# Patient Record
Sex: FEMALE | Race: WHITE | NOT HISPANIC OR LATINO | Employment: OTHER | URBAN - METROPOLITAN AREA
[De-identification: names, ages, dates, MRNs, and addresses within clinical notes are randomized per-mention and may not be internally consistent; named-entity substitution may affect disease eponyms.]

---

## 2017-01-12 ENCOUNTER — COMMUNICATION - HEALTHEAST (OUTPATIENT)
Dept: ADMINISTRATIVE | Facility: CLINIC | Age: 82
End: 2017-01-12

## 2017-01-13 ENCOUNTER — COMMUNICATION - HEALTHEAST (OUTPATIENT)
Dept: FAMILY MEDICINE | Facility: CLINIC | Age: 82
End: 2017-01-13

## 2017-01-31 ENCOUNTER — OFFICE VISIT - HEALTHEAST (OUTPATIENT)
Dept: CARDIOLOGY | Facility: CLINIC | Age: 82
End: 2017-01-31

## 2017-01-31 ENCOUNTER — COMMUNICATION - HEALTHEAST (OUTPATIENT)
Dept: FAMILY MEDICINE | Facility: CLINIC | Age: 82
End: 2017-01-31

## 2017-01-31 DIAGNOSIS — I25.10 CORONARY ARTERY DISEASE INVOLVING NATIVE CORONARY ARTERY OF NATIVE HEART WITHOUT ANGINA PECTORIS: ICD-10-CM

## 2017-01-31 DIAGNOSIS — E78.2 MIXED HYPERLIPIDEMIA: ICD-10-CM

## 2017-01-31 DIAGNOSIS — Z95.2 S/P AVR: ICD-10-CM

## 2017-01-31 DIAGNOSIS — I10 ESSENTIAL HYPERTENSION: ICD-10-CM

## 2017-01-31 ASSESSMENT — MIFFLIN-ST. JEOR: SCORE: 1017.88

## 2017-02-03 ENCOUNTER — COMMUNICATION - HEALTHEAST (OUTPATIENT)
Dept: FAMILY MEDICINE | Facility: CLINIC | Age: 82
End: 2017-02-03

## 2017-02-03 DIAGNOSIS — L40.50 PSORIATIC ARTHRITIS (H): ICD-10-CM

## 2017-02-09 ENCOUNTER — AMBULATORY - HEALTHEAST (OUTPATIENT)
Dept: LAB | Facility: CLINIC | Age: 82
End: 2017-02-09

## 2017-02-09 DIAGNOSIS — L40.50 PSORIATIC ARTHROPATHY (H): ICD-10-CM

## 2017-02-09 LAB
CREAT SERPL-MCNC: 0.76 MG/DL (ref 0.6–1.1)
GFR SERPL CREATININE-BSD FRML MDRD: >60 ML/MIN/1.73M2

## 2017-02-11 ENCOUNTER — COMMUNICATION - HEALTHEAST (OUTPATIENT)
Dept: FAMILY MEDICINE | Facility: CLINIC | Age: 82
End: 2017-02-11

## 2017-02-11 DIAGNOSIS — L40.50 PSORIATIC ARTHROPATHY (H): ICD-10-CM

## 2017-02-13 ENCOUNTER — COMMUNICATION - HEALTHEAST (OUTPATIENT)
Dept: ADMINISTRATIVE | Facility: CLINIC | Age: 82
End: 2017-02-13

## 2017-02-13 DIAGNOSIS — L40.50 PSORIATIC ARTHRITIS (H): ICD-10-CM

## 2017-02-13 DIAGNOSIS — L40.50 PSORIATIC ARTHROPATHY (H): ICD-10-CM

## 2017-02-14 ENCOUNTER — COMMUNICATION - HEALTHEAST (OUTPATIENT)
Dept: FAMILY MEDICINE | Facility: CLINIC | Age: 82
End: 2017-02-14

## 2017-02-14 DIAGNOSIS — I10 HTN (HYPERTENSION): ICD-10-CM

## 2017-03-22 ENCOUNTER — COMMUNICATION - HEALTHEAST (OUTPATIENT)
Dept: FAMILY MEDICINE | Facility: CLINIC | Age: 82
End: 2017-03-22

## 2017-03-22 DIAGNOSIS — R06.09 DYSPNEA ON EXERTION: ICD-10-CM

## 2017-05-04 ENCOUNTER — COMMUNICATION - HEALTHEAST (OUTPATIENT)
Dept: ADMINISTRATIVE | Facility: CLINIC | Age: 82
End: 2017-05-04

## 2017-05-04 DIAGNOSIS — L40.50 PSORIATIC ARTHROPATHY (H): ICD-10-CM

## 2017-05-04 DIAGNOSIS — L40.50 PSORIATIC ARTHRITIS (H): ICD-10-CM

## 2017-05-05 ENCOUNTER — COMMUNICATION - HEALTHEAST (OUTPATIENT)
Dept: FAMILY MEDICINE | Facility: CLINIC | Age: 82
End: 2017-05-05

## 2017-05-05 ENCOUNTER — AMBULATORY - HEALTHEAST (OUTPATIENT)
Dept: LAB | Facility: CLINIC | Age: 82
End: 2017-05-05

## 2017-05-05 DIAGNOSIS — L40.50 PSORIATIC ARTHROPATHY (H): ICD-10-CM

## 2017-05-05 LAB
CREAT SERPL-MCNC: 0.74 MG/DL (ref 0.6–1.1)
GFR SERPL CREATININE-BSD FRML MDRD: >60 ML/MIN/1.73M2

## 2017-06-02 ENCOUNTER — OFFICE VISIT - HEALTHEAST (OUTPATIENT)
Dept: FAMILY MEDICINE | Facility: CLINIC | Age: 82
End: 2017-06-02

## 2017-06-02 DIAGNOSIS — R53.83 FATIGUE: ICD-10-CM

## 2017-06-06 ENCOUNTER — AMBULATORY - HEALTHEAST (OUTPATIENT)
Dept: FAMILY MEDICINE | Facility: CLINIC | Age: 82
End: 2017-06-06

## 2017-06-06 DIAGNOSIS — E55.9 VITAMIN D DEFICIENCY: ICD-10-CM

## 2017-06-07 ENCOUNTER — COMMUNICATION - HEALTHEAST (OUTPATIENT)
Dept: FAMILY MEDICINE | Facility: CLINIC | Age: 82
End: 2017-06-07

## 2017-06-21 ENCOUNTER — COMMUNICATION - HEALTHEAST (OUTPATIENT)
Dept: ADMINISTRATIVE | Facility: CLINIC | Age: 82
End: 2017-06-21

## 2017-07-05 ENCOUNTER — COMMUNICATION - HEALTHEAST (OUTPATIENT)
Dept: RHEUMATOLOGY | Facility: CLINIC | Age: 82
End: 2017-07-05

## 2017-07-05 DIAGNOSIS — L40.50 PSORIATIC ARTHRITIS (H): ICD-10-CM

## 2017-07-11 ENCOUNTER — OFFICE VISIT - HEALTHEAST (OUTPATIENT)
Dept: RHEUMATOLOGY | Facility: CLINIC | Age: 82
End: 2017-07-11

## 2017-07-11 DIAGNOSIS — M15.9 GENERALIZED OSTEOARTHROSIS OF HAND: ICD-10-CM

## 2017-07-11 DIAGNOSIS — L40.50 PSORIATIC ARTHRITIS (H): ICD-10-CM

## 2017-07-11 DIAGNOSIS — L40.8 OTHER PSORIASIS: ICD-10-CM

## 2017-07-11 DIAGNOSIS — Z79.899 HIGH RISK MEDICATION USE: ICD-10-CM

## 2017-07-11 ASSESSMENT — MIFFLIN-ST. JEOR: SCORE: 1031.49

## 2017-07-13 ENCOUNTER — COMMUNICATION - HEALTHEAST (OUTPATIENT)
Dept: RHEUMATOLOGY | Facility: CLINIC | Age: 82
End: 2017-07-13

## 2017-07-13 DIAGNOSIS — L40.50 PSORIATIC ARTHROPATHY (H): ICD-10-CM

## 2017-07-13 DIAGNOSIS — L40.50 PSORIATIC ARTHRITIS (H): ICD-10-CM

## 2017-07-27 ENCOUNTER — COMMUNICATION - HEALTHEAST (OUTPATIENT)
Dept: CARDIOLOGY | Facility: CLINIC | Age: 82
End: 2017-07-27

## 2017-07-27 DIAGNOSIS — I25.708 CORONARY ARTERY DISEASE INVOLVING CORONARY BYPASS GRAFT OF NATIVE HEART WITH OTHER FORMS OF ANGINA PECTORIS (H): ICD-10-CM

## 2017-07-28 ENCOUNTER — COMMUNICATION - HEALTHEAST (OUTPATIENT)
Dept: FAMILY MEDICINE | Facility: CLINIC | Age: 82
End: 2017-07-28

## 2017-07-28 DIAGNOSIS — E03.9 HYPOTHYROIDISM: ICD-10-CM

## 2017-08-04 ENCOUNTER — OFFICE VISIT - HEALTHEAST (OUTPATIENT)
Dept: FAMILY MEDICINE | Facility: CLINIC | Age: 82
End: 2017-08-04

## 2017-08-04 DIAGNOSIS — Z79.899 HIGH RISK MEDICATION USE: ICD-10-CM

## 2017-08-04 DIAGNOSIS — E03.9 HYPOTHYROIDISM: ICD-10-CM

## 2017-08-04 DIAGNOSIS — L40.50 PSORIATIC ARTHRITIS (H): ICD-10-CM

## 2017-08-04 DIAGNOSIS — R06.02 SHORTNESS OF BREATH: ICD-10-CM

## 2017-08-04 DIAGNOSIS — E55.9 VITAMIN D DEFICIENCY: ICD-10-CM

## 2017-08-04 DIAGNOSIS — E78.5 HYPERLIPIDEMIA: ICD-10-CM

## 2017-08-04 DIAGNOSIS — Z00.00 HEALTH MAINTENANCE EXAMINATION: ICD-10-CM

## 2017-08-04 DIAGNOSIS — R13.10 DYSPHAGIA: ICD-10-CM

## 2017-08-04 LAB
CHOLEST SERPL-MCNC: 135 MG/DL
FASTING STATUS PATIENT QL REPORTED: YES
HDLC SERPL-MCNC: 65 MG/DL
LDLC SERPL CALC-MCNC: 58 MG/DL
TRIGL SERPL-MCNC: 62 MG/DL

## 2017-08-04 ASSESSMENT — MIFFLIN-ST. JEOR: SCORE: 1003.36

## 2017-08-08 ENCOUNTER — AMBULATORY - HEALTHEAST (OUTPATIENT)
Dept: PULMONOLOGY | Facility: OTHER | Age: 82
End: 2017-08-08

## 2017-08-08 ENCOUNTER — COMMUNICATION - HEALTHEAST (OUTPATIENT)
Dept: FAMILY MEDICINE | Facility: CLINIC | Age: 82
End: 2017-08-08

## 2017-08-08 DIAGNOSIS — R06.02 SOB (SHORTNESS OF BREATH): ICD-10-CM

## 2017-08-09 ENCOUNTER — COMMUNICATION - HEALTHEAST (OUTPATIENT)
Dept: FAMILY MEDICINE | Facility: CLINIC | Age: 82
End: 2017-08-09

## 2017-08-09 ENCOUNTER — OFFICE VISIT - HEALTHEAST (OUTPATIENT)
Dept: FAMILY MEDICINE | Facility: CLINIC | Age: 82
End: 2017-08-09
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
## 2017-08-18 ENCOUNTER — HOSPITAL ENCOUNTER (OUTPATIENT)
Dept: MAMMOGRAPHY | Facility: HOSPITAL | Age: 82
Discharge: HOME | End: 2017-08-18
Attending: OBSTETRICS & GYNECOLOGY

## 2017-08-18 DIAGNOSIS — Z12.31 VISIT FOR SCREENING MAMMOGRAM: ICD-10-CM

## 2017-09-05 ENCOUNTER — COMMUNICATION - HEALTHEAST (OUTPATIENT)
Dept: ADMINISTRATIVE | Facility: CLINIC | Age: 82
End: 2017-09-05

## 2017-09-05 ENCOUNTER — AMBULATORY - HEALTHEAST (OUTPATIENT)
Dept: FAMILY MEDICINE | Facility: CLINIC | Age: 82
End: 2017-09-05

## 2017-09-05 ENCOUNTER — COMMUNICATION - HEALTHEAST (OUTPATIENT)
Dept: FAMILY MEDICINE | Facility: CLINIC | Age: 82
End: 2017-09-05

## 2017-09-05 DIAGNOSIS — H91.90 DECREASED HEARING: ICD-10-CM

## 2017-09-05 DIAGNOSIS — M85.80 OSTEOPENIA: ICD-10-CM

## 2017-09-07 ENCOUNTER — OFFICE VISIT - HEALTHEAST (OUTPATIENT)
Dept: CARDIOLOGY | Facility: CLINIC | Age: 82
End: 2017-09-07

## 2017-09-07 DIAGNOSIS — I25.810 CORONARY ARTERY DISEASE INVOLVING CORONARY BYPASS GRAFT OF NATIVE HEART WITHOUT ANGINA PECTORIS: ICD-10-CM

## 2017-09-07 DIAGNOSIS — Z95.2 S/P AVR: ICD-10-CM

## 2017-09-07 DIAGNOSIS — E78.2 MIXED HYPERLIPIDEMIA: ICD-10-CM

## 2017-09-07 DIAGNOSIS — I10 ESSENTIAL HYPERTENSION WITH GOAL BLOOD PRESSURE LESS THAN 140/90: ICD-10-CM

## 2017-09-07 ASSESSMENT — MIFFLIN-ST. JEOR: SCORE: 1026.95

## 2017-09-18 ENCOUNTER — OFFICE VISIT - HEALTHEAST (OUTPATIENT)
Dept: PULMONOLOGY | Facility: OTHER | Age: 82
End: 2017-09-18

## 2017-09-18 ENCOUNTER — RECORDS - HEALTHEAST (OUTPATIENT)
Dept: PULMONOLOGY | Facility: OTHER | Age: 82
End: 2017-09-18

## 2017-09-18 ENCOUNTER — RECORDS - HEALTHEAST (OUTPATIENT)
Dept: ADMINISTRATIVE | Facility: OTHER | Age: 82
End: 2017-09-18

## 2017-09-18 DIAGNOSIS — R06.09 DYSPNEA ON EXERTION: ICD-10-CM

## 2017-09-18 DIAGNOSIS — R06.02 SHORTNESS OF BREATH: ICD-10-CM

## 2017-09-18 DIAGNOSIS — R94.2 DIFFUSION CAPACITY OF LUNG (DL), DECREASED: ICD-10-CM

## 2017-09-19 ENCOUNTER — COMMUNICATION - HEALTHEAST (OUTPATIENT)
Dept: ENDOCRINOLOGY | Facility: CLINIC | Age: 82
End: 2017-09-19

## 2017-09-28 ENCOUNTER — COMMUNICATION - HEALTHEAST (OUTPATIENT)
Dept: FAMILY MEDICINE | Facility: CLINIC | Age: 82
End: 2017-09-28

## 2017-09-28 DIAGNOSIS — L40.50 PSORIATIC ARTHROPATHY (H): ICD-10-CM

## 2017-09-28 DIAGNOSIS — L40.50 PSORIATIC ARTHRITIS (H): ICD-10-CM

## 2017-10-18 ENCOUNTER — OFFICE VISIT - HEALTHEAST (OUTPATIENT)
Dept: AUDIOLOGY | Facility: CLINIC | Age: 82
End: 2017-10-18

## 2017-10-18 DIAGNOSIS — H90.3 SENSORINEURAL HEARING LOSS, BILATERAL: ICD-10-CM

## 2017-10-18 DIAGNOSIS — H93.13 TINNITUS OF BOTH EARS: ICD-10-CM

## 2017-11-04 ENCOUNTER — COMMUNICATION - HEALTHEAST (OUTPATIENT)
Dept: FAMILY MEDICINE | Facility: CLINIC | Age: 82
End: 2017-11-04

## 2017-11-04 DIAGNOSIS — I25.10 CORONARY ARTERY DISEASE INVOLVING NATIVE CORONARY ARTERY OF NATIVE HEART WITHOUT ANGINA PECTORIS: ICD-10-CM

## 2017-11-04 DIAGNOSIS — I10 HTN (HYPERTENSION): ICD-10-CM

## 2017-11-06 ENCOUNTER — AMBULATORY - HEALTHEAST (OUTPATIENT)
Dept: LAB | Facility: CLINIC | Age: 82
End: 2017-11-06

## 2017-11-06 DIAGNOSIS — Z79.899 HIGH RISK MEDICATION USE: ICD-10-CM

## 2017-11-06 DIAGNOSIS — L40.50 PSORIATIC ARTHRITIS (H): ICD-10-CM

## 2017-11-06 LAB
ALT SERPL W P-5'-P-CCNC: 28 U/L (ref 0–45)
CREAT SERPL-MCNC: 0.84 MG/DL (ref 0.6–1.1)
GFR SERPL CREATININE-BSD FRML MDRD: >60 ML/MIN/1.73M2

## 2017-11-13 ENCOUNTER — OFFICE VISIT - HEALTHEAST (OUTPATIENT)
Dept: ENDOCRINOLOGY | Facility: CLINIC | Age: 82
End: 2017-11-13

## 2017-11-13 DIAGNOSIS — E03.9 HYPOTHYROIDISM: ICD-10-CM

## 2017-11-13 ASSESSMENT — MIFFLIN-ST. JEOR: SCORE: 1009.37

## 2017-11-22 ENCOUNTER — OFFICE VISIT - HEALTHEAST (OUTPATIENT)
Dept: RHEUMATOLOGY | Facility: CLINIC | Age: 82
End: 2017-11-22

## 2017-11-22 DIAGNOSIS — Z79.899 HIGH RISK MEDICATION USE: ICD-10-CM

## 2017-11-22 DIAGNOSIS — M15.9 GENERALIZED OSTEOARTHROSIS OF HAND: ICD-10-CM

## 2017-11-22 DIAGNOSIS — L40.8 OTHER PSORIASIS: ICD-10-CM

## 2017-11-22 DIAGNOSIS — L40.50 PSORIATIC ARTHRITIS (H): ICD-10-CM

## 2018-01-18 ENCOUNTER — COMMUNICATION - HEALTHEAST (OUTPATIENT)
Dept: ADMINISTRATIVE | Facility: CLINIC | Age: 83
End: 2018-01-18

## 2018-01-26 ENCOUNTER — OFFICE VISIT - HEALTHEAST (OUTPATIENT)
Dept: FAMILY MEDICINE | Facility: CLINIC | Age: 83
End: 2018-01-26

## 2018-01-26 DIAGNOSIS — R53.83 FATIGUE: ICD-10-CM

## 2018-01-26 DIAGNOSIS — I25.708 CORONARY ARTERY DISEASE INVOLVING CORONARY BYPASS GRAFT OF NATIVE HEART WITH OTHER FORMS OF ANGINA PECTORIS (H): ICD-10-CM

## 2018-01-26 DIAGNOSIS — R42 LIGHTHEADED: ICD-10-CM

## 2018-01-26 DIAGNOSIS — Z71.84 TRAVEL ADVICE ENCOUNTER: ICD-10-CM

## 2018-01-26 DIAGNOSIS — L40.50 PSORIATIC ARTHRITIS (H): ICD-10-CM

## 2018-01-26 DIAGNOSIS — D49.2 SKIN GROWTH: ICD-10-CM

## 2018-01-26 DIAGNOSIS — Z79.899 HIGH RISK MEDICATION USE: ICD-10-CM

## 2018-01-26 LAB
ALBUMIN SERPL-MCNC: 3.8 G/DL (ref 3.5–5)
ALP SERPL-CCNC: 85 U/L (ref 45–120)
ALT SERPL W P-5'-P-CCNC: 29 U/L (ref 0–45)
ANION GAP SERPL CALCULATED.3IONS-SCNC: 10 MMOL/L (ref 5–18)
AST SERPL W P-5'-P-CCNC: 42 U/L (ref 0–40)
ATRIAL RATE - MUSE: 73 BPM
BILIRUB SERPL-MCNC: 0.9 MG/DL (ref 0–1)
BUN SERPL-MCNC: 11 MG/DL (ref 8–28)
CALCIUM SERPL-MCNC: 9.7 MG/DL (ref 8.5–10.5)
CHLORIDE BLD-SCNC: 102 MMOL/L (ref 98–107)
CO2 SERPL-SCNC: 28 MMOL/L (ref 22–31)
CREAT SERPL-MCNC: 0.83 MG/DL (ref 0.6–1.1)
DIASTOLIC BLOOD PRESSURE - MUSE: NORMAL MMHG
ERYTHROCYTE [DISTWIDTH] IN BLOOD BY AUTOMATED COUNT: 13.8 % (ref 11–14.5)
GFR SERPL CREATININE-BSD FRML MDRD: >60 ML/MIN/1.73M2
GLUCOSE BLD-MCNC: 86 MG/DL (ref 70–125)
HCT VFR BLD AUTO: 34.5 % (ref 35–47)
HGB BLD-MCNC: 11.4 G/DL (ref 12–16)
INTERPRETATION ECG - MUSE: NORMAL
MCH RBC QN AUTO: 31.5 PG (ref 27–34)
MCHC RBC AUTO-ENTMCNC: 33.2 G/DL (ref 32–36)
MCV RBC AUTO: 95 FL (ref 80–100)
P AXIS - MUSE: 95 DEGREES
PLATELET # BLD AUTO: 146 THOU/UL (ref 140–440)
PMV BLD AUTO: 8.8 FL (ref 7–10)
POTASSIUM BLD-SCNC: 4.4 MMOL/L (ref 3.5–5)
PR INTERVAL - MUSE: 218 MS
PROT SERPL-MCNC: 7.3 G/DL (ref 6–8)
QRS DURATION - MUSE: 84 MS
QT - MUSE: 414 MS
QTC - MUSE: 456 MS
R AXIS - MUSE: -25 DEGREES
RBC # BLD AUTO: 3.62 MILL/UL (ref 3.8–5.4)
SODIUM SERPL-SCNC: 140 MMOL/L (ref 136–145)
SYSTOLIC BLOOD PRESSURE - MUSE: NORMAL MMHG
T AXIS - MUSE: 85 DEGREES
TSH SERPL DL<=0.005 MIU/L-ACNC: 1.11 UIU/ML (ref 0.3–5)
VENTRICULAR RATE- MUSE: 73 BPM
WBC: 6.5 THOU/UL (ref 4–11)

## 2018-01-26 ASSESSMENT — MIFFLIN-ST. JEOR: SCORE: 1004.38

## 2018-01-29 ENCOUNTER — COMMUNICATION - HEALTHEAST (OUTPATIENT)
Dept: FAMILY MEDICINE | Facility: CLINIC | Age: 83
End: 2018-01-29

## 2018-01-29 DIAGNOSIS — I25.10 CORONARY ARTERY DISEASE INVOLVING NATIVE CORONARY ARTERY OF NATIVE HEART WITHOUT ANGINA PECTORIS: ICD-10-CM

## 2018-01-29 DIAGNOSIS — I10 HTN (HYPERTENSION): ICD-10-CM

## 2018-01-29 LAB
25(OH)D3 SERPL-MCNC: 46.5 NG/ML (ref 30–80)
25(OH)D3 SERPL-MCNC: 46.5 NG/ML (ref 30–80)

## 2018-02-01 ENCOUNTER — HOSPITAL ENCOUNTER (OUTPATIENT)
Dept: CARDIOLOGY | Facility: HOSPITAL | Age: 83
Discharge: HOME | End: 2018-02-01
Attending: FAMILY MEDICINE

## 2018-02-01 DIAGNOSIS — I25.708 CORONARY ARTERY DISEASE INVOLVING CORONARY BYPASS GRAFT OF NATIVE HEART WITH OTHER FORMS OF ANGINA PECTORIS (H): ICD-10-CM

## 2018-02-01 DIAGNOSIS — R42 LIGHTHEADED: ICD-10-CM

## 2018-02-01 LAB
AORTIC ROOT: 2.7 CM
AORTIC VALVE MEAN VELOCITY: 131 CM/S
AV DIMENSIONLESS INDEX VTI: 0.6
AV MEAN GRADIENT: 8 MMHG
AV PEAK GRADIENT: 13.1 MMHG
AV VALVE AREA: 1.5 CM2
AV VELOCITY RATIO: 0.6
BSA FOR ECHO PROCEDURE: 1.61 M2
CV BLOOD PRESSURE: NORMAL MMHG
CV ECHO HEIGHT: 62.8 IN
CV ECHO WEIGHT: 129 LBS
DOP CALC AO PEAK VEL: 181 CM/S
DOP CALC AO VTI: 33.4 CM
DOP CALC LVOT AREA: 2.54 CM2
DOP CALC LVOT DIAMETER: 1.8 CM
DOP CALC LVOT PEAK VEL: 104 CM/S
DOP CALC LVOT STROKE VOLUME: 49.6 CM3
DOP CALCLVOT PEAK VEL VTI: 19.5 CM
EJECTION FRACTION: 60 % (ref 55–75)
FRACTIONAL SHORTENING: 35.6 % (ref 28–44)
INTERVENTRICULAR SEPTUM IN END DIASTOLE: 1.2 CM (ref 0.6–0.9)
IVS/PW RATIO: 1.1
LA AREA 1: 23 CM2
LA AREA 2: 22 CM2
LEFT ATRIUM LENGTH: 5.5 CM
LEFT ATRIUM SIZE: 4.2 CM
LEFT ATRIUM TO AORTIC ROOT RATIO: 1.56 NO UNITS
LEFT ATRIUM VOLUME INDEX: 48.6 ML/M2
LEFT ATRIUM VOLUME: 78.2 ML
LEFT VENTRICLE CARDIAC INDEX: 2.5 L/MIN/M2
LEFT VENTRICLE CARDIAC OUTPUT: 4 L/MIN
LEFT VENTRICLE DIASTOLIC VOLUME INDEX: 47.4 CM3/M2 (ref 34–74)
LEFT VENTRICLE DIASTOLIC VOLUME: 76.3 CM3 (ref 46–106)
LEFT VENTRICLE HEART RATE: 80 BPM
LEFT VENTRICLE MASS INDEX: 102.3 G/M2
LEFT VENTRICLE SYSTOLIC VOLUME INDEX: 19.1 CM3/M2 (ref 11–31)
LEFT VENTRICLE SYSTOLIC VOLUME: 30.8 CM3 (ref 14–42)
LEFT VENTRICULAR INTERNAL DIMENSION IN DIASTOLE: 4.19 CM (ref 3.8–5.2)
LEFT VENTRICULAR INTERNAL DIMENSION IN SYSTOLE: 2.7 CM (ref 2.2–3.5)
LEFT VENTRICULAR MASS: 164.7 G
LEFT VENTRICULAR OUTFLOW TRACT MEAN GRADIENT: 3 MMHG
LEFT VENTRICULAR OUTFLOW TRACT MEAN VELOCITY: 77.3 CM/S
LEFT VENTRICULAR OUTFLOW TRACT PEAK GRADIENT: 4 MMHG
LEFT VENTRICULAR POSTERIOR WALL IN END DIASTOLE: 1.08 CM (ref 0.6–0.9)
LV STROKE VOLUME INDEX: 30.8 ML/M2
MITRAL VALVE DECELERATION SLOPE: 5290 MM/S2
MITRAL VALVE PRESSURE HALF-TIME: 73 MS
MV AVERAGE E/E' RATIO: 16.7 CM/S
MV DECELERATION TIME: 249 MS
MV E'TISSUE VEL-LAT: 5.8 CM/S
MV E'TISSUE VEL-MED: 6.77 CM/S
MV LATERAL E/E' RATIO: 18.1
MV MEDIAL E/E' RATIO: 15.5
MV PEAK E VELOCITY: 105 CM/S
MV VALVE AREA PRESSURE 1/2 METHOD: 3 CM2
NUC REST DIASTOLIC VOLUME INDEX: 2064 LBS
NUC REST SYSTOLIC VOLUME INDEX: 62.75 IN
PR MAX PG: 4 MMHG
PR PEAK VELOCITY: 97.1 CM/S
TRICUSPID REGURGITATION PEAK PRESSURE GRADIENT: 22.5 MMHG
TRICUSPID VALVE ANULAR PLANE SYSTOLIC EXCURSION: 2.1 CM
TRICUSPID VALVE PEAK REGURGITANT VELOCITY: 237 CM/S

## 2018-02-01 ASSESSMENT — MIFFLIN-ST. JEOR: SCORE: 1000.3

## 2018-02-20 ENCOUNTER — AMBULATORY - HEALTHEAST (OUTPATIENT)
Dept: LAB | Facility: CLINIC | Age: 83
End: 2018-02-20

## 2018-02-20 DIAGNOSIS — Z79.899 HIGH RISK MEDICATION USE: ICD-10-CM

## 2018-02-20 DIAGNOSIS — L40.50 PSORIATIC ARTHRITIS (H): ICD-10-CM

## 2018-02-20 LAB
ALBUMIN SERPL-MCNC: 3.9 G/DL (ref 3.5–5)
ALT SERPL W P-5'-P-CCNC: 29 U/L (ref 0–45)
CREAT SERPL-MCNC: 0.87 MG/DL (ref 0.6–1.1)
ERYTHROCYTE [DISTWIDTH] IN BLOOD BY AUTOMATED COUNT: 14.7 % (ref 11–14.5)
GFR SERPL CREATININE-BSD FRML MDRD: >60 ML/MIN/1.73M2
HCT VFR BLD AUTO: 37 % (ref 35–47)
HGB BLD-MCNC: 12.1 G/DL (ref 12–16)
MCH RBC QN AUTO: 31.2 PG (ref 27–34)
MCHC RBC AUTO-ENTMCNC: 32.8 G/DL (ref 32–36)
MCV RBC AUTO: 95 FL (ref 80–100)
PLATELET # BLD AUTO: 153 THOU/UL (ref 140–440)
PMV BLD AUTO: 9.1 FL (ref 7–10)
RBC # BLD AUTO: 3.89 MILL/UL (ref 3.8–5.4)
WBC: 7.2 THOU/UL (ref 4–11)

## 2018-02-22 ENCOUNTER — OFFICE VISIT - HEALTHEAST (OUTPATIENT)
Dept: RHEUMATOLOGY | Facility: CLINIC | Age: 83
End: 2018-02-22

## 2018-02-22 DIAGNOSIS — L40.50 PSORIATIC ARTHRITIS (H): ICD-10-CM

## 2018-02-22 DIAGNOSIS — Z79.899 HIGH RISK MEDICATION USE: ICD-10-CM

## 2018-02-22 DIAGNOSIS — M15.9 GENERALIZED OSTEOARTHROSIS OF HAND: ICD-10-CM

## 2018-02-22 ASSESSMENT — MIFFLIN-ST. JEOR: SCORE: 1000.3

## 2018-03-01 ENCOUNTER — OFFICE VISIT - HEALTHEAST (OUTPATIENT)
Dept: CARDIOLOGY | Facility: CLINIC | Age: 83
End: 2018-03-01

## 2018-03-01 DIAGNOSIS — E78.2 MIXED HYPERLIPIDEMIA: ICD-10-CM

## 2018-03-01 DIAGNOSIS — I10 ESSENTIAL HYPERTENSION: ICD-10-CM

## 2018-03-01 DIAGNOSIS — I25.10 CORONARY ARTERY DISEASE INVOLVING NATIVE CORONARY ARTERY OF NATIVE HEART WITHOUT ANGINA PECTORIS: ICD-10-CM

## 2018-03-01 DIAGNOSIS — Z95.2 S/P AVR: ICD-10-CM

## 2018-03-01 ASSESSMENT — MIFFLIN-ST. JEOR: SCORE: 1018.44

## 2018-03-02 LAB
ATRIAL RATE - MUSE: 72 BPM
DIASTOLIC BLOOD PRESSURE - MUSE: NORMAL MMHG
INTERPRETATION ECG - MUSE: NORMAL
P AXIS - MUSE: 81 DEGREES
PR INTERVAL - MUSE: 216 MS
QRS DURATION - MUSE: 92 MS
QT - MUSE: 408 MS
QTC - MUSE: 446 MS
R AXIS - MUSE: -18 DEGREES
SYSTOLIC BLOOD PRESSURE - MUSE: NORMAL MMHG
T AXIS - MUSE: 89 DEGREES
VENTRICULAR RATE- MUSE: 72 BPM

## 2018-03-28 ENCOUNTER — COMMUNICATION - HEALTHEAST (OUTPATIENT)
Dept: FAMILY MEDICINE | Facility: CLINIC | Age: 83
End: 2018-03-28

## 2018-03-28 DIAGNOSIS — E55.9 VITAMIN D DEFICIENCY: ICD-10-CM

## 2018-03-29 ENCOUNTER — COMMUNICATION - HEALTHEAST (OUTPATIENT)
Dept: FAMILY MEDICINE | Facility: CLINIC | Age: 83
End: 2018-03-29

## 2018-03-29 DIAGNOSIS — E55.9 VITAMIN D DEFICIENCY: ICD-10-CM

## 2018-04-26 ENCOUNTER — OFFICE VISIT - HEALTHEAST (OUTPATIENT)
Dept: FAMILY MEDICINE | Facility: CLINIC | Age: 83
End: 2018-04-26

## 2018-04-26 ENCOUNTER — RECORDS - HEALTHEAST (OUTPATIENT)
Dept: GENERAL RADIOLOGY | Facility: CLINIC | Age: 83
End: 2018-04-26

## 2018-04-26 DIAGNOSIS — M25.539 PAIN IN UNSPECIFIED WRIST: ICD-10-CM

## 2018-04-26 DIAGNOSIS — M25.532 LEFT WRIST PAIN: ICD-10-CM

## 2018-04-28 ENCOUNTER — COMMUNICATION - HEALTHEAST (OUTPATIENT)
Dept: FAMILY MEDICINE | Facility: CLINIC | Age: 83
End: 2018-04-28

## 2018-04-28 DIAGNOSIS — E03.9 HYPOTHYROIDISM: ICD-10-CM

## 2018-04-28 DIAGNOSIS — L40.50 PSORIATIC ARTHROPATHY (H): ICD-10-CM

## 2018-04-30 ENCOUNTER — COMMUNICATION - HEALTHEAST (OUTPATIENT)
Dept: CARDIOLOGY | Facility: CLINIC | Age: 83
End: 2018-04-30

## 2018-04-30 DIAGNOSIS — I25.708 CORONARY ARTERY DISEASE INVOLVING CORONARY BYPASS GRAFT OF NATIVE HEART WITH OTHER FORMS OF ANGINA PECTORIS (H): ICD-10-CM

## 2018-05-15 ENCOUNTER — COMMUNICATION - HEALTHEAST (OUTPATIENT)
Dept: ENDOCRINOLOGY | Facility: CLINIC | Age: 83
End: 2018-05-15

## 2018-06-06 ENCOUNTER — AMBULATORY - HEALTHEAST (OUTPATIENT)
Dept: LAB | Facility: CLINIC | Age: 83
End: 2018-06-06

## 2018-06-06 DIAGNOSIS — Z79.899 HIGH RISK MEDICATION USE: ICD-10-CM

## 2018-06-06 DIAGNOSIS — L40.50 PSORIATIC ARTHRITIS (H): ICD-10-CM

## 2018-06-06 LAB
ALBUMIN SERPL-MCNC: 4 G/DL (ref 3.5–5)
ALT SERPL W P-5'-P-CCNC: 24 U/L (ref 0–45)
CREAT SERPL-MCNC: 0.88 MG/DL (ref 0.6–1.1)
ERYTHROCYTE [DISTWIDTH] IN BLOOD BY AUTOMATED COUNT: 12.4 % (ref 11–14.5)
GFR SERPL CREATININE-BSD FRML MDRD: >60 ML/MIN/1.73M2
HCT VFR BLD AUTO: 38.7 % (ref 35–47)
HGB BLD-MCNC: 13 G/DL (ref 12–16)
MCH RBC QN AUTO: 33 PG (ref 27–34)
MCHC RBC AUTO-ENTMCNC: 33.5 G/DL (ref 32–36)
MCV RBC AUTO: 98 FL (ref 80–100)
PLATELET # BLD AUTO: 152 THOU/UL (ref 140–440)
PMV BLD AUTO: 8.1 FL (ref 7–10)
RBC # BLD AUTO: 3.93 MILL/UL (ref 3.8–5.4)
WBC: 8.9 THOU/UL (ref 4–11)

## 2018-06-08 ENCOUNTER — OFFICE VISIT - HEALTHEAST (OUTPATIENT)
Dept: RHEUMATOLOGY | Facility: CLINIC | Age: 83
End: 2018-06-08

## 2018-06-08 DIAGNOSIS — M15.9 GENERALIZED OSTEOARTHROSIS OF HAND: ICD-10-CM

## 2018-06-08 DIAGNOSIS — M65.932 EXTENSOR TENOSYNOVITIS OF WRIST, LEFT: ICD-10-CM

## 2018-06-08 DIAGNOSIS — L40.50 PSORIATIC ARTHRITIS (H): ICD-10-CM

## 2018-06-08 ASSESSMENT — MIFFLIN-ST. JEOR: SCORE: 1006.65

## 2018-06-29 ENCOUNTER — COMMUNICATION - HEALTHEAST (OUTPATIENT)
Dept: ADMINISTRATIVE | Facility: CLINIC | Age: 83
End: 2018-06-29

## 2018-07-09 ENCOUNTER — OFFICE VISIT - HEALTHEAST (OUTPATIENT)
Dept: FAMILY MEDICINE | Facility: CLINIC | Age: 83
End: 2018-07-09

## 2018-07-09 ENCOUNTER — HOSPITAL ENCOUNTER (OUTPATIENT)
Dept: CT IMAGING | Facility: HOSPITAL | Age: 83
Discharge: HOME | End: 2018-07-09
Attending: FAMILY MEDICINE

## 2018-07-09 DIAGNOSIS — R42 VERTIGO: ICD-10-CM

## 2018-07-09 DIAGNOSIS — R41.3 MEMORY LOSS: ICD-10-CM

## 2018-07-11 ENCOUNTER — OFFICE VISIT - HEALTHEAST (OUTPATIENT)
Dept: OCCUPATIONAL THERAPY | Facility: REHABILITATION | Age: 83
End: 2018-07-11

## 2018-07-11 ENCOUNTER — COMMUNICATION - HEALTHEAST (OUTPATIENT)
Dept: FAMILY MEDICINE | Facility: CLINIC | Age: 83
End: 2018-07-11

## 2018-07-11 DIAGNOSIS — R42 DIZZINESS: ICD-10-CM

## 2018-07-11 DIAGNOSIS — R26.81 UNSTEADINESS ON FEET: ICD-10-CM

## 2018-08-16 ENCOUNTER — HOSPITAL ENCOUNTER (OUTPATIENT)
Dept: NEUROLOGY | Facility: CLINIC | Age: 83
Setting detail: THERAPIES SERIES
Discharge: STILL ADMITTED OR ADMINISTRATIVE | End: 2018-08-16
Attending: FAMILY MEDICINE

## 2018-08-16 ENCOUNTER — HOSPITAL ENCOUNTER (OUTPATIENT)
Dept: NEUROLOGY | Facility: CLINIC | Age: 83
Setting detail: THERAPIES SERIES
Discharge: STILL ADMITTED OR ADMINISTRATIVE | End: 2018-08-16
Attending: SOCIAL WORKER

## 2018-08-16 DIAGNOSIS — G31.84 MCI (MILD COGNITIVE IMPAIRMENT): ICD-10-CM

## 2018-08-21 ENCOUNTER — OFFICE VISIT - HEALTHEAST (OUTPATIENT)
Dept: FAMILY MEDICINE | Facility: CLINIC | Age: 83
End: 2018-08-21

## 2018-08-21 DIAGNOSIS — E03.9 HYPOTHYROIDISM: ICD-10-CM

## 2018-08-21 DIAGNOSIS — Z00.00 ROUTINE GENERAL MEDICAL EXAMINATION AT A HEALTH CARE FACILITY: ICD-10-CM

## 2018-08-21 DIAGNOSIS — Z78.0 POSTMENOPAUSAL: ICD-10-CM

## 2018-08-21 DIAGNOSIS — I10 ESSENTIAL HYPERTENSION: ICD-10-CM

## 2018-08-21 DIAGNOSIS — Z13.220 ENCOUNTER FOR SCREENING FOR LIPOID DISORDERS: ICD-10-CM

## 2018-08-21 LAB
CHOLEST SERPL-MCNC: 139 MG/DL
FASTING STATUS PATIENT QL REPORTED: NORMAL
HDLC SERPL-MCNC: 79 MG/DL
LDLC SERPL CALC-MCNC: 46 MG/DL
TRIGL SERPL-MCNC: 70 MG/DL

## 2018-08-21 ASSESSMENT — MIFFLIN-ST. JEOR: SCORE: 1001.07

## 2018-08-22 ENCOUNTER — AMBULATORY - HEALTHEAST (OUTPATIENT)
Dept: SCHEDULING | Facility: CLINIC | Age: 83
End: 2018-08-22

## 2018-08-22 DIAGNOSIS — Z78.0 POSTMENOPAUSAL: ICD-10-CM

## 2018-08-28 ENCOUNTER — HOSPITAL ENCOUNTER (OUTPATIENT)
Dept: MRI IMAGING | Facility: CLINIC | Age: 83
Discharge: HOME | End: 2018-08-28
Attending: PSYCHIATRY & NEUROLOGY

## 2018-08-28 DIAGNOSIS — G31.84 MCI (MILD COGNITIVE IMPAIRMENT): ICD-10-CM

## 2018-09-05 ENCOUNTER — HOSPITAL ENCOUNTER (OUTPATIENT)
Dept: NEUROLOGY | Facility: CLINIC | Age: 83
Setting detail: THERAPIES SERIES
Discharge: STILL ADMITTED OR ADMINISTRATIVE | End: 2018-09-05
Attending: PSYCHIATRY & NEUROLOGY

## 2018-09-05 DIAGNOSIS — G31.84 MILD NEUROCOGNITIVE DISORDER: ICD-10-CM

## 2018-09-11 ENCOUNTER — OFFICE VISIT - HEALTHEAST (OUTPATIENT)
Dept: CARDIOLOGY | Facility: CLINIC | Age: 83
End: 2018-09-11

## 2018-09-11 DIAGNOSIS — E78.2 MIXED HYPERLIPIDEMIA: ICD-10-CM

## 2018-09-11 DIAGNOSIS — I25.810 CORONARY ARTERY DISEASE INVOLVING CORONARY BYPASS GRAFT OF NATIVE HEART WITHOUT ANGINA PECTORIS: ICD-10-CM

## 2018-09-11 DIAGNOSIS — I10 ESSENTIAL HYPERTENSION: ICD-10-CM

## 2018-09-11 DIAGNOSIS — Z95.2 S/P AVR: ICD-10-CM

## 2018-09-11 ASSESSMENT — MIFFLIN-ST. JEOR: SCORE: 1008.81

## 2018-09-12 ENCOUNTER — OFFICE VISIT - HEALTHEAST (OUTPATIENT)
Dept: RHEUMATOLOGY | Facility: CLINIC | Age: 83
End: 2018-09-12

## 2018-09-12 DIAGNOSIS — Z79.899 HIGH RISK MEDICATION USE: ICD-10-CM

## 2018-09-12 DIAGNOSIS — L40.50 PSORIATIC ARTHRITIS (H): ICD-10-CM

## 2018-09-12 DIAGNOSIS — M15.9 GENERALIZED OSTEOARTHROSIS OF HAND: ICD-10-CM

## 2018-09-12 LAB
ALBUMIN SERPL-MCNC: 4 G/DL (ref 3.5–5)
ALT SERPL W P-5'-P-CCNC: 26 U/L (ref 0–45)
CREAT SERPL-MCNC: 0.79 MG/DL (ref 0.6–1.1)
ERYTHROCYTE [DISTWIDTH] IN BLOOD BY AUTOMATED COUNT: 11.4 % (ref 11–14.5)
GFR SERPL CREATININE-BSD FRML MDRD: >60 ML/MIN/1.73M2
HCT VFR BLD AUTO: 37.3 % (ref 35–47)
HGB BLD-MCNC: 12.6 G/DL (ref 12–16)
MCH RBC QN AUTO: 32.8 PG (ref 27–34)
MCHC RBC AUTO-ENTMCNC: 33.8 G/DL (ref 32–36)
MCV RBC AUTO: 97 FL (ref 80–100)
PLATELET # BLD AUTO: 144 THOU/UL (ref 140–440)
PMV BLD AUTO: 8.6 FL (ref 7–10)
RBC # BLD AUTO: 3.83 MILL/UL (ref 3.8–5.4)
WBC: 7.2 THOU/UL (ref 4–11)

## 2018-09-19 ENCOUNTER — HOSPITAL ENCOUNTER (OUTPATIENT)
Dept: MAMMOGRAPHY | Facility: CLINIC | Age: 83
Discharge: HOME | End: 2018-09-19
Attending: FAMILY MEDICINE

## 2018-09-19 DIAGNOSIS — Z12.31 VISIT FOR SCREENING MAMMOGRAM: ICD-10-CM

## 2018-09-24 ENCOUNTER — HOSPITAL ENCOUNTER (OUTPATIENT)
Dept: NEUROLOGY | Facility: CLINIC | Age: 83
Setting detail: THERAPIES SERIES
Discharge: STILL ADMITTED OR ADMINISTRATIVE | End: 2018-09-24
Attending: PSYCHIATRY & NEUROLOGY

## 2018-09-24 DIAGNOSIS — G31.84 MILD NEUROCOGNITIVE DISORDER: ICD-10-CM

## 2018-10-04 ENCOUNTER — HOSPITAL ENCOUNTER (OUTPATIENT)
Dept: NEUROLOGY | Facility: CLINIC | Age: 83
Setting detail: THERAPIES SERIES
Discharge: STILL ADMITTED OR ADMINISTRATIVE | End: 2018-10-04
Attending: PSYCHIATRY & NEUROLOGY

## 2018-10-04 DIAGNOSIS — F02.80 ALZHEIMER DISEASE (H): ICD-10-CM

## 2018-10-04 DIAGNOSIS — G30.9 ALZHEIMER DISEASE (H): ICD-10-CM

## 2018-10-08 ENCOUNTER — HOSPITAL ENCOUNTER (OUTPATIENT)
Dept: OCCUPATIONAL THERAPY | Age: 83
Setting detail: THERAPIES SERIES
Discharge: STILL ADMITTED OR ADMINISTRATIVE | End: 2018-10-08
Attending: PSYCHIATRY & NEUROLOGY

## 2018-10-08 ENCOUNTER — HOSPITAL ENCOUNTER (OUTPATIENT)
Dept: NEUROLOGY | Facility: CLINIC | Age: 83
Setting detail: THERAPIES SERIES
Discharge: STILL ADMITTED OR ADMINISTRATIVE | End: 2018-10-08
Attending: PSYCHIATRY & NEUROLOGY

## 2018-10-08 DIAGNOSIS — G30.9 ALZHEIMER DISEASE (H): ICD-10-CM

## 2018-10-08 DIAGNOSIS — R41.89 COGNITIVE IMPAIRMENT: ICD-10-CM

## 2018-10-08 DIAGNOSIS — F02.80 ALZHEIMER DISEASE (H): ICD-10-CM

## 2018-10-30 ENCOUNTER — AMBULATORY - HEALTHEAST (OUTPATIENT)
Dept: NURSING | Facility: CLINIC | Age: 83
End: 2018-10-30

## 2018-10-30 DIAGNOSIS — Z23 FLU VACCINE NEED: ICD-10-CM

## 2018-11-28 ENCOUNTER — HOSPITAL ENCOUNTER (OUTPATIENT)
Dept: NEUROLOGY | Facility: CLINIC | Age: 83
Setting detail: THERAPIES SERIES
Discharge: STILL ADMITTED OR ADMINISTRATIVE | End: 2018-11-28
Attending: PSYCHIATRY & NEUROLOGY

## 2018-11-28 DIAGNOSIS — G31.84 MCI (MILD COGNITIVE IMPAIRMENT): ICD-10-CM

## 2018-12-04 ENCOUNTER — COMMUNICATION - HEALTHEAST (OUTPATIENT)
Dept: NEUROLOGY | Facility: CLINIC | Age: 83
End: 2018-12-04

## 2018-12-04 DIAGNOSIS — G31.84 MCI (MILD COGNITIVE IMPAIRMENT): ICD-10-CM

## 2018-12-10 ENCOUNTER — AMBULATORY - HEALTHEAST (OUTPATIENT)
Dept: LAB | Facility: CLINIC | Age: 83
End: 2018-12-10

## 2018-12-10 DIAGNOSIS — Z79.899 HIGH RISK MEDICATION USE: ICD-10-CM

## 2018-12-10 LAB
ALBUMIN SERPL-MCNC: 4 G/DL (ref 3.5–5)
ALT SERPL W P-5'-P-CCNC: 26 U/L (ref 0–45)
CREAT SERPL-MCNC: 0.82 MG/DL (ref 0.6–1.1)
ERYTHROCYTE [DISTWIDTH] IN BLOOD BY AUTOMATED COUNT: 12.2 % (ref 11–14.5)
GFR SERPL CREATININE-BSD FRML MDRD: >60 ML/MIN/1.73M2
HCT VFR BLD AUTO: 37.1 % (ref 35–47)
HGB BLD-MCNC: 12.6 G/DL (ref 12–16)
MCH RBC QN AUTO: 33 PG (ref 27–34)
MCHC RBC AUTO-ENTMCNC: 33.9 G/DL (ref 32–36)
MCV RBC AUTO: 97 FL (ref 80–100)
PLATELET # BLD AUTO: 159 THOU/UL (ref 140–440)
PMV BLD AUTO: 8.6 FL (ref 7–10)
RBC # BLD AUTO: 3.81 MILL/UL (ref 3.8–5.4)
WBC: 7 THOU/UL (ref 4–11)

## 2018-12-12 ENCOUNTER — OFFICE VISIT - HEALTHEAST (OUTPATIENT)
Dept: RHEUMATOLOGY | Facility: CLINIC | Age: 83
End: 2018-12-12

## 2018-12-12 DIAGNOSIS — M15.9 GENERALIZED OSTEOARTHROSIS OF HAND: ICD-10-CM

## 2018-12-12 DIAGNOSIS — L40.50 PSORIATIC ARTHRITIS (H): ICD-10-CM

## 2018-12-12 DIAGNOSIS — Z79.899 HIGH RISK MEDICATION USE: ICD-10-CM

## 2018-12-12 DIAGNOSIS — L40.8 OTHER PSORIASIS: ICD-10-CM

## 2019-01-08 ENCOUNTER — RECORDS - HEALTHEAST (OUTPATIENT)
Dept: ADMINISTRATIVE | Facility: OTHER | Age: 84
End: 2019-01-08

## 2019-01-17 ENCOUNTER — COMMUNICATION - HEALTHEAST (OUTPATIENT)
Dept: FAMILY MEDICINE | Facility: CLINIC | Age: 84
End: 2019-01-17

## 2019-01-17 DIAGNOSIS — E03.9 HYPOTHYROIDISM: ICD-10-CM

## 2019-02-04 ENCOUNTER — COMMUNICATION - HEALTHEAST (OUTPATIENT)
Dept: FAMILY MEDICINE | Facility: CLINIC | Age: 84
End: 2019-02-04

## 2019-02-04 DIAGNOSIS — I10 HTN (HYPERTENSION): ICD-10-CM

## 2019-02-04 DIAGNOSIS — I25.10 CORONARY ARTERY DISEASE INVOLVING NATIVE CORONARY ARTERY OF NATIVE HEART WITHOUT ANGINA PECTORIS: ICD-10-CM

## 2019-02-15 ENCOUNTER — COMMUNICATION - HEALTHEAST (OUTPATIENT)
Dept: FAMILY MEDICINE | Facility: CLINIC | Age: 84
End: 2019-02-15

## 2019-02-15 DIAGNOSIS — I25.708 CORONARY ARTERY DISEASE INVOLVING CORONARY BYPASS GRAFT OF NATIVE HEART WITH OTHER FORMS OF ANGINA PECTORIS (H): ICD-10-CM

## 2019-03-12 ENCOUNTER — OFFICE VISIT - HEALTHEAST (OUTPATIENT)
Dept: FAMILY MEDICINE | Facility: CLINIC | Age: 84
End: 2019-03-12

## 2019-03-12 ENCOUNTER — HOSPITAL ENCOUNTER (OUTPATIENT)
Dept: LAB | Age: 84
Setting detail: SPECIMEN
Discharge: HOME | End: 2019-03-12

## 2019-03-12 DIAGNOSIS — E03.9 HYPOTHYROIDISM, UNSPECIFIED TYPE: ICD-10-CM

## 2019-03-12 DIAGNOSIS — I50.31 ACUTE DIASTOLIC CHF (CONGESTIVE HEART FAILURE) (H): ICD-10-CM

## 2019-03-12 DIAGNOSIS — D69.6 THROMBOCYTOPENIA (H): ICD-10-CM

## 2019-03-12 DIAGNOSIS — L40.50 PSORIATIC ARTHRITIS (H): ICD-10-CM

## 2019-03-12 DIAGNOSIS — I25.708 CORONARY ARTERY DISEASE INVOLVING CORONARY BYPASS GRAFT OF NATIVE HEART WITH OTHER FORMS OF ANGINA PECTORIS (H): ICD-10-CM

## 2019-03-12 DIAGNOSIS — R53.83 TIRED: ICD-10-CM

## 2019-03-12 LAB
ALBUMIN SERPL-MCNC: 3.9 G/DL (ref 3.5–5)
ALP SERPL-CCNC: 73 U/L (ref 45–120)
ALT SERPL W P-5'-P-CCNC: 25 U/L (ref 0–45)
ANION GAP SERPL CALCULATED.3IONS-SCNC: 9 MMOL/L (ref 5–18)
AST SERPL W P-5'-P-CCNC: 42 U/L (ref 0–40)
BILIRUB SERPL-MCNC: 0.7 MG/DL (ref 0–1)
BUN SERPL-MCNC: 13 MG/DL (ref 8–28)
CALCIUM SERPL-MCNC: 9.7 MG/DL (ref 8.5–10.5)
CHLORIDE BLD-SCNC: 100 MMOL/L (ref 98–107)
CO2 SERPL-SCNC: 29 MMOL/L (ref 22–31)
CREAT SERPL-MCNC: 0.9 MG/DL (ref 0.6–1.1)
GFR SERPL CREATININE-BSD FRML MDRD: 60 ML/MIN/1.73M2
GLUCOSE BLD-MCNC: 113 MG/DL (ref 70–125)
POTASSIUM BLD-SCNC: 5 MMOL/L (ref 3.5–5)
PROT SERPL-MCNC: 7.4 G/DL (ref 6–8)
SODIUM SERPL-SCNC: 138 MMOL/L (ref 136–145)
TSH SERPL DL<=0.005 MIU/L-ACNC: 2.2 UIU/ML (ref 0.3–5)

## 2019-03-12 ASSESSMENT — MIFFLIN-ST. JEOR: SCORE: 987.03

## 2019-03-15 ENCOUNTER — HOSPITAL ENCOUNTER (OUTPATIENT)
Dept: LAB | Age: 84
Setting detail: SPECIMEN
Discharge: HOME | End: 2019-03-15

## 2019-03-15 ENCOUNTER — COMMUNICATION - HEALTHEAST (OUTPATIENT)
Dept: RHEUMATOLOGY | Facility: CLINIC | Age: 84
End: 2019-03-15

## 2019-03-15 ENCOUNTER — AMBULATORY - HEALTHEAST (OUTPATIENT)
Dept: LAB | Facility: CLINIC | Age: 84
End: 2019-03-15

## 2019-03-15 DIAGNOSIS — L40.50 PSORIATIC ARTHRITIS (H): ICD-10-CM

## 2019-03-15 DIAGNOSIS — Z79.899 HIGH RISK MEDICATION USE: ICD-10-CM

## 2019-03-15 LAB
ALBUMIN SERPL-MCNC: 4 G/DL (ref 3.5–5)
ALT SERPL W P-5'-P-CCNC: 37 U/L (ref 0–45)
CREAT SERPL-MCNC: 0.96 MG/DL (ref 0.6–1.1)
ERYTHROCYTE [DISTWIDTH] IN BLOOD BY AUTOMATED COUNT: 12.5 % (ref 11–14.5)
GFR SERPL CREATININE-BSD FRML MDRD: 56 ML/MIN/1.73M2
HCT VFR BLD AUTO: 38.1 % (ref 35–47)
HGB BLD-MCNC: 12.8 G/DL (ref 12–16)
MCH RBC QN AUTO: 33.2 PG (ref 27–34)
MCHC RBC AUTO-ENTMCNC: 33.6 G/DL (ref 32–36)
MCV RBC AUTO: 99 FL (ref 80–100)
PLATELET # BLD AUTO: 175 THOU/UL (ref 140–440)
PMV BLD AUTO: 7.9 FL (ref 7–10)
RBC # BLD AUTO: 3.85 MILL/UL (ref 3.8–5.4)
WBC: 7.8 THOU/UL (ref 4–11)

## 2019-03-18 ENCOUNTER — COMMUNICATION - HEALTHEAST (OUTPATIENT)
Dept: FAMILY MEDICINE | Facility: CLINIC | Age: 84
End: 2019-03-18

## 2019-04-01 ENCOUNTER — COMMUNICATION - HEALTHEAST (OUTPATIENT)
Dept: FAMILY MEDICINE | Facility: CLINIC | Age: 84
End: 2019-04-01

## 2019-04-03 ENCOUNTER — HOSPITAL ENCOUNTER (OUTPATIENT)
Dept: CARDIOLOGY | Facility: HOSPITAL | Age: 84
Discharge: HOME | End: 2019-04-03
Attending: INTERNAL MEDICINE

## 2019-04-03 ENCOUNTER — OFFICE VISIT - HEALTHEAST (OUTPATIENT)
Dept: CARDIOLOGY | Facility: CLINIC | Age: 84
End: 2019-04-03

## 2019-04-03 DIAGNOSIS — I25.810 CORONARY ARTERY DISEASE INVOLVING CORONARY BYPASS GRAFT OF NATIVE HEART WITHOUT ANGINA PECTORIS: ICD-10-CM

## 2019-04-03 DIAGNOSIS — I49.3 FREQUENT PVCS: ICD-10-CM

## 2019-04-03 DIAGNOSIS — I10 ESSENTIAL HYPERTENSION: ICD-10-CM

## 2019-04-03 DIAGNOSIS — Z95.2 S/P AVR: ICD-10-CM

## 2019-04-03 DIAGNOSIS — E78.2 MIXED HYPERLIPIDEMIA: ICD-10-CM

## 2019-04-03 ASSESSMENT — MIFFLIN-ST. JEOR: SCORE: 990.66

## 2019-04-04 LAB
ATRIAL RATE - MUSE: 62 BPM
DIASTOLIC BLOOD PRESSURE - MUSE: NORMAL MMHG
INTERPRETATION ECG - MUSE: NORMAL
P AXIS - MUSE: 77 DEGREES
PR INTERVAL - MUSE: 228 MS
QRS DURATION - MUSE: 104 MS
QT - MUSE: 432 MS
QTC - MUSE: 438 MS
R AXIS - MUSE: -38 DEGREES
SYSTOLIC BLOOD PRESSURE - MUSE: NORMAL MMHG
T AXIS - MUSE: 84 DEGREES
VENTRICULAR RATE- MUSE: 62 BPM

## 2019-04-11 ENCOUNTER — COMMUNICATION - HEALTHEAST (OUTPATIENT)
Dept: CARDIOLOGY | Facility: CLINIC | Age: 84
End: 2019-04-11

## 2019-04-11 DIAGNOSIS — I49.3 FREQUENT PVCS: ICD-10-CM

## 2019-04-16 ENCOUNTER — COMMUNICATION - HEALTHEAST (OUTPATIENT)
Dept: CARDIOLOGY | Facility: CLINIC | Age: 84
End: 2019-04-16

## 2019-04-17 ENCOUNTER — HOSPITAL ENCOUNTER (OUTPATIENT)
Dept: MRI IMAGING | Facility: HOSPITAL | Age: 84
Discharge: HOME | End: 2019-04-17
Attending: INTERNAL MEDICINE

## 2019-04-17 DIAGNOSIS — I49.3 FREQUENT PVCS: ICD-10-CM

## 2019-04-17 DIAGNOSIS — I25.810 CORONARY ARTERY DISEASE INVOLVING CORONARY BYPASS GRAFT OF NATIVE HEART WITHOUT ANGINA PECTORIS: ICD-10-CM

## 2019-04-17 LAB
ATRIAL RATE - MUSE: 42 BPM
ATRIAL RATE - MUSE: 89 BPM
DIASTOLIC BLOOD PRESSURE - MUSE: NORMAL MMHG
DIASTOLIC BLOOD PRESSURE - MUSE: NORMAL MMHG
INTERPRETATION ECG - MUSE: NORMAL
INTERPRETATION ECG - MUSE: NORMAL
P AXIS - MUSE: 81 DEGREES
P AXIS - MUSE: 83 DEGREES
PR INTERVAL - MUSE: 244 MS
PR INTERVAL - MUSE: NORMAL MS
QRS DURATION - MUSE: 94 MS
QRS DURATION - MUSE: 96 MS
QT - MUSE: 424 MS
QT - MUSE: 434 MS
QTC - MUSE: 457 MS
QTC - MUSE: 475 MS
R AXIS - MUSE: -31 DEGREES
R AXIS - MUSE: -33 DEGREES
SYSTOLIC BLOOD PRESSURE - MUSE: NORMAL MMHG
SYSTOLIC BLOOD PRESSURE - MUSE: NORMAL MMHG
T AXIS - MUSE: 100 DEGREES
T AXIS - MUSE: 97 DEGREES
VENTRICULAR RATE- MUSE: 70 BPM
VENTRICULAR RATE- MUSE: 72 BPM

## 2019-04-19 LAB
CCTA EJECTION FRACTION: 49 %
CCTA INTERVENTRICULAR SETPUM: 1 CM (ref 0.6–1.1)
CCTA LEFT INTERNAL DIMENSION IN SYSTOLE: 3.2 CM (ref 2.1–4)
CCTA LEFT VENTRICULAR INTERNAL DIMENSION IN DIASTOLE: 4.5 CM (ref 3.5–6)
CCTA LEFT VENTRICULAR MASS: 142.89 G
CCTA POSTERIOR WALL: 0.9 CM (ref 0.6–1.1)
MR CARDIAC LEFT VENTRIAL CARDIAC INDEX: 2.3 L/MIN/M2 (ref 1.75–3.8)
MR CARDIAC LEFT VENTRICAL CARDIAC OUTPUT: 3.6 L/MIN (ref 2.8–8.8)
MR CARDIAC LEFT VENTRICULAR DIASTOLIC VOLUME INDEX: 59.77 ML/M2 (ref 41–81)
MR CARDIAC LEFT VENTRICULAR MASS INDEX: 63.54 G/M2 (ref 63–95)
MR CARDIAC LEFT VENTRICULAR MASS: 101 G (ref 75–175)
MR CARDIAC LEFT VENTRICULAR STROKE VOLUME INDEX: 32.72 ML/M2 (ref 26–56)
MR CARDIAC LEFT VENTRICULAR SYSTOLIC VOLUME INDEX: 27.05 ML/M2 (ref 12–20)
MR EJECTION FRACTION: 54.74 %
MR HEIGHT: 1.6 M
MR LEFT VENTRICULAR DYSTOLIC VOLUMEN: 95 ML (ref 52–141)
MR LEFT VENTRICULAR STROKE VOLUMEN: 52 ML (ref 33–97)
MR LEFT VENTRICULAR SYSTOLIC VOLUME: 43 ML (ref 13–51)
MR WEIGHT: 57.2 KG

## 2019-04-23 ENCOUNTER — OFFICE VISIT - HEALTHEAST (OUTPATIENT)
Dept: FAMILY MEDICINE | Facility: CLINIC | Age: 84
End: 2019-04-23

## 2019-04-23 ENCOUNTER — RECORDS - HEALTHEAST (OUTPATIENT)
Dept: ADMINISTRATIVE | Facility: OTHER | Age: 84
End: 2019-04-23

## 2019-04-23 DIAGNOSIS — R06.02 SOB (SHORTNESS OF BREATH): ICD-10-CM

## 2019-04-23 DIAGNOSIS — R42 DIZZINESS: ICD-10-CM
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
## 2019-04-23 LAB
ALBUMIN SERPL-MCNC: 3.9 G/DL (ref 3.5–5)
ALBUMIN UR-MCNC: NEGATIVE MG/DL
ALP SERPL-CCNC: 71 U/L (ref 45–120)
ALT SERPL W P-5'-P-CCNC: 30 U/L (ref 0–45)
ANION GAP SERPL CALCULATED.3IONS-SCNC: 14 MMOL/L (ref 5–18)
APPEARANCE UR: CLEAR
AST SERPL W P-5'-P-CCNC: 41 U/L (ref 0–40)
ATRIAL RATE - MUSE: 81 BPM
BACTERIA #/AREA URNS HPF: ABNORMAL HPF
BASOPHILS # BLD AUTO: 0.1 THOU/UL (ref 0–0.2)
BASOPHILS NFR BLD AUTO: 1 % (ref 0–2)
BILIRUB SERPL-MCNC: 1.5 MG/DL (ref 0–1)
BILIRUB UR QL STRIP: NEGATIVE
BNP SERPL-MCNC: 863 PG/ML (ref 0–163)
BUN SERPL-MCNC: 11 MG/DL (ref 8–28)
CALCIUM SERPL-MCNC: 9.1 MG/DL (ref 8.5–10.5)
CHLORIDE BLD-SCNC: 99 MMOL/L (ref 98–107)
CO2 SERPL-SCNC: 21 MMOL/L (ref 22–31)
COLOR UR AUTO: YELLOW
CREAT SERPL-MCNC: 0.83 MG/DL (ref 0.6–1.1)
DIASTOLIC BLOOD PRESSURE - MUSE: NORMAL MMHG
EOSINOPHIL # BLD AUTO: 0.1 THOU/UL (ref 0–0.4)
EOSINOPHIL NFR BLD AUTO: 1 % (ref 0–6)
ERYTHROCYTE [DISTWIDTH] IN BLOOD BY AUTOMATED COUNT: 13.1 % (ref 11–14.5)
GFR SERPL CREATININE-BSD FRML MDRD: >60 ML/MIN/1.73M2
GLUCOSE BLD-MCNC: 111 MG/DL (ref 70–125)
GLUCOSE UR STRIP-MCNC: NEGATIVE MG/DL
HCT VFR BLD AUTO: 38 % (ref 35–47)
HGB BLD-MCNC: 12.7 G/DL (ref 12–16)
HGB UR QL STRIP: NEGATIVE
INTERPRETATION ECG - MUSE: NORMAL
KETONES UR STRIP-MCNC: NEGATIVE MG/DL
LEUKOCYTE ESTERASE UR QL STRIP: ABNORMAL
LYMPHOCYTES # BLD AUTO: 1.4 THOU/UL (ref 0.8–4.4)
LYMPHOCYTES NFR BLD AUTO: 12 % (ref 20–40)
MCH RBC QN AUTO: 32.4 PG (ref 27–34)
MCHC RBC AUTO-ENTMCNC: 33.5 G/DL (ref 32–36)
MCV RBC AUTO: 97 FL (ref 80–100)
MONOCYTES # BLD AUTO: 1 THOU/UL (ref 0–0.9)
MONOCYTES NFR BLD AUTO: 8 % (ref 2–10)
NEUTROPHILS # BLD AUTO: 9.1 THOU/UL (ref 2–7.7)
NEUTROPHILS NFR BLD AUTO: 78 % (ref 50–70)
NITRATE UR QL: NEGATIVE
P AXIS - MUSE: 90 DEGREES
PH UR STRIP: 6 [PH] (ref 5–8)
PLATELET # BLD AUTO: 89 THOU/UL (ref 140–440)
PMV BLD AUTO: 9 FL (ref 7–10)
POTASSIUM BLD-SCNC: 4.2 MMOL/L (ref 3.5–5)
PR INTERVAL - MUSE: 200 MS
PROT SERPL-MCNC: 7.4 G/DL (ref 6–8)
QRS DURATION - MUSE: 96 MS
QT - MUSE: 394 MS
QTC - MUSE: 457 MS
R AXIS - MUSE: -17 DEGREES
RBC # BLD AUTO: 3.93 MILL/UL (ref 3.8–5.4)
RBC #/AREA URNS AUTO: ABNORMAL HPF
SODIUM SERPL-SCNC: 134 MMOL/L (ref 136–145)
SP GR UR STRIP: <=1.005 (ref 1–1.03)
SQUAMOUS #/AREA URNS AUTO: ABNORMAL LPF
SYSTOLIC BLOOD PRESSURE - MUSE: NORMAL MMHG
T AXIS - MUSE: 86 DEGREES
UROBILINOGEN UR STRIP-ACNC: ABNORMAL
VENTRICULAR RATE- MUSE: 81 BPM
WBC #/AREA URNS AUTO: ABNORMAL HPF
WBC CLUMPS #/AREA URNS HPF: PRESENT /[HPF]
WBC: 11.6 THOU/UL (ref 4–11)

## 2019-04-24 ENCOUNTER — COMMUNICATION - HEALTHEAST (OUTPATIENT)
Dept: FAMILY MEDICINE | Facility: CLINIC | Age: 84
End: 2019-04-24

## 2019-04-24 DIAGNOSIS — L40.50 PSORIATIC ARTHROPATHY (H): ICD-10-CM

## 2019-04-24 LAB — BACTERIA SPEC CULT: NO GROWTH

## 2019-04-25 ENCOUNTER — OFFICE VISIT - HEALTHEAST (OUTPATIENT)
Dept: CARDIOLOGY | Facility: CLINIC | Age: 84
End: 2019-04-25

## 2019-04-25 DIAGNOSIS — R00.2 PALPITATIONS: ICD-10-CM

## 2019-04-25 DIAGNOSIS — Z95.2 S/P AVR: ICD-10-CM

## 2019-04-25 DIAGNOSIS — I49.3 FREQUENT PVCS: ICD-10-CM

## 2019-04-25 DIAGNOSIS — I25.10 CORONARY ARTERY DISEASE INVOLVING NATIVE CORONARY ARTERY OF NATIVE HEART WITHOUT ANGINA PECTORIS: ICD-10-CM

## 2019-04-25 ASSESSMENT — MIFFLIN-ST. JEOR: SCORE: 999.73

## 2019-04-30 ENCOUNTER — COMMUNICATION - HEALTHEAST (OUTPATIENT)
Dept: SCHEDULING | Facility: CLINIC | Age: 84
End: 2019-04-30

## 2019-05-01 ENCOUNTER — RECORDS - HEALTHEAST (OUTPATIENT)
Dept: GENERAL RADIOLOGY | Facility: CLINIC | Age: 84
End: 2019-05-01

## 2019-05-01 ENCOUNTER — COMMUNICATION - HEALTHEAST (OUTPATIENT)
Dept: FAMILY MEDICINE | Facility: CLINIC | Age: 84
End: 2019-05-01

## 2019-05-01 ENCOUNTER — OFFICE VISIT - HEALTHEAST (OUTPATIENT)
Dept: FAMILY MEDICINE | Facility: CLINIC | Age: 84
End: 2019-05-01

## 2019-05-01 DIAGNOSIS — M53.3 PAIN IN THE COCCYX: ICD-10-CM

## 2019-05-01 DIAGNOSIS — K59.01 SLOW TRANSIT CONSTIPATION: ICD-10-CM
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
## 2019-05-01 DIAGNOSIS — E03.9 HYPOTHYROIDISM: ICD-10-CM
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
## 2019-05-01 DIAGNOSIS — M53.3 SACROCOCCYGEAL DISORDERS, NOT ELSEWHERE CLASSIFIED: ICD-10-CM

## 2019-05-03 ENCOUNTER — OFFICE VISIT - HEALTHEAST (OUTPATIENT)
Dept: CARDIOLOGY | Facility: CLINIC | Age: 84
End: 2019-05-03

## 2019-05-03 DIAGNOSIS — I25.810 CORONARY ARTERY DISEASE INVOLVING CORONARY BYPASS GRAFT OF NATIVE HEART WITHOUT ANGINA PECTORIS: ICD-10-CM

## 2019-05-03 DIAGNOSIS — E78.2 MIXED HYPERLIPIDEMIA: ICD-10-CM

## 2019-05-03 DIAGNOSIS — I10 ESSENTIAL HYPERTENSION: ICD-10-CM

## 2019-05-03 DIAGNOSIS — Z95.2 S/P AVR: ICD-10-CM

## 2019-05-03 DIAGNOSIS — I49.3 FREQUENT PVCS: ICD-10-CM

## 2019-05-03 ASSESSMENT — MIFFLIN-ST. JEOR: SCORE: 986.13

## 2019-05-04 LAB
ATRIAL RATE - MUSE: 65 BPM
DIASTOLIC BLOOD PRESSURE - MUSE: NORMAL MMHG
INTERPRETATION ECG - MUSE: NORMAL
P AXIS - MUSE: 79 DEGREES
PR INTERVAL - MUSE: 210 MS
QRS DURATION - MUSE: 84 MS
QT - MUSE: 422 MS
QTC - MUSE: 438 MS
R AXIS - MUSE: -37 DEGREES
SYSTOLIC BLOOD PRESSURE - MUSE: NORMAL MMHG
T AXIS - MUSE: 66 DEGREES
VENTRICULAR RATE- MUSE: 65 BPM

## 2019-05-06 ENCOUNTER — COMMUNICATION - HEALTHEAST (OUTPATIENT)
Dept: CARDIOLOGY | Facility: CLINIC | Age: 84
End: 2019-05-06

## 2019-05-06 DIAGNOSIS — I49.3 FREQUENT PVCS: ICD-10-CM

## 2019-05-14 ENCOUNTER — RECORDS - HEALTHEAST (OUTPATIENT)
Dept: LAB | Facility: CLINIC | Age: 84
End: 2019-05-14

## 2019-05-15 LAB
ANION GAP SERPL CALCULATED.3IONS-SCNC: 5 MMOL/L (ref 5–18)
BUN SERPL-MCNC: 11 MG/DL (ref 8–28)
CALCIUM SERPL-MCNC: 8.9 MG/DL (ref 8.5–10.5)
CHLORIDE BLD-SCNC: 94 MMOL/L (ref 98–107)
CO2 SERPL-SCNC: 28 MMOL/L (ref 22–31)
CREAT SERPL-MCNC: 0.72 MG/DL (ref 0.6–1.1)
ERYTHROCYTE [DISTWIDTH] IN BLOOD BY AUTOMATED COUNT: 14.1 % (ref 11–14.5)
GFR SERPL CREATININE-BSD FRML MDRD: >60 ML/MIN/1.73M2
GLUCOSE BLD-MCNC: 79 MG/DL (ref 70–125)
HCT VFR BLD AUTO: 33.2 % (ref 35–47)
HGB BLD-MCNC: 10.9 G/DL (ref 12–16)
MCH RBC QN AUTO: 32.1 PG (ref 27–34)
MCHC RBC AUTO-ENTMCNC: 32.8 G/DL (ref 32–36)
MCV RBC AUTO: 98 FL (ref 80–100)
PLATELET # BLD AUTO: 225 THOU/UL (ref 140–440)
PMV BLD AUTO: 10.4 FL (ref 8.5–12.5)
POTASSIUM BLD-SCNC: 5 MMOL/L (ref 3.5–5)
RBC # BLD AUTO: 3.4 MILL/UL (ref 3.8–5.4)
SODIUM SERPL-SCNC: 127 MMOL/L (ref 136–145)
TSH SERPL DL<=0.005 MIU/L-ACNC: 4.89 UIU/ML (ref 0.3–5)
WBC: 8.1 THOU/UL (ref 4–11)

## 2019-05-20 ENCOUNTER — RECORDS - HEALTHEAST (OUTPATIENT)
Dept: LAB | Facility: CLINIC | Age: 84
End: 2019-05-20

## 2019-05-20 LAB
IRON SATN MFR SERPL: 14 % (ref 20–50)
IRON SERPL-MCNC: 44 UG/DL (ref 42–175)
SODIUM SERPL-SCNC: 134 MMOL/L (ref 136–145)
TIBC SERPL-MCNC: 313 UG/DL (ref 313–563)
TRANSFERRIN SERPL-MCNC: 250 MG/DL (ref 212–360)
VIT B12 SERPL-MCNC: 1847 PG/ML (ref 213–816)

## 2019-05-28 ENCOUNTER — RECORDS - HEALTHEAST (OUTPATIENT)
Dept: LAB | Facility: CLINIC | Age: 84
End: 2019-05-28

## 2019-05-29 LAB
HGB BLD-MCNC: 12.9 G/DL (ref 12–16)
SODIUM SERPL-SCNC: 130 MMOL/L (ref 136–145)

## 2019-06-04 ENCOUNTER — OFFICE VISIT - HEALTHEAST (OUTPATIENT)
Dept: CARDIOLOGY | Facility: CLINIC | Age: 84
End: 2019-06-04

## 2019-06-04 ENCOUNTER — RECORDS - HEALTHEAST (OUTPATIENT)
Dept: LAB | Facility: CLINIC | Age: 84
End: 2019-06-04

## 2019-06-04 DIAGNOSIS — I49.3 FREQUENT PVCS: ICD-10-CM

## 2019-06-04 DIAGNOSIS — Z95.2 S/P AVR: ICD-10-CM

## 2019-06-04 DIAGNOSIS — I10 ESSENTIAL HYPERTENSION: ICD-10-CM

## 2019-06-04 DIAGNOSIS — I25.10 CORONARY ARTERY DISEASE INVOLVING NATIVE CORONARY ARTERY OF NATIVE HEART WITHOUT ANGINA PECTORIS: ICD-10-CM

## 2019-06-04 DIAGNOSIS — I25.708 CORONARY ARTERY DISEASE INVOLVING CORONARY BYPASS GRAFT OF NATIVE HEART WITH OTHER FORMS OF ANGINA PECTORIS (H): ICD-10-CM

## 2019-06-04 ASSESSMENT — MIFFLIN-ST. JEOR: SCORE: 954.37

## 2019-06-05 ENCOUNTER — RECORDS - HEALTHEAST (OUTPATIENT)
Dept: ADMINISTRATIVE | Facility: OTHER | Age: 84
End: 2019-06-05

## 2019-06-05 LAB — SODIUM SERPL-SCNC: 135 MMOL/L (ref 136–145)

## 2019-06-07 ENCOUNTER — COMMUNICATION - HEALTHEAST (OUTPATIENT)
Dept: FAMILY MEDICINE | Facility: CLINIC | Age: 84
End: 2019-06-07

## 2019-06-10 ENCOUNTER — COMMUNICATION - HEALTHEAST (OUTPATIENT)
Dept: FAMILY MEDICINE | Facility: CLINIC | Age: 84
End: 2019-06-10

## 2019-06-10 ENCOUNTER — AMBULATORY - HEALTHEAST (OUTPATIENT)
Dept: LAB | Facility: CLINIC | Age: 84
End: 2019-06-10

## 2019-06-10 DIAGNOSIS — Z79.899 HIGH RISK MEDICATION USE: ICD-10-CM

## 2019-06-10 LAB
ALBUMIN SERPL-MCNC: 3.7 G/DL (ref 3.5–5)
ALT SERPL W P-5'-P-CCNC: 28 U/L (ref 0–45)
CREAT SERPL-MCNC: 0.76 MG/DL (ref 0.6–1.1)
ERYTHROCYTE [DISTWIDTH] IN BLOOD BY AUTOMATED COUNT: 13.1 % (ref 11–14.5)
GFR SERPL CREATININE-BSD FRML MDRD: >60 ML/MIN/1.73M2
HCT VFR BLD AUTO: 38.1 % (ref 35–47)
HGB BLD-MCNC: 12.8 G/DL (ref 12–16)
MCH RBC QN AUTO: 32.9 PG (ref 27–34)
MCHC RBC AUTO-ENTMCNC: 33.5 G/DL (ref 32–36)
MCV RBC AUTO: 98 FL (ref 80–100)
PLATELET # BLD AUTO: 243 THOU/UL (ref 140–440)
PMV BLD AUTO: 7.7 FL (ref 7–10)
RBC # BLD AUTO: 3.88 MILL/UL (ref 3.8–5.4)
WBC: 8.1 THOU/UL (ref 4–11)

## 2019-06-13 ENCOUNTER — OFFICE VISIT - HEALTHEAST (OUTPATIENT)
Dept: RHEUMATOLOGY | Facility: CLINIC | Age: 84
End: 2019-06-13

## 2019-06-13 ENCOUNTER — HOME CARE/HOSPICE - HEALTHEAST (OUTPATIENT)
Dept: HOME HEALTH SERVICES | Facility: HOME HEALTH | Age: 84
End: 2019-06-13

## 2019-06-13 ENCOUNTER — OFFICE VISIT - HEALTHEAST (OUTPATIENT)
Dept: FAMILY MEDICINE | Facility: CLINIC | Age: 84
End: 2019-06-13

## 2019-06-13 DIAGNOSIS — L89.301 PRESSURE INJURY OF BUTTOCK, STAGE 1, UNSPECIFIED LATERALITY: ICD-10-CM

## 2019-06-13 DIAGNOSIS — L40.50 PSORIATIC ARTHRITIS (H): ICD-10-CM
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
## 2019-06-13 DIAGNOSIS — I50.31 ACUTE DIASTOLIC CHF (CONGESTIVE HEART FAILURE) (H): ICD-10-CM

## 2019-06-13 DIAGNOSIS — R00.2 PALPITATIONS: ICD-10-CM

## 2019-06-13 DIAGNOSIS — M84.48XA SACRAL INSUFFICIENCY FRACTURE, INITIAL ENCOUNTER: ICD-10-CM

## 2019-06-13 DIAGNOSIS — R06.02 SOB (SHORTNESS OF BREATH): ICD-10-CM

## 2019-06-13 DIAGNOSIS — R42 DIZZINESS: ICD-10-CM

## 2019-06-13 DIAGNOSIS — G31.84 MCI (MILD COGNITIVE IMPAIRMENT): ICD-10-CM

## 2019-06-13 DIAGNOSIS — I10 ESSENTIAL HYPERTENSION: ICD-10-CM

## 2019-06-13 DIAGNOSIS — M70.61 GREATER TROCHANTERIC BURSITIS OF RIGHT HIP: ICD-10-CM

## 2019-06-13 DIAGNOSIS — M15.9 GENERALIZED OSTEOARTHROSIS OF HAND: ICD-10-CM

## 2019-06-17 ENCOUNTER — COMMUNICATION - HEALTHEAST (OUTPATIENT)
Dept: SCHEDULING | Facility: CLINIC | Age: 84
End: 2019-06-17

## 2019-06-17 ENCOUNTER — COMMUNICATION - HEALTHEAST (OUTPATIENT)
Dept: FAMILY MEDICINE | Facility: CLINIC | Age: 84
End: 2019-06-17

## 2019-06-18 ENCOUNTER — COMMUNICATION - HEALTHEAST (OUTPATIENT)
Dept: FAMILY MEDICINE | Facility: CLINIC | Age: 84
End: 2019-06-18

## 2019-06-18 DIAGNOSIS — I10 ESSENTIAL HYPERTENSION: ICD-10-CM

## 2019-06-18 DIAGNOSIS — M84.48XA SACRAL INSUFFICIENCY FRACTURE, INITIAL ENCOUNTER: ICD-10-CM

## 2019-06-19 ENCOUNTER — COMMUNICATION - HEALTHEAST (OUTPATIENT)
Dept: FAMILY MEDICINE | Facility: CLINIC | Age: 84
End: 2019-06-19

## 2019-06-19 DIAGNOSIS — M84.48XA SACRAL INSUFFICIENCY FRACTURE, INITIAL ENCOUNTER: ICD-10-CM

## 2019-06-24 ENCOUNTER — RECORDS - HEALTHEAST (OUTPATIENT)
Dept: ADMINISTRATIVE | Facility: OTHER | Age: 84
End: 2019-06-24

## 2019-06-26 ENCOUNTER — COMMUNICATION - HEALTHEAST (OUTPATIENT)
Dept: FAMILY MEDICINE | Facility: CLINIC | Age: 84
End: 2019-06-26

## 2019-07-03 ENCOUNTER — RECORDS - HEALTHEAST (OUTPATIENT)
Dept: ADMINISTRATIVE | Facility: OTHER | Age: 84
End: 2019-07-03

## 2019-07-07 ENCOUNTER — RECORDS - HEALTHEAST (OUTPATIENT)
Dept: ADMINISTRATIVE | Facility: OTHER | Age: 84
End: 2019-07-07

## 2019-07-08 ENCOUNTER — COMMUNICATION - HEALTHEAST (OUTPATIENT)
Dept: CARDIOLOGY | Facility: CLINIC | Age: 84
End: 2019-07-08

## 2019-07-08 ENCOUNTER — AMBULATORY - HEALTHEAST (OUTPATIENT)
Dept: CARDIOLOGY | Facility: CLINIC | Age: 84
End: 2019-07-08

## 2019-07-08 DIAGNOSIS — I49.3 FREQUENT PVCS: ICD-10-CM

## 2019-07-08 DIAGNOSIS — I10 ESSENTIAL HYPERTENSION: ICD-10-CM

## 2019-07-09 ENCOUNTER — COMMUNICATION - HEALTHEAST (OUTPATIENT)
Dept: FAMILY MEDICINE | Facility: CLINIC | Age: 84
End: 2019-07-09

## 2019-07-09 ENCOUNTER — RECORDS - HEALTHEAST (OUTPATIENT)
Dept: ADMINISTRATIVE | Facility: OTHER | Age: 84
End: 2019-07-09

## 2019-07-10 ENCOUNTER — COMMUNICATION - HEALTHEAST (OUTPATIENT)
Dept: FAMILY MEDICINE | Facility: CLINIC | Age: 84
End: 2019-07-10

## 2019-07-10 ENCOUNTER — RECORDS - HEALTHEAST (OUTPATIENT)
Dept: ADMINISTRATIVE | Facility: OTHER | Age: 84
End: 2019-07-10

## 2019-07-16 ENCOUNTER — RECORDS - HEALTHEAST (OUTPATIENT)
Dept: ADMINISTRATIVE | Facility: OTHER | Age: 84
End: 2019-07-16

## 2019-07-18 ENCOUNTER — RECORDS - HEALTHEAST (OUTPATIENT)
Dept: ADMINISTRATIVE | Facility: OTHER | Age: 84
End: 2019-07-18

## 2019-07-25 ENCOUNTER — RECORDS - HEALTHEAST (OUTPATIENT)
Dept: ADMINISTRATIVE | Facility: OTHER | Age: 84
End: 2019-07-25

## 2019-08-13 ENCOUNTER — COMMUNICATION - HEALTHEAST (OUTPATIENT)
Dept: CARDIOLOGY | Facility: CLINIC | Age: 84
End: 2019-08-13

## 2019-08-14 ENCOUNTER — OFFICE VISIT - HEALTHEAST (OUTPATIENT)
Dept: FAMILY MEDICINE | Facility: CLINIC | Age: 84
End: 2019-08-14

## 2019-08-14 DIAGNOSIS — Z79.899 POLYPHARMACY: ICD-10-CM

## 2019-08-14 DIAGNOSIS — L82.1 SEBORRHEIC KERATOSIS: ICD-10-CM

## 2019-08-14 DIAGNOSIS — N39.44 NOCTURNAL ENURESIS: ICD-10-CM

## 2019-08-14 DIAGNOSIS — R68.89 HEAT INTOLERANCE: ICD-10-CM

## 2019-08-14 DIAGNOSIS — R53.83 FATIGUE, UNSPECIFIED TYPE: ICD-10-CM

## 2019-08-14 LAB
BASOPHILS # BLD AUTO: 0 THOU/UL (ref 0–0.2)
BASOPHILS NFR BLD AUTO: 1 % (ref 0–2)
EOSINOPHIL # BLD AUTO: 0.2 THOU/UL (ref 0–0.4)
EOSINOPHIL NFR BLD AUTO: 2 % (ref 0–6)
ERYTHROCYTE [DISTWIDTH] IN BLOOD BY AUTOMATED COUNT: 13.2 % (ref 11–14.5)
HCT VFR BLD AUTO: 35.5 % (ref 35–47)
HGB BLD-MCNC: 12.2 G/DL (ref 12–16)
LYMPHOCYTES # BLD AUTO: 2.1 THOU/UL (ref 0.8–4.4)
LYMPHOCYTES NFR BLD AUTO: 31 % (ref 20–40)
MCH RBC QN AUTO: 34.1 PG (ref 27–34)
MCHC RBC AUTO-ENTMCNC: 34.2 G/DL (ref 32–36)
MCV RBC AUTO: 100 FL (ref 80–100)
MONOCYTES # BLD AUTO: 0.6 THOU/UL (ref 0–0.9)
MONOCYTES NFR BLD AUTO: 9 % (ref 2–10)
NEUTROPHILS # BLD AUTO: 3.9 THOU/UL (ref 2–7.7)
NEUTROPHILS NFR BLD AUTO: 58 % (ref 50–70)
PLATELET # BLD AUTO: 154 THOU/UL (ref 140–440)
PMV BLD AUTO: 8.3 FL (ref 7–10)
RBC # BLD AUTO: 3.56 MILL/UL (ref 3.8–5.4)
TSH SERPL DL<=0.005 MIU/L-ACNC: 2.29 UIU/ML (ref 0.3–5)
VIT B12 SERPL-MCNC: 1838 PG/ML (ref 213–816)
WBC: 6.8 THOU/UL (ref 4–11)

## 2019-08-15 ENCOUNTER — COMMUNICATION - HEALTHEAST (OUTPATIENT)
Dept: FAMILY MEDICINE | Facility: CLINIC | Age: 84
End: 2019-08-15

## 2019-08-20 ENCOUNTER — OFFICE VISIT - HEALTHEAST (OUTPATIENT)
Dept: PHARMACY | Facility: CLINIC | Age: 84
End: 2019-08-20

## 2019-08-20 DIAGNOSIS — I25.708 CORONARY ARTERY DISEASE INVOLVING CORONARY BYPASS GRAFT OF NATIVE HEART WITH OTHER FORMS OF ANGINA PECTORIS (H): ICD-10-CM

## 2019-08-20 DIAGNOSIS — G31.84 MCI (MILD COGNITIVE IMPAIRMENT): ICD-10-CM

## 2019-08-20 DIAGNOSIS — L40.50 PSORIATIC ARTHRITIS (H): ICD-10-CM

## 2019-08-20 DIAGNOSIS — M81.0 SENILE OSTEOPOROSIS: ICD-10-CM

## 2019-08-20 DIAGNOSIS — R35.1 NOCTURIA: ICD-10-CM

## 2019-08-20 DIAGNOSIS — M94.9 DISORDER OF BONE AND CARTILAGE: ICD-10-CM

## 2019-08-20 DIAGNOSIS — E03.9 HYPOTHYROIDISM, UNSPECIFIED TYPE: ICD-10-CM

## 2019-08-20 DIAGNOSIS — M89.9 DISORDER OF BONE AND CARTILAGE: ICD-10-CM

## 2019-08-21 ENCOUNTER — OFFICE VISIT - HEALTHEAST (OUTPATIENT)
Dept: CARDIOLOGY | Facility: CLINIC | Age: 84
End: 2019-08-21

## 2019-08-21 DIAGNOSIS — I25.708 CORONARY ARTERY DISEASE INVOLVING CORONARY BYPASS GRAFT OF NATIVE HEART WITH OTHER FORMS OF ANGINA PECTORIS (H): ICD-10-CM

## 2019-08-21 DIAGNOSIS — Z95.2 S/P AVR: ICD-10-CM

## 2019-08-21 DIAGNOSIS — E78.2 MIXED HYPERLIPIDEMIA: ICD-10-CM

## 2019-08-21 DIAGNOSIS — I49.3 FREQUENT PVCS: ICD-10-CM

## 2019-08-21 DIAGNOSIS — I10 ESSENTIAL HYPERTENSION: ICD-10-CM

## 2019-08-21 ASSESSMENT — MIFFLIN-ST. JEOR: SCORE: 958.91

## 2019-08-28 ENCOUNTER — OFFICE VISIT - HEALTHEAST (OUTPATIENT)
Dept: FAMILY MEDICINE | Facility: CLINIC | Age: 84
End: 2019-08-28

## 2019-08-28 DIAGNOSIS — M25.552 HIP PAIN, LEFT: ICD-10-CM

## 2019-08-28 DIAGNOSIS — R10.32 LEFT INGUINAL PAIN: ICD-10-CM

## 2019-09-04 ENCOUNTER — RECORDS - HEALTHEAST (OUTPATIENT)
Dept: ADMINISTRATIVE | Facility: OTHER | Age: 84
End: 2019-09-04

## 2019-09-06 ENCOUNTER — COMMUNICATION - HEALTHEAST (OUTPATIENT)
Dept: PHARMACY | Facility: CLINIC | Age: 84
End: 2019-09-06

## 2019-09-07 ENCOUNTER — COMMUNICATION - HEALTHEAST (OUTPATIENT)
Dept: RHEUMATOLOGY | Facility: CLINIC | Age: 84
End: 2019-09-07

## 2019-09-07 DIAGNOSIS — L40.50 PSORIATIC ARTHRITIS (H): ICD-10-CM

## 2019-09-25 ENCOUNTER — COMMUNICATION - HEALTHEAST (OUTPATIENT)
Dept: ADMINISTRATIVE | Facility: HOSPITAL | Age: 84
End: 2019-09-25

## 2019-10-05 ENCOUNTER — COMMUNICATION - HEALTHEAST (OUTPATIENT)
Dept: RHEUMATOLOGY | Facility: CLINIC | Age: 84
End: 2019-10-05

## 2019-10-05 DIAGNOSIS — L40.50 PSORIATIC ARTHRITIS (H): ICD-10-CM

## 2019-10-09 ENCOUNTER — AMBULATORY - HEALTHEAST (OUTPATIENT)
Dept: LAB | Facility: CLINIC | Age: 84
End: 2019-10-09

## 2019-10-09 ENCOUNTER — OFFICE VISIT - HEALTHEAST (OUTPATIENT)
Dept: FAMILY MEDICINE | Facility: CLINIC | Age: 84
End: 2019-10-09

## 2019-10-09 DIAGNOSIS — R00.1 BRADYCARDIA: ICD-10-CM

## 2019-10-09 DIAGNOSIS — L40.50 PSORIATIC ARTHRITIS (H): ICD-10-CM

## 2019-10-09 LAB
ALBUMIN SERPL-MCNC: 4 G/DL (ref 3.5–5)
ALT SERPL W P-5'-P-CCNC: 24 U/L (ref 0–45)
CREAT SERPL-MCNC: 0.89 MG/DL (ref 0.6–1.1)
ERYTHROCYTE [DISTWIDTH] IN BLOOD BY AUTOMATED COUNT: 10.6 % (ref 11–14.5)
GFR SERPL CREATININE-BSD FRML MDRD: >60 ML/MIN/1.73M2
HCT VFR BLD AUTO: 38.9 % (ref 35–47)
HGB BLD-MCNC: 13.2 G/DL (ref 12–16)
MCH RBC QN AUTO: 34.4 PG (ref 27–34)
MCHC RBC AUTO-ENTMCNC: 33.9 G/DL (ref 32–36)
MCV RBC AUTO: 101 FL (ref 80–100)
PLATELET # BLD AUTO: 154 THOU/UL (ref 140–440)
PMV BLD AUTO: 8.5 FL (ref 7–10)
RBC # BLD AUTO: 3.83 MILL/UL (ref 3.8–5.4)
WBC: 7.9 THOU/UL (ref 4–11)

## 2019-10-10 ENCOUNTER — OFFICE VISIT - HEALTHEAST (OUTPATIENT)
Dept: FAMILY MEDICINE | Facility: CLINIC | Age: 84
End: 2019-10-10

## 2019-10-10 DIAGNOSIS — I10 ESSENTIAL HYPERTENSION: ICD-10-CM

## 2019-10-10 DIAGNOSIS — E03.9 HYPOTHYROIDISM, UNSPECIFIED TYPE: ICD-10-CM

## 2019-10-10 DIAGNOSIS — I49.3 FREQUENT PVCS: ICD-10-CM

## 2019-10-10 DIAGNOSIS — R71.8 ELEVATED MCV: ICD-10-CM

## 2019-10-15 ENCOUNTER — INFUSION - HEALTHEAST (OUTPATIENT)
Dept: INFUSION THERAPY | Facility: HOSPITAL | Age: 84
End: 2019-10-15

## 2019-10-15 ENCOUNTER — COMMUNICATION - HEALTHEAST (OUTPATIENT)
Dept: ADMINISTRATIVE | Facility: CLINIC | Age: 84
End: 2019-10-15

## 2019-10-15 DIAGNOSIS — M81.0 SENILE OSTEOPOROSIS: ICD-10-CM

## 2019-10-15 DIAGNOSIS — L40.50 PSORIATIC ARTHRITIS (H): ICD-10-CM

## 2019-10-15 LAB
ATRIAL RATE - MUSE: 58 BPM
DIASTOLIC BLOOD PRESSURE - MUSE: NORMAL
INTERPRETATION ECG - MUSE: NORMAL
P AXIS - MUSE: 85 DEGREES
PR INTERVAL - MUSE: 220 MS
QRS DURATION - MUSE: 102 MS
QT - MUSE: 448 MS
QTC - MUSE: 439 MS
R AXIS - MUSE: -25 DEGREES
SYSTOLIC BLOOD PRESSURE - MUSE: NORMAL
T AXIS - MUSE: 90 DEGREES
VENTRICULAR RATE- MUSE: 58 BPM

## 2019-11-01 ENCOUNTER — HOSPITAL ENCOUNTER (OUTPATIENT)
Dept: MAMMOGRAPHY | Facility: CLINIC | Age: 84
Discharge: HOME | End: 2019-11-01

## 2019-11-01 DIAGNOSIS — Z12.31 VISIT FOR SCREENING MAMMOGRAM: ICD-10-CM

## 2019-11-05 ENCOUNTER — OFFICE VISIT - HEALTHEAST (OUTPATIENT)
Dept: RHEUMATOLOGY | Facility: CLINIC | Age: 84
End: 2019-11-05

## 2019-11-05 DIAGNOSIS — L40.50 PSORIATIC ARTHRITIS (H): ICD-10-CM

## 2019-11-05 DIAGNOSIS — I25.708 CORONARY ARTERY DISEASE INVOLVING CORONARY BYPASS GRAFT OF NATIVE HEART WITH OTHER FORMS OF ANGINA PECTORIS (H): ICD-10-CM

## 2019-11-05 DIAGNOSIS — Z79.899 HIGH RISK MEDICATION USE: ICD-10-CM

## 2019-11-05 DIAGNOSIS — L40.8 OTHER PSORIASIS: ICD-10-CM

## 2019-11-05 DIAGNOSIS — L40.50 PSORIATIC ARTHROPATHY (H): ICD-10-CM

## 2019-11-05 ASSESSMENT — MIFFLIN-ST. JEOR: SCORE: 977.05

## 2020-01-03 ENCOUNTER — RECORDS - HEALTHEAST (OUTPATIENT)
Dept: ADMINISTRATIVE | Facility: OTHER | Age: 85
End: 2020-01-03

## 2020-01-03 ENCOUNTER — AMBULATORY - HEALTHEAST (OUTPATIENT)
Dept: NEUROLOGY | Facility: CLINIC | Age: 85
End: 2020-01-03

## 2020-01-03 DIAGNOSIS — G31.84 MCI (MILD COGNITIVE IMPAIRMENT): ICD-10-CM

## 2020-01-04 ENCOUNTER — COMMUNICATION - HEALTHEAST (OUTPATIENT)
Dept: FAMILY MEDICINE | Facility: CLINIC | Age: 85
End: 2020-01-04

## 2020-01-04 DIAGNOSIS — I25.708 CORONARY ARTERY DISEASE INVOLVING CORONARY BYPASS GRAFT OF NATIVE HEART WITH OTHER FORMS OF ANGINA PECTORIS (H): ICD-10-CM

## 2020-01-08 ENCOUNTER — COMMUNICATION - HEALTHEAST (OUTPATIENT)
Dept: FAMILY MEDICINE | Facility: CLINIC | Age: 85
End: 2020-01-08

## 2020-01-08 DIAGNOSIS — G31.84 MCI (MILD COGNITIVE IMPAIRMENT): ICD-10-CM

## 2020-01-22 ENCOUNTER — AMBULATORY - HEALTHEAST (OUTPATIENT)
Dept: ADMINISTRATIVE | Facility: REHABILITATION | Age: 85
End: 2020-01-22

## 2020-01-22 DIAGNOSIS — M21.379 FOOT DROP: ICD-10-CM

## 2020-02-13 ENCOUNTER — OFFICE VISIT - HEALTHEAST (OUTPATIENT)
Dept: CARDIOLOGY | Facility: CLINIC | Age: 85
End: 2020-02-13

## 2020-02-13 DIAGNOSIS — Z95.2 S/P AVR: ICD-10-CM

## 2020-02-13 DIAGNOSIS — I49.3 FREQUENT PVCS: ICD-10-CM

## 2020-02-13 DIAGNOSIS — I10 ESSENTIAL HYPERTENSION: ICD-10-CM

## 2020-02-13 DIAGNOSIS — E78.2 MIXED HYPERLIPIDEMIA: ICD-10-CM

## 2020-02-13 DIAGNOSIS — I25.709 CORONARY ARTERY DISEASE INVOLVING CORONARY BYPASS GRAFT OF NATIVE HEART WITH ANGINA PECTORIS (H): ICD-10-CM

## 2020-02-26 ENCOUNTER — HOSPITAL ENCOUNTER (OUTPATIENT)
Dept: NEUROLOGY | Facility: CLINIC | Age: 85
Setting detail: THERAPIES SERIES
Discharge: STILL ADMITTED OR ADMINISTRATIVE | End: 2020-02-26
Attending: PSYCHIATRY & NEUROLOGY

## 2020-02-26 DIAGNOSIS — I25.708 CORONARY ARTERY DISEASE INVOLVING CORONARY BYPASS GRAFT OF NATIVE HEART WITH OTHER FORMS OF ANGINA PECTORIS (H): ICD-10-CM

## 2020-02-26 DIAGNOSIS — I99.9 MILD VASCULAR NEUROCOGNITIVE DISORDER: ICD-10-CM

## 2020-02-26 DIAGNOSIS — F06.70 MILD VASCULAR NEUROCOGNITIVE DISORDER: ICD-10-CM

## 2020-02-26 DIAGNOSIS — E78.5 HYPERLIPIDEMIA, UNSPECIFIED HYPERLIPIDEMIA TYPE: ICD-10-CM

## 2020-02-26 DIAGNOSIS — I10 ESSENTIAL HYPERTENSION: ICD-10-CM

## 2020-02-26 DIAGNOSIS — G31.84 MCI (MILD COGNITIVE IMPAIRMENT): ICD-10-CM

## 2020-02-28 ENCOUNTER — COMMUNICATION - HEALTHEAST (OUTPATIENT)
Dept: RHEUMATOLOGY | Facility: CLINIC | Age: 85
End: 2020-02-28

## 2020-02-28 DIAGNOSIS — L40.50 PSORIATIC ARTHRITIS (H): ICD-10-CM

## 2020-03-03 ENCOUNTER — COMMUNICATION - HEALTHEAST (OUTPATIENT)
Dept: FAMILY MEDICINE | Facility: CLINIC | Age: 85
End: 2020-03-03

## 2020-03-03 ENCOUNTER — AMBULATORY - HEALTHEAST (OUTPATIENT)
Dept: LAB | Facility: CLINIC | Age: 85
End: 2020-03-03

## 2020-03-03 DIAGNOSIS — E03.9 HYPOTHYROIDISM: ICD-10-CM

## 2020-03-03 DIAGNOSIS — L40.50 PSORIATIC ARTHRITIS (H): ICD-10-CM

## 2020-03-03 LAB
ALBUMIN SERPL-MCNC: 4 G/DL (ref 3.5–5)
ALT SERPL W P-5'-P-CCNC: 26 U/L (ref 0–45)
CREAT SERPL-MCNC: 0.94 MG/DL (ref 0.6–1.1)
ERYTHROCYTE [DISTWIDTH] IN BLOOD BY AUTOMATED COUNT: 10.8 % (ref 11–14.5)
GFR SERPL CREATININE-BSD FRML MDRD: 57 ML/MIN/1.73M2
HCT VFR BLD AUTO: 38.3 % (ref 35–47)
HGB BLD-MCNC: 13.1 G/DL (ref 12–16)
MCH RBC QN AUTO: 34.4 PG (ref 27–34)
MCHC RBC AUTO-ENTMCNC: 34.1 G/DL (ref 32–36)
MCV RBC AUTO: 101 FL (ref 80–100)
PLATELET # BLD AUTO: 144 THOU/UL (ref 140–440)
PMV BLD AUTO: 9 FL (ref 7–10)
RBC # BLD AUTO: 3.8 MILL/UL (ref 3.8–5.4)
WBC: 6.8 THOU/UL (ref 4–11)

## 2020-03-17 ENCOUNTER — COMMUNICATION - HEALTHEAST (OUTPATIENT)
Dept: RHEUMATOLOGY | Facility: CLINIC | Age: 85
End: 2020-03-17

## 2020-03-18 ENCOUNTER — OFFICE VISIT - HEALTHEAST (OUTPATIENT)
Dept: RHEUMATOLOGY | Facility: CLINIC | Age: 85
End: 2020-03-18

## 2020-03-18 DIAGNOSIS — L40.50 PSORIATIC ARTHRITIS (H): ICD-10-CM

## 2020-03-18 DIAGNOSIS — L40.8 OTHER PSORIASIS: ICD-10-CM

## 2020-03-18 DIAGNOSIS — M15.9 GENERALIZED OSTEOARTHROSIS OF HAND: ICD-10-CM

## 2020-03-23 ENCOUNTER — COMMUNICATION - HEALTHEAST (OUTPATIENT)
Dept: RHEUMATOLOGY | Facility: CLINIC | Age: 85
End: 2020-03-23

## 2020-03-23 DIAGNOSIS — L40.50 PSORIATIC ARTHRITIS (H): ICD-10-CM

## 2020-03-24 ENCOUNTER — COMMUNICATION - HEALTHEAST (OUTPATIENT)
Dept: RHEUMATOLOGY | Facility: CLINIC | Age: 85
End: 2020-03-24

## 2020-03-24 DIAGNOSIS — L40.50 PSORIATIC ARTHRITIS (H): ICD-10-CM

## 2020-04-07 ENCOUNTER — COMMUNICATION - HEALTHEAST (OUTPATIENT)
Dept: ADMINISTRATIVE | Facility: CLINIC | Age: 85
End: 2020-04-07

## 2020-04-07 DIAGNOSIS — L40.50 PSORIATIC ARTHRITIS (H): ICD-10-CM

## 2020-04-13 ENCOUNTER — COMMUNICATION - HEALTHEAST (OUTPATIENT)
Dept: ADMINISTRATIVE | Facility: CLINIC | Age: 85
End: 2020-04-13

## 2020-04-14 ENCOUNTER — OFFICE VISIT - HEALTHEAST (OUTPATIENT)
Dept: RHEUMATOLOGY | Facility: CLINIC | Age: 85
End: 2020-04-14

## 2020-04-14 DIAGNOSIS — L40.50 PSORIATIC ARTHRITIS (H): ICD-10-CM

## 2020-04-14 DIAGNOSIS — M15.9 GENERALIZED OSTEOARTHROSIS OF HAND: ICD-10-CM

## 2020-04-16 ENCOUNTER — COMMUNICATION - HEALTHEAST (OUTPATIENT)
Dept: RHEUMATOLOGY | Facility: CLINIC | Age: 85
End: 2020-04-16

## 2020-04-16 DIAGNOSIS — L40.50 PSORIATIC ARTHRITIS (H): ICD-10-CM

## 2020-06-16 ENCOUNTER — OFFICE VISIT - HEALTHEAST (OUTPATIENT)
Dept: FAMILY MEDICINE | Facility: CLINIC | Age: 85
End: 2020-06-16

## 2020-06-16 ENCOUNTER — OFFICE VISIT - HEALTHEAST (OUTPATIENT)
Dept: RHEUMATOLOGY | Facility: CLINIC | Age: 85
End: 2020-06-16

## 2020-06-16 DIAGNOSIS — G31.84 MCI (MILD COGNITIVE IMPAIRMENT): ICD-10-CM

## 2020-06-16 DIAGNOSIS — L40.50 PSORIATIC ARTHRITIS (H): ICD-10-CM

## 2020-06-16 DIAGNOSIS — I50.31 ACUTE DIASTOLIC CHF (CONGESTIVE HEART FAILURE) (H): ICD-10-CM

## 2020-06-16 DIAGNOSIS — J31.0 RHINITIS, UNSPECIFIED TYPE: ICD-10-CM

## 2020-06-16 DIAGNOSIS — I10 ESSENTIAL HYPERTENSION: ICD-10-CM

## 2020-06-16 DIAGNOSIS — L40.8 OTHER PSORIASIS: ICD-10-CM

## 2020-06-16 DIAGNOSIS — M15.9 GENERALIZED OSTEOARTHROSIS OF HAND: ICD-10-CM

## 2020-06-16 DIAGNOSIS — L40.50 PSORIATIC ARTHROPATHY (H): ICD-10-CM

## 2020-06-16 DIAGNOSIS — Z79.899 HIGH RISK MEDICATION USE: ICD-10-CM

## 2020-06-17 ENCOUNTER — AMBULATORY - HEALTHEAST (OUTPATIENT)
Dept: LAB | Facility: CLINIC | Age: 85
End: 2020-06-17

## 2020-06-17 DIAGNOSIS — L40.50 PSORIATIC ARTHRITIS (H): ICD-10-CM

## 2020-06-17 LAB
ALBUMIN SERPL-MCNC: 4 G/DL (ref 3.5–5)
ALT SERPL W P-5'-P-CCNC: 25 U/L (ref 0–45)
CREAT SERPL-MCNC: 0.94 MG/DL (ref 0.6–1.1)
ERYTHROCYTE [DISTWIDTH] IN BLOOD BY AUTOMATED COUNT: 11.4 % (ref 11–14.5)
GFR SERPL CREATININE-BSD FRML MDRD: 57 ML/MIN/1.73M2
HCT VFR BLD AUTO: 40.2 % (ref 35–47)
HGB BLD-MCNC: 13.3 G/DL (ref 12–16)
MCH RBC QN AUTO: 33.3 PG (ref 27–34)
MCHC RBC AUTO-ENTMCNC: 33.2 G/DL (ref 32–36)
MCV RBC AUTO: 100 FL (ref 80–100)
PLATELET # BLD AUTO: 153 THOU/UL (ref 140–440)
PMV BLD AUTO: 9.3 FL (ref 7–10)
RBC # BLD AUTO: 4 MILL/UL (ref 3.8–5.4)
WBC: 7.9 THOU/UL (ref 4–11)

## 2020-06-24 ENCOUNTER — RECORDS - HEALTHEAST (OUTPATIENT)
Dept: FAMILY MEDICINE | Facility: CLINIC | Age: 85
End: 2020-06-24

## 2020-06-24 ENCOUNTER — COMMUNICATION - HEALTHEAST (OUTPATIENT)
Dept: FAMILY MEDICINE | Facility: CLINIC | Age: 85
End: 2020-06-24

## 2020-06-24 DIAGNOSIS — G31.84 MCI (MILD COGNITIVE IMPAIRMENT): ICD-10-CM

## 2020-07-02 ENCOUNTER — OFFICE VISIT - HEALTHEAST (OUTPATIENT)
Dept: FAMILY MEDICINE | Facility: CLINIC | Age: 85
End: 2020-07-02

## 2020-07-02 DIAGNOSIS — R00.1 BRADYCARDIA: ICD-10-CM

## 2020-07-02 DIAGNOSIS — R23.2 HOT FLASHES: ICD-10-CM

## 2020-07-02 DIAGNOSIS — R32 URINARY INCONTINENCE, UNSPECIFIED TYPE: ICD-10-CM

## 2020-07-02 DIAGNOSIS — R53.83 FATIGUE, UNSPECIFIED TYPE: ICD-10-CM

## 2020-07-02 LAB
ALBUMIN SERPL-MCNC: 4.2 G/DL (ref 3.5–5)
ALBUMIN UR-MCNC: NEGATIVE MG/DL
ALP SERPL-CCNC: 65 U/L (ref 45–120)
ALT SERPL W P-5'-P-CCNC: 28 U/L (ref 0–45)
ANION GAP SERPL CALCULATED.3IONS-SCNC: 11 MMOL/L (ref 5–18)
APPEARANCE UR: CLEAR
AST SERPL W P-5'-P-CCNC: 38 U/L (ref 0–40)
BASOPHILS # BLD AUTO: 0.1 THOU/UL (ref 0–0.2)
BASOPHILS NFR BLD AUTO: 1 % (ref 0–2)
BILIRUB SERPL-MCNC: 0.8 MG/DL (ref 0–1)
BILIRUB UR QL STRIP: NEGATIVE
BUN SERPL-MCNC: 19 MG/DL (ref 8–28)
CALCIUM SERPL-MCNC: 10 MG/DL (ref 8.5–10.5)
CHLORIDE BLD-SCNC: 98 MMOL/L (ref 98–107)
CO2 SERPL-SCNC: 25 MMOL/L (ref 22–31)
COLOR UR AUTO: YELLOW
CREAT SERPL-MCNC: 1.11 MG/DL (ref 0.6–1.1)
EOSINOPHIL # BLD AUTO: 0.2 THOU/UL (ref 0–0.4)
EOSINOPHIL NFR BLD AUTO: 2 % (ref 0–6)
ERYTHROCYTE [DISTWIDTH] IN BLOOD BY AUTOMATED COUNT: 12.7 % (ref 11–14.5)
GFR SERPL CREATININE-BSD FRML MDRD: 47 ML/MIN/1.73M2
GLUCOSE BLD-MCNC: 94 MG/DL (ref 70–125)
GLUCOSE UR STRIP-MCNC: NEGATIVE MG/DL
HCT VFR BLD AUTO: 39.2 % (ref 35–47)
HGB BLD-MCNC: 13.5 G/DL (ref 12–16)
HGB UR QL STRIP: NEGATIVE
KETONES UR STRIP-MCNC: NEGATIVE MG/DL
LEUKOCYTE ESTERASE UR QL STRIP: NEGATIVE
LYMPHOCYTES # BLD AUTO: 2.3 THOU/UL (ref 0.8–4.4)
LYMPHOCYTES NFR BLD AUTO: 30 % (ref 20–40)
MCH RBC QN AUTO: 34.5 PG (ref 27–34)
MCHC RBC AUTO-ENTMCNC: 34.6 G/DL (ref 32–36)
MCV RBC AUTO: 100 FL (ref 80–100)
MONOCYTES # BLD AUTO: 0.6 THOU/UL (ref 0–0.9)
MONOCYTES NFR BLD AUTO: 8 % (ref 2–10)
NEUTROPHILS # BLD AUTO: 4.6 THOU/UL (ref 2–7.7)
NEUTROPHILS NFR BLD AUTO: 60 % (ref 50–70)
NITRATE UR QL: NEGATIVE
PH UR STRIP: 6 [PH] (ref 5–8)
PLATELET # BLD AUTO: 143 THOU/UL (ref 140–440)
PMV BLD AUTO: 8.7 FL (ref 7–10)
POTASSIUM BLD-SCNC: 5.2 MMOL/L (ref 3.5–5)
PROT SERPL-MCNC: 7.2 G/DL (ref 6–8)
RBC # BLD AUTO: 3.93 MILL/UL (ref 3.8–5.4)
SODIUM SERPL-SCNC: 134 MMOL/L (ref 136–145)
SP GR UR STRIP: 1.01 (ref 1–1.03)
TSH SERPL DL<=0.005 MIU/L-ACNC: 0.95 UIU/ML (ref 0.3–5)
UROBILINOGEN UR STRIP-ACNC: NORMAL
WBC: 7.7 THOU/UL (ref 4–11)

## 2020-07-06 ENCOUNTER — COMMUNICATION - HEALTHEAST (OUTPATIENT)
Dept: CARDIOLOGY | Facility: CLINIC | Age: 85
End: 2020-07-06

## 2020-07-06 DIAGNOSIS — I49.3 FREQUENT PVCS: ICD-10-CM

## 2020-08-10 ENCOUNTER — COMMUNICATION - HEALTHEAST (OUTPATIENT)
Dept: CARDIOLOGY | Facility: CLINIC | Age: 85
End: 2020-08-10

## 2020-08-10 DIAGNOSIS — I10 ESSENTIAL HYPERTENSION: ICD-10-CM

## 2020-08-11 ENCOUNTER — COMMUNICATION - HEALTHEAST (OUTPATIENT)
Dept: CARDIOLOGY | Facility: CLINIC | Age: 85
End: 2020-08-11

## 2020-08-11 ENCOUNTER — TELEPHONE (OUTPATIENT)
Dept: NEUROLOGY | Facility: CLINIC | Age: 85
End: 2020-08-11

## 2020-08-11 DIAGNOSIS — I10 ESSENTIAL HYPERTENSION: ICD-10-CM

## 2020-08-11 NOTE — TELEPHONE ENCOUNTER
Pt called, responding to the 8-4-20 letter that we sent. She is confused and needs direction. Please call her at [phone]

## 2020-08-11 NOTE — TELEPHONE ENCOUNTER
Patient don't want to do any testing. She said that she is okay. Neuropsych result is in [employer]. Please send letter. Thanks

## 2020-08-12 NOTE — TELEPHONE ENCOUNTER
Please send her a copy the Neuropsych results. I cannot explain in it a letter. She can schedule apt if she likes.   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

## 2020-08-14 ENCOUNTER — AMBULATORY - HEALTHEAST (OUTPATIENT)
Dept: LAB | Facility: CLINIC | Age: 85
End: 2020-08-14

## 2020-08-14 DIAGNOSIS — L40.50 PSORIATIC ARTHRITIS (H): ICD-10-CM

## 2020-08-14 LAB
ALBUMIN SERPL-MCNC: 4 G/DL (ref 3.5–5)
ALT SERPL W P-5'-P-CCNC: 30 U/L (ref 0–45)
CREAT SERPL-MCNC: 0.9 MG/DL (ref 0.6–1.1)
ERYTHROCYTE [DISTWIDTH] IN BLOOD BY AUTOMATED COUNT: 11.1 % (ref 11–14.5)
GFR SERPL CREATININE-BSD FRML MDRD: 60 ML/MIN/1.73M2
HCT VFR BLD AUTO: 37.3 % (ref 35–47)
HGB BLD-MCNC: 12.9 G/DL (ref 12–16)
MCH RBC QN AUTO: 34.1 PG (ref 27–34)
MCHC RBC AUTO-ENTMCNC: 34.5 G/DL (ref 32–36)
MCV RBC AUTO: 99 FL (ref 80–100)
PLATELET # BLD AUTO: 128 THOU/UL (ref 140–440)
PMV BLD AUTO: 9 FL (ref 7–10)
RBC # BLD AUTO: 3.77 MILL/UL (ref 3.8–5.4)
WBC: 7.4 THOU/UL (ref 4–11)

## 2020-08-19 ENCOUNTER — OFFICE VISIT - HEALTHEAST (OUTPATIENT)
Dept: RHEUMATOLOGY | Facility: CLINIC | Age: 85
End: 2020-08-19

## 2020-08-19 DIAGNOSIS — M15.9 GENERALIZED OSTEOARTHROSIS OF HAND: ICD-10-CM

## 2020-08-19 DIAGNOSIS — L40.50 PSORIATIC ARTHRITIS (H): ICD-10-CM

## 2020-08-24 ENCOUNTER — COMMUNICATION - HEALTHEAST (OUTPATIENT)
Dept: RHEUMATOLOGY | Facility: CLINIC | Age: 85
End: 2020-08-24

## 2020-08-24 DIAGNOSIS — L40.50 PSORIATIC ARTHRITIS (H): ICD-10-CM

## 2020-08-25 ENCOUNTER — COMMUNICATION - HEALTHEAST (OUTPATIENT)
Dept: FAMILY MEDICINE | Facility: CLINIC | Age: 85
End: 2020-08-25

## 2020-08-25 DIAGNOSIS — E03.9 HYPOTHYROIDISM: ICD-10-CM

## 2020-09-23 ENCOUNTER — COMMUNICATION - HEALTHEAST (OUTPATIENT)
Dept: CARDIOLOGY | Facility: CLINIC | Age: 85
End: 2020-09-23

## 2020-09-24 ENCOUNTER — OFFICE VISIT - HEALTHEAST (OUTPATIENT)
Dept: CARDIOLOGY | Facility: CLINIC | Age: 85
End: 2020-09-24

## 2020-09-24 DIAGNOSIS — I25.119 CORONARY ARTERY DISEASE INVOLVING NATIVE CORONARY ARTERY OF NATIVE HEART WITH ANGINA PECTORIS (H): ICD-10-CM

## 2020-09-24 DIAGNOSIS — R06.09 DYSPNEA ON EXERTION: ICD-10-CM

## 2020-09-24 DIAGNOSIS — E78.2 MIXED HYPERLIPIDEMIA: ICD-10-CM

## 2020-09-24 DIAGNOSIS — I10 ESSENTIAL HYPERTENSION: ICD-10-CM

## 2020-09-24 DIAGNOSIS — Z95.3 S/P AORTIC VALVE REPLACEMENT WITH BIOPROSTHETIC VALVE: ICD-10-CM

## 2020-09-24 LAB
ALT SERPL W P-5'-P-CCNC: 30 U/L (ref 0–45)
ANION GAP SERPL CALCULATED.3IONS-SCNC: 8 MMOL/L (ref 5–18)
BUN SERPL-MCNC: 18 MG/DL (ref 8–28)
CALCIUM SERPL-MCNC: 10.2 MG/DL (ref 8.5–10.5)
CHLORIDE BLD-SCNC: 97 MMOL/L (ref 98–107)
CO2 SERPL-SCNC: 31 MMOL/L (ref 22–31)
CREAT SERPL-MCNC: 1.05 MG/DL (ref 0.6–1.1)
ERYTHROCYTE [DISTWIDTH] IN BLOOD BY AUTOMATED COUNT: 14.1 % (ref 11–14.5)
GFR SERPL CREATININE-BSD FRML MDRD: 50 ML/MIN/1.73M2
GLUCOSE BLD-MCNC: 105 MG/DL (ref 70–125)
HCT VFR BLD AUTO: 39.2 % (ref 35–47)
HGB BLD-MCNC: 13 G/DL (ref 12–16)
LDLC SERPL CALC-MCNC: 36 MG/DL
MCH RBC QN AUTO: 33.6 PG (ref 27–34)
MCHC RBC AUTO-ENTMCNC: 33.2 G/DL (ref 32–36)
MCV RBC AUTO: 101 FL (ref 80–100)
PLATELET # BLD AUTO: 160 THOU/UL (ref 140–440)
PMV BLD AUTO: 12 FL (ref 8.5–12.5)
POTASSIUM BLD-SCNC: 5.2 MMOL/L (ref 3.5–5)
RBC # BLD AUTO: 3.87 MILL/UL (ref 3.8–5.4)
SODIUM SERPL-SCNC: 136 MMOL/L (ref 136–145)
WBC: 7.9 THOU/UL (ref 4–11)

## 2020-09-24 ASSESSMENT — MIFFLIN-ST. JEOR: SCORE: 958.91

## 2020-09-25 LAB — BNP SERPL-MCNC: 338 PG/ML (ref 0–167)

## 2020-10-05 ENCOUNTER — HOSPITAL ENCOUNTER (OUTPATIENT)
Dept: CARDIOLOGY | Facility: HOSPITAL | Age: 85
Discharge: HOME | End: 2020-10-05
Attending: INTERNAL MEDICINE

## 2020-10-05 ENCOUNTER — HOSPITAL ENCOUNTER (OUTPATIENT)
Dept: NUCLEAR MEDICINE | Facility: HOSPITAL | Age: 85
Discharge: HOME | End: 2020-10-05
Attending: INTERNAL MEDICINE

## 2020-10-05 DIAGNOSIS — R06.09 DYSPNEA ON EXERTION: ICD-10-CM

## 2020-10-05 DIAGNOSIS — I25.119 CORONARY ARTERY DISEASE INVOLVING NATIVE CORONARY ARTERY OF NATIVE HEART WITH ANGINA PECTORIS (H): ICD-10-CM

## 2020-10-05 DIAGNOSIS — Z95.3 S/P AORTIC VALVE REPLACEMENT WITH BIOPROSTHETIC VALVE: ICD-10-CM

## 2020-10-05 LAB
AORTIC ROOT: 2.6 CM
AORTIC VALVE MEAN VELOCITY: 158 CM/S
ASCENDING AORTA: 2.9 CM
AV DIMENSIONLESS INDEX VTI: 0.3
AV PEAK GRADIENT: 11.8 MMHG
AV VALVE AREA: 1.1 CM2
AV VELOCITY RATIO: 0.5
BSA FOR ECHO PROCEDURE: 1.56 M2
CV BLOOD PRESSURE: NORMAL MMHG
CV ECHO HEIGHT: 64 IN
CV ECHO WEIGHT: 119 LBS
CV STRESS CURRENT BP HE: NORMAL
CV STRESS CURRENT HR HE: 55
CV STRESS CURRENT HR HE: 59
CV STRESS CURRENT HR HE: 60
CV STRESS CURRENT HR HE: 66
CV STRESS CURRENT HR HE: 68
CV STRESS CURRENT HR HE: 68
CV STRESS CURRENT HR HE: 69
CV STRESS CURRENT HR HE: 71
CV STRESS CURRENT HR HE: 71
CV STRESS CURRENT HR HE: 74
CV STRESS CURRENT HR HE: 74
CV STRESS CURRENT HR HE: 78
CV STRESS CURRENT HR HE: 79
CV STRESS DEVIATION TIME HE: NORMAL
CV STRESS ECHO PERCENT HR HE: NORMAL
CV STRESS EXERCISE STAGE HE: NORMAL
CV STRESS FINAL RESTING BP HE: NORMAL
CV STRESS FINAL RESTING HR HE: 68
CV STRESS MAX HR HE: 83
CV STRESS MAX TREADMILL GRADE HE: 0
CV STRESS MAX TREADMILL SPEED HE: 0
CV STRESS PEAK DIA BP HE: NORMAL
CV STRESS PEAK SYS BP HE: NORMAL
CV STRESS PHASE HE: NORMAL
CV STRESS PROTOCOL HE: NORMAL
CV STRESS RESTING PT POSITION HE: NORMAL
CV STRESS ST DEVIATION AMOUNT HE: NORMAL
CV STRESS ST DEVIATION ELEVATION HE: NORMAL
CV STRESS ST EVELATION AMOUNT HE: NORMAL
CV STRESS TEST TYPE HE: NORMAL
CV STRESS TOTAL STAGE TIME MIN 1 HE: NORMAL
DOP CALC AO PEAK VEL: 172 CM/S
DOP CALC AO VTI: 57 CM
DOP CALC LVOT AREA: 3.14 CM2
DOP CALC LVOT DIAMETER: 2 CM
DOP CALC LVOT PEAK VEL: 85.6 CM/S
DOP CALC LVOT STROKE VOLUME: 61.5 CM3
DOP CALC MV VTI: 38.4 CM
DOP CALCLVOT PEAK VEL VTI: 19.6 CM
EJECTION FRACTION: 58 % (ref 55–75)
FRACTIONAL SHORTENING: 28.9 % (ref 28–44)
INTERVENTRICULAR SEPTUM IN END DIASTOLE: 1 CM (ref 0.6–0.9)
IVS/PW RATIO: 1
LA AREA 1: 16.5 CM2
LA AREA 2: 26 CM2
LEFT ATRIUM LENGTH: 6 CM
LEFT ATRIUM SIZE: 3.1 CM
LEFT ATRIUM VOLUME INDEX: 39 ML/M2
LEFT ATRIUM VOLUME: 60.8 ML
LEFT VENTRICLE CARDIAC INDEX: 2.4 L/MIN/M2
LEFT VENTRICLE CARDIAC OUTPUT: 3.8 L/MIN
LEFT VENTRICLE DIASTOLIC VOLUME INDEX: 46.8 CM3/M2 (ref 29–61)
LEFT VENTRICLE DIASTOLIC VOLUME: 73 CM3 (ref 46–106)
LEFT VENTRICLE HEART RATE: 61 BPM
LEFT VENTRICLE MASS INDEX: 75.2 G/M2
LEFT VENTRICLE SYSTOLIC VOLUME INDEX: 19.9 CM3/M2 (ref 8–24)
LEFT VENTRICLE SYSTOLIC VOLUME: 31 CM3 (ref 14–42)
LEFT VENTRICULAR INTERNAL DIMENSION IN DIASTOLE: 3.8 CM (ref 3.8–5.2)
LEFT VENTRICULAR INTERNAL DIMENSION IN SYSTOLE: 2.7 CM (ref 2.2–3.5)
LEFT VENTRICULAR MASS: 117.3 G
LEFT VENTRICULAR OUTFLOW TRACT MEAN GRADIENT: 2 MMHG
LEFT VENTRICULAR OUTFLOW TRACT MEAN VELOCITY: 68.3 CM/S
LEFT VENTRICULAR OUTFLOW TRACT PEAK GRADIENT: 3 MMHG
LEFT VENTRICULAR POSTERIOR WALL IN END DIASTOLE: 1 CM (ref 0.6–0.9)
LV STROKE VOLUME INDEX: 39.5 ML/M2
MITRAL VALVE DECELERATION SLOPE: 420 MM/S2
MITRAL VALVE E/A RATIO: 1.9
MITRAL VALVE MEAN INFLOW VELOCITY: 36.7 CM/S
MITRAL VALVE PEAK VELOCITY: 100 CM/S
MITRAL VALVE PRESSURE HALF-TIME: 3 MS
MV AREA VTI: 1.6 CM2
MV AVERAGE E/E' RATIO: 13.8 CM/S
MV DECELERATION TIME: 222 MSEC
MV E'TISSUE VEL-LAT: 7.1 CM/S
MV E'TISSUE VEL-MED: 6.4 CM/S
MV LATERAL E/E' RATIO: 13.1
MV MEAN GRADIENT: 1 MMHG
MV MEDIAL E/E' RATIO: 14.5
MV PEAK A VELOCITY: 48.5 CM/S
MV PEAK E VELOCITY: 93 CM/S
MV PEAK GRADIENT: 4 MMHG
MV VALVE AREA BY CONTINUITY EQUATION: 1.6 CM2
MV VALVE AREA PRESSURE 1/2 METHOD: 73.3 CM2
NUC REST DIASTOLIC VOLUME INDEX: 1904 LBS
NUC REST SYSTOLIC VOLUME INDEX: 64 IN
RATE PRESSURE PRODUCT: NORMAL
STRESS ECHO BASELINE DIASTOLIC HE: 85
STRESS ECHO BASELINE HR: 54
STRESS ECHO BASELINE SYSTOLIC BP: 168
STRESS ECHO CALCULATED PERCENT HR: 61 %
STRESS ECHO LAST STRESS DIASTOLIC BP: 81
STRESS ECHO LAST STRESS HR: 74
STRESS ECHO LAST STRESS SYSTOLIC BP: 143
STRESS ECHO TARGET HR: 137
TRICUSPID VALVE ANULAR PLANE SYSTOLIC EXCURSION: 2 CM

## 2020-10-05 ASSESSMENT — MIFFLIN-ST. JEOR
SCORE: 974.78
SCORE: 974.78

## 2020-10-09 ENCOUNTER — COMMUNICATION - HEALTHEAST (OUTPATIENT)
Dept: FAMILY MEDICINE | Facility: CLINIC | Age: 85
End: 2020-10-09

## 2020-10-09 DIAGNOSIS — I25.708 CORONARY ARTERY DISEASE INVOLVING CORONARY BYPASS GRAFT OF NATIVE HEART WITH OTHER FORMS OF ANGINA PECTORIS (H): ICD-10-CM

## 2020-11-09 ENCOUNTER — COMMUNICATION - HEALTHEAST (OUTPATIENT)
Dept: CARDIOLOGY | Facility: CLINIC | Age: 85
End: 2020-11-09

## 2020-11-10 ENCOUNTER — OFFICE VISIT - HEALTHEAST (OUTPATIENT)
Dept: CARDIOLOGY | Facility: CLINIC | Age: 85
End: 2020-11-10

## 2020-11-10 DIAGNOSIS — Z95.2 S/P AVR: ICD-10-CM

## 2020-11-10 DIAGNOSIS — I49.3 FREQUENT PVCS: ICD-10-CM

## 2020-11-10 DIAGNOSIS — E78.2 MIXED HYPERLIPIDEMIA: ICD-10-CM

## 2020-11-10 DIAGNOSIS — I10 ESSENTIAL HYPERTENSION: ICD-10-CM

## 2020-11-10 DIAGNOSIS — I25.810 CORONARY ARTERY DISEASE INVOLVING CORONARY BYPASS GRAFT OF NATIVE HEART WITHOUT ANGINA PECTORIS: ICD-10-CM

## 2020-11-10 ASSESSMENT — MIFFLIN-ST. JEOR: SCORE: 915.82

## 2020-11-18 ENCOUNTER — COMMUNICATION - HEALTHEAST (OUTPATIENT)
Dept: CARDIOLOGY | Facility: CLINIC | Age: 85
End: 2020-11-18

## 2021-01-01 ENCOUNTER — TRANSFERRED RECORDS (OUTPATIENT)
Dept: HEALTH INFORMATION MANAGEMENT | Facility: CLINIC | Age: 86
End: 2021-01-01
Payer: COMMERCIAL

## 2021-01-01 DIAGNOSIS — E78.2 MIXED HYPERLIPIDEMIA: ICD-10-CM

## 2021-01-01 RX ORDER — ATORVASTATIN CALCIUM 40 MG/1
40 TABLET, FILM COATED ORAL DAILY
Qty: 90 TABLET | Refills: 0 | Status: SHIPPED | OUTPATIENT
Start: 2021-01-01 | End: 2022-01-01

## 2021-01-12 ENCOUNTER — COMMUNICATION - HEALTHEAST (OUTPATIENT)
Dept: RHEUMATOLOGY | Facility: CLINIC | Age: 86
End: 2021-01-12

## 2021-01-12 DIAGNOSIS — L40.50 PSORIATIC ARTHRITIS (H): ICD-10-CM

## 2021-01-18 ENCOUNTER — AMBULATORY - HEALTHEAST (OUTPATIENT)
Dept: LAB | Facility: CLINIC | Age: 86
End: 2021-01-18

## 2021-01-18 DIAGNOSIS — L40.50 PSORIATIC ARTHRITIS (H): ICD-10-CM

## 2021-01-18 LAB
ALBUMIN SERPL-MCNC: 3.9 G/DL (ref 3.5–5)
ALT SERPL W P-5'-P-CCNC: 28 U/L (ref 0–45)
CREAT SERPL-MCNC: 1.01 MG/DL (ref 0.6–1.1)
ERYTHROCYTE [DISTWIDTH] IN BLOOD BY AUTOMATED COUNT: 11.1 % (ref 11–14.5)
GFR SERPL CREATININE-BSD FRML MDRD: 52 ML/MIN/1.73M2
HCT VFR BLD AUTO: 40 % (ref 35–47)
HGB BLD-MCNC: 13.6 G/DL (ref 12–16)
MCH RBC QN AUTO: 34 PG (ref 27–34)
MCHC RBC AUTO-ENTMCNC: 33.9 G/DL (ref 32–36)
MCV RBC AUTO: 100 FL (ref 80–100)
PLATELET # BLD AUTO: 132 THOU/UL (ref 140–440)
PMV BLD AUTO: 9 FL (ref 7–10)
RBC # BLD AUTO: 3.99 MILL/UL (ref 3.8–5.4)
WBC: 7.9 THOU/UL (ref 4–11)

## 2021-03-29 ENCOUNTER — COMMUNICATION - HEALTHEAST (OUTPATIENT)
Dept: RHEUMATOLOGY | Facility: CLINIC | Age: 86
End: 2021-03-29

## 2021-03-29 DIAGNOSIS — L40.50 PSORIATIC ARTHRITIS (H): ICD-10-CM

## 2021-04-06 ENCOUNTER — COMMUNICATION - HEALTHEAST (OUTPATIENT)
Dept: ADMINISTRATIVE | Facility: CLINIC | Age: 86
End: 2021-04-06

## 2021-04-26 ENCOUNTER — COMMUNICATION - HEALTHEAST (OUTPATIENT)
Dept: CARDIOLOGY | Facility: CLINIC | Age: 86
End: 2021-04-26

## 2021-04-26 DIAGNOSIS — I49.3 FREQUENT PVCS: ICD-10-CM

## 2021-05-06 ENCOUNTER — OFFICE VISIT - HEALTHEAST (OUTPATIENT)
Dept: CARDIOLOGY | Facility: CLINIC | Age: 86
End: 2021-05-06

## 2021-05-06 DIAGNOSIS — I25.709 CORONARY ARTERY DISEASE INVOLVING CORONARY BYPASS GRAFT OF NATIVE HEART WITH ANGINA PECTORIS (H): ICD-10-CM

## 2021-05-06 DIAGNOSIS — I49.3 FREQUENT PVCS: ICD-10-CM

## 2021-05-06 DIAGNOSIS — I10 ESSENTIAL HYPERTENSION: ICD-10-CM

## 2021-05-06 DIAGNOSIS — Z95.2 S/P AVR: ICD-10-CM

## 2021-05-06 DIAGNOSIS — E78.2 MIXED HYPERLIPIDEMIA: ICD-10-CM

## 2021-05-06 ASSESSMENT — MIFFLIN-ST. JEOR: SCORE: 933.96

## 2021-05-07 ENCOUNTER — COMMUNICATION - HEALTHEAST (OUTPATIENT)
Dept: RHEUMATOLOGY | Facility: CLINIC | Age: 86
End: 2021-05-07

## 2021-05-07 DIAGNOSIS — L40.50 PSORIATIC ARTHRITIS (H): ICD-10-CM

## 2021-05-18 ENCOUNTER — COMMUNICATION - HEALTHEAST (OUTPATIENT)
Dept: FAMILY MEDICINE | Facility: CLINIC | Age: 86
End: 2021-05-18

## 2021-05-18 DIAGNOSIS — I25.708 CORONARY ARTERY DISEASE INVOLVING CORONARY BYPASS GRAFT OF NATIVE HEART WITH OTHER FORMS OF ANGINA PECTORIS (H): ICD-10-CM

## 2021-05-20 ENCOUNTER — AMBULATORY - HEALTHEAST (OUTPATIENT)
Dept: LAB | Facility: CLINIC | Age: 86
End: 2021-05-20

## 2021-05-20 DIAGNOSIS — L40.50 PSORIATIC ARTHRITIS (H): ICD-10-CM

## 2021-05-20 LAB
ALBUMIN SERPL-MCNC: 3.7 G/DL (ref 3.5–5)
ALT SERPL W P-5'-P-CCNC: 21 U/L (ref 0–45)
CREAT SERPL-MCNC: 0.9 MG/DL (ref 0.6–1.1)
ERYTHROCYTE [DISTWIDTH] IN BLOOD BY AUTOMATED COUNT: 13.2 % (ref 11–14.5)
GFR SERPL CREATININE-BSD FRML MDRD: 60 ML/MIN/1.73M2
HCT VFR BLD AUTO: 36.2 % (ref 35–47)
HGB BLD-MCNC: 12 G/DL (ref 12–16)
MCH RBC QN AUTO: 32.9 PG (ref 27–34)
MCHC RBC AUTO-ENTMCNC: 33.1 G/DL (ref 32–36)
MCV RBC AUTO: 99 FL (ref 80–100)
PLATELET # BLD AUTO: 154 THOU/UL (ref 140–440)
PMV BLD AUTO: 10.1 FL (ref 7–10)
RBC # BLD AUTO: 3.65 MILL/UL (ref 3.8–5.4)
WBC: 7.4 THOU/UL (ref 4–11)

## 2021-05-25 ENCOUNTER — RECORDS - HEALTHEAST (OUTPATIENT)
Dept: ADMINISTRATIVE | Facility: CLINIC | Age: 86
End: 2021-05-25

## 2021-05-26 VITALS
HEART RATE: 62 BPM | SYSTOLIC BLOOD PRESSURE: 168 MMHG | RESPIRATION RATE: 32 BRPM | TEMPERATURE: 97.3 F | OXYGEN SATURATION: 98 % | DIASTOLIC BLOOD PRESSURE: 58 MMHG

## 2021-05-26 VITALS
SYSTOLIC BLOOD PRESSURE: 128 MMHG | RESPIRATION RATE: 14 BRPM | DIASTOLIC BLOOD PRESSURE: 60 MMHG | OXYGEN SATURATION: 94 % | BODY MASS INDEX: 22.32 KG/M2 | WEIGHT: 126 LBS | HEART RATE: 59 BPM

## 2021-05-26 VITALS
HEART RATE: 52 BPM | OXYGEN SATURATION: 98 % | TEMPERATURE: 97.5 F | SYSTOLIC BLOOD PRESSURE: 140 MMHG | DIASTOLIC BLOOD PRESSURE: 74 MMHG

## 2021-05-27 ENCOUNTER — RECORDS - HEALTHEAST (OUTPATIENT)
Dept: ADMINISTRATIVE | Facility: CLINIC | Age: 86
End: 2021-05-27

## 2021-05-27 ENCOUNTER — OFFICE VISIT - HEALTHEAST (OUTPATIENT)
Dept: RHEUMATOLOGY | Facility: CLINIC | Age: 86
End: 2021-05-27

## 2021-05-27 DIAGNOSIS — Z79.899 HIGH RISK MEDICATION USE: ICD-10-CM

## 2021-05-27 DIAGNOSIS — L40.8 OTHER PSORIASIS: ICD-10-CM

## 2021-05-27 DIAGNOSIS — M15.9 GENERALIZED OSTEOARTHROSIS OF HAND: ICD-10-CM

## 2021-05-27 DIAGNOSIS — L40.50 PSORIATIC ARTHROPATHY (H): ICD-10-CM

## 2021-05-27 DIAGNOSIS — L40.50 PSORIATIC ARTHRITIS (H): ICD-10-CM

## 2021-05-27 NOTE — TELEPHONE ENCOUNTER
Dr Wilburn - pt traveling to [place] and wonders if there is any concerns regarding elevation for her?  -[facility]

## 2021-05-27 NOTE — TELEPHONE ENCOUNTER
LM stating recommendations.  -ra    ----- Message -----  From: Shilpa Wilburn MD  Sent: 4/16/2019   5:03 PM  To: Hailey Chaudhari RN    If the patient takes her medications regularly, low salt diet, she should be fine.    Thanks  Martin

## 2021-05-27 NOTE — TELEPHONE ENCOUNTER
----- Message from Venita Haque sent at 4/16/2019  8:22 AM CDT -----  Contact: Patient   Caller: Rox    Primary cardiologist: Dr. Wilburn    Detailed reason for call: Rox states she's going to [place] in the near future and wanted to make sure there isn't anything she should be concerned about, given the elevation in [place]. What does Dr. Wilburn think?  Please return call.     New or active symptoms? No    Best phone number: [phone]    Best time to contact: Today    Ok to leave a detailed message? Yes    Device? No

## 2021-05-27 NOTE — TELEPHONE ENCOUNTER
----- Message from Shilpa Wilburn MD sent at 4/11/2019  2:00 PM CDT -----  Regarding: RE: DL pt  Please start metoprolol XL 25 mg daily.  Schedule the patient to see EP in June.  Aimee Salazar      ----- Message -----  From: Echo Melgoza RN  Sent: 4/9/2019   7:39 AM  To: Hailey Chaudhari RN, Shilpa Wilburn MD  Subject: FW: DL pt                                        Please note schedulers comments below r.e. EP consult - is that ok?  Any new orders?  mg  ----- Message -----  From: Mona Alcantar  Sent: 4/9/2019   7:34 AM  To: Echo Melgoza RN  Subject: RE: DL pt                                        So for the PVC ablations only the MD's can see them for this and there is nothing available until June is released with them. I will put a recall in.   Celiary    ----- Message -----  From: Echo Melgoza RN  Sent: 4/8/2019   4:07 PM  To: [facility] Scheduling Registration Pool  Subject: DL pt                                            Per DL - Please scheduled the patient to see EP for possible EP ablation.  Dx freq PVCs

## 2021-05-27 NOTE — TELEPHONE ENCOUNTER
Called patient and updated on plan to see SAUL CAMPOS in June. Explained purpose of this in great detail as patient did not recall conversation with Dr. Wilburn re: holter results. These are mailed home to her per her request. She understands why she will start a new medication and this medication purpose was explained in detail. Pt will let her son know and has my direct line should he have any questions. Rx sent to mail order pharmacy per patient request due to cost.-[facility]

## 2021-05-28 ENCOUNTER — RECORDS - HEALTHEAST (OUTPATIENT)
Dept: ADMINISTRATIVE | Facility: CLINIC | Age: 86
End: 2021-05-28

## 2021-05-28 NOTE — TELEPHONE ENCOUNTER
Patient has one week left of medication but get this from mail order which takes about five days to get to her.      Refill Request  Did you contact pharmacy: Yes  Medication name:   Requested Prescriptions     Pending Prescriptions Disp Refills     levothyroxine (SYNTHROID, LEVOTHROID) 100 MCG tablet 90 tablet 0     Who prescribed the medication: Misbah Barone MD   Pharmacy Name and Location: [facility]  Is patient out of medication: No.  Seven days left  Patient notified refills processed in 72 hours:  yes  Okay to leave a detailed message: yes

## 2021-05-28 NOTE — PROGRESS NOTES
ASSESSMENT:  1. Fall, subsequent encounter  XR Pelvis AP    MR Pelvis Without Contrast   2. Pain in the coccyx  XR Pelvis AP    MR Pelvis Without Contrast   3. Slow transit constipation         PLAN:  X-ray negative for fracture.  Discussed that sometimes given ongoing nature of pain there is a fracture but is not seen on x-ray.  Proceed with MRI to make sure no pelvic fracture is present.  Patient has had ongoing severe pain which does not seem related to sciatica.  We will follow for results of MRI and adjust plan as necessary based on results.  Patient with some constipation x-ray, discussed taking MiraLAX daily.  No problem-specific Assessment & Plan notes found for this encounter.      Patient Instructions   Can use tylenol up to 3000 mg daily for pain.    Can use heat or ice if helpful.    Follow up for future scan.       Orders Placed This Encounter   Procedures     XR Pelvis AP     Standing Status:   Future     Number of Occurrences:   1     Standing Expiration Date:   5/1/2020     Order Specific Question:   Can the procedure be changed per Radiologist protocol?     Answer:   Yes     MR Pelvis Without Contrast     Standing Status:   Future     Standing Expiration Date:   5/1/2020     Order Specific Question:   Can the procedure be changed per Radiologist protocol?     Answer:   Yes     Order Specific Question:   If this is a diagnostic procedure, have the patient's age and recent imaging history been considered?     Answer:   Yes     Order Specific Question:   Is the patient claustrophobic and in need of sedation to complete their MR scan?     Answer:   No     There are no discontinued medications.    Return in about 6 months (around 11/1/2019) for Annual exam.    CHIEF COMPLAINT:  Chief Complaint   Patient presents with     Follow-up     pt fell on 4/21 and now is having issues with her back and legs feeling weak       HISTORY OF PRESENT ILLNESS:  Rox is a 84 y.o. female here today for evaluation after  a fall onto her buttocks on .  Ronny was attending Easter dinner at her family's house going up the steps to the front door, she fell after she bit us interpreted how high the step was she ended up falling directly onto her bottom and has had pain in that area ever since.  Pain occurs to the right of the sacral coccyx area it is quite severe.  She is unsure what to do about it.  Was previously seen after a fall but no x-rays were taken at that time as there were other issues discussed.  She has had continued pain without relief since then.  It limits her range of motion.  She cannot bend over fully or it is painful at 8 out of 10 level.  There is no radiation pain or shooting of pain down the legs.  Pain is localized to the right side of the buttocks, sacral area.    REVIEW OF SYSTEMS:        All other systems are negative  PFSH:  Reviewed, no changes      TOBACCO USE:  Social History     Tobacco Use   Smoking Status Former Smoker     Packs/day: 1.00     Years: 30.00     Pack years: 30.00     Types: Cigarettes     Last attempt to quit: 1995     Years since quittin.3   Smokeless Tobacco Never Used       VITALS:  Vitals:    19 1318   BP: 136/62   Patient Site: Left Arm   Patient Position: Sitting   Cuff Size: Adult Regular   Pulse: 72   Resp: 14   Weight: 129 lb 2 oz (58.6 kg)     Wt Readings from Last 3 Encounters:   19 125 lb (56.7 kg)   19 129 lb 2 oz (58.6 kg)   19 128 lb (58.1 kg)       PHYSICAL EXAM:   /62 (Patient Site: Left Arm, Patient Position: Sitting, Cuff Size: Adult Regular)   Pulse 72   Resp 14   Wt 129 lb 2 oz (58.6 kg)   LMP 1974   BMI 22.87 kg/m    General appearance: alert, appears stated age and cooperative  Neck: no adenopathy, no carotid bruit, no JVD, supple, symmetrical, trachea midline and thyroid not enlarged, symmetric, no tenderness/mass/nodules  Back: symmetric, no curvature. ROM normal. No CVA tenderness.  Patient with tenderness to  the SI joint more on the right side of the left.  This was slight tenderness but had more exquisite tenderness approximately 2 inches to the right of the sacral area coccyx area.  Range of motion bending at the waist is limited by pain, patient can bend forward approximately 30 degrees before feeling pain in the aforementioned area.  Lungs: clear to auscultation bilaterally  Heart: regular rate and rhythm, S1, S2 normal, no murmur, click, rub or gallop  Extremities: extremities normal, atraumatic, no cyanosis or edema  Pulses: 2+ and symmetric  Skin: Skin color, texture, turgor normal. No rashes or lesions  Neurologic: Grossly normal    DATA REVIEWED:  Additional History from Old Records Summarized (2): 0  Decision to Obtain Records (1): 0  Radiology Tests Summarized or Ordered (1): xray  Labs Reviewed or Ordered (1): 0  Medicine Test Summarized or Ordered (1): 0  Independent Review of EKG or X-RAY(2 each): constipation, no fracture    The visit lasted a total of 25 minutes face to face with the patient. Over 50% of the time was spent counseling and educating the patient about plan of care.    MEDICATIONS:  Current Outpatient Medications   Medication Sig Dispense Refill     aspirin 81 MG EC tablet Take 81 mg by mouth daily.       atorvastatin (LIPITOR) 40 MG tablet Take 1 tablet (40 mg total) by mouth daily. 90 tablet 2     CALCIUM CARBONATE (CALCIUM 500 ORAL) Take by mouth.       cholecalciferol, vitamin D3, 1,000 unit tablet Take 2,000 Units by mouth daily.       donepezil (ARICEPT) 10 MG tablet Take 1 tablet (10 mg total) by mouth at bedtime. 5 tablet 1     folic acid (FOLVITE) 1 MG tablet TAKE ONE TABLET BY MOUTH EVERY DAY 90 tablet 3     furosemide (LASIX) 40 MG tablet Take 1 tablet (40 mg total) by mouth as needed (If gained weight more than 1 pound a day or more leg edema). 30 tablet 1     meclizine (ANTIVERT) 12.5 mg tablet Take 1 tablet (12.5 mg total) by mouth 3 (three) times a day as needed for dizziness.  30 tablet 0     methotrexate 2.5 MG tablet TAKE THREE TABLETS (7.5MG) BY MOUTH ONCE A WEEK 36 tablet 0     metoprolol succinate (TOPROL-XL) 25 MG Take 1 tablet (25 mg total) by mouth 2 (two) times a day. 90 tablet 3     MV,CALCIUM,MIN/IRON/FOLIC/VITK (MULTI FOR HER ORAL) Take by mouth.       nitroglycerin (NITROSTAT) 0.4 MG SL tablet Place 1 tablet (0.4 mg total) under the tongue every 5 (five) minutes as needed for chest pain. 25 tablet 0     VIT C/E/ZN/COPPR/LUTEIN/ZEAXAN (PRESERVISION AREDS 2 ORAL) Take 1 tablet by mouth 2 (two) times a day.       levothyroxine (SYNTHROID, LEVOTHROID) 100 MCG tablet TAKE ONE TABLET BY MOUTH EVERY DAY AT 6:00 A.M. 90 tablet 2     No current facility-administered medications for this visit.        This note has been dictated using voice recognition software. Any grammatical or context distortions are unintentional and inherent to the software

## 2021-05-28 NOTE — PROGRESS NOTES
Assessment:     1. SOB (shortness of breath)  HM1(CBC and Differential)    XR Chest 2 Views    HM1 (CBC with Diff)    Electrocardiogram Perform and Read    Comprehensive Metabolic Panel    BNP(B-type Natriuretic Peptide)    Urinalysis-UC if Indicated    Culture, Urine    Ambulatory referral to [facility]   2. Fall, initial encounter  Electrocardiogram Perform and Read    Urinalysis-UC if Indicated    Culture, Urine    Ambulatory referral to [facility]   3. Dizziness  Ambulatory referral to [facility]     EKG read by me shows a bigeminy pattern and an PVC as mentioned in a previous EKGs 2.  However, I will also await the official read.  X-ray does not show any acute changes, shows COPD changes.  A UA showed slight leukocyte esterase.  Await cultures.  CBC showed slightly elevated white count.    At the time of documentation, I do note that her be done at her peptide is elevated suggesting that she likely is in an acute exacerbation of his CHF.  I have asked patient to take her Lasix 40 mg as she is also noted to have weight gain and to go to the emergency room if she is having increased shortness of breath.    Since I had already referred patient to rapid access clinic and she has an appointment on Thursday, patient feels that she is stable enough to wait until then.    DATA REVIEWED:  Additional History from Old Records Summarized (2): 2  Decision to Obtain Records (1): 0  Radiology Tests Summarized or Ordered (1): 0  Labs Reviewed or Ordered (1): 1  Medicine Test Summarized or Ordered (1): 1  Independent Review of EKG or X-RAY(2 each): 2+2= 4        Plan:        1. Educational materials given and questions answered.  2. The risks and benefits of my recommendations, as well as other treatment options were discussed with the patient today.   Return for follow-up or sooner, if symptoms worsen,  rapid access clinic as scheduled on Thursday.    Patient Instructions     I seen you today for  shortness of breath and dizziness.    An EKG done today appears abnormal.    I am still waiting for the cardiologist to read this EKG    You have appointment at [facility] rapid access clinic.     Center    4/25/2019 11:20 AM (Arrive by 11:05 AM) Maxi Estrada MD [facility]     If you feel short of breath or worsening or have any confusion, I would like you to go to the emergency room.    It would be safe if you were with someone and not alone.            Subjective:     Chief Complaint   Patient presents with     Shortness of Breath     Pt here today to discuss shortness of breath/ states its hard to catch breath x 2-3 d     Fall     States fell on 4/21/19 landed on buttocks        Rox D Lucas is a 84 y.o. female who I am asked to see in consultation for evaluation of dizziness.  The dizziness has been present for 3 days. The patient describes the symptoms as disequalibirum and lightheadedness. Symptoms are exacerbated by bending and motion The patient also complains of tinnitus  however that has been present for long time per patient. Patient denies none.  She has been treated with nothing    Fell down steps on Easter as she miscalculated.    Then over the last few 2 days she has been feeling short of breath and feels that it hard to catch her breath.    She denies any coughing episodes.  No fevers come.  Appetite is fair.  No nausea or vomiting.  Of note, she is recently had cardiac testing including an cardiac MRI which was stated to be normal.  This is reviewed with her.  She does have congestive heart failure.    I reviewed his chart.  She saw the cardiologist on 4/3/2019.  It was thought that her irregular heartbeats from bigeminy and PVCs were the cause of her symptoms of fatigue and lightheadedness.  Further cardiac MRI and Holter monitor was done.  These were reported to be PVC and she is currently being evaluated for ablations.  However she does not have an appointment until  June.    The following portions of the patient's history were reviewed and updated as appropriate: allergies, current medications, past family history, past medical history, past social history, past surgical history and problem list.  No Known Allergies    Current Outpatient Medications on File Prior to Visit   Medication Sig Dispense Refill     amLODIPine (NORVASC) 5 MG tablet TAKE ONE TABLET BY MOUTH EVERY DAY FOR REYNAUDS 90 tablet 1     aspirin 81 MG EC tablet Take 81 mg by mouth daily.       atorvastatin (LIPITOR) 40 MG tablet Take 1 tablet (40 mg total) by mouth daily. 90 tablet 2     CALCIUM CARBONATE (CALCIUM 500 ORAL) Take by mouth.       cholecalciferol, vitamin D3, 1,000 unit tablet Take 2,000 Units by mouth daily.       donepezil (ARICEPT) 10 MG tablet Take 1 tablet (10 mg total) by mouth at bedtime. 5 tablet 1     folic acid (FOLVITE) 1 MG tablet Take 1 tablet (1,000 mcg total) by mouth daily. 90 tablet 3     levothyroxine (SYNTHROID, LEVOTHROID) 100 MCG tablet TAKE ONE TABLET BY MOUTH EVERY DAY AT 6:00 A.M. 90 tablet 0     methotrexate 2.5 MG tablet TAKE THREE TABLETS (7.5MG) BY MOUTH ONCE A WEEK 36 tablet 0     metoprolol succinate (TOPROL-XL) 25 MG Take 1 tablet (25 mg total) by mouth daily. 90 tablet 3     MV,CALCIUM,MIN/IRON/FOLIC/VITK (MULTI FOR HER ORAL) Take by mouth.       VIT C/E/ZN/COPPR/LUTEIN/ZEAXAN (PRESERVISION AREDS 2 ORAL) Take 1 tablet by mouth 2 (two) times a day.       furosemide (LASIX) 40 MG tablet Take 1 tablet (40 mg total) by mouth as needed (If gained weight more than 1 pound a day or more leg edema). 30 tablet 1     meclizine (ANTIVERT) 12.5 mg tablet Take 1 tablet (12.5 mg total) by mouth 3 (three) times a day as needed for dizziness. 30 tablet 0     nitroglycerin (NITROSTAT) 0.4 MG SL tablet Place 1 tablet (0.4 mg total) under the tongue every 5 (five) minutes as needed for chest pain. 25 tablet 0     No current facility-administered medications on file prior to visit.   "      Patient Active Problem List   Diagnosis     Hammer Toe     Hemorrhoids     Psoriasis     Generalized Osteoarthritis Of The Hand     Osteoarthritis Of The Knee     Vitamin D Deficiency     Palpitations     Hypothyroidism     Neck Pain     Upper Back Pain (Between Shoulder Blades)     Osteopenia     Coronary artery disease     S/P AVR     Constipation     Acute diastolic CHF (congestive heart failure) (H)     Essential hypertension     Hyperlipidemia     Coronary artery disease involving coronary bypass graft of native heart with other forms of angina pectoris (H)     Psoriatic arthritis (H)     High risk medication use     Thrombocytopenia (H)       Past Medical History:   Diagnosis Date     Acute renal failure (H)      Aortic valve stenosis, severe      Arthritis      Coronary artery disease 11/17/2014     Coronary artery disease      Disease of thyroid gland      Hammer toe      Hyperlipidemia      Hypertension      Macular disorder     \"wrinkle\"     Rheumatoid arthritis (H)      S/P AVR 12/15/2014       Past Surgical History:   Procedure Laterality Date     ANGIOPLASTY / STENTING FEMORAL       AORTIC VALVE REPLACEMENT       CARDIAC SURGERY      CABG     EYE SURGERY Bilateral     cataracts     DE LIGATE FALLOPIAN TUBE      Description: Tubal Ligation;  Recorded: 03/20/2008;     DE REMOVAL GALLBLADDER      Description: Cholecystectomy;  Recorded: 03/20/2008;     DE TOTAL HIP ARTHROPLASTY Right     Description: Total Hip Replacement;  Recorded: 03/20/2008; right       Family History   Problem Relation Age of Onset     Asthma Mother      Coronary artery disease Neg Hx        Social History     Socioeconomic History     Marital status:      Spouse name: None     Number of children: None     Years of education: None     Highest education level: None   Occupational History     None   Social Needs     Financial resource strain: None     Food insecurity:     Worry: None     Inability: None     Transportation " needs:     Medical: None     Non-medical: None   Tobacco Use     Smoking status: Former Smoker     Packs/day: 1.00     Years: 30.00     Pack years: 30.00     Types: Cigarettes     Last attempt to quit: 1995     Years since quittin.3     Smokeless tobacco: Never Used   Substance and Sexual Activity     Alcohol use: Yes     Comment: Occasional      Drug use: No     Sexual activity: None   Lifestyle     Physical activity:     Days per week: None     Minutes per session: None     Stress: None   Relationships     Social connections:     Talks on phone: None     Gets together: None     Attends Congregation service: None     Active member of club or organization: None     Attends meetings of clubs or organizations: None     Relationship status: None     Intimate partner violence:     Fear of current or ex partner: None     Emotionally abused: None     Physically abused: None     Forced sexual activity: None   Other Topics Concern     None   Social History Narrative    , patient notes she and her  were  for 60 years and he passed away 12 years ago following an illness and complications. Patient notes they had a happy marriage.    Children: Patient notes she had 4 children, one daughter passed away following a MVA when daughter was 57 years old. Leisa states she is close to her two boys, daughter, grandchildren, and great-grandchildren and finds her family supportive.         Lives in a town home independently.       Review of Systems  A 12 point comprehensive review of systems was negative except as noted.      Objective:      /54 (Patient Site: Left Arm, Patient Position: Sitting, Cuff Size: Adult Regular)   Pulse 65   Resp 16   Wt 131 lb 9.6 oz (59.7 kg)   LMP 1974   SpO2 91%   BMI 23.31 kg/m       GENERAL APPEARANCE:  Appearing stated age, alert, cooperative, and in no acute distress.   HEENT: Pupils equal, regular, react to light and accommodation. Extraocular muscles intact,  fundi benign. Ear canals and tympanic membranes are normal. Lips, mouth, and throat are unremarkable.   NECK: Neck supple without adenopathy, thyromegaly or masses.   LYMPH: No anterior cervical or supraclavicular LN enlargement   PULMONARY: Normal respiratory effort. Chest is clear.   CARDIOVASCULAR: Heart auscultation: Noted mechanical sounds    SKIN: Warm and well perfused.  Noted bruising on extremities.  Patient informs me this is normal for her.   ABDOMEN: Abdomen soft, non-tender. BS normal. No masses or organomegaly.   EXTREMITIES: Peripheral pulses are full. Extremities with no edema.   MENTAL STATUS: Alert, oriented and thought content appropriate   NEUROLOGIC: Station and gait normal, strength and movement normal, reflexes are normal and symmetric.  Mega-Hallpike maneuver was negative for nystagmus.

## 2021-05-28 NOTE — TELEPHONE ENCOUNTER
RN cannot approve Refill Request    RN can NOT refill this medication med is not covered by policy/route to provider     . Last office visit: 7/9/2018 Jaclyn Kaye MD Last Physical: 8/4/2017 Last MTM visit: Visit date not found Last visit same specialty: 4/23/2019 Misbah Barone MD.  Next visit within 3 mo: Visit date not found  Next physical within 3 mo: Visit date not found      Yasemin Black, [facility] Triage/Med Refill 4/26/2019    Requested Prescriptions   Pending Prescriptions Disp Refills     folic acid (FOLVITE) 1 MG tablet [Pharmacy Med Name: FOLIC ACID 1MG TABS] 90 tablet 3     Sig: TAKE ONE TABLET BY MOUTH EVERY DAY       There is no refill protocol information for this order

## 2021-05-28 NOTE — PATIENT INSTRUCTIONS - HE
I seen you today for shortness of breath and dizziness.    An EKG done today appears abnormal.    I am still waiting for the cardiologist to read this EKG    You have appointment at [facility] rapid access clinic.     Center    4/25/2019 11:20 AM (Arrive by 11:05 AM) Maxi Estrada MD [facility]     If you feel short of breath or worsening or have any confusion, I would like you to go to the emergency room.    It would be safe if you were with someone and not alone.

## 2021-05-28 NOTE — TELEPHONE ENCOUNTER
Reason contacted:  Results  Information relayed:  Informed patient of Dr. Barone's message below.  Patient states she still feels short of breath today and she has taken the Lasix 40 mg.  Reccommended patient to go to ED for evaluation but patient declined. States she has appointment with cardiology tomorrow morning. Result letter mailed to patient.  Additional questions:  No  Further follow-up needed:  Yes-Cardiology appt 04/25/19

## 2021-05-28 NOTE — TELEPHONE ENCOUNTER
Triage call:   Back is painful- pain since easter when she fell and landed on her bottom- saw Dr QUEZADA last Monday no imaging done of her back and patient reports that she has been resting and taking it easy per PCP recommendations but her pain is not improving. Rating pain 9-10/10- sitting is better but when she gets up she has pain in her middle back - radiates into her legs and makes it hard to walk as she gets shaky- requiring additional support to walk. Reviewed additional care advice with patient.     Triaged to be seen in the next 4 hours- patient indicates that she already made an appointment for tomorrow with Fanta Burton. Advised on location of WIC in [facility] to be seen today. Patient states that she will try to get to the WIC today but if she is unable to get there she will keep her appointment with Fanta Burton tomorrow.     Ines Scott RN [facility] Triage/Med Refill 4/30/2019 4:56 PM    Reason for Disposition    [1] Pain radiates into the thigh or further down the leg AND [2] both legs    Protocols used: BACK PAIN-A-AH

## 2021-05-28 NOTE — TELEPHONE ENCOUNTER
Refill Approved    Rx renewed per Medication Renewal Policy. Medication was last renewed on 1/18/19.    Yasemin Black, [facility] Triage/Med Refill 5/1/2019     Requested Prescriptions   Pending Prescriptions Disp Refills     levothyroxine (SYNTHROID, LEVOTHROID) 100 MCG tablet 90 tablet 0       Thyroid Hormones Protocol Passed - 5/1/2019  3:49 PM        Passed - Provider visit in past 12 months or next 3 months     Last office visit with prescriber/PCP: 4/23/2019 Misbah Barone MD OR same dept: 5/1/2019 Fanta Burton FNP OR same specialty: 5/1/2019 Fanta Burton FNP  Last physical: 8/21/2018 Last MTM visit: Visit date not found   Next visit within 3 mo: Visit date not found  Next physical within 3 mo: Visit date not found  Prescriber OR PCP: Misbah Barone MD  Last diagnosis associated with med order: 1. Hypothyroidism  - levothyroxine (SYNTHROID, LEVOTHROID) 100 MCG tablet  Dispense: 90 tablet; Refill: 0    If protocol passes may refill for 12 months if within 3 months of last provider visit (or a total of 15 months).             Passed - TSH on file in past 12 months for patient age 12 & older     TSH   Date Value Ref Range Status   03/12/2019 2.20 0.30 - 5.00 uIU/mL Final

## 2021-05-28 NOTE — PROGRESS NOTES
CARDIOLOGY RAPID ACCESS CLINIC CONSULT NOTE     Assessment/Plan:   1. Light headedness, near syncope an 84-year-old woman with frequent PVCs, prolonged bigeminy.  Left ventricular ejection fraction is normal.  I would suggest replacing her amlodipine with an increase in her metoprolol up to 25 mg twice daily.  She will follow-up with Dr. Wilburn as previously scheduled next week.  2. Coronary artery disease.  No evidence of ischemia on recent stress MRI  3. Status post aortic valve replacement with normal function suggested       History of Present Illness:     It is my pleasure to see Rox Zavala at the [facility] RAPID ACCESS clinic for evaluation of dyspnea.    Rox Zavala is a 84 y.o. female with a past medical history of aortic stenosis and coronary artery disease.  She has had multiple coronary revascularizations, most recently 2016.  She is also status post aortic valve replacement in 2014.  She saw Dr. Grove earlier this month with palpitations and exertional dyspnea.  ECG showed bigeminal PVCs and fusion complexes but a cardiac MRI showed no evidence of inducible ischemia.  Her left ventricular ejection fraction was noted to be normal.    Holter monitor showed frequent ventricular ectopy accounting for 30% of her beats    She noted some improvement over the last week, coincidentally with starting metoprolol 25 mg daily.  Then earlier this week she was at [employer] when she developed dizziness lightheadedness unsteadiness and nearly fell.  She presents today for further evaluation.  She also fell on Sunday climbing steps in a hurry.    Past Medical History:     Patient Active Problem List   Diagnosis     Hammer Toe     Hemorrhoids     Psoriasis     Generalized Osteoarthritis Of The Hand     Osteoarthritis Of The Knee     Vitamin D Deficiency     Palpitations     Hypothyroidism     Neck Pain     Upper Back Pain (Between Shoulder Blades)     Osteopenia     Coronary artery disease     S/P AVR      Constipation     Acute diastolic CHF (congestive heart failure) (H)     Essential hypertension     Hyperlipidemia     Coronary artery disease involving coronary bypass graft of native heart with other forms of angina pectoris (H)     Psoriatic arthritis (H)     High risk medication use     Thrombocytopenia (H)       Past Surgical History:     Past Surgical History:   Procedure Laterality Date     ANGIOPLASTY / STENTING FEMORAL       AORTIC VALVE REPLACEMENT       CARDIAC SURGERY      CABG     EYE SURGERY Bilateral     cataracts     ND LIGATE FALLOPIAN TUBE      Description: Tubal Ligation;  Recorded: 03/20/2008;     ND REMOVAL GALLBLADDER      Description: Cholecystectomy;  Recorded: 03/20/2008;     ND TOTAL HIP ARTHROPLASTY Right     Description: Total Hip Replacement;  Recorded: 03/20/2008; right       Family History:     Family History   Problem Relation Age of Onset     Asthma Mother      Coronary artery disease Neg Hx          Social History:    reports that she quit smoking about 24 years ago. Her smoking use included cigarettes. She has a 30.00 pack-year smoking history. She has never used smokeless tobacco. She reports that she drinks alcohol. She reports that she does not use drugs.      Meds:     Current Outpatient Medications on File Prior to Visit   Medication Sig Dispense Refill     amLODIPine (NORVASC) 5 MG tablet TAKE ONE TABLET BY MOUTH EVERY DAY FOR REYNAUDS 90 tablet 1     aspirin 81 MG EC tablet Take 81 mg by mouth daily.       atorvastatin (LIPITOR) 40 MG tablet Take 1 tablet (40 mg total) by mouth daily. 90 tablet 2     CALCIUM CARBONATE (CALCIUM 500 ORAL) Take by mouth.       cholecalciferol, vitamin D3, 1,000 unit tablet Take 2,000 Units by mouth daily.       donepezil (ARICEPT) 10 MG tablet Take 1 tablet (10 mg total) by mouth at bedtime. 5 tablet 1     folic acid (FOLVITE) 1 MG tablet Take 1 tablet (1,000 mcg total) by mouth daily. 90 tablet 3     furosemide (LASIX) 40 MG tablet Take 1  "tablet (40 mg total) by mouth as needed (If gained weight more than 1 pound a day or more leg edema). 30 tablet 1     levothyroxine (SYNTHROID, LEVOTHROID) 100 MCG tablet TAKE ONE TABLET BY MOUTH EVERY DAY AT 6:00 A.M. 90 tablet 0     methotrexate 2.5 MG tablet TAKE THREE TABLETS (7.5MG) BY MOUTH ONCE A WEEK 36 tablet 0     metoprolol succinate (TOPROL-XL) 25 MG Take 1 tablet (25 mg total) by mouth daily. 90 tablet 3     MV,CALCIUM,MIN/IRON/FOLIC/VITK (MULTI FOR HER ORAL) Take by mouth.       nitroglycerin (NITROSTAT) 0.4 MG SL tablet Place 1 tablet (0.4 mg total) under the tongue every 5 (five) minutes as needed for chest pain. 25 tablet 0     VIT C/E/ZN/COPPR/LUTEIN/ZEAXAN (PRESERVISION AREDS 2 ORAL) Take 1 tablet by mouth 2 (two) times a day.       meclizine (ANTIVERT) 12.5 mg tablet Take 1 tablet (12.5 mg total) by mouth 3 (three) times a day as needed for dizziness. 30 tablet 0     No current facility-administered medications on file prior to visit.        Allergies:   Patient has no known allergies.    Review of Systems:     General: WNL  Eyes: WNL  Ears/Nose/Throat: WNL  Lungs: Shortness of Breath  Heart: Shortness of Breath with activity, Irregular Heartbeat  Stomach: WNL  Bladder: WNL  Muscle/Joints: WNL  Skin: WNL  Nervous System: WNL  Mental Health: WNL     Blood: WNL        Objective:      Physical Exam  128 lb (58.1 kg)  5' 3\" (1.6 m)  Body mass index is 22.67 kg/m .  /58 (Patient Site: Right Arm, Patient Position: Sitting, Cuff Size: Adult Regular)   Pulse 64   Resp 14   Ht 5' 3\" (1.6 m)   Wt 128 lb (58.1 kg)   LMP 08/17/1974   BMI 22.67 kg/m      General Appearance : Awake, Alert, No acute distress  HEENT: No Scleral icterus; the mucous membranes were pink and moist.  Conjunctivae not injected  Neck:  No cervical bruits, jugular venous distention, or thyromegaly   Chest: Mild kyphosis.  Healed midline sternal incision  Lungs: Respirations unlabored; the lungs are clear to auscultation.  No " wheezing   Cardiovascular: Normal point of maximal impulse.  Auscultation reveals irregularly irregular first and second heart sounds with no murmurs, rubs, or gallops.  Carotid, radial, and dorsalis pedal pulses are intact and symmetric.  Abdomen: No organomegaly, masses, bruits, or tenderness. Bowels sounds are present  Extremities: No edema  Skin: No xanthelasma. Warm, Dry.  Musculoskeletal: No tenderness.  Neurologic: Alert and oriented ×3.      Holter monitor 4/3/2019:    Abnormal Holter monitor tracing by virtue of the increased burden of PVCs.  Given the somewhat complex nature of the PVCs patient may warrant further electrophysiology workup particularly if the LVEF is <40%.    The patient's symptomatic recordings did not differ significantly from the remainder of his tracing which showed roughly the same burden of PVCs.    No sustained atrial or ventricular tachyarrhythmia.    The profound bradycardia or pauses.    Cardiac Imaging Studies:  Cardiac stress MRI 4/17/2019:  1. Lexiscan stress cardiac MRI is negative for inducible myocardial ischemia.   2. Lexiscan stress ECG is negative for inducible myocardial ischemia.  3. No previous myocardial infarction is identified.  4. Normal left ventricular size, wall thickness and function. The calculated left ventricular ejection fraction is 55%.  5. Normal right ventricular size and function.  6. Bioprosthetic aortic valve with normal function.    Lab Review   Lab Results   Component Value Date     (L) 04/23/2019    K 4.2 04/23/2019    CL 99 04/23/2019    CO2 21 (L) 04/23/2019    BUN 11 04/23/2019    CREATININE 0.83 04/23/2019    CALCIUM 9.1 04/23/2019     Lab Results   Component Value Date    WBC 11.6 (H) 04/23/2019    WBC 6.4 09/29/2015    HGB 12.7 04/23/2019    HCT 38.0 04/23/2019    MCV 97 04/23/2019    PLT 89 (L) 04/23/2019     Lab Results   Component Value Date    CHOL 139 08/21/2018    TRIG 70 08/21/2018    HDL 79 08/21/2018    LDLCALC 46 08/21/2018      Lab Results   Component Value Date    TROPONINI 0.04 12/24/2014     Lab Results   Component Value Date     (H) 04/23/2019     Lab Results   Component Value Date    TSH 2.20 03/12/2019       Maxi Estrada MD [facility]    His note created using Dragon voice recognition software. Sound alike errors may have escaped editing.

## 2021-05-28 NOTE — PATIENT INSTRUCTIONS - HE
Can use tylenol up to 3000 mg daily for pain.    Can use heat or ice if helpful.    Follow up for future scan.

## 2021-05-28 NOTE — PATIENT INSTRUCTIONS - HE
1. Stop taking amlodipine  2. Increase metoprolol to 25 mg twice daily  3. If you feel lightheaded.  Sit down immediately.  4. Follow-up with Dr. Wilburn next week as previously scheduled

## 2021-05-29 ENCOUNTER — RECORDS - HEALTHEAST (OUTPATIENT)
Dept: ADMINISTRATIVE | Facility: CLINIC | Age: 86
End: 2021-05-29

## 2021-05-29 NOTE — TELEPHONE ENCOUNTER
Reason contacted:  Follow Up  Information relayed:  Spoke to patient's son Billy and he states he saw patient yesterday and her vitals were BP:179/71 Pulse: 59 and BP: 150/83 Pulse: 55.  Billy states [facility] nurse will be visiting patient today and check her vitals.  Advised Billy to set up follow up appointment with cardiology for patient to discuss Metoprolol and pulse.  Billy voiced understanding.  Additional questions:  No  Further follow-up needed:  No

## 2021-05-29 NOTE — PATIENT INSTRUCTIONS - HE
Rox Zavala    It was a pleasure to see you at [facility] today.    Change metoprolol succinate to 25 mg (1 tablet daily) for easier compliance  Consider stopping lisinopril if low blood pressure or symptoms of lightheadedness on standing  Echocardiogram in 6 months reassess left ventricular function  Follow up appointment:   Dr. Shilpa Wilburn after echo  Amiodarone could be considered for PVC suppression if left ventricular dysfunction develops    Contact Ladonna at [phone] with questions or concerns.    Kristofer Gutierrez MD

## 2021-05-29 NOTE — TELEPHONE ENCOUNTER
Orders being requested: [facility] calling needing home physical therapy orders for Rox Zavala. 1x a week for 1 week and twice a week for 4 weeks.   Reason service is needed/diagnosis: DX: Status post Sacral fracture from a fall.   When are orders needed by: ASAP- Monday 6/10 is okay Verbal is okay.  Where to send Orders: Per Protocol   Okay to leave detailed message? Yes

## 2021-05-29 NOTE — TELEPHONE ENCOUNTER
FYI - Status Update  Who is Calling: [facility] OT  Update: Patient and son were advised to go to the ER due to low pulse but they declined stating we will monitor this and come in to the clinic later this afternoon.  Okay to leave a detailed message?:  Yes

## 2021-05-29 NOTE — TELEPHONE ENCOUNTER
Pt says her pulse has been off and on and she is not dizzy and feels fine.  Last week was 45 at the doctor last week  Pt son sets up her meds metoprolol 25 mg daily and lisinopril 5 mg daily from [facility]  Pt drinks water ok  Pt granddaughter is there and has checked her VS  bp 143/76 pulse was 79    Pt is ok monitoring this and will f/u as you direct, thanks    BP Readings from Last 3 Encounters:   06/13/19 100/62   06/13/19 147/55   06/04/19 100/54     Janice Steen, RN Care [facility] RN Triage

## 2021-05-29 NOTE — TELEPHONE ENCOUNTER
Orders being requested: verbal order for Skilled nursing evaluation   Reason service is needed/diagnosis: Skin break down on her Coccyx  When are orders needed by: as soon as possible.   Where to send Orders: Phone:  [phone]  Okay to leave detailed message?  Yes

## 2021-05-29 NOTE — TELEPHONE ENCOUNTER
Inform patient that due to clinic shortage of physicians today, she may not be able to be seen in the clinic.  Other option is to go to walk-in clinic.    She may need further evaluation    Misbah Barone MD  6/17/2019

## 2021-05-29 NOTE — TELEPHONE ENCOUNTER
Left message to call back for: Verbal Orders  Information to relay to patient:  Left detailed message for Devora OT, at [facility]. Ok for verbal orders below per Dr. Barone. Deovra to call back with any further questions or concerns.

## 2021-05-29 NOTE — TELEPHONE ENCOUNTER
Orders being requested: OT  2 times per week for 1 week   1 time per week for 1 week   2 times per week for 1 week   1 time per week for 1 week  Reason service is needed/diagnosis: Balance, Home Safety, ADLs, Cognition  When are orders needed by: as soon as possible   Where to send Orders: Phone:  [phone]  Okay to leave detailed message?  Yes

## 2021-05-29 NOTE — TELEPHONE ENCOUNTER
RN cannot approve Refill Request    RN can NOT refill this medication last prescription was written by a hospitalist     Trace Valderrama, [facility] Triage/Med Refill 6/18/2019    Requested Prescriptions   Pending Prescriptions Disp Refills     gabapentin (NEURONTIN) 100 MG capsule  0     Sig: Take 100 mg by mouth 2 (two) times a day.       Gabapentin/Levetiracetam/Tiagabine Refill Protocol  Passed - 6/18/2019  8:31 AM        Passed - PCP or prescribing provider visit in past 12 months or next 3 months     Last office visit with prescriber/PCP: 6/13/2019 Misbah Barone MD OR same dept: 6/13/2019 Misbah Barone MD OR same specialty: 6/13/2019 Misbah Barone MD  Last physical: 8/21/2018 Last MTM visit: Visit date not found   Next visit within 3 mo: Visit date not found  Next physical within 3 mo: Visit date not found  Prescriber OR PCP: Misbah Barone MD  Last diagnosis associated with med order: 1. Sacral insufficiency fracture, initial encounter  - gabapentin (NEURONTIN) 100 MG capsule; Take 100 mg by mouth 2 (two) times a day.; Refill: 0    2. Essential hypertension  - lisinopril (PRINIVIL,ZESTRIL) 5 MG tablet; Take 1 tablet (5 mg total) by mouth daily.  Dispense: 30 tablet; Refill: 0    If protocol passes may refill for 12 months if within 3 months of last provider visit (or a total of 15 months).             lisinopril (PRINIVIL,ZESTRIL) 5 MG tablet 30 tablet 0     Sig: Take 1 tablet (5 mg total) by mouth daily.       Ace Inhibitors Refill Protocol Passed - 6/18/2019  8:31 AM        Passed - PCP or prescribing provider visit in past 12 months       Last office visit with prescriber/PCP: 6/13/2019 Misbah Barone MD OR same dept: 6/13/2019 Misbah Barone MD OR same specialty: 6/13/2019 Misbah Barone MD  Last physical: 8/21/2018 Last MTM visit: Visit date not found   Next visit within 3 mo: Visit date not found  Next physical within 3 mo: Visit date not found  Prescriber OR PCP:  Misbah Barone MD  Last diagnosis associated with med order: 1. Sacral insufficiency fracture, initial encounter  - gabapentin (NEURONTIN) 100 MG capsule; Take 100 mg by mouth 2 (two) times a day.; Refill: 0    2. Essential hypertension  - lisinopril (PRINIVIL,ZESTRIL) 5 MG tablet; Take 1 tablet (5 mg total) by mouth daily.  Dispense: 30 tablet; Refill: 0    If protocol passes may refill for 12 months if within 3 months of last provider visit (or a total of 15 months).             Passed - Serum Potassium in past 12 months     Lab Results   Component Value Date    Potassium 5.0 05/15/2019             Passed - Blood pressure filed in past 12 months     BP Readings from Last 1 Encounters:   06/13/19 100/62             Passed - Serum Creatinine in past 12 months     Creatinine   Date Value Ref Range Status   06/10/2019 0.76 0.60 - 1.10 mg/dL Final

## 2021-05-29 NOTE — TELEPHONE ENCOUNTER
LOW HR 33-35 pulse went up to 47 and than goes back down  Again bp 132/81  OT checked and against the bp machine  Asymptomatic  Pt home alone with OT  I recommended ER and noone there is to drive her to call 911 now.  Call 911 now to come and assess and take pt as needed to ER  Janice Steen RN Care [facility] RN Triage    Reason for Disposition    Heart beating very slowly (e.g., < 50 / minute) (EXCEPTION: athlete)    Protocols used: HEART RATE AND HEARTBEAT [id number]

## 2021-05-29 NOTE — PROGRESS NOTES
ASSESSMENT AND PLAN:  Rox Zavala 84 y.o. female is seen here on 06/13/19 for follow-up.  She has psoriatic arthritis, psoriasis, osteoarthritis, methotrexate 7.5 mg once a week on Fridays, is helping her very significantly having reduced it from 15 mg/week.  She takes folic acid.  While her PSA is well controlled and she is hurting more at the base of her left thumb likely due to osteoarthritis of the first CMC, the evidence for de Quervain's disease is absent.  She would like to proceed with local injection done with 20 mg of methylprednisolone Marcaine under ultrasound guidance.  Recent labs normal.  Follow-up here in 6 months labs every 3 months.     Diagnoses and all orders for this visit:    Psoriatic arthritis (H)  -     methotrexate 2.5 MG tablet; TAKE THREE TABLETS (7.5MG) BY MOUTH ONCE A WEEK  Dispense: 36 tablet; Refill: 0    Generalized Osteoarthritis Of The Hand  -     methylPREDNISolone acetate injection 20 mg (DEPO-MEDROL)    Greater trochanteric bursitis of right hip  -     Discontinue: methylPREDNISolone acetate injection 40 mg (DEPO-MEDROL)          HISTORY OF PRESENTING ILLNESS ON 06/13/19 :  Rox Zavala 84 y.o. 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  She is on methotrexate 7.5 mg/week with folic acid.  She noted left hip pain moderately severely painful in the hand, this is on the left side, this is in the base of her thumb.  She rates this pain as moderately severe, 5.5/10, interfering with some of the day-to-day activities, reminiscent of what she experienced in the past and responded to corticosteroid injection in the first CMC.  She had a fall around [facility], sustained a sacral fracture.  She is been hurting since and the right trochanteric area, moderately severe, keeps it up at night when she rolls over. She has not had fever or weight loss blurry vision eye redness mouth ulcer nausea there is no  "cough no rash.    She does not have evidence of autoimmunity.  She does not smoke does not take alcohol.  She had right hip replacement in .Further historical information, including ROS and limitation in activities as noted in the multidimensional health assessment questionnaire scanned in the EMR and in the assessment and plan section.    ALLERGIES:Patient has no known allergies.    PAST MEDICAL/ACTIVE PROBLEMS/MEDICATION/SOCIAL DATA  Past Medical History:   Diagnosis Date     Acute renal failure (H)      Aortic valve stenosis, severe      Arthritis      Coronary artery disease 2014     Coronary artery disease      Disease of thyroid gland      Hammer toe      Hyperlipidemia      Hypertension      Macular disorder     \"wrinkle\"     Rheumatoid arthritis (H)      S/P AVR 12/15/2014     Social History     Tobacco Use   Smoking Status Former Smoker     Packs/day: 1.00     Years: 30.00     Pack years: 30.00     Types: Cigarettes     Last attempt to quit: 1995     Years since quittin.4   Smokeless Tobacco Never Used     Patient Active Problem List   Diagnosis     Hammer Toe     Hemorrhoids     Psoriasis     Generalized Osteoarthritis Of The Hand     Osteoarthritis Of The Knee     Vitamin D Deficiency     Palpitations     Hypothyroidism     Neck Pain     Upper Back Pain (Between Shoulder Blades)     Osteopenia     Coronary artery disease     S/P AVR     Constipation     Acute diastolic CHF (congestive heart failure) (H)     Essential hypertension     Hyperlipidemia     Coronary artery disease involving coronary bypass graft of native heart with other forms of angina pectoris (H)     Psoriatic arthritis (H)     High risk medication use     Thrombocytopenia (H)     Sacral insufficiency fracture, initial encounter     Hypertension     Back pain     Current Outpatient Medications   Medication Sig Dispense Refill     acetaminophen (TYLENOL) 500 MG tablet Take 1-2 tablets (500-1,000 mg total) by mouth every 4 " (four) hours as needed.  0     ascorbic acid, vitamin C, (VITAMIN C) 250 MG tablet Take 250 mg by mouth daily.       aspirin 81 MG EC tablet Take 81 mg by mouth daily.       atorvastatin (LIPITOR) 40 MG tablet Take 1 tablet (40 mg total) by mouth daily. 90 tablet 2     calcitonin, salmon, (MIACALCIN) 200 unit/actuation nasal spray Apply 1 spray into alternating nostrils daily. 30 mL 0     calcium-vitamin D 500 mg(1,250mg) -200 unit per tablet Take 1 tablet by mouth daily.  0     cholecalciferol, vitamin D3, 2,000 unit Tab Take 2,000 Units by mouth daily.       donepezil (ARICEPT) 10 MG tablet Take 1 tablet (10 mg total) by mouth at bedtime. 5 tablet 1     ferrous sulfate 325 (65 FE) MG tablet Take 1 tablet by mouth daily with breakfast.       folic acid (FOLVITE) 1 MG tablet TAKE ONE TABLET BY MOUTH EVERY DAY 90 tablet 3     gabapentin (NEURONTIN) 100 MG capsule Take 100 mg by mouth 2 (two) times a day. (Patient taking differently: Take 300 mg by mouth 2 (two) times a day.       )  0     levothyroxine (SYNTHROID, LEVOTHROID) 100 MCG tablet TAKE ONE TABLET BY MOUTH EVERY DAY AT 6:00 A.M. 90 tablet 2     lisinopril (PRINIVIL,ZESTRIL) 5 MG tablet Take 1 tablet (5 mg total) by mouth daily. 30 tablet 0     methotrexate 2.5 MG tablet TAKE THREE TABLETS (7.5MG) BY MOUTH ONCE A WEEK 36 tablet 0     metoprolol succinate (TOPROL-XL) 25 MG Take 1 tablet (25 mg total) by mouth daily. 180 tablet 3     nitroglycerin (NITROSTAT) 0.4 MG SL tablet Place 1 tablet (0.4 mg total) under the tongue every 5 (five) minutes as needed for chest pain. 25 tablet 0     senna (SENOKOT) 8.6 mg tablet Take 2 tablets by mouth daily.       traMADol (ULTRAM) 50 mg tablet Take 1 tablet (50 mg total) by mouth every 4 (four) hours. 60 tablet 0     VIT C/E/ZN/COPPR/LUTEIN/ZEAXAN (PRESERVISION AREDS 2 ORAL) Take 1 tablet by mouth 2 (two) times a day.       No current facility-administered medications for this visit.      DETAILED EXAMINATION  06/13/19   :  There were no vitals filed for this visit.  Alert oriented. Head including the face is examined for malar rash, heliotropes, scarring, lupus pernio. Eyes examined for redness such as in episcleritis/scleritis, periorbital lesions.   Neck/ Face examined for parotid gland swelling, range of motion of neck.  Left upper and lower and right upper and lower extremities examined for tenderness, swelling, warmth of the appendicular joints, range of motion, edema, rash.  Some of the important findings included: She is markedly tender with minimal grinding of the left first CMC, her Finkelstein is negative,, there is no synovitis of the palpable joints of the upper extremities.  No warmth swelling effusion at the knees without JLT.  No tenderness in the trochanteric area on either side.  Painful arc on abduction right shoulder.  She has tenderness of the right trochanteric area, scar from the prior hip arthroplasty.        LAB / IMAGING DATA:  ALT   Date Value Ref Range Status   06/10/2019 28 0 - 45 U/L Final   05/10/2019 20 0 - 45 U/L Final   04/23/2019 30 0 - 45 U/L Final     Albumin   Date Value Ref Range Status   06/10/2019 3.7 3.5 - 5.0 g/dL Final   05/10/2019 3.0 (L) 3.5 - 5.0 g/dL Final   04/23/2019 3.9 3.5 - 5.0 g/dL Final     Creatinine   Date Value Ref Range Status   06/10/2019 0.76 0.60 - 1.10 mg/dL Final   05/15/2019 0.72 0.60 - 1.10 mg/dL Final   05/11/2019 0.73 0.60 - 1.10 mg/dL Final       WBC   Date Value Ref Range Status   06/10/2019 8.1 4.0 - 11.0 thou/uL Final   05/15/2019 8.1 4.0 - 11.0 thou/uL Final   09/29/2015 6.4 4.0 - 11.0 thou/uL Final   05/07/2015 6.9 4.0 - 11.0 thou/uL Final     Hemoglobin   Date Value Ref Range Status   06/10/2019 12.8 12.0 - 16.0 g/dL Final   05/29/2019 12.9 12.0 - 16.0 g/dL Final   05/15/2019 10.9 (L) 12.0 - 16.0 g/dL Final     Platelets   Date Value Ref Range Status   06/10/2019 243 140 - 440 thou/uL Final   05/15/2019 225 140 - 440 thou/uL Final   05/11/2019 274 140 - 440  nils/Danette Final       Lab Results   Component Value Date    RF <15 07/17/2013    SEDRATE 20 11/08/2016

## 2021-05-29 NOTE — PROGRESS NOTES
Assessment:     1. Fall, subsequent encounter     2. SOB (shortness of breath)     3. Dizziness  Ambulatory referral to Home Health   4. Pressure injury of buttock, stage 1, unspecified laterality  Ambulatory referral to Home Health   5. Palpitations     6. Acute diastolic CHF (congestive heart failure) (H)     7. Essential hypertension     8. Sacral insufficiency fracture, initial encounter     9. MCI (mild cognitive impairment)           Rox D Lucas  has a past medical history  has a past medical history of Acute renal failure (H), Aortic valve stenosis, severe, Arthritis, Coronary artery disease (11/17/2014), Coronary artery disease, Disease of thyroid gland, Hammer toe, Hyperlipidemia, Hypertension, Macular disorder, MCI (mild cognitive impairment) (6/16/2019), Pressure injury of buttock, stage 1, unspecified laterality (6/16/2019), Rheumatoid arthritis (H), S/P AVR (12/15/2014), and Sacral insufficiency fracture, initial encounter (5/6/2019).    Patient with multiple medical problems follows after hospital admission.  Currently she is getting PT OT at home.  She is noted to have new onset of early pressure ulcers.  This is likely related to her sacral insufficiency fracture and being less mobile.  I have asked her to take the pressure off her buttock area and give her some ideas about using a donut cushion or rolled towels and changing of positions often.    Regarding her blood pressure explained to accompanying son and daughter regarding the changes made during each visit and the final plan of care as noted before         Plan:      The diagnosis was discussed with the patient and evaluation and treatment plans outlined.  Follow up: Return in about 1 month (around 7/11/2019) for recheck.     Take metoprolol 25 mg daily.     Take lisinopril 5 mg daily unless blood pressure is 100/60 or less.    Take gabapentin 100 mg 2 times a day.    Tramadol can be used as needed for pain control, however make sure  you monitor as it can make you dizzy.    Subjective:      Rox Zavala is a  female who presents for evaluation of   Chief Complaint   Patient presents with     Medication Management     Pt here today for medication check, possible medication refill of Gabapentin     Patient is here for follow-up from a hospitalization and subsequently TCU admission.    She was hospitalized at [facility] from 5/6 to 5/11/2019.    She had fallen on her staircase on 04/24/2019 but had presented more with shortness of breath and I had had her evaluated with cardiology.  Subsequently she was seen in clinic again on 05/01/2019 .  An x-ray was ordered that was normal.  An MRI was ordered but she ended up going to the hospital on zero 5/0 6 and admitted for severe back pain with MRI showing S2 fracture.    Chart notes are reviewed from the clinic visits and the hospitalization.    During hospitalization her pain was controlled with tramadol and gabapentin.  She was noted to have accelerated hypertension possibly thought due to be from pain and lisinopril was added to her regimen.      Currently she describes some discomfort of her lower back, however she is more concerned about a flaky rash on her buttock that she has been using Desitin.     In addition, accompanying son and daughter are also concerned about her numerous blood pressure medication change during clinic visits and hospitalization.  I discussed her last visit with cardiologist but it is mentioned that she should take metoprolol once a day for convenience and lisinopril as long as there is a need.  Lisinopril can be stopped if she has symptoms of low blood pressure.  In future amiodarone can be considered if she has frequent PVCs.  She denies any chest pain or increasing shortness of breath.  She feels she is at her baseline and improving.    Reviewed her MRI spine that shows mildly displaced fracture through S2 vertebra and fractures of the sacral ata.  In addition she  does have foraminal stenosis at L5/S1.  MRI of the sacrum shows mild to moderate bilateral sacroiliac joint arthritis.    The following portions of the patient's history were reviewed and updated as appropriate: allergies, current medications, past family history, past medical history, past social history, past surgical history and problem list.  No Known Allergies    Current Outpatient Medications on File Prior to Visit   Medication Sig Dispense Refill     aspirin 81 MG EC tablet Take 81 mg by mouth daily.       atorvastatin (LIPITOR) 40 MG tablet Take 1 tablet (40 mg total) by mouth daily. 90 tablet 2     calcium-vitamin D 500 mg(1,250mg) -200 unit per tablet Take 1 tablet by mouth daily.  0     cholecalciferol, vitamin D3, 2,000 unit Tab Take 2,000 Units by mouth daily.       donepezil (ARICEPT) 10 MG tablet Take 1 tablet (10 mg total) by mouth at bedtime. 5 tablet 1     folic acid (FOLVITE) 1 MG tablet TAKE ONE TABLET BY MOUTH EVERY DAY 90 tablet 3     levothyroxine (SYNTHROID, LEVOTHROID) 100 MCG tablet TAKE ONE TABLET BY MOUTH EVERY DAY AT 6:00 A.M. 90 tablet 2     metoprolol succinate (TOPROL-XL) 25 MG Take 1 tablet (25 mg total) by mouth daily. 180 tablet 3     VIT C/E/ZN/COPPR/LUTEIN/ZEAXAN (PRESERVISION AREDS 2 ORAL) Take 1 tablet by mouth 2 (two) times a day.       acetaminophen (TYLENOL) 500 MG tablet Take 1-2 tablets (500-1,000 mg total) by mouth every 4 (four) hours as needed.  0     ascorbic acid, vitamin C, (VITAMIN C) 250 MG tablet Take 250 mg by mouth daily.       calcitonin, salmon, (MIACALCIN) 200 unit/actuation nasal spray Apply 1 spray into alternating nostrils daily. 30 mL 0     ferrous sulfate 325 (65 FE) MG tablet Take 1 tablet by mouth daily with breakfast.       gabapentin (NEURONTIN) 100 MG capsule Take 100 mg by mouth 2 (two) times a day. (Patient taking differently: Take 300 mg by mouth 2 (two) times a day.       )  0     lisinopril (PRINIVIL,ZESTRIL) 5 MG tablet Take 1 tablet (5 mg  "total) by mouth daily. 30 tablet 0     methotrexate 2.5 MG tablet TAKE THREE TABLETS (7.5MG) BY MOUTH ONCE A WEEK 36 tablet 0     nitroglycerin (NITROSTAT) 0.4 MG SL tablet Place 1 tablet (0.4 mg total) under the tongue every 5 (five) minutes as needed for chest pain. 25 tablet 0     senna (SENOKOT) 8.6 mg tablet Take 2 tablets by mouth daily.       traMADol (ULTRAM) 50 mg tablet Take 1 tablet (50 mg total) by mouth every 4 (four) hours. 60 tablet 0     No current facility-administered medications on file prior to visit.        Patient Active Problem List   Diagnosis     Hammer Toe     Hemorrhoids     Psoriasis     Generalized Osteoarthritis Of The Hand     Osteoarthritis Of The Knee     Vitamin D Deficiency     Palpitations     Hypothyroidism     Neck Pain     Upper Back Pain (Between Shoulder Blades)     Osteopenia     Coronary artery disease     S/P AVR     Constipation     Acute diastolic CHF (congestive heart failure) (H)     Essential hypertension     Hyperlipidemia     Coronary artery disease involving coronary bypass graft of native heart with other forms of angina pectoris (H)     Psoriatic arthritis (H)     High risk medication use     Thrombocytopenia (H)     Sacral insufficiency fracture, initial encounter     Hypertension     Back pain     Pressure injury of buttock, stage 1, unspecified laterality     MCI (mild cognitive impairment)       Past Medical History:   Diagnosis Date     Acute renal failure (H)      Aortic valve stenosis, severe      Arthritis      Coronary artery disease 11/17/2014     Coronary artery disease      Disease of thyroid gland      Hammer toe      Hyperlipidemia      Hypertension      Macular disorder     \"wrinkle\"     MCI (mild cognitive impairment) 6/16/2019     Pressure injury of buttock, stage 1, unspecified laterality 6/16/2019     Rheumatoid arthritis (H)      S/P AVR 12/15/2014     Sacral insufficiency fracture, initial encounter 5/6/2019       Past Surgical History: "   Procedure Laterality Date     ANGIOPLASTY / STENTING FEMORAL       AORTIC VALVE REPLACEMENT       CARDIAC SURGERY      CABG     EYE SURGERY Bilateral     cataracts     TX LIGATE FALLOPIAN TUBE      Description: Tubal Ligation;  Recorded: 2008;     TX REMOVAL GALLBLADDER      Description: Cholecystectomy;  Recorded: 2008;     TX TOTAL HIP ARTHROPLASTY Right     Description: Total Hip Replacement;  Recorded: 2008; right       Family History   Problem Relation Age of Onset     Asthma Mother      Coronary artery disease Neg Hx        Social History     Socioeconomic History     Marital status:      Spouse name: None     Number of children: None     Years of education: None     Highest education level: None   Occupational History     None   Social Needs     Financial resource strain: None     Food insecurity:     Worry: None     Inability: None     Transportation needs:     Medical: None     Non-medical: None   Tobacco Use     Smoking status: Former Smoker     Packs/day: 1.00     Years: 30.00     Pack years: 30.00     Types: Cigarettes     Last attempt to quit: 1995     Years since quittin.4     Smokeless tobacco: Never Used   Substance and Sexual Activity     Alcohol use: Yes     Comment: Occasional      Drug use: No     Sexual activity: None   Lifestyle     Physical activity:     Days per week: None     Minutes per session: None     Stress: None   Relationships     Social connections:     Talks on phone: None     Gets together: None     Attends [employer] service: None     Active member of club or organization: None     Attends meetings of clubs or organizations: None     Relationship status: None     Intimate partner violence:     Fear of current or ex partner: None     Emotionally abused: None     Physically abused: None     Forced sexual activity: None   Other Topics Concern     None   Social History Narrative    , patient notes she and her  were  for 60 years  and he passed away 12 years ago following an illness and complications. Patient notes they had a happy marriage.    Children: Patient notes she had 4 children, one daughter passed away following a MVA when daughter was 57 years old. Leisa states she is close to her two boys, daughter, grandchildren, and great-grandchildren and finds her family supportive.         Lives in a town home independently.       Review of Systems  A 12 point comprehensive review of systems was negative except as noted.       Objective:   /55 (Patient Site: Left Arm, Patient Position: Sitting, Cuff Size: Adult Regular)   Pulse (!) 48   Resp 16   Wt 116 lb 9.6 oz (52.9 kg)   LMP 08/17/1974   SpO2 96%   BMI 20.65 kg/m        General Appearance: healthy, alert, oriented and no distress  HEENT Exam: Oropharynx clear without lesion or exudate  Neck:  supple, no adenopathy, thyroid normal  Heart: Normal heart sounds, S1 and S2, No murmurs.  Lungs: Normal breath sounds, Clear to auscultation bilateral  Skin exam: Bilateral gluteal area skin shows a slightly erythematous/bluish hue with dry  flaky skin.  No marcial ulceration.  Neurological exam: gait normal, alert and oriented X 3  Hem/Lymph/Immuno exam: negative findings:  no cervical nodes, no supraclavicular nodes,

## 2021-05-29 NOTE — TELEPHONE ENCOUNTER
Who is calling:  Patient's son, Billy  Reason for Call:  He states the patient is completely out of these medications and was informed these would be sent during her office visit on 6/13/2019. Billy is requesting a call once these have been sent today.  Date of last appointment with primary care: 6/13/2019  Okay to leave a detailed message: Yes

## 2021-05-29 NOTE — TELEPHONE ENCOUNTER
Reason contacted:  Gabapentin Refill  Information relayed:  Informed patient's son iBlly that Dr. Barone has sent Gabapentin refill to pharmacy.  Billy voiced understanding.  Additional questions:  No  Further follow-up needed:  No

## 2021-05-29 NOTE — TELEPHONE ENCOUNTER
FYI - Status Update  Who is Calling: Patient  Update: Questioning if refills can be processed today, due to patient being out of both medications.  Okay to leave a detailed message?:  No return call needed

## 2021-05-30 VITALS — WEIGHT: 132 LBS | HEIGHT: 63 IN | BODY MASS INDEX: 23.39 KG/M2

## 2021-05-30 NOTE — TELEPHONE ENCOUNTER
Orders being requested: [facility]  Reason service is needed/diagnosis: Doesn't need anymore.  Verbal OK  When are orders needed by:as soon as possible  Where to send Orders: Phone:  [phone]  Okay to leave detailed message?  Yes

## 2021-05-30 NOTE — TELEPHONE ENCOUNTER
Orders being requested: Physical Therapy - 2 times this week and one time next week  Reason service is needed/diagnosis: for gate and home exercise program  When are orders needed by: as soon as possible  Where to send Orders: Phone:  [phone]  Okay to leave detailed message?  Yes  Patient is progressing well.

## 2021-05-30 NOTE — TELEPHONE ENCOUNTER
Orders being requested: OT additional one visit week of 7/1/2019 and one visit week of 7/8/2019  Reason service is needed/diagnosis: As requested by Dev[facility] OT / [facility] to work on self care.  When are orders needed by: As able  Where to send Orders: Phone:  [phone]  Okay to leave detailed message?  Yes

## 2021-05-31 VITALS — BODY MASS INDEX: 23.74 KG/M2 | WEIGHT: 134 LBS | HEIGHT: 63 IN

## 2021-05-31 VITALS — BODY MASS INDEX: 23.93 KG/M2 | WEIGHT: 135.1 LBS

## 2021-05-31 VITALS — HEIGHT: 63 IN | BODY MASS INDEX: 22.86 KG/M2 | WEIGHT: 129 LBS

## 2021-05-31 VITALS — BODY MASS INDEX: 23.91 KG/M2 | WEIGHT: 135 LBS

## 2021-05-31 VITALS — BODY MASS INDEX: 23.21 KG/M2 | WEIGHT: 131 LBS | HEIGHT: 63 IN

## 2021-05-31 VITALS — HEIGHT: 63 IN | WEIGHT: 128.8 LBS | BODY MASS INDEX: 22.82 KG/M2

## 2021-05-31 VITALS — HEIGHT: 63 IN | BODY MASS INDEX: 23.92 KG/M2 | WEIGHT: 135 LBS

## 2021-05-31 VITALS — WEIGHT: 133 LBS | BODY MASS INDEX: 23.56 KG/M2

## 2021-05-31 VITALS — WEIGHT: 131 LBS | BODY MASS INDEX: 23.39 KG/M2

## 2021-05-31 VITALS — WEIGHT: 129.9 LBS | BODY MASS INDEX: 23.02 KG/M2 | HEIGHT: 63 IN

## 2021-05-31 NOTE — TELEPHONE ENCOUNTER
----- Message from Bi Balbuena sent at 8/13/2019 10:10 AM CDT -----  Contact: Pt  Would need ECHO order placed please  ----- Message -----  From: Hailey Chaudhari, RN  Sent: 8/13/2019  10:04 AM  To: Bi Balbuena    Per Dr Gutierrez 6/4/19:  Echocardiogram in 6 months reassess left ventricular function  Follow up appointment:   Dr. Shilpa Wilburn after echo    Please send this to Dr Gutierrez's nurse to take care of .  margarette jarrell    ----- Message -----  From: Bi Balbuena  Sent: 8/13/2019   8:38 AM  To: Hailey Chaudhari RN    General phone call:    Caller: Pt    Primary cardiologist: Dr Wilburn    Detailed reason for call: Pt was seen in June by Dr Gutierrez and to FU w/ Dr Wilburn later this year with an ECHO PRIOR to FU - there is no order at this time - Please place order and I will set up with the patient - thank you   Pt wanted to be seen sooner for some BP concerns and tiredness - hoping to get Echo scheduled later this week as Pt set apt for next week - She would appreciate a call back as well - thank you      New or active symptoms? na    Best phone number: [phone]    Best time to contact: any    Ok to leave a detailed message? yes    Device? no    Additional Info: thank you

## 2021-05-31 NOTE — TELEPHONE ENCOUNTER
PC back to pt  She states that she no longer needed any further assistance  Pt saw PMD yesterday  Pt wanted to f/u with Dr Wilburn on 8/21, and will discuss need for Echo with him at that [facility]

## 2021-05-31 NOTE — PATIENT INSTRUCTIONS - HE
Recommendations from today's MTM visit:                                                    MTM (medication therapy management) is a service provided by a clinical pharmacist designed to help you get the most of out of your medicines. and Today we reviewed what your medicines are for, how to know if they are working, that your medicines are safe and how to make your medicine regimen as easy as possible.     1. Stop vitamin C (likely used in past to help you absorb your iron pill) and multivitamin (you are getting what you need from food and your calcium/vitamin D supplements)    2. Return to [facility] as able, weight bearing exercise is good for bone health and improving balance     3. Consider treatment of osteoporosis (due to sacral fracture), you would benefit from a once yearly infusion of Reclast to improve bone density and decrease risk of fracture) - Keri will discuss with Dr. Barone before ordering when she returns     4. We are not going to add another medicine for urination because those do not play well with your memory medication     It was great to speak with you today.  I value your experience and would be very thankful for your time with providing feedback on our clinic survey. You may receive a survey via email or text message in the next few days.     Next MTM visit: 2-3 weeks by phone after discussion with Dr. Barone     To schedule another MTM appointment, please call the clinic directly or you may call the MTM scheduling line at [phone] or toll-free at [phone].     My Clinical Pharmacist's contact information:                                                      It was a pleasure seeing you today!  Please feel free to contact me with any questions or concerns you have.      Carlos Gomez, PharmD, BCACP  Medication Management (MTM) Pharmacist  [facility]

## 2021-05-31 NOTE — PROGRESS NOTES
MTM Initial Encounter  Assessment & Plan                                                     1. Nocturia  Reviewed symptoms which are long-standing, no offending medications at this time.  Reviewed that most medications targeted to address excessive urination, function by mechanism of blocking acetylcholine, however donepezil is a cholinesterase inhibitor which seeks to increase acetylcholine therefore these 2 medication classes directly interact with each other.  She tolerates her symptoms at this time and would prefer to continue on donepezil for memory.  She may consider an alternative agent for memory, encouraged her to discuss with Dr. Holland.  Anticholinergics are on the beers criteria and are high risk medications for elderly adults therefore the risks of falls and confusion particularly in the setting of mild cognitive impairment are likely higher than the benefits of decreasing urination at bedtime.  Discussed again fluid restriction in several hours prior to bedtime.    2. Coronary artery disease involving coronary bypass graft of native heart with other forms of angina pectoris (H)  Stable, to follow-up with cardiologist tomorrow.    3. MCI (mild cognitive impairment)  Stable on once daily donepezil, not experience any adverse effects.  Follows with Dr. Holland.  As discussed above I would recommend against initiating medication for nocturia, as this is a long-standing issue and anticholinergics may interact with the benefits she would gain from donepezil.    4. Hypothyroidism, unspecified type  Stable. Recommended to continue current regimen.     5. Osteopenia  Due to her sacral fracture, may may be considered osteoporosis due to fragility fracture.  Will discuss updated diagnostics with PCP.  She is getting adequate calcium and vitamin D through diet and supplementation, encouraged her to discontinue multivitamin.  Also reviewed that she no longer requires vitamin C as she is not on iron at this time,  encouraged her to discontinue this as well.  Reviewed benefits versus risks of initiating antiresorptive treatment to treat her bone health, as her risk of fracture is greater than her risk of adverse effects from a bisphosphonate.  Normal renal function, no anticipated dental work due to having all dentures.  Would likely benefit from IV Reclast, due to mild cognitive impairment it would likely be difficult for once weekly dosing of bisphosphonate.  -Recommend IV Reclast annually, to discuss with PCP  -Discontinue multivitamin  -Discontinue vitamin C    6. Psoriatic arthritis (H)  Stable, last seen by Dr. Santoyo in June 2019, notes reviewed.  She has done well on current methotrexate dose in addition to folic acid, if she were getting worsening mouth sores may consider titrating folic acid to 2 mg daily.  She received an injection which was effective at her last appointment with him.  Recommend reassess at follow-up.    Follow Up  Return in about 2 weeks (around 9/3/2019) for Medication Management Pharmacist, By phone.      Subjective & Objective                                                     Rox D Lucas is a 84 y.o. female coming in for an initial visit for Medication Therapy Management. She was referred to me from Misbah Barone MD    Chief Complaint: Medication management    Medication Adherence/Access: Stable, no issues identified.    Nocturia: has been getting up about every 3 hours. No concerns about falling during these episodes. Has always urinated a lot.  She is wondering if there is a medication that she could take to help decrease this.    Coronary artery disease: Follows with Dr. Wilburn, will be seeing him tomorrow.  Denies signs or symptoms of chest pain, shortness of breath, signs or symptoms of hypotension.  Her main concern is fatigue.  She also has a frequent PVCs, and severe aortic valve stenosis, with history of aortic valve replacement.  Continues on aspirin, statin, ACE inhibitor,  beta-blocker.    Mild cognitive impairment: Continues on donepezil without GI side effects. Notes that it takes her a long time to get to sleep.  She has a hard time knowing whether this seems to be helping with her memory or not.  She does not note a worsening of her memory.    Hypothyroid: Continues on daily levothyroxine.  Lab Results   Component Value Date    TSH 2.29 2019     Osteoporosis: now with likely fragility fracture, sacral fracture. No more pain and able to walk more independently. She had been getting home care coming and this was completed two weeks ago. She will be going back to [employer] every day, she loves exercise. She does not get dairy every day, perhaps every other day. Has never been treated for osteoporosis medication. Rarely needs to use tylenol for pain but has this at home. Has both sets of dentures no planned dental work.  She is still on vitamin C and does not know why she is on this, additionally takes a multivitamin.  Vitamin D, Total (25-Hydroxy)   Date Value Ref Range Status   2018 46.5 30.0 - 80.0 ng/mL Final     Psoriatic arthritis: follows with Dr. Santoyo  She takes 3 tablets of methotrexate once weekly, in addition to folic acid daily.      PMH: reviewed in EPIC   Allergies/ADRs: reviewed in EPIC   Alcohol: One glass of wine per week on   Tobacco:   Social History     Tobacco Use   Smoking Status Former Smoker     Packs/day: 1.00     Years: 30.00     Pack years: 30.00     Types: Cigarettes     Last attempt to quit: 1995     Years since quittin.6   Smokeless Tobacco Never Used     Today's Vitals:   Vitals:    19 1448   BP: 141/60   Pulse: (!) 52   Weight: 122 lb (55.3 kg)     ----------------    Much or all of the text in this note was generated through the use of Dragon Dictate voice-to-text software. Errors in spelling or words which seem out of context are unintentional. Sound alike errors, in particular, may have escaped editing.     The  patient was given a summary of these recommendations as an after visit summary    I spent 60 minutes with this patient today. An extra 15 minutes was spent creating the Medication Action Plan. All changes were made via collaborative practice agreement with Misbah Barone MD. A copy of the visit note was provided to the patient's provider.     Carlos Gomez, PharmD, BCACP  Medication Management (MTM) Pharmacist  [facility]         Current Outpatient Medications   Medication Sig Dispense Refill     acetaminophen (TYLENOL) 500 MG tablet Take 1-2 tablets (500-1,000 mg total) by mouth every 4 (four) hours as needed.  0     aspirin 81 MG EC tablet Take 81 mg by mouth daily.       atorvastatin (LIPITOR) 40 MG tablet Take 1 tablet (40 mg total) by mouth daily. 90 tablet 2     cholecalciferol, vitamin D3, 2,000 unit Tab Take 2,000 Units by mouth daily.       donepezil (ARICEPT) 10 MG tablet Take 1 tablet (10 mg total) by mouth at bedtime. 5 tablet 1     folic acid (FOLVITE) 1 MG tablet TAKE ONE TABLET BY MOUTH EVERY DAY 90 tablet 3     levothyroxine (SYNTHROID, LEVOTHROID) 100 MCG tablet TAKE ONE TABLET BY MOUTH EVERY DAY AT 6:00 A.M. 90 tablet 2     methotrexate 2.5 MG tablet TAKE THREE TABLETS (7.5MG) BY MOUTH ONCE A WEEK 36 tablet 0     metoprolol succinate (TOPROL-XL) 25 MG Take 1 tablet (25 mg total) by mouth daily. 180 tablet 3     nitroglycerin (NITROSTAT) 0.4 MG SL tablet Place 1 tablet (0.4 mg total) under the tongue every 5 (five) minutes as needed for chest pain. 25 tablet 0     polyethylene glycol (GLYCOLAX) 17 gram/dose powder Take 17 g by mouth daily as needed.       VIT C/E/ZN/COPPR/LUTEIN/ZEAXAN (PRESERVISION AREDS 2 ORAL) Take 1 tablet by mouth 2 (two) times a day.       calcium-vitamin D 500 mg(1,250mg) -200 unit per tablet Take 1 tablet by mouth 2 (two) times a day. (600mg calcium + 800 IU Vitamin D)  0     lisinopril (PRINIVIL,ZESTRIL) 10 MG tablet Take 1 tablet (10 mg  total) by mouth daily. 90 tablet 3     polyethylene glycol 3350 (MIRALAX ORAL) Take 1 tablet by mouth daily as needed.       No current facility-administered medications for this visit.

## 2021-05-31 NOTE — PROGRESS NOTES
"Assessment/Plan:        Diagnoses and all orders for this visit:    Fatigue, unspecified type  -     Thyroid Cascade  -     Vitamin B12  -     HM1(CBC and Differential)  -     HM1 (CBC with Diff)   Improve sleep hygiene; tips given to patient   DD: sleep deprivation, depression, old age, TSH    Seborrheic keratosis  -     Ambulatory referral to Dermatology   Didn't have time to remove today; will send to derm to remove and do a full body skin check    Polypharmacy  -     Ambulatory referral to Medication Management   On many vitamins that may be unnecessary, any drug interactions that could be causing hot flashes/fatigue    Nocturnal enuresis   Consideration of oxybutrin to help with nighttime urination   Stop drinking water after dinner   Improve sleep hygiene    Heat Intolerance   Thyroid cascade    Follow up with PCP in 2 weeks for further evaluation. She has many complaints today and after initial workup may need to consider further evaluation of fatigue to determine cause;  sleep deprivation vs mental health and depression, old age, thyroid dysfunction        Subjective:    Patient ID: Rox Zavala is a 84 y.o. female.    HPI patient comes in with multiple complaints including \"hot flashes\", skin lesion, and fatigue.    Hot flashes: states that they have started up again despite not having them for years. Feels hot all over relative to others around her last a couple of minutes. Have started about 1 week ago.    Fatigue: no energy, daytime fatigue, no energy to do anything. Has poor sleep hygiene watches TV until she goes to bed; often lays there a long time before falling asleep, gets up multiple times to urinate in the night. Wakes up at 6am to get out of bed.     Skin Lesion: on her forehead feels that it is getting better; has been a long time many years since she has seen a dermatologist.      The following portions of the patient's history were reviewed and updated as appropriate: allergies, current " medications, past family history, past medical history, past social history, past surgical history and problem list.    Review of Systems   Constitutional: Positive for fatigue.   Endocrine: Positive for heat intolerance.   Genitourinary: Positive for urgency.        Nighttime frequency of urination.   Denies incontinence with sneezing, coughing or laughing   Skin:        Skin lesion             Objective:    Physical Exam  /55 (Patient Site: Left Arm, Patient Position: Sitting, Cuff Size: Adult Regular)   Pulse 64   Temp 97.7  F (36.5  C) (Axillary)   Resp 24   Wt 122 lb (55.3 kg)   LMP 08/17/1974   BMI 21.61 kg/m    General: Patient appears to be in no acute distress.  Ears: No nodules, lesions, masses, discharge or tenderness in auricles or mastoid area. No cerumen in ear canals. Tympanic membranes pearly gray, normal bony landmarks and cone of light bilaterally, no bulges or perforations.   Oropharynx: Buccal mucosa pink, moist, no lesions. Tongue midline, spongy, pink. Uvula midline. Pharynx with out erythema, no exudates. Tonsils + 1.  Neck: Full range of motion, thyroid normal without palpated nodules  Lymph:  Lymph nodes in neck, supraclavicular, and infraclavicular are not palpable and are not tender.  Lungs: Unlabored. clear breath sounds heard throughout lung fields.   Heart: Regular rate and rhythm.  Skin: has a brown irregular shaped, elevated waxy appearance Seborrheic keratosis

## 2021-05-31 NOTE — PROGRESS NOTES
DIAGNOSIS:  1. Left inguinal pain  XR Pelvis W 2 Vw Hip Left   2. Hip pain, left , I suspect the inguinal and hip pain are related to underlying osteoarthritis. Ambulatory referral to Orthopedic Surgery       PLAN:     The xrays of the left hip are read and reviewed with the pt and her son.    Follow up with ORTHOPEDICS      HPI:  Having some pain in the left groin with walking over the past 3-4 weeks.  Had a fall on Easter and it seems to have started after that.  Doesn't hurt to stand.  Has had a right hip replacement.  Has not had a left hip xray.   Pain slightly progressive.  Pain is sharp.  Pain up to 8/10.    No numbness in the legs.  Bowel and bladder frxn is normal     Xray shows:  Left hip DJD.      Current Outpatient Medications on File Prior to Visit   Medication Sig Dispense Refill     acetaminophen (TYLENOL) 500 MG tablet Take 1-2 tablets (500-1,000 mg total) by mouth every 4 (four) hours as needed.  0     aspirin 81 MG EC tablet Take 81 mg by mouth daily.       atorvastatin (LIPITOR) 40 MG tablet Take 1 tablet (40 mg total) by mouth daily. 90 tablet 2     calcium-vitamin D 500 mg(1,250mg) -200 unit per tablet Take 1 tablet by mouth 2 (two) times a day. (600mg calcium + 800 IU Vitamin D)  0     cholecalciferol, vitamin D3, 2,000 unit Tab Take 2,000 Units by mouth daily.       donepezil (ARICEPT) 10 MG tablet Take 1 tablet (10 mg total) by mouth at bedtime. 5 tablet 1     folic acid (FOLVITE) 1 MG tablet TAKE ONE TABLET BY MOUTH EVERY DAY 90 tablet 3     levothyroxine (SYNTHROID, LEVOTHROID) 100 MCG tablet TAKE ONE TABLET BY MOUTH EVERY DAY AT 6:00 A.M. 90 tablet 2     lisinopril (PRINIVIL,ZESTRIL) 10 MG tablet Take 1 tablet (10 mg total) by mouth daily. 90 tablet 3     methotrexate 2.5 MG tablet TAKE THREE TABLETS (7.5MG) BY MOUTH ONCE A WEEK 36 tablet 0     metoprolol succinate (TOPROL-XL) 25 MG Take 1 tablet (25 mg total) by mouth daily. 180 tablet 3     nitroglycerin (NITROSTAT) 0.4 MG SL tablet  Place 1 tablet (0.4 mg total) under the tongue every 5 (five) minutes as needed for chest pain. 25 tablet 0     polyethylene glycol (GLYCOLAX) 17 gram/dose powder Take 17 g by mouth daily as needed.       polyethylene glycol 3350 (MIRALAX ORAL) Take 1 tablet by mouth daily as needed.       VIT C/E/ZN/COPPR/LUTEIN/ZEAXAN (PRESERVISION AREDS 2 ORAL) Take 1 tablet by mouth 2 (two) times a day.       No current facility-administered medications on file prior to visit.        Pmh: reviewed  Psh: reviewed  Allergy:  reviewed      EXAM:    /52 (Patient Site: Left Arm, Patient Position: Sitting, Cuff Size: Adult Regular)   Pulse 94   Temp (!) 96.4  F (35.8  C) (Oral)   Wt 121 lb 3.2 oz (55 kg)   LMP 08/17/1974   BMI 21.47 kg/m    GEN:   ALERT, NAD, ORIENTED TIMES THREE  LUNGS: CTA  COR: RRR WITHOUT MURMUR  SKIN: NO RASH , ULCERS OR LESIONS NOTED  MS: very mild left groin tx to deep palpation but there is no palpable inguinal hernia.  No tx over the lateral hp or trochanter.   Mild left hip discomfort with extremes of rotational movement or abduction.  EXT: WITHOUT EDEMA/SWELLING    Xray of the left hips with MODERATE OSTEOARTHRITIS.   No fracture is seen.

## 2021-06-01 ENCOUNTER — RECORDS - HEALTHEAST (OUTPATIENT)
Dept: ADMINISTRATIVE | Facility: OTHER | Age: 86
End: 2021-06-01

## 2021-06-01 ENCOUNTER — COMMUNICATION - HEALTHEAST (OUTPATIENT)
Dept: FAMILY MEDICINE | Facility: CLINIC | Age: 86
End: 2021-06-01

## 2021-06-01 ENCOUNTER — SURGERY - HEALTHEAST (OUTPATIENT)
Dept: GASTROENTEROLOGY | Facility: HOSPITAL | Age: 86
End: 2021-06-01
Payer: COMMERCIAL

## 2021-06-01 ENCOUNTER — RECORDS - HEALTHEAST (OUTPATIENT)
Dept: ADMINISTRATIVE | Facility: CLINIC | Age: 86
End: 2021-06-01

## 2021-06-01 VITALS — BODY MASS INDEX: 23.13 KG/M2 | WEIGHT: 129.56 LBS

## 2021-06-01 VITALS — BODY MASS INDEX: 22.79 KG/M2 | HEIGHT: 63 IN | WEIGHT: 128.6 LBS

## 2021-06-01 VITALS — BODY MASS INDEX: 22.86 KG/M2 | HEIGHT: 63 IN | WEIGHT: 129 LBS

## 2021-06-01 VITALS — BODY MASS INDEX: 23.11 KG/M2 | HEIGHT: 63 IN | WEIGHT: 130.4 LBS

## 2021-06-01 VITALS — WEIGHT: 130 LBS | BODY MASS INDEX: 23.21 KG/M2

## 2021-06-01 VITALS — BODY MASS INDEX: 23.57 KG/M2 | WEIGHT: 133 LBS | HEIGHT: 63 IN

## 2021-06-01 VITALS — WEIGHT: 129.06 LBS | BODY MASS INDEX: 23.04 KG/M2

## 2021-06-01 DIAGNOSIS — E03.9 HYPOTHYROIDISM: ICD-10-CM

## 2021-06-01 ASSESSMENT — MIFFLIN-ST. JEOR: SCORE: 984.31

## 2021-06-02 ENCOUNTER — RECORDS - HEALTHEAST (OUTPATIENT)
Dept: ADMINISTRATIVE | Facility: OTHER | Age: 86
End: 2021-06-02

## 2021-06-02 ENCOUNTER — COMMUNICATION - HEALTHEAST (OUTPATIENT)
Dept: SCHEDULING | Facility: CLINIC | Age: 86
End: 2021-06-02

## 2021-06-02 VITALS — WEIGHT: 130 LBS | BODY MASS INDEX: 23.04 KG/M2 | HEIGHT: 63 IN

## 2021-06-02 VITALS — BODY MASS INDEX: 22.18 KG/M2 | WEIGHT: 125.2 LBS | HEIGHT: 63 IN

## 2021-06-02 VITALS — BODY MASS INDEX: 22.32 KG/M2 | WEIGHT: 126 LBS | HEIGHT: 63 IN

## 2021-06-02 VITALS — BODY MASS INDEX: 23.03 KG/M2 | WEIGHT: 130 LBS

## 2021-06-02 VITALS — WEIGHT: 129 LBS | BODY MASS INDEX: 22.85 KG/M2

## 2021-06-02 ASSESSMENT — MIFFLIN-ST. JEOR: SCORE: 1017.42

## 2021-06-02 NOTE — PATIENT INSTRUCTIONS - HE
Check your medication list    You should be on Lisinopril 10 mg daily.    This was increased by Dr Wilburn

## 2021-06-02 NOTE — PROGRESS NOTES
Assessment:     1. Essential hypertension     2. Frequent PVCs     3. Hypothyroidism, unspecified type     4. Elevated MCV           Rox Zavala  has a past medical history  has a past medical history of Acute renal failure (H), Aortic valve stenosis, severe, Arthritis, Coronary artery disease (11/17/2014), Coronary artery disease, Disease of thyroid gland, Hammer toe, Hyperlipidemia, Hypertension, Macular disorder, MCI (mild cognitive impairment) (6/16/2019), Pressure injury of buttock, stage 1, unspecified laterality (6/16/2019), Rheumatoid arthritis (H), S/P AVR (12/15/2014), and Sacral insufficiency fracture, initial encounter (5/6/2019)..  I reviewed the notes from walk-in clinic.    Regarding her blood pressure explained to accompanying son regarding the changes made during visit to cardiology.  Patient does appear stable at this time and we will continue to monitor    Noted elevated MCV.  Vitamin B12 has been checked and this was normal.  Thyroid is normal.  Needs to be monitored.     Plan:      The diagnosis was discussed with the patient and evaluation and treatment plans outlined.  Follow up: Return in about 6 months (around 4/10/2020) for Annual physical.     Patient Instructions   Check your medication list    You should be on Lisinopril 10 mg daily.    This was increased by Dr Wilburn    .    Subjective:      Rox Zavala is a  female who presents for evaluation of   Chief Complaint   Patient presents with     Follow-up     Unsure of why this appt was needed     Patient is here for follow-up     Though she is not sure why she is following today, it is noted that she had accelerated hypertension yesterday at a walk-in clinic after lab draw.  This lab draw was for the rheumatologist.  Patient denies any acute concerns.  She feels stable.  She denies any shortness of breath or chest pain..  Accompanying son does inform me that it is always been a challenge getting a good blood pressure for  her.  It takes more than 1 try.    It is not sure as the lisinopril was actually increased as suggested by cardiologist during her visit in August.  Discussed this with the son who was supposed to report back to the cardiologist in 10 days.  He admits that he has possibly slipped on this.    The following portions of the patient's history were reviewed and updated as appropriate: allergies, current medications, past family history, past medical history, past social history, past surgical history and problem list.  No Known Allergies    Current Outpatient Medications on File Prior to Visit   Medication Sig Dispense Refill     aspirin 81 MG EC tablet Take 81 mg by mouth daily.       atorvastatin (LIPITOR) 40 MG tablet Take 1 tablet (40 mg total) by mouth daily. 90 tablet 2     calcium-vitamin D 500 mg(1,250mg) -200 unit per tablet Take 1 tablet by mouth 2 (two) times a day. (600mg calcium + 800 IU Vitamin D)  0     cholecalciferol, vitamin D3, 2,000 unit Tab Take 2,000 Units by mouth daily.       donepezil (ARICEPT) 10 MG tablet Take 1 tablet (10 mg total) by mouth at bedtime. 5 tablet 1     folic acid (FOLVITE) 1 MG tablet TAKE ONE TABLET BY MOUTH EVERY DAY 90 tablet 3     levothyroxine (SYNTHROID, LEVOTHROID) 100 MCG tablet TAKE ONE TABLET BY MOUTH EVERY DAY AT 6:00 A.M. 90 tablet 2     lisinopril (PRINIVIL,ZESTRIL) 10 MG tablet Take 1 tablet (10 mg total) by mouth daily. 90 tablet 3     methotrexate 2.5 MG tablet TAKE THREE TABLETS (7.5MG) BY MOUTH ONCE A WEEK 36 tablet 0     metoprolol succinate (TOPROL-XL) 25 MG Take 1 tablet (25 mg total) by mouth daily. 180 tablet 3     VIT C/E/ZN/COPPR/LUTEIN/ZEAXAN (PRESERVISION AREDS 2 ORAL) Take 1 tablet by mouth 2 (two) times a day.       acetaminophen (TYLENOL) 500 MG tablet Take 1-2 tablets (500-1,000 mg total) by mouth every 4 (four) hours as needed.  0     nitroglycerin (NITROSTAT) 0.4 MG SL tablet Place 1 tablet (0.4 mg total) under the tongue every 5 (five) minutes  "as needed for chest pain. 25 tablet 0     polyethylene glycol 3350 (MIRALAX ORAL) Take 1 tablet by mouth daily as needed.       [DISCONTINUED] polyethylene glycol (GLYCOLAX) 17 gram/dose powder Take 17 g by mouth daily as needed.       No current facility-administered medications on file prior to visit.        Patient Active Problem List   Diagnosis     Hammer Toe     Hemorrhoids     Psoriasis     Generalized Osteoarthritis Of The Hand     Osteoarthritis Of The Knee     Vitamin D Deficiency     Palpitations     Hypothyroidism     Neck Pain     Upper Back Pain (Between Shoulder Blades)     Osteopenia     Coronary artery disease     S/P AVR     Constipation     Acute diastolic CHF (congestive heart failure) (H)     Essential hypertension     Hyperlipidemia     Coronary artery disease involving coronary bypass graft of native heart with other forms of angina pectoris (H)     Psoriatic arthritis (H)     High risk medication use     Thrombocytopenia (H)     Sacral insufficiency fracture, initial encounter     Hypertension     Back pain     Pressure injury of buttock, stage 1, unspecified laterality     MCI (mild cognitive impairment)     Frequent PVCs     Senile osteoporosis       Past Medical History:   Diagnosis Date     Acute renal failure (H)      Aortic valve stenosis, severe      Arthritis      Coronary artery disease 11/17/2014     Coronary artery disease      Disease of thyroid gland      Hammer toe      Hyperlipidemia      Hypertension      Macular disorder     \"wrinkle\"     MCI (mild cognitive impairment) 6/16/2019     Pressure injury of buttock, stage 1, unspecified laterality 6/16/2019     Rheumatoid arthritis (H)      S/P AVR 12/15/2014     Sacral insufficiency fracture, initial encounter 5/6/2019       Past Surgical History:   Procedure Laterality Date     ANGIOPLASTY / STENTING FEMORAL       AORTIC VALVE REPLACEMENT       CARDIAC SURGERY      CABG     EYE SURGERY Bilateral     cataracts     CT LIGATE " FALLOPIAN TUBE      Description: Tubal Ligation;  Recorded: 2008;     SD REMOVAL GALLBLADDER      Description: Cholecystectomy;  Recorded: 2008;     SD TOTAL HIP ARTHROPLASTY Right     Description: Total Hip Replacement;  Recorded: 2008; right       Family History   Problem Relation Age of Onset     Asthma Mother      Coronary artery disease Neg Hx        Social History     Socioeconomic History     Marital status:      Spouse name: None     Number of children: None     Years of education: None     Highest education level: None   Occupational History     None   Social Needs     Financial resource strain: None     Food insecurity:     Worry: None     Inability: None     Transportation needs:     Medical: None     Non-medical: None   Tobacco Use     Smoking status: Former Smoker     Packs/day: 1.00     Years: 30.00     Pack years: 30.00     Types: Cigarettes     Last attempt to quit: 1995     Years since quittin.7     Smokeless tobacco: Never Used   Substance and Sexual Activity     Alcohol use: Yes     Alcohol/week: 1.0 standard drinks     Types: 1 Glasses of wine per week     Drug use: No     Sexual activity: None   Lifestyle     Physical activity:     Days per week: None     Minutes per session: None     Stress: None   Relationships     Social connections:     Talks on phone: None     Gets together: None     Attends Cheondo[employer] service: None     Active member of club or organization: None     Attends meetings of clubs or organizations: None     Relationship status: None     Intimate partner violence:     Fear of current or ex partner: None     Emotionally abused: None     Physically abused: None     Forced sexual activity: None   Other Topics Concern     None   Social History Narrative    , patient notes she and her  were  for 60 years and he passed away 12 years ago following an illness and complications. Patient notes they had a happy marriage.    Children:  Patient notes she had 4 children, one daughter passed away following a MVA when daughter was 57 years old. Leisa states she is close to her two boys, daughter, grandchildren, and great-grandchildren and finds her family supportive.         Lives in a town home independently.       Review of Systems  A 12 point comprehensive review of systems was negative except as noted.       Objective:   /72 (Patient Site: Right Arm, Patient Position: Sitting, Cuff Size: Adult Regular)   Pulse (!) 56   Wt 123 lb 3.2 oz (55.9 kg)   LMP 08/17/1974   SpO2 97%   BMI 21.82 kg/m    Unable to get manual blood pressure.  Pulse also was calculated manually.    General Appearance:alert, oriented and no distress  HEENT Exam: Oropharynx clear without lesion or exudate  Neck:  supple, no adenopathy, thyroid normal  Heart: Normal heart sounds, appreciate bigeminy  Lungs: Normal breath sounds, Clear to auscultation bilateral  Neurological exam: gait normal, alert and oriented X 3  Hem/Lymph/Immuno exam: negative findings:  no cervical nodes, no supraclavicular nodes,

## 2021-06-02 NOTE — PROGRESS NOTES
Rox Zavala, female, 84 y.o., arrived to clinic at 1250 for Reclast infusion with son and granddaughter. Plan of care reviewed with patient and she verbalized understanding. Vital signs stable. PIV inserted with ease into R forearm with great blood return. Reclast administered per MD order and tolerated well.   Rox Zavala discharged to [facility] at 1410 alert, oriented and in stable condition with son and grand-daughter.

## 2021-06-02 NOTE — PROGRESS NOTES
Patient reported feeling dizzy, nauseated and hot flushes while walking out to the lab area after a blood draw.  Patient states that these symptoms last a few minutes.  Noted blood pressure as 200/90 and pulse irregular 27-37. Blood sugar 118.  Patient denies symptoms or concerns at this time.  Patient was advised to be seen in walk in clinic at this time.

## 2021-06-02 NOTE — TELEPHONE ENCOUNTER
Natanael      Pt did labs 10.09, please review. She needs med refills:    Refill: methotrexate 2.5 MG tablet    Please send to: [facility] - [place], [place] - [address]    Patient can be reached @ [phone]

## 2021-06-02 NOTE — PROGRESS NOTES
Walk IN Clinic Note    CC: Hypotension and low heart rate    Assessment/Plan:   84 y.o. old female with lightheadedness and bradycardia after blood draw likely due to vasovagal.  Patient blood pressure and heart rate normalized after she rest.  Patient did take her metoprolol and blood pressure med this morning.  Her EKG showed first-degree heart block with frequent PVC which is unchanged from previous.  Patient reports feeling well and has no other symptoms at this point.  Will discharge patient home.  Patient does have appointment with her primary physician tomorrow.  Instructed patient that at any time she have any palpitation, lightheadedness, shortness of breath, chest pain, she should contact provider immediately or go to the emergency department.    HPI:   Patient will refer to [facility] from lab  Rox COHN Lucas is a 84 y.o. old female with past medical history significant for hypothyroidism, CAD, severe aortic valve stenosis hypertension, and frequent PVCs.  Patient was at lab for blood draw.  After that she report feel lightheadedness.  Blood pressure check and systolic in the 200s.  Blood glucose was checked and report 118.  Patient reports she was in her usual health up until today.  In fact she just returned from a cruise trip.  She denies any recent illnesses, palpitation, chest pain, shortness of breath, edema, nausea or vomiting.  Initial vital signs show heart rate of 29 and blood pressure 182/52    Review of Systems   10-point review of systems negative except as noted above.    Past Medical History  Patient Active Problem List    Diagnosis Date Noted     Senile osteoporosis 09/23/2019     Frequent PVCs 08/21/2019     Pressure injury of buttock, stage 1, unspecified laterality 06/16/2019     MCI (mild cognitive impairment) 06/16/2019     Back pain 05/08/2019     Hypertension 05/07/2019     Sacral insufficiency fracture, initial encounter 05/06/2019     Thrombocytopenia (H) 03/12/2019     Psoriatic  "arthritis (H) 07/11/2017     High risk medication use 07/11/2017     Coronary artery disease involving coronary bypass graft of native heart with other forms of angina pectoris (H)      Essential hypertension 12/08/2015     Hyperlipidemia 12/08/2015     Acute diastolic CHF (congestive heart failure) (H) 12/23/2014     Constipation 12/20/2014     S/P AVR 12/15/2014     Coronary artery disease 11/17/2014     Neck Pain      Upper Back Pain (Between Shoulder Blades)      Osteopenia      Hammer Toe      Hemorrhoids      Psoriasis      Generalized Osteoarthritis Of The Hand      Osteoarthritis Of The Knee      Vitamin D Deficiency      Palpitations      Hypothyroidism        Past Medical History:   Diagnosis Date     Acute renal failure (H)      Aortic valve stenosis, severe      Arthritis      Coronary artery disease 11/17/2014     Coronary artery disease      Disease of thyroid gland      Hammer toe      Hyperlipidemia      Hypertension      Macular disorder     \"wrinkle\"     MCI (mild cognitive impairment) 6/16/2019     Pressure injury of buttock, stage 1, unspecified laterality 6/16/2019     Rheumatoid arthritis (H)      S/P AVR 12/15/2014     Sacral insufficiency fracture, initial encounter 5/6/2019       Medications   Current Outpatient Medications on File Prior to Visit   Medication Sig Dispense Refill     aspirin 81 MG EC tablet Take 81 mg by mouth daily.       atorvastatin (LIPITOR) 40 MG tablet Take 1 tablet (40 mg total) by mouth daily. 90 tablet 2     calcium-vitamin D 500 mg(1,250mg) -200 unit per tablet Take 1 tablet by mouth 2 (two) times a day. (600mg calcium + 800 IU Vitamin D)  0     cholecalciferol, vitamin D3, 2,000 unit Tab Take 2,000 Units by mouth daily.       donepezil (ARICEPT) 10 MG tablet Take 1 tablet (10 mg total) by mouth at bedtime. 5 tablet 1     folic acid (FOLVITE) 1 MG tablet TAKE ONE TABLET BY MOUTH EVERY DAY 90 tablet 3     levothyroxine (SYNTHROID, LEVOTHROID) 100 MCG tablet TAKE ONE " TABLET BY MOUTH EVERY DAY AT 6:00 A.M. 90 tablet 2     lisinopril (PRINIVIL,ZESTRIL) 10 MG tablet Take 1 tablet (10 mg total) by mouth daily. 90 tablet 3     methotrexate 2.5 MG tablet TAKE THREE TABLETS (7.5MG) BY MOUTH ONCE A WEEK 36 tablet 0     metoprolol succinate (TOPROL-XL) 25 MG Take 1 tablet (25 mg total) by mouth daily. 180 tablet 3     nitroglycerin (NITROSTAT) 0.4 MG SL tablet Place 1 tablet (0.4 mg total) under the tongue every 5 (five) minutes as needed for chest pain. 25 tablet 0     polyethylene glycol (GLYCOLAX) 17 gram/dose powder Take 17 g by mouth daily as needed.       polyethylene glycol 3350 (MIRALAX ORAL) Take 1 tablet by mouth daily as needed.       VIT C/E/ZN/COPPR/LUTEIN/ZEAXAN (PRESERVISION AREDS 2 ORAL) Take 1 tablet by mouth 2 (two) times a day.       acetaminophen (TYLENOL) 500 MG tablet Take 1-2 tablets (500-1,000 mg total) by mouth every 4 (four) hours as needed.  0     No current facility-administered medications on file prior to visit.          Physical Exam:  BP (!) 182/52   Pulse 62   Temp 97.3  F (36.3  C) (Oral)   Resp (!) 32   LMP 08/17/1974   SpO2 96%   General appearance: alert, appears stated age and cooperative  Head: Normocephalic, without obvious abnormality, atraumatic  Neck: no adenopathy, no JVD, supple, symmetrical, trachea midline and thyroid not enlarged, symmetric, no tenderness/mass/nodules  Lungs: clear to auscultation bilaterally  Heart: regular rate and rhythm, S1, S2 normal, no murmur, click, rub or gallop  Extremities: extremities normal, atraumatic, no cyanosis or edema

## 2021-06-03 ENCOUNTER — RECORDS - HEALTHEAST (OUTPATIENT)
Dept: ADMINISTRATIVE | Facility: OTHER | Age: 86
End: 2021-06-03

## 2021-06-03 ENCOUNTER — RECORDS - HEALTHEAST (OUTPATIENT)
Dept: ADMINISTRATIVE | Facility: CLINIC | Age: 86
End: 2021-06-03

## 2021-06-03 VITALS — BODY MASS INDEX: 21.61 KG/M2 | WEIGHT: 122 LBS

## 2021-06-03 VITALS — BODY MASS INDEX: 21.47 KG/M2 | WEIGHT: 121.2 LBS

## 2021-06-03 VITALS — HEIGHT: 63 IN | BODY MASS INDEX: 22.68 KG/M2 | WEIGHT: 128 LBS

## 2021-06-03 VITALS
SYSTOLIC BLOOD PRESSURE: 160 MMHG | OXYGEN SATURATION: 97 % | DIASTOLIC BLOOD PRESSURE: 72 MMHG | BODY MASS INDEX: 21.82 KG/M2 | HEART RATE: 56 BPM | WEIGHT: 123.2 LBS

## 2021-06-03 VITALS — HEIGHT: 63 IN | WEIGHT: 119 LBS | BODY MASS INDEX: 21.09 KG/M2

## 2021-06-03 VITALS
HEIGHT: 63 IN | DIASTOLIC BLOOD PRESSURE: 80 MMHG | SYSTOLIC BLOOD PRESSURE: 134 MMHG | HEART RATE: 76 BPM | BODY MASS INDEX: 21.79 KG/M2 | WEIGHT: 123 LBS

## 2021-06-03 VITALS — BODY MASS INDEX: 20.55 KG/M2 | WEIGHT: 116 LBS

## 2021-06-03 VITALS — BODY MASS INDEX: 23.31 KG/M2 | WEIGHT: 131.6 LBS

## 2021-06-03 VITALS — WEIGHT: 125 LBS | HEIGHT: 63 IN | BODY MASS INDEX: 22.15 KG/M2

## 2021-06-03 VITALS — WEIGHT: 129.13 LBS | BODY MASS INDEX: 22.87 KG/M2

## 2021-06-03 VITALS — WEIGHT: 122 LBS | BODY MASS INDEX: 21.61 KG/M2

## 2021-06-03 VITALS — BODY MASS INDEX: 20.91 KG/M2 | WEIGHT: 118 LBS | HEIGHT: 63 IN

## 2021-06-03 VITALS — BODY MASS INDEX: 20.65 KG/M2 | WEIGHT: 116.6 LBS

## 2021-06-03 ASSESSMENT — MIFFLIN-ST. JEOR: SCORE: 1044.63

## 2021-06-03 NOTE — PROGRESS NOTES
ASSESSMENT AND PLAN:  Rox Zavala 84 y.o. female is seen here on 11/05/19 for follow-up of psoriatic arthritis, psoriasis, osteoarthritis, on methotrexate 7-1/2 mg that she takes on Fridays, folic acid, doing great.  Over time as she has reduce the dose of methotrexate there is been no flareup of the inflammatory component.  Her OA is under good control after 3 months ago she had corticosteroid injection into the first extensor tendon sheath.  As well as she is doing we will continue the current regimen.  We will meet here in 5 months, check labs in 2 months and just before her next visit.         Diagnoses and all orders for this visit:    Psoriatic arthritis (H)  -     methotrexate 2.5 MG tablet; TAKE THREE TABLETS (7.5MG) BY MOUTH ONCE A WEEK  Dispense: 24 tablet; Refill: 0    Psoriasis    High risk medication use    Coronary artery disease involving coronary bypass graft of native heart with other forms of angina pectoris (H)    Psoriatic arthropathy (H)  -     folic acid (FOLVITE) 1 MG tablet; Take 1 tablet (1,000 mcg total) by mouth daily.  Dispense: 90 tablet; Refill: 3          HISTORY OF PRESENTING ILLNESS ON 11/05/19 :  Rox Zavala 84 y.o. 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  She is on methotrexate 7.5 mg/week with folic acid.  She has done so well over the past several months.  She has noted mild discomfort in her shoulders, she had a corticosteroid injection in her left hip joint, that proved to be of some help for her.  She has noted no stiffness in the morning.  She had methotrexate toxicity check lab work month ago within acceptable range.  She has recently moved to a senior living place where she is very happy.  She recalls going and she was in wheelchair now she ambulates without help.  Right she has not had fever or weight loss blurry vision eye redness mouth ulcer nausea there is no cough no rash.     "She does not have evidence of autoimmunity.  She does not smoke does not take alcohol.  She had right hip replacement in .Further historical information, including ROS and limitation in activities as noted in the multidimensional health assessment questionnaire scanned in the EMR and in the assessment and plan section.    ALLERGIES:Patient has no known allergies.    PAST MEDICAL/ACTIVE PROBLEMS/MEDICATION/SOCIAL DATA  Past Medical History:   Diagnosis Date     Acute renal failure (H)      Aortic valve stenosis, severe      Arthritis      Coronary artery disease 2014     Coronary artery disease      Disease of thyroid gland      Hammer toe      Hyperlipidemia      Hypertension      Macular disorder     \"wrinkle\"     MCI (mild cognitive impairment) 2019     Pressure injury of buttock, stage 1, unspecified laterality 2019     Rheumatoid arthritis (H)      S/P AVR 12/15/2014     Sacral insufficiency fracture, initial encounter 2019     Social History     Tobacco Use   Smoking Status Former Smoker     Packs/day: 1.00     Years: 30.00     Pack years: 30.00     Types: Cigarettes     Last attempt to quit: 1995     Years since quittin.8   Smokeless Tobacco Never Used     Patient Active Problem List   Diagnosis     Hammer Toe     Hemorrhoids     Psoriasis     Generalized Osteoarthritis Of The Hand     Osteoarthritis Of The Knee     Vitamin D Deficiency     Palpitations     Hypothyroidism     Neck Pain     Upper Back Pain (Between Shoulder Blades)     Osteopenia     Coronary artery disease     S/P AVR     Constipation     Acute diastolic CHF (congestive heart failure) (H)     Essential hypertension     Hyperlipidemia     Coronary artery disease involving coronary bypass graft of native heart with other forms of angina pectoris (H)     Psoriatic arthritis (H)     High risk medication use     Thrombocytopenia (H)     Sacral insufficiency fracture, initial encounter     Hypertension     Back pain " "    Pressure injury of buttock, stage 1, unspecified laterality     MCI (mild cognitive impairment)     Frequent PVCs     Senile osteoporosis     Current Outpatient Medications   Medication Sig Dispense Refill     acetaminophen (TYLENOL) 500 MG tablet Take 1-2 tablets (500-1,000 mg total) by mouth every 4 (four) hours as needed.  0     aspirin 81 MG EC tablet Take 81 mg by mouth daily.       atorvastatin (LIPITOR) 40 MG tablet Take 1 tablet (40 mg total) by mouth daily. 90 tablet 2     calcium-vitamin D 500 mg(1,250mg) -200 unit per tablet Take 1 tablet by mouth 2 (two) times a day. (600mg calcium + 800 IU Vitamin D)  0     cholecalciferol, vitamin D3, 2,000 unit Tab Take 2,000 Units by mouth daily.       donepezil (ARICEPT) 10 MG tablet Take 1 tablet (10 mg total) by mouth at bedtime. 5 tablet 1     folic acid (FOLVITE) 1 MG tablet TAKE ONE TABLET BY MOUTH EVERY DAY 90 tablet 3     levothyroxine (SYNTHROID, LEVOTHROID) 100 MCG tablet TAKE ONE TABLET BY MOUTH EVERY DAY AT 6:00 A.M. 90 tablet 2     lisinopril (PRINIVIL,ZESTRIL) 10 MG tablet Take 1 tablet (10 mg total) by mouth daily. 90 tablet 3     methotrexate 2.5 MG tablet TAKE THREE TABLETS (7.5MG) BY MOUTH ONCE A WEEK 24 tablet 0     metoprolol succinate (TOPROL-XL) 25 MG Take 1 tablet (25 mg total) by mouth daily. 180 tablet 3     nitroglycerin (NITROSTAT) 0.4 MG SL tablet Place 1 tablet (0.4 mg total) under the tongue every 5 (five) minutes as needed for chest pain. 25 tablet 0     polyethylene glycol 3350 (MIRALAX ORAL) Take 1 tablet by mouth daily as needed.       VIT C/E/ZN/COPPR/LUTEIN/ZEAXAN (PRESERVISION AREDS 2 ORAL) Take 1 tablet by mouth 2 (two) times a day.       No current facility-administered medications for this visit.      DETAILED EXAMINATION  11/05/19  :  Vitals:    11/05/19 1516   BP: 134/80   Patient Site: Right Arm   Patient Position: Sitting   Cuff Size: Adult Regular   Pulse: 76   Weight: 123 lb (55.8 kg)   Height: 5' 3\" (1.6 m)     Alert " oriented. Head including the face is examined for malar rash, heliotropes, scarring, lupus pernio. Eyes examined for redness such as in episcleritis/scleritis, periorbital lesions.   Neck/ Face examined for parotid gland swelling, range of motion of neck.  Left upper and lower and right upper and lower extremities examined for tenderness, swelling, warmth of the appendicular joints, range of motion, edema, rash.  Some of the important findings included: There is no synovitis of palpable joints of upper extremities, there is no tenderness along the radial area of both sides of the forearms, she has Heberden's, no JLT effusion warmth of the knees, full range of motion of the shoulders.          LAB / IMAGING DATA:  ALT   Date Value Ref Range Status   10/09/2019 24 0 - 45 U/L Final   06/10/2019 28 0 - 45 U/L Final   05/10/2019 20 0 - 45 U/L Final     Albumin   Date Value Ref Range Status   10/09/2019 4.0 3.5 - 5.0 g/dL Final   06/10/2019 3.7 3.5 - 5.0 g/dL Final   05/10/2019 3.0 (L) 3.5 - 5.0 g/dL Final     Creatinine   Date Value Ref Range Status   10/09/2019 0.89 0.60 - 1.10 mg/dL Final   06/10/2019 0.76 0.60 - 1.10 mg/dL Final   05/15/2019 0.72 0.60 - 1.10 mg/dL Final       WBC   Date Value Ref Range Status   10/09/2019 7.9 4.0 - 11.0 thou/uL Final   08/14/2019 6.8 4.0 - 11.0 thou/uL Final   09/29/2015 6.4 4.0 - 11.0 thou/uL Final   05/07/2015 6.9 4.0 - 11.0 thou/uL Final     Hemoglobin   Date Value Ref Range Status   10/09/2019 13.2 12.0 - 16.0 g/dL Final   08/14/2019 12.2 12.0 - 16.0 g/dL Final   06/10/2019 12.8 12.0 - 16.0 g/dL Final     Platelets   Date Value Ref Range Status   10/09/2019 154 140 - 440 thou/uL Final   08/14/2019 154 140 - 440 thou/uL Final   06/10/2019 243 140 - 440 thou/uL Final       Lab Results   Component Value Date    RF <15 07/17/2013    SEDRATE 20 11/08/2016

## 2021-06-04 NOTE — TELEPHONE ENCOUNTER
Refill Approved    Rx renewed per Medication Renewal Policy. Medication was last renewed on 2/15/2019.    Montrell Elizabeth, [facility] Triage/Med Refill 1/4/2020     Requested Prescriptions   Pending Prescriptions Disp Refills     atorvastatin (LIPITOR) 40 MG tablet 90 tablet 2     Sig: Take 1 tablet (40 mg total) by mouth daily.       Statins Refill Protocol (Hmg CoA Reductase Inhibitors) Passed - 1/4/2020 12:24 PM        Passed - PCP or prescribing provider visit in past 12 months      Last office visit with prescriber/PCP: 10/10/2019 Misbah Barone MD OR same dept: 10/10/2019 Misbah Barone MD OR same specialty: 10/10/2019 Misbah Barone MD  Last physical: 8/21/2018 Last MTM visit: Visit date not found   Next visit within 3 mo: Visit date not found  Next physical within 3 mo: Visit date not found  Prescriber OR PCP: Misbah Barone MD  Last diagnosis associated with med order: 1. Coronary artery disease involving coronary bypass graft of native heart with other forms of angina pectoris (H)  - atorvastatin (LIPITOR) 40 MG tablet; Take 1 tablet (40 mg total) by mouth daily.  Dispense: 90 tablet; Refill: 2    If protocol passes may refill for 12 months if within 3 months of last provider visit (or a total of 15 months).

## 2021-06-05 VITALS — WEIGHT: 119 LBS | HEIGHT: 64 IN | BODY MASS INDEX: 20.32 KG/M2

## 2021-06-05 VITALS — BODY MASS INDEX: 20.32 KG/M2 | WEIGHT: 119 LBS | HEIGHT: 64 IN

## 2021-06-05 NOTE — TELEPHONE ENCOUNTER
Medications ordered for the patient.  Due for  follow-up in April.  Misbah Barone MD  1/8/2020

## 2021-06-05 NOTE — TELEPHONE ENCOUNTER
Medication Request  Medication name:    Disp Refills Start End    donepezil (ARICEPT) 10 MG tablet 5 tablet 1 12/4/2018     Sig - Route: Take 1 tablet (10 mg total) by mouth at bedtime. - Oral    Sent to pharmacy as: donepezil (ARICEPT) 10 MG tablet    E-Prescribing Status: Receipt confirmed by pharmacy (12/4/2018 10:07 AM CST)      Requested Pharmacy: [facility]  Reason for request: refill  When did you use medication last?:  3 weeks ago  Patient offered appointment:  yes, 4/13/2020. Patient stated that she will come in sooner if Misbah Barone MD wants her to.  Okay to leave a detailed message: yes  [phone]      FYI: Patient stated that she's been out for 3 weeks.

## 2021-06-06 ENCOUNTER — RECORDS - HEALTHEAST (OUTPATIENT)
Dept: ADMINISTRATIVE | Facility: OTHER | Age: 86
End: 2021-06-06

## 2021-06-06 ENCOUNTER — SURGERY - HEALTHEAST (OUTPATIENT)
Dept: GASTROENTEROLOGY | Facility: HOSPITAL | Age: 86
End: 2021-06-06
Payer: COMMERCIAL

## 2021-06-06 ASSESSMENT — MIFFLIN-ST. JEOR
SCORE: 1015.15
SCORE: 1015.15

## 2021-06-06 NOTE — PROGRESS NOTES
NEUROPSYCHOLOGICAL CONSULTATION  [facility]      NAME:  Rox Zavala  :  1935    STINSON: 2020    REASON FOR REFERRAL:  Ms. Zavala is a 85 y.o., right-handed,  female with coronary artery disease (s/p stents x2), cerebrovascular disease noted on MRI, aortic stenosis (s/p aortic valve replacement), congestive heart failure, hypertension, hypercholesterolemia, hypothyroidism, macular degeneration, and rheumatoid arthritis. She was previously followed by Sesar Holland MD, in the [facility] for concerns about her memory and word-finding abilities. As part of her diagnostic work-up, she was seen by me for a comprehensive neuropsychological evaluation on 2018. Test results at that time revealed subtle difficulties in several aspects of language processing (e.g., sentence repetition, semantic fluency, and verbal abstract reasoning) along with qualitative observation of significant word-finding difficulty and paraphasic errors in conversation. In addition to language impairments, she also demonstrated learning inefficiencies and a mild retrieval deficit on memory testing. 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 However, Dr. Holland suspected that her cognitive issues were due (at least in part) to an Alzheimer's disease process. In light of Dr. Holland's departure from our clinic, Ms. Zavala's care was transferred to neurologist Dillon Pierce MD. During a recent appointment with Dr. Pierce, the patient expressed concerns about ongoing progressive memory loss and word-finding difficulty. She was subsequently referred for this updated neuropsychological evaluation by Dr. Pierce in order to assist with differential diagnosis and care planning.     Verbal consent for neuropsychological testing was received following the provision of  "information about the nature and purpose of the evaluation, and the opportunity to ask questions. Verbal permission to route a copy of the final report to her primary care provider was also obtained.     HISTORY OF PRESENTING PROBLEM:  The following information was obtained via medical record review and by interview of the patient and her son, Billy.    As noted above, the patient was previously followed by Sesar Holland MD, in the [facility] for concerns about her memory and word-finding abilities. During their initial consult on 8/16/2018, Dr. Holland observed what appeared to be \"intermittent dysfluency with occasional paraphasias, difficulty interpreting grammatically complex statements, and a one-way naming deficit.\" He believed these findings could be consistent with a degree of logopenia and agrammatism. As such, the patient was referred for a brain MRI and a neuropsychological evaluation in order to assist with differential diagnosis and care planning. Per Dr. Holland's review of the MRI dated 8/16/2018 he observed \"mild generalized cortical atrophy with good preservation of medial temporal structures and moderately severe small vessel ischemic white matter changes.\" The patient underwent her initial neuropsychological evaluation with me on 9/5/2018. Test results at that time revealed subtle difficulties in several aspects of language processing (e.g., sentence repetition, semantic fluency, and verbal abstract reasoning) along with qualitative observation of significant word-finding difficulty and paraphasic errors in conversation. In addition to language impairments, she also demonstrated learning inefficiencies and a mild retrieval deficit on memory testing. 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 Dr. Holland agreed that there was an early neurodegenerative disease at play (possibly " "Alzheimer's disease with predominant language impairment) along with contribution of cerebrovascular disease. She was started on Aricept on 10/8/2018.    In light of Dr. Holland's departure from our clinic, Ms. Zavala's care was transferred to neurologist Dillon Pierce MD. During a recent appointment with Dr. Pierce on 1/3/2020, the patient expressed concerns about ongoing progressive memory loss and word-finding difficulty. She was administered a brief cognitive screen during that appointment (MOCA), on which she scored 19/30. This was noted to be a slight improvement from her score of 17/30 in 2018. Her neurologic examination was otherwise unremarkable aside from unsteady gait and left foot drop. An EEG was performed on 1/3/2020 in order to rule out epileptiform causes of cognitive impairment; results of the EEG revealed mild to moderate background dysrhythmia but without epileptiform activity. She was also referred for an updated neuropsychological evaluation in order to monitor her cognitive status and clarify etiology.    Today, Ms. Zavala reported experiencing ongoing word-finding difficulty and memory issues. Specifically, she stated that she forgets names of people she has met in the apartment complex; however, upon further questioning, she only forgets names of people she met just once or acquaintances she has not seen for quite a while. She has no difficulty recalling more familiar people's names. Along with names of people, other occasional word-finding difficulties were endorsed, but she stated that the words eventually come to her. With regard to memory, she stated that she forgets conversations but has no issues with misplacing items or getting lost in familiar places. She also denied any difficulties with language comprehension, reading, writing, attention/concentration, or organization.    Billy largely corroborated his mother's reports, but noted that, \"If anything, she's in a better place now\" " "from a cognitive perspective. He stated that she recently moved into an independent senior living apartment about a year ago (downsizing from a larger town home), so she has less responsibility now and a better daily routine.     With regard to the activities of daily living, Ms. Zavala reported that she remains fully independent. She continues to drive, albeit only to nearby destinations (e.g., the drug store, grocery store, etc.) and only during daylight hours. Recent accidents or traffic citations were denied. Billy noted that he wishes his mother would stop driving in order to prevent any accidents from happening. She continues to independently manage her own medications without issue. She also manages her finances. Her utilities are included in her monthly rent, which has simplified her monthly bill-paying practices. All of her other bills are on automatic payment schedules online, and Billy has access to her accounts to ensure accuracy. Billy noted that she is doing her own taxes again this year through [employer], and appears very well organized in this process. The patient gets most of her meals from the cafeteria, which is also included in her monthly rent payment. She is still able to perform household chores and errands independently.    MEDICAL HISTORY:  Ms. Zavala's medical history is significant for coronary artery disease (s/p stents x2), cerebrovascular disease noted on MRI, aortic stenosis (s/p aortic valve replacement), congestive heart failure, hypertension, hypercholesterolemia, hypothyroidism, macular degeneration, and rheumatoid arthritis. She was also recently diagnosed with peripheral neuropathy. She sustained a mechanical fall in May 2019, which resulted in a sacral fracture. The patient stated that she was essentially \"bedridden\" for the next 5-6 months and received PT and OT for a while thereafter. She also slipped on ice a few weeks ago, but denied any other recent falls. The patient also " denied any history of significant head injury, known seizures, stroke, heart attack, or tremor.     Past Surgical History:   Procedure Laterality Date     ANGIOPLASTY / STENTING FEMORAL       AORTIC VALVE REPLACEMENT       CARDIAC SURGERY      CABG     EYE SURGERY Bilateral     cataracts     OK LIGATE FALLOPIAN TUBE      Description: Tubal Ligation;  Recorded: 03/20/2008;     OK REMOVAL GALLBLADDER      Description: Cholecystectomy;  Recorded: 03/20/2008;     OK TOTAL HIP ARTHROPLASTY Right     Description: Total Hip Replacement;  Recorded: 03/20/2008; right     Diagnostic studies:  Brain MRI dated 8/16/2018 revealed mild generalized parenchymal volume loss without lobar predilection, along with mild to moderate chronic microvascular ischemic changes in the cerebral white matter. Per Dr. Holland's review of these images, he felt that the medial temporal structures were well preserved, and believed that her burden of microvascular ischemic changes was more moderate-to-severe.    Current medications include (per medical record):   Current Outpatient Medications:      acetaminophen (TYLENOL) 500 MG tablet, Take 1-2 tablets (500-1,000 mg total) by mouth every 4 (four) hours as needed., Disp: , Rfl: 0     aspirin 81 MG EC tablet, Take 81 mg by mouth daily., Disp: , Rfl:      atorvastatin (LIPITOR) 40 MG tablet, Take 1 tablet (40 mg total) by mouth daily., Disp: 90 tablet, Rfl: 2     calcium-vitamin D 500 mg(1,250mg) -200 unit per tablet, Take 1 tablet by mouth 2 (two) times a day. (600mg calcium + 800 IU Vitamin D), Disp: , Rfl: 0     cholecalciferol, vitamin D3, 2,000 unit Tab, Take 2,000 Units by mouth daily., Disp: , Rfl:      donepezil (ARICEPT) 10 MG tablet, Take 1 tablet (10 mg total) by mouth at bedtime., Disp: 30 tablet, Rfl: 6     folic acid (FOLVITE) 1 MG tablet, Take 1 tablet (1,000 mcg total) by mouth daily., Disp: 90 tablet, Rfl: 3     levothyroxine (SYNTHROID, LEVOTHROID) 100 MCG tablet, TAKE ONE TABLET BY  "MOUTH EVERY DAY AT 6:00 A.M., Disp: 90 tablet, Rfl: 2     lisinopril (PRINIVIL,ZESTRIL) 10 MG tablet, Take 1 tablet (10 mg total) by mouth daily., Disp: 90 tablet, Rfl: 3     methotrexate 2.5 MG tablet, TAKE THREE TABLETS (7.5MG) BY MOUTH ONCE A WEEK, Disp: 24 tablet, Rfl: 0     metoprolol succinate (TOPROL-XL) 25 MG, Take 1 tablet (25 mg total) by mouth daily., Disp: 180 tablet, Rfl: 3     nitroglycerin (NITROSTAT) 0.4 MG SL tablet, Place 1 tablet (0.4 mg total) under the tongue every 5 (five) minutes as needed for chest pain., Disp: 25 tablet, Rfl: 0     polyethylene glycol 3350 (MIRALAX ORAL), Take 1 tablet by mouth daily as needed., Disp: , Rfl:      VIT C/E/ZN/COPPR/LUTEIN/ZEAXAN (PRESERVISION AREDS 2 ORAL), Take 1 tablet by mouth 2 (two) times a day., Disp: , Rfl: .    FAMILY MEDICAL HISTORY:   Family neurologic history is significant for Parkinson's disease \"or something similar\" in her nephew, with onset in his 70's. He is reportedly \"losing his balance.\" Her brother is an alcoholic. Other relevant family neurologic history, including family history of dementia, was denied. Family medical history is additionally significant for:   Family History   Problem Relation Age of Onset     Asthma Mother      Coronary artery disease Neg Hx      Breast cancer Neg Hx      PSYCHIATRIC HISTORY:  With regard to her psychiatric history, Ms. Zavala denied a history of past psychiatric conditions or mental health treatment. When asked about her current mood, the patient stated, \"Most of the time I'm happy. Sometimes I feel down but not much, and never for more than a couple hours.\" She denied any significant anxiety symptoms. While moving into her apartment was a stressor, she is satisfied with where she lives now. Other stressors were denied. Billy corroborated these reports and denied observing any changes in her mood or personality.     Ms. Zavala reported that her sleep is normal and largely undisturbed, aside from " needing to go to the bathroom 1-2 times per night. She typically falls back to sleep quickly. She estimates that she is typically getting about 7 hours of sleep per night and reported that she feels well rested in the morning. She noted that she feels adequately energized during the day. Known symptoms of REM sleep behavior disorder were denied.    With regard to substance abuse, Ms. Zavala reported that she has been a non-smoker for over 30 years. Other substance abuse history was denied. Currently, she consumes alcohol only rarely. Other current substance use was denied.     SOCIAL HISTORY:  Per my previous report, Ms. Zavala was born and raised in [place], [place]. She dropped out of high school during her senior year after she became pregnant; however, she earned her GED 3-4 years later. She earned mostly Bs and Cs for grades. Significant learning difficulties or developmental attention issues were denied. 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 She retired at age 64 of her own accord. She was  for 55 years until her  passed away about 14 years ago. They had 4 children together, 3 of whom are still living and live relatively nearby. The patient lives alone in an independent senior living apartment in [place], [place], where she has resided over the past 10-12 months. For leisure, she enjoys exercising twice per day, playing cards (e.g., 500), meeting up with friends every week, and reading books every night.      SERVICES:   A clinical interview/neurobehavioral status examination was conducted with the patient and documented. I reviewed my previous neuropsychological report, thoroughly reviewed the medical record, selected the neuropsychological test battery, provided supervision to the trained examiner/technician, interpreted/integrated patient data and test results, and engaged in clinical  decision making, treatment planning and report writing/preparation. I directly administered/scored 2+ of the neuropsychological tests. A trained examiner/technician administered and scored the remainder of the neuropsychological tests (2+ tests).  Please see below for a breakdown of time spent and the associated codes billed for these services.   Services   Time Spent  CPT Codes   Neurobehavioral Status Exam:  (e.g., face-to-face, interpretation, report)   62 minutes   1 x 96116   Neuropsychological Evaluation Services:   (e.g., integration, interpretation, treatment planning, clinical decision making, feedback)   220 minutes   1 x 96132  3 x 96133   Neuropsychological Testing by Psychologist:  (e.g., test administration, scoring, 2+ tests administered)   17 minutes   1 x 96136     Neuropsychological Testing by Trained Examiner/Technician:  (e.g., test administration, scoring, 2+ tests administered)   183 minutes   1 x 96138  5 x 96139     For diagnostic and coding purposes, Ms. Zavala has a history of coronary artery disease (s/p stents x2), cerebrovascular disease noted on MRI, aortic stenosis (s/p aortic valve replacement), congestive heart failure, hypertension, hypercholesterolemia, hypothyroidism, macular degeneration, and rheumatoid arthritis. She was referred for an evaluation of mild neurocognitive disorder.     TESTS ADMINISTERED:   Wechsler Memory Scale-III Information/Orientation, Wechsler Adult Intelligence Scale-IV (select subtests), Wide Range Achievement Test-4 (select subtests), Wechsler Memory Test-IV (select subtests), Brief Visuospatial Memory Test-Revised, [place] Verbal Learning Test-Short Form, Trailmaking Test, Controlled Oral Word Association Test and Category Fluency, [place] Naming Test-Short Form, BDAE Complex Ideational subtest, SAMANO Sentence Repetition subtest, Clock Drawing Test, Yary Jackson Executive Functioning Test (Color Word Interference subtest), Generalized Anxiety Disorder-7  Assessment and Geriatric Depression Scale-Short Form.    BEHAVIORAL OBSERVATIONS:   Ms. Zavala arrived on time and accompanied by her son to today's appointment. She was appropriately dressed and groomed. She appeared alert and engaged. Gait was slow and with a hunched posture. Other motor activity appeared normal. No vision or hearing difficulties were observed. Conversational speech was of normal rate, volume, and prosody. No word-finding pauses or paraphasias were noted. Her thought process appeared linear and goal-directed. No hallucinations or delusions were apparent. Judgment and insight appeared intact. Her mood was euthymic and her affect was appropriately reactive. Rapport was easily established and eye contact was appropriate.     During testing, she was alert throughout. She was pleasant and cooperative throughout the evaluation. She understood test instructions without difficulty. She exhibited some word-finding difficulty and paraphasic errors during testing. No frontal signs were observed behaviorally. Despite occasional frustration during challenging tasks, Ms. Zavala appeared adequately motivated and engaged easily during testing. Her performances were fully intact on embedded and/or stand-alone measures of objective effort. Therefore, the following results are considered a valid estimation of her current cognitive abilities.    OPTIMAL PREMORBID INTELLECT:  Optimal premorbid intellectual abilities were estimated as falling in the average range based on Ms. Zavala's educational and occupational histories and performance on tasks least likely to be affected by acquired brain dysfunction.    SUMMARY OF TEST RESULTS:  ATTENTION/WORKING MEMORY. Performance on a measure sensitive to sustained auditory-verbal attention and concentration (WAIS-IV Digit Span) was in the average range, as she was able to recite up to 6 digits forward (average), up to 2 digits backward (borderline impaired), and up to 4  "digits in sequence (average). On a test of complex concentration that requires speeded numeric sequencing (Trails A), the patient performed in the average range and without error. On a more difficult version of that task that requires speeded alpha-numeric sequencing/cognitive set-shifting (Trails B), performance was in the high average range and without error.     PROCESSING SPEED. Speeded visual scanning/cancellation was in the high average range (WAIS-IV Symbol Search). On the DKEFS Color-Word Interference Test, speeded color naming was high average, and speeded word reading was average.     LANGUAGE PROCESSING. Language comprehension was in the low average range (BDAE Complex Ideational subtest). The WAIS-IV Verbal Comprehension Index was in the average range (pro-rated VCI = 93), with average performance on a subtest of word knowledge (Vocabulary), and average performance on a measure of verbal abstract reasoning (Similarities). The patient demonstrated average performance on the [place] Naming Test, a test of visual confrontation naming and semantic retrieval. Phonemic fluency was in the average range (COWAT), while semantic fluency was mildly impaired (Category Fluency). Sentence repetition was in the \"defective\" range (SAMANO Sentence Repetition). Her writing sample was notable for a misspelling (\"I lik the sun shine\"), but there was no evidence of micrographia.     VISUOSPATIAL/CONSTRUCTIONAL SKILLS. Nonverbal abstract reasoning was in the average range (WAIS-IV Matrix Reasoning). Copy of six simple geometric figures was within normal limits (BVMT-R Copy). On a Clock Drawing Task, the patient was able to draw a well-formed [place]. However, despite being told to write in all of the numbers in the clock face, she only wrote in the 12, 3, 6, and 9 (which were generally in the correct locations around the clock face, although the 6 and 9 fell outside of the clock face). Also, in between those numbers she made tick " "marks instead of writing in the numbers. Her clock hands converged on the 12 instead of in the center of the clock face. The clock hands were differentiated by length but pointed to the incorrect locations when she was asked to make the clock read \"10 minutes passed 11.\"     LEARNING/MEMORY. The patient was fully oriented to personal and current information. On the WMS-IV Logical Memory subtest, immediate memory for paragraph-length stories was mildly impaired, as was her delayed memory of those stories (with a 38% retention rate). She required cues for both stories during the delayed recall trial. Delayed recognition of that same material was average for age. On a 9-item verbal list-learning task (CVLT-II Short Form), the patient acquired up to 6 words (67%) of the word list by the 4th and final learning trial (raw scores over trials = 2, 4, 6, 6). Total learning acquisition was in the low average range; however, she also made 5 intrusion errors across the four learning trials, which is severely impaired. Following a brief 30-second distractor task, the patient recalled 6 items, which was in the average range. After a 10-minute delay, the patient recalled 3 items, which was in the borderline impaired range. Her recall benefited only slightly further from semantic cues (4 items; low average range). Recognition discriminability was in the average range for her age, as she recognized 7/9 items (borderline impaired) and made only 2 false-positive errors (average).     On a visual memory measure (BVMT-R), immediate recall of six simple geometric figures over three learning trials was in the mildly impaired range (raw scores over trials = 1, 2, 3 out of 12). Delayed recall of the figures was in the borderline impaired range, with a 133% retention rate (raw score = 4 out of 12; her drawings were more accurate after the time delay). Recognition discriminability was in the {borderline impaired range, as she correctly " recognized 4/6 figures (borderline impaired) and made 1 false-positive error (low average). Of note, the patients age of 85 is outside the normative data sample age range on this test (the normative data sample only goes up to age 79 years); as such, comparing her scores with a slightly younger age group may have made her performances look worse than they would be if she were compared to others of her same age.      EXECUTIVE FUNCTIONS. On a test of inhibition and cognitive flexibility, (DKEFS Color-Word Interference Inhibition trial), the patient performed in the average range for completion time and made 3 errors, which is in the average range. On a test of complex concentration that requires speeded numeric sequencing (Trails A), the patient performed in the average range and without error. On a more difficult version of that task that requires speeded alpha-numeric sequencing/cognitive set-shifting (Trails B), performance was in the high average range and without error. Verbal and nonverbal abstract reasoning were both average (WAIS-IV Similarities and Matrix Reasoning). Phonemic fluency was average (COWAT). Performance on the Clock Drawing Test was impaired, as she was unable to accurately represent analog time.     MOOD. On a self-report measure of depression (GDS-15), the patient endorsed 1 item. This suggests depressive symptoms in the normal range. On the DULCE MARIA-7, a self-report measure of anxiety, she obtained a score of 0,  placing her in the range of minimal anxiety.    SUMMARY OF FINDINGS:  Ms. Zavala is a 85 y.o., right-handed,  female with coronary artery disease (s/p stents x2), cerebrovascular disease noted on MRI, aortic stenosis (s/p aortic valve replacement), congestive heart failure, hypertension, hypercholesterolemia, hypothyroidism, macular degeneration, and rheumatoid arthritis. She was referred for this updated neuropsychological evaluation by her neurologist Dillon Pierce MD, in  order to monitor her cognitive status and to assist with differential diagnosis and care planning.     Optimal premorbid intellectual abilities are estimated as falling in the average range, and most of Ms. Zavala's performances are generally commensurate with that estimate. However, she exhibits very subtle difficulties on some measures of language processing (i.e., sentence repetition and semantic fluency), along with [id number] impaired verbal and nonverbal learning/memory. In addition, her clock drawing is significantly impaired; however, all other measures of abstract reasoning and visuospatial/constructional abilities are very much intact. Other cognitive domains are also considered to be within normal limits for her age, including attention/concentration, processing speed, and all other language abilities (i.e., confrontation naming, word reading, phonemic fluency, and vocabulary) and all other executive functions (i.e., cognitive inhibition, mental set-shifting, abstract reasoning, and sequencing). Emotionally, the patient endorses very minimal, if any, symptoms of depression or anxiety on self-report measures.    With regard to her current learning/memory performances more specifically, the patient primarily demonstrates a retrieval deficit, as evidenced by impaired free recall that is then aided by prompts/cues on recognition testing. Further, her learning seems to benefit from repetition of information. This pattern of performance on memory testing is most suggestive of prefrontal dysfunction, although very subtle involvement of mesial temporal structures cannot be fully ruled out.    Compared to previous testing with me on 9/5/2018, all of Ms. Zavala's performances have remained largely stable. There is no compelling evidence of decline in any cognitive domain. Her self-reported mood/anxiety symptoms have also remained stable since her previous evaluation.    Overall, these results are most  suggestive of subtle cerebral dysfunction in the prefrontal regions, with possible involvement in the dominant (presumably left) lateral temporal region. Again, there appears to be relative sparing of the mesial temporal structures; however, some extremely subtle involvement cannot be fully ruled out. 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 Given that the patient's cognitive performances have remained quite stable over the intervening 18 months, this may lend more evidence toward a cerebrovascular cause of her cognitive impairments. A neurodegenerative contribution may be slightly less compelling for this same reason, unless the initiation of donepezil in 2017 has effectively staved off any appreciable neurocognitive decline over time. That said, I would not expect donepezil to effectively slow down a Primary Progressive Aphasia process, and the patient's cognitive profile is not fully consistent with a typical Alzheimer's disease process, although this may be the most likely type of dementia if one is indeed present. The patient has no clinical features of a behavioral-variant frontotemporal dementia or synucleinopathy. There is also no evidence for medication side effects given her current regimen. Overall, the patient can be reassured that her subtle cognitive impairments have remained stable over time and she seems to be doing quite well in her current independent living situation.    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 Consider additional biomarker testing (e.g., FDG PET or CSF analysis) to further clarify diagnosis, if appropriate. Ongoing use of Aricept remains appropriate.    2) Continued management of her cerebrovascular risk factors (e.g., blood pressure, cholesterol, etc.)  is encouraged in order to reduce the risk of further cognitive decline.     3) Given her subtle learning and memory retrieval difficulties on testing, increased oversight is recommended while performing complex and/or novel tasks. For example, while she will likely be able to independently sort her medications in a pillbox, having someone double check the pillbox for accuracy is encouraged, along with periodically checking that her medications have been taken throughout the week. Her son currently oversees her financial situation, which remains appropriate. At this time, I have no significant concerns about her driving safety from a cognitive standpoint, particularly if she only drives to familiar locations and during daylight hours. However, should concerns about driving safety arise, she may consider a formal behind-the-wheel driving evaluation through [facility] or [facility]. Her current living situation remains appropriate at this time.    4) Cognitive compensatory strategies will also likely be beneficial, including utilizing note pads, checklists, to-do lists, a calendar, alarm reminders, a GPS, a pillbox, and maintaining a daily routine and an organized living environment.    5) If not already done, completion of paperwork for advance directives and assignment of healthcare and financial Power of  should be considered at this time.    6) Ms. Zavala is encouraged to remain physically, socially, and mentally active.    7) Neuropsychological follow-up is recommended in about 2 years (or as clinically indicated) in order to monitor the patient's cognitive status, help clarify etiology, and update recommendations. The current test data can be used as a baseline to which future comparisons can be made.      Ms. Zavala has requested to receive the results of this evaluation from her referring provider at the time of an upcoming follow-up appointment; however, she was  encouraged to schedule a formal feedback appointment with me after that appointment to discuss these results in further detail. Thank you for allowing me to participate in Ms. Lucas's care. Please contact me with any questions regarding the content of this report.        Mona Vargas PsyD,   Licensed Clinical Neuropsychologist  [facility]  Neuropsychology Section   Phone:  [phone]

## 2021-06-06 NOTE — PROGRESS NOTES
"Rox Zavala is a 85 y.o. female who is being evaluated via a billable telephone visit.      The patient has been notified of following:     \"This telephone visit will be conducted via a call between you and your physician/provider. We have found that certain health care needs can be provided without the need for a physical exam.  This service lets us provide the care you need with a short phone conversation.  If a prescription is necessary we can send it directly to your pharmacy.  If lab work is needed we can place an order for that and you can then stop by our lab to have the test done at a later time.    If during the course of the call the physician/provider feels a telephone visit is not appropriate, you will not be charged for this service.\"     Rox Zavala complains of    Chief Complaint   Patient presents with     Follow-up       I have reviewed and updated the patient's Past Medical History, Social History, Family History and Medication List.    ALLERGIES  Patient has no known allergies.    Joi Finch MA       ASSESSMENT AND PLAN:  Rox Zavala 85 y.o. female is contacted for virtual visit.  She has psoriatic arthritis, osteoarthritis doing great on methotrexate 7-1/2 mg that she takes on Fridays, folic acid hospital stay the course.  For her osteoarthritis I advised against taking nonsteroidals for various reasons including the current signals from [place] with regards to the pandemic.  We will meet here in 3 months.  Labs prior.              Diagnoses and all orders for this visit:    Psoriatic arthritis (H)  -     methotrexate 2.5 MG tablet; Take 3 tablets (7.5 mg total) by mouth once a week. TAKE THREE TABLETS BY MOUTH ONCE A WEEK  Dispense: 24 tablet; Refill: 0    Generalized Osteoarthritis Of The Hand    Osteoarthritis Of The Knee    Psoriasis          HISTORY OF PRESENTING ILLNESS ON 03/18/20 :  Rox Zavala 85 y.o. has a virtual visit today.  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  She is on methotrexate 7.5 mg/week with folic acid.  She has not had a flareup during the past several months..  She has noted mild discomfort in her shoulders, she had a corticosteroid injection in her left hip joint, that proved to be of some help for her.  She has noted no stiffness in the morning.  She had methotrexate toxicity check lab work month ago within acceptable range.  She has recently moved to a senior living place where she is very happy.  She recalls going and she was in wheelchair now she ambulates without help.  Right she has not had fever or weight loss blurry vision eye redness mouth ulcer nausea there is no cough no rash.    She does not have evidence of autoimmunity.  She does not smoke does not take alcohol.  She had right hip replacement in .Further historical information, including ROS and limitation in activities as noted in the multidimensional health assessment questionnaire scanned in the EMR and in the assessment and plan section.    ALLERGIES:Patient has no known allergies.    PAST MEDICAL/ACTIVE PROBLEMS/MEDICATION/SOCIAL DATA  Past Medical History:   Diagnosis Date     Acute renal failure (H)      Aortic valve stenosis, severe      Arthritis      Coronary artery disease 2014     Coronary artery disease      Disease of thyroid gland      Hammer toe      Hyperlipidemia      Hypertension      Macular disorder     \"wrinkle\"     MCI (mild cognitive impairment) 2019     Pressure injury of buttock, stage 1, unspecified laterality 2019     Rheumatoid arthritis (H)      S/P AVR 12/15/2014     Sacral insufficiency fracture, initial encounter 2019     Social History     Tobacco Use   Smoking Status Former Smoker     Packs/day: 1.00     Years: 30.00     Pack years: 30.00     Types: Cigarettes     Last attempt to quit: 1995     Years since quittin.2   Smokeless Tobacco Never Used     Patient " Active Problem List   Diagnosis     Hammer Toe     Hemorrhoids     Psoriasis     Generalized Osteoarthritis Of The Hand     Osteoarthritis Of The Knee     Vitamin D Deficiency     Palpitations     Hypothyroidism     Neck Pain     Upper Back Pain (Between Shoulder Blades)     Osteopenia     Coronary artery disease     S/P AVR     Constipation     Acute diastolic CHF (congestive heart failure) (H)     Essential hypertension     Hyperlipidemia     Coronary artery disease involving coronary bypass graft of native heart with other forms of angina pectoris (H)     Psoriatic arthritis (H)     High risk medication use     Thrombocytopenia (H)     Sacral insufficiency fracture, initial encounter     Hypertension     Back pain     Pressure injury of buttock, stage 1, unspecified laterality     MCI (mild cognitive impairment)     Frequent PVCs     Senile osteoporosis     Current Outpatient Medications   Medication Sig Dispense Refill     acetaminophen (TYLENOL) 500 MG tablet Take 1-2 tablets (500-1,000 mg total) by mouth every 4 (four) hours as needed.  0     aspirin 81 MG EC tablet Take 81 mg by mouth daily.       atorvastatin (LIPITOR) 40 MG tablet Take 1 tablet (40 mg total) by mouth daily. 90 tablet 2     calcium-vitamin D 500 mg(1,250mg) -200 unit per tablet Take 1 tablet by mouth 2 (two) times a day. (600mg calcium + 800 IU Vitamin D)  0     cholecalciferol, vitamin D3, 2,000 unit Tab Take 2,000 Units by mouth daily.       donepezil (ARICEPT) 10 MG tablet Take 1 tablet (10 mg total) by mouth at bedtime. 30 tablet 6     folic acid (FOLVITE) 1 MG tablet Take 1 tablet (1,000 mcg total) by mouth daily. 90 tablet 3     levothyroxine (SYNTHROID, LEVOTHROID) 100 MCG tablet TAKE ONE TABLET BY MOUTH EVERY DAY AT 6:00 A.M. 90 tablet 1     lisinopril (PRINIVIL,ZESTRIL) 10 MG tablet Take 1 tablet (10 mg total) by mouth daily. 90 tablet 3     methotrexate 2.5 MG tablet Take 3 tablets (7.5 mg total) by mouth once a week. TAKE THREE  TABLETS BY MOUTH ONCE A WEEK 24 tablet 0     metoprolol succinate (TOPROL-XL) 25 MG Take 1 tablet (25 mg total) by mouth daily. 180 tablet 3     nitroglycerin (NITROSTAT) 0.4 MG SL tablet Place 1 tablet (0.4 mg total) under the tongue every 5 (five) minutes as needed for chest pain. 25 tablet 0     polyethylene glycol 3350 (MIRALAX ORAL) Take 1 tablet by mouth daily as needed.       VIT C/E/ZN/COPPR/LUTEIN/ZEAXAN (PRESERVISION AREDS 2 ORAL) Take 1 tablet by mouth 2 (two) times a day.       No current facility-administered medications for this visit.           LAB / IMAGING DATA:  ALT   Date Value Ref Range Status   03/03/2020 26 0 - 45 U/L Final   10/09/2019 24 0 - 45 U/L Final   06/10/2019 28 0 - 45 U/L Final     Albumin   Date Value Ref Range Status   03/03/2020 4.0 3.5 - 5.0 g/dL Final   10/09/2019 4.0 3.5 - 5.0 g/dL Final   06/10/2019 3.7 3.5 - 5.0 g/dL Final     Creatinine   Date Value Ref Range Status   03/03/2020 0.94 0.60 - 1.10 mg/dL Final   10/09/2019 0.89 0.60 - 1.10 mg/dL Final   06/10/2019 0.76 0.60 - 1.10 mg/dL Final       WBC   Date Value Ref Range Status   03/03/2020 6.8 4.0 - 11.0 thou/uL Final   10/09/2019 7.9 4.0 - 11.0 thou/uL Final   09/29/2015 6.4 4.0 - 11.0 thou/uL Final   05/07/2015 6.9 4.0 - 11.0 thou/uL Final     Hemoglobin   Date Value Ref Range Status   03/03/2020 13.1 12.0 - 16.0 g/dL Final   10/09/2019 13.2 12.0 - 16.0 g/dL Final   08/14/2019 12.2 12.0 - 16.0 g/dL Final     Platelets   Date Value Ref Range Status   03/03/2020 144 140 - 440 thou/uL Final   10/09/2019 154 140 - 440 thou/uL Final   08/14/2019 154 140 - 440 thou/uL Final       Lab Results   Component Value Date    RF <15 07/17/2013    SEDRATE 20 11/08/2016            Call Started at: 12:52  Call Ended at: 1:04

## 2021-06-06 NOTE — TELEPHONE ENCOUNTER
To reduce the risk of the COVID-19 outbreak pt is offered to reschedule/ or a telephone visit.  Pt states she  would prefer reschedule visit from Dr. Santoyo on Apil14  Pt has no other question at this time.

## 2021-06-06 NOTE — PROGRESS NOTES
The patient was seen for a neuropsychological evaluation for the purposes of diagnostic clarification and treatment planning. 120 minutes of face-to-face testing were provided by this writer. An additional 63 minutes were spent scoring and compiling test results.The patient was cooperative with testing. No concerns were brought to my attention. Please see Dr. Vargas's report for a detailed description of the charges and interpretation and integration of the findings.

## 2021-06-06 NOTE — TELEPHONE ENCOUNTER
Refill Approved    Rx renewed per Medication Renewal Policy. Medication was last renewed on 5/1/19.    Yasemin Black, [facility] Triage/Med Refill 3/3/2020     Requested Prescriptions   Pending Prescriptions Disp Refills     levothyroxine (SYNTHROID, LEVOTHROID) 100 MCG tablet 90 tablet 2     Sig: TAKE ONE TABLET BY MOUTH EVERY DAY AT 6:00 A.M.       Thyroid Hormones Protocol Passed - 3/3/2020 12:12 PM        Passed - Provider visit in past 12 months or next 3 months     Last office visit with prescriber/PCP: 10/10/2019 Misbah Barone MD OR same dept: 10/10/2019 Misbah Barone MD OR same specialty: 10/10/2019 Misbah Barone MD  Last physical: 8/21/2018 Last MTM visit: Visit date not found   Next visit within 3 mo: Visit date not found  Next physical within 3 mo: Visit date not found  Prescriber OR PCP: Misbah Barone MD  Last diagnosis associated with med order: 1. Hypothyroidism  - levothyroxine (SYNTHROID, LEVOTHROID) 100 MCG tablet; TAKE ONE TABLET BY MOUTH EVERY DAY AT 6:00 A.M.  Dispense: 90 tablet; Refill: 2    If protocol passes may refill for 12 months if within 3 months of last provider visit (or a total of 15 months).             Passed - TSH on file in past 12 months for patient age 12 & older     TSH   Date Value Ref Range Status   08/14/2019 2.29 0.30 - 5.00 uIU/mL Final

## 2021-06-07 ENCOUNTER — SURGERY - HEALTHEAST (OUTPATIENT)
Dept: GASTROENTEROLOGY | Facility: HOSPITAL | Age: 86
End: 2021-06-07
Payer: COMMERCIAL

## 2021-06-07 ENCOUNTER — RECORDS - HEALTHEAST (OUTPATIENT)
Dept: ADMINISTRATIVE | Facility: OTHER | Age: 86
End: 2021-06-07

## 2021-06-07 ASSESSMENT — MIFFLIN-ST. JEOR
SCORE: 1008.35
SCORE: 1008.35

## 2021-06-07 NOTE — TELEPHONE ENCOUNTER
[facility] now uses [facility], pts records have been transferred there, they do have pts MTX rx from 4/7/20 for 90 day supply. Pt can fill this next on 4/15/20, this will be automatically refills on 4/15/20 and sent to pt.

## 2021-06-07 NOTE — TELEPHONE ENCOUNTER
Dr Santoyo patient    Please send MTX prescription to her Mail order service.    [facility] - [place], [place] - [address]    Rox @ [phone]

## 2021-06-07 NOTE — PROGRESS NOTES
"Rox Zavala is a 85 y.o. female who is being evaluated via a billable telephone visit.      The patient has been notified of following:     \"This telephone visit will be conducted via a call between you and your physician/provider. We have found that certain health care needs can be provided without the need for a physical exam.  This service lets us provide the care you need with a short phone conversation.  If a prescription is necessary we can send it directly to your pharmacy.  If lab work is needed we can place an order for that and you can then stop by our lab to have the test done at a later time.    Telephone visits are billed at different rates depending on your insurance coverage. During this emergency period, for some insurers they may be billed the same as an in-person visit.  Please reach out to your insurance provider with any questions.    If during the course of the call the physician/provider feels a telephone visit is not appropriate, you will not be charged for this service.\"    Patient has given verbal consent to a Telephone visit? Yes    Patient would like to receive their AVS by AVS Preference: Joselyn.    Additional provider notes:   Sharona Ortega CMA      ASSESSMENT AND PLAN:    Diagnoses and all orders for this visit:    Psoriatic arthritis (H)  -     methotrexate 2.5 MG tablet; Take 3 tablets (7.5 mg total) by mouth once a week.  Dispense: 36 tablet; Refill: 0    Generalized Osteoarthritis Of The Hand          HISTORY OF PRESENTING ILLNESS:  Rox Zavala 85 y.o. is evaluated here via phone/tele health visit.  She has psoriatic arthritis, osteoarthritis, having more pain.  This is in her right thumb, at the base, in the past corticosteroid injections been helpful.  Not been able to get methotrexate.  The thumb pain is more with activity.  There is no swelling.  There is no history of trauma.  She has been taking acetaminophen.  The methotrexate prescribed on 4/7/2020 was not " "dispensed apparently she is having difficulty getting those and wonders if a repeat prescription can be sent.  This was done today.  We discussed the options, she is she feels going to be able to manage over the next near future and return for corticosteroid injection at the first [facility] in the next few weeks and hopefully by then the pandemic would be under better control.  Today we also discussed the issues related to the current pandemic, the pros and cons of the current treatment plan, the CDC guidelines such as social distancing washing the hands covering the cough.  ALLERGIES:Patient has no known allergies.    PAST MEDICAL/ACTIVE PROBLEMS/MEDICATION/SOCIAL DATA  Past Medical History:   Diagnosis Date     Acute renal failure (H)      Aortic valve stenosis, severe      Arthritis      Coronary artery disease 2014     Coronary artery disease      Disease of thyroid gland      Hammer toe      Hyperlipidemia      Hypertension      Macular disorder     \"wrinkle\"     MCI (mild cognitive impairment) 2019     Pressure injury of buttock, stage 1, unspecified laterality 2019     Rheumatoid arthritis (H)      S/P AVR 12/15/2014     Sacral insufficiency fracture, initial encounter 2019     Social History     Tobacco Use   Smoking Status Former Smoker     Packs/day: 1.00     Years: 30.00     Pack years: 30.00     Types: Cigarettes     Last attempt to quit: 1995     Years since quittin.3   Smokeless Tobacco Never Used     Patient Active Problem List   Diagnosis     Hammer Toe     Hemorrhoids     Psoriasis     Generalized Osteoarthritis Of The Hand     Osteoarthritis Of The Knee     Vitamin D Deficiency     Palpitations     Hypothyroidism     Neck Pain     Upper Back Pain (Between Shoulder Blades)     Osteopenia     Coronary artery disease     S/P AVR     Constipation     Acute diastolic CHF (congestive heart failure) (H)     Essential hypertension     Hyperlipidemia     Coronary artery disease " involving coronary bypass graft of native heart with other forms of angina pectoris (H)     Psoriatic arthritis (H)     High risk medication use     Thrombocytopenia (H)     Sacral insufficiency fracture, initial encounter     Hypertension     Back pain     Pressure injury of buttock, stage 1, unspecified laterality     MCI (mild cognitive impairment)     Frequent PVCs     Senile osteoporosis     Current Outpatient Medications   Medication Sig Dispense Refill     aspirin 81 MG EC tablet Take 81 mg by mouth daily.       atorvastatin (LIPITOR) 40 MG tablet Take 1 tablet (40 mg total) by mouth daily. 90 tablet 2     calcium-vitamin D 500 mg(1,250mg) -200 unit per tablet Take 1 tablet by mouth 2 (two) times a day. (600mg calcium + 800 IU Vitamin D)  0     cholecalciferol, vitamin D3, 2,000 unit Tab Take 2,000 Units by mouth daily.       donepezil (ARICEPT) 10 MG tablet Take 1 tablet (10 mg total) by mouth at bedtime. 30 tablet 6     folic acid (FOLVITE) 1 MG tablet Take 1 tablet (1,000 mcg total) by mouth daily. 90 tablet 3     levothyroxine (SYNTHROID, LEVOTHROID) 100 MCG tablet TAKE ONE TABLET BY MOUTH EVERY DAY AT 6:00 A.M. 90 tablet 1     lisinopril (PRINIVIL,ZESTRIL) 10 MG tablet Take 1 tablet (10 mg total) by mouth daily. 90 tablet 3     methotrexate 2.5 MG tablet Take 3 tablets (7.5 mg total) by mouth once a week. 36 tablet 0     metoprolol succinate (TOPROL-XL) 25 MG Take 1 tablet (25 mg total) by mouth daily. 180 tablet 3     nitroglycerin (NITROSTAT) 0.4 MG SL tablet Place 1 tablet (0.4 mg total) under the tongue every 5 (five) minutes as needed for chest pain. 25 tablet 0     polyethylene glycol 3350 (MIRALAX ORAL) Take 1 tablet by mouth daily as needed.       VIT C/E/ZN/COPPR/LUTEIN/ZEAXAN (PRESERVISION AREDS 2 ORAL) Take 1 tablet by mouth 2 (two) times a day.       acetaminophen (TYLENOL) 500 MG tablet Take 1-2 tablets (500-1,000 mg total) by mouth every 4 (four) hours as needed.  0     No current  facility-administered medications for this visit.          EXAMINATION: None, phone visit.      LAB / IMAGING DATA:  ALT   Date Value Ref Range Status   03/03/2020 26 0 - 45 U/L Final   10/09/2019 24 0 - 45 U/L Final   06/10/2019 28 0 - 45 U/L Final     Albumin   Date Value Ref Range Status   03/03/2020 4.0 3.5 - 5.0 g/dL Final   10/09/2019 4.0 3.5 - 5.0 g/dL Final   06/10/2019 3.7 3.5 - 5.0 g/dL Final     Creatinine   Date Value Ref Range Status   03/03/2020 0.94 0.60 - 1.10 mg/dL Final   10/09/2019 0.89 0.60 - 1.10 mg/dL Final   06/10/2019 0.76 0.60 - 1.10 mg/dL Final       WBC   Date Value Ref Range Status   03/03/2020 6.8 4.0 - 11.0 thou/uL Final   10/09/2019 7.9 4.0 - 11.0 thou/uL Final   09/29/2015 6.4 4.0 - 11.0 thou/uL Final   05/07/2015 6.9 4.0 - 11.0 thou/uL Final     Hemoglobin   Date Value Ref Range Status   03/03/2020 13.1 12.0 - 16.0 g/dL Final   10/09/2019 13.2 12.0 - 16.0 g/dL Final   08/14/2019 12.2 12.0 - 16.0 g/dL Final     Platelets   Date Value Ref Range Status   03/03/2020 144 140 - 440 thou/uL Final   10/09/2019 154 140 - 440 thou/uL Final   08/14/2019 154 140 - 440 thou/uL Final       Lab Results   Component Value Date    RF <15 07/17/2013    SEDRATE 20 11/08/2016     Duration of the call:7.20 Minutes   1.10-1.18

## 2021-06-08 ENCOUNTER — COMMUNICATION - HEALTHEAST (OUTPATIENT)
Dept: SCHEDULING | Facility: CLINIC | Age: 86
End: 2021-06-08

## 2021-06-08 ENCOUNTER — RECORDS - HEALTHEAST (OUTPATIENT)
Dept: ADMINISTRATIVE | Facility: OTHER | Age: 86
End: 2021-06-08

## 2021-06-08 ASSESSMENT — MIFFLIN-ST. JEOR
SCORE: 1040.1
SCORE: 1040.1

## 2021-06-08 NOTE — PROGRESS NOTES
"Rox Zavala is a 85 y.o. female who is being evaluated via a billable telephone visit.      The patient has been notified of following:     \"This telephone visit will be conducted via a call between you and your physician/provider. We have found that certain health care needs can be provided without the need for a physical exam.  This service lets us provide the care you need with a short phone conversation.  If a prescription is necessary we can send it directly to your pharmacy.  If lab work is needed we can place an order for that and you can then stop by our lab to have the test done at a later time.    Telephone visits are billed at different rates depending on your insurance coverage. During this emergency period, for some insurers they may be billed the same as an in-person visit.  Please reach out to your insurance provider with any questions.    If during the course of the call the physician/provider feels a telephone visit is not appropriate, you will not be charged for this service.\"    Patient has given verbal consent to a Telephone visit? Yes    What phone number would you like to be contacted at? [phone]    Patient would like to receive their AVS by AVS Preference: Joselyn.      ASSESSMENT AND PLAN:    Diagnoses and all orders for this visit:    Psoriatic arthritis (H)  -     methotrexate 2.5 MG tablet; Take 3 tablets (7.5 mg total) by mouth once a week.  Dispense: 36 tablet; Refill: 0    Osteoarthritis Of The Knee    Generalized Osteoarthritis Of The Hand    Psoriasis    High risk medication use    Psoriatic arthropathy (H)  -     folic acid (FOLVITE) 1 MG tablet; Take 1 tablet (1,000 mcg total) by mouth daily.  Dispense: 90 tablet; Refill: 3          HISTORY OF PRESENTING ILLNESS:  Rox Zavala 85 y.o. is evaluated here via phone link.  She has psoriatic arthritis.  She is on methotrexate, folic acid.  She has noted pain.  This is in her thumb.  Is in the left side.  This is worse " "with activity.  In the past corticosteroid injections have been helpful.  There is no swelling.  She has not had ocular symptoms.  No other joint areas are bothersome.  She is due for labs to be done tomorrow.  We discussed various options.  I have suggested that she watches how the thumb pain evolves, fortunately so far she is not functionally impaired in any significant way, she does not sound like that she has triggering of the thumb either.  It did not sound from her description that she has dactylitis.  We can plan to meet here in 4 weeks and if by then she still has pain that will allow us to do a further assessment and intervention as needed.       ROS enquiry held for fever, ocular symptoms, rash, headache,  GI issues.  Today we also discussed the issues related to the current pandemic, the pros and cons of the current treatment plan, the CDC guidelines such as social distancing washing the hands covering the cough.  ALLERGIES:Patient has no known allergies.    PAST MEDICAL/ACTIVE PROBLEMS/MEDICATION/SOCIAL DATA  Past Medical History:   Diagnosis Date     Acute renal failure (H)      Aortic valve stenosis, severe      Arthritis      Coronary artery disease 2014     Coronary artery disease      Disease of thyroid gland      Hammer toe      Hyperlipidemia      Hypertension      Macular disorder     \"wrinkle\"     MCI (mild cognitive impairment) 2019     Pressure injury of buttock, stage 1, unspecified laterality 2019     Rheumatoid arthritis (H)      S/P AVR 12/15/2014     Sacral insufficiency fracture, initial encounter 2019     Social History     Tobacco Use   Smoking Status Former Smoker     Packs/day: 1.00     Years: 30.00     Pack years: 30.00     Types: Cigarettes     Last attempt to quit: 1995     Years since quittin.4   Smokeless Tobacco Never Used     Patient Active Problem List   Diagnosis     Hammer Toe     Hemorrhoids     Psoriasis     Generalized Osteoarthritis Of The " Hand     Osteoarthritis Of The Knee     Vitamin D Deficiency     Palpitations     Hypothyroidism     Neck Pain     Upper Back Pain (Between Shoulder Blades)     Osteopenia     Coronary artery disease     S/P AVR     Constipation     Acute diastolic CHF (congestive heart failure) (H)     Essential hypertension     Hyperlipidemia     Coronary artery disease involving coronary bypass graft of native heart with other forms of angina pectoris (H)     Psoriatic arthritis (H)     High risk medication use     Thrombocytopenia (H)     Sacral insufficiency fracture, initial encounter     Hypertension     Back pain     Pressure injury of buttock, stage 1, unspecified laterality     MCI (mild cognitive impairment)     Frequent PVCs     Senile osteoporosis     Current Outpatient Medications   Medication Sig Dispense Refill     acetaminophen (TYLENOL) 500 MG tablet Take 1-2 tablets (500-1,000 mg total) by mouth every 4 (four) hours as needed.  0     aspirin 81 MG EC tablet Take 81 mg by mouth daily.       atorvastatin (LIPITOR) 40 MG tablet Take 1 tablet (40 mg total) by mouth daily. 90 tablet 2     calcium-vitamin D 500 mg(1,250mg) -200 unit per tablet Take 1 tablet by mouth 2 (two) times a day. (600mg calcium + 800 IU Vitamin D)  0     cholecalciferol, vitamin D3, 2,000 unit Tab Take 2,000 Units by mouth daily.       donepezil (ARICEPT) 10 MG tablet Take 1 tablet (10 mg total) by mouth at bedtime. 30 tablet 6     folic acid (FOLVITE) 1 MG tablet Take 1 tablet (1,000 mcg total) by mouth daily. 90 tablet 3     levothyroxine (SYNTHROID, LEVOTHROID) 100 MCG tablet TAKE ONE TABLET BY MOUTH EVERY DAY AT 6:00 A.M. 90 tablet 1     lisinopril (PRINIVIL,ZESTRIL) 10 MG tablet Take 1 tablet (10 mg total) by mouth daily. 90 tablet 3     methotrexate 2.5 MG tablet Take 3 tablets (7.5 mg total) by mouth once a week. 36 tablet 0     metoprolol succinate (TOPROL-XL) 25 MG Take 1 tablet (25 mg total) by mouth daily. 180 tablet 3      nitroglycerin (NITROSTAT) 0.4 MG SL tablet Place 1 tablet (0.4 mg total) under the tongue every 5 (five) minutes as needed for chest pain. 25 tablet 0     polyethylene glycol 3350 (MIRALAX ORAL) Take 1 tablet by mouth daily as needed.       VIT C/E/ZN/COPPR/LUTEIN/ZEAXAN (PRESERVISION AREDS 2 ORAL) Take 1 tablet by mouth 2 (two) times a day.       No current facility-administered medications for this visit.          EXAMINATION: She appears to be alert and oriented, her speech was fluent, she did not sound like she was in pain neither she was sounding breathless.    LAB / IMAGING DATA:  ALT   Date Value Ref Range Status   03/03/2020 26 0 - 45 U/L Final   10/09/2019 24 0 - 45 U/L Final   06/10/2019 28 0 - 45 U/L Final     Albumin   Date Value Ref Range Status   03/03/2020 4.0 3.5 - 5.0 g/dL Final   10/09/2019 4.0 3.5 - 5.0 g/dL Final   06/10/2019 3.7 3.5 - 5.0 g/dL Final     Creatinine   Date Value Ref Range Status   03/03/2020 0.94 0.60 - 1.10 mg/dL Final   10/09/2019 0.89 0.60 - 1.10 mg/dL Final   06/10/2019 0.76 0.60 - 1.10 mg/dL Final       WBC   Date Value Ref Range Status   03/03/2020 6.8 4.0 - 11.0 thou/uL Final   10/09/2019 7.9 4.0 - 11.0 thou/uL Final   09/29/2015 6.4 4.0 - 11.0 thou/uL Final   05/07/2015 6.9 4.0 - 11.0 thou/uL Final     Hemoglobin   Date Value Ref Range Status   03/03/2020 13.1 12.0 - 16.0 g/dL Final   10/09/2019 13.2 12.0 - 16.0 g/dL Final   08/14/2019 12.2 12.0 - 16.0 g/dL Final     Platelets   Date Value Ref Range Status   03/03/2020 144 140 - 440 thou/uL Final   10/09/2019 154 140 - 440 thou/uL Final   08/14/2019 154 140 - 440 thou/uL Final       Lab Results   Component Value Date    RF <15 07/17/2013    SEDRATE 20 11/08/2016     Duration of the call:7  Minutes  Call start: 1251  pm  Call end:   138pm  Multiple calls due to poor connection

## 2021-06-08 NOTE — PROGRESS NOTES
"Rox Zavala is a 85 y.o. female who is being evaluated via a billable telephone visit.      The patient has been notified of following:     \"This telephone visit will be conducted via a call between you and your physician/provider. We have found that certain health care needs can be provided without the need for a physical exam.  This service lets us provide the care you need with a short phone conversation.  If a prescription is necessary we can send it directly to your pharmacy.  If lab work is needed we can place an order for that and you can then stop by our lab to have the test done at a later time.    Telephone visits are billed at different rates depending on your insurance coverage. During this emergency period, for some insurers they may be billed the same as an in-person visit.  Please reach out to your insurance provider with any questions.    If during the course of the call the physician/provider feels a telephone visit is not appropriate, you will not be charged for this service.\"    Patient has given verbal consent to a Telephone visit? Yes    What phone number would you like to be contacted at? [phone]    Patient would like to receive their AVS by AVS Preference: Mail a copy.    Additional provider notes:     Chief Complaint   Patient presents with     Allergies     Pt would like to discuss allergy Sx- Sx include excessive nasal drainage, eyes watering, occasional cough        Subjective:    Rox Zavala is a 85 y.o. female who presents for concerns of excessive nasal drainage and eye watering and occasional cough.  She informs me that this started since last June when she moved into the facility.  She resides at the [facility] in [place].  It is an independent senior living.  She informs me that she never had these allergies when she was staying at her home.  She does admit that the entire building is carpeted.  She denies any fevers, shortness of breath.  She does have cough " that she thinks is from the postnasal drip.    Regarding her blood pressure, she checks it once in a while and it is usually in the range of 126/60 to 70 mmHg.  She was seen by cardiology last year and was supposed to have a follow-up but she has not been there yet.  She informs me that she is able to walk about 6 blocks but then has to slow down and take a break.  She denies any chest pain.  No nausea/vomiting.  She has a good appetite.    Problem List, Past Medical History, Social History, Family History, Medications, and Allergies reviewed in [facility].      Review of Systems -  Review of Systems - General ROS: negative  Cardiovascular ROS: negative  Gastrointestinal ROS: negative        Objective:     LMP 08/17/1974     PSYCH: Mentation appears normal, affect normal/bright, judgement and insight intact, normal speech      Assessment:    1. Rhinitis, unspecified type  fluticasone propionate (FLONASE) 50 mcg/actuation nasal spray   2. MCI (mild cognitive impairment)  donepeziL (ARICEPT) 10 MG tablet   3. Acute diastolic CHF (congestive heart failure) (H)     4. Essential hypertension       Her blood pressure and cognitive impairment seems to be stable.  Best practice advisory mentions CHF.  I did remind patient to schedule follow-up with cardiology.  For her rhinitis, possible allergies to dust mite since her symptoms started after moving in the new facility.  We will start with a trial of Flonase.  Discussed with the patient to have her apartment steam clean.  We can further try antihistamine if there is no benefit noted.    Plan:  Follow-up in 1 month  Follow up if symptoms are not improving or worsening.    See orders in [facility].      Phone call duration:  14 minutes    Misbah Barone MD

## 2021-06-09 ENCOUNTER — RECORDS - HEALTHEAST (OUTPATIENT)
Dept: ADMINISTRATIVE | Facility: OTHER | Age: 86
End: 2021-06-09

## 2021-06-09 ASSESSMENT — MIFFLIN-ST. JEOR
SCORE: 1037.83
SCORE: 1034.66
SCORE: 1034.66
SCORE: 1037.83

## 2021-06-09 NOTE — TELEPHONE ENCOUNTER
Medication Question or Clarification  Who is calling: Patient  What medication are you calling about (include dose and sig)?:   Disp  Refills  Start  End      donepeziL (ARICEPT) 10 MG tablet  30 tablet  6  6/16/2020      Sig - Route: Take 1 tablet (10 mg total) by mouth at bedtime. - Oral     Sent to pharmacy as: donepeziL 10 mg tablet (Aricept)     E-Prescribing Status: Receipt confirmed by pharmacy (6/16/2020  2:15 PM CDT)       Who prescribed the medication?: Misbah Barone MD   What is your question/concern?: Patient stated she is having issues with [facility]. Patient stated they told her Misbah Barone MD has to call [facility] before they will mail her medication to her. Patient stated to call [facility] at [phone]. Please let the patient know of the outcome with a call.  Requested Pharmacy: [facility]  Okay to leave a detailed message?: No

## 2021-06-09 NOTE — TELEPHONE ENCOUNTER
Left message for the pharmacy, asked what information is needed from Dr. Barone to have this medication sent out to patient if any.

## 2021-06-09 NOTE — TELEPHONE ENCOUNTER
90-day prescription with additional 3 refills has been sent to the pharmacy    Misbah Barone MD  6/25/2020

## 2021-06-09 NOTE — PATIENT INSTRUCTIONS - HE
I have seen you today for fatigue.  Blood work is being done for CBC and blood count, liver and kidney function test and thyroid testing     Urine exam is being done for incontinence to see if there is any urine infection going on.    You do have lower heart rate that can be from metoprolol that can make you feel fatigued.  I have reviewed the cardiologist note and he recommends continuing taking current dose.      A follow-up in 6 months was suggested.  He last saw him in June.  To schedule a follow-up with Dr. Wilburn.    I will see you back in 1 month to see how you are doing.    I would recommend keeping a daily journal of your medications and symptoms and that will help us.

## 2021-06-09 NOTE — TELEPHONE ENCOUNTER
Who is calling:  Rox  Reason for Call:  She wants Dr. Barone to send a 90 day supply of Aricept and additional refills.  Date of last appointment with primary care: 6/16/20  Okay to leave a detailed message: Yes

## 2021-06-09 NOTE — PROGRESS NOTES
I spoke with patient she wants to get in as soon as possible as she says she has not improved. I scheduled her for 07/02/20 at 11:20AM with Dr Barone.

## 2021-06-09 NOTE — TELEPHONE ENCOUNTER
FYI - Status Update  Who is Calling: Patient  Update: Patient wants this medication ordered for 90 days and refills.  Okay to leave a detailed message?:  No return call needed

## 2021-06-10 NOTE — TELEPHONE ENCOUNTER
Refill Approved    Rx renewed per Medication Renewal Policy. Medication was last renewed on 3/3/20.    Yasemin Black, [facility] Triage/Med Refill 8/25/2020     Requested Prescriptions   Pending Prescriptions Disp Refills     levothyroxine (SYNTHROID, LEVOTHROID) 100 MCG tablet 90 tablet 1     Sig: TAKE ONE TABLET BY MOUTH EVERY DAY AT 6:00 A.M.       Thyroid Hormones Protocol Passed - 8/25/2020  8:04 AM        Passed - Provider visit in past 12 months or next 3 months     Last office visit with prescriber/PCP: 7/2/2020 Misbah Barone MD OR same dept: 7/2/2020 Misbah Barone MD OR same specialty: 7/2/2020 Misbah Barone MD  Last physical: 8/21/2018 Last MTM visit: Visit date not found   Next visit within 3 mo: Visit date not found  Next physical within 3 mo: Visit date not found  Prescriber OR PCP: Misbah Barone MD  Last diagnosis associated with med order: 1. Hypothyroidism  - levothyroxine (SYNTHROID, LEVOTHROID) 100 MCG tablet; TAKE ONE TABLET BY MOUTH EVERY DAY AT 6:00 A.M.  Dispense: 90 tablet; Refill: 1    If protocol passes may refill for 12 months if within 3 months of last provider visit (or a total of 15 months).             Passed - TSH on file in past 12 months for patient age 12 & older     TSH   Date Value Ref Range Status   07/02/2020 0.95 0.30 - 5.00 uIU/mL Final

## 2021-06-10 NOTE — TELEPHONE ENCOUNTER
----- Message from Ivis Francois sent at 8/10/2020 12:19 PM CDT -----  Patient has already contacted their pharmacy. The medication or refill issue is below:    Primary Cardiologist: DR HUMPHREYS  Medication: LISINOPRIL  Issue / Concern: REFILL, NEW RX  Preferred Pharmacy/City: [facility] MAIL ORDER, [facility]?  Best Phone Number for Patient: [phone]    Additional Info:        -------------------------------------------------------------  Refill sent.  -[email or web address]

## 2021-06-10 NOTE — TELEPHONE ENCOUNTER
FYI - Status Update  Who is Calling: Patient  Update: The pharmacist will not fill this medication without Misbah Barone MD approval. I am almost out of this medication and request this to be entered ASAP.   Okay to leave a detailed message?:  Yes

## 2021-06-10 NOTE — PROGRESS NOTES
"ASSESSMENT AND PLAN:  Rox Zavala 85 y.o. female is seen here on 08/19/20 for evaluation of left hand pain likely secondary to osteoarthritis of the first CMC, discussed differential, she wants proceed local injection not interested in surgical option at this time 20 mg of Kenalog injected ultrasound guidance of the left first CMC, after pros and cons were reviewed.  Follow-up for this as needed.  Diagnoses and all orders for this visit:    Generalized Osteoarthritis Of The Hand  -     triamcinolone acetonide 40 mg/mL injection 20 mg (KENALOG-40)    Psoriatic arthritis (H)          HISTORY OF PRESENTING ILLNESS ON 08/19/20 :  Rox Zavala 85 y.o. is here for a moderately severe flare up of pain. Here for a moderately severe flare up of pain.  Joints affected include Left first CMC area. This has gone on for several weeks. Pain is described as sharp. It is worse with activity at times bedtime..  Her symptoms are moderately severe. The symptoms are progressive.  Associated findings  /do not include: swelling, rash.  There is no associated recent fall or trauma.  Over-the-counter treatment to date has been with significant relief.    Further historical information, including ROS and limitation in activities as noted in the multidimensional health assessment questionnaire scanned in the EMR and in the assessment and plan section.    ALLERGIES:Patient has no known allergies.    PAST MEDICAL/ACTIVE PROBLEMS/MEDICATION/SOCIAL DATA  Past Medical History:   Diagnosis Date     Acute renal failure (H)      Aortic valve stenosis, severe      Arthritis      Coronary artery disease 11/17/2014     Coronary artery disease      Disease of thyroid gland      Hammer toe      Hyperlipidemia      Hypertension      Macular disorder     \"wrinkle\"     MCI (mild cognitive impairment) 6/16/2019     Pressure injury of buttock, stage 1, unspecified laterality 6/16/2019     Rheumatoid arthritis (H)      S/P AVR 12/15/2014     Sacral " insufficiency fracture, initial encounter 2019     Social History     Tobacco Use   Smoking Status Former Smoker     Packs/day: 1.00     Years: 30.00     Pack years: 30.00     Types: Cigarettes     Last attempt to quit: 1995     Years since quittin.6   Smokeless Tobacco Never Used     Patient Active Problem List   Diagnosis     Hammer Toe     Hemorrhoids     Psoriasis     Generalized Osteoarthritis Of The Hand     Osteoarthritis Of The Knee     Vitamin D Deficiency     Palpitations     Hypothyroidism     Neck Pain     Upper Back Pain (Between Shoulder Blades)     Osteopenia     Coronary artery disease     S/P AVR     Constipation     Acute diastolic CHF (congestive heart failure) (H)     Essential hypertension     Hyperlipidemia     Coronary artery disease involving coronary bypass graft of native heart with other forms of angina pectoris (H)     Psoriatic arthritis (H)     High risk medication use     Sacral insufficiency fracture, initial encounter     Hypertension     Back pain     Pressure injury of buttock, stage 1, unspecified laterality     MCI (mild cognitive impairment)     Frequent PVCs     Senile osteoporosis     Current Outpatient Medications   Medication Sig Dispense Refill     acetaminophen (TYLENOL) 500 MG tablet Take 1-2 tablets (500-1,000 mg total) by mouth every 4 (four) hours as needed.  0     aspirin 81 MG EC tablet Take 81 mg by mouth daily.       atorvastatin (LIPITOR) 40 MG tablet Take 1 tablet (40 mg total) by mouth daily. 90 tablet 2     calcium-vitamin D 500 mg(1,250mg) -200 unit per tablet Take 1 tablet by mouth 2 (two) times a day. (600mg calcium + 800 IU Vitamin D)  0     cholecalciferol, vitamin D3, 2,000 unit Tab Take 2,000 Units by mouth daily.       donepeziL (ARICEPT) 10 MG tablet Take 1 tablet (10 mg total) by mouth at bedtime. 90 tablet 3     fluticasone propionate (FLONASE) 50 mcg/actuation nasal spray 2 sprays into each nostril daily. 16 g 12     folic acid  (FOLVITE) 1 MG tablet Take 1 tablet (1,000 mcg total) by mouth daily. 90 tablet 3     levothyroxine (SYNTHROID, LEVOTHROID) 100 MCG tablet TAKE ONE TABLET BY MOUTH EVERY DAY AT 6:00 A.M. 90 tablet 1     lisinopriL (PRINIVIL,ZESTRIL) 10 MG tablet Take 1 tablet (10 mg total) by mouth daily. 90 tablet 1     methotrexate 2.5 MG tablet Take 3 tablets (7.5 mg total) by mouth once a week. 36 tablet 0     metoprolol succinate (TOPROL-XL) 25 MG Take 1 tablet (25 mg total) by mouth daily. 180 tablet 1     nitroglycerin (NITROSTAT) 0.4 MG SL tablet Place 1 tablet (0.4 mg total) under the tongue every 5 (five) minutes as needed for chest pain. 25 tablet 0     polyethylene glycol 3350 (MIRALAX ORAL) Take 1 tablet by mouth daily as needed.       VIT C/E/ZN/COPPR/LUTEIN/ZEAXAN (PRESERVISION AREDS 2 ORAL) Take 1 tablet by mouth 2 (two) times a day.       No current facility-administered medications for this visit.          DETAILED EXAMINATION  08/19/20  :  Vitals:    08/19/20 1227   BP: 144/76   Weight: 121 lb 14.4 oz (55.3 kg)     Alert oriented. Head including the face is examined for malar rash, heliotropes, scarring, lupus pernio. Eyes examined for redness such as in episcleritis/scleritis, periorbital lesions.   Neck/ Face examined for parotid gland swelling, range of motion of neck.  Left upper and lower and right upper and lower extremities examined for tenderness, swelling, warmth of the appendicular joints, range of motion, edema, rash.  Some of the important findings included: Crepitation of the left first CMC with mild grinding associated with pain.  No synovitis of the wrist.  Finkelstein negative.           LAB / IMAGING DATA:  ALT   Date Value Ref Range Status   08/14/2020 30 0 - 45 U/L Final   07/02/2020 28 0 - 45 U/L Final   06/17/2020 25 0 - 45 U/L Final     Albumin   Date Value Ref Range Status   08/14/2020 4.0 3.5 - 5.0 g/dL Final   07/02/2020 4.2 3.5 - 5.0 g/dL Final   06/17/2020 4.0 3.5 - 5.0 g/dL Final      Creatinine   Date Value Ref Range Status   08/14/2020 0.90 0.60 - 1.10 mg/dL Final   07/02/2020 1.11 (H) 0.60 - 1.10 mg/dL Final   06/17/2020 0.94 0.60 - 1.10 mg/dL Final       WBC   Date Value Ref Range Status   08/14/2020 7.4 4.0 - 11.0 thou/uL Final   07/02/2020 7.7 4.0 - 11.0 thou/uL Final   09/29/2015 6.4 4.0 - 11.0 thou/uL Final   05/07/2015 6.9 4.0 - 11.0 thou/uL Final     Hemoglobin   Date Value Ref Range Status   08/14/2020 12.9 12.0 - 16.0 g/dL Final   07/02/2020 13.5 12.0 - 16.0 g/dL Final   06/17/2020 13.3 12.0 - 16.0 g/dL Final     Platelets   Date Value Ref Range Status   08/14/2020 128 (L) 140 - 440 thou/uL Final   07/02/2020 143 140 - 440 thou/uL Final   06/17/2020 153 140 - 440 thou/uL Final       Lab Results   Component Value Date    RF <15 07/17/2013    SEDRATE 20 11/08/2016

## 2021-06-11 NOTE — PROGRESS NOTES
Assessment & Plan:  1. Fatigue  No signs of infection on initial hemogram, but does show some slight anemia.  Encouraged patient to do an iron supplement and will await the rest of her lab results.  Rest of her exam is normal and so we will follow for results and see how beneficial.  Supplementation is to her.  She had previously had problems with low iron according to herself and recently has discontinued many of her multivitamins.  We will make sure her thyroid function is also normal once lab work is returned.  - Basic Metabolic Panel  - Thyroid Stimulating Hormone (TSH)  - T3 (Triiodothyronine), Free  - T4, Free  - Vitamin D, Total (25-Hydroxy)  - Vitamin B12  - HM2(CBC w/o Differential)      There are no Patient Instructions on file for this visit.    Orders Placed This Encounter   Procedures     Basic Metabolic Panel     Thyroid Stimulating Hormone (TSH)     T3 (Triiodothyronine), Free     T4, Free     Vitamin D, Total (25-Hydroxy)     Vitamin B12     HM2(CBC w/o Differential)     There are no discontinued medications.            Chief Complaint:   Chief Complaint   Patient presents with     Fatigue     c/o feeling tired all the time x 2 wks       History of Present Illness:  Rox is a 82 y.o. female presenting to the clinic today with fatigue and tiredness for 2 weeks.  Ally is a quite active 82-year-old who has many chronic medical conditions but also tries to stay strong and active by exercising on a daily basis at [employer].  Over the past 2 weeks she has continued to do her usual daily exercise but when she is home she has a great lack of energy.  She has not felt otherwise ill she has not been exposed to illness per her knowledge.  No fevers or chills, no shortness of breath or cough.  No changes in  urination.  She had said some anemia in the past, she does have history of COPD.  She follows closely with cardiology due to heart failure and bypass surgery in the past.  Also history of rheumatoid  arthritis and thyroid issue.  Her medications have been stable for some time but lab work has not been checked in at least a year for her thyroid.    Review of Systems:  All other systems are negative except as noted above.    PFSH:  Reviewed and updated.    Tobacco Use:  History   Smoking Status     Former Smoker     Packs/day: 1.00     Years: 30.00     Types: Cigarettes     Quit date: 1/1/1995   Smokeless Tobacco     Never Used       Vitals:  Vitals:    06/02/17 1123   BP: 110/62   Patient Site: Right Arm   Patient Position: Sitting   Cuff Size: Adult Regular   Pulse: 68   Resp: 16   Weight: 135 lb (61.2 kg)     Wt Readings from Last 3 Encounters:   06/02/17 135 lb (61.2 kg)   01/31/17 132 lb (59.9 kg)   12/06/16 132 lb (59.9 kg)       Physical Exam:  Constitutional:  Reveals an alert, cooperative, 82-year-old female in no acute distress.  Vitals:  Per nursing notes.  Eyes: PERRLA, funduscopic exam within normal limits.  HENT: TMs clear bilaterally,Oropharynx without erythema, posterior nasal drainage or thrush. Neck supple, no lymphadenopathy,  thyroid not palpable.  Cardiovascular:  Regular rate and rhythm without murmurs, rubs, or gallops. Carotids without bruits. Legs without edema.   Respiratory: Clear.  Respiratory effort normal.  Neurologic:  No gross focal deficits.   Lymph: No lymphadenopathy.  Psychiatric:  Mood appropriate, memory intact.     Data Reviewed:  Additional History from Old Records or Another Person Summarized (2 total): None.     Decision to Obtain Extra information (1 total): None.     Radiology Tests Summarized and Ordered (XRAY/CT/MRI/DXA) (1 total): None.    Labs Reviewed and Ordered (1 total): Labs as ordered above.    Medicine Tests Summarized and Ordered (EKG/ECHO/COLONOSCOPY/EGD) (1 total): None.    Independent Review of EKG or X-Ray (2 each): None.    The visit lasted a total of 25 minutes face to face with the patient. Over 50% of the time was spent counseling and educating the  patient about plan of care.    Medications:  Current Outpatient Prescriptions   Medication Sig Dispense Refill     alendronate (FOSAMAX) 70 MG tablet Take 1 tablet (70 mg total) by mouth every 7 days. Take in the morning on an empty stomach with a full glass of water 30 minutes before food 12 tablet 3     amLODIPine (NORVASC) 5 MG tablet Take 1 tablet (5 mg total) by mouth daily. For raynauds 90 tablet 1     aspirin 81 MG EC tablet Take 81 mg by mouth daily.       atorvastatin (LIPITOR) 40 MG tablet Take 1 tablet (40 mg total) by mouth daily. 30 tablet 0     CALCIUM CARBONATE (CALCIUM 500 ORAL) Take by mouth.       cholecalciferol, vitamin D3, 1,000 unit tablet Take 2,000 Units by mouth daily.       clopidogrel (PLAVIX) 75 mg tablet Take 1 tablet (75 mg total) by mouth daily. 90 tablet 2     folic acid (FOLVITE) 1 MG tablet Take 1 tablet (1 mg total) by mouth daily. 90 tablet 3     levothyroxine (SYNTHROID, LEVOTHROID) 100 MCG tablet Take 1 tablet (100 mcg total) by mouth Daily at 6:00 am. 90 tablet 3     methotrexate 2.5 MG tablet Take 6 tablets (15 mg total) by mouth once a week. 48 tablet 0     MV,CALCIUM,MIN/IRON/FOLIC/VITK (MULTI FOR HER ORAL) Take by mouth.       nitroglycerin (NITROSTAT) 0.4 MG SL tablet Place 1 tablet (0.4 mg total) under the tongue every 5 (five) minutes as needed for chest pain. 25 tablet 0     SPIRIVA WITH HANDIHALER 18 mcg inhalation capsule INHALE THE CONTENTS OF 1 CAPSULE ONCE DAILY USING HANDIHALER DEVICE. TO GET FULL DOSE, BREATHE OUT AND INHALE ONCE AGAIN. 90 capsule 2     levothyroxine (SYNTHROID, LEVOTHROID) 100 MCG tablet Take 100 mcg by mouth.       VIT C/E/ZN/COPPR/LUTEIN/ZEAXAN (PRESERVISION AREDS 2 ORAL) Take 1 tablet by mouth 2 (two) times a day.       No current facility-administered medications for this visit.        Total Data Points: 0    Fanta Burton, APRN, CNP    This note has been dictated using voice recognition software. Any grammatical or context distortions are  unintentional and inherent to the software

## 2021-06-11 NOTE — TELEPHONE ENCOUNTER
Wellness Screening Tool  Symptom Screening:  Do you have one of the following NEW symptoms:    Fever (subjective or >100.0)?  No    A new cough?  No    Shortness of breath?  No     Chills? No     New loss of taste or smell? No     Generalized body aches? No     New persistent headache? No     New sore throat? No     Nausea, vomiting, or diarrhea?  No    Within the past 2 weeks, have you been exposed to someone with a known positive illness below:    COVID-19 (known or suspected)?  No    Chicken pox?  No    Mealses?  No    Pertussis?  No    Patient notified of visitor policy- They may have one person accompany them to their appointment, but they will need to wear a mask and will be screened upon arrival for symptoms: Yes  Pt informed to wear a mask: Yes  Pt notified if they develop any symptoms listed above, prior to their appointment, they are to call the clinic directly at [phone] for further instructions.  Yes  Patient's appointment status: Patient will be seen in clinic as scheduled on 9/24

## 2021-06-11 NOTE — PROGRESS NOTES
ASSESSMENT AND PLAN:  Rox Zavala 82 y.o. female is seen here on 07/11/17 for follow-up of psoriatic arthritis, osteoarthritis remain pain is the base of the right thumb.  We discussed management options.  She is to continue methotrexate check labs in 4 weeks and then couple days prior to her next visit in 3 months.  As long as she is doing which is today and then we can stretch on the follow-up and labs accordingly.  Refills of methotrexate provided.  Diagnoses and all orders for this visit:    Psoriatic arthritis  -     methotrexate 2.5 MG tablet; Take 6 tablets (15 mg total) by mouth once a week.  Dispense: 48 tablet; Refill: 0  -     Cancel: ALT (SGPT)  -     Cancel: Albumin  -     Cancel: Creatinine  -     Cancel: HM2(CBC w/o Differential)  -     ALT (SGPT); Standing  -     Albumin; Standing  -     Creatinine; Standing  -     HM2(CBC w/o Differential); Standing    Psoriasis    Generalized Osteoarthritis Of The Hand    High risk medication use  -     ALT (SGPT); Standing  -     Albumin; Standing  -     Creatinine; Standing  -     HM2(CBC w/o Differential); Standing          HISTORY OF PRESENTING ILLNESS ON 07/11/17 :  Rox Zavala 82 y.o. is here for follow-up of psoriatic arthritis.  She is on methotrexate.  She has osteoarthritis.  She noted pain level moderate severity.  This is in the base of the right thumb area.  6.0/10.  Interfering with some of the day-to-day activities.  There is no morning stiffness.  She has not had recurrence of the original pustular psoriatic lesions that she had many years ago.  She had labs drawn in June.  She reports good tolerability with methotrexate.  She takes folic acid along with.  She does not smoke does not take alcohol.  She had right hip replacement in 2007.Further historical information, including ROS and limitation in activities as noted in the multidimensional health assessment questionnaire scanned in the EMR and in the assessment and plan  "section.    ALLERGIES:Review of patient's allergies indicates no known allergies.    PAST MEDICAL/ACTIVE PROBLEMS/MEDICATION/SOCIAL DATA  Past Medical History:   Diagnosis Date     Acute renal failure      Aortic valve stenosis, severe      Arthritis      Coronary artery disease 11/17/2014     Coronary artery disease      Disease of thyroid gland      Hammer toe      Hyperlipidemia      Hypertension      Macular disorder     \"wrinkle\"     Rheumatoid arthritis      S/P AVR 12/15/2014     History   Smoking Status     Former Smoker     Packs/day: 1.00     Years: 30.00     Types: Cigarettes     Quit date: 1/1/1995   Smokeless Tobacco     Never Used     Patient Active Problem List   Diagnosis     Callus     Hammer Toe     Hemorrhoids     Psoriatic Arthropathy     Psoriasis     Generalized Osteoarthritis Of The Hand     Osteoarthritis Of The Knee     Vitamin D Deficiency     Aortic Stenosis     Cough     Palpitations     Hypothyroidism     Rheumatoid arthritis     Neck Pain     Upper Back Pain (Between Shoulder Blades)     Osteopenia     Aortic stenosis, severe     Coronary artery disease     S/P AVR     Fever     Constipation     Upset stomach     Pain at surgical incision     Healthcare-associated pneumonia     Acute respiratory distress     Bilateral pleural effusion     Acute diastolic CHF (congestive heart failure)     Hypertension     Essential hypertension     Hyperlipidemia     Coronary artery disease involving coronary bypass graft of native heart with other forms of angina pectoris     Postoperative (PCI) anemia due to acute blood loss     Thigh hematoma, right, initial encounter     Psoriatic arthritis     Current Outpatient Prescriptions   Medication Sig Dispense Refill     alendronate (FOSAMAX) 70 MG tablet Take 1 tablet (70 mg total) by mouth every 7 days. Take in the morning on an empty stomach with a full glass of water 30 minutes before food 12 tablet 3     amLODIPine (NORVASC) 5 MG tablet Take 1 tablet " "(5 mg total) by mouth daily. For raynauds 90 tablet 1     aspirin 81 MG EC tablet Take 81 mg by mouth daily.       atorvastatin (LIPITOR) 40 MG tablet Take 1 tablet (40 mg total) by mouth daily. 30 tablet 0     CALCIUM CARBONATE (CALCIUM 500 ORAL) Take by mouth.       cholecalciferol, vitamin D3, 1,000 unit tablet Take 2,000 Units by mouth daily.       clopidogrel (PLAVIX) 75 mg tablet Take 1 tablet (75 mg total) by mouth daily. 90 tablet 2     ergocalciferol (ERGOCALCIFEROL) 50,000 unit capsule Take 1 capsule (50,000 Units total) by mouth once a week for 12 doses. 12 capsule 0     folic acid (FOLVITE) 1 MG tablet Take 1 tablet (1 mg total) by mouth daily. 90 tablet 3     levothyroxine (SYNTHROID, LEVOTHROID) 100 MCG tablet Take 1 tablet (100 mcg total) by mouth Daily at 6:00 am. 90 tablet 3     levothyroxine (SYNTHROID, LEVOTHROID) 100 MCG tablet Take 100 mcg by mouth.       methotrexate 2.5 MG tablet Take 6 tablets (15 mg total) by mouth once a week. 48 tablet 0     MV,CALCIUM,MIN/IRON/FOLIC/VITK (MULTI FOR HER ORAL) Take by mouth.       nitroglycerin (NITROSTAT) 0.4 MG SL tablet Place 1 tablet (0.4 mg total) under the tongue every 5 (five) minutes as needed for chest pain. 25 tablet 0     SPIRIVA WITH HANDIHALER 18 mcg inhalation capsule INHALE THE CONTENTS OF 1 CAPSULE ONCE DAILY USING HANDIHALER DEVICE. TO GET FULL DOSE, BREATHE OUT AND INHALE ONCE AGAIN. 90 capsule 2     VIT C/E/ZN/COPPR/LUTEIN/ZEAXAN (PRESERVISION AREDS 2 ORAL) Take 1 tablet by mouth 2 (two) times a day.       No current facility-administered medications for this visit.      DETAILED EXAMINATION  07/11/17  :  Vitals:    07/11/17 1433   BP: 110/64   Patient Site: Right Arm   Patient Position: Sitting   Cuff Size: Adult Regular   Weight: 135 lb (61.2 kg)   Height: 5' 3\" (1.6 m)     Alert oriented. Head including the face is examined for malar rash, heliotropes, scarring, lupus pernio. Eyes examined for redness such as in episcleritis/scleritis, " periorbital lesions.   Neck examined  for lymph nodes, range of motion Both upper and lower extremities (all four) examined for swollen, warm &/or  tender joints, range of motion, rash, muscle weakness, edema. The salient normal / abnormal findings are appended. No appreciable synovitis in any of the palpable appendicular joints.  There is no dactylitis enthesitis nail pitting.       LAB / IMAGING DATA:  ALT   Date Value Ref Range Status   05/05/2017 29 0 - 45 U/L Final   02/09/2017 28 0 - 45 U/L Final   12/06/2016 31 0 - 45 U/L Final     Albumin   Date Value Ref Range Status   05/05/2017 4.0 3.5 - 5.0 g/dL Final   02/09/2017 4.0 3.5 - 5.0 g/dL Final   12/06/2016 3.9 3.5 - 5.0 g/dL Final     Creatinine   Date Value Ref Range Status   06/02/2017 0.78 0.60 - 1.10 mg/dL Final   05/05/2017 0.74 0.60 - 1.10 mg/dL Final   02/09/2017 0.76 0.60 - 1.10 mg/dL Final       WBC   Date Value Ref Range Status   06/02/2017 6.5 4.0 - 11.0 thou/uL Final   05/05/2017 6.8 4.0 - 11.0 thou/uL Final   09/29/2015 6.4 4.0 - 11.0 thou/uL Final   05/07/2015 6.9 4.0 - 11.0 thou/uL Final     Hemoglobin   Date Value Ref Range Status   06/02/2017 10.4 (L) 12.0 - 16.0 g/dL Final   05/05/2017 12.4 12.0 - 16.0 g/dL Final   02/09/2017 11.5 (L) 12.0 - 16.0 g/dL Final     Platelets   Date Value Ref Range Status   06/02/2017 145 140 - 440 thou/uL Final   05/05/2017 175 140 - 440 thou/uL Final   02/09/2017 156 140 - 440 thou/uL Final       Lab Results   Component Value Date    RF <15 07/17/2013    SEDRATE 20 11/08/2016

## 2021-06-12 ENCOUNTER — RECORDS - HEALTHEAST (OUTPATIENT)
Dept: LAB | Facility: CLINIC | Age: 86
End: 2021-06-12

## 2021-06-12 NOTE — PATIENT INSTRUCTIONS - HE
Rox Zavala,    It is my pleasure to see you today at the [facility].    My recommendations for this visit are:    1.  Continue all medications except for increasing lisinopril from 10 to 20 mg daily  2.  Check your blood pressure and pulses daily for 10 days and call me back.  3.  All tests are reviewed, nothing is significant  4.   Will see you again in 6 months    Shilpa Wilburn MD, PhD

## 2021-06-12 NOTE — TELEPHONE ENCOUNTER
Refill Request  Did you contact pharmacy: Yes  Medication name:   Requested Prescriptions     Pending Prescriptions Disp Refills     atorvastatin (LIPITOR) 40 MG tablet 90 tablet 2     Sig: Take 1 tablet (40 mg total) by mouth daily.     Who prescribed the medication: Dr Barone  Requested Pharmacy: [facility]  Is patient out of medication: No.  7 days left States the pharmacy told her to have the provider to call this medication in so that she can get the med on time and not run out.    Okay to leave a detailed message: yes

## 2021-06-12 NOTE — PROGRESS NOTES
Subjective:      Rox Zavala is a 82 y.o. female comes in for evaluation of itching and slight swelling to her right volar forearm.  She specifically said this started yesterday when she was bit by insect.  She is concerned about possible infection.  She has not had any fevers or chills.  She denies any weakness.    Objective:     /70  Pulse 75  Temp 97.8  F (36.6  C) (Oral)   Wt 135 lb 1.6 oz (61.3 kg)  LMP 08/17/1974  SpO2 98%  BMI 23.93 kg/m2      Gen: well appearing    HR is normal at 75    Right forearm: Patient has an obvious insect bite on the volar aspect with a punctate lesion and swelling that measures 2 cm.  Surrounding the areas red slightly inflamed skin with slight increased warmth.  This does not appear to be due to cellulitis.      Assessment/Plan:       Insect bite to the right forearm.      Patient Instructions     Right arm insect bite that appears to be inflamed, but not infected.     I recommend using topical Benadryl 2-3 a day to help with the itching.

## 2021-06-12 NOTE — TELEPHONE ENCOUNTER
Refill Approved    Rx renewed per Medication Renewal Policy. Medication was last renewed on 1/4/20.    Yasemin Black, [facility] Triage/Med Refill 10/9/2020     Requested Prescriptions   Pending Prescriptions Disp Refills     atorvastatin (LIPITOR) 40 MG tablet 90 tablet 2     Sig: Take 1 tablet (40 mg total) by mouth daily.       Statins Refill Protocol (Hmg CoA Reductase Inhibitors) Passed - 10/9/2020  8:14 AM        Passed - PCP or prescribing provider visit in past 12 months      Last office visit with prescriber/PCP: 7/2/2020 Misbah Barone MD OR same dept: 7/2/2020 Misbah Barone MD OR same specialty: 7/2/2020 Misbah Barone MD  Last physical: 8/21/2018 Last MTM visit: Visit date not found   Next visit within 3 mo: Visit date not found  Next physical within 3 mo: Visit date not found  Prescriber OR PCP: Misbah Barone MD  Last diagnosis associated with med order: 1. Coronary artery disease involving coronary bypass graft of native heart with other forms of angina pectoris (H)  - atorvastatin (LIPITOR) 40 MG tablet; Take 1 tablet (40 mg total) by mouth daily.  Dispense: 90 tablet; Refill: 2    If protocol passes may refill for 12 months if within 3 months of last provider visit (or a total of 15 months).

## 2021-06-12 NOTE — PROGRESS NOTES
SUBJECTIVE:   Chief Complaint   Patient presents with     Annual Exam     fasting labs today; discuss vitamin d     Rox D Lucas 82 y.o. female    Current Outpatient Prescriptions   Medication Sig Dispense Refill     alendronate (FOSAMAX) 70 MG tablet Take 1 tablet (70 mg total) by mouth every 7 days. Take in the morning on an empty stomach with a full glass of water 30 minutes before food 12 tablet 3     amLODIPine (NORVASC) 5 MG tablet Take 1 tablet (5 mg total) by mouth daily. For raynauds 90 tablet 1     aspirin 81 MG EC tablet Take 81 mg by mouth daily.       atorvastatin (LIPITOR) 40 MG tablet Take 1 tablet (40 mg total) by mouth daily. 90 tablet 2     CALCIUM CARBONATE (CALCIUM 500 ORAL) Take by mouth.       cholecalciferol, vitamin D3, 1,000 unit tablet Take 2,000 Units by mouth daily.       clopidogrel (PLAVIX) 75 mg tablet Take 1 tablet (75 mg total) by mouth daily. 90 tablet 2     ergocalciferol (ERGOCALCIFEROL) 50,000 unit capsule Take 1 capsule (50,000 Units total) by mouth once a week for 12 doses. 12 capsule 0     folic acid (FOLVITE) 1 MG tablet Take 1 tablet (1 mg total) by mouth daily. 90 tablet 3     levothyroxine (SYNTHROID, LEVOTHROID) 100 MCG tablet Take 100 mcg by mouth.       levothyroxine (SYNTHROID, LEVOTHROID) 100 MCG tablet TAKE ONE TABLET BY MOUTH EVERY DAY AT 6:00 A.M. 90 tablet 2     methotrexate 2.5 MG tablet Take 6 tablets (15 mg total) by mouth once a week. 72 tablet 0     MV,CALCIUM,MIN/IRON/FOLIC/VITK (MULTI FOR HER ORAL) Take by mouth.       nitroglycerin (NITROSTAT) 0.4 MG SL tablet Place 1 tablet (0.4 mg total) under the tongue every 5 (five) minutes as needed for chest pain. 25 tablet 0     SPIRIVA WITH HANDIHALER 18 mcg inhalation capsule INHALE THE CONTENTS OF 1 CAPSULE ONCE DAILY USING HANDIHALER DEVICE. TO GET FULL DOSE, BREATHE OUT AND INHALE ONCE AGAIN. 90 capsule 2     VIT C/E/ZN/COPPR/LUTEIN/ZEAXAN (PRESERVISION AREDS 2 ORAL) Take 1 tablet by mouth 2 (two) times a  "day.       No current facility-administered medications for this visit.      Allergies: Review of patient's allergies indicates no known allergies.   Patient's last menstrual period was 08/17/1974.    HPI:   Kirstie 82-year-old here for annual physical and follow-up of chronic conditions.    Health maintenance: Last colonoscopy was 2009, normal, she was told she did not need any further colon cancer screening.  Pneumovax appears to have been prior to age 67.  Otherwise immunizations are up-to-date.  She gets mammograms annually in August.    Osteopenia with high fracture risk: Patient is on alendronate, followed by endocrinology.    Psoriatic arthritis: Patient follows with Dr. Santoyo    Hypothyroidism: Thyroid lab performed in June and normal.    Chronic shortness of breath: Likely related to COPD.  Patient had PFTs about a year ago.  She saw our clinical pharmacist to initiate Spiriva about a year ago and reports compliance.    Coronary artery disease, status post aortic valve replacement for aortic stenosis, hypertension, hyperlipidemia: Follows with cardiology, last visit there was in January and plans to follow-up in 6 months.  She does have her next appointment scheduled.  No new symptoms.  Reports compliance with her amlodipine, aspirin, Plavix, and atorvastatin.  Fasting today.    Discomfort with swallowing: Patient has recently noticed some discomfort in her chest with swallowing.  She denies that any food has gotten stuck.  No choking.  She has had some heartburn.  She wonders if related to the alendronate.    ROS: as per HPI    OBJECTIVE:   /58 (Patient Site: Left Arm, Patient Position: Sitting, Cuff Size: Adult Regular)  Pulse 64  Temp 97.7  F (36.5  C) (Oral)   Resp 12  Ht 5' 3\" (1.6 m)  Wt 128 lb 12.8 oz (58.4 kg)  LMP 08/17/1974  BMI 22.82 kg/m2    GENERAL:  Pleasant, well-appearing woman in no acute distress.  VITAL SIGNS:  Reviewed.  HEENT:  Pupils are equal, round, and reactive to " light.  Sclerae and  conjunctivae clear.  TMs are clear bilaterally.  Oropharynx is clear with  moist mucous membranes.  NECK:  Supple without lymphadenopathy or thyromegaly.  No carotid bruits.  CARDIOVASCULAR:  Heart regular rate and rhythm without murmur.  Normal S1 and  S2.  LUNGS:  Clear to auscultation bilaterally without wheezes or crackles.  Good  air movement throughout.  BREASTS:  Normal contours.  No skin dimpling, nipple retraction or nipple  discharge.  Palpation reveals no masses, no supraclavicular or axillary  lymphadenopathy.    ABDOMEN:  Soft, nontender, and nondistended without  guarding or rebound.  No organomegaly.  EXTREMITIES:  Warm and well perfused without edema. Dorsalis pedis pulses easily palable and symmetric bilaterally.  NEURO: Alert and oriented. Grossly nonfocal.  PSYCHIATRIC: Presents on time and well groomed.  Normal speach and thought content.  Full affect.  No abnormal movements or behaviors noted.      ASSESSMENT/PLAN  1. Vitamin D deficiency  - Vitamin D, Total (25-Hydroxy)    2. Hyperlipidemia  - Lipid Cascade  - Comprehensive Metabolic Panel    3. Shortness of breath  - Ambulatory referral to Pulmonology    4.  Hypertension  Blood pressure is a little low but she denies any symptoms.  He has not had anything to eat or drink today.  Continue to monitor.  She also has a cardiology follow-up appointment within a month.    5.  Dysphasia  Symptoms have been mild and intermittent.  Hold alendronate for the next 2-4 weeks but if symptoms persist, proceed with EGD.  If symptoms resolve after holding the alendronate, recommend she return to Dr. Piedra to discuss other treatments for her osteopenia.  Recommend she follow-up right away if severe or worsening symptoms.     6.  Hypothyroidism  Labs reviewed and up-to-date    7.  Psoriatic arthritis  Continue to follow with rheumatology    8.  Coronary artery disease and history of aortic valve replacement for AS  Continue to follow with  cardiology    9.  Annual physical  No longer needs Pap or colonoscopy.  She continues with mammograms.  Fasting glucose for screening.  We will check on her Pneumovax status as she may need a booster.  Immunizations otherwise up-to-date.

## 2021-06-13 NOTE — PROGRESS NOTES
Assessment/Plan:        Diagnoses and all orders for this visit:    Dyspnea on exertion    Diffusion capacity of lung (dl), decreased    82-year-old female with good overall physical function here for consult for shortness of breath and possible COPD.  Overall her shortness of breath seems to be within reasonable range for age.  She can work out for 30-40 minutes at curves and is not very limited.  Her shortness of breath with quick a sense of stairs seems within the range of normal.    She is a very slight decrease in her ratio of FEV1 to FVC.  This is very close to normal for her age.  She has very good overall FEV1 and FVC as well as other parameters on her pulmonary function tests.  In addition she has no prodrome of COPD-by this I mean no recurrent chest colds, bronchitis, sputum production.  Her DLCO is decreased and this is most likely due to her past smoking history.  Despite this she does not have evidence for COPD.    Overall I do not think she has COPD.  I do not think Spiriva will be helpful in this medication comes with quite a cost.  Think she can stop this medication, continue her extremely healthy lifestyle and follow-up with us as needed.      You can stop your spiriva.    Your lung tests show very good airway function and lung size.  Subjective:    Patient ID: Rox Zavala is a 82 y.o. female.    HPI Comments: 82F here for shortness of breath.  He is worse with significant exertion like walking up stairs quickly.  It improves with rest.  Symptoms localized to her chest.  Overall her symptoms have been may be a little bit more increased over the last year or so.  Pertinent negatives include no sputum production, no cough, no bronchitis or chest colds.  There are no pertinent positives.    She was started on Spiriva about a year ago because of changes in her pulmonary function test.  She has not had any change in her symptoms related to Spiriva.        Review of Systems  Review of systems  positive for easy bruising cold intolerance leg swelling.  The remainder of 14 system review systems is negative.        Objective:    Physical Exam   Constitutional: She is oriented to person, place, and time. She appears well-nourished.   HENT:   Head: Normocephalic and atraumatic.   Nose: Nose normal.   Mouth/Throat: Oropharynx is clear and moist. No oropharyngeal exudate.   Eyes: Right eye exhibits no discharge. No scleral icterus.   Neck: Neck supple. No JVD present. No tracheal deviation present. No thyromegaly present.   Cardiovascular: Normal rate and regular rhythm.    Pulmonary/Chest: Effort normal and breath sounds normal. No stridor. No respiratory distress.   Abdominal: Soft. She exhibits no distension. There is no tenderness.   Musculoskeletal: She exhibits no edema.   Lymphadenopathy:     She has no cervical adenopathy.   Neurological: She is alert and oriented to person, place, and time. Coordination normal.   Skin: Skin is warm and dry. No rash noted. No erythema.   Psychiatric: She has a normal mood and affect. Her behavior is normal. Judgment and thought content normal.           Current Outpatient Prescriptions on File Prior to Visit   Medication Sig Dispense Refill     amLODIPine (NORVASC) 5 MG tablet Take 1 tablet (5 mg total) by mouth daily. For raynauds 90 tablet 1     aspirin 81 MG EC tablet Take 81 mg by mouth daily.       atorvastatin (LIPITOR) 40 MG tablet Take 1 tablet (40 mg total) by mouth daily. 90 tablet 2     CALCIUM CARBONATE (CALCIUM 500 ORAL) Take by mouth.       cholecalciferol, vitamin D3, 1,000 unit tablet Take 2,000 Units by mouth daily.       folic acid (FOLVITE) 1 MG tablet Take 1 tablet (1 mg total) by mouth daily. 90 tablet 3     levothyroxine (SYNTHROID, LEVOTHROID) 100 MCG tablet TAKE ONE TABLET BY MOUTH EVERY DAY AT 6:00 A.M. 90 tablet 2     methotrexate 2.5 MG tablet Take 6 tablets (15 mg total) by mouth once a week. 72 tablet 0     MV,CALCIUM,MIN/IRON/FOLIC/VITK  (MULTI FOR HER ORAL) Take by mouth.       nitroglycerin (NITROSTAT) 0.4 MG SL tablet Place 1 tablet (0.4 mg total) under the tongue every 5 (five) minutes as needed for chest pain. 25 tablet 0     SPIRIVA WITH HANDIHALER 18 mcg inhalation capsule INHALE THE CONTENTS OF 1 CAPSULE ONCE DAILY USING HANDIHALER DEVICE. TO GET FULL DOSE, BREATHE OUT AND INHALE ONCE AGAIN. 90 capsule 2     VIT C/E/ZN/COPPR/LUTEIN/ZEAXAN (PRESERVISION AREDS 2 ORAL) Take 1 tablet by mouth 2 (two) times a day.       [DISCONTINUED] levothyroxine (SYNTHROID, LEVOTHROID) 100 MCG tablet Take 100 mcg by mouth.       No current facility-administered medications on file prior to visit.      /70  Pulse 78  Resp 16  Wt 133 lb (60.3 kg)  LMP 08/17/1974  SpO2 95% Comment: RA  BMI 23.56 kg/m2    Medical History  Active Ambulatory (Non-Hospital) Problems    Diagnosis     Psoriatic arthritis     High risk medication use     Postoperative (PCI) anemia due to acute blood loss     Thigh hematoma, right, initial encounter     Coronary artery disease involving coronary bypass graft of native heart with other forms of angina pectoris     Essential hypertension     Hyperlipidemia     Healthcare-associated pneumonia     Acute respiratory distress     Bilateral pleural effusion     Acute diastolic CHF (congestive heart failure)     Hypertension     Fever     Constipation     Upset stomach     Pain at surgical incision     S/P AVR     Aortic stenosis, severe     Coronary artery disease     Neck Pain     Upper Back Pain (Between Shoulder Blades)     Osteopenia     Callus     Hammer Toe     Hemorrhoids     Psoriatic Arthropathy     Psoriasis     Generalized Osteoarthritis Of The Hand     Osteoarthritis Of The Knee     Vitamin D Deficiency     Aortic Stenosis     Cough     Palpitations     Hypothyroidism     Rheumatoid arthritis     Past Medical History:   Diagnosis Date     Acute renal failure      Aortic valve stenosis, severe      Arthritis      Coronary  artery disease 11/17/2014     Coronary artery disease      Disease of thyroid gland      Hammer toe      Hyperlipidemia      Hypertension      Macular disorder      Rheumatoid arthritis      S/P AVR 12/15/2014        Surgical History  She  has a past surgical history that includes total hip arthroplasty (Right); removal gallbladder; ligate fallopian tube; Cardiac surgery; Aortic valve replacement; Eye surgery (Bilateral); and Angioplasty / stenting femoral.       Social History  Reviewed, and she  reports that she quit smoking about 22 years ago. Her smoking use included Cigarettes. She has a 30.00 pack-year smoking history. She has never used smokeless tobacco. She reports that she drinks alcohol. She reports that she does not use illicit drugs.    Worked in the home, in [employer], worked for the [employer] at the [employer].     Allergies  No Known Allergies Family History  Reviewed, and family history includes Asthma in her mother. There is no history of Coronary artery disease.                            Data Review - imaging, labs, and ekgs listed below were reviewed by me.  Chest XRay and chest CT images and EKG tracings interpreted personally.     Past Labs  Physical on 08/04/2017   Component Date Value     Vitamin D, Total (25-Hyd* 08/04/2017 57.8      Cholesterol 08/04/2017 135      Triglycerides 08/04/2017 62      HDL Cholesterol 08/04/2017 65      LDL Calculated 08/04/2017 58      Patient Fasting > 8hrs? 08/04/2017 Yes      Sodium 08/04/2017 136      Potassium 08/04/2017 4.0      Chloride 08/04/2017 101      CO2 08/04/2017 26      Anion Gap, Calculation 08/04/2017 9      Glucose 08/04/2017 86      BUN 08/04/2017 13      Creatinine 08/04/2017 0.82      GFR MDRD Af Amer 08/04/2017 >60      GFR MDRD Non Af Amer 08/04/2017 >60      Bilirubin, Total 08/04/2017 0.6      Calcium 08/04/2017 9.1      Protein, Total 08/04/2017 7.1      Albumin 08/04/2017 3.9      Alkaline Phosphatase 08/04/2017 76      AST 08/04/2017 38       ALT 08/04/2017 27      WBC 08/04/2017 5.6      RBC 08/04/2017 3.54*     Hemoglobin 08/04/2017 11.4*     Hematocrit 08/04/2017 34.2*     MCV 08/04/2017 97      MCH 08/04/2017 32.2      MCHC 08/04/2017 33.3      RDW 08/04/2017 12.9      Platelets 08/04/2017 156      MPV 08/04/2017 7.8        Past Imaging  Mammo Screening Bilateral    Result Date: 8/18/2017  BILATERAL FULL FIELD DIGITAL SCREENING MAMMOGRAM WITH TOMOSYNTHESIS Performed on: 8/18/17 Compared to: 08/17/2016 Mammo Screening Bilateral, 12/21/2011 Mammo Screening Bilateral, and 11/22/2010 Mammo Screening Bilateral Findings: The breasts are almost entirely fatty. There is no radiographic evidence of malignancy. This study was evaluated with the assistance of Computer-Aided Detection. Breast Tomosynthesis was used in interpretation. Repeat routine screening mammogram in one year is recommended. ACR BI-RADS Category 1: Negative     Pulmonary function test raw data and tracings reviewed by me.  Slight decrease in FEV1 to FVC ratio is 63%.  Supernormal FEV1 and FVC without response to bronchodilators.  TLC supranormal but within range reasonable given her increased spirometry values.  DLCO is moderately decreased.

## 2021-06-13 NOTE — TELEPHONE ENCOUNTER
----- Message from DRAGAN Layne sent at 11/18/2020  9:13 AM CST -----  Regarding: ESTELA PATIENT  General phone call:    Caller: Patient    Primary cardiologist: ESTELA    Detailed reason for call: patient wants a call back before she goes out for her noon walk, she has some BP readings to report.    Best phone number: [phone]    Best time to contact: before noon    Ok to leave a detailed message? Yes    Device? NO    Additional Info:

## 2021-06-13 NOTE — TELEPHONE ENCOUNTER
"Pt reports the following after increasing lisinopril to 20 mg daily 11/10/20:    Date BP P   11/10/20 152/48 48   11/11/20 137/57 55   11/12/20 145/70 50   11/13/20 147/62 52   11/14/20 146/66 59   11/15/20 140/55 55   11/16/20 143/66 52   11/17/20 125/50 54   11/18/20 126/51 54     Pt adds that all were taken between 10 and 11 AM, and she is \"feeling fine.\"    BPs sent to Dr Wilburn for review.  -rah  "

## 2021-06-13 NOTE — PROGRESS NOTES
Audiology only; referred by Jaclyn Kaye    History:  Known bilateral hearing loss; last tested at [facility] 6-2-15. Possibly interested in amplification at this time. She endorsed bilateral tinnitus, but noted no other subjective changes to ears or hearing ability. Has difficulty hearing/understanding voices on TV. She denied aural fullness, dizziness, history of noise exposure, otalgia, or recent illness.    Results:  Otoscopy was unremarkable in either ear. Hearing sensitivity was assessed with good reliability using insert phones.      Normal hearing sensitivity for 250-1000 Hz, sloping to mild to moderate-severe sensorineural hearing loss for 8316-8630 Hz, bilaterally. Shifts of 15-20 dB were noted in left ear only at 4038-3533 Hz, per 6-2-15 audiogram.    Speech reception thresholds showed agreement with pure tone findings in each ear. Word recognition ability was excellent, bilaterally, with presentation levels above typical conversational volume in both ears.    Tympanometry was consistent with normal middle ear function, bilaterally (shallow tracings were obtained in both ears).    Recommendations:  Follow-up with PCP; retest hearing annually (to monitor) or per medical management.  Wear hearing protection consistently in noise.  Rox Zavala is a potential binaural amplification candidate, if patient motivation exists and medical clearance is granted. Today's results are valid for a period of six months for the purpose of obtaining amplification. Ms. Zavala expressed verbal understanding of this information and plan.    Ledy Conn, [facility]3]-A  [place] Licensed Audiologist [id number]

## 2021-06-13 NOTE — TELEPHONE ENCOUNTER
----- Message -----  From: Shilpa Wilburn MD  Sent: 11/18/2020   1:21 PM CST  To: Hailey Chaudhari RN    It is good, no change in medication.  Blood pressure is controlled.  Thanks  Martin  ------------------------------------------------------------------------------  Discussed with pt.  Pt will continue with 20 mg lisinopril daily.  Pt had no questions and verbalized understanding.  -[email or web address]

## 2021-06-14 ENCOUNTER — COMMUNICATION - HEALTHEAST (OUTPATIENT)
Dept: ADMINISTRATIVE | Facility: CLINIC | Age: 86
End: 2021-06-14

## 2021-06-14 ENCOUNTER — COMMUNICATION - HEALTHEAST (OUTPATIENT)
Dept: CARDIOLOGY | Facility: CLINIC | Age: 86
End: 2021-06-14

## 2021-06-14 ENCOUNTER — OFFICE VISIT - HEALTHEAST (OUTPATIENT)
Dept: GERIATRICS | Facility: CLINIC | Age: 86
End: 2021-06-14

## 2021-06-14 DIAGNOSIS — I10 ESSENTIAL HYPERTENSION: ICD-10-CM

## 2021-06-14 DIAGNOSIS — K92.2 ACUTE GI BLEEDING: ICD-10-CM

## 2021-06-14 DIAGNOSIS — K29.70 HELICOBACTER PYLORI GASTRITIS: ICD-10-CM

## 2021-06-14 DIAGNOSIS — D62 ACUTE BLOOD LOSS ANEMIA: ICD-10-CM

## 2021-06-14 DIAGNOSIS — L40.50 PSORIATIC ARTHRITIS (H): ICD-10-CM

## 2021-06-14 DIAGNOSIS — B96.81 HELICOBACTER PYLORI GASTRITIS: ICD-10-CM

## 2021-06-14 NOTE — PROGRESS NOTES
ASSESSMENT AND PLAN:  Rox Zavala 82 y.o. female is seen here on 11/22/17 for follow-up of psoriatic arthritis, osteoarthritis remain pain is the base of the right thumb.  She has continued to do well as she tapered the methotrexate down to 6, 50 mg, per week.  She wonders if she can drop it further she has been on it for such a long time.  She is going to see how 10 mg per week works for her she is on folic acid.  We will meet here in 3 months with labs just prior.  With regards to the first CMC osteoarthritis symptoms she is aware of the options.        Diagnoses and all orders for this visit:    Psoriasis    Psoriatic arthritis  -     methotrexate 2.5 MG tablet; Take 4 tablets (10 mg total) by mouth once a week.  Dispense: 48 tablet; Refill: 0    Osteoarthritis Of The Knee    High risk medication use    Generalized Osteoarthritis Of The Hand          HISTORY OF PRESENTING ILLNESS ON 11/22/17 :  Rox Zavala 82 y.o. is here for follow-up of psoriatic arthritis.  History of pustular psoriasis.  Osteoarthritis.  She is doing great.  She noted pain level is 0.5/10.  At that to pertains too around the base of the thumb at the right first CMC, more likely than not due to osteoarthritis.  She noted this pain to be mild.  This comes on more of that she uses the hand the worse it gets.  As she has been taking methotrexate at 15 mg per week.  She wonders if she could cut back down to 4 i.e. 10 mg per week.  She noted no stiffness in the morning no fever or weight loss blurry vision she has not had eye redness mouth also nausea cough there is no rash.  Her most recent labs are reviewed within acceptable range.  She is taking folic acid.  She does not have evidence of autoimmunity.  She does not smoke does not take alcohol.  She had right hip replacement in 2007.Further historical information, including ROS and limitation in activities as noted in the multidimensional health assessment questionnaire scanned in the  "EMR and in the assessment and plan section.    ALLERGIES:Review of patient's allergies indicates no known allergies.    PAST MEDICAL/ACTIVE PROBLEMS/MEDICATION/SOCIAL DATA  Past Medical History:   Diagnosis Date     Acute renal failure      Aortic valve stenosis, severe      Arthritis      Coronary artery disease 11/17/2014     Coronary artery disease      Disease of thyroid gland      Hammer toe      Hyperlipidemia      Hypertension      Macular disorder     \"wrinkle\"     Rheumatoid arthritis      S/P AVR 12/15/2014     History   Smoking Status     Former Smoker     Packs/day: 1.00     Years: 30.00     Types: Cigarettes     Quit date: 1/1/1995   Smokeless Tobacco     Never Used     Patient Active Problem List   Diagnosis     Callus     Hammer Toe     Hemorrhoids     Psoriatic Arthropathy     Psoriasis     Generalized Osteoarthritis Of The Hand     Osteoarthritis Of The Knee     Vitamin D Deficiency     Aortic Stenosis     Cough     Palpitations     Hypothyroidism     Rheumatoid arthritis     Neck Pain     Upper Back Pain (Between Shoulder Blades)     Osteopenia     Aortic stenosis, severe     Coronary artery disease     S/P AVR     Fever     Constipation     Upset stomach     Pain at surgical incision     Healthcare-associated pneumonia     Acute respiratory distress     Bilateral pleural effusion     Acute diastolic CHF (congestive heart failure)     Hypertension     Essential hypertension     Hyperlipidemia     Coronary artery disease involving coronary bypass graft of native heart with other forms of angina pectoris     Postoperative (PCI) anemia due to acute blood loss     Thigh hematoma, right, initial encounter     Psoriatic arthritis     High risk medication use     Current Outpatient Prescriptions   Medication Sig Dispense Refill     amLODIPine (NORVASC) 5 MG tablet Take 1 tablet (5 mg total) by mouth daily. For Reynauds. 90 tablet 0     aspirin 81 MG EC tablet Take 81 mg by mouth daily.       atorvastatin " (LIPITOR) 40 MG tablet Take 1 tablet (40 mg total) by mouth daily. 90 tablet 2     CALCIUM CARBONATE (CALCIUM 500 ORAL) Take by mouth.       cholecalciferol, vitamin D3, 1,000 unit tablet Take 2,000 Units by mouth daily.       folic acid (FOLVITE) 1 MG tablet Take 1 tablet (1 mg total) by mouth daily. 90 tablet 3     levothyroxine (SYNTHROID, LEVOTHROID) 100 MCG tablet TAKE ONE TABLET BY MOUTH EVERY DAY AT 6:00 A.M. 90 tablet 2     methotrexate 2.5 MG tablet Take 6 tablets (15 mg total) by mouth once a week. 72 tablet 0     MV,CALCIUM,MIN/IRON/FOLIC/VITK (MULTI FOR HER ORAL) Take by mouth.       nitroglycerin (NITROSTAT) 0.4 MG SL tablet Place 1 tablet (0.4 mg total) under the tongue every 5 (five) minutes as needed for chest pain. 25 tablet 0     VIT C/E/ZN/COPPR/LUTEIN/ZEAXAN (PRESERVISION AREDS 2 ORAL) Take 1 tablet by mouth 2 (two) times a day.       No current facility-administered medications for this visit.      DETAILED EXAMINATION  11/22/17  :  Vitals:    11/22/17 1344   BP: 132/70   Pulse: 88   Weight: 131 lb (59.4 kg)     Alert oriented. Head including the face is examined for malar rash, heliotropes, scarring, lupus pernio. Eyes examined for redness such as in episcleritis/scleritis, periorbital lesions.   Neck/ Face examined for parotid gland swelling, range of motion of neck.  Left upper and lower and right upper and lower extremities examined for tenderness, swelling, warmth of the appendicular joints, range of motion, edema, rash.  Some of the important findings included: She has no synovitis in any of the palpable appendicular joints.  She has crepitation or marked squaring of the first CMC's bilaterally more prominent on the right side.  Minimal JLT of the knees.  Full range of motion of the shoulders.  There is no dactylitis enthesitis there is no nail pitting or onycholysis.        LAB / IMAGING DATA:  ALT   Date Value Ref Range Status   11/06/2017 28 0 - 45 U/L Final   08/04/2017 27 0 - 45 U/L  Final   05/05/2017 29 0 - 45 U/L Final     Albumin   Date Value Ref Range Status   11/06/2017 3.7 3.5 - 5.0 g/dL Final   08/04/2017 3.9 3.5 - 5.0 g/dL Final   05/05/2017 4.0 3.5 - 5.0 g/dL Final     Creatinine   Date Value Ref Range Status   11/06/2017 0.84 0.60 - 1.10 mg/dL Final   08/04/2017 0.82 0.60 - 1.10 mg/dL Final   06/02/2017 0.78 0.60 - 1.10 mg/dL Final       WBC   Date Value Ref Range Status   11/06/2017 8.1 4.0 - 11.0 thou/uL Final   08/04/2017 5.6 4.0 - 11.0 thou/uL Final   09/29/2015 6.4 4.0 - 11.0 thou/uL Final   05/07/2015 6.9 4.0 - 11.0 thou/uL Final     Hemoglobin   Date Value Ref Range Status   11/06/2017 11.2 (L) 12.0 - 16.0 g/dL Final   08/04/2017 11.4 (L) 12.0 - 16.0 g/dL Final   06/02/2017 10.4 (L) 12.0 - 16.0 g/dL Final     Platelets   Date Value Ref Range Status   11/06/2017 163 140 - 440 thou/uL Final   08/04/2017 156 140 - 440 thou/uL Final   06/02/2017 145 140 - 440 thou/uL Final       Lab Results   Component Value Date    RF <15 07/17/2013    SEDRATE 20 11/08/2016

## 2021-06-14 NOTE — PROGRESS NOTES
Progress Note    Reason for Visit:  Chief Complaint     Follow-up          Progress Note:    HPI: This pleasant 82-year-old female patient is here for follow-up because of osteopenia.  Component      Latest Ref Rng & Units 5/5/2017 6/2/2017 8/4/2017 11/6/2017   Creatinine      0.60 - 1.10 mg/dL 0.74  0.82 0.84   GFR MDRD Af Amer      >60 mL/min/1.73m2 >60  >60 >60   GFR MDRD Non Af Amer      >60 mL/min/1.73m2 >60  >60 >60   Bilirubin, Total      0.0 - 1.0 mg/dL   0.6    Calcium      8.5 - 10.5 mg/dL   9.1    Protein, Total      6.0 - 8.0 g/dL   7.1    ALBUMIN      3.5 - 5.0 g/dL   3.9    Alkaline Phosphatase      45 - 120 U/L   76    AST      0 - 40 U/L   38    ALT      0 - 45 U/L   27 28   Cholesterol      <=199 mg/dL   135    Triglycerides      <=149 mg/dL   62    HDL Cholesterol      >=50 mg/dL   65    LDL Calculated      <=129 mg/dL   58    Patient Fasting > 8hrs?         Yes    TSH      0.30 - 5.00 uIU/mL  1.33     Vitamin D, Total (25-Hydroxy)      30.0 - 80.0 ng/mL  14.8 (L) 57.8          Review of Systems:    Nervous System: No headache, dizziness, fainting or memory loss. No tingling sensation of hand or feet.  Ears: No hearing loss or ringing in the ears  Eyes: No blurring of vision, redness, itching or dryness.  Nose: No nosebleed or loss of smell  Mouth: No mouth sores or loss of taste  Throat: No hoarseness or difficulty swallowing  Neck: No enlarged thyroid or lymph nodes.  Heart: No chest pain, palpitation or irregular heartbeat. No swelling of hands or feet  Lungs: No shortness of breath, cough, night sweats, wheezing or hemoptysis.  Gastrointestinal: No nausea or vomiting, constipation or diarrhea.  No acid reflux, abdominal pain or blood in stools.  Kidney/Bladdr: No polyuria, polydipsia, nocturia or hematuria.  Genital/Sexual: No loss of libido  Skin: No rash, hair loss or hirsutism.  No abnormal striae  Muscles/Joints/Bones: No morning stiffness, muscle aches and pain or loss of  height.    Current Medications:  Current Outpatient Prescriptions   Medication Sig     amLODIPine (NORVASC) 5 MG tablet Take 1 tablet (5 mg total) by mouth daily. For Reynauds.     aspirin 81 MG EC tablet Take 81 mg by mouth daily.     atorvastatin (LIPITOR) 40 MG tablet Take 1 tablet (40 mg total) by mouth daily.     CALCIUM CARBONATE (CALCIUM 500 ORAL) Take by mouth.     cholecalciferol, vitamin D3, 1,000 unit tablet Take 2,000 Units by mouth daily.     folic acid (FOLVITE) 1 MG tablet Take 1 tablet (1 mg total) by mouth daily.     levothyroxine (SYNTHROID, LEVOTHROID) 100 MCG tablet TAKE ONE TABLET BY MOUTH EVERY DAY AT 6:00 A.M.     methotrexate 2.5 MG tablet Take 6 tablets (15 mg total) by mouth once a week.     MV,CALCIUM,MIN/IRON/FOLIC/VITK (MULTI FOR HER ORAL) Take by mouth.     VIT C/E/ZN/COPPR/LUTEIN/ZEAXAN (PRESERVISION AREDS 2 ORAL) Take 1 tablet by mouth 2 (two) times a day.     nitroglycerin (NITROSTAT) 0.4 MG SL tablet Place 1 tablet (0.4 mg total) under the tongue every 5 (five) minutes as needed for chest pain.       Patients Active Problems:  Patient Active Problem List   Diagnosis     Callus     Hammer Toe     Hemorrhoids     Psoriatic Arthropathy     Psoriasis     Generalized Osteoarthritis Of The Hand     Osteoarthritis Of The Knee     Vitamin D Deficiency     Aortic Stenosis     Cough     Palpitations     Hypothyroidism     Rheumatoid arthritis     Neck Pain     Upper Back Pain (Between Shoulder Blades)     Osteopenia     Aortic stenosis, severe     Coronary artery disease     S/P AVR     Fever     Constipation     Upset stomach     Pain at surgical incision     Healthcare-associated pneumonia     Acute respiratory distress     Bilateral pleural effusion     Acute diastolic CHF (congestive heart failure)     Hypertension     Essential hypertension     Hyperlipidemia     Coronary artery disease involving coronary bypass graft of native heart with other forms of angina pectoris     Postoperative  "(PCI) anemia due to acute blood loss     Thigh hematoma, right, initial encounter     Psoriatic arthritis     High risk medication use       History:   reports that she quit smoking about 22 years ago. Her smoking use included Cigarettes. She has a 30.00 pack-year smoking history. She has never used smokeless tobacco. She reports that she drinks alcohol. She reports that she does not use illicit drugs.   reports that she quit smoking about 22 years ago. Her smoking use included Cigarettes. She has a 30.00 pack-year smoking history. She has never used smokeless tobacco. She reports that she drinks alcohol. She reports that she does not use illicit drugs.  History   Smoking Status     Former Smoker     Packs/day: 1.00     Years: 30.00     Types: Cigarettes     Quit date: 1/1/1995   Smokeless Tobacco     Never Used      reports that she quit smoking about 22 years ago. Her smoking use included Cigarettes. She has a 30.00 pack-year smoking history. She has never used smokeless tobacco. She reports that she drinks alcohol. She reports that she does not use illicit drugs.  History   Sexual Activity     Sexual activity: Not on file     Past Medical History:   Diagnosis Date     Acute renal failure      Aortic valve stenosis, severe      Arthritis      Coronary artery disease 11/17/2014     Coronary artery disease      Disease of thyroid gland      Hammer toe      Hyperlipidemia      Hypertension      Macular disorder     \"wrinkle\"     Rheumatoid arthritis      S/P AVR 12/15/2014     Family History   Problem Relation Age of Onset     Asthma Mother      Coronary artery disease Neg Hx      Past Medical History:   Diagnosis Date     Acute renal failure      Aortic valve stenosis, severe      Arthritis      Coronary artery disease 11/17/2014     Coronary artery disease      Disease of thyroid gland      Hammer toe      Hyperlipidemia      Hypertension      Macular disorder     \"wrinkle\"     Rheumatoid arthritis      S/P AVR " "12/15/2014     Past Surgical History:   Procedure Laterality Date     ANGIOPLASTY / STENTING FEMORAL       AORTIC VALVE REPLACEMENT       CARDIAC SURGERY      CABG     EYE SURGERY Bilateral     cataracts     NC LIGATE FALLOPIAN TUBE      Description: Tubal Ligation;  Recorded: 03/20/2008;     NC REMOVAL GALLBLADDER      Description: Cholecystectomy;  Recorded: 03/20/2008;     NC TOTAL HIP ARTHROPLASTY Right     Description: Total Hip Replacement;  Recorded: 03/20/2008; right       Vitals   height is 5' 2.75\" (1.594 m) and weight is 131 lb (59.4 kg). Her blood pressure is 159/60.         Exam  General appearance: The patient looked well, not in acute distress.  Eyes: no evidence of thyroid eye disease.   Retinal exam: No evidence of diabetic retinopathy.  Mouth and Throat: Normal  Neck: No evidence of thyromegaly, enlarged lymph node or tenderness  Chest: Trachea is central. Chest is clear to auscultation and percussion. Breat sounds are normal.  Cardiovascular exam: JVP is not raised. Heart sounds are normal, no murmurs or rub  Peripheral pulses are palpable.   Abdomen: No masses or tenderness.    Back: No vertebral tenderness or kyphosis.  Extremities: No evidence of leg edema.   Skin: Normal to touch.  No abnormal striae  Neurologic exam:  Visual fields are intact by confrontation, grossly intact. No evidence of peripheral neuropathy.  Detailed foot exam normal.        Diagnosis:  No diagnosis found.    Orders:   No orders of the defined types were placed in this encounter.        Assessment and Plan: Osteopenia patient had a bone DEXA scan last year which showed T score at the spine is 0.7 at the left hip is -1.6.  The patient has lost 5.6% at the hip.    I have tried to put her on Fosamax but she got severe acid reflux and could not tolerate it.    We tried to get Prolia approved but that was denied.    She is taking vitamin D 2000 units a day +600 mg of calcium.    Vitamin D improved from 14.8-57.8 creatinine " 0.84 I did advise the patient to follow-up in 1 year and will do a bone DEXA scan 3 weeks before at this stage we will decide if there is any deterioration we will put her to try and get her on Prolia.    Hypothyroidism on Synthroid 100 mcg she has good energy level will monitor the levels.    She has a tissue valve replacement.    She is on aspirin 81 mg.    Hypertension blood pressure 159/60 she is on Norvasc 5 mg once a day.  Last visit was 108/70.    Patient return to clinic in 1 year I did spend 40 minutes with the patient more than 50% was spent on counseling and managing her care.

## 2021-06-15 LAB
ANION GAP SERPL CALCULATED.3IONS-SCNC: 6 MMOL/L (ref 5–18)
BUN SERPL-MCNC: 11 MG/DL (ref 8–28)
CALCIUM SERPL-MCNC: 8.1 MG/DL (ref 8.5–10.5)
CHLORIDE BLD-SCNC: 108 MMOL/L (ref 98–107)
CO2 SERPL-SCNC: 23 MMOL/L (ref 22–31)
CREAT SERPL-MCNC: 0.79 MG/DL (ref 0.6–1.1)
ERYTHROCYTE [DISTWIDTH] IN BLOOD BY AUTOMATED COUNT: 16.1 % (ref 11–14.5)
GFR SERPL CREATININE-BSD FRML MDRD: >60 ML/MIN/1.73M2
GLUCOSE BLD-MCNC: 81 MG/DL (ref 70–125)
HCT VFR BLD AUTO: 24.1 % (ref 35–47)
HGB BLD-MCNC: 7.4 G/DL (ref 12–16)
MCH RBC QN AUTO: 29.4 PG (ref 27–34)
MCHC RBC AUTO-ENTMCNC: 30.7 G/DL (ref 32–36)
MCV RBC AUTO: 96 FL (ref 80–100)
PLATELET # BLD AUTO: 222 THOU/UL (ref 140–440)
PMV BLD AUTO: 10.8 FL (ref 8.5–12.5)
POTASSIUM BLD-SCNC: 4.3 MMOL/L (ref 3.5–5)
RBC # BLD AUTO: 2.52 MILL/UL (ref 3.8–5.4)
SODIUM SERPL-SCNC: 137 MMOL/L (ref 136–145)
WBC: 7.4 THOU/UL (ref 4–11)

## 2021-06-15 NOTE — PROGRESS NOTES
ASSESSMENT:  1. Coronary artery disease involving coronary bypass graft of native heart with other forms of angina pectoris  Her nitroglycerin is likely , refill provided for her upcoming trip.  She has not needed this but she understands she should have it on hand if needed.  - nitroglycerin (NITROSTAT) 0.4 MG SL tablet; Place 1 tablet (0.4 mg total) under the tongue every 5 (five) minutes as needed for chest pain.  Dispense: 25 tablet; Refill: 0    2. Lightheaded  Mild intermittent spells over the last month.  EKG in clinic today shows sinus rhythm, first-degree AV block, PVCs, unchanged from previous.  Echocardiogram ordered.  She has an appointment scheduled with her cardiologist Dr. Grove for routine follow-up in early March, she could call and see if they could get her in sooner in light of her upcoming trip in case any additional testing is needed.  - Echo Complete; Future  - Electrocardiogram Perform and Read    3. Skin growth  She has some skin lesions on her face that she was questioning, recommend she see dermatology, referral placed.  She also has a benign-appearing lesion on her upper back that gets bothered by clothing, could be removed for that reason.  She can have that done through dermatology or here by scheduling a future visit.  - Ambulatory referral to Dermatology    4. Fatigue  EKG without changes compared to previous.  Echo ordered.  Labs as below.  - Vitamin D, Total (25-Hydroxy)  - Thyroid Cascade  - Comprehensive Metabolic Panel  - Hem 2    5. Psoriatic arthritis, high risk med use  Labs through Dr. Santoyo drawn today with her other blood work    6.  Pretravel consult, travel to [place]  Discussed recommendations.  Hep A vaccine today.  Also discussed hepatitis B vaccine, which certainly not strongly indicated and deferred.    PLAN:  Patient Instructions   Call cardiology to see if you can schedule an earlier appointment.      No orders of the defined types were placed in this  encounter.    There are no discontinued medications.    No Follow-up on file.    CHIEF COMPLAINT:  Chief Complaint   Patient presents with     Travel Consult     [place]; 3/9-3/19 with 2 granddaughters       HISTORY OF PRESENT ILLNESS:  Rox is a 82 y.o. female presenting to the clinic today for a travel consult. She will be traveling to [place] from 3/9-3/19/18 with her two granddaughters. They will be traveling to [place], [facility], and [place]. She agrees to receive a HepA vaccine.    High Blood Pressure: Her blood pressure is running a bit high today in clinic. She does note that she had two cups of coffee today and exercised this morning. She continues on 5 mg amlodipine daily. MD measured BP was 136/58, MANUEL, sitting.    Dizziness: She notes that her heart rate feels irregular at times and that she has been getting lightheaded lately. She denies any accompanying shortness of breath or chest pain. This has been going on for 1 month. Scheduled to see Dr. Wilburn in early March for routine follow up for CAD and AVR. Feels more fatigued.    Skin Lesions: She notes some spots on the left side of her face that feel raised and dry. She also points out a mole on her back that gets caught on the tags of her clothing. She does not currently see a dermatologist.    Hypothyroidism: She continues on 100mcg levothyroxine daily. Lately, she has been feeling lazy and unmotivated.    REVIEW OF SYSTEMS:   She continues to follow rheumatology. Saw pulminology and felt to not have COPD, so stopped spiriva. All other systems are negative.    PFSH:  Social: She has two granddaughters.  Surgical: Valve replacement and stents.    TOBACCO USE:  History   Smoking Status     Former Smoker     Packs/day: 1.00     Years: 30.00     Types: Cigarettes     Quit date: 1/1/1995   Smokeless Tobacco     Never Used       VITALS:  Vitals:    01/26/18 1322   BP: 154/62   Patient Site: Left Arm   Patient Position: Sitting   Cuff Size: Adult Regular   Pulse: 64  "  Resp: 16   Temp: 97.7  F (36.5  C)   TempSrc: Oral   Weight: 129 lb 14.4 oz (58.9 kg)   Height: 5' 2.75\" (1.594 m)     Wt Readings from Last 3 Encounters:   01/26/18 129 lb 14.4 oz (58.9 kg)   11/22/17 131 lb (59.4 kg)   11/13/17 131 lb (59.4 kg)     Body mass index is 23.19 kg/(m^2).    PHYSICAL EXAM:  GENERAL: Pleasant, well-appearing patient in no acute distress.   VITALS: MD measured BP was 136/58, MANUEL, sitting.  HEENT: Pupils equal round reactive to light. Sclerae and conjunctivae clear. Oropharynx is clear with moist mucous membranes.   NECK: Supple without lymphadenopathy, no carotid bruits   CARDIOVASCULAR: Heart regular rate and rhythm, 2/6 systolic murmur normal S1-S2   ABDOMEN: Soft, nontender, nondistended.  No guarding or rebound.  No organomegaly or masses appreciated.  LUNGS: Clear to auscultation bilaterally, good air movement throughout   EXTREMITIES Warm and well-perfused without edema. Pedal pulses palpable and symmetric bilaterally   NEURO: Alert and oriented. Grossly nonfocal.   PSYCHIATRIC: Presents on time and well groomed. Normal speech and thought content. Full affect. No abnormal movements or behaviors noted.    ADDITIONAL HISTORY SUMMARIZED (2): Reviewed 9/7/17 note regarding hypertension. Reviewed 9/18/17 note regarding shortness of breath.  DECISION TO OBTAIN EXTRA INFORMATION (1): None.   RADIOLOGY TESTS (1): None.  LABS (1): Labs were ordered today.  MEDICINE TESTS (1): Ordered Echo/ EKG today.  INDEPENDENT REVIEW (2 each): Independent review of EKG, sinus rhythm, PVCs, no significant change    The visit lasted a total of 21 minutes face to face with the patient. Over 50% of the time was spent counseling and educating the patient about travel consult.    Chiqui HARRIS, am scribing for and in the presence of, Dr. Kaye.    Dr. Vargas HARRIS, personally performed the services described in this documentation, as scribed by Chiqui Jackson in my presence, and it is both accurate and " complete.    MEDICATIONS:  Current Outpatient Prescriptions   Medication Sig Dispense Refill     amLODIPine (NORVASC) 5 MG tablet Take 1 tablet (5 mg total) by mouth daily. For Shubhamauds. 90 tablet 0     aspirin 81 MG EC tablet Take 81 mg by mouth daily.       atorvastatin (LIPITOR) 40 MG tablet Take 1 tablet (40 mg total) by mouth daily. 90 tablet 2     CALCIUM CARBONATE (CALCIUM 500 ORAL) Take by mouth.       cholecalciferol, vitamin D3, 1,000 unit tablet Take 2,000 Units by mouth daily.       folic acid (FOLVITE) 1 MG tablet Take 1 tablet (1 mg total) by mouth daily. 90 tablet 3     levothyroxine (SYNTHROID, LEVOTHROID) 100 MCG tablet TAKE ONE TABLET BY MOUTH EVERY DAY AT 6:00 A.M. 90 tablet 2     methotrexate 2.5 MG tablet Take 4 tablets (10 mg total) by mouth once a week. 48 tablet 0     MV,CALCIUM,MIN/IRON/FOLIC/VITK (MULTI FOR HER ORAL) Take by mouth.       nitroglycerin (NITROSTAT) 0.4 MG SL tablet Place 1 tablet (0.4 mg total) under the tongue every 5 (five) minutes as needed for chest pain. 25 tablet 0     VIT C/E/ZN/COPPR/LUTEIN/ZEAXAN (PRESERVISION AREDS 2 ORAL) Take 1 tablet by mouth 2 (two) times a day.       No current facility-administered medications for this visit.        Total data points: 6

## 2021-06-16 ENCOUNTER — RECORDS - HEALTHEAST (OUTPATIENT)
Dept: LAB | Facility: CLINIC | Age: 86
End: 2021-06-16

## 2021-06-16 ENCOUNTER — OFFICE VISIT - HEALTHEAST (OUTPATIENT)
Dept: GERIATRICS | Facility: CLINIC | Age: 86
End: 2021-06-16

## 2021-06-16 DIAGNOSIS — I25.708 CORONARY ARTERY DISEASE INVOLVING CORONARY BYPASS GRAFT OF NATIVE HEART WITH OTHER FORMS OF ANGINA PECTORIS (H): ICD-10-CM

## 2021-06-16 DIAGNOSIS — D62 ACUTE BLOOD LOSS ANEMIA: ICD-10-CM

## 2021-06-16 DIAGNOSIS — B96.81 HELICOBACTER PYLORI GASTRITIS: ICD-10-CM

## 2021-06-16 DIAGNOSIS — K29.70 HELICOBACTER PYLORI GASTRITIS: ICD-10-CM

## 2021-06-16 DIAGNOSIS — I10 ESSENTIAL HYPERTENSION: ICD-10-CM

## 2021-06-16 DIAGNOSIS — L40.50 PSORIATIC ARTHRITIS (H): ICD-10-CM

## 2021-06-16 DIAGNOSIS — K92.2 ACUTE GI BLEEDING: ICD-10-CM

## 2021-06-16 PROBLEM — I49.3 FREQUENT PVCS: Status: ACTIVE | Noted: 2019-08-21

## 2021-06-16 PROBLEM — M54.9 BACK PAIN: Status: ACTIVE | Noted: 2019-05-08

## 2021-06-16 PROBLEM — E87.20 LACTIC ACIDEMIA: Status: ACTIVE | Noted: 2021-06-01

## 2021-06-16 PROBLEM — M84.48XA SACRAL INSUFFICIENCY FRACTURE, INITIAL ENCOUNTER: Status: ACTIVE | Noted: 2019-05-06

## 2021-06-16 PROBLEM — K25.3 GASTRIC ULCER DUE TO HELICOBACTER PYLORI, ACUTE: Status: ACTIVE | Noted: 2021-06-12

## 2021-06-16 PROBLEM — K92.1 MELENA: Status: ACTIVE | Noted: 2021-06-01

## 2021-06-16 PROBLEM — Z79.899 HIGH RISK MEDICATION USE: Status: ACTIVE | Noted: 2017-07-11

## 2021-06-16 PROBLEM — R55 SYNCOPE, UNSPECIFIED SYNCOPE TYPE: Status: ACTIVE | Noted: 2021-06-06

## 2021-06-16 PROBLEM — R57.8 HEMORRHAGIC SHOCK (H): Status: ACTIVE | Noted: 2021-06-01

## 2021-06-16 PROBLEM — L89.301 PRESSURE INJURY OF BUTTOCK, STAGE 1, UNSPECIFIED LATERALITY: Status: ACTIVE | Noted: 2019-06-16

## 2021-06-16 NOTE — PROGRESS NOTES
ASSESSMENT AND PLAN:  Rox Zavala 82 y.o. female is seen here on 02/22/18 for follow-up.  She has psoriatic arthritis.  She has osteoarthritis.  She is doing great with methotrexate 10 mg per week.  She takes folic acid.  Her main discomfort is in the right thumb area where she has osteoarthritis the first CMC.  We discussed options.  She would exercise those as and when she feels appropriate.  Her recent labs are within acceptable range.  I have asked her to continue as now.  Return for follow-up here in 3 months.        Diagnoses and all orders for this visit:    Psoriatic arthritis  -     methotrexate 2.5 MG tablet; Take 4 tablets (10 mg total) by mouth once a week.  Dispense: 48 tablet; Refill: 0    High risk medication use    Generalized Osteoarthritis Of The Hand    Osteoarthritis Of The Knee          HISTORY OF PRESENTING ILLNESS ON 02/22/18 :  Rox Zavala 82 y.o. is here for follow-up of psoriatic arthritis and osteoarthritis.  She is complaining of pain.  This is in her right hand.  She points the base of the thumb also has similar symptoms in the left side.  She rates them as moderately severe left, milder on the right side.  She has more difficulty doing things.  Despite that she is able to do most of her day-to-day activities.  Her morning stiffness is limited to 5 minutes she also has mild pain in her right hip area.  Recent labs are within acceptable range on methotrexate 10 mg per week with that she is taking folic acid daily.  She noted pain level 2.5/10. She is taking folic acid.  She does not have evidence of autoimmunity.  She does not smoke does not take alcohol.  She had right hip replacement in 2007.Further historical information, including ROS and limitation in activities as noted in the multidimensional health assessment questionnaire scanned in the EMR and in the assessment and plan section.    ALLERGIES:Review of patient's allergies indicates no known allergies.    PAST  "MEDICAL/ACTIVE PROBLEMS/MEDICATION/SOCIAL DATA  Past Medical History:   Diagnosis Date     Acute renal failure      Aortic valve stenosis, severe      Arthritis      Coronary artery disease 11/17/2014     Coronary artery disease      Disease of thyroid gland      Hammer toe      Hyperlipidemia      Hypertension      Macular disorder     \"wrinkle\"     Rheumatoid arthritis      S/P AVR 12/15/2014     History   Smoking Status     Former Smoker     Packs/day: 1.00     Years: 30.00     Types: Cigarettes     Quit date: 1/1/1995   Smokeless Tobacco     Never Used     Patient Active Problem List   Diagnosis     Callus     Hammer Toe     Hemorrhoids     Psoriasis     Generalized Osteoarthritis Of The Hand     Osteoarthritis Of The Knee     Vitamin D Deficiency     Aortic Stenosis     Cough     Palpitations     Hypothyroidism     Neck Pain     Upper Back Pain (Between Shoulder Blades)     Osteopenia     Aortic stenosis, severe     Coronary artery disease     S/P AVR     Fever     Constipation     Upset stomach     Pain at surgical incision     Healthcare-associated pneumonia     Acute respiratory distress     Bilateral pleural effusion     Acute diastolic CHF (congestive heart failure)     Hypertension     Essential hypertension     Hyperlipidemia     Coronary artery disease involving coronary bypass graft of native heart with other forms of angina pectoris     Postoperative (PCI) anemia due to acute blood loss     Thigh hematoma, right, initial encounter     Psoriatic arthritis     High risk medication use     Current Outpatient Prescriptions   Medication Sig Dispense Refill     amLODIPine (NORVASC) 5 MG tablet TAKE ONE TABLET BY MOUTH EVERY DAY FOR REYNAUDS 90 tablet 3     aspirin 81 MG EC tablet Take 81 mg by mouth daily.       atorvastatin (LIPITOR) 40 MG tablet Take 1 tablet (40 mg total) by mouth daily. 90 tablet 2     CALCIUM CARBONATE (CALCIUM 500 ORAL) Take by mouth.       cholecalciferol, vitamin D3, 1,000 unit " "tablet Take 2,000 Units by mouth daily.       folic acid (FOLVITE) 1 MG tablet Take 1 tablet (1 mg total) by mouth daily. 90 tablet 3     levothyroxine (SYNTHROID, LEVOTHROID) 100 MCG tablet TAKE ONE TABLET BY MOUTH EVERY DAY AT 6:00 A.M. 90 tablet 2     MV,CALCIUM,MIN/IRON/FOLIC/VITK (MULTI FOR HER ORAL) Take by mouth.       nitroglycerin (NITROSTAT) 0.4 MG SL tablet Place 1 tablet (0.4 mg total) under the tongue every 5 (five) minutes as needed for chest pain. 25 tablet 0     VIT C/E/ZN/COPPR/LUTEIN/ZEAXAN (PRESERVISION AREDS 2 ORAL) Take 1 tablet by mouth 2 (two) times a day.       methotrexate 2.5 MG tablet Take 4 tablets (10 mg total) by mouth once a week. 48 tablet 0     No current facility-administered medications for this visit.      DETAILED EXAMINATION  02/22/18  :  Vitals:    02/22/18 1342   BP: (!) 166/94   Pulse: 66   Weight: 129 lb (58.5 kg)   Height: 5' 2.75\" (1.594 m)     Alert oriented. Head including the face is examined for malar rash, heliotropes, scarring, lupus pernio. Eyes examined for redness such as in episcleritis/scleritis, periorbital lesions.   Neck/ Face examined for parotid gland swelling, range of motion of neck.  Left upper and lower and right upper and lower extremities examined for tenderness, swelling, warmth of the appendicular joints, range of motion, edema, rash.  Some of the important findings included: She has squaring of the first CMC bilaterally tender more so on the right side.  There is no dactylitis of the digits, enthesitis.  There is no synovitis of the palpable appendicular joints.  There is no onycholysis nail pitting of the digits.      LAB / IMAGING DATA:  ALT   Date Value Ref Range Status   02/20/2018 29 0 - 45 U/L Final   01/26/2018 29 0 - 45 U/L Final   11/06/2017 28 0 - 45 U/L Final     Albumin   Date Value Ref Range Status   02/20/2018 3.9 3.5 - 5.0 g/dL Final   01/26/2018 3.8 3.5 - 5.0 g/dL Final   11/06/2017 3.7 3.5 - 5.0 g/dL Final     Creatinine   Date " Value Ref Range Status   02/20/2018 0.87 0.60 - 1.10 mg/dL Final   01/26/2018 0.83 0.60 - 1.10 mg/dL Final   11/06/2017 0.84 0.60 - 1.10 mg/dL Final       WBC   Date Value Ref Range Status   02/20/2018 7.2 4.0 - 11.0 thou/uL Final   01/26/2018 6.5 4.0 - 11.0 thou/uL Final   09/29/2015 6.4 4.0 - 11.0 thou/uL Final   05/07/2015 6.9 4.0 - 11.0 thou/uL Final     Hemoglobin   Date Value Ref Range Status   02/20/2018 12.1 12.0 - 16.0 g/dL Final   01/26/2018 11.4 (L) 12.0 - 16.0 g/dL Final   11/06/2017 11.2 (L) 12.0 - 16.0 g/dL Final     Platelets   Date Value Ref Range Status   02/20/2018 153 140 - 440 thou/uL Final   01/26/2018 146 140 - 440 thou/uL Final   11/06/2017 163 140 - 440 thou/uL Final       Lab Results   Component Value Date    RF <15 07/17/2013    SEDRATE 20 11/08/2016

## 2021-06-16 NOTE — TELEPHONE ENCOUNTER
Pt called and is requesting a refill for her methotrexate. Please refill at [facility].

## 2021-06-17 ENCOUNTER — RECORDS - HEALTHEAST (OUTPATIENT)
Dept: LAB | Facility: CLINIC | Age: 86
End: 2021-06-17

## 2021-06-17 LAB — HGB BLD-MCNC: 7.2 G/DL (ref 12–16)

## 2021-06-17 NOTE — PATIENT INSTRUCTIONS - HE
Rox Zavala,    It is my pleasure to see you today at the [facility].    My recommendations for this visit are:    1. Continue your current medications.  2. Start Imdur 30 mg daily for chest pain and shortness of breath  3. Stress cardiac MRI for evaluation of bypass grafts and blockage, aortic function and heart function  4. Will see you again in 2 months and as needed    Shilpa Wilburn MD, PhD

## 2021-06-17 NOTE — PROGRESS NOTES
ASSESSMENT:  1. Left wrist pain  CANCELED: XR Wrist Left 3 or More VWS       PLAN:  Xray negative for fracture. Symptoms consistent with a tendonitis today, so no further labs were done. Continue to monitor, ice, may use wrist brace for support. Symptoms should improve within 1-2 weeks. If pain worsening, continuing or if redness, increased swelling or warmth develop be seen here or call rheumatology.     No problem-specific Assessment & Plan notes found for this encounter.      There are no Patient Instructions on file for this visit.    No orders of the defined types were placed in this encounter.    There are no discontinued medications.    No Follow-up on file.    CHIEF COMPLAINT:  Chief Complaint   Patient presents with     Wrist Pain     c/o left wrist pain x 4 days, some swelling to the wrist, no trauma or injury to it        HISTORY OF PRESENT ILLNESS:  Rox is a 83 y.o. female with left wrist pain for 4-5 days.  Patient had noticed an increase in swelling and pain on the radial side of the wrist but this time.  She is not aware of any injury or trauma.  She does workout at [employer] on a regular basis and so could have done something to it there.  She does have a history of psoriatic and osteoarthritis that she follows with rheumatology for.  She has recently seen them and is stable on methotrexate, no recent dosing changes.  She has not been doing anything at home to treat the wrist.  She has been trying to use it less.  She is right-hand dominant.  There is been no redness, swelling, warmth to the area.    REVIEW OF SYSTEMS:      Pertinent items are noted in HPI.  All other systems are negative  PFSH:  Reviewed, no changes      TOBACCO USE:  History   Smoking Status     Former Smoker     Packs/day: 1.00     Years: 30.00     Types: Cigarettes     Quit date: 1/1/1995   Smokeless Tobacco     Never Used       VITALS:  Vitals:    04/26/18 1109   BP: 108/60   Patient Site: Left Arm   Patient Position: Sitting    Cuff Size: Adult Regular   Pulse: 72   Resp: 16   Temp: 97.9  F (36.6  C)   TempSrc: Oral   Weight: 129 lb 9 oz (58.8 kg)     Wt Readings from Last 3 Encounters:   04/26/18 129 lb 9 oz (58.8 kg)   03/01/18 133 lb (60.3 kg)   02/22/18 129 lb (58.5 kg)       PHYSICAL EXAM:   /60 (Patient Site: Left Arm, Patient Position: Sitting, Cuff Size: Adult Regular)  Pulse 72  Temp 97.9  F (36.6  C) (Oral)   Resp 16  Wt 129 lb 9 oz (58.8 kg)  LMP 08/17/1974  BMI 23.13 kg/m2  General appearance: alert, appears stated age and cooperative  Extremities: swelling left wrist and hand. Pain radial side of wrist to palpation, clicking of APL on movement.  Pulses: 2+ and symmetric  Skin: Skin color, texture, turgor normal. No rashes or lesions  Neurologic: Grossly normal    DATA REVIEWED:  Additional History from Old Records Summarized (2): 2  Decision to Obtain Records (1): 0  Radiology Tests Summarized or Ordered (1): wrist xray  Labs Reviewed or Ordered (1): 0  Medicine Test Summarized or Ordered (1): 0  Independent Review of EKG or X-RAY(2 each): 0    The visit lasted a total of 25 minutes face to face with the patient. Over 50% of the time was spent counseling and educating the patient about plan of care.    MEDICATIONS:  Current Outpatient Prescriptions   Medication Sig Dispense Refill     amLODIPine (NORVASC) 5 MG tablet TAKE ONE TABLET BY MOUTH EVERY DAY FOR REYNAUDS 90 tablet 3     aspirin 81 MG EC tablet Take 81 mg by mouth daily.       atorvastatin (LIPITOR) 40 MG tablet Take 1 tablet (40 mg total) by mouth daily. 90 tablet 2     CALCIUM CARBONATE (CALCIUM 500 ORAL) Take by mouth.       cholecalciferol, vitamin D3, 1,000 unit tablet Take 2,000 Units by mouth daily.       folic acid (FOLVITE) 1 MG tablet Take 1 tablet (1 mg total) by mouth daily. 90 tablet 3     levothyroxine (SYNTHROID, LEVOTHROID) 100 MCG tablet TAKE ONE TABLET BY MOUTH EVERY DAY AT 6:00 A.M. 90 tablet 2     methotrexate 2.5 MG tablet Take 4  tablets (10 mg total) by mouth once a week. 48 tablet 0     MV,CALCIUM,MIN/IRON/FOLIC/VITK (MULTI FOR HER ORAL) Take by mouth.       VIT C/E/ZN/COPPR/LUTEIN/ZEAXAN (PRESERVISION AREDS 2 ORAL) Take 1 tablet by mouth 2 (two) times a day.       furosemide (LASIX) 40 MG tablet Take 1 tablet (40 mg total) by mouth as needed (If gained weight more than 1 pound a day or more leg edema). 30 tablet 1     nitroglycerin (NITROSTAT) 0.4 MG SL tablet Place 1 tablet (0.4 mg total) under the tongue every 5 (five) minutes as needed for chest pain. 25 tablet 0     No current facility-administered medications for this visit.        This note has been dictated using voice recognition software. Any grammatical or context distortions are unintentional and inherent to the software

## 2021-06-17 NOTE — TELEPHONE ENCOUNTER
RN cannot approve Refill Request    RN can NOT refill this medication   Patient request early refill. Medication last filled 10/9/20 for qty 90 refill 2. Provider to advise on request. . Last office visit: 7/2/2020 Misbah Barone MD Last Physical: 8/21/2018 Last MTM visit: Visit date not found Last visit same specialty: 7/2/2020 Misbah Barone MD.  Next visit within 3 mo: Visit date not found  Next physical within 3 mo: Visit date not found      Enrico Morales, [facility] Triage/Med Refill 5/19/2021    Requested Prescriptions   Pending Prescriptions Disp Refills     atorvastatin (LIPITOR) 40 MG tablet 90 tablet 2     Sig: Take 1 tablet (40 mg total) by mouth daily.       Statins Refill Protocol (Hmg CoA Reductase Inhibitors) Passed - 5/18/2021  2:45 PM        Passed - PCP or prescribing provider visit in past 12 months      Last office visit with prescriber/PCP: 7/2/2020 Misbah Barone MD OR same dept: 7/2/2020 Misbah Barone MD OR same specialty: 7/2/2020 Misbah Barone MD  Last physical: 8/21/2018 Last MTM visit: Visit date not found   Next visit within 3 mo: Visit date not found  Next physical within 3 mo: Visit date not found  Prescriber OR PCP: Misbah Barone MD  Last diagnosis associated with med order: 1. Coronary artery disease involving coronary bypass graft of native heart with other forms of angina pectoris (H)  - atorvastatin (LIPITOR) 40 MG tablet; Take 1 tablet (40 mg total) by mouth daily.  Dispense: 90 tablet; Refill: 2    If protocol passes may refill for 12 months if within 3 months of last provider visit (or a total of 15 months).

## 2021-06-17 NOTE — PATIENT INSTRUCTIONS - HE
Patient Instructions by Misbah Barone MD at 6/13/2019  1:30 PM     Author: Misbah Barone MD Service: -- Author Type: Physician    Filed: 6/13/2019  2:31 PM Encounter Date: 6/13/2019 Status: Addendum    : Misbah Barone MD (Physician)    Related Notes: Original Note by Misbah Barone MD (Physician) filed at 6/13/2019  2:25 PM       Take metoprolol 25 mg daily.     Take lisinopril 5 mg daily unless blood pressure is 100/60 or less.    Take gabapentin 100 mg 2 times a day.    Tramadol can be used as needed for pain control, however make sure you monitor as it can make you dizzy.    .  Preventing Pressure Sores (Pressure Ulcers)  Pressure sores can develop quickly, even in healthy skin. Thats why taking steps to prevent them is so important. Taking pressure off your skin is the first step. That means changing positions often, supporting your body, and not rubbing and sliding. Keeping your skin clean, eating well, and stretching your joints and muscles can also help prevent pressure sores. Be sure to check your skin daily, too.  Change positions often  Changing positions often allows blood to get to your skin and keep the tissue healthy.  In a chair    Shift weight from side to side at least once an hour--every 15 minutes if possible.    Ask about pads and cushions that can reduce pressure on your skin.  In bed    Change positions at least every 2 hours, more often if possible.    Use lightweight sheets and blankets to reduce pressure from above.    Ask about special pads and mattresses that spread pressure over a larger area of your body.  Support your body  Supporting your body spreads pressure over a larger area.  In a chair    Lightly cushion your back and buttocks. Dont use doughnut-type cushions. They can cut off the blood supply to your skin.    Lightly pad the footrest on your wheelchair.  In bed    When lying on your back, put pillows under your lower calves and ankles. Keep your elbows  slightly bent.    When lying on your side, put pillows behind your back, between your legs, and between your ankles. Keep elbows and knees slightly bent.  Don't rub or slide  Rubbing (friction) and sliding (shear) cause the skin to break down more easily.  In a chair    Keep your feet on a footrest, so your thighs are horizontal. This keeps your buttocks from sliding forward.    Support your shoulder blades and back with a pillow.  In bed    Keep your sheets smooth, dry, and free of crumbs. Use a sheepskin pad to prevent rubbing.    Keep your feet and head slightly raised to avoid sliding. When on bed rest, dont raise your head more than 30 degrees, except when needed for some medical conditions or for eating.   Keep your skin clean  Keeping your skin clean and dry also helps prevent pressure sores.    Keep your skin free of sweat, urine, or wound drainage.    Apply protective creams and use absorbent pads if you don't have bladder or bowel control.    Check your skin twice a day for signs of breakdown.  Eat healthy and move around  If you are in a bed or a wheelchair most or all of the time, you need to:    Eat enough calories to stay at a stable weight.    Get plenty of protein, vitamins, and iron, and drink lots of fluids each day.    Get out of your bed or chair as much as possible.  Check your skin twice a day  Do skin checks each day as part of your daily routine. Skin breakdown starts with slight changes, but can progress very quickly.     Look for redness, bruises, cuts, and other irritations, especially over bony areas.    Look for scabbing, blistering, or open areas on the surface of your skin. These are more serious and must be treated right away.  Date Last Reviewed: 9/1/2017 2000-2017 [employer]. [address] [place] [address], [place] [address]. All rights reserved. This information is not intended as a substitute for professional medical care. Always follow your healthcare professional's  instructions.             Patient Education     Pressure Injuries: Common Sites  Bony prominences are the areas of bone that are close to the skin's surface. These areas are most susceptible to pressure injuries because they have the least amount of cushioning. Which pressure points are vulnerable for a particular patient depend on the position in which most of that patients time is spent.  Pressure points to remember     On the back      Sitting      On the side   Date Last Reviewed: 1/24/2016 2000-2017 [employer]. [address] [place] [address], [place] [address]. All rights reserved. This information is not intended as a substitute for professional medical care. Always follow your healthcare professional's instructions.

## 2021-06-18 LAB — HGB BLD-MCNC: 7.4 G/DL (ref 12–16)

## 2021-06-18 NOTE — PROGRESS NOTES
ASSESSMENT AND PLAN:  Rox Zavala 83 y.o. female is seen here on 06/08/18 for follow-up.  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  She has new onset of sharp pain in her left wrist which is commensurate with the tenosynovitis of the first extensor compartment, de Quervain's disease.  We discussed the management options.  She wants to get local injection 20 mg of methylprednisolone injected into the tendon sheath under ultrasound guidance.  She is going to continue methotrexate her recent labs are within normal range.  If this recurs the question of psoriatic arthritis related flareups would need to be considered.  She may then need to consider increasing the methotrexate or adding additional medication.  We will meet here this time in 3 months.        Diagnoses and all orders for this visit:    Psoriatic arthritis  -     methotrexate 2.5 MG tablet; Take 4 tablets (10 mg total) by mouth once a week.  Dispense: 48 tablet; Refill: 0    Extensor tenosynovitis of wrist, left  -     methylPREDNISolone acetate injection 20 mg (DEPO-MEDROL); Inject 0.5 mL (20 mg total) into the joint once.    Generalized Osteoarthritis Of The Hand          HISTORY OF PRESENTING ILLNESS ON 06/08/18 :  Rox Zavala 83 y.o. is here for follow-up of psoriatic arthritis and osteoarthritis.  She is complaining of pain.  This is severe.  This is along her left wrist.  This is interfering with her day-to-day activities.  She has been trying various over-the-counter measures braces over the past 4 weeks that she has had this.  She wonders if there is some swelling.  Some activities such as reaching behind her back can be quite excruciating.  She noted no other joints are similarly affected.  There is no history of trauma.  She reports no morning stiffness.  There is no fever weight loss blurry vision eye redness moccasin nausea cough or new rash.    She does not have evidence of autoimmunity.  She  "does not smoke does not take alcohol.  She had right hip replacement in 2007.Further historical information, including ROS and limitation in activities as noted in the multidimensional health assessment questionnaire scanned in the EMR and in the assessment and plan section.    ALLERGIES:Review of patient's allergies indicates no known allergies.    PAST MEDICAL/ACTIVE PROBLEMS/MEDICATION/SOCIAL DATA  Past Medical History:   Diagnosis Date     Acute renal failure      Aortic valve stenosis, severe      Arthritis      Coronary artery disease 11/17/2014     Coronary artery disease      Disease of thyroid gland      Hammer toe      Hyperlipidemia      Hypertension      Macular disorder     \"wrinkle\"     Rheumatoid arthritis      S/P AVR 12/15/2014     History   Smoking Status     Former Smoker     Packs/day: 1.00     Years: 30.00     Types: Cigarettes     Quit date: 1/1/1995   Smokeless Tobacco     Never Used     Patient Active Problem List   Diagnosis     Callus     Hammer Toe     Hemorrhoids     Psoriasis     Generalized Osteoarthritis Of The Hand     Osteoarthritis Of The Knee     Vitamin D Deficiency     Aortic Stenosis     Cough     Palpitations     Hypothyroidism     Neck Pain     Upper Back Pain (Between Shoulder Blades)     Osteopenia     Aortic stenosis, severe     Coronary artery disease     S/P AVR     Fever     Constipation     Upset stomach     Pain at surgical incision     Healthcare-associated pneumonia     Acute respiratory distress     Bilateral pleural effusion     Acute diastolic CHF (congestive heart failure)     Hypertension     Essential hypertension     Hyperlipidemia     Coronary artery disease involving coronary bypass graft of native heart with other forms of angina pectoris     Postoperative (PCI) anemia due to acute blood loss     Thigh hematoma, right, initial encounter     Psoriatic arthritis     High risk medication use     Extensor tenosynovitis of wrist, left     Current Outpatient " "Prescriptions   Medication Sig Dispense Refill     amLODIPine (NORVASC) 5 MG tablet TAKE ONE TABLET BY MOUTH EVERY DAY FOR REYNAUDS 90 tablet 3     aspirin 81 MG EC tablet Take 81 mg by mouth daily.       CALCIUM CARBONATE (CALCIUM 500 ORAL) Take by mouth.       cholecalciferol, vitamin D3, 1,000 unit tablet Take 2,000 Units by mouth daily.       folic acid (FOLVITE) 1 MG tablet Take 1 tablet (1,000 mcg total) by mouth daily. 90 tablet 3     levothyroxine (SYNTHROID, LEVOTHROID) 100 MCG tablet TAKE ONE TABLET BY MOUTH EVERY DAY AT 6:00 A.M. 90 tablet 2     methotrexate 2.5 MG tablet Take 4 tablets (10 mg total) by mouth once a week. 48 tablet 0     MV,CALCIUM,MIN/IRON/FOLIC/VITK (MULTI FOR HER ORAL) Take by mouth.       VIT C/E/ZN/COPPR/LUTEIN/ZEAXAN (PRESERVISION AREDS 2 ORAL) Take 1 tablet by mouth 2 (two) times a day.       atorvastatin (LIPITOR) 40 MG tablet Take 1 tablet (40 mg total) by mouth daily. 90 tablet 2     furosemide (LASIX) 40 MG tablet Take 1 tablet (40 mg total) by mouth as needed (If gained weight more than 1 pound a day or more leg edema). 30 tablet 1     nitroglycerin (NITROSTAT) 0.4 MG SL tablet Place 1 tablet (0.4 mg total) under the tongue every 5 (five) minutes as needed for chest pain. 25 tablet 0     Current Facility-Administered Medications   Medication Dose Route Frequency Provider Last Rate Last Dose     methylPREDNISolone acetate injection 20 mg (DEPO-MEDROL)  20 mg Intra-articular Once SOILA Glass         DETAILED EXAMINATION  06/08/18  :  Vitals:    06/08/18 1137   BP: 124/62   Weight: 130 lb 6.4 oz (59.1 kg)   Height: 5' 2.75\" (1.594 m)     Alert oriented. Head including the face is examined for malar rash, heliotropes, scarring, lupus pernio. Eyes examined for redness such as in episcleritis/scleritis, periorbital lesions.   Neck/ Face examined for parotid gland swelling, range of motion of neck.  Left upper and lower and right upper and lower extremities examined for " tenderness, swelling, warmth of the appendicular joints, range of motion, edema, rash.  Some of the important findings included: She has warmth tenderness and swelling of the distal left forearm on the radial aspect.  She has positive Finkelstein.  Right side is normal.  She does not have synovitis elsewhere in the upper extremities amongst the palpable joints.  She is status post right hip replacement.  Joint line tenderness minimal, bilateral knees.        LAB / IMAGING DATA:  ALT   Date Value Ref Range Status   06/06/2018 24 0 - 45 U/L Final   02/20/2018 29 0 - 45 U/L Final   01/26/2018 29 0 - 45 U/L Final     Albumin   Date Value Ref Range Status   06/06/2018 4.0 3.5 - 5.0 g/dL Final   02/20/2018 3.9 3.5 - 5.0 g/dL Final   01/26/2018 3.8 3.5 - 5.0 g/dL Final     Creatinine   Date Value Ref Range Status   06/06/2018 0.88 0.60 - 1.10 mg/dL Final   02/20/2018 0.87 0.60 - 1.10 mg/dL Final   01/26/2018 0.83 0.60 - 1.10 mg/dL Final       WBC   Date Value Ref Range Status   06/06/2018 8.9 4.0 - 11.0 thou/uL Final   02/20/2018 7.2 4.0 - 11.0 thou/uL Final   09/29/2015 6.4 4.0 - 11.0 thou/uL Final   05/07/2015 6.9 4.0 - 11.0 thou/uL Final     Hemoglobin   Date Value Ref Range Status   06/06/2018 13.0 12.0 - 16.0 g/dL Final   02/20/2018 12.1 12.0 - 16.0 g/dL Final   01/26/2018 11.4 (L) 12.0 - 16.0 g/dL Final     Platelets   Date Value Ref Range Status   06/06/2018 152 140 - 440 thou/uL Final   02/20/2018 153 140 - 440 thou/uL Final   01/26/2018 146 140 - 440 thou/uL Final       Lab Results   Component Value Date    RF <15 07/17/2013    SEDRATE 20 11/08/2016

## 2021-06-19 NOTE — PROGRESS NOTES
ASSESSMENT & PLAN:  1. Vertigo  Suspect BPPV.  With her history of atherosclerosis, head CT ordered to evaluate for stroke, although I think stroke is unlikely to be the cause of her symptoms.  We will obtain a head CT today and as long as unremarkable she can proceed with PT/OT for vertigo.  She wonders if there is a medication she can take.  We discussed trying a low-dose of meclizine but discussed that it can have increase adverse effects in the elderly, and she should use with caution.  We discussed that if she has side effects, it is likely not worth taking.  - CT Head Without Contrast; Future  - Ambulatory referral to PT/OT    2. Memory loss  She reports a slow decline over at least the last year.  Within the last year she has had normal thyroid and B12 testing so those were deferred today.  Referral to memory loss clinic further evaluation.  - Ambulatory referral to Dementia/Memory Loss Clinic      Patient Instructions   We will do CT scan of your head for dizziness, but I think it will turn out fine.    I have referred you to Physical/occupational therapy for dizziness/vertigo. You can try meclizine, but use caution - as it can cause drowsiness, confusion (increased side effects in elderly).    I referred you to our [facility] at [place].      Orders Placed This Encounter   Procedures     CT Head Without Contrast     Standing Status:   Future     Standing Expiration Date:   7/9/2019     Order Specific Question:   Can the procedure be changed per Radiologist protocol?     Answer:   Yes     Order Specific Question:   If this is a diagnostic procedure, have the patient's age and recent imaging history been considered?     Answer:   Yes     Ambulatory referral to Dementia/Memory Loss Clinic     Referral Priority:   Routine     Referral Type:   Consultation     Referral Reason:   Evaluation and Treatment     Number of Visits Requested:   1     Ambulatory referral to PT/OT     Referral Priority:   Routine      Referral Type:   Physical Therapy or Occupational Therapy     Referral Reason:   Evaluation and Treatment     Requested Specialty:   Physical Therapy     Number of Visits Requested:   1     There are no discontinued medications.    No Follow-up on file.    CHIEF COMPLAINT:  Chief Complaint   Patient presents with     Dizziness     woke up with dizziness and couldnt walk without holding the wall. Pt is still a little dizzy and feels unbalance.       HISTORY OF PRESENT ILLNESS:  Rox is a 83 y.o. female presenting to the clinic today for dizziness. For the last few days, she has felt unsteady. She has not fallen. This morning, she woke up very dizzy and could not walk without holding onto the wall. Feels like the room is spinning. She reports a similar episode a few years ago which resolved quickly. Moving her head seems to trigger the dizziness. She denies any leg issues, no tripping or stumbling, no numbness, tingling, or weakness. No recent falls or head trauma.  Denies feeling like she might pass out.    Memory Loss: She has questions regarding testing for Alzheimer. She feels she has been more forgetful in the past few months, but ongoing at least a year. Forgets names, events, and places. She frequently needs to back track a few pages when she is reading a book as she forgets what she is reading about.    REVIEW OF SYSTEMS:   Positive for ringing in the ears. No chest pain or pressure. No palpitations. All other systems are negative.    PFSH:  Surgical: History of bypass.  Social: She is retired. Has 2 sons.    TOBACCO USE:  History   Smoking Status     Former Smoker     Packs/day: 1.00     Years: 30.00     Types: Cigarettes     Quit date: 1/1/1995   Smokeless Tobacco     Never Used       VITALS:  Vitals:    07/09/18 0946   BP: 128/70   Patient Site: Right Arm   Patient Position: Sitting   Cuff Size: Adult Regular   Pulse: 64   Resp: 16   Temp: 97.5  F (36.4  C)   TempSrc: Oral   Weight: 129 lb 1 oz (58.5 kg)      Wt Readings from Last 3 Encounters:   07/09/18 129 lb 1 oz (58.5 kg)   06/08/18 130 lb 6.4 oz (59.1 kg)   04/26/18 129 lb 9 oz (58.8 kg)     Body mass index is 23.04 kg/(m^2).    PHYSICAL EXAM:  GENERAL: Pleasant, well-appearing patient in no acute distress.   HEENT: Pupils equal round reactive to light. Sclerae and conjunctivae clear. TMs are clear bilaterally.   NECK: Supple without lymphadenopathy, no carotid bruits   CARDIOVASCULAR: Heart regular rate and rhythm with 2/6 systolic murmur normal S1-S2   LUNGS: Clear to auscultation bilaterally, good air movement throughout   EXTREMITIES Warm and well-perfused without edema. Pedal pulses palpable and symmetric bilaterally   NEURO: Alert and oriented. Normal speech. Cranial nerves II through XII intact. Biceps, brachioradialis, Achilles, patellar reflexes 2+ and symmetric bilaterally. Upper and lower extremity strength and sensation is normal and symmetric. Normal gait. Difficulty with balance during tandem gait. Negative Romberg. Normal rapid alternating movements, nose finger-nose.  When she turns her head during the exam she does trigger a brief spell of feeling dizzy.  PSYCHIATRIC: Presents on time and well groomed. Normal speech and thought content. Full affect. No abnormal movements or behaviors noted.      ADDITIONAL HISTORY SUMMARIZED (2): Dr. Wilburn cardiology note 3/1/18.  DECISION TO OBTAIN EXTRA INFORMATION (1): None.   RADIOLOGY TESTS (1): Ordered head CT.   LABS (1): None.  MEDICINE TESTS (1): None.  INDEPENDENT REVIEW (2 each): None.       The visit lasted a total of 23 minutes face to face with the patient. Over 50% of the time was spent counseling and educating the patient about dizziness and memory loss.    I, Chiqui Jackson, am scribing for and in the presence of, Dr. Kaye.    I, Dr. Kaye, personally performed the services described in this documentation, as scribed by Chiqui Jackson in my presence, and it is both accurate and  complete.    MEDICATIONS:  Current Outpatient Prescriptions   Medication Sig Dispense Refill     amLODIPine (NORVASC) 5 MG tablet TAKE ONE TABLET BY MOUTH EVERY DAY FOR REYNAUDS 90 tablet 3     aspirin 81 MG EC tablet Take 81 mg by mouth daily.       atorvastatin (LIPITOR) 40 MG tablet Take 1 tablet (40 mg total) by mouth daily. 90 tablet 2     CALCIUM CARBONATE (CALCIUM 500 ORAL) Take by mouth.       cholecalciferol, vitamin D3, 1,000 unit tablet Take 2,000 Units by mouth daily.       folic acid (FOLVITE) 1 MG tablet Take 1 tablet (1,000 mcg total) by mouth daily. 90 tablet 3     furosemide (LASIX) 40 MG tablet Take 1 tablet (40 mg total) by mouth as needed (If gained weight more than 1 pound a day or more leg edema). 30 tablet 1     levothyroxine (SYNTHROID, LEVOTHROID) 100 MCG tablet TAKE ONE TABLET BY MOUTH EVERY DAY AT 6:00 A.M. 90 tablet 2     methotrexate 2.5 MG tablet Take 4 tablets (10 mg total) by mouth once a week. 48 tablet 0     MV,CALCIUM,MIN/IRON/FOLIC/VITK (MULTI FOR HER ORAL) Take by mouth.       nitroglycerin (NITROSTAT) 0.4 MG SL tablet Place 1 tablet (0.4 mg total) under the tongue every 5 (five) minutes as needed for chest pain. 25 tablet 0     VIT C/E/ZN/COPPR/LUTEIN/ZEAXAN (PRESERVISION AREDS 2 ORAL) Take 1 tablet by mouth 2 (two) times a day.       meclizine (ANTIVERT) 12.5 mg tablet Take 1 tablet (12.5 mg total) by mouth 3 (three) times a day as needed for dizziness. 30 tablet 0     No current facility-administered medications for this visit.        Total data points: 3

## 2021-06-19 NOTE — LETTER
Letter by Misbah Barone MD at      Author: Misbah Barone MD Service: -- Author Type: --    Filed:  Encounter Date: 4/24/2019 Status: (Other)         Rox Zavala  [address] [place]  [place] [address]             April 24, 2019         Dear Ms. Zavala,    Below are the results from your recent visit:    Resulted Orders   HM1 (CBC with Diff)   Result Value Ref Range    WBC 11.6 (H) 4.0 - 11.0 thou/uL    RBC 3.93 3.80 - 5.40 mill/uL    Hemoglobin 12.7 12.0 - 16.0 g/dL    Hematocrit 38.0 35.0 - 47.0 %    MCV 97 80 - 100 fL    MCH 32.4 27.0 - 34.0 pg    MCHC 33.5 32.0 - 36.0 g/dL    RDW 13.1 11.0 - 14.5 %    Platelets 89 (L) 140 - 440 thou/uL    MPV 9.0 7.0 - 10.0 fL    Neutrophils % 78 (H) 50 - 70 %    Lymphocytes % 12 (L) 20 - 40 %    Monocytes % 8 2 - 10 %    Eosinophils % 1 0 - 6 %    Basophils % 1 0 - 2 %    Neutrophils Absolute 9.1 (H) 2.0 - 7.7 thou/uL    Lymphocytes Absolute 1.4 0.8 - 4.4 thou/uL    Monocytes Absolute 1.0 (H) 0.0 - 0.9 thou/uL    Eosinophils Absolute 0.1 0.0 - 0.4 thou/uL    Basophils Absolute 0.1 0.0 - 0.2 thou/uL   Comprehensive Metabolic Panel   Result Value Ref Range    Sodium 134 (L) 136 - 145 mmol/L    Potassium 4.2 3.5 - 5.0 mmol/L    Chloride 99 98 - 107 mmol/L    CO2 21 (L) 22 - 31 mmol/L    Anion Gap, Calculation 14 5 - 18 mmol/L    Glucose 111 70 - 125 mg/dL    BUN 11 8 - 28 mg/dL    Creatinine 0.83 0.60 - 1.10 mg/dL    GFR MDRD Af Amer >60 >60 mL/min/1.73m2    GFR MDRD Non Af Amer >60 >60 mL/min/1.73m2    Bilirubin, Total 1.5 (H) 0.0 - 1.0 mg/dL    Calcium 9.1 8.5 - 10.5 mg/dL    Protein, Total 7.4 6.0 - 8.0 g/dL    Albumin 3.9 3.5 - 5.0 g/dL    Alkaline Phosphatase 71 45 - 120 U/L    AST 41 (H) 0 - 40 U/L    ALT 30 0 - 45 U/L    Narrative    Fasting Glucose reference range is 70-99 mg/dL per  American Diabetes Association (ADA) guidelines.   BNP(B-type Natriuretic Peptide)   Result Value Ref Range     (H) 0 - 163 pg/mL   Urinalysis-UC if Indicated   Result  Value Ref Range    Color, UA Yellow Colorless, Yellow, Straw, Light Yellow    Clarity, UA Clear Clear    Glucose, UA Negative Negative    Bilirubin, UA Negative Negative    Ketones, UA Negative Negative    Specific Gravity, UA <=1.005 1.005 - 1.030    Blood, UA Negative Negative    pH, UA 6.0 5.0 - 8.0    Protein, UA Negative Negative mg/dL    Urobilinogen, UA 1.0 E.U./dL 0.2 E.U./dL, 1.0 E.U./dL    Nitrite, UA Negative Negative    Leukocytes, UA Small (!) Negative    Bacteria, UA Few (!) None Seen hpf    RBC, UA None Seen None Seen, 0-2 hpf    WBC, UA 5-10 (!) None Seen, 0-5 hpf    Squam Epithel, UA 0-5 None Seen, 0-5 lpf    WBC Clumps Present (!) None Seen    Narrative    Urine Culture ordered based on [facility] criteria       Labs show that she is in congestive heart failure based on her BNP results. The x-ray did not show much of fluid congestion however she does have chronic COPD changes that can mask it.     If she is continuing to have shortness of breath, she should go to emergency room.     If she feels stable as before, noting that she has had fluid gain of greater than 1 pound, she should take 1 pill of Lasix 40 mg as previously suggested by her cardiologist.  She does have cardiology appointment tomorrow.     An EKG is read by a cardiologist shows that it is same as before.     Please call with questions or contact us using [employer].    Sincerely,        Electronically signed by Misbah Barone MD

## 2021-06-19 NOTE — LETTER
Letter by Nicolle Maciel CNP at      Author: Nicolle Maciel CNP Service: -- Author Type: --    Filed:  Encounter Date: 8/15/2019 Status: (Other)         Rox Zavala  [address]  [address]  [place] [address]             August 15, 2019         Dear MsMaximiliano Lucas,    Below are the results from your recent visit:    Resulted Orders   Thyroid Cascade   Result Value Ref Range    TSH 2.29 0.30 - 5.00 uIU/mL   Vitamin B12   Result Value Ref Range    Vitamin B-12 1,838 (H) 213 - 816 pg/mL   HM1 (CBC with Diff)   Result Value Ref Range    WBC 6.8 4.0 - 11.0 thou/uL    RBC 3.56 (L) 3.80 - 5.40 mill/uL    Hemoglobin 12.2 12.0 - 16.0 g/dL    Hematocrit 35.5 35.0 - 47.0 %     80 - 100 fL    MCH 34.1 (H) 27.0 - 34.0 pg    MCHC 34.2 32.0 - 36.0 g/dL    RDW 13.2 11.0 - 14.5 %    Platelets 154 140 - 440 thou/uL    MPV 8.3 7.0 - 10.0 fL    Neutrophils % 58 50 - 70 %    Lymphocytes % 31 20 - 40 %    Monocytes % 9 2 - 10 %    Eosinophils % 2 0 - 6 %    Basophils % 1 0 - 2 %    Neutrophils Absolute 3.9 2.0 - 7.7 thou/uL    Lymphocytes Absolute 2.1 0.8 - 4.4 thou/uL    Monocytes Absolute 0.6 0.0 - 0.9 thou/uL    Eosinophils Absolute 0.2 0.0 - 0.4 thou/uL    Basophils Absolute 0.0 0.0 - 0.2 thou/uL       Your thyroid levels are normal and are not causing the fatigue and hot flashes. Your vitamin B12 level is still really high; you should stop taking Vitamin B12 and any of the vitamin B complexes. Your hemoglobin and hematocrit are normal no need to check iron stores. Please follow up in 2 weeks with Dr. Barone your primary care physician for further evaluation     Please call with questions or contact us using [employer].    Sincerely,        Electronically signed by Nicolle Maciel CNP

## 2021-06-19 NOTE — LETTER
Letter by Carlos Gomez, PharmD at      Author: Carlos Gomez, Zoila Service: -- Author Type: --    Filed:  Encounter Date: 2019 Status: (Other)             2019      Rox Zavala  [address]  [address]  [place] [address]            Dear Rox Zavala     Thank you for talking with me on 2019 about your health and medications. [facility] MTM (Medication Therapy Management) program helps you understand your medications and use them safely.    Along with this letter are an action plan (Medication Action Plan) and a medication list (Personal Medication List). The action plan has steps you should take to help you get the best results from your medications. The medication list will help you keep track of your medications and how to use them the right way.       Have your action plan and medication list with you when you talk with your doctors, pharmacists, and other health care providers.    Ask your doctors, pharmacists, and other healthcare providers to update them at every visit.     Take your medication list with you if you go to the hospital or emergency room.     Give a copy of the action plan and medication list to your family or caregivers.     If you want to talk about this letter or any of the papers with it, please call [phone]. We look forward to working with you, your doctors, and other healthcare providers to help you stay healthy.    Sincerely,     Carlos Gomez    _  Medication Action Plan For Rox Zavala, : 1935     This action plan will help you get the best results from your medications if you:     1. Read What we talked about.   2. Take the steps listed in the What I need to do boxes.   3. Fill in What I did and when I did it.   4. Fill in My follow-up plan and Questions I want to ask.     Have this action plan with you when you talk with your doctors, pharmacists, and other healthcare providers in your care team. Share this with your family  or caregivers too.    Date Prepared: 8/23/2019      What we talked about:  Nighttime urination     What I need to do:  Due to potential interactions between the memory medications and excessive urination medications we have decided to not start a medication for excessive urination at night     What I did and when I did it:          What we talked about:  Bone health     What I need to do:  You would likely benefit from a medication administered once yearly called Reclast, I will discuss with Dr. Barone and call you once I hear that she is agreeable    Recommend to discontinue multivitamin and vitamin C     What I did and when I did it:          What we talked about:       What I need to do:       What I did and when I did it:         My follow-up plan (add notes about next steps):            Questions I want to ask (include topics about medications or therapy):          If you have any questions about your action plan, call Carlos Gomez at [phone], Monday-Friday 8:00-4:30pm  _  This medication list was made for you after we talked. We also used information from your doctors chart.      Use blank rows to add new medications. Then fill in the dates you started using them.     Cross out medications when you no longer use them. Then write the date and why you stopped using them.     Ask your doctors, pharmacists, and other healthcare providers to update this list at every visit.  Keep this list up-to-date with:  ? prescription medications  ? over the counter drugs  ? herbals  ? vitamins  ? minerals          If you go to the hospital or emergency room, take this list with you. Share this with your family or caregivers too.    Date Prepared: 8/23/2019      Allergies or side effects:       Medication: acetaminophen (TYLENOL) 500 MG tablet      How I use it: Take 1-2 tablets (500-1,000 mg total) by mouth every 4 (four) hours as needed.      Why I use it: Sacral insufficiency fracture, initial encounter     Prescriber: Zeeshan Love PA-C      Date I started using it:     Date I stopped using it:       Why I stopped using it:          Medication: aspirin 81 MG EC tablet      How I use it: Take 81 mg by mouth daily.      Why I use it:  Coronary artery disease    Prescriber: Dr. Wilburn      Date I started using it:     Date I stopped using it:       Why I stopped using it:          Medication: atorvastatin (LIPITOR) 40 MG tablet      How I use it: Take 1 tablet (40 mg total) by mouth daily.      Why I use it: Coronary artery disease involving coronary bypass graft of native heart with other forms of angina pectoris (H)    Prescriber: Misbah Barone MD      Date I started using it:     Date I stopped using it:       Why I stopped using it:          Medication: calcium-vitamin D 500 mg(1,250mg) -200 unit per tablet      How I use it: Take 1 tablet by mouth daily.      Why I use it: Vitamin D deficiency; Disorder of Bone and Cartilage    Prescriber: Domo Maynard MD      Date I started using it:     Date I stopped using it:       Why I stopped using it:          Medication: calcium-vitamin D 500 mg(1,250mg) -200 unit per tablet      How I use it: Take 1 tablet by mouth 2 (two) times a day. (600mg calcium + 800 IU Vitamin D)      Why I use it: Disorder of Bone and Cartilage    Prescriber: Misbah Barone MD      Date I started using it:     Date I stopped using it:       Why I stopped using it:          Medication: cholecalciferol, vitamin D3, 2,000 unit Tab      How I use it: Take 2,000 Units by mouth daily.      Why I use it:  Osteopenia    Prescriber: Misbah Barone MD      Date I started using it:     Date I stopped using it:       Why I stopped using it:          Medication: donepezil (ARICEPT) 10 MG tablet      How I use it: Take 1 tablet (10 mg total) by mouth at bedtime.      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      Why I use it: Hypothyroidism    Prescriber: Misbah Barone MD      Date I started using it:     Date I stopped using it:       Why I stopped using it:          Medication: lisinopril (PRINIVIL,ZESTRIL) 10 MG tablet      How I use it: Take 1 tablet (10 mg total) by mouth daily.      Why I use it: Essential Hypertension    Prescriber: Shilpa Wilburn MD      Date I started using it:     Date I stopped using it:       Why I stopped using it:          Medication: lisinopril (PRINIVIL,ZESTRIL) 5 MG tablet      How I use it: Take 1 tablet (5 mg total) by mouth daily.      Why I use it: Essential Hypertension    Prescriber: Shilpa Wilburn MD      Date I started using it:     Date I stopped using it:       Why I stopped using it:          Medication: methotrexate 2.5 MG tablet      How I use it: TAKE THREE TABLETS (7.5MG) BY MOUTH ONCE A WEEK      Why I use it: Psoriatic Arthritis (H)    Prescriber: SOILA Glass      Date I started using it:     Date I stopped using it:       Why I stopped using it:          Medication: metoprolol succinate (TOPROL-XL) 25 MG      How I use it: Take 1 tablet (25 mg total) by mouth daily.      Why I use it: Frequent PVCs    Prescriber: Shilpa Wilburn MD      Date I started using it:     Date I stopped using it:       Why I stopped using it:          Medication: nitroglycerin (NITROSTAT) 0.4 MG SL tablet      How I use it: Place 1 tablet (0.4 mg total) under the tongue every 5 (five) minutes as needed for chest pain.      Why I use it: Coronary  artery disease involving coronary bypass graft of native heart with other forms of angina pectoris (H)    Prescriber: Jaclyn Kaye MD      Date I started using it:     Date I stopped using it:       Why I stopped using it:          Medication: polyethylene glycol (GLYCOLAX) 17 gram/dose powder      How I use it: Take 17 g by mouth daily as needed.      Why I use it: Hypothyroidism, unspecified type    Prescriber: PROVIDER, HISTORICAL      Date I started using it:     Date I stopped using it:       Why I stopped using it:          Medication: polyethylene glycol 3350 (MIRALAX ORAL)      How I use it: Take 1 tablet by mouth daily as needed.      Why I use it:  Constipation    Prescriber:  Self      Date I started using it:     Date I stopped using it:       Why I stopped using it:          Medication: VIT C/E/ZN/COPPR/LUTEIN/ZEAXAN (PRESERVISION AREDS 2 ORAL)      How I use it: Take 1 tablet by mouth 2 (two) times a day.      Why I use it:  Macular degeneration    Prescriber:  Self      Date I started using it:     Date I stopped using it:       Why I stopped using it:          Medication:       How I use it:       Why I use it:     Prescriber:       Date I started using it:     Date I stopped using it:       Why I stopped using it:          Medication:       How I use it:       Why I use it:     Prescriber:       Date I started using it:     Date I stopped using it:       Why I stopped using it:          Medication:       How I use it:       Why I use it:     Prescriber:       Date I started using it:     Date I stopped using it:       Why I stopped using it:          Other Information:      If you have any questions about your medication list, call [phone]    According to the Paperwork Reduction Act of 1995, no persons are required to respond to a collection of information unless it displays a valid OMB control number. The valid OMB number for this information collection is [id number]. The time required  to complete this information collection is estimated to average 37.76 minutes per response, including the time to review instructions, searching existing data resources, gather the data needed, and complete and review the information collection. If you have any comments concerning the accuracy of the time estimate(s) or suggestions for improving this form, please write to: CMS, Attn: [employer] Clearance Officer, [address] [place] [address], [place], [place] [address].

## 2021-06-19 NOTE — PROGRESS NOTES
[facility] Certification Request    July 11, 2018      Patient: Rox Zavala  MR Number: [id number]  YOB: 1935  Date of Visit: 7/11/2018      Dear Dr. Jaclyn Kaye:    Thank you for this referral.   We are seeing Rox Zavala in Occupational Therapy for vertigo.    Medicare and/or Medicaid requires physician review and approval of the treatment plan. Please review the plan of care and verify that you agree with the therapy plan of care by co-signing this note.      Plan of Care  Authorization / Certification Start Date: 07/11/18  Authorization / Certification End Date: 10/09/18  Authorization / Certification Number of Visits: 12  Communication with: Referral Source  Patient Related Instruction: Nature of Condition;Treatment plan and rationale;Basis of treatment;Expected outcome  Times per Week: 1  Number of Weeks: 12  Number of Visits: 12  Neuromuscular Reeducation: vestibular  Canolith Repostioning:      Goals:  Patient will bend: to dress;to clean;without vertigo;without loss of balance;in 12 weeks  Patient able to perform bed mobility: without vertigo;in 12 weeks  Patient will turn head: without dizziness;for conversation;in 12 weeks  Patient will look up / down: without vertigo;for reading;in 12 weeks      If you have any questions or concerns, please don't hesitate to call.    Sincerely,      Lima Mendoza, OT        Physician recommendation:     ___ Follow therapist's recommendation        ___ Modify therapy      *Physician co-signature indicates they certify the need for these services furnished within this plan and while under their care.      [facility]   Vestibular Initial Evaluation    Patient Name: Rox Zavala  Date of evaluation: 7/11/2018  Referral Diagnosis: vertigo  Referring provider: Jaclyn Kaye MD  Visit Diagnosis:     ICD-10-CM    1. Dizziness R42    2. Unsteadiness on feet R26.81        Assessment:      Patient has  negative positional tests and no sign of vestibular dysfunction. Patient reports that she has no true vertigo but lightheaded sensation with standing up and bending over.     Goals:  Patient will bend: to dress;to clean;without vertigo;without loss of balance;in 12 weeks  Patient able to perform bed mobility: without vertigo;in 12 weeks  Patient will turn head: without dizziness;for conversation;in 12 weeks  Patient will look up / down: without vertigo;for reading;in 12 weeks    Patient's expectations/goals are realistic.    Barriers to Learning or Achieving Goals:  No Barriers.       Plan / Patient Instructions:        Plan of Care:   Authorization / Certification Start Date: 18  Authorization / Certification End Date: 10/09/18  Authorization / Certification Number of Visits: 12  Communication with: Referral Source  Patient Related Instruction: Nature of Condition;Treatment plan and rationale;Basis of treatment;Expected outcome  Times per Week: 1  Number of Weeks: 12  Number of Visits: 12  Neuromuscular Reeducation: vestibular  Canolith Repostioning:      Plan for next visit: repeat positional tests     Subjective:         History of Present Illness:    Rox is a 83 y.o. female who presents to therapy today with complaints of lightheaded and unsteadiness. Patient had sudden onset of symptoms 2018. Symptoms are not improving. She denies history of similar symptoms. Patient denies ear pain. Patient denies hearing changes.    Pain Ratin    Functional limitations are described as occurring with:   balance, bending, bed mobility, head turns, looking up or down         Objective:      Note: Items left blank indicates the item was not performed or not indicated at the time of the evaluation.    Patient Outcome Measures:  Dizziness Handicap Inventory  Score in %: 38     Vestibular Disorder Examination  1. Dizziness     2. Unsteadiness on feet       Precautions/Restrictions: None  Posture Observation:       General standing posture is normal.    ROM:  Not Tested    Strength: Not Tested    Functional Mobility: fair      Oculomotor Assessment: Normal tracking and normal saccades    VOR Function: Normal VOR    Positional Tests:  Hallpike Right:  Negative  Hallpike Left:  Negative  Head Roll Right: Negative  Head Roll Left:  Negative    Balance Assessment: will test next visit if indicated    Treatment Today:  Evaluation only today. Negative vestibular evaluation. If symptoms persist, patient will return in 2 weeks to repeat positional tests.  TREATMENT MINUTES COMMENTS   Evaluation 25    Self-care/ Home management     Neuromuscular Re-education     Canalith repositioning procedure           Total 25    Blank areas are intentional and mean the treatment did not include these items.     GOALS AND PLAN OF CARE WERE ESTABLISHED IN COOPERATION WITH THE PATIENT    OT Evaluation Code: (Please list factors)   Comorbidities: RA, ARF, CAD, CHF, HLD, HTN   Profile/History Review: Brief    Need for eval modification: No    # Treatment options: Limited    Clinical Decision Making:  Low      Occupational Profile/ Medical and Therapy History and Comorbidities Occupational Performance Clinical Decision Making   (Complexity)   brief history with review of medical/therapy records related to the presenting problem.  No comorbidities 1-3 Performance deficits that result in activity limitations and/or participation restrictions.    No Assessment Modification  Low complexity, which includes  problem-focused assessments, and consideration of a limited number of treatment options.      expanded review of medical/therapy records and additional review of physical, cognitive and psychosocial history.    May have comorbidities 3-5 Performance deficits that result in activity limitations and/or participation restrictions.    Minimal to moderate modification of assessment Moderate complexity, which includes analysis of data from detailed assessments,  and consideration of several treatment options.         Review of medical/therapy records and extensive additional review of physical, cognitive and psychosocial history.  Comorbidities affect occupational performance 5 or more Performance deficits that result in activity limitations and/or participation restrictions.    Significant modification of assessment High complexity, analysis of  Occupational profile and data,  Comprehensive assessments, multiple treatment options.            Lima Mendoza  7/11/2018  12:59 PM

## 2021-06-19 NOTE — PROGRESS NOTES
".OUT PATIENT CLINICAL SOCIAL WORK: PSYCHOSOCIAL ASSESSMENT      Rox Zavala   1935 8/16/2018    Primary Language: English Referral Source: Dr. Kaye at [facility]  Person(s) Present at Visit: Patient and social work intern  Goal(s) for Visit: First Consultation  Presenting Concerns: Patient notes she sometimes forgets names or words and is wanting an assessment to learn more.    Rox Zavala \"Leisa\" is a 83 y.o. female who was referred for cognitive assessment by her primary care provider as Leisa has noticed she is forgetting names, words, and describes what seems to be difficulty with short-term memory recall, particularly over the last year. Leisa De La Fuente states she wants to learn more to see if the changes are normal or if there is something more going on. Leisa notes that she feels comfortable in her townhome and enjoys spending time with family, friends, and her [employer] community, though notes she feels lonely or down at times.     PRIMARY DECISION MAKER FOR HEALTHCARE  Patient  Copy of legal documents on chart? Yes    Name: Trace Zavala Relationship: Son Phone: [phone] (H), [phone] (C)  Alternate: Kwesi Zavala Relationship: Son Phone: [phone] (C), [phone] (W)    PATIENT REPRESENTATIVE  Same as above    HEALTHCARE DIRECTIVE/LEGAL ISSUES  Pt has healthcare directive: Yes, copy on chart    Name: Trace Zavala Relationship: Son Phone: [phone] (H), [phone] (C)  Alternate: Kwesi Zavala Relationship: Son Phone: [phone] (C), [phone] (W)    FINANCIAL INFORMATION  Primary Funding Source:Medicare A and B   Secondary Funding Source: [employer]  Financial POA: None reported by patient  SSI / SSDI: Not reported by patient  Social Security: Not reported by patient  [place]: Not reported by patient  LTC Insurance: Not reported by patient    Medical Assistance (MA) Status:   N/A    OCCUPATION / EDUCATION:  Current Employment: " None/Retired  Prior Occupation(s): 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    BELIEF SYSTEM: Cathoic, patient states she attends two churches, [facility] [facility]. Patient did not report any cultural or Orthodox concerns regarding her treatment.    MEDICAL:  Please see  Dr. Holland's consultation from 8/16/18 for complete medical history.  Community MD/Clinic: Dr[facility] Kaye at [facility]   Additional Information/Concerns: Per medical record, patient has been diagnosed with vertigo, dizziness, aortic stenosis, three-vessel coronary artery disease, hypertension, and hyperlipidemia.     CHEMICAL HEALTH:  Current Tobacco Use: None  Prior Tobacco Use: Not reported by patient   Current Alcohol/Drug Use: None        Treatment: Not reported by patient  Prior Alcohol/Drug Use: Not reported by patient  Treatment: Not reported by patient  Additional Information/Concerns:  Patient notes she drinks 4 cups of coffee/caffeine drinks per day.    PSYCHIATRIC / BEHAVIORAL:  Current Dx: Not reported by patient  Current Treatment: Not reported by patient  Prior Dx:Not reported by patient  Prior treatment: Not reported by patient    HOME / LIVING ENVIRONMENT:  Patient lives: Alone in a [facility] where she has lived for 12 years  Home Accessibility Concerns: Patient notes that she feels comfortable in her home as bedroom, bathroom, kitchen, etc are all on one level. Patient notes she does have treadmill in the basement that she uses.   Additional Information/Concerns: Patient notes her children have had conversations with her about moving to senior living or assisted living apartments. However, patient states she feels she is doing fine where she lives now and would be open to having assistance come into her home should that be needed at a later time.    COMMUNITY / SOCIAL SUPPORT:   Community services: None    FAMILY SUPPORT:  Relationship Status: ,  "patient notes she and her  were  for 60 years and he passed away 12 years ago following an illness and complications. Patient notes they had a happy marriage.  Children: Patient notes she had 4 children, one daughter passed away following a MVA when daughter was 57 years old. Leisa states she is close to her two boys, daughter, grandchildren, and great-grandchildren and finds her family supportive.     DAILY ROUTINE:  Sleep- Not noted by patient     Nutrition-Not noted by patient  Activities-Leisa states she attends [employer] 5 days/week and enjoys walking on her treadmill. Leisa also attends Oriental orthodox regularly at two [facility].    Hobbies-Leisa states she attends a [employer] luncheon [employer] from her old neighborhood once a month, plays cards (500) with friends on Thursday evenings, enjoys watching daytime television, and enjoys reading. Leisa states she also sees her family often.    FUNCTIONAL STATUS:  Is patient driving? Yes  Additional Information: Patient notes that she does not drive long-distances and uses her Garmin to drive around town which works well for her. She did not state any driving concerns.   Is patient independent with the following activities? Patient reports she is independent in ADLs and iADLs.    PRIOR COGNITIVE TESTING / IMAGING: Patient had CT done 7/9/18 per medical record.    INTERVENTION(S):  Assessment and Resources    PATIENT/FAMILY OBSERVATIONS:  Patient: Patient appeared and stated she was nervous and had anxiety about this appointment stating \"I'd rather have a broken arm that I know will heal rather than something wrong with my mind\". Leisa was pleasant and engaged in conversation with affect appropriate for content of speech. Leisa's anxiety appeared to decrease after talking about the activities she enjoys and after talking about the time she spends with her family and friends. Of note, Leisa did not know or did not remember she had a healthcare directive on file so writer " informed her of that.    PLAN/FOLLOW-UP: Writer provided information on social work's role, the clinic setting, and social work's contact information should questions arise. Social work will follow-up with Leisa after her next visit with Dr. Holland. She plans on bringing her family to the next follow-up appointment with Dr. Holland.    Mariluz Jain, Social Work Intern  8/16/18 1200    I have read, discussed, and agree with the documentation as presented by Mariluz Jain, Social Work Intern.    Stefani Melgoza, [facility]  8/16/18 1201

## 2021-06-19 NOTE — PROGRESS NOTES
[facility] Outpatient Consultation    Assessment / Impression:   1.  Cognitive disorder NOS, rule out MCI, rule out dementia  2.  Cerebrovascular disease per neuro imaging  3.  Remote history of smoking, patient abstinent ×25 years  4.  Degenerative arthritis, status post right hip replacement  5.  Rheumatoid arthritis  6.  ASCVD, status post coronary stenting ×2  7.  Aortic stenosis, status post aortic valve replacement  8.  Hypertension  9.  Hypercholesterolemia  10.  Status post bladder suspension surgery and tubal ligation    Plan:   1.  MRI brain  2.  Neuropsychometric testing  3.  Review screening blood tests on return to clinic    This 83-year-old female with multiple cardiovascular risks reports a several month history of difficulty with word finding and possibly early difficulty with speech praxis.  Exam today does reveal what appears to be intermittent dysfluency with occasional paraphasias, difficulty interpreting grammatically complex statements and a one-way naming deficit.  Findings are consistent with both a degree of logopenia and agrammatism although clear evidence of agrammatism during the course of spontaneous conversation is not noted.  The above-noted evaluation is felt to be appropriate.  I do note in reviewing the patient's medical record brain CT performed earlier this year that appears to identify a greater degree of sylvian fissure widening on the left when compared with that noted on the right (patient is right-handed).    Total time for this evaluation one hour with majority of time spent in counseling.  All questions answered.  Subjective:   HPI: Roxcampos Zavala is a 83 y.o. female with above-noted diagnoses who presents for cognitive assessment.  Unaccompanied today, the patient reports that she is noted for more than 4 months difficulty with word finding during the course of casual conversation.  Although she has great difficulty describing the nature of this problem, it would  appear to involve specific words during conversation as well as names of friends and relatives.  A mild degree of forgetfulness generally has been noted that she is now relying more and lists to complete her daily routine.  On specific questioning she does report the possibility of having difficulty with stuttering when attempting to pronounce words and possibly transposing syllables from time to time.  Paraphasias may also be present based on self-report.  It is difficult to determine whether these problems have been progressive in nature but the patient does not believe that they interfere significantly with her ability to live independently.  As best as can be determined, it is likely that family members have also noted these problems.    On further questioning the patient denies somatic complaints.  No headache shortness of breath chest or abdominal discomfort noted.  Weight is stable.  No fevers difficulty chills or sweats identified.  On specific questioning the patient denies significant orthostasis, loss of consciousness, falls, tremors, with double vision or reading, neck stiffness, postural instability or difficulty with sleep.  No history of delirium noted.  Remainder for general medical review of systems is negative.    Past Medical History:   As above    Social History:   Born in [place], the patient obtained her GED.   for 60 years, she and her  raised 3 children.  The patient has been  for 12 years and since that time has been residing in a town home in [place] functioning completely independently per her report.    Past Psychiatric History:   Negative    Family History:   Negative for mental illness and her degenerative dementia.    Review of Systems:  Pertinent items are noted in HPI.    Objective:   The patient's a reasonably well-developed elderly female who is neatly groomed casually dressed and seated for evaluation.  She does not appear to be in any acute physical  distress.    Vitals:    08/16/18 1028   BP: 173/76   Pulse: 81   Weight: 130 lb (59 kg)     Physical Exam:    Skin exam reveals no evidence of acute rashes or lesions.  Mild lower extremity edema is noted.  Extremities are warm to palpation.  Pulses are intact.  HEENT exam reveals head to be normocephalic.  Conjunctivae clear.  Oropharynx is clear.  The patient is edentulous with full upper and lower plates noted.  Neck is supple.  No tenderness.  Lungs are clear to auscultation.  Cardiac exam with a regular S1-S2.  No gallop noted.  Abdomen soft nontender.    Neurological Exam:     Brief exam performed secondary to time constraints.  Cranial nerves reveal visual fields to be full.  Forward conjugate gaze noted along with normal volitional EOMs pursuit and saccadic movements.  Face is symmetrical.  Hearing may be a touch decreased bilaterally.  Oropharynx reveals midline tongue without atrophy or fasciculation and symmetrical palatal lift.  Speech is well articulated and projected normally with no clear evidence at this time of motor speech problems.  Strength and tone appeared to be normal peripherally.  Reflexes are diminished throughout.  Frontal release signs are absent.    Cognitive Evaluation:     The patient had the appearance noted above.  She remained alert engaged and socially appropriate during the course of evaluation.  Spontaneous conversation was noted to be a bit distant with occasional word finding difficulty noted when the patient was required to retrieve a specific word.  1 or 2 paraphasias were noted.  No evidence of transposition of syllables during conversation or gross syntactic errors were noted.  Some difficulty in interpreting grammatically complex statements was identified.  A mild one-way naming deficit was noted.  The patient was able to describe the utility of a number of objects without difficulty.  Noted no difficulty with attentional function.  No variability in cognitive capability  identified.  Affect was a bit anxious mood congruent.  No evidence of depression abnormal thought content or form.  No significant anxiety identified.  No evidence of true obsessions or compulsions.  No unusual ideations or stereotypies.    Medications:  Scheduled Meds:  Continuous Infusions:  PRN Meds:.    Sesar Holland MD  8/16/2018

## 2021-06-20 ENCOUNTER — HEALTH MAINTENANCE LETTER (OUTPATIENT)
Age: 86
End: 2021-06-20

## 2021-06-20 NOTE — PROGRESS NOTES
The patient was seen for a neuropsychological evaluation for the purposes of diagnostic clarification and treatment planning. 160 minutes of face-to-face testing were provided by neuropsychology extern (graduate practicum student), under the direct supervision of this writer. The patient was cooperative with testing. No concerns were brought to our attention. Please see Dr. Vargas's report for a detailed description of the charges and interpretation and integration of the findings.

## 2021-06-20 NOTE — PROGRESS NOTES
NEUROPSYCHOLOGY PROGRESS NOTE    NAME: Rox Zavala  YOB: 1935     DATE OF EVALUATION: 9/24/2018      SUMMARY OF SESSION:  Rox Zavala is a 83 y.o.,  female who was referred for a cognitive evaluation by Sesar Holland MD.  Ms. Zavala arrived on time and accompanied by her son, Trace. We began the session by discussing her experience during the neuropsychometric evaluation.  I provided Ms. Zavala with detailed feedback regarding her performance on cognitive testing and her pattern of cognitive strengths and weaknesses.  I discussed my overall impressions and recommendations and provided the opportunity for Ms. Zavala to ask any questions that she had about the evaluation.  At the end of the session, she indicated that she understood the results and that I had answered all of her questions.  She was provided with my contact information, should any further questions or concerns arise in the future.    Please contact me with any questions regarding the content of this note.     Mona Vargas PsyD, LP  [place] [address]  Phone: [phone]    For diagnostic and coding purposes, Rox Zavala was referred for an evaluation of mild neurocognitive disorder.  This appointment consisted of a total of 1 hour of [id number].

## 2021-06-20 NOTE — PROGRESS NOTES
Assessment and Plan:     1. Routine general medical examination at a health care facility     2. Encounter for screening for lipoid disorders  Lipid Cascade, FASTING    Lipid Cascade, FASTING   3. Postmenopausal  DXA Bone Density Scan   4. Hypothyroidism     5. Essential hypertension         The patient's current medical problems were reviewed.    I have had an Advance Directives discussion with the patient.  The following health maintenance schedule was reviewed with the patient and provided in printed form in the after visit summary:   Health Maintenance   Topic Date Due     INFLUENZA VACCINE RULE BASED (1) 08/01/2018     DXA SCAN  08/17/2018     ADVANCE DIRECTIVES DISCUSSED WITH PATIENT  11/13/2018     COLONOSCOPY  03/30/2019     FALL RISK ASSESSMENT  07/09/2019     TD 18+ HE  07/29/2026     PNEUMOCOCCAL POLYSACCHARIDE VACCINE AGE 65 AND OVER  Completed     PNEUMOCOCCAL CONJUGATE VACCINE FOR ADULTS (PCV13 OR PREVNAR)  Completed     ZOSTER VACCINE  Completed        Subjective:   Chief Complaint: Rox Zavala is an 83 y.o. female here for an Annual Wellness visit.      HPI: Patient is here to establish care.  She denies any acute concerns except ongoing concerns for memory.  She has been evaluated by neuropsychiatry for further follow-up scheduled.  The chart is reviewed and plan of care is reviewed.    She denies any chest pain, shortness of breath, loss of appetite or any recent falls    Review of Systems:   Please see above.  The rest of the review of systems are negative for all systems.    Patient Care Team:  Jaclyn Kaye MD as PCP - General (Family Medicine)     Patient Active Problem List   Diagnosis     Hammer Toe     Hemorrhoids     Psoriasis     Generalized Osteoarthritis Of The Hand     Osteoarthritis Of The Knee     Vitamin D Deficiency     Palpitations     Hypothyroidism     Neck Pain     Upper Back Pain (Between Shoulder Blades)     Osteopenia     Coronary artery disease     S/P AVR      "Constipation     Acute diastolic CHF (congestive heart failure) (H)     Essential hypertension     Hyperlipidemia     Coronary artery disease involving coronary bypass graft of native heart with other forms of angina pectoris (H)     Psoriatic arthritis (H)     High risk medication use     Past Medical History:   Diagnosis Date     Acute renal failure (H)      Aortic valve stenosis, severe      Arthritis      Coronary artery disease 11/17/2014     Coronary artery disease      Disease of thyroid gland      Hammer toe      Hyperlipidemia      Hypertension      Macular disorder     \"wrinkle\"     Rheumatoid arthritis (H)      S/P AVR 12/15/2014      Past Surgical History:   Procedure Laterality Date     ANGIOPLASTY / STENTING FEMORAL       AORTIC VALVE REPLACEMENT       CARDIAC SURGERY      CABG     EYE SURGERY Bilateral     cataracts     [place] LIGATE FALLOPIAN TUBE      Description: Tubal Ligation;  Recorded: 03/20/2008;     [place] REMOVAL GALLBLADDER      Description: Cholecystectomy;  Recorded: 03/20/2008;     [place] TOTAL HIP ARTHROPLASTY Right     Description: Total Hip Replacement;  Recorded: 03/20/2008; right      Family History   Problem Relation Age of Onset     Asthma Mother      Coronary artery disease Neg Hx       Social History     Social History     Marital status:      Spouse name: N/A     Number of children: N/A     Years of education: N/A     Occupational History     Not on file.     Social History Main Topics     Smoking status: Former Smoker     Packs/day: 1.00     Years: 30.00     Types: Cigarettes     Quit date: 1/1/1995     Smokeless tobacco: Never Used     Alcohol use Yes      Comment: Occasional      Drug use: No     Sexual activity: Not on file     Other Topics Concern     Not on file     Social History Narrative    , patient notes she and her  were  for 60 years and he passed away 12 years ago following an illness and complications. Patient notes they had a happy marriage.    " "Children: Patient notes she had 4 children, one daughter passed away following a MVA when daughter was 57 years old. Leisa states she is close to her two boys, daughter, grandchildren, and great-grandchildren and finds her family supportive.       Current Outpatient Prescriptions   Medication Sig Dispense Refill     amLODIPine (NORVASC) 5 MG tablet TAKE ONE TABLET BY MOUTH EVERY DAY FOR REYNAUDS 90 tablet 3     aspirin 81 MG EC tablet Take 81 mg by mouth daily.       atorvastatin (LIPITOR) 40 MG tablet Take 1 tablet (40 mg total) by mouth daily. 90 tablet 2     CALCIUM CARBONATE (CALCIUM 500 ORAL) Take by mouth.       cholecalciferol, vitamin D3, 1,000 unit tablet Take 2,000 Units by mouth daily.       folic acid (FOLVITE) 1 MG tablet Take 1 tablet (1,000 mcg total) by mouth daily. 90 tablet 3     levothyroxine (SYNTHROID, LEVOTHROID) 100 MCG tablet TAKE ONE TABLET BY MOUTH EVERY DAY AT 6:00 A.M. 90 tablet 2     methotrexate 2.5 MG tablet Take 4 tablets (10 mg total) by mouth once a week. 48 tablet 0     MV,CALCIUM,MIN/IRON/FOLIC/VITK (MULTI FOR HER ORAL) Take by mouth.       VIT C/E/ZN/COPPR/LUTEIN/ZEAXAN (PRESERVISION AREDS 2 ORAL) Take 1 tablet by mouth 2 (two) times a day.       furosemide (LASIX) 40 MG tablet Take 1 tablet (40 mg total) by mouth as needed (If gained weight more than 1 pound a day or more leg edema). 30 tablet 1     meclizine (ANTIVERT) 12.5 mg tablet Take 1 tablet (12.5 mg total) by mouth 3 (three) times a day as needed for dizziness. 30 tablet 0     nitroglycerin (NITROSTAT) 0.4 MG SL tablet Place 1 tablet (0.4 mg total) under the tongue every 5 (five) minutes as needed for chest pain. 25 tablet 0     No current facility-administered medications for this visit.       Objective:   Vital Signs:   Visit Vitals     /56 (Patient Site: Left Arm, Patient Position: Sitting, Cuff Size: Adult Regular)     Pulse 60     Temp 97.6  F (36.4  C) (Oral)     Resp 12     Ht 5' 2.91\" (1.598 m)     Wt 128 lb " "9.6 oz (58.3 kg)     LMP 08/17/1974     SpO2 97%     BMI 22.84 kg/m2        VisionScreening:  No exam data present     PHYSICAL EXAM  /56 (Patient Site: Left Arm, Patient Position: Sitting, Cuff Size: Adult Regular)  Pulse 60  Temp 97.6  F (36.4  C) (Oral)   Resp 12  Ht 5' 2.91\" (1.598 m)  Wt 128 lb 9.6 oz (58.3 kg)  LMP 08/17/1974  SpO2 97%  BMI 22.84 kg/m2    General Appearance:    Alert, cooperative, no distress, appears stated age   Head:    Normocephalic, without obvious abnormality, atraumatic   Eyes:    PERRL, conjunctiva/corneas clear, EOM's intact, fundi     benign, both eyes   Ears:    Normal TM's and external ear canals, both ears   Nose:   Nares normal, septum midline, mucosa normal, no drainage     or sinus tenderness   Throat:   Lips, mucosa, and tongue normal; teeth and gums normal   Neck:   Supple, symmetrical, trachea midline, no adenopathy;     thyroid:  no enlargement/tenderness/nodules; no carotid    bruit or JVD   Back:     Symmetric, no curvature, ROM normal, no CVA tenderness   Lungs:     Clear to auscultation bilaterally, respirations unlabored   Chest Wall:    No tenderness or deformity    Heart:    Regular rate and rhythm, S1 and S2 normal, no murmur, rub    or gallop   Breast Exam:    No tenderness, masses, or nipple abnormality   Abdomen:     Soft, non-tender, bowel sounds active all four quadrants,     no masses, no organomegaly           Extremities:   Extremities normal, atraumatic, no cyanosis or edema   Pulses:   2+ and symmetric all extremities   Skin:   Skin color, texture, turgor normal, no rashes or lesions   Lymph nodes:   Cervical, supraclavicular, and axillary nodes normal             Assessment Results 8/21/2018   Activities of Daily Living No help needed   Instrumental Activities of Daily Living No help needed   Mini Cog Total Score 3   Some recent data might be hidden     A Mini-Cog score of 0-2 suggests the possibility of dementia, score of 3-5 suggests no " dementia    Identified Health Risks:     The patient was provided with suggestions to help her develop a healthy lifestyle.   The patient was provided with written information regarding signs of hearing loss.  Patient's advanced directive was discussed and I am comfortable with the patient's wishes.

## 2021-06-20 NOTE — PROGRESS NOTES
Assessment / Impression     1.  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  Cerebrovascular disease  3.  Other medical problems as noted in past medical history    Plan:   1.  OT evaluation  2.  Return for further conversation regarding treatment.  On follow-up we will encourage use of cholinesterase inhibitor therapy as well as appropriate environmental support depending on results of above    Long conversation held with the patient and son Billy in attendance.  Neuropsychometric testing does reveal multiple domain mild cognitive impairment with multiple domains of language function impaired.  In addition, some difficulties with prefrontal function and new learning noted although new learning was felt to be borderline impaired.  MRI of the brain reveals mild generalized cortical atrophy with good preservation of medial temporal structures and moderately severe small vessel ischemic white matter changes.  Given the patient's age, clinical presentation and results of diagnostic testing, I believe she likely is suffering from a multifactorial process that does include a neurodegenerative component.  At this point in time an early clinically manifest stage of neurodegenerative disease predominating in language impairment complicated by cerebrovascular disease with appear most likely.  Although this was explained to the patient.  I was quite encouraging to both the patient and son given what appears to be a good level of preserved functioning.    Total time for evaluation one hour with the majority time spent in counseling.    Subjective:     HPI: Rox COHN Lucas is a 83 y.o. female with above-noted diagnoses who returns for follow-up.  Diagnostic testing essentially as noted above.  I will need to review blood testing on return to clinic although I believe all obligatory screening test have been  performed.  The patient offers no new complaints at this time.          Review of Systems:          As above        Objective:     Vitals:    10/04/18 1013   BP: (!) 135/95   Pulse: 94       No results found for this or any previous visit (from the past 24 hour(s)).    Physical Exam: As noted previously.  The patient is alert pleasant and engaged.  I do note a mild degree of anxiety.  No evidence of confusion.  Affect and mood are good.

## 2021-06-20 NOTE — PROGRESS NOTES
Persons accompanying you (the patient) today: Son Billy     How have you been doing since we saw you last? Please note any concerns.  Pt is here to go over MRI, Labs, Neuro    Please list any recent hospitalizations/surgeries/procedures you've had since we saw you last:  none    Have you had any falls since your last visit? No    Do you have any pain today? No

## 2021-06-20 NOTE — PROGRESS NOTES
Occupational Therapy  Occupational Therapy    Medicare Insurance    Units: 1 OT alice  Total Minutes: 56    Medical Diagnosis: Alzheimer's disease  Treatment Diagnosis: Cognitive Impairment  Referring Practitioner: Sesar Holland MD  Date of referral: 8/16/18  Start of care: October 8, 2018    Ms.Delores LALIT Zavala was referred by Sesar Holland MD for an occupational therapy outpatient cognitive evaluation. The assessments used in this evaluation will help determine if cognitive impairment is present and if so, how functional daily activity may be affected.  Following the assessments, the occupational therapist may offer education and recommendations to assist caregivers in providing the optimal level of support for their loved one. Rox was seen on 10/8/2018 and was accompanied by her son, Billy. She appeared well groomed and alert.  Rox was Pleasant and she ambulated independently.    When asked if she knew the purpose of this appointment, patient was unaware.    Living Arrangement:  Patient lives alone in a townhouse.    Homemaking:  Patient is completing  Financial Management: Patient is completing  Medications: Patient is using a medbox.  Patient is setting up medications by herself.    Driving:  Yes  ADL:  Independent  Leisure: Attends classes at [employer], coffee with friends, cards with friends.      Pain: No    OBJECTIVE  Results of assessments are as follows:    ACL: 4.6/5.8    CPT:   Sub-test Score   Med box 4.5/6   Shop 6/6   Wash 5/5   [place] 4/5   Phone 5/6       Average Score 4.9/5.6       MOCA: 17/30  MOCA memory index score: 4/15      The above assessment scores indicate a mild-moderate level of impairment.      ASSESSMENT  Recommendations:  Cognitive functioning recommendations: 4.6-4.8: The assessment scores indicate a recommendation for an assisted living environment with daily check-in supervision (i.e. at home with assistance or assisted living facility). Assistance with managing medications  and finances is recommended as Rox may have increased difficulty with complex problem solving. Learning new skills and information may be very challenging for Rox but she may be able to do so with a lot of repetition. Problem solving is often challenging especially when it is not anticipated or expected. Rox may lack insight into her deficits indicating an increased need for assistance with complex tasks requiring accuracy for safety. Supervision is recommended for Rox when using hot tools and major appliances during meal preparation. Driving is not recommended for Rox due to difficulty with divided attention and complex problem solving. Further, Rox may benefit from increased structure throughout the day creating healthy habits and routines and eliminating her need to problem solve from one task to another.    PLAN  Goal: Patient and/or family will verbalize understanding of recommendations regarding instrumental activities of daily living and activities of daily living.    Plan of Care: one-time OT alice    Thank you for this referral.  If I can be of further assistance, please do not hesitate to contact me.    MEDICARE PATIENT: Yes: HCIN #[id number]; Provider # ; Certification Dates: from 10/8/2018 to 10/8/2018    Physician Recommendation:  1. I certify the need for these services furnished within this plan and while under my care. I agree with the therapist's recommendation for plan of care.  2. If there is any recommendation for modification of therapy plan, please indicate below.    MARK Diane, OTR/L on 10/8/2018

## 2021-06-20 NOTE — PROGRESS NOTES
ASSESSMENT AND PLAN:  Rox Zavala 83 y.o. female is seen here on 09/12/18 for follow-up.  She has psoriatic arthritis.  She has osteoarthritis.  She had a history of postural psoriasis.  She is continuing to great with methotrexate at 10 mg.  Will she has concerns around side effects.  Will drop it down to 7.5 mg per week.  Continue folic acid.  She has good grasp of the difference between psoriatic arthritis and osteoarthritis.  Check labs today.  Will meet here in 3 months with labs just prior.      Diagnoses and all orders for this visit:    Psoriatic arthritis (H)  -     methotrexate 2.5 MG tablet; Take 3 tablets (7.5 mg total) by mouth once a week.  Dispense: 36 tablet; Refill: 0    High risk medication use  -     ALT (SGPT); Standing  -     Albumin; Standing  -     Creatinine; Standing  -     HM2(CBC w/o Differential); Standing    Generalized Osteoarthritis Of The Hand    Osteoarthritis Of The Knee        HISTORY OF PRESENTING ILLNESS ON 09/12/18 :  Rox Zavala 83 y.o. is here for follow-up of psoriatic arthritis and osteoarthritis.  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  She is due for her labs she is on methotrexate 10 mg per week with folic acid.  On a previous visit she had tenosynovitis of the left first extensor tendon sheath she has done well with that injection of methylprednisolone.  She noted virtually no pain apart from her left knee this is the mother some with activity only mild.  We discussed management of osteoarthritis.  She noted that her stiffness in the morning is nonexistent.  There is no fever weight loss blurry vision eye redness moccasin nausea cough or rash.   She does not have evidence of autoimmunity.  She does not smoke does not take alcohol.  She had right hip replacement in 2007.Further historical information, including ROS and limitation in activities as noted in the multidimensional health assessment questionnaire scanned in the EMR and in the  "assessment and plan section.    ALLERGIES:Review of patient's allergies indicates no known allergies.    PAST MEDICAL/ACTIVE PROBLEMS/MEDICATION/SOCIAL DATA  Past Medical History:   Diagnosis Date     Acute renal failure (H)      Aortic valve stenosis, severe      Arthritis      Coronary artery disease 11/17/2014     Coronary artery disease      Disease of thyroid gland      Hammer toe      Hyperlipidemia      Hypertension      Macular disorder     \"wrinkle\"     Rheumatoid arthritis (H)      S/P AVR 12/15/2014     History   Smoking Status     Former Smoker     Packs/day: 1.00     Years: 30.00     Types: Cigarettes     Quit date: 1/1/1995   Smokeless Tobacco     Never Used     Patient Active Problem List   Diagnosis     Hammer Toe     Hemorrhoids     Psoriasis     Generalized Osteoarthritis Of The Hand     Osteoarthritis Of The Knee     Vitamin D Deficiency     Palpitations     Hypothyroidism     Neck Pain     Upper Back Pain (Between Shoulder Blades)     Osteopenia     Coronary artery disease     S/P AVR     Constipation     Acute diastolic CHF (congestive heart failure) (H)     Essential hypertension     Hyperlipidemia     Coronary artery disease involving coronary bypass graft of native heart with other forms of angina pectoris (H)     Psoriatic arthritis (H)     High risk medication use     Current Outpatient Prescriptions   Medication Sig Dispense Refill     amLODIPine (NORVASC) 5 MG tablet TAKE ONE TABLET BY MOUTH EVERY DAY FOR REYNAUDS 90 tablet 3     aspirin 81 MG EC tablet Take 81 mg by mouth daily.       atorvastatin (LIPITOR) 40 MG tablet Take 1 tablet (40 mg total) by mouth daily. 90 tablet 2     CALCIUM CARBONATE (CALCIUM 500 ORAL) Take by mouth.       cholecalciferol, vitamin D3, 1,000 unit tablet Take 2,000 Units by mouth daily.       folic acid (FOLVITE) 1 MG tablet Take 1 tablet (1,000 mcg total) by mouth daily. 90 tablet 3     furosemide (LASIX) 40 MG tablet Take 1 tablet (40 mg total) by mouth as " needed (If gained weight more than 1 pound a day or more leg edema). 30 tablet 1     levothyroxine (SYNTHROID, LEVOTHROID) 100 MCG tablet TAKE ONE TABLET BY MOUTH EVERY DAY AT 6:00 A.M. 90 tablet 2     meclizine (ANTIVERT) 12.5 mg tablet Take 1 tablet (12.5 mg total) by mouth 3 (three) times a day as needed for dizziness. 30 tablet 0     MV,CALCIUM,MIN/IRON/FOLIC/VITK (MULTI FOR HER ORAL) Take by mouth.       nitroglycerin (NITROSTAT) 0.4 MG SL tablet Place 1 tablet (0.4 mg total) under the tongue every 5 (five) minutes as needed for chest pain. 25 tablet 0     VIT C/E/ZN/COPPR/LUTEIN/ZEAXAN (PRESERVISION AREDS 2 ORAL) Take 1 tablet by mouth 2 (two) times a day.       methotrexate 2.5 MG tablet Take 4 tablets (10 mg total) by mouth once a week. 48 tablet 0     No current facility-administered medications for this visit.      DETAILED EXAMINATION  09/12/18  :  Vitals:    09/12/18 1235   BP: 146/88   Patient Site: Right Arm   Patient Position: Sitting   Cuff Size: Adult Regular   Pulse: 68   Weight: 130 lb (59 kg)     Alert oriented. Head including the face is examined for malar rash, heliotropes, scarring, lupus pernio. Eyes examined for redness such as in episcleritis/scleritis, periorbital lesions.   Neck/ Face examined for parotid gland swelling, range of motion of neck.  Left upper and lower and right upper and lower extremities examined for tenderness, swelling, warmth of the appendicular joints, range of motion, edema, rash.  Some of the important findings included: He has mild discomfort of the left knee joint line, without effusion or warmth.  She has Heberden's, squaring of the first CMC's.  She does not have dactylitis of digits.  There is no dactylitis of the toes.  She has no nail pitting or onycholysis.        LAB / IMAGING DATA:  ALT   Date Value Ref Range Status   06/06/2018 24 0 - 45 U/L Final   02/20/2018 29 0 - 45 U/L Final   01/26/2018 29 0 - 45 U/L Final     Albumin   Date Value Ref Range Status    06/06/2018 4.0 3.5 - 5.0 g/dL Final   02/20/2018 3.9 3.5 - 5.0 g/dL Final   01/26/2018 3.8 3.5 - 5.0 g/dL Final     Creatinine   Date Value Ref Range Status   06/06/2018 0.88 0.60 - 1.10 mg/dL Final   02/20/2018 0.87 0.60 - 1.10 mg/dL Final   01/26/2018 0.83 0.60 - 1.10 mg/dL Final       WBC   Date Value Ref Range Status   06/06/2018 8.9 4.0 - 11.0 thou/uL Final   02/20/2018 7.2 4.0 - 11.0 thou/uL Final   09/29/2015 6.4 4.0 - 11.0 thou/uL Final   05/07/2015 6.9 4.0 - 11.0 thou/uL Final     Hemoglobin   Date Value Ref Range Status   06/06/2018 13.0 12.0 - 16.0 g/dL Final   02/20/2018 12.1 12.0 - 16.0 g/dL Final   01/26/2018 11.4 (L) 12.0 - 16.0 g/dL Final     Platelets   Date Value Ref Range Status   06/06/2018 152 140 - 440 thou/uL Final   02/20/2018 153 140 - 440 thou/uL Final   01/26/2018 146 140 - 440 thou/uL Final       Lab Results   Component Value Date    RF <15 07/17/2013    SEDRATE 20 11/08/2016

## 2021-06-20 NOTE — PROGRESS NOTES
[facility] Discharge Summary  Patient Name: Rox COHN Lucas  Date: 10/5/2018  Referral Diagnosis: vertigo  Referring provider: Jaclyn Kaye MD  Visit Diagnosis:   1. Dizziness     2. Unsteadiness on feet         Goal status: negative vestibular evaluation    Patient was seen for 1 visit. The patient discontinued therapy, did not return.    The patient will need a new referral to resume.    Thank you for your referral.  Lima Mendoza  10/5/2018  9:40 AM

## 2021-06-20 NOTE — PROGRESS NOTES
"  NEUROPSYCHOLOGICAL CONSULTATION    NAME:  Rox Zavala  :  1935    STINSON: 2018    REASON FOR REFERRAL:  Ms. Zavala is a 83 y.o., right-handed,  female with history of cerebrovascular disease, atherosclerotic cardiovascular disease (s/p stents x2), congestive heart failure, aortic stenosis (s/p aortic valve replacement), hypertension, hypercholesterolemia, hypothyroidism, and degenerative and rheumatoid arthritis. The patient began noticing difficulty with word-finding ability and memory, which led to a referral to see Sesar Holland MD, in the [facility] on 2018. During his examination of the patient, Dr. Holland observed what appeared to be \"intermittent dysfluency with occasional paraphasias, difficulty interpreting grammatically complex statements, and a one-way naming deficit.\" He believed these findings could be consistent with a degree of logopenia and agrammatism. As such, the patient was referred for a brain MRI and this neuropsychological evaluation in order to assist with differential diagnosis and care planning.     Verbal consent for neuropsychological testing was received following the provision of information about the nature and purpose of the evaluation, and the opportunity to ask questions. Verbal permission to route a copy of the final report to her primary care provider was also obtained.     HISTORY OF PRESENTING PROBLEM:  The following information was obtained via medical record review and by interview of the patient and her son, Trace. She and Trace talk on the phone and see each other multiple times per week.    Currently, Ms. Zavala reported experiencing significant cognitive problems that began insidiously about 4-5 months ago and have remained largely stable over time. Specifically, she endorsed memory difficulties, including forgetting conversations, dates, and movies she has seen long ago. She also has noticed word-finding problems. " Information generally comes to her eventually or with the help of a clue or prompt. She denied any issues in other cognitive domains, including attention/concentration, processing speed, visuospatial ability, or organizational skills. According to her son, Trace, he has noticed that she has increased word-finding difficulty over the past year or more. He does not notice any significant memory issues, in contrast to the patient's report.     With regard to the activities of daily living, Ms. Zavala reported that she is fully independent. She currently lives alone in a town home. She continues to drive without issue but limits most of her driving to daytime hours. She uses a GPS as needed but typically does not use it. Medication management is performed without any problems, and she uses a pillbox. She also remains independent with regard to financial management. Most of her bills are paid online. She has no issues with cooking, making medical appointments, running errands, or performing household chores. Trace corroborated these reports.    MEDICAL HISTORY:  Ms. Zavala's medical history is significant for cerebrovascular disease, atherosclerotic cardiovascular disease (s/p CABG and stents x2), congestive heart failure, aortic stenosis (s/p aortic valve replacement), hypertension, hypercholesterolemia, hypothyroidism, macular degeneration, and degenerative and rheumatoid arthritis. She denied any history of significant head injuries, seizure, known stroke or heart attack, tremor, falls, balance issues, current pain, or hallucinations.    Diagnostic studies:  Brain MRI dated 8/16/2018 revealed mild generalized atrophy, and mild to moderate chronic small vessel ischemic changes. Prior to obtaining the current MRI results, per Dr. Holland's note, he also observed a greater degree of sylvian fissure widening on the left when compared with the right on the patient's head CT from 7/9/2018.     Past Surgical History:    Procedure Laterality Date     ANGIOPLASTY / STENTING FEMORAL       AORTIC VALVE REPLACEMENT       CARDIAC SURGERY      CABG     EYE SURGERY Bilateral     cataracts     [place] LIGATE FALLOPIAN TUBE      Description: Tubal Ligation;  Recorded: 03/20/2008;     [place] REMOVAL GALLBLADDER      Description: Cholecystectomy;  Recorded: 03/20/2008;     [place] TOTAL HIP ARTHROPLASTY Right     Description: Total Hip Replacement;  Recorded: 03/20/2008; right     Current medications include (per medical record):   Current Outpatient Prescriptions:      amLODIPine (NORVASC) 5 MG tablet, TAKE ONE TABLET BY MOUTH EVERY DAY FOR REYNAUDS, Disp: 90 tablet, Rfl: 3     aspirin 81 MG EC tablet, Take 81 mg by mouth daily., Disp: , Rfl:      atorvastatin (LIPITOR) 40 MG tablet, Take 1 tablet (40 mg total) by mouth daily., Disp: 90 tablet, Rfl: 2     CALCIUM CARBONATE (CALCIUM 500 ORAL), Take by mouth., Disp: , Rfl:      cholecalciferol, vitamin D3, 1,000 unit tablet, Take 2,000 Units by mouth daily., Disp: , Rfl:      folic acid (FOLVITE) 1 MG tablet, Take 1 tablet (1,000 mcg total) by mouth daily., Disp: 90 tablet, Rfl: 3     furosemide (LASIX) 40 MG tablet, Take 1 tablet (40 mg total) by mouth as needed (If gained weight more than 1 pound a day or more leg edema)., Disp: 30 tablet, Rfl: 1     levothyroxine (SYNTHROID, LEVOTHROID) 100 MCG tablet, TAKE ONE TABLET BY MOUTH EVERY DAY AT 6:00 A.M., Disp: 90 tablet, Rfl: 2     meclizine (ANTIVERT) 12.5 mg tablet, Take 1 tablet (12.5 mg total) by mouth 3 (three) times a day as needed for dizziness., Disp: 30 tablet, Rfl: 0     methotrexate 2.5 MG tablet, Take 4 tablets (10 mg total) by mouth once a week., Disp: 48 tablet, Rfl: 0     MV,CALCIUM,MIN/IRON/FOLIC/VITK (MULTI FOR HER ORAL), Take by mouth., Disp: , Rfl:      nitroglycerin (NITROSTAT) 0.4 MG SL tablet, Place 1 tablet (0.4 mg total) under the tongue every 5 (five) minutes as needed for chest pain., Disp: 25 tablet, Rfl: 0     VIT  "C/E/ZN/COPPR/LUTEIN/ZEAXAN (PRESERVISION AREDS 2 ORAL), Take 1 tablet by mouth 2 (two) times a day., Disp: , Rfl: .    RELEVANT FAMILY MEDICAL HISTORY:   Family neurologic history is significant for Parkinson's disease \"or something similar\" in her nephew who is about 73 years old. He is reportedly \"losing his balance.\" Her brother is an alcoholic. Other relevant family medical history, including family history of dementia, was denied.     Family History   Problem Relation Age of Onset     Asthma Mother      Coronary artery disease Neg Hx      PSYCHIATRIC HISTORY:  Currently, the patient described her mood as \"pretty happy\". She acknowledged some concern about this evaluation, but otherwise denied significant symptoms of depression or anxiety. Suicidal ideation was also denied. Trace agreed with her reports, as he also denied any significant changes or concerns with regard to her mood or personality. The patient endorsed ongoing grief related to the death of her daughter by way of a motorcycle accident three years ago. She also brought up that her  and sister  in the same year about 11 years ago. With regard to her psychiatric history, Ms. Zavala denied a history of past psychiatric conditions or mental health treatment. She reported that her appetite is normal. Her sleep is normal. She estimates that she is typically getting about 8 hours of sleep per night and reported that she feels well rested in the morning and adequately energized during the day. Symptoms of REM sleep behavior disorder were denied.    With regard to substance abuse, Ms. Zavala reported no history of past drug or alcohol abuse or dependence.  At the present time, she reported that she typically consumes one alcoholic beverage every couple of weeks. She drinks half of a pot of coffee daily, half of which is decaffeinated. She has been a non-smoker for over 30 years.     SOCIAL HISTORY:  Ms. Zavala was born and raised in [facility]. She dropped out of high school during her senior year after she became pregnant; however, she earned her GED 3-4 years later. She earned mostly Bs and Cs for grades. Significant learning difficulties or developmental attention issues were denied. 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 She retired at age 64 of her own accord. She was  for 55 years until her  passed away 12 years ago. They had 4 children together, 3 of whom are still living and live relatively nearby. The patient lives alone in her own town home in [place], [place], where she has resided since 2005. For leisure, she enjoys exercising daily at her local gym, going out for lunch with people, reading books, and doing tasks around the house.    SERVICES:   One hour of the neuropsychologist's time was spent performing a neurobehavioral status examination (00847); 3 hours of the neuropsychologist's time was spent in medical record review, administering the WMS-III Information and Orientation subtest, interpretating and integrating all tests, and in report preparation (62701); and 3 hours was spent in the administration of the remainder of the testing battery by a trained examiner and interpreted by the neuropsychologist (42287). For diagnostic and coding purposes, Ms. Zavala has a history of erebrovascular disease, atherosclerotic cardiovascular disease (s/p stents x2), congestive heart failure, aortic stenosis (s/p aortic valve replacement), hypertension, hypercholesterolemia, hypothyroidism, and degenerative and rheumatoid arthritis. She was referred for an evaluation of mild neurocognitive disorder.    TESTS ADMINISTERED:   Wechsler Memory Scale-III Information/Orientation, Wechsler Adult Intelligence Scale-IV (select subtests), Wide Range Achievement Test-4 (select subtests), Wechsler Memory Test-IV (select subtests), Brief  "Visuospatial Memory Test-Revised, [place] Verbal Learning Test-Short Form, Trailmaking Test, Controlled Oral Word Association Test and Category Fluency, [place] Naming Test-Short Form,Clock Drawing Test, Yary Jackson Executive Functioning Test (Color Word Interference subtest), Cookie Theft, Sentence Repetition, BDAE Complex Ideation, Generalized Anxiety Disorder-7 Assessment and Geriatric Depression Scale-Short Form.    BEHAVIORAL OBSERVATIONS:   Ms. Zavala arrived on time and accompanied by her son to today's appointment. She was appropriately dressed and groomed. She appeared alert and engaged. Gait was slow; however, other motor activity appeared normal. No vision or hearing difficulties were observed. Conversational speech was of normal volume and prosody; however, frequent word-finding pauses and paraphasias were observed. For example, during the clinical interview she said \"[place]\" instead of midwife, \"refresh\" instead of depressed, \"doctor\" instead of hospital, and \"answer\" instead of [place]. She usually noticed her errors and was able to correct herself, but occasionally the errors would go unnoticed by the patient. Her thought process otherwise appeared linear and goal-directed. No hallucinations or delusions were apparent. Judgment and insight appeared largely intact. Her mood was mostly euthmyic but mildly anxious at the beginning of the testing session, and her affect was appropriately reactive. Rapport was easily established and eye contact was appropriate. During testing, she was alert throughout. She was pleasant and cooperative throughout the evaluation. She required repetition and clarification of test instructions on occasion, but eventually appeared to understand all task demands. Ms. Zavala appeared adequately motivated and engaged easily during testing, and her performance was fully intact on an embedded measure of objective effort. Therefore, the following results are considered a valid " estimation of her current cognitive abilities.    OPTIMAL PREMORBID INTELLECT:  Optimal premorbid intellectual abilities were estimated as falling in the average range based on Ms. Lucas's educational and occupational histories and performance on tasks least likely to be affected by acquired brain dysfunction.    SUMMARY OF TEST RESULTS:  ATTENTION/WORKING MEMORY. Performance on a measure sensitive to sustained auditory-verbal attention and concentration (WAIS-IV Digit Span) was in the average range, as she was able to recite up to 6 digits forward (average), up to 3 digits backward (average), and up to 5 digits in sequence (average). On a test of complex concentration that requires speeded numeric sequencing (Trails A), the patient performed in the high average range and without error. On a more difficult version of that task that requires speeded alpha-numeric sequencing/cognitive set-shifting (Trails B), performance was in the average range and without error.     PROCESSING SPEED. On a measure of psychomotor speed (WAIS-IV Symbol Search), the patient performed in the average range. On the DKEFS Color-Word Interference Test, speeded color naming and speeded word reading were both in the average range, while speeded cognitive inhibition was high average.      LANGUAGE PROCESSING. The patient's ability to comprehend grammatically complex sentences and passages was in the borderline impaired range. The WAIS-IV Verbal Comprehension Index was in the low average range (pro-rated VCI = 81), with borderline impaired performance on a subtest of word knowledge (Vocabulary), and low average performance on a measure of verbal abstract reasoning (Similarities). The patient demonstrated average performance on the [place] Naming Test-Short Form, a test of visual confrontation naming and semantic retrieval. Of the three items she missed, she was able to retrieve one of them after she was provided with a phonemic cue. Phonemic  fluency was in the high average range (COWAT), while semantic fluency was borderline impaired. Her ability to repeat sentences that gradually increased in length and complexity was in the borderline impaired range (Sentence Repetition). Her writing sample was intact and there was no evidence of micrographia. When presented with a complex picture with a variety of events going on (Cookie Theft), her responses were within normal limits with no significant agrammatisms, paraphasias, or word-finding difficulty.     VISUOSPATIAL/CONSTRUCTIONAL SKILLS. Nonverbal abstract reasoning was in the average range (WAIS-IV Matrix Reasoning). Copy of a complex geometric figure was within normal limits and well-organized in approach (RCFT Copy). Copy of six simple geometric figures was within normal limits (BVMT-R Copy). On a Clock Drawing Task, the patient was able to draw a large, well-formed [place] with fairly well spaced numbers and clock hands that converged in the center of the clock face. The clock hands were also well-differentiated by length.     LEARNING/MEMORY. The patient was adequately oriented to personal and current information. On the WMS-IV Logical Memory subtest, immediate memory for paragraph-length stories was moderately impaired. She recalled 1 out of 14 details on the first presentation of the first story, and 5 out of 14 after the second presentation of that story. She recalled only 2 out of 25 details on the longer second story that was presented only once. On the delayed recall trial, she required cues for both stories. Her delayed memory was borderline impaired with a 57% retention rate; however, she recalled 0 details from the first story and 4 details from the second. Delayed recognition of that same material was in the low average range.     On a 9-item verbal list-learning task ([place] Verbal Learning Test-II Short Form), the patient acquired up to 5 words (56%) of the word list by the 4th and final  learning trial (raw scores over trials = 2, 4, 5, 5). Total learning acquisition was in the mildly impaired range. Following a distractor task, the patient recalled 4 items with one intrusion error, which was in the borderline impaired range. After a 10-minute delay, the patient again recalled 4 items, which was in the low average range. Her recall  benefited only slightly further from semantic cues (5 items; low average range). Recognition discriminability was in the low average range, as she recognized 7/9 items (borderline impaired) and made 3 false positive errors (average).     On a visual memory measure (BVMT-R), immediate recall of six simple geometric figures over three learning trials was in the borderline impaired range (raw scores over trials = 2, 2, 5 out of 12). Delayed recall of the figures was also borderline impaired (raw score = 4 out of 12) with an 80% retention rate. Recognition discriminability was in the borderline impaired range, as she correctly recognized 4/6 figures (borderline impaired) and made 1 false positive error (low average).     EXECUTIVE FUNCTIONS. On a test of inhibition and cognitive flexibility, (DKEFS Color-Word Interference Inhibition trial), the patient made 3 errors, which is in the average range. No errors were made on a test of speeded sequencing or speeded cognitive set-shifting (Trails A and B). Verbal and nonverbal abstract reasoning were borderline impaired and average, respectively (WAIS-IV Similarities and Matrix Reasoning). Phonemic fluency was high average, as measured by COWAT. Performance on the Clock Drawing Test was within normal limits, as she was able to accurately represent analog time.     MOOD. On a self-report measure of depression (Geriatric Depression Scale - Short Form), the patient endorsed 1 item. This suggests depressive symptoms in the minimal range. On the Generalized Anxiety Disorder-7, a self-report measure of anxiety, she obtained a score of 0,   placing her in the range of minimal anxiety.    DIAGNOSTIC IMPRESSIONS:  Ms. Zavala is a 83 y.o., left-handed,  female with history of cerebrovascular disease, atherosclerotic cardiovascular disease (s/p stents x2), congestive heart failure, aortic stenosis (s/p aortic valve replacement), hypertension, hypercholesterolemia, hypothyroidism, and degenerative and rheumatoid arthritis. The patient began noticing difficulty with word-finding ability and memory, which led to a referral to see Sesar Holland MD, in the [facility] on 8/16/2018. The patient was referred for this neuropsychological evaluation by Dr. Holland in order to assist with differential diagnosis and care planning.     Optimal premorbid intellectual abilities are estimated as falling in the average  range, and most of Ms. Zavala's performances are generally commensurate with that estimate. However, she exhibits subtle difficulties in several language processes, including sentence repetition, semantic fluency, and verbal abstract reasoning, which generally fall in the borderline impaired range. While her performance on an objective confrontation naming task is intact, she exhibits frequent word-finding difficulties in spontaneous conversation. Numerous paraphasias are also demonstrated during conversation. All other language functions are within normal limits, including single-word reading, comprehension of grammatically complex sentences, word knowledge, phonemic fluency, and a writing sample.     In addition to these language deficits, some aspects of verbal and nonverbal learning/memory are also largely borderline impaired. Specifically, she demonstrates moderate learning inefficiencies for verbal and nonverbal materials. However, she retains most of what she initially learns after a time delay. Her recall is further aided by recognition prompts/cues, suggestive of a mild retrieval deficit.    All other cognitive performances  are intact, including attention/concentration, processing speed, visuospatial/constructional skills, and executive functions (i.e., cognitive inhibition, mental flexibility, visual abstract reasoning, sequencing, and planning). Further, with regard to emotional functioning, the patient denies currently experiencing any significant depressive or anxiety symptoms.    Overall, these results are primarily suggestive of subtle cerebral dysfunction in the dominant (presumably left) lateral temporal region. 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 Her prominent cerebrovascular disease may also contribute to some of her deficits. Her test findings are not overly compelling for an Alzheimer's etiology, and her profile and history do not support a synucleinopathy. There is no evidence of emotional distress currently, and I do not suspect any medication side effects.    RECOMMENDATIONS:  1) Ongoing neurologic care and monitoring is recommended.     2) Ms. Zavala may benefit from a referral to cognitive rehabilitation (i.e., speech therapy) in order to help her develop cognitive compensatory strategies to help improve communication, learning, and retrieval of information. For example, she would likely benefit from utilizing note pads, checklists, to-do lists, a calendar, alarm reminders, a pillbox, and maintaining a routine and an organized living environment.    3) Ongoing management of her cerebrovascular risk factors is encouraged in order to reduce the risk of further cognitive decline.    4) Given the current test findings, I would not expect Ms. Zavala to have significant difficulty performing day-to-day activities. At this time, I have no concerns about her current  living situation, her ability to drive, or decisional capacity. If not already done, completion of paperwork for advance directives and assignment of healthcare and financial Power of  should be considered at this time.    5) Neuropsychological follow-up can be completed in 18-24 months, or as clinically indicated, in order to monitor the patient's cognitive status, help to clarify diagnosis, and to update recommendations. This evaluation can now serve as a baseline.      Ms. Zavala has requested to receive the results of this evaluation via a formal feedback appointment with me, which is currently scheduled for 9/24. She will then meet with the referring provider, Dr. Holland, on 10/4 to further discuss the results and her plan of care. Thank you for allowing me to participate in Ms. Zavala's care. Please contact me with any questions regarding the content of this report.        Mona Vargas PsyD, LP  Licensed Clinical Neuropsychologist  [facility]  Neuropsychology Section   Phone:  [phone]

## 2021-06-20 NOTE — PROGRESS NOTES
Assessment / Impression   1.  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  Cerebrovascular disease      Plan:   1.  Begin Aricept 5 mg p.o. nightly.  Patient is to discontinue medicine and contact me with any concerns.  I have informed her with respect to more common side effects  2.   consultation to develop a plan B    A long conversation held with the patient and son Billy in attendance.  Results of functional test scores include [place] 17/30, CPT 4.9 and ACL of 4.6.  These results are a bit disparate when compared with results of neuropsychometric testing however overall I believe the patient will be in need of increasing assistance moving forward in time.  I did explain to the son and patient that they both assume some rest given the Ally is currently living in an independent environment.  All of this was explained and the patient was reassured.    Total time for evaluation 30 minutes with majority time spent in counseling.      Subjective:     HPI: Rox COHN Lucas is a 83 y.o. female with above-noted diagnoses who returns for follow-up.  The patient offers no new complaints at this time.  Functional test scores as noted.  I should mention that the patient, per neuropsychometric testing, was felt to be testing at an MCI level of impairment albeit in multiple domains.  Functional test scores demonstrate a bit more impairment.  Please note that the patient does demonstrate prefrontal impairments on testing and this might suggest more difficulty in novel situations or situations requiring problem solving etc.    The patient offers no new complaints at this time.          Review of Systems:          As above        Objective:   There were no vitals filed for this visit.    No results found for this or any previous visit (from the past 24 hour(s)).    Physical Exam: As noted previously.  No changes identified.

## 2021-06-21 NOTE — PROGRESS NOTES
Writer met briefly with patient and her son Billy in attendance.  Support services were discussed and resources given to both.  PLAN: We will be available for any further support and follow-up.    Stefani GOLD  10/8/18  1600

## 2021-06-22 NOTE — PROGRESS NOTES
Persons accompanying you (the patient) today:     How have you been doing since we saw you last? Please note any concerns.  Pt has no concerns      Please list any recent hospitalizations/surgeries/procedures you've had since we saw you last:  none    Have you had any falls since your last visit? No    Do you have any pain today? No

## 2021-06-22 NOTE — PROGRESS NOTES
Assessment / Impression   1.  Multiple domain mild cognitive impairment in part secondary to Alzheimer's disease      Plan:   1.  Increase Aricept to 10 mg/day  2.  Follow-up in 6 months or 1 year.  Repeat OT assessment in 1 year    Long conversation with the patient and son Billy in attendance.  Patient appears to be doing well with 5 mg Aricept with this in mind we will increase to 10 mg/day.  I also discussed wellness programming.    Total time for this evaluation 30 minutes with majority time spent in counseling.      Subjective:     HPI: Rox COHN Lucas is a 83 y.o. female with above-noted diagnoses who returns for follow-up.  The patient reports no side effects from therapy with Aricept.  She is taking the medicine at night.          Review of Systems:          As above        Objective:   There were no vitals filed for this visit.    No results found for this or any previous visit (from the past 24 hour(s)).    Physical Exam: As noted

## 2021-06-22 NOTE — PROGRESS NOTES
ASSESSMENT AND PLAN:  Rox Zavala 83 y.o. female is seen here on 12/12/18 for follow-up of psoriatic arthritis, she has osteoarthritis, background of psoriasis, doing great on methotrexate only 7.5 mg daily and labs recently checked normal, on folic acid.  Management principles of osteoarthritis reviewed.  She has discomfort at the right thumb base, left first CMC injection proved to be very successful for her pain control.  She is going to continue with the current dose of methotrexate.  Return for follow-up.  In 6 months with labs in 3 months and just prior to the next visit.    Diagnoses and all orders for this visit:    Psoriatic arthritis (H)  -     methotrexate 2.5 MG tablet; Take 3 tablets (7.5 mg total) by mouth once a week.  Dispense: 36 tablet; Refill: 0    Generalized Osteoarthritis Of The Hand    Osteoarthritis Of The Knee    High risk medication use    Psoriasis          HISTORY OF PRESENTING ILLNESS ON 12/12/18 :  Rox Zavala 83 y.o. 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  She is on methotrexate 7.5 mg/week with folic acid.  She noted left hip pain moderately severely painful in the right shoulder.  Mild discomfort to none discomfort in other areas.  Overall pain level 2.5 2.0/10 able to do all day-to-day activities without difficulty morning stiffness no more than 5 minutes.  She has not had fever or weight loss blurry vision eye redness mouth ulcer nausea there is no cough no rash.    She does not have evidence of autoimmunity.  She does not smoke does not take alcohol.  She had right hip replacement in 2007.Further historical information, including ROS and limitation in activities as noted in the multidimensional health assessment questionnaire scanned in the EMR and in the assessment and plan section.    ALLERGIES:Patient has no known allergies.    PAST MEDICAL/ACTIVE PROBLEMS/MEDICATION/SOCIAL DATA  Past  "Medical History:   Diagnosis Date     Acute renal failure (H)      Aortic valve stenosis, severe      Arthritis      Coronary artery disease 2014     Coronary artery disease      Disease of thyroid gland      Hammer toe      Hyperlipidemia      Hypertension      Macular disorder     \"wrinkle\"     Rheumatoid arthritis (H)      S/P AVR 12/15/2014     Social History     Tobacco Use   Smoking Status Former Smoker     Packs/day: 1.00     Years: 30.00     Pack years: 30.00     Types: Cigarettes     Last attempt to quit: 1995     Years since quittin.9   Smokeless Tobacco Never Used     Patient Active Problem List   Diagnosis     Hammer Toe     Hemorrhoids     Psoriasis     Generalized Osteoarthritis Of The Hand     Osteoarthritis Of The Knee     Vitamin D Deficiency     Palpitations     Hypothyroidism     Neck Pain     Upper Back Pain (Between Shoulder Blades)     Osteopenia     Coronary artery disease     S/P AVR     Constipation     Acute diastolic CHF (congestive heart failure) (H)     Essential hypertension     Hyperlipidemia     Coronary artery disease involving coronary bypass graft of native heart with other forms of angina pectoris (H)     Psoriatic arthritis (H)     High risk medication use     Current Outpatient Medications   Medication Sig Dispense Refill     amLODIPine (NORVASC) 5 MG tablet TAKE ONE TABLET BY MOUTH EVERY DAY FOR REYNAUDS 90 tablet 3     aspirin 81 MG EC tablet Take 81 mg by mouth daily.       atorvastatin (LIPITOR) 40 MG tablet Take 1 tablet (40 mg total) by mouth daily. 90 tablet 2     CALCIUM CARBONATE (CALCIUM 500 ORAL) Take by mouth.       cholecalciferol, vitamin D3, 1,000 unit tablet Take 2,000 Units by mouth daily.       donepezil (ARICEPT) 10 MG tablet Take 1 tablet (10 mg total) by mouth at bedtime. 5 tablet 1     folic acid (FOLVITE) 1 MG tablet Take 1 tablet (1,000 mcg total) by mouth daily. 90 tablet 3     furosemide (LASIX) 40 MG tablet Take 1 tablet (40 mg total) by " mouth as needed (If gained weight more than 1 pound a day or more leg edema). 30 tablet 1     levothyroxine (SYNTHROID, LEVOTHROID) 100 MCG tablet TAKE ONE TABLET BY MOUTH EVERY DAY AT 6:00 A.M. 90 tablet 2     meclizine (ANTIVERT) 12.5 mg tablet Take 1 tablet (12.5 mg total) by mouth 3 (three) times a day as needed for dizziness. 30 tablet 0     MV,CALCIUM,MIN/IRON/FOLIC/VITK (MULTI FOR HER ORAL) Take by mouth.       nitroglycerin (NITROSTAT) 0.4 MG SL tablet Place 1 tablet (0.4 mg total) under the tongue every 5 (five) minutes as needed for chest pain. 25 tablet 0     VIT C/E/ZN/COPPR/LUTEIN/ZEAXAN (PRESERVISION AREDS 2 ORAL) Take 1 tablet by mouth 2 (two) times a day.       methotrexate 2.5 MG tablet Take 3 tablets (7.5 mg total) by mouth once a week. 36 tablet 0     No current facility-administered medications for this visit.      DETAILED EXAMINATION  12/12/18  :  Vitals:    12/12/18 1149   BP: 118/62   Patient Site: Right Arm   Patient Position: Sitting   Cuff Size: Adult Regular   Pulse: 62   Weight: 129 lb (58.5 kg)     Alert oriented. Head including the face is examined for malar rash, heliotropes, scarring, lupus pernio. Eyes examined for redness such as in episcleritis/scleritis, periorbital lesions.   Neck/ Face examined for parotid gland swelling, range of motion of neck.  Left upper and lower and right upper and lower extremities examined for tenderness, swelling, warmth of the appendicular joints, range of motion, edema, rash.  Some of the important findings included: She has tenderness of the left and the right first CMC is more so on the right side, there is no synovitis of the palpable joints of the upper extremities.  No warmth swelling effusion at the knees without JLT.  No tenderness in the trochanteric area on either side.  Painful arc on abduction right shoulder.        LAB / IMAGING DATA:  ALT   Date Value Ref Range Status   12/10/2018 26 0 - 45 U/L Final   09/12/2018 26 0 - 45 U/L Final    06/06/2018 24 0 - 45 U/L Final     Albumin   Date Value Ref Range Status   12/10/2018 4.0 3.5 - 5.0 g/dL Final   09/12/2018 4.0 3.5 - 5.0 g/dL Final   06/06/2018 4.0 3.5 - 5.0 g/dL Final     Creatinine   Date Value Ref Range Status   12/10/2018 0.82 0.60 - 1.10 mg/dL Final   09/12/2018 0.79 0.60 - 1.10 mg/dL Final   06/06/2018 0.88 0.60 - 1.10 mg/dL Final       WBC   Date Value Ref Range Status   12/10/2018 7.0 4.0 - 11.0 thou/uL Final   09/12/2018 7.2 4.0 - 11.0 thou/uL Final   09/29/2015 6.4 4.0 - 11.0 thou/uL Final   05/07/2015 6.9 4.0 - 11.0 thou/uL Final     Hemoglobin   Date Value Ref Range Status   12/10/2018 12.6 12.0 - 16.0 g/dL Final   09/12/2018 12.6 12.0 - 16.0 g/dL Final   06/06/2018 13.0 12.0 - 16.0 g/dL Final     Platelets   Date Value Ref Range Status   12/10/2018 159 140 - 440 thou/uL Final   09/12/2018 144 140 - 440 thou/uL Final   06/06/2018 152 140 - 440 thou/uL Final       Lab Results   Component Value Date    RF <15 07/17/2013    SEDRATE 20 11/08/2016

## 2021-06-23 ENCOUNTER — RECORDS - HEALTHEAST (OUTPATIENT)
Dept: LAB | Facility: CLINIC | Age: 86
End: 2021-06-23

## 2021-06-23 LAB
BNP SERPL-MCNC: 600 PG/ML (ref 0–167)
HGB BLD-MCNC: 8.3 G/DL (ref 12–16)

## 2021-06-23 NOTE — TELEPHONE ENCOUNTER
Refill Approved    Rx renewed per Medication Renewal Policy. Medication was last renewed on 1/29/18.    Yasemin Black, [facility] Triage/Med Refill 2/6/2019     Requested Prescriptions   Pending Prescriptions Disp Refills     amLODIPine (NORVASC) 5 MG tablet [Pharmacy Med Name: AMLODIPINE BESYLATE 5MG TABS] 90 tablet 3     Sig: TAKE ONE TABLET BY MOUTH EVERY DAY FOR REYNAUDS    Calcium-Channel Blockers Protocol Passed - 2/4/2019  8:17 AM       Passed - PCP or prescribing provider visit in past 12 months or next 3 months    Last office visit with prescriber/PCP: 7/9/2018 Jaclyn Kaye MD OR same dept: 7/9/2018 Jaclyn Kaye MD OR same specialty: 7/9/2018 Jaclyn Kaye MD  Last physical: 8/4/2017 Last MTM visit: Visit date not found   Next visit within 3 mo: Visit date not found  Next physical within 3 mo: Visit date not found  Prescriber OR PCP: Jaclyn Kaye MD  Last diagnosis associated with med order: 1. HTN (hypertension)  - amLODIPine (NORVASC) 5 MG tablet [Pharmacy Med Name: AMLODIPINE BESYLATE 5MG TABS]; TAKE ONE TABLET BY MOUTH EVERY DAY FOR REYNAUDS  Dispense: 90 tablet; Refill: 3    2. Coronary artery disease involving native coronary artery of native heart without angina pectoris  - amLODIPine (NORVASC) 5 MG tablet [Pharmacy Med Name: AMLODIPINE BESYLATE 5MG TABS]; TAKE ONE TABLET BY MOUTH EVERY DAY FOR REYNAUDS  Dispense: 90 tablet; Refill: 3    If protocol passes may refill for 12 months if within 3 months of last provider visit (or a total of 15 months).            Passed - Blood pressure filed in past 12 months    BP Readings from Last 1 Encounters:   12/12/18 118/62

## 2021-06-23 NOTE — TELEPHONE ENCOUNTER
Please remind patient that she is due for repeat thyroid testing.  Last testing was last year in January.

## 2021-06-23 NOTE — TELEPHONE ENCOUNTER
Due for labs     Medication was last renewed on 4/28/18.    Yasemin Black, [facility] Triage/Med Refill 1/18/2019     Requested Prescriptions   Pending Prescriptions Disp Refills     levothyroxine (SYNTHROID, LEVOTHROID) 100 MCG tablet [Pharmacy Med Name: LEVOTHYROXINE SODIUM 100MCG TABS] 90 tablet 2     Sig: TAKE ONE TABLET BY MOUTH EVERY DAY AT 6:00 A.M.    Thyroid Hormones Protocol Passed - 1/17/2019 12:27 PM       Passed - Provider visit in past 12 months or next 3 months    Last office visit with prescriber/PCP: 7/9/2018 Jaclyn Kaye MD OR same dept: 7/9/2018 Jaclyn Kaye MD OR same specialty: 7/9/2018 Jaclyn Kaye MD  Last physical: 8/4/2017 Last MTM visit: Visit date not found   Next visit within 3 mo: Visit date not found  Next physical within 3 mo: Visit date not found  Prescriber OR PCP: Jaclyn Kaye MD  Last diagnosis associated with med order: 1. Hypothyroidism  - levothyroxine (SYNTHROID, LEVOTHROID) 100 MCG tablet [Pharmacy Med Name: LEVOTHYROXINE SODIUM 100MCG TABS]; TAKE ONE TABLET BY MOUTH EVERY DAY AT 6:00 A.M.  Dispense: 90 tablet; Refill: 2    If protocol passes may refill for 12 months if within 3 months of last provider visit (or a total of 15 months).            Passed - TSH on file in past 12 months for patient age 12 & older    TSH   Date Value Ref Range Status   01/26/2018 1.11 0.30 - 5.00 uIU/mL Final

## 2021-06-24 LAB
ANION GAP SERPL CALCULATED.3IONS-SCNC: 7 MMOL/L (ref 5–18)
BUN SERPL-MCNC: 8 MG/DL (ref 8–28)
CALCIUM SERPL-MCNC: 8.1 MG/DL (ref 8.5–10.5)
CHLORIDE BLD-SCNC: 106 MMOL/L (ref 98–107)
CO2 SERPL-SCNC: 25 MMOL/L (ref 22–31)
CREAT SERPL-MCNC: 0.83 MG/DL (ref 0.6–1.1)
GFR SERPL CREATININE-BSD FRML MDRD: >60 ML/MIN/1.73M2
GLUCOSE BLD-MCNC: 83 MG/DL (ref 70–125)
POTASSIUM BLD-SCNC: 4.2 MMOL/L (ref 3.5–5)
SODIUM SERPL-SCNC: 138 MMOL/L (ref 136–145)

## 2021-06-24 NOTE — TELEPHONE ENCOUNTER
Refill Request  Did you contact pharmacy: No  Medication name:   Requested Prescriptions     Pending Prescriptions Disp Refills     atorvastatin (LIPITOR) 40 MG tablet 90 tablet 2     Sig: Take 1 tablet (40 mg total) by mouth daily.     Who prescribed the medication: Maxi Estrada  Pharmacy Name and Location: [facility]  Is patient out of medication: unsure  Patient notified refills processed in 72 hours:  no  Okay to leave a detailed message: no

## 2021-06-24 NOTE — PROGRESS NOTES
Assessment:     1. Hypothyroidism, unspecified type  Thyroid [facility]   2. Psoriatic arthritis (H)     3. Thrombocytopenia (H)     4. Acute diastolic CHF (congestive heart failure) (H)     5. Coronary artery disease involving coronary bypass graft of native heart with other forms of angina pectoris (H)     6. Tired  Comprehensive Metabolic Panel        Fatigue, organic etiology unlikely based on careful history and exam.    The best practice advisory-  For CHF she is following with Dr. Canales in the next month per patient.  For her psoriatic arthritis and thrombocytopenia she is scheduled to see rheumatology.  She will pursue her lab work today.     Plan:      Discussed diagnosis with patient.  Reassured that serious underlying cause for the fatigue is very unlikely.  Discussed lifestyle modification as means of resolving problem.  See orders for lab evaluation.  Discussed how depression can be a cause of fatigue.      Patient Instructions     We are checking your blood and thyroid today.    Follow with Rheumatology and cardiology.    Regarding Colonoscopy, You need to check with Dr canales , if you can take the amount of fluid.    It is not  recommend pursuing colonoscopy after 79, per preventive care  Guidelines for most people.  However,age is  not an absolute contraindication.    Misbah Barone MD  3/12/2019              Subjective:       Rox Zavala is a 84 y.o. female who presents for evaluation of fatigue. Symptoms began 2 months ago. The patient feels the fatigue began with: URI. Symptoms of her fatigue have been change in appetite, feelings of depression, general malaise and lack of interest in usual activities. Patient describes the following psychological symptoms: none. Patient denies exercise intolerance, fever, GI blood loss, significant change in weight, symptoms of arthritis and unusual rashes. Symptoms have progressed to a point and plateaued and gradually improved. Symptom severity: symptoms  bothersome, but easily able to carry out all usual work/school/family activities. Previous visits for this problem: none.     She feels tired and fatigued more than usual.  She does not think it is a physical energy.  It is like she is getting lazy.        The following portions of the patient's history were reviewed and updated as appropriate: allergies, current medications, past family history, past medical history, past social history, past surgical history and problem list.  No Known Allergies    Current Outpatient Medications on File Prior to Visit   Medication Sig Dispense Refill     amLODIPine (NORVASC) 5 MG tablet TAKE ONE TABLET BY MOUTH EVERY DAY FOR REYNAUDS 90 tablet 1     aspirin 81 MG EC tablet Take 81 mg by mouth daily.       atorvastatin (LIPITOR) 40 MG tablet Take 1 tablet (40 mg total) by mouth daily. 90 tablet 2     CALCIUM CARBONATE (CALCIUM 500 ORAL) Take by mouth.       cholecalciferol, vitamin D3, 1,000 unit tablet Take 2,000 Units by mouth daily.       donepezil (ARICEPT) 10 MG tablet Take 1 tablet (10 mg total) by mouth at bedtime. 5 tablet 1     folic acid (FOLVITE) 1 MG tablet Take 1 tablet (1,000 mcg total) by mouth daily. 90 tablet 3     levothyroxine (SYNTHROID, LEVOTHROID) 100 MCG tablet TAKE ONE TABLET BY MOUTH EVERY DAY AT 6:00 A.M. 90 tablet 0     meclizine (ANTIVERT) 12.5 mg tablet Take 1 tablet (12.5 mg total) by mouth 3 (three) times a day as needed for dizziness. 30 tablet 0     methotrexate 2.5 MG tablet Take 3 tablets (7.5 mg total) by mouth once a week. 36 tablet 0     MV,CALCIUM,MIN/IRON/FOLIC/VITK (MULTI FOR HER ORAL) Take by mouth.       nitroglycerin (NITROSTAT) 0.4 MG SL tablet Place 1 tablet (0.4 mg total) under the tongue every 5 (five) minutes as needed for chest pain. 25 tablet 0     VIT C/E/ZN/COPPR/LUTEIN/ZEAXAN (PRESERVISION AREDS 2 ORAL) Take 1 tablet by mouth 2 (two) times a day.       furosemide (LASIX) 40 MG tablet Take 1 tablet (40 mg total) by mouth as  "needed (If gained weight more than 1 pound a day or more leg edema). 30 tablet 1     No current facility-administered medications on file prior to visit.        Patient Active Problem List   Diagnosis     Hammer Toe     Hemorrhoids     Psoriasis     Generalized Osteoarthritis Of The Hand     Osteoarthritis Of The Knee     Vitamin D Deficiency     Palpitations     Hypothyroidism     Neck Pain     Upper Back Pain (Between Shoulder Blades)     Osteopenia     Coronary artery disease     S/P AVR     Constipation     Acute diastolic CHF (congestive heart failure) (H)     Essential hypertension     Hyperlipidemia     Coronary artery disease involving coronary bypass graft of native heart with other forms of angina pectoris (H)     Psoriatic arthritis (H)     High risk medication use     Thrombocytopenia (H)       Past Medical History:   Diagnosis Date     Acute renal failure (H)      Aortic valve stenosis, severe      Arthritis      Coronary artery disease 11/17/2014     Coronary artery disease      Disease of thyroid gland      Hammer toe      Hyperlipidemia      Hypertension      Macular disorder     \"wrinkle\"     Rheumatoid arthritis (H)      S/P AVR 12/15/2014       Past Surgical History:   Procedure Laterality Date     ANGIOPLASTY / STENTING FEMORAL       AORTIC VALVE REPLACEMENT       CARDIAC SURGERY      CABG     EYE SURGERY Bilateral     cataracts     OR LIGATE FALLOPIAN TUBE      Description: Tubal Ligation;  Recorded: 03/20/2008;     OR REMOVAL GALLBLADDER      Description: Cholecystectomy;  Recorded: 03/20/2008;     OR TOTAL HIP ARTHROPLASTY Right     Description: Total Hip Replacement;  Recorded: 03/20/2008; right       Family History   Problem Relation Age of Onset     Asthma Mother      Coronary artery disease Neg Hx        Social History     Socioeconomic History     Marital status:      Spouse name: None     Number of children: None     Years of education: None     Highest education level: None " "  Occupational History     None   Social Needs     Financial resource strain: None     Food insecurity:     Worry: None     Inability: None     Transportation needs:     Medical: None     Non-medical: None   Tobacco Use     Smoking status: Former Smoker     Packs/day: 1.00     Years: 30.00     Pack years: 30.00     Types: Cigarettes     Last attempt to quit: 1995     Years since quittin.2     Smokeless tobacco: Never Used   Substance and Sexual Activity     Alcohol use: Yes     Comment: Occasional      Drug use: No     Sexual activity: None   Lifestyle     Physical activity:     Days per week: None     Minutes per session: None     Stress: None   Relationships     Social connections:     Talks on phone: None     Gets together: None     Attends Faith service: None     Active member of club or organization: None     Attends meetings of clubs or organizations: None     Relationship status: None     Intimate partner violence:     Fear of current or ex partner: None     Emotionally abused: None     Physically abused: None     Forced sexual activity: None   Other Topics Concern     None   Social History Narrative    , patient notes she and her  were  for 60 years and he passed away 12 years ago following an illness and complications. Patient notes they had a happy marriage.    Children: Patient notes she had 4 children, one daughter passed away following a MVA when daughter was 57 years old. Leisa states she is close to her two boys, daughter, grandchildren, and great-grandchildren and finds her family supportive.        Review of Systems  Respiratory: negative  Genitourinary:negative  Integument/breast: negative  Hematologic/lymphatic: negative      Objective:     BP (!) 129/94 (Patient Site: Left Arm, Patient Position: Sitting, Cuff Size: Adult Regular)   Pulse (!) 57   Temp 97.4  F (36.3  C) (Oral)   Resp 20   Ht 5' 3\" (1.6 m)   Wt 125 lb 3.2 oz (56.8 kg)   LMP 1974   SpO2 " 97%   BMI 22.18 kg/m        General:Healthy, alert and in no acute distress  Head:  NCAT w/o lesions or tenderness  Eyes: conjunctivae/corneas clear. PERRL, EOM's intact. Fundi benign  Ears: normal TM's and external ear canals bilateral  Sinus tender: negative  Nose: Normal Montoya and turbinates  Mouth: lips, mucosa, and tongue normal. Teeth and gums normal. No tonsillar endangerment or erythema of pharynx  Neck: supple, symmetrical, trachea midline.  Lungs: clear to auscultation bilaterally  Heart: RRR, No murmurs, extremities show trace edema  Abdomen: Soft NTND, No HSM,   Skin: No rashes

## 2021-06-24 NOTE — PATIENT INSTRUCTIONS - HE
We are checking your blood and thyroid today.    Follow with Rheumatology and cardiology.    Regarding Colonoscopy, You need to check with Dr canales , if you can take the amount of fluid.    It is not  recommend pursuing colonoscopy after 79, per preventive care  Guidelines for most people.  However,age is  not an absolute contraindication.    Misbah Barone MD  3/12/2019

## 2021-06-25 ENCOUNTER — AMBULATORY - HEALTHEAST (OUTPATIENT)
Dept: GERIATRICS | Facility: CLINIC | Age: 86
End: 2021-06-25

## 2021-06-25 ENCOUNTER — COMMUNICATION - HEALTHEAST (OUTPATIENT)
Dept: GERIATRICS | Facility: CLINIC | Age: 86
End: 2021-06-25

## 2021-06-25 NOTE — TELEPHONE ENCOUNTER
RN cannot approve Refill Request    RN can NOT refill this medication   Patient request early refill. Medication last filled 8/25/20 for qty 90 refill 3. Provider to advise on request. . Last office visit: 7/2/2020 Misbah Barone MD Last Physical: 8/21/2018 Last MTM visit: Visit date not found Last visit same specialty: 7/2/2020 Misbah Barone MD.  Next visit within 3 mo: Visit date not found  Next physical within 3 mo: Visit date not found      Enrico Morales, [facility] Triage/Med Refill 6/2/2021    Requested Prescriptions   Pending Prescriptions Disp Refills     levothyroxine (SYNTHROID, LEVOTHROID) 100 MCG tablet 90 tablet 3     Sig: TAKE ONE TABLET BY MOUTH EVERY DAY AT 6:00 A.M.       Thyroid Hormones Protocol Passed - 6/1/2021 11:15 AM        Passed - Provider visit in past 12 months or next 3 months     Last office visit with prescriber/PCP: 7/2/2020 Misbah Barone MD OR same dept: 7/2/2020 Misbah Barone MD OR same specialty: 7/2/2020 Misbah Barone MD  Last physical: 8/21/2018 Last MTM visit: Visit date not found   Next visit within 3 mo: Visit date not found  Next physical within 3 mo: Visit date not found  Prescriber OR PCP: Misbah Barone MD  Last diagnosis associated with med order: 1. Hypothyroidism  - levothyroxine (SYNTHROID, LEVOTHROID) 100 MCG tablet; TAKE ONE TABLET BY MOUTH EVERY DAY AT 6:00 A.M.  Dispense: 90 tablet; Refill: 3    If protocol passes may refill for 12 months if within 3 months of last provider visit (or a total of 15 months).             Passed - TSH on file in past 12 months for patient age 12 & older     TSH   Date Value Ref Range Status   07/02/2020 0.95 0.30 - 5.00 uIU/mL Final

## 2021-06-25 NOTE — TELEPHONE ENCOUNTER
----- Message from Justyna Matthews sent at 6/14/2021  2:19 PM CDT -----  Regarding: ESTELA PT  General phone call:    Caller: Foster Woodward   Primary cardiologist: ESTELA  Detailed reason for call: Patient had a hemaclip 6/6 by Dr. Vinod PIERRE - She's due for an Cardiac MRI on Wednesday 6/16 - is this okay to have done?    Best phone number: [phone]  Best time to contact: any  Ok to leave a detailed message? yes  Device? no    Additional Info:

## 2021-06-25 NOTE — TELEPHONE ENCOUNTER
Referred son Billy to GI for what type of clip pt had implanted and whether or not it was MRI safe.  Billy states he has call out to GI as well and is awaiting response.  -lakeisha

## 2021-06-25 NOTE — PROGRESS NOTES
Rox Zavala is a 86 y.o. female who is being evaluated via a billable video visit.      How would you like to obtain your AVS? MyChart.  If dropped from the video visit, the video invitation should be resent by: Text to cell phone: [phone]  Will anyone else be joining your video visit? Son will be there (Billy)          Video-Visit Details    Type of service:  Video Visit    Start: 05/27/2021 12:32 pm   Stop: 05/27/2021 12:38 pm  Originating Location (pt. Location): Home    Distant Location (provider location):  [facility]     Platform used for Video Visit: Quemulus      This document was created using a software with less than 100% fidelity, at times resulting in unintended, even erroneous syntax and grammar.  The reader is advised to keep this under consideration while reviewing, interpreting this note.           ASSESSMENT AND PLAN:    Diagnoses and all orders for this visit:    Psoriatic arthritis (H)  -     methotrexate 2.5 MG tablet; TAKE 3 TABLETS (7.5MG) 1 TIME WEEKLY  Dispense: 36 tablet; Refill: 0    Generalized Osteoarthritis Of The Hand    Psoriasis    High risk medication use    Psoriatic arthropathy (H)  -     folic acid (FOLVITE) 1 MG tablet; Take 1 tablet (1,000 mcg total) by mouth daily.  Dispense: 90 tablet; Refill: 3        Follow up in 6 months, labs every 3 months.      HISTORY OF PRESENTING ILLNESS:  Rox Zavala 86 y.o. is evaluated here via video/audio link.    She is here for follow-up.  She has psoriatic arthritis, psoriasis, osteoarthritis, on methotrexate, folic acid, has done well with this, intermittent pain at the base of the thumb which she had corticosteroid injection with good benefit.  Recently she has noted triggering of the left fifth digit which is 2 weeks old now, there is mild, does not wake her up from sleep.  Recent labs are reviewed within acceptable range.  She she had her son with him to facilitate today's visit..         ROS enquiry  "held for fever, ocular symptoms, rash, headache,  GI issues.  Today we also discussed the issues related to the current pandemic, the pros and cons of the current treatment plan, the CDC guidelines such as social distancing washing the hands covering the cough.  ALLERGIES:Patient has no known allergies.    PAST MEDICAL/ACTIVE PROBLEMS/MEDICATION/SOCIAL DATA  Past Medical History:   Diagnosis Date     Acute renal failure (H)      Aortic valve stenosis, severe      Arthritis      Coronary artery disease 2014     Coronary artery disease      Disease of thyroid gland      Hammer toe      Hyperlipidemia      Hypertension      Macular disorder     \"wrinkle\"     MCI (mild cognitive impairment) 2019     Pressure injury of buttock, stage 1, unspecified laterality 2019     Rheumatoid arthritis (H)      S/P AVR 12/15/2014     Sacral insufficiency fracture, initial encounter 2019     Social History     Tobacco Use   Smoking Status Former Smoker     Packs/day: 1.00     Years: 30.00     Pack years: 30.00     Types: Cigarettes     Quit date: 1995     Years since quittin.4   Smokeless Tobacco Never Used     Patient Active Problem List   Diagnosis     Hammer Toe     Hemorrhoids     Psoriasis     Generalized Osteoarthritis Of The Hand     Osteoarthritis Of The Knee     Vitamin D Deficiency     Palpitations     Hypothyroidism     Neck Pain     Upper Back Pain (Between Shoulder Blades)     Osteopenia     Coronary artery disease     S/P AVR     Constipation     Acute diastolic CHF (congestive heart failure) (H)     Essential hypertension     Hyperlipidemia     Coronary artery disease involving coronary bypass graft of native heart with other forms of angina pectoris (H)     Psoriatic arthritis (H)     High risk medication use     Sacral insufficiency fracture, initial encounter     Hypertension     Back pain     Pressure injury of buttock, stage 1, unspecified laterality     MCI (mild cognitive impairment) "     Frequent PVCs     Senile osteoporosis     Current Outpatient Medications   Medication Sig Dispense Refill     acetaminophen (TYLENOL) 500 MG tablet Take 1-2 tablets (500-1,000 mg total) by mouth every 4 (four) hours as needed.  0     aspirin 81 MG EC tablet Take 81 mg by mouth daily.       atorvastatin (LIPITOR) 40 MG tablet Take 1 tablet (40 mg total) by mouth daily. 90 tablet 2     calcium-vitamin D 500 mg(1,250mg) -200 unit per tablet Take 1 tablet by mouth 2 (two) times a day. (600mg calcium + 800 IU Vitamin D)  0     cholecalciferol, vitamin D3, 2,000 unit Tab Take 2,000 Units by mouth daily.       donepeziL (ARICEPT) 10 MG tablet Take 1 tablet (10 mg total) by mouth at bedtime. 90 tablet 3     fluticasone propionate (FLONASE) 50 mcg/actuation nasal spray 2 sprays into each nostril daily. 16 g 12     folic acid (FOLVITE) 1 MG tablet Take 1 tablet (1,000 mcg total) by mouth daily. 90 tablet 3     isosorbide mononitrate (IMDUR) 30 MG 24 hr tablet Take 1 tablet (30 mg total) by mouth daily. 30 tablet 11     levothyroxine (SYNTHROID, LEVOTHROID) 100 MCG tablet TAKE ONE TABLET BY MOUTH EVERY DAY AT 6:00 A.M. 90 tablet 3     lisinopriL (PRINIVIL,ZESTRIL) 20 MG tablet Take 1 tablet (20 mg total) by mouth daily. 30 tablet 11     methotrexate 2.5 MG tablet TAKE 3 TABLETS (7.5MG) 1 TIME WEEKLY 36 tablet 0     metoprolol succinate (TOPROL-XL) 25 MG Take 1 tablet (25 mg total) by mouth daily. 180 tablet 1     nitroglycerin (NITROSTAT) 0.4 MG SL tablet Place 1 tablet (0.4 mg total) under the tongue every 5 (five) minutes as needed for chest pain. 25 tablet 0     polyethylene glycol 3350 (MIRALAX ORAL) Take 1 tablet by mouth daily as needed.       VIT C/E/ZN/COPPR/LUTEIN/ZEAXAN (PRESERVISION AREDS 2 ORAL) Take 1 tablet by mouth 2 (two) times a day.       No current facility-administered medications for this visit.          EXAMINATION:    Using the audio and video link as best as possible the constitutional, neck,  neurologic, psych, skin, both upper extremities areas/organ system were evaluated during this assessment.  Some of the important findings: Alert, oriented, speech fluent.    Able to fully flex the digits, into fists bilaterally, wrist and elbow range of motion appear normal, abduction of the shoulder is normal.  Minimal triggering of the left fifth digit.  No dactylitis of the digits.      LAB / IMAGING DATA:  ALT   Date Value Ref Range Status   05/20/2021 21 0 - 45 U/L Final   01/18/2021 28 0 - 45 U/L Final   09/24/2020 30 0 - 45 U/L Final     Albumin   Date Value Ref Range Status   05/20/2021 3.7 3.5 - 5.0 g/dL Final   01/18/2021 3.9 3.5 - 5.0 g/dL Final   08/14/2020 4.0 3.5 - 5.0 g/dL Final     Creatinine   Date Value Ref Range Status   05/20/2021 0.90 0.60 - 1.10 mg/dL Final   01/18/2021 1.01 0.60 - 1.10 mg/dL Final   09/24/2020 1.05 0.60 - 1.10 mg/dL Final       WBC   Date Value Ref Range Status   05/20/2021 7.4 4.0 - 11.0 thou/uL Final   01/18/2021 7.9 4.0 - 11.0 thou/uL Final   09/29/2015 6.4 4.0 - 11.0 thou/uL Final   05/07/2015 6.9 4.0 - 11.0 thou/uL Final     Hemoglobin   Date Value Ref Range Status   05/20/2021 12.0 12.0 - 16.0 g/dL Final   01/18/2021 13.6 12.0 - 16.0 g/dL Final   09/24/2020 13.0 12.0 - 16.0 g/dL Final     Platelets   Date Value Ref Range Status   05/20/2021 154 140 - 440 thou/uL Final   01/18/2021 132 (L) 140 - 440 thou/uL Final   09/24/2020 160 140 - 440 thou/uL Final       Lab Results   Component Value Date    RF <15 07/17/2013    SEDRATE 20 11/08/2016

## 2021-06-25 NOTE — TELEPHONE ENCOUNTER
Test Results  Who is calling?:  Patient  Who ordered the test:  Dr. Barone  Type of test: Lab  Date of test:  3/12/19  Where was the test performed: [facility]  What are your questions/concerns?:  Patient is asking, if she needs to see Dr. Santoyo sooner than 3 months since AST was high.  Patient is taking methotrexate 7.5 mg a week. Patient stated this is why her labs needed to be checked.  Patient rarely drinks only if she goes out to eat with her kids, last drink was 2 weeks ago.  Please reach out to patient and advise.  Okay to leave a detailed message?:  Yes [phone]    Written by Misbah Barone MD on 3/12/2019  8:21 PM   Ally     Most of the blood work is normal.   Liver enzyme AST is mildly high.  Usually this is elevated with alcohol use.     The rest of the liver enzymes are normal     We will continue to keep a check on it.     Misbah Barone MD  3/12/2019

## 2021-06-25 NOTE — PROGRESS NOTES
Progress Notes by Shilpa Wilburn MD at 1/31/2017 11:10 AM     Author: Shilpa Wilburn MD Service: -- Author Type: Physician    Filed: 1/31/2017 12:18 PM Encounter Date: 1/31/2017 Status: Signed    : Shilpa Wilburn MD (Physician)           Click to link to [facility] HEART [facility] NOTE       Assessment/Plan:   A 81 years old woman presents to this clinic for routine cardiology follow-up.    1.  Three-vessel coronary artery disease, status post the LIMA to D1, s/p CHEN to mid LCx and RCA 7/2016: The patient has no chest pain.  Her shortness of breath could be related to COPD.  There is no indication of congestive heart failure.    Continue aspirin, Plavix, Lipitor and amlodipine.    2.  Severe aortic valve stenosis, status post the bioprosthetic aortic valve replacement: She had an echocardiogram on December 25 2014 which was reported normal bovine aortic valve.     3.  Essential hypertension: Her blood pressure has been controlled well with 5 mg of amlodipine daily.      4.  Hyperlipidemia: Continue pravastatin 40 mg nightly.  LDL was controlled well.    Thank you for the opportunity to be involved in the care of Rox Zavala. If you have any questions, please feel free to contact me.  I will see the patient again in 6 months.     History of Present Illness:   It is my pleasure to see Rox Zavala at the [facility] clinic for routine cardiology Follow-up.  Rox Zavala is a 81 y.o. female with a medical history of severe aortic valve stenosis status post bioprosthetic aortic valve replacement on Dec15, 2014, one-vessel bypass surgery LIMA to D1, 3 vessel coronary artery disease s/p CHEN to mid LCx and mid RCA on 7-5-2016, essential hypertension, hyperlipidemia, hypothyroidism.    The patient states that she did not have anymore intermittent chest pain after stent placement.  She still has dyspnea on exertion which has been stable, most likely secondary to COPD because  bronchodilators improved symptoms.  She has no palpitations.  She has occasional lightheadedness.  She denies any orthopnea or PND or leg swelling.  Her blood pressure and heart rate is in normal range.      Past Medical History:     Patient Active Problem List   Diagnosis   ? Callus   ? Hammer Toe   ? Hemorrhoids   ? Psoriatic Arthropathy   ? Psoriasis   ? Generalized Osteoarthritis Of The Hand   ? Osteoarthritis Of The Knee   ? Vitamin D Deficiency   ? Aortic Stenosis   ? Cough   ? Palpitations   ? Hypothyroidism   ? Rheumatoid arthritis   ? Neck Pain   ? Upper Back Pain (Between Shoulder Blades)   ? Osteopenia   ? Aortic stenosis, severe   ? Coronary artery disease   ? S/P AVR   ? Fever   ? Constipation   ? Upset stomach   ? Pain at surgical incision   ? Healthcare-associated pneumonia   ? Acute respiratory distress   ? Bilateral pleural effusion   ? Acute diastolic CHF (congestive heart failure)   ? Hypertension   ? Essential hypertension   ? Hyperlipidemia   ? Coronary artery disease involving coronary bypass graft of native heart with other forms of angina pectoris   ? Postoperative (PCI) anemia due to acute blood loss   ? Thigh hematoma, right, initial encounter       Past Surgical History:     Past Surgical History   Procedure Laterality Date   ? Pr total hip arthroplasty Right      Description: Total Hip Replacement;  Recorded: 03/20/2008; right   ? Pr removal gallbladder       Description: Cholecystectomy;  Recorded: 03/20/2008;   ? Pr ligate fallopian tube       Description: Tubal Ligation;  Recorded: 03/20/2008;   ? Cardiac surgery       CABG   ? Aortic valve replacement     ? Eye surgery Bilateral      cataracts   ? Angioplasty / stenting femoral         Family History:     Family History   Problem Relation Age of Onset   ? Asthma Mother    ? Coronary artery disease Neg Hx        Social History:    reports that she quit smoking about 22 years ago. Her smoking use included Cigarettes. She has a 30.00  pack-year smoking history. She has never used smokeless tobacco. She reports that she drinks alcohol. She reports that she does not use illicit drugs.    Review of Systems:   General: WNL  Eyes: WNL  Ears/Nose/Throat: WNL  Lungs: WNL  Heart: WNL  Stomach: Constipation  Bladder: WNL  Muscle/Joints: Joint Pain  Skin: WNL  Nervous System: WNL  Mental Health: WNL     Blood: Easy Bruising    Meds:     Current Outpatient Prescriptions:   ?  alendronate (FOSAMAX) 70 MG tablet, Take 1 tablet (70 mg total) by mouth every 7 days. Take in the morning on an empty stomach with a full glass of water 30 minutes before food, Disp: 12 tablet, Rfl: 3  ?  amLODIPine (NORVASC) 5 MG tablet, Take 1 tablet (5 mg total) by mouth daily. For raynauds, Disp: 90 tablet, Rfl: 3  ?  aspirin 81 MG EC tablet, Take 81 mg by mouth daily., Disp: , Rfl:   ?  atorvastatin (LIPITOR) 40 MG tablet, Take 1 tablet (40 mg total) by mouth daily., Disp: 30 tablet, Rfl: 0  ?  clopidogrel (PLAVIX) 75 mg tablet, Take 1 tablet (75 mg total) by mouth daily., Disp: 90 tablet, Rfl: 2  ?  folic acid (FOLVITE) 1 MG tablet, Take 1 mg by mouth daily., Disp: , Rfl:   ?  levothyroxine (SYNTHROID, LEVOTHROID) 100 MCG tablet, Take 1 tablet (100 mcg total) by mouth Daily at 6:00 am., Disp: 90 tablet, Rfl: 3  ?  methotrexate 2.5 MG tablet, Take 15 mg by mouth once a week. 6 tablets on fridays, Disp: , Rfl:   ?  nitroglycerin (NITROSTAT) 0.4 MG SL tablet, Place 1 tablet (0.4 mg total) under the tongue every 5 (five) minutes as needed for chest pain., Disp: 25 tablet, Rfl: 0  ?  SPIRIVA WITH HANDIHALER 18 mcg inhalation capsule, INHALE THE CONTENTS OF 1 CAPSULE ONCE DAILY USING HANDIHALER DEVICE. TO GET FULL DOSE, BREATHE OUT AND INHALE ONCE AGAIN., Disp: 90 capsule, Rfl: 0  ?  VIT C/E/ZN/COPPR/LUTEIN/ZEAXAN (PRESERVISION AREDS 2 ORAL), Take 1 tablet by mouth 2 (two) times a day., Disp: , Rfl:   ?  levothyroxine (SYNTHROID, LEVOTHROID) 100 MCG tablet, Take 100 mcg by mouth.,  "Disp: , Rfl:     Allergies:   Review of patient's allergies indicates no known allergies.      Objective:      Physical Exam  132 lb (59.9 kg)  5' 3\" (1.6 m)  Body mass index is 23.38 kg/(m^2).  Visit Vitals   ? /62 (Patient Site: Right Arm, Patient Position: Sitting, Cuff Size: Adult Regular)   ? Pulse 76   ? Resp 16   ? Ht 5' 3\" (1.6 m)   ? Wt 132 lb (59.9 kg)   ? LMP 08/17/1974   ? BMI 23.38 kg/m2       General Appearance:   Awake, Alert, No acute distress.   HEENT:  Pupil equal and reactive to light. No scleral icterus; the mucous membranes were moist.   Neck: No cervical bruits. No JVD. No thyromegaly.     Chest: The spine was straight. The chest was symmetric.   Lungs:   Respirations unlabored; Lungs are clear to auscultation. No crackles. No wheezing.   Cardiovascular:   Regular rhythm and rate, normal first and second heart sounds with I/VI systolic murmurs. No rubs or gallops.    Abdomen:  Soft. No tenderness. Non-distended. Bowels sounds are present   Extremities: Equal tibial pulses. No leg edema.   Skin: No rashes or ulcers. Warm, Dry.   Musculoskeletal: No tenderness. No deformity.   Neurologic: Mood and affect are appropriate. No focal deficits.         Cardiac Imaging Studies  Coronary angiography with PCI on 7-5-2016:  - Moderate proximal and distal left main disease  - LAD has only mild disease. There is severe disease in the  first diagonal and there is a patent LIMA feeding it distally.  - The proximal circumflex had severe calcific disease and was  stented with a Promus CHEN with good results.  - There is diffuse mild proximal RCA disease and severe  disease in the mid segment which was stented with a Promus CHEN  with good results. There is mild distal RCA system disease.    ECHO on 12/23/2014:  Summary  Mild concentric left ventricular hypertrophy.  Left ventricular ejection fraction is visually estimated to be 55%.  Normal function of the bioprosthetic valve in aortic position.  Trace " aortic regurgitation is noted.  There is a small pericardial effusion.  Large pleural effusion is noted.    Lab Review   Lab Results   Component Value Date     12/06/2016    K 5.0 12/06/2016     12/06/2016    CO2 27 12/06/2016    BUN 12 12/06/2016    CREATININE 0.99 12/06/2016    CALCIUM 9.8 12/06/2016     Lab Results   Component Value Date    WBC 6.7 11/16/2016    WBC 6.4 09/29/2015    HGB 11.8 (L) 11/16/2016    HCT 34.9 (L) 11/16/2016    MCV 96 11/16/2016     (L) 11/16/2016     Lab Results   Component Value Date    CHOL 141 12/06/2016    CHOL 152 07/08/2016    CHOL 153 03/03/2015     Lab Results   Component Value Date    HDL 75 12/06/2016    HDL 74 07/08/2016    HDL 68 03/03/2015     Lab Results   Component Value Date    LDLCALC 54 12/06/2016    LDLCALC 60 07/08/2016    LDLCALC 68 03/03/2015     Lab Results   Component Value Date    TRIG 61 12/06/2016    TRIG 92 07/08/2016    TRIG 85 03/03/2015     Lab Results   Component Value Date    TSH 1.54 11/23/2016

## 2021-06-25 NOTE — TELEPHONE ENCOUNTER
Please inform patient-    No, patient does not need to see Dr. Santoyo sooner.  The enzyme is minimally elevated.  This is not worrisome.  We can continue to monitor.  Misbah Barone MD  3/19/2019

## 2021-06-25 NOTE — PROGRESS NOTES
Progress Notes by Shilpa Wilburn MD at 9/7/2017 11:30 AM     Author: Shilpa Wilburn MD Service: -- Author Type: Physician    Filed: 9/7/2017 12:09 PM Encounter Date: 9/7/2017 Status: Signed    : Shilpa Wilburn MD (Physician)           Click to link to [facility] HEART [facility] NOTE       Assessment/Plan:   An 82 years old woman presents to this clinic for routine cardiology follow-up.    1.  Three-vessel coronary artery disease, status post the LIMA to D1, s/p CHEN to mid LCx and RCA 7/2016: The patient has no chest pain.  Her shortness of breath could be related to COPD, stable.      Continue aspirin, Lipitor and amlodipine.  The patient has been taking Plavix more than 12 months, which can be stopped at this time.    2.  Severe aortic valve stenosis, status post the bioprosthetic aortic valve replacement: She had an echocardiogram on December 25 2014 which was reported normal function of bovine aortic valve.     3.  Essential hypertension: Her blood pressure has been controlled well with 5 mg of amlodipine daily.      4.  Hyperlipidemia: Continue pravastatin 40 mg nightly.  LDL was controlled well.    Thank you for the opportunity to be involved in the care of Rox Zavala. If you have any questions, please feel free to contact me.  I will see the patient again in 6 months.     History of Present Illness:   It is my pleasure to see oRx Zavala at the [facility] clinic for routine cardiology Follow-up.  Rox Zavala is an 82 y.o. female with a medical history of severe aortic valve stenosis status post bioprosthetic aortic valve replacement on Dec15, 2014, one-vessel bypass surgery LIMA to D1, 3 vessel coronary artery disease s/p CHEN to mid LCx and mid RCA on 7-5-2016, essential hypertension, hyperlipidemia, hypothyroidism.    The patient states that she has been doing fine since last visit.  She still has dyspnea on exertion which has been stable.  She has no chest  pain, palpitations.  She has occasional lightheadedness.  She denies any orthopnea or PND or leg swelling.  Her blood pressure and heart rate is in normal range.  She does exercise regularly.    Past Medical History:     Patient Active Problem List   Diagnosis   ? Callus   ? Hammer Toe   ? Hemorrhoids   ? Psoriatic Arthropathy   ? Psoriasis   ? Generalized Osteoarthritis Of The Hand   ? Osteoarthritis Of The Knee   ? Vitamin D Deficiency   ? Aortic Stenosis   ? Cough   ? Palpitations   ? Hypothyroidism   ? Rheumatoid arthritis   ? Neck Pain   ? Upper Back Pain (Between Shoulder Blades)   ? Osteopenia   ? Aortic stenosis, severe   ? Coronary artery disease   ? S/P AVR   ? Fever   ? Constipation   ? Upset stomach   ? Pain at surgical incision   ? Healthcare-associated pneumonia   ? Acute respiratory distress   ? Bilateral pleural effusion   ? Acute diastolic CHF (congestive heart failure)   ? Hypertension   ? Essential hypertension   ? Hyperlipidemia   ? Coronary artery disease involving coronary bypass graft of native heart with other forms of angina pectoris   ? Postoperative (PCI) anemia due to acute blood loss   ? Thigh hematoma, right, initial encounter   ? Psoriatic arthritis   ? High risk medication use       Past Surgical History:     Past Surgical History:   Procedure Laterality Date   ? ANGIOPLASTY / STENTING FEMORAL     ? AORTIC VALVE REPLACEMENT     ? CARDIAC SURGERY      CABG   ? EYE SURGERY Bilateral     cataracts   ? MT LIGATE FALLOPIAN TUBE      Description: Tubal Ligation;  Recorded: 03/20/2008;   ? MT REMOVAL GALLBLADDER      Description: Cholecystectomy;  Recorded: 03/20/2008;   ? MT TOTAL HIP ARTHROPLASTY Right     Description: Total Hip Replacement;  Recorded: 03/20/2008; right       Family History:     Family History   Problem Relation Age of Onset   ? Asthma Mother    ? Coronary artery disease Neg Hx         Social History:    reports that she quit smoking about 22 years ago. Her smoking use  included Cigarettes. She has a 30.00 pack-year smoking history. She has never used smokeless tobacco. She reports that she drinks alcohol. She reports that she does not use illicit drugs.    Review of Systems:   General: WNL  Eyes: WNL  Ears/Nose/Throat: Hearing Loss  Lungs: WNL  Heart: Shortness of Breath with activity  Stomach: Constipation  Bladder: WNL  Muscle/Joints: WNL  Skin: WNL  Nervous System: WNL  Mental Health: WNL     Blood: Easy Bruising    Meds:     Current Outpatient Prescriptions:   ?  amLODIPine (NORVASC) 5 MG tablet, Take 1 tablet (5 mg total) by mouth daily. For raynauds, Disp: 90 tablet, Rfl: 1  ?  aspirin 81 MG EC tablet, Take 81 mg by mouth daily., Disp: , Rfl:   ?  atorvastatin (LIPITOR) 40 MG tablet, Take 1 tablet (40 mg total) by mouth daily., Disp: 90 tablet, Rfl: 2  ?  CALCIUM CARBONATE (CALCIUM 500 ORAL), Take by mouth., Disp: , Rfl:   ?  cholecalciferol, vitamin D3, 1,000 unit tablet, Take 2,000 Units by mouth daily., Disp: , Rfl:   ?  folic acid (FOLVITE) 1 MG tablet, Take 1 tablet (1 mg total) by mouth daily., Disp: 90 tablet, Rfl: 3  ?  levothyroxine (SYNTHROID, LEVOTHROID) 100 MCG tablet, Take 100 mcg by mouth., Disp: , Rfl:   ?  levothyroxine (SYNTHROID, LEVOTHROID) 100 MCG tablet, TAKE ONE TABLET BY MOUTH EVERY DAY AT 6:00 A.M., Disp: 90 tablet, Rfl: 2  ?  methotrexate 2.5 MG tablet, Take 6 tablets (15 mg total) by mouth once a week., Disp: 72 tablet, Rfl: 0  ?  MV,CALCIUM,MIN/IRON/FOLIC/VITK (MULTI FOR HER ORAL), Take by mouth., Disp: , Rfl:   ?  nitroglycerin (NITROSTAT) 0.4 MG SL tablet, Place 1 tablet (0.4 mg total) under the tongue every 5 (five) minutes as needed for chest pain., Disp: 25 tablet, Rfl: 0  ?  SPIRIVA WITH HANDIHALER 18 mcg inhalation capsule, INHALE THE CONTENTS OF 1 CAPSULE ONCE DAILY USING HANDIHALER DEVICE. TO GET FULL DOSE, BREATHE OUT AND INHALE ONCE AGAIN., Disp: 90 capsule, Rfl: 2  ?  VIT C/E/ZN/COPPR/LUTEIN/ZEAXAN (PRESERVISION AREDS 2 ORAL), Take 1  "tablet by mouth 2 (two) times a day., Disp: , Rfl:     Allergies:   Review of patient's allergies indicates no known allergies.      Objective:      Physical Exam  134 lb (60.8 kg)  5' 3\" (1.6 m)  Body mass index is 23.74 kg/(m^2).  /55 (Patient Site: Left Arm, Patient Position: Sitting, Cuff Size: Adult Regular)  Pulse 80 Comment: Irregular  Resp 16  Ht 5' 3\" (1.6 m)  Wt 134 lb (60.8 kg)  LMP 08/17/1974  BMI 23.74 kg/m2    General Appearance:   Awake, Alert, No acute distress.   HEENT:  Pupil equal and reactive to light. No scleral icterus; the mucous membranes were moist.   Neck: No cervical bruits. No JVD. No thyromegaly.     Chest: The spine was straight. The chest was symmetric.   Lungs:   Respirations unlabored; Lungs are clear to auscultation. No crackles. No wheezing.   Cardiovascular:   Regular rhythm and rate, normal first and second heart sounds with II/VI systolic murmurs at RUSB. No rubs or gallops.    Abdomen:  Soft. No tenderness. Non-distended. Bowels sounds are present   Extremities: Equal tibial pulses. No leg edema.   Skin: No rashes or ulcers. Warm, Dry.   Musculoskeletal: No tenderness. No deformity.   Neurologic: Mood and affect are appropriate. No focal deficits.         Cardiac Imaging Studies  Coronary angiography with PCI on 7-5-2016:  - Moderate proximal and distal left main disease  - LAD has only mild disease. There is severe disease in the  first diagonal and there is a patent LIMA feeding it distally.  - The proximal circumflex had severe calcific disease and was  stented with a Promus CHEN with good results.  - There is diffuse mild proximal RCA disease and severe  disease in the mid segment which was stented with a Promus CHEN  with good results. There is mild distal RCA system disease.    ECHO on 12/23/2014:  Summary  Mild concentric left ventricular hypertrophy.  Left ventricular ejection fraction is visually estimated to be 55%.  Normal function of the bioprosthetic " valve in aortic position.  Trace aortic regurgitation is noted.  There is a small pericardial effusion.  Large pleural effusion is noted.    Lab Review   Lab Results   Component Value Date     08/04/2017    K 4.0 08/04/2017     08/04/2017    CO2 26 08/04/2017    BUN 13 08/04/2017    CREATININE 0.82 08/04/2017    CALCIUM 9.1 08/04/2017     Lab Results   Component Value Date    WBC 5.6 08/04/2017    WBC 6.4 09/29/2015    HGB 11.4 (L) 08/04/2017    HCT 34.2 (L) 08/04/2017    MCV 97 08/04/2017     08/04/2017     Lab Results   Component Value Date    CHOL 135 08/04/2017    CHOL 141 12/06/2016    CHOL 152 07/08/2016     Lab Results   Component Value Date    HDL 65 08/04/2017    HDL 75 12/06/2016    HDL 74 07/08/2016     Lab Results   Component Value Date    LDLCALC 58 08/04/2017    LDLCALC 54 12/06/2016    LDLCALC 60 07/08/2016     Lab Results   Component Value Date    TRIG 62 08/04/2017    TRIG 61 12/06/2016    TRIG 92 07/08/2016     Lab Results   Component Value Date    TSH 1.33 06/02/2017

## 2021-06-26 NOTE — PROGRESS NOTES
Progress Notes by Shilpa Wilburn MD at 3/1/2018  8:10 AM     Author: Shilpa Wilburn MD Service: -- Author Type: Physician    Filed: 3/1/2018  9:05 AM Encounter Date: 3/1/2018 Status: Signed    : Shilpa Wilburn MD (Physician)           Click to link to [facility] HEART [facility] NOTE       Assessment/Plan:   An 83 years old woman presents to this clinic for routine cardiology follow-up.    1.  Three-vessel coronary artery disease, status post the LIMA to D1, s/p CHEN to mid LCx and RCA 7/2016: The patient has no chest pain.  Her mild dyspnea on exertion has been stable.      Continue aspirin, Lipitor and amlodipine.    The patient complains of irregular heartbeats.  12 leads ECG was done in clinic today.  I personally reviewed ECG which showed normal sinus rhythm with frequent PVCs.  The patient had a echocardiogram last months which was reported normal left ventricular size and function.  The patient has occasional mild ankle edema.  She is going to [place] for 10 days of vacation.  We discussed low-salt diet.  Prescribed Lasix 40 mg as needed if she gains weight or worsening bilateral leg edema.    2.  Severe aortic valve stenosis, status post the bioprosthetic aortic valve replacement: She had an echocardiogram on February 1, 2018, no change compared to previous one, no evidence of aortic valve stenosis or regurgitation.     3.  Essential hypertension: Her blood pressure has been controlled well with 5 mg of amlodipine daily.      4.  Hyperlipidemia: Continue pravastatin 40 mg nightly.  LDL was controlled well.    Thank you for the opportunity to be involved in the care of Rox Zavala. If you have any questions, please feel free to contact me.  I will see the patient again in 6 months.     History of Present Illness:   It is my pleasure to see Rox Zavala at the [facility] clinic for routine cardiology Follow-up.  Rox Zavala is an 83 y.o. female with a medical history  of severe aortic valve stenosis status post bioprosthetic aortic valve replacement on Dec15, 2014, one-vessel bypass surgery LIMA to D1, 3 vessel coronary artery disease s/p CHEN to mid LCx and mid RCA on 7-5-2016, essential hypertension, hyperlipidemia, hypothyroidism.    The patient states that she has been doing fine since last visit.  She still has chronic mild dyspnea on exertion which has been stable.  She has no chest pain.  She does feel irregular heartbeat sometimes, no persistent palpitations.  She has occasional lightheadedness.  She denies any orthopnea or PND.  She has occasional leg swelling.  Her blood pressure and heart rate is in normal range.  She does exercise regularly.    Past Medical History:     Patient Active Problem List   Diagnosis   ? Callus   ? Hammer Toe   ? Hemorrhoids   ? Psoriasis   ? Generalized Osteoarthritis Of The Hand   ? Osteoarthritis Of The Knee   ? Vitamin D Deficiency   ? Aortic Stenosis   ? Cough   ? Palpitations   ? Hypothyroidism   ? Neck Pain   ? Upper Back Pain (Between Shoulder Blades)   ? Osteopenia   ? Aortic stenosis, severe   ? Coronary artery disease   ? S/P AVR   ? Fever   ? Constipation   ? Upset stomach   ? Pain at surgical incision   ? Healthcare-associated pneumonia   ? Acute respiratory distress   ? Bilateral pleural effusion   ? Acute diastolic CHF (congestive heart failure)   ? Hypertension   ? Essential hypertension   ? Hyperlipidemia   ? Coronary artery disease involving coronary bypass graft of native heart with other forms of angina pectoris   ? Postoperative (PCI) anemia due to acute blood loss   ? Thigh hematoma, right, initial encounter   ? Psoriatic arthritis   ? High risk medication use       Past Surgical History:     Past Surgical History:   Procedure Laterality Date   ? ANGIOPLASTY / STENTING FEMORAL     ? AORTIC VALVE REPLACEMENT     ? CARDIAC SURGERY      CABG   ? EYE SURGERY Bilateral     cataracts   ? NE LIGATE FALLOPIAN TUBE       Description: Tubal Ligation;  Recorded: 03/20/2008;   ? NC REMOVAL GALLBLADDER      Description: Cholecystectomy;  Recorded: 03/20/2008;   ? NC TOTAL HIP ARTHROPLASTY Right     Description: Total Hip Replacement;  Recorded: 03/20/2008; right       Family History:     Family History   Problem Relation Age of Onset   ? Asthma Mother    ? Coronary artery disease Neg Hx         Social History:    reports that she quit smoking about 23 years ago. Her smoking use included Cigarettes. She has a 30.00 pack-year smoking history. She has never used smokeless tobacco. She reports that she drinks alcohol. She reports that she does not use illicit drugs.    Review of Systems:   General: WNL  Eyes: WNL  Ears/Nose/Throat: WNL  Lungs: WNL  Heart: Shortness of Breath with activity, Irregular Heartbeat, Leg Swelling  Stomach: WNL  Bladder: WNL  Muscle/Joints: WNL  Skin: WNL  Nervous System: WNL  Mental Health: WNL     Blood: WNL    Meds:     Current Outpatient Prescriptions:   ?  amLODIPine (NORVASC) 5 MG tablet, TAKE ONE TABLET BY MOUTH EVERY DAY FOR LENINS, Disp: 90 tablet, Rfl: 3  ?  aspirin 81 MG EC tablet, Take 81 mg by mouth daily., Disp: , Rfl:   ?  atorvastatin (LIPITOR) 40 MG tablet, Take 1 tablet (40 mg total) by mouth daily., Disp: 90 tablet, Rfl: 2  ?  CALCIUM CARBONATE (CALCIUM 500 ORAL), Take by mouth., Disp: , Rfl:   ?  cholecalciferol, vitamin D3, 1,000 unit tablet, Take 2,000 Units by mouth daily., Disp: , Rfl:   ?  folic acid (FOLVITE) 1 MG tablet, Take 1 tablet (1 mg total) by mouth daily., Disp: 90 tablet, Rfl: 3  ?  levothyroxine (SYNTHROID, LEVOTHROID) 100 MCG tablet, TAKE ONE TABLET BY MOUTH EVERY DAY AT 6:00 A.M., Disp: 90 tablet, Rfl: 2  ?  methotrexate 2.5 MG tablet, Take 4 tablets (10 mg total) by mouth once a week., Disp: 48 tablet, Rfl: 0  ?  MV,CALCIUM,MIN/IRON/FOLIC/VITK (MULTI FOR HER ORAL), Take by mouth., Disp: , Rfl:   ?  nitroglycerin (NITROSTAT) 0.4 MG SL tablet, Place 1 tablet (0.4 mg total)  "under the tongue every 5 (five) minutes as needed for chest pain., Disp: 25 tablet, Rfl: 0  ?  VIT C/E/ZN/COPPR/LUTEIN/ZEAXAN (PRESERVISION AREDS 2 ORAL), Take 1 tablet by mouth 2 (two) times a day., Disp: , Rfl:   ?  furosemide (LASIX) 40 MG tablet, Take 1 tablet (40 mg total) by mouth as needed (If gained weight more than 1 pound a day or more leg edema)., Disp: 30 tablet, Rfl: 1    Allergies:   Review of patient's allergies indicates no known allergies.      Objective:      Physical Exam  133 lb (60.3 kg)  5' 2.75\" (1.594 m)  Body mass index is 23.75 kg/(m^2).  /66 (Patient Site: Right Arm, Patient Position: Sitting, Cuff Size: Adult Small)  Pulse 76  Resp 16  Ht 5' 2.75\" (1.594 m)  Wt 133 lb (60.3 kg)  LMP 08/17/1974  BMI 23.75 kg/m2    General Appearance:   Awake, Alert, No acute distress.   HEENT:  Pupil equal and reactive to light. No scleral icterus; the mucous membranes were moist.   Neck: No cervical bruits. No JVD. No thyromegaly.     Chest: The spine was straight. The chest was symmetric.   Lungs:   Respirations unlabored; Lungs are clear to auscultation. No crackles. No wheezing.   Cardiovascular:   Regular rhythm and rate, normal first and second heart sounds with II/VI systolic murmurs at RUSB. No rubs or gallops.    Abdomen:  Soft. No tenderness. Non-distended. Bowels sounds are present   Extremities: Equal tibial pulses.  Trace ankle edema.   Skin: No rashes or ulcers. Warm, Dry.   Musculoskeletal: No tenderness. No deformity.   Neurologic: Mood and affect are appropriate. No focal deficits.         EKG: Personally reviewed  Normal sinus rhythm with frequent PVCs    Cardiac Imaging Studies  Coronary angiography with PCI on 7-5-2016:  - Moderate proximal and distal left main disease  - LAD has only mild disease. There is severe disease in the  first diagonal and there is a patent LIMA feeding it distally.  - The proximal circumflex had severe calcific disease and was  stented with a " Promus CHEN with good results.  - There is diffuse mild proximal RCA disease and severe  disease in the mid segment which was stented with a Promus CHEN  with good results. There is mild distal RCA system disease.    ECHO on 12/23/2014:  Summary  Mild concentric left ventricular hypertrophy.  Left ventricular ejection fraction is visually estimated to be 55%.  Normal function of the bioprosthetic valve in aortic position.  Trace aortic regurgitation is noted.  There is a small pericardial effusion.  Large pleural effusion is noted.    Lab Review   Lab Results   Component Value Date     01/26/2018    K 4.4 01/26/2018     01/26/2018    CO2 28 01/26/2018    BUN 11 01/26/2018    CREATININE 0.87 02/20/2018    CALCIUM 9.7 01/26/2018     Lab Results   Component Value Date    WBC 7.2 02/20/2018    WBC 6.4 09/29/2015    HGB 12.1 02/20/2018    HCT 37.0 02/20/2018    MCV 95 02/20/2018     02/20/2018     Lab Results   Component Value Date    CHOL 135 08/04/2017    CHOL 141 12/06/2016    CHOL 152 07/08/2016     Lab Results   Component Value Date    HDL 65 08/04/2017    HDL 75 12/06/2016    HDL 74 07/08/2016     Lab Results   Component Value Date    LDLCALC 58 08/04/2017    LDLCALC 54 12/06/2016    LDLCALC 60 07/08/2016     Lab Results   Component Value Date    TRIG 62 08/04/2017    TRIG 61 12/06/2016    TRIG 92 07/08/2016     No components found for: CHOLHDL  Lab Results   Component Value Date    TROPONINI 0.04 12/24/2014     Lab Results   Component Value Date     (H) 12/23/2014     Lab Results   Component Value Date    TSH 1.11 01/26/2018

## 2021-06-26 NOTE — PROGRESS NOTES
Code Status:  DNR  Visit Type: Problem Visit (GI bleed)     Facility:  [facility] [[id number]]             History of Present Illness: Rox Zavala is a 86 y.o. female who I am seeing today for follow-up at the TCU.  Patient recently hospitalized on 6/6/2021 secondary to syncope.  Past medical history includes CAD, status post one-vessel CABG and CHEN to the mid left circumflex and RCA as of 7/16, aortic valve stenosis, status post bioprosthetic AVR in 2014, hypertension chin, hyperlipidemia and psoriatic arthritis.  Patient found to have recurrent GI bleed with acute blood loss anemia.  Repeat EGD on 6/7 demonstrated two 5 mm gastric ulcers 1 with visible vessel which had been clipped.  Patient also found to be positive for H. pylori antigen.  Patient did undergo clipping x2.  No further episodes of melena.  Per GI patient is able to resume her aspirin on 6/15.  Patient continues on omeprazole 40 mg twice daily until repeat EGD in 8 to 10 weeks.  Patient is being treated for H. pylori antigen for a total of 10 days with tetracycline, bismuth and metronidazole.  Acute blood loss anemia.  Patient did receive 3 units of packed red blood cells.  Initially her Lasix was stopped however this was resumed secondary to volume overload from IV fluid resuscitation and blood transfusion.  Hypertension.  She was initially hypotensive so her antihypertensives were held however with fluid overload patient did have elevated blood pressure and meds were resumed.  History of mild cognitive impairment on Aricept.  Coronary artery disease with bioprosthetic AVR.  Aspirin can be resumed on 6/15.  Psoriatic arthritis on methotrexate.    Today patient sitting up in bedside chair.  Yesterday hemoglobin was obtained which was 7.4 down from 8.1.  Initially guaiacs were ordered however they have been positive which may be related to the bismuth that she is receiving for H. pylori.  She continues on bismuth, tetracycline  and metronidazole until 6/21 for her H. pylori.  She denies any abdominal pain or discomfort.  She does have a 2-3+ edema in the lower extremities from all the fluids that she has received.  Hemodynamically she is stable.  She is eating well.  I will continue to hold her aspirin until we have some follow-up laboratory.  She did receive a dose of her methotrexate.  I did have nursing staff call her rheumatologist about holding this until bleed is stable.  However for 3 days there is been no return call.  No current outbreak.    Active Ambulatory Problems     Diagnosis Date Noted     Hammer Toe      Hemorrhoids      Psoriasis      Generalized Osteoarthritis Of The Hand      Osteoarthritis Of The Knee      Vitamin D Deficiency      Palpitations      Hypothyroidism      Neck Pain      Upper Back Pain (Between Shoulder Blades)      Osteopenia      Coronary artery disease 11/17/2014     S/P AVR 12/15/2014     Constipation 12/20/2014     Acute diastolic CHF (congestive heart failure) (H) 12/23/2014     Essential hypertension 12/08/2015     Hyperlipidemia 12/08/2015     Coronary artery disease involving coronary bypass graft of native heart with other forms of angina pectoris (H)      Psoriatic arthritis (H) 07/11/2017     High risk medication use 07/11/2017     Sacral insufficiency fracture, initial encounter 05/06/2019     Hypertension 05/07/2019     Back pain 05/08/2019     Pressure injury of buttock, stage 1, unspecified laterality 06/16/2019     MCI (mild cognitive impairment) 06/16/2019     Frequent PVCs 08/21/2019     Senile osteoporosis 09/23/2019     Hemorrhagic shock (H) 06/01/2021     JOSIAH (acute kidney injury) (H) 06/01/2021     Lactic acidemia 06/01/2021     GI bleed 06/01/2021     Acute blood loss anemia 06/01/2021     Melena 06/01/2021     Syncope, unspecified syncope type 06/06/2021     Anemia due to blood loss, acute 06/06/2021     Dieulafoy lesion of stomach      Gastric ulcer due to Helicobacter pylori,  acute 06/12/2021     Resolved Ambulatory Problems     Diagnosis Date Noted     Callus      Psoriatic arthropathy (H)      Aortic Stenosis      Cough      Rheumatoid arthritis (H)      Aortic stenosis, severe 11/17/2014     Fever 12/20/2014     Upset stomach 12/20/2014     Pain at surgical incision 12/20/2014     Healthcare-associated pneumonia 12/23/2014     Acute respiratory distress 12/23/2014     Bilateral pleural effusion 12/23/2014     Hypertension 12/23/2014     Postoperative (PCI) anemia due to acute blood loss 07/13/2016     Thigh hematoma, right, initial encounter 07/13/2016     Extensor tenosynovitis of wrist, left 06/08/2018     Thrombocytopenia (H) 03/12/2019     Past Medical History:   Diagnosis Date     Acute renal failure (H)      Aortic valve stenosis, severe      Arthritis      Disease of thyroid gland      Hammer toe      Macular disorder        Current Outpatient Medications   Medication Sig Note     acetaminophen (TYLENOL) 500 MG tablet Take 1-2 tablets (500-1,000 mg total) by mouth every 4 (four) hours as needed.      atorvastatin (LIPITOR) 40 MG tablet Take 1 tablet (40 mg total) by mouth daily.      bismuth subsalicylate (PEPTO-BISMOL) 262 mg Chew chew tab Chew 2 tablets (524 mg total) 4 (four) times a day for 9 days.      calcium-vitamin D 500 mg(1,250mg) -200 unit per tablet Take 1 tablet by mouth 2 (two) times a day. (600mg calcium + 800 IU Vitamin D)      cholecalciferol, vitamin D3, 2,000 unit Tab Take 2,000 Units by mouth daily.      donepeziL (ARICEPT) 10 MG tablet Take 1 tablet (10 mg total) by mouth at bedtime.      fluticasone propionate (FLONASE) 50 mcg/actuation nasal spray Apply 2 sprays into each nostril daily as needed for rhinitis.      folic acid (FOLVITE) 1 MG tablet Take 1 tablet (1,000 mcg total) by mouth daily.      isosorbide mononitrate (IMDUR) 30 MG 24 hr tablet Take 1 tablet (30 mg total) by mouth daily.      levothyroxine (SYNTHROID, LEVOTHROID) 100 MCG tablet TAKE  ONE TABLET BY MOUTH EVERY DAY AT 6:00 A.M.      lisinopriL (PRINIVIL,ZESTRIL) 20 MG tablet Take 1 tablet (20 mg total) by mouth daily.      methotrexate 2.5 MG tablet TAKE 3 TABLETS (7.5MG) 1 TIME WEEKLY 6/6/2021: Fridays     metoprolol succinate (TOPROL-XL) 25 MG Take 1 tablet (25 mg total) by mouth daily.      metroNIDAZOLE (FLAGYL) 500 MG tablet Take 1 tablet (500 mg total) by mouth 3 (three) times a day for 9 days.      nitroglycerin (NITROSTAT) 0.4 MG SL tablet Place 1 tablet (0.4 mg total) under the tongue every 5 (five) minutes as needed for chest pain.      omeprazole (PRILOSEC) 40 MG capsule Take 1 capsule (40 mg total) by mouth 2 (two) times a day before meals.      polyethylene glycol (MIRALAX) 17 gram packet Take 17 g by mouth daily as needed.      tetracycline 500 MG capsule Take 1 capsule (500 mg total) by mouth 4 (four) times a day for 9 days.      VIT C/E/ZN/COPPR/LUTEIN/ZEAXAN (PRESERVISION AREDS 2 ORAL) Take 1 tablet by mouth 2 (two) times a day.        No Known Allergies  .      Review of Systems  No fevers or chills. No headache, lightheadedness or dizziness. No SOB, chest pains or palpitations. Appetite is improving.  She denies any abdominal pain.  No nausea, vomiting, constipation or diarrhea.  Nursing staff deny any active bleed noted in her stool.  However guaiac was positive x2.  No dysuria, frequency, burning or pain with urination. Otherwise review of systems are negative.     Physical Exam  PHYSICAL EXAMINATION:  Vital signs: /60, pulse 65, respirations 20, temperature 97.3, O2 sat 94% on room air.  Weight 132.4 pounds.  General: Awake, Alert, oriented x3, appropriately, follows simple commands, conversant  HEENT:PERRLA, Pink conjunctiva, anicteric sclerae, moist oral mucosa  NECK: Supple, without any lymphadenopathy, or masses  CVS:  S1  S2, without murmur or gallop.   LUNG: Clear to auscultation, No wheezes, rales or rhonci.  BACK: No kyphosis of the thoracic spine  ABDOMEN:  Soft, nontender to palpation, with positive bowel sounds  EXTREMITIES: Good range of motion on both upper and lower extremities, 2-3+ pedal edema, no calf tenderness  SKIN: Warm and dry, no rashes or erythema noted  NEUROLOGIC: Intact, pulses palpable  PSYCHIATRIC: Mild cognitive impairment at baseline however she does answer questions appropriately.      Labs:    Lab Results   Component Value Date    WBC 7.4 06/15/2021    HGB 7.4 (L) 06/15/2021    HCT 24.1 (L) 06/15/2021    MCV 96 06/15/2021     06/15/2021     Results for orders placed or performed in visit on 06/15/21   Basic Metabolic Panel   Result Value Ref Range    Sodium 137 136 - 145 mmol/L    Potassium 4.3 3.5 - 5.0 mmol/L    Chloride 108 (H) 98 - 107 mmol/L    CO2 23 22 - 31 mmol/L    Anion Gap, Calculation 6 5 - 18 mmol/L    Glucose 81 70 - 125 mg/dL    Calcium 8.1 (L) 8.5 - 10.5 mg/dL    BUN 11 8 - 28 mg/dL    Creatinine 0.79 0.60 - 1.10 mg/dL    GFR MDRD Af Amer >60 >60 mL/min/1.73m2    GFR MDRD Non Af Amer >60 >60 mL/min/1.73m2           Assessment/Plan:  1. Acute GI bleeding   patient underwent Hemoclip x2.  Continue to hold aspirin.   2. Acute blood loss anemia   hemoglobin trending downward now 7.4 from 8.1.  This could be hemodilution.  She did receive guaiac which was positive however she continues on H. pylori treatment which includes bismuth which can cause false positives.  We will repeat hemoglobin in the a.m.  Continue to monitor.   3. Helicobacter pylori gastritis   patient continues on tetracycline, omeprazole and bismuth until 6/21/2021.   4. Essential hypertension   satisfactory control.   5. Coronary artery disease involving coronary bypass graft of native heart with other forms of angina pectoris (H)   we will hold aspirin x2 weeks.  She continues on isosorbide.   6. Psoriatic arthritis (H)   she was given methotrexate yesterday.  She does receive this weekly.  No current outbreak.  Will hold x2 weeks.  Continue PPI.  EPI can  cause methotrexate toxicity.   7.   Lower extremity edema  secondary to IV fluids from hospital.  Knee-high teds on a.m. off at at bedtime.  Follow-up BMP in the a.m.         35 minutes spent of which greater than 50% was face to face communication with the patient about positive guaiac, hemoglobin trending downward, recent clipping as well as treatment for RA and psoriatic arthritis as well as collaborating with the nursing staff regarding laboratory follow-up and medication management.    This note has been dictated using voice recognition software. Any grammatical or context distortions are unintentional and inherent to the software    Electronically signed by: Janett Barnard, CNP

## 2021-06-26 NOTE — PROGRESS NOTES
[facility]      Facility:    [facility] [[id number]]  Code Status: DNR      Chief Complaint/Reason for Visit:  Chief Complaint   Patient presents with     H & P     Coronary artery disease, status post CABG with one-vessel, bioprosthetic aortic valve replacement , essential hypertension, recent hospitalization for previous admission for hemorrhagic shock secondary to GI bleed no source of bleeding was identified at the time the readmission to the hospital almost immediately from the TCU after syncopal episode recurrent GI bleed and acute blood loss anemia.       HPI:   Rox is a 86 y.o. female who  at the [facility].  She had a previous hospitalization for hemorrhagic shock secondary to GI bleed.  At that time there is no clear source of bleeding identified.  She was then brought here to the TCU and was witnessed by the nurses to have a syncopal episode.  She was then sent to the hospital and repeat EGD on  demonstrated two 5 mm gastric ulcers one of them visible vessel which was clipped with EGD.  She did receive 3 units of packed red blood cells at this time for acute blood loss anemia and patient was seen by GI and critical care.    Patient did have positive H. pylori antigen and she had no further episodes of melena.  She did resume her aspirin in 7 days.  Drake troponin on the  and I believe we will do that.  She was treated with bismuth tetracycline and Flagyl.    She did some volume overload did receive Lasix in the hospital and because of this hemoglobin was somewhat erratic.  It was stable on discharge.    She has essential hypertension was hypotensive initially hypertensive medications were held and metoprolol and lisinopril were restarted.  Kidney function was stable on discharge.    Patient was monitored in the ICU in critical care and no arrhythmias were noted.  Head CT was negative.  Patient was treated properly and transferred here to  "the TCU at [facility] in stable condition.    Patient is sitting in a chair comfortably.  She has no concerns Daniel she claims she does have a loss of memory but she says she gets by rather well.  We will do some cognitive testing she has had no signs of bleeding no signs of infection and hemoglobin is pending for tomorrow.  It was stable in the hospital.  She will continue her treatment for H. pylori until done at this time.  She denies any chest pain shortness of breath and she is moving her bowels without difficulty.    Past Medical History:  Past Medical History:   Diagnosis Date     Acute renal failure (H)      Aortic valve stenosis, severe      Arthritis      Coronary artery disease 11/17/2014     Coronary artery disease      Disease of thyroid gland      Hammer toe      Hyperlipidemia      Hypertension      Macular disorder     \"wrinkle\"     MCI (mild cognitive impairment) 6/16/2019     Pressure injury of buttock, stage 1, unspecified laterality 6/16/2019     Rheumatoid arthritis (H)      S/P AVR 12/15/2014     Sacral insufficiency fracture, initial encounter 5/6/2019           Surgical History:  Past Surgical History:   Procedure Laterality Date     ANGIOPLASTY / STENTING FEMORAL       AORTIC VALVE REPLACEMENT       CARDIAC SURGERY      CABG     EYE SURGERY Bilateral     cataracts     CO ESOPHAGOGASTRODUODENOSCOPY TRANSORAL DIAGNOSTIC N/A 6/1/2021    Procedure: ESOPHAGOGASTRODUODENOSCOPY (EGD) with biopsies;  Surgeon: Julio Lopez MD;  Location: [facility];  Service: Gastroenterology     CO ESOPHAGOGASTRODUODENOSCOPY TRANSORAL DIAGNOSTIC N/A 6/6/2021    Procedure: ESOPHAGOGASTRODUODENOSCOPY (EGD) with bicap cauterization;  Surgeon: Julio Lopez MD;  Location: [facility];  Service: Gastroenterology     CO ESOPHAGOGASTRODUODENOSCOPY TRANSORAL DIAGNOSTIC N/A 6/7/2021    Procedure: ESOPHAGOGASTRODUODENOSCOPY (EGD) with hemoclip;  Surgeon: Sam Brock MD;  Location: [facility];  " Service: Gastroenterology     AL LIGATE FALLOPIAN TUBE      Description: Tubal Ligation;  Recorded: 2008;     AL REMOVAL GALLBLADDER      Description: Cholecystectomy;  Recorded: 2008;     AL TOTAL HIP ARTHROPLASTY Right     Description: Total Hip Replacement;  Recorded: 2008; right       Family History:   Family History   Problem Relation Age of Onset     Asthma Mother      Coronary artery disease Neg Hx      Breast cancer Neg Hx        Social History:    Social History     Socioeconomic History     Marital status:      Spouse name: None     Number of children: None     Years of education: None     Highest education level: None   Occupational History     None   Social Needs     Financial resource strain: None     Food insecurity     Worry: None     Inability: None     Transportation needs     Medical: None     Non-medical: None   Tobacco Use     Smoking status: Former Smoker     Packs/day: 1.00     Years: 30.00     Pack years: 30.00     Types: Cigarettes     Quit date: 1995     Years since quittin.4     Smokeless tobacco: Never Used   Substance and Sexual Activity     Alcohol use: Yes     Alcohol/week: 1.0 standard drinks     Types: 1 Glasses of wine per week     Drug use: No     Sexual activity: None   Lifestyle     Physical activity     Days per week: None     Minutes per session: None     Stress: None   Relationships     Social connections     Talks on phone: None     Gets together: None     Attends Confucianist service: None     Active member of club or organization: None     Attends meetings of clubs or organizations: None     Relationship status: None     Intimate partner violence     Fear of current or ex partner: None     Emotionally abused: None     Physically abused: None     Forced sexual activity: None   Other Topics Concern     None   Social History Narrative    , patient notes she and her  were  for 60 years and he passed away 12 years ago following  "an illness and complications. Patient notes they had a happy marriage.    Children: Patient notes she had 4 children, one daughter passed away following a MVA when daughter was 57 years old. Leisa states she is close to her two boys, daughter, grandchildren, and great-grandchildren and finds her family supportive.         Lives in a town home independently.. Now in senior living.        Lives at \" [facility]\", independent living.        Misbah Barone MD  7/2/2020                  Review of Systems   Constitutional:        Patient denies any pain fevers chills nausea vomit diarrhea change in vision hearing taste or smell weakness one-sided chest pain shortness of breath.  Denies any current shortness stool polyphagia polydipsia polyuria depression or anxiety and the remainder the review of systems is negative.       Vitals:    06/14/21 0920   BP: 134/70   Pulse: 64   Resp: 16   Temp: 97.3  F (36.3  C)   SpO2: 97%       Physical Exam  Constitutional:       General: She is not in acute distress.     Appearance: She is not toxic-appearing.   HENT:      Head: Normocephalic and atraumatic.      Nose: Nose normal.      Mouth/Throat:      Mouth: Mucous membranes are moist.      Pharynx: Oropharynx is clear.   Eyes:      General:         Right eye: No discharge.         Left eye: No discharge.   Cardiovascular:      Rate and Rhythm: Normal rate and regular rhythm.   Pulmonary:      Effort: Pulmonary effort is normal. No respiratory distress.      Breath sounds: No wheezing.   Abdominal:      General: Bowel sounds are normal. There is distension.      Palpations: Abdomen is soft.      Tenderness: There is no abdominal tenderness.   Musculoskeletal:      Right lower leg: No edema.      Left lower leg: No edema.   Skin:     General: Skin is warm and dry.   Neurological:      Mental Status: She is alert. Mental status is at baseline.   Psychiatric:         Mood and Affect: Mood normal.         Medication List:  Current " Outpatient Medications   Medication Sig     acetaminophen (TYLENOL) 500 MG tablet Take 1-2 tablets (500-1,000 mg total) by mouth every 4 (four) hours as needed.     atorvastatin (LIPITOR) 40 MG tablet Take 1 tablet (40 mg total) by mouth daily.     bismuth subsalicylate (PEPTO-BISMOL) 262 mg Chew chew tab Chew 2 tablets (524 mg total) 4 (four) times a day for 9 days.     calcium-vitamin D 500 mg(1,250mg) -200 unit per tablet Take 1 tablet by mouth 2 (two) times a day. (600mg calcium + 800 IU Vitamin D)     cholecalciferol, vitamin D3, 2,000 unit Tab Take 2,000 Units by mouth daily.     donepeziL (ARICEPT) 10 MG tablet Take 1 tablet (10 mg total) by mouth at bedtime.     fluticasone propionate (FLONASE) 50 mcg/actuation nasal spray Apply 2 sprays into each nostril daily as needed for rhinitis.     folic acid (FOLVITE) 1 MG tablet Take 1 tablet (1,000 mcg total) by mouth daily.     isosorbide mononitrate (IMDUR) 30 MG 24 hr tablet Take 1 tablet (30 mg total) by mouth daily.     levothyroxine (SYNTHROID, LEVOTHROID) 100 MCG tablet TAKE ONE TABLET BY MOUTH EVERY DAY AT 6:00 A.M.     lisinopriL (PRINIVIL,ZESTRIL) 20 MG tablet Take 1 tablet (20 mg total) by mouth daily.     methotrexate 2.5 MG tablet TAKE 3 TABLETS (7.5MG) 1 TIME WEEKLY     metoprolol succinate (TOPROL-XL) 25 MG Take 1 tablet (25 mg total) by mouth daily.     metroNIDAZOLE (FLAGYL) 500 MG tablet Take 1 tablet (500 mg total) by mouth 3 (three) times a day for 9 days.     nitroglycerin (NITROSTAT) 0.4 MG SL tablet Place 1 tablet (0.4 mg total) under the tongue every 5 (five) minutes as needed for chest pain.     omeprazole (PRILOSEC) 40 MG capsule Take 1 capsule (40 mg total) by mouth 2 (two) times a day before meals.     polyethylene glycol (MIRALAX) 17 gram packet Take 17 g by mouth daily as needed.     tetracycline 500 MG capsule Take 1 capsule (500 mg total) by mouth 4 (four) times a day for 9 days.     VIT C/E/ZN/COPPR/LUTEIN/ZEAXAN (PRESERVISION AREDS  2 ORAL) Take 1 tablet by mouth 2 (two) times a day.       Labs: 6/12/2021 white count was 8.4, hemoglobin is 8.3, hematocrit was 26.8, platelets 172,000.  Sodium was 139, potassium 4.1, chloride 109, CO2 is 23, anion gap was 7, glucose 88, calcium was 7.8, BUN was 14, creatinine 0.75, GFR was greater than 60.  On 611 hemoglobin was 8.1 and did go up to 8.4.  We will continue to monitor.  H. pylori was positive in the hospital.      Assessment:    ICD-10-CM    1. Acute GI bleeding  K92.2    2. Acute blood loss anemia  D62    3. Helicobacter pylori gastritis  K29.70     B96.81    4. Essential hypertension  I10    5. Psoriatic arthritis (H)  L40.50        Plan: Plan at this time we will continue to monitor but hemoglobin will be done tomorrow to continue to monitor that.  We will continue with H. pylori treatment until patient is done with that and    I did look at her blood pressures thoroughly no needing change of medication she has not been hypotensive nor hypertensive at this time.    Psoriatic arthritis is in good condition at this time and no signs of any relapses.    Cognitive impairment; we will do cognitive testing here this week while she is here and she will continue on her medications without any new changes.  She will continue to undergo physical and Occupational Therapy with no new changes.    The be no other changes to care plan at this time I will continue to monitor above medical problems.        Electronically signed by: Enrico Tamayo DO

## 2021-06-27 NOTE — PROGRESS NOTES
Progress Notes by Kristofer Gutierrez MD at 6/4/2019  3:10 PM     Author: Kristofer Gutierrez MD Service: -- Author Type: Physician    Filed: 6/4/2019  3:49 PM Encounter Date: 6/4/2019 Status: Signed    : Kristofer Gutierrez MD (Physician)           Click to link to [facility] HEART CARE ELECTROPHYSIOLOGY CONSULTATION    Thank you, Dr. hSilpa Wilburn and Dr. Barone, for asking the [facility] team to see Ms. Rox Zavala to evaluate frequent of ventricular premature beats.      Assessment/Recommendations   Clinic Problem List:  1. Essential hypertension     2. Coronary artery disease involving coronary bypass graft of native heart with other forms of angina pectoris (H)     3. S/P AVR     4. Coronary artery disease involving native coronary artery of native heart without angina pectoris     5. Frequent PVCs  metoprolol succinate (TOPROL-XL) 25 MG       Assessment:    Frequent multifocal ventricular premature beats with common of ventricular bigeminy.  No evidence for PVC induced cardiomyopathy and no clear symptoms associated with ventricular ectopy.  Lightheadedness occurs was probably orthostatic.  No treatment is advised at this time.  Coronary artery disease no clinical evidence for angina and negative stress MRI  Normally functioning bioprosthetic aortic valve  Hypertension well controlled    Plan:  Change metoprolol succinate to 25 mg (1 tablet daily) for easier compliance  Consider stopping lisinopril if low blood pressure or symptoms of lightheadedness on standing  Echocardiogram in 6 months reassess left ventricular function  Follow up appointment:   Dr. Shilpa Wilburn after echo  Amiodarone could be considered for PVC suppression if left ventricular dysfunction develops       History of Present Illness     Rox Zavala is a 84 y.o. female with history of aortic stenosis and coronary artery disease who has been known to have an irregular pulse for several years.  She  underwent aortic valve replacement in 2014 for aortic stenosis.  Coronary artery bypass was considered but not performed then.  In 2016 she had evidence to the circumflex and right coronary arteries.  Left main and proximal LAD were patent with stenosis of the first diagonal branch noted.  She had been exercising regularly at [employer] and doing quite well until late April.  She describes stumbling and missing a step falling and resulting in a fractured sacrum.  She has been in transitional care since that time.  She denies syncope or presyncope but did describe an episode of transient lightheadedness when she stood up suddenly after bending over at [employer].  Symptoms resolved within a few seconds and have not recurred.  She saw Dr. Estrada in March 2019 after this episode.  Amlodipine was discontinued and metoprolol 25 mg twice daily was prescribed which was subsequently decreased to 12.5 mg twice daily.  Holter monitor in April 2019 showed very frequent of ventricular premature beats 30,000 over 24 hours with short runs of relatively slow and irregular nonsustained ventricular tachycardia rates approximately 140 bpm or less.  Several different PVC morphologies were no noted all of left ventricular origin.  Cardiac MRI 4/17/2019 showed normal left ventricular function, ejection fraction 55%, no scar, no evidence for ischemia, and normal bioprosthetic aortic valve function.  Patient's lower back pain is improved dramatically.  She is still relatively sedentary.  She denies any further episodes of lightheadedness syncope or presyncope.  Personnaly reviewed: Holter, MRI, and twelve-lead ECGs.  Recent 12-lead ECG show sinus rhythm, leftward axis and ventricular bigeminy with several different PVC morphologies noted.       Physical Examination Review of Systems   Vitals:    06/04/19 1502   BP: 100/54   Pulse: (!) 56   Resp: 16     Body mass index is 20.9 kg/m .  Wt Readings from Last 3 Encounters:   06/04/19 118 lb (53.5 kg)  "  05/08/19 125 lb (56.7 kg)   05/03/19 125 lb (56.7 kg)        Appearance:   no distress, only   HEENT: no scleral icterus, normal conjunctivae    Neck: no carotid bruits or thyromegaly   Chest/Lungs:   lungs are clear to auscultation, no rales or wheezing,      Cardiovascular:   Jugular venous pressure or cm, Apical pulse is irregular with a bigeminal pattern. Normal S1,S2 with soft grade 1/6 systolic ejection murmur,   Abdomen:  no  Hepatosplenomegaly., nontender,  bowel sounds are present   Extremities: no cyanosis or clubbing, No edema   Skin: no xanthelasma, warm.    Neurologic: No gross focal neurologic deficits   Mood/Affect: Alert, cooperative    General: Weight Loss  Eyes: WNL  Ears/Nose/Throat: WNL  Lungs: WNL  Heart: Irregular Heartbeat  Stomach: WNL  Bladder: WNL  Muscle/Joints: WNL  Skin: WNL  Nervous System: Falls  Mental Health: WNL     Blood: WNL     Medical History  Surgical History Family History Social History   Past Medical History:   Diagnosis Date   ? Acute renal failure (H)    ? Aortic valve stenosis, severe    ? Arthritis    ? Coronary artery disease 11/17/2014   ? Coronary artery disease    ? Disease of thyroid gland    ? Hammer toe    ? Hyperlipidemia    ? Hypertension    ? Macular disorder     \"wrinkle\"   ? Rheumatoid arthritis (H)    ? S/P AVR 12/15/2014    Past Surgical History:   Procedure Laterality Date   ? ANGIOPLASTY / STENTING FEMORAL     ? AORTIC VALVE REPLACEMENT     ? CARDIAC SURGERY      CABG   ? EYE SURGERY Bilateral     cataracts   ? [place] LIGATE FALLOPIAN TUBE      Description: Tubal Ligation;  Recorded: 03/20/2008;   ? [place] REMOVAL GALLBLADDER      Description: Cholecystectomy;  Recorded: 03/20/2008;   ? [place] TOTAL HIP ARTHROPLASTY Right     Description: Total Hip Replacement;  Recorded: 03/20/2008; right    Family History   Problem Relation Age of Onset   ? Asthma Mother    ? Coronary artery disease Neg Hx        Social History     Socioeconomic History   ? Marital status: "      Spouse name: Not on file   ? Number of children: Not on file   ? Years of education: Not on file   ? Highest education level: Not on file   Occupational History   ? Not on file   Social Needs   ? Financial resource strain: Not on file   ? Food insecurity:     Worry: Not on file     Inability: Not on file   ? Transportation needs:     Medical: Not on file     Non-medical: Not on file   Tobacco Use   ? Smoking status: Former Smoker     Packs/day: 1.00     Years: 30.00     Pack years: 30.00     Types: Cigarettes     Last attempt to quit: 1995     Years since quittin.4   ? Smokeless tobacco: Never Used   Substance and Sexual Activity   ? Alcohol use: Yes     Comment: Occasional    ? Drug use: No   ? Sexual activity: Not on file   Lifestyle   ? Physical activity:     Days per week: Not on file     Minutes per session: Not on file   ? Stress: Not on file   Relationships   ? Social connections:     Talks on phone: Not on file     Gets together: Not on file     Attends Protestant service: Not on file     Active member of club or organization: Not on file     Attends meetings of clubs or organizations: Not on file     Relationship status: Not on file   ? Intimate partner violence:     Fear of current or ex partner: Not on file     Emotionally abused: Not on file     Physically abused: Not on file     Forced sexual activity: Not on file   Other Topics Concern   ? Not on file   Social History Narrative    , patient notes she and her  were  for 60 years and he passed away 12 years ago following an illness and complications. Patient notes they had a happy marriage.    Children: Patient notes she had 4 children, one daughter passed away following a MVA when daughter was 57 years old. Leisa states she is close to her two boys, daughter, grandchildren, and great-grandchildren and finds her family supportive.         Lives in a town home independently.          Medications  Allergies   Current  Outpatient Medications   Medication Sig Dispense Refill   ? acetaminophen (TYLENOL) 500 MG tablet Take 1-2 tablets (500-1,000 mg total) by mouth every 4 (four) hours as needed.  0   ? ascorbic acid, vitamin C, (VITAMIN C) 250 MG tablet Take 250 mg by mouth daily.     ? aspirin 81 MG EC tablet Take 81 mg by mouth daily.     ? atorvastatin (LIPITOR) 40 MG tablet Take 1 tablet (40 mg total) by mouth daily. 90 tablet 2   ? calcium-vitamin D 500 mg(1,250mg) -200 unit per tablet Take 1 tablet by mouth daily.  0   ? cholecalciferol, vitamin D3, 2,000 unit Tab Take 2,000 Units by mouth daily.     ? donepezil (ARICEPT) 10 MG tablet Take 1 tablet (10 mg total) by mouth at bedtime. 5 tablet 1   ? ferrous sulfate 325 (65 FE) MG tablet Take 1 tablet by mouth daily with breakfast.     ? folic acid (FOLVITE) 1 MG tablet TAKE ONE TABLET BY MOUTH EVERY DAY 90 tablet 3   ? gabapentin (NEURONTIN) 100 MG capsule Take 100 mg by mouth 2 (two) times a day. (Patient taking differently: Take 300 mg by mouth 2 (two) times a day.       )  0   ? levothyroxine (SYNTHROID, LEVOTHROID) 100 MCG tablet TAKE ONE TABLET BY MOUTH EVERY DAY AT 6:00 A.M. 90 tablet 2   ? lisinopril (PRINIVIL,ZESTRIL) 5 MG tablet Take 1 tablet (5 mg total) by mouth daily. 30 tablet 0   ? methotrexate 2.5 MG tablet TAKE THREE TABLETS (7.5MG) BY MOUTH ONCE A WEEK 36 tablet 0   ? metoprolol succinate (TOPROL-XL) 25 MG Take 1 tablet (25 mg total) by mouth daily. 180 tablet 3   ? nitroglycerin (NITROSTAT) 0.4 MG SL tablet Place 1 tablet (0.4 mg total) under the tongue every 5 (five) minutes as needed for chest pain. 25 tablet 0   ? senna (SENOKOT) 8.6 mg tablet Take 2 tablets by mouth daily.     ? traMADol (ULTRAM) 50 mg tablet Take 1 tablet (50 mg total) by mouth every 4 (four) hours. 60 tablet 0   ? VIT C/E/ZN/COPPR/LUTEIN/ZEAXAN (PRESERVISION AREDS 2 ORAL) Take 1 tablet by mouth 2 (two) times a day.     ? calcitonin, salmon, (MIACALCIN) 200 unit/actuation nasal spray Apply  1 spray into alternating nostrils daily. 30 mL 0     No current facility-administered medications for this visit.       No Known Allergies

## 2021-06-27 NOTE — PROGRESS NOTES
Progress Notes by Shilpa Wilburn MD at 8/21/2019  4:30 PM     Author: Shilpa Wilburn MD Service: -- Author Type: Physician    Filed: 8/21/2019  5:12 PM Encounter Date: 8/21/2019 Status: Signed    : Shilpa Wilburn MD (Physician)           Click to link to [facility] Heart Care     [facility] HEART CARE NOTE       Assessment/Plan:   An 84 years old woman presents to this clinic for evaluation of uncontrolled hypertension and fatigue.    1.  Three-vessel coronary artery disease, status post the LIMA to D1, s/p CHEN to mid LCx and RCA 7/2016: The patient has no chest pain. Stress cardiac MRI is negative for inducible myocardial ischemia, no evidence of previous myocardial infarction or myocardial scar, normal left ventricular size and function.   Continue aspirin, Lipitor and metoprolol XL at this time.    2.  Frequent PVCs, bigeminy pattern: The patient complains of fatigue, but no palpitations.  The etiology of her fatigue is not clear, could be multiple factors.  She has no palpitations, no PVC induced cardiomyopathy.  Continue metoprolol succinate 25 mg daily.    3.  Severe aortic valve stenosis, status post the bioprosthetic aortic valve replacement: She had an echocardiogram on February 1, 2018, no change compared to previous one, no evidence of aortic valve stenosis or regurgitation.  Cardiac MRI also demonstrated normal function of prosthetic aortic valve.    4.  Essential hypertension: Her blood pressure is high.  Continue metoprolol XL 25 mg daily.  Increase lisinopril to 10 mg daily.  Monitor her blood pressure closely.  Her son will call me back in 10 days.    5.  Hyperlipidemia: Continue pravastatin 40 mg nightly.  LDL was controlled well.    Thank you for the opportunity to be involved in the care of Rox Zavala. If you have any questions, please feel free to contact me.  I will see the patient again in 3 months.     History of Present Illness:   It is my pleasure to see Rox Zavala at the  [facility] clinic for evaluation of uncontrolled hypertension and fatigue.  Rox Zavala is an 84 y.o. female with a medical history of severe aortic valve stenosis status post bioprosthetic aortic valve replacement on Dec15, 2014, one-vessel bypass surgery LIMA to D1, 3 vessel coronary artery disease s/p CHEN to mid LCx and mid RCA on 7-5-2016, essential hypertension, hyperlipidemia, hypothyroidism, frequent PVCs.    The patient states that she had no chest pain, palpitations, dizziness, orthopnea, PND or leg edema.  She complains of fatigue.  She has been doing exercise regularly.  She is mildly depressed at this time.  The patient has noticed her blood pressure is around 150 mmHg in systolic.  Her heart rate is controlled.      Past Medical History:     Patient Active Problem List   Diagnosis   ? Hammer Toe   ? Hemorrhoids   ? Psoriasis   ? Generalized Osteoarthritis Of The Hand   ? Osteoarthritis Of The Knee   ? Vitamin D Deficiency   ? Palpitations   ? Hypothyroidism   ? Neck Pain   ? Upper Back Pain (Between Shoulder Blades)   ? Osteopenia   ? Coronary artery disease   ? S/P AVR   ? Constipation   ? Acute diastolic CHF (congestive heart failure) (H)   ? Essential hypertension   ? Hyperlipidemia   ? Coronary artery disease involving coronary bypass graft of native heart with other forms of angina pectoris (H)   ? Psoriatic arthritis (H)   ? High risk medication use   ? Thrombocytopenia (H)   ? Sacral insufficiency fracture, initial encounter   ? Hypertension   ? Back pain   ? Pressure injury of buttock, stage 1, unspecified laterality   ? MCI (mild cognitive impairment)       Past Surgical History:     Past Surgical History:   Procedure Laterality Date   ? ANGIOPLASTY / STENTING FEMORAL     ? AORTIC VALVE REPLACEMENT     ? CARDIAC SURGERY      CABG   ? EYE SURGERY Bilateral     cataracts   ? [place] LIGATE FALLOPIAN TUBE      Description: Tubal Ligation;  Recorded: 03/20/2008;   ? [place] REMOVAL  GALLBLADDER      Description: Cholecystectomy;  Recorded: 03/20/2008;   ? [place] TOTAL HIP ARTHROPLASTY Right     Description: Total Hip Replacement;  Recorded: 03/20/2008; right       Family History:     Family History   Problem Relation Age of Onset   ? Asthma Mother    ? Coronary artery disease Neg Hx         Social History:    reports that she quit smoking about 24 years ago. Her smoking use included cigarettes. She has a 30.00 pack-year smoking history. She has never used smokeless tobacco. She reports that she drinks about 0.6 oz of alcohol per week. She reports that she does not use drugs.    Review of Systems:   General: WNL  Eyes: WNL  Ears/Nose/Throat: WNL  Lungs: WNL  Heart: WNL  Stomach: WNL  Bladder: WNL  Muscle/Joints: WNL  Skin: WNL  Nervous System: Daytime Sleepiness  Mental Health: WNL     Blood: WNL    Meds:     Current Outpatient Medications:   ?  acetaminophen (TYLENOL) 500 MG tablet, Take 1-2 tablets (500-1,000 mg total) by mouth every 4 (four) hours as needed., Disp: , Rfl: 0  ?  aspirin 81 MG EC tablet, Take 81 mg by mouth daily., Disp: , Rfl:   ?  atorvastatin (LIPITOR) 40 MG tablet, Take 1 tablet (40 mg total) by mouth daily., Disp: 90 tablet, Rfl: 2  ?  calcium-vitamin D 500 mg(1,250mg) -200 unit per tablet, Take 1 tablet by mouth 2 (two) times a day. (600mg calcium + 800 IU Vitamin D), Disp: , Rfl: 0  ?  cholecalciferol, vitamin D3, 2,000 unit Tab, Take 2,000 Units by mouth daily., Disp: , Rfl:   ?  donepezil (ARICEPT) 10 MG tablet, Take 1 tablet (10 mg total) by mouth at bedtime., Disp: 5 tablet, Rfl: 1  ?  folic acid (FOLVITE) 1 MG tablet, TAKE ONE TABLET BY MOUTH EVERY DAY, Disp: 90 tablet, Rfl: 3  ?  levothyroxine (SYNTHROID, LEVOTHROID) 100 MCG tablet, TAKE ONE TABLET BY MOUTH EVERY DAY AT 6:00 A.M., Disp: 90 tablet, Rfl: 2  ?  lisinopril (PRINIVIL,ZESTRIL) 10 MG tablet, Take 1 tablet (10 mg total) by mouth daily., Disp: 90 tablet, Rfl: 3  ?  methotrexate 2.5 MG tablet, TAKE THREE TABLETS  "(7.5MG) BY MOUTH ONCE A WEEK, Disp: 36 tablet, Rfl: 0  ?  metoprolol succinate (TOPROL-XL) 25 MG, Take 1 tablet (25 mg total) by mouth daily., Disp: 180 tablet, Rfl: 3  ?  nitroglycerin (NITROSTAT) 0.4 MG SL tablet, Place 1 tablet (0.4 mg total) under the tongue every 5 (five) minutes as needed for chest pain., Disp: 25 tablet, Rfl: 0  ?  polyethylene glycol 3350 (MIRALAX ORAL), Take 1 tablet by mouth daily as needed., Disp: , Rfl:   ?  VIT C/E/ZN/COPPR/LUTEIN/ZEAXAN (PRESERVISION AREDS 2 ORAL), Take 1 tablet by mouth 2 (two) times a day., Disp: , Rfl:   ?  polyethylene glycol (GLYCOLAX) 17 gram/dose powder, Take 17 g by mouth daily as needed., Disp: , Rfl:     Allergies:   Patient has no known allergies.      Objective:      Physical Exam  119 lb (54 kg)  5' 3\" (1.6 m)  Body mass index is 21.08 kg/m .  /62 (Patient Site: Left Arm, Patient Position: Sitting, Cuff Size: Adult Regular) Comment: 2nd: 164 / 72  Pulse (!) 53   Resp 16   Ht 5' 3\" (1.6 m)   Wt 119 lb (54 kg) Comment: With shoes  LMP 08/17/1974   BMI 21.08 kg/m      General Appearance:   Awake, Alert, No acute distress.   HEENT:  Pupil equal and reactive to light. No scleral icterus; the mucous membranes were moist.   Neck: Bilateral cervical bruits. No JVD. No thyromegaly.     Chest: The spine was straight. The chest was symmetric.   Lungs:   Respirations unlabored; Lungs are clear to auscultation. No crackles. No wheezing.   Cardiovascular:   Regularly irregular rhythm and rate, normal first and second heart sounds with II/VI systolic murmurs at RUSB. No rubs or gallops.    Abdomen:  Soft. No tenderness. Non-distended. Bowels sounds are present   Extremities: Equal tibial pulses. No leg edema.   Skin: No rashes or ulcers. Warm, Dry.   Musculoskeletal: No tenderness. No deformity.   Neurologic: Mood and affect are appropriate. No focal deficits.         EKG: Personally reviewed  Normal sinus rhythm with frequent PVCs    Cardiac Imaging " Studies  Coronary angiography with PCI on 7-5-2016:  - Moderate proximal and distal left main disease  - LAD has only mild disease. There is severe disease in the  first diagonal and there is a patent LIMA feeding it distally.  - The proximal circumflex had severe calcific disease and was  stented with a Promus CHEN with good results.  - There is diffuse mild proximal RCA disease and severe  disease in the mid segment which was stented with a Promus CHEN  with good results. There is mild distal RCA system disease.    ECHO on 12/23/2014:  Summary  Mild concentric left ventricular hypertrophy.  Left ventricular ejection fraction is visually estimated to be 55%.  Normal function of the bioprosthetic valve in aortic position.  Trace aortic regurgitation is noted.  There is a small pericardial effusion.  Large pleural effusion is noted.      ECHO on 2-1-2018:  1. Normal left ventricular size and systolic performance with a visually estimated ejection fraction of 55%.   2. The aortic valve is not well visualized, but suspected to be comprised of three cusps.  There is mild to moderate calcification of the aortic valve.  Spectral Doppler does not reveal any significant stenosis, however.  3. Normal right ventricular size and systolic performance.   4. There is mild left atrial enlargement.   5. There is an echo dense region in the anterior aspect of the left atrium which appears somewhat calcified.  This, however, does not appear to be new and is present on the prior examination dated 23 December 2014.     When compared to the prior real-time echocardiogram dated 23 December 2014, the small pericardial effusion noted on the prior examination is no longer appreciated.  Otherwise, there has been no major change    Stress cardiac MRI on 4-:  1. Lexiscan stress cardiac MRI is negative for inducible myocardial ischemia.   2. Lexiscan stress ECG is negative for inducible myocardial ischemia.  3. No previous myocardial  infarction is identified.  4. Normal left ventricular size, wall thickness and function. The calculated left ventricular ejection fraction is 55%.  5. Normal right ventricular size and function.  6. Bioprosthetic aortic valve with normal function.    Holter on 4-3-2019:    Abnormal Holter monitor tracing by virtue of the increased burden of PVCs.  Given the somewhat complex nature of the PVCs patient may warrant further electrophysiology workup particularly if the LVEF is <40%.    The patient's symptomatic recordings did not differ significantly from the remainder of his tracing which showed roughly the same burden of PVCs.    No sustained atrial or ventricular tachyarrhythmia.    The profound bradycardia or pauses.    Lab Review   Lab Results   Component Value Date     (L) 06/05/2019    K 5.0 05/15/2019    CL 94 (L) 05/15/2019    CO2 28 05/15/2019    BUN 11 05/15/2019    CREATININE 0.76 06/10/2019    CALCIUM 8.9 05/15/2019     Lab Results   Component Value Date    WBC 6.8 08/14/2019    WBC 6.4 09/29/2015    HGB 12.2 08/14/2019    HCT 35.5 08/14/2019     08/14/2019     08/14/2019     Lab Results   Component Value Date    CHOL 139 08/21/2018    CHOL 135 08/04/2017    CHOL 141 12/06/2016     Lab Results   Component Value Date    HDL 79 08/21/2018    HDL 65 08/04/2017    HDL 75 12/06/2016     Lab Results   Component Value Date    LDLCALC 46 08/21/2018    LDLCALC 58 08/04/2017    LDLCALC 54 12/06/2016     Lab Results   Component Value Date    TRIG 70 08/21/2018    TRIG 62 08/04/2017    TRIG 61 12/06/2016     No components found for: CHOLHDL  Lab Results   Component Value Date    TROPONINI 0.04 12/24/2014     Lab Results   Component Value Date     (H) 04/23/2019     Lab Results   Component Value Date    TSH 2.29 08/14/2019

## 2021-06-27 NOTE — PROGRESS NOTES
Progress Notes by Shilpa Wilburn MD at 5/3/2019 10:50 AM     Author: Shilpa Wilburn MD Service: -- Author Type: Physician    Filed: 5/3/2019 12:25 PM Encounter Date: 5/3/2019 Status: Signed    : Shilpa Wilburn MD (Physician)           Click to link to [facility]     [facility] HEART CARE NOTE       Assessment/Plan:   An 84 years old woman presents to this clinic for routine cardiology follow-up and discussing stress cardiac MRI and Holter reports.    1.  Three-vessel coronary artery disease, status post the LIMA to D1, s/p CHEN to mid LCx and RCA 7/2016: The patient has no chest pain.  He complains of dyspnea on exertion.  Stress cardiac MRI is negative for inducible myocardial ischemia, no evidence of previous myocardial infarction or myocardial scar, normal left ventricular size and function.   Continue aspirin, Lipitor and metoprolol XL at this time.    2.  Frequent PVCs, bigeminy pattern: The patient developed irregular heartbeats, fatigue, lightheadedness.  Her ECG showed bigeminy PVC pattern.  This could be the cause for her symptoms.  As mentioned above, cardiac MRI was reported no PVC induced cardiomyopathy.  No myocardial scar.  Due to frequent PVCs, the patient may needs further EP study based on Holter report.  The patient is scheduled to see Dr. Sanchez on June 4th.  Meantime, continue metoprolol XL total 50 mg daily.    3.  Severe aortic valve stenosis, status post the bioprosthetic aortic valve replacement: She had an echocardiogram on February 1, 2018, no change compared to previous one, no evidence of aortic valve stenosis or regurgitation.  Cardiac MRI also demonstrated normal function of prosthetic aortic valve.    4.  Essential hypertension: Her blood pressure is high today due to back pain.  Usually her blood pressure was controlled.  Continue metoprolol XL 50 mg daily.  Monitor her blood pressure closely.    5.  Hyperlipidemia: Continue pravastatin 40 mg nightly.  LDL was controlled  well.    Thank you for the opportunity to be involved in the care of Rox Zavala. If you have any questions, please feel free to contact me.  I will see the patient again in 4 months.     History of Present Illness:   It is my pleasure to see Rox Zavala at the [facility] clinic for routine cardiology Follow-up.  Rox Zavala is an 84 y.o. female with a medical history of severe aortic valve stenosis status post bioprosthetic aortic valve replacement on Dec15, 2014, one-vessel bypass surgery LIMA to D1, 3 vessel coronary artery disease s/p CHEN to mid LCx and mid RCA on 7-5-2016, essential hypertension, hyperlipidemia, hypothyroidism, frequent PVCs.    The patient states that she had a fall leading to low back pain.  She has a lot of pain this morning.  Her blood pressure is high.  Usually her blood pressure has been controlled well.  She had no chest pain, shortness of breath, orthopnea, PND or leg edema.  She complains of mild dyspnea on exertion which is similar to before.  She also complains of fluttering heartbeats.        ECG in clinic today showed that sinus arrhythmia with first-degree AV block, frequent PVC bigeminy pattern.    Past Medical History:     Patient Active Problem List   Diagnosis   ? Hammer Toe   ? Hemorrhoids   ? Psoriasis   ? Generalized Osteoarthritis Of The Hand   ? Osteoarthritis Of The Knee   ? Vitamin D Deficiency   ? Palpitations   ? Hypothyroidism   ? Neck Pain   ? Upper Back Pain (Between Shoulder Blades)   ? Osteopenia   ? Coronary artery disease   ? S/P AVR   ? Constipation   ? Acute diastolic CHF (congestive heart failure) (H)   ? Essential hypertension   ? Hyperlipidemia   ? Coronary artery disease involving coronary bypass graft of native heart with other forms of angina pectoris (H)   ? Psoriatic arthritis (H)   ? High risk medication use   ? Thrombocytopenia (H)       Past Surgical History:     Past Surgical History:   Procedure Laterality Date   ?  ANGIOPLASTY / STENTING FEMORAL     ? AORTIC VALVE REPLACEMENT     ? CARDIAC SURGERY      CABG   ? EYE SURGERY Bilateral     cataracts   ? IN LIGATE FALLOPIAN TUBE      Description: Tubal Ligation;  Recorded: 03/20/2008;   ? IN REMOVAL GALLBLADDER      Description: Cholecystectomy;  Recorded: 03/20/2008;   ? IN TOTAL HIP ARTHROPLASTY Right     Description: Total Hip Replacement;  Recorded: 03/20/2008; right       Family History:     Family History   Problem Relation Age of Onset   ? Asthma Mother    ? Coronary artery disease Neg Hx         Social History:    reports that she quit smoking about 24 years ago. Her smoking use included cigarettes. She has a 30.00 pack-year smoking history. She has never used smokeless tobacco. She reports that she drinks alcohol. She reports that she does not use drugs.    Review of Systems:   General: WNL  Eyes: WNL  Ears/Nose/Throat: WNL  Lungs: WNL  Heart: WNL  Stomach: WNL  Bladder: WNL  Muscle/Joints: WNL  Skin: WNL  Nervous System: WNL  Mental Health: WNL     Blood: WNL    Meds:     Current Outpatient Medications:   ?  aspirin 81 MG EC tablet, Take 81 mg by mouth daily., Disp: , Rfl:   ?  atorvastatin (LIPITOR) 40 MG tablet, Take 1 tablet (40 mg total) by mouth daily., Disp: 90 tablet, Rfl: 2  ?  CALCIUM CARBONATE (CALCIUM 500 ORAL), Take by mouth., Disp: , Rfl:   ?  cholecalciferol, vitamin D3, 1,000 unit tablet, Take 2,000 Units by mouth daily., Disp: , Rfl:   ?  donepezil (ARICEPT) 10 MG tablet, Take 1 tablet (10 mg total) by mouth at bedtime., Disp: 5 tablet, Rfl: 1  ?  folic acid (FOLVITE) 1 MG tablet, TAKE ONE TABLET BY MOUTH EVERY DAY, Disp: 90 tablet, Rfl: 3  ?  furosemide (LASIX) 40 MG tablet, Take 1 tablet (40 mg total) by mouth as needed (If gained weight more than 1 pound a day or more leg edema)., Disp: 30 tablet, Rfl: 1  ?  levothyroxine (SYNTHROID, LEVOTHROID) 100 MCG tablet, TAKE ONE TABLET BY MOUTH EVERY DAY AT 6:00 A.M., Disp: 90 tablet, Rfl: 2  ?  methotrexate  "2.5 MG tablet, TAKE THREE TABLETS (7.5MG) BY MOUTH ONCE A WEEK, Disp: 36 tablet, Rfl: 0  ?  metoprolol succinate (TOPROL-XL) 25 MG, Take 1 tablet (25 mg total) by mouth 2 (two) times a day., Disp: 90 tablet, Rfl: 3  ?  MV,CALCIUM,MIN/IRON/FOLIC/VITK (MULTI FOR HER ORAL), Take by mouth., Disp: , Rfl:   ?  nitroglycerin (NITROSTAT) 0.4 MG SL tablet, Place 1 tablet (0.4 mg total) under the tongue every 5 (five) minutes as needed for chest pain., Disp: 25 tablet, Rfl: 0  ?  VIT C/E/ZN/COPPR/LUTEIN/ZEAXAN (PRESERVISION AREDS 2 ORAL), Take 1 tablet by mouth 2 (two) times a day., Disp: , Rfl:   ?  meclizine (ANTIVERT) 12.5 mg tablet, Take 1 tablet (12.5 mg total) by mouth 3 (three) times a day as needed for dizziness., Disp: 30 tablet, Rfl: 0    Allergies:   Patient has no known allergies.      Objective:      Physical Exam  125 lb (56.7 kg)  5' 3\" (1.6 m)  Body mass index is 22.14 kg/m .  BP (!) 188/78 (Patient Site: Left Arm, Patient Position: Sitting, Cuff Size: Adult Regular)   Pulse 72   Resp 16   Ht 5' 3\" (1.6 m)   Wt 125 lb (56.7 kg)   LMP 08/17/1974   BMI 22.14 kg/m      General Appearance:   Awake, Alert, No acute distress.   HEENT:  Pupil equal and reactive to light. No scleral icterus; the mucous membranes were moist.   Neck: Bilateral cervical bruits. No JVD. No thyromegaly.     Chest: The spine was straight. The chest was symmetric.   Lungs:   Respirations unlabored; Lungs are clear to auscultation. No crackles. No wheezing.   Cardiovascular:   Regularly irregular rhythm and rate, normal first and second heart sounds with II/VI systolic murmurs at RUSB. No rubs or gallops.    Abdomen:  Soft. No tenderness. Non-distended. Bowels sounds are present   Extremities: Equal tibial pulses. No leg edema.   Skin: No rashes or ulcers. Warm, Dry.   Musculoskeletal: No tenderness. No deformity.   Neurologic: Mood and affect are appropriate. No focal deficits.         EKG: Personally reviewed  Normal sinus rhythm with " frequent PVCs    Cardiac Imaging Studies  Coronary angiography with PCI on 7-5-2016:  - Moderate proximal and distal left main disease  - LAD has only mild disease. There is severe disease in the  first diagonal and there is a patent LIMA feeding it distally.  - The proximal circumflex had severe calcific disease and was  stented with a Promus CHEN with good results.  - There is diffuse mild proximal RCA disease and severe  disease in the mid segment which was stented with a Promus CHEN  with good results. There is mild distal RCA system disease.    ECHO on 12/23/2014:  Summary  Mild concentric left ventricular hypertrophy.  Left ventricular ejection fraction is visually estimated to be 55%.  Normal function of the bioprosthetic valve in aortic position.  Trace aortic regurgitation is noted.  There is a small pericardial effusion.  Large pleural effusion is noted.      ECHO on 2-1-2018:  1. Normal left ventricular size and systolic performance with a visually estimated ejection fraction of 55%.   2. The aortic valve is not well visualized, but suspected to be comprised of three cusps.  There is mild to moderate calcification of the aortic valve.  Spectral Doppler does not reveal any significant stenosis, however.  3. Normal right ventricular size and systolic performance.   4. There is mild left atrial enlargement.   5. There is an echo dense region in the anterior aspect of the left atrium which appears somewhat calcified.  This, however, does not appear to be new and is present on the prior examination dated 23 December 2014.     When compared to the prior real-time echocardiogram dated 23 December 2014, the small pericardial effusion noted on the prior examination is no longer appreciated.  Otherwise, there has been no major change    Stress cardiac MRI on 4-:  1. Lexiscan stress cardiac MRI is negative for inducible myocardial ischemia.   2. Lexiscan stress ECG is negative for inducible myocardial  ischemia.  3. No previous myocardial infarction is identified.  4. Normal left ventricular size, wall thickness and function. The calculated left ventricular ejection fraction is 55%.  5. Normal right ventricular size and function.  6. Bioprosthetic aortic valve with normal function.    Holter on 4-3-2019:    Abnormal Holter monitor tracing by virtue of the increased burden of PVCs.  Given the somewhat complex nature of the PVCs patient may warrant further electrophysiology workup particularly if the LVEF is <40%.    The patient's symptomatic recordings did not differ significantly from the remainder of his tracing which showed roughly the same burden of PVCs.    No sustained atrial or ventricular tachyarrhythmia.    The profound bradycardia or pauses.    Lab Review   Lab Results   Component Value Date     (L) 04/23/2019    K 4.2 04/23/2019    CL 99 04/23/2019    CO2 21 (L) 04/23/2019    BUN 11 04/23/2019    CREATININE 0.83 04/23/2019    CALCIUM 9.1 04/23/2019     Lab Results   Component Value Date    WBC 11.6 (H) 04/23/2019    WBC 6.4 09/29/2015    HGB 12.7 04/23/2019    HCT 38.0 04/23/2019    MCV 97 04/23/2019    PLT 89 (L) 04/23/2019     Lab Results   Component Value Date    CHOL 139 08/21/2018    CHOL 135 08/04/2017    CHOL 141 12/06/2016     Lab Results   Component Value Date    HDL 79 08/21/2018    HDL 65 08/04/2017    HDL 75 12/06/2016     Lab Results   Component Value Date    LDLCALC 46 08/21/2018    LDLCALC 58 08/04/2017    LDLCALC 54 12/06/2016     Lab Results   Component Value Date    TRIG 70 08/21/2018    TRIG 62 08/04/2017    TRIG 61 12/06/2016     No components found for: CHOLHDL  Lab Results   Component Value Date    TROPONINI 0.04 12/24/2014     Lab Results   Component Value Date     (H) 04/23/2019     Lab Results   Component Value Date    TSH 2.20 03/12/2019

## 2021-06-27 NOTE — PROGRESS NOTES
Progress Notes by Shilpa Wilburn MD at 4/3/2019  1:30 PM     Author: Shilpa Wilburn MD Service: -- Author Type: Physician    Filed: 4/3/2019  2:14 PM Encounter Date: 4/3/2019 Status: Signed    : Shilpa Wilburn MD (Physician)           Click to link to [facility]     [facility] HEART CARE NOTE       Assessment/Plan:   An 84 years old woman presents to this clinic for routine cardiology follow-up and complaining of fatigue, lightheadedness, irregular heartbeats.    1.  Three-vessel coronary artery disease, status post the LIMA to D1, s/p CHEN to mid LCx and RCA 7/2016: The patient has no chest pain.  But complains of irregular heartbeats, fatigue mild dyspnea on exertion.    Her ECG showed sinus rhythm with first degree AV block and frequent PVCs appearing bigeminy pattern.  We discussed further evaluation.  Stress cardiac MRI is requested for evaluation of #1 myocardial ischemia, #2 myocardial scar, #3 frequent PVC related cardiomyopathy, heart function and structure.  Continue aspirin, Lipitor and amlodipine at this time.    2.  Frequent PVCs, bigeminy pattern: The patient developed irregular heartbeats, fatigue, lightheadedness.  Her ECG showed bigeminy PVC pattern.  This could be the cause for her symptoms.  As mentioned above, cardiac MRI for evaluation of myocardial scar or ischemia related frequent PVC.  Holter monitor is requested for evaluation of the numbers of PVCs over 24 hours.    2.  Severe aortic valve stenosis, status post the bioprosthetic aortic valve replacement: She had an echocardiogram on February 1, 2018, no change compared to previous one, no evidence of aortic valve stenosis or regurgitation.     3.  Essential hypertension: Her blood pressure has been controlled well with 5 mg of amlodipine daily.      4.  Hyperlipidemia: Continue pravastatin 40 mg nightly.  LDL was controlled well.    We will follow-up her Holter monitor and stress cardiac MRI report.  Discussed the findings with the  patient and also discuss the treatment.  Thank you for the opportunity to be involved in the care of Rox Zavala. If you have any questions, please feel free to contact me.  I will see the patient again in 3 weeks.     History of Present Illness:   It is my pleasure to see Rox Zavala at the [facility] clinic for routine cardiology Follow-up.  Rox Zavala is an 84 y.o. female with a medical history of severe aortic valve stenosis status post bioprosthetic aortic valve replacement on Dec15, 2014, one-vessel bypass surgery LIMA to D1, 3 vessel coronary artery disease s/p CHEN to mid LCx and mid RCA on 7-5-2016, essential hypertension, hyperlipidemia, hypothyroidism.    The patient states that she did not do well over last 3 weeks.  She developed lightheadedness, fatigue, mild shortness of breath.  She complains of fluttering heartbeats.  She had no chest pain, orthopnea, PND.  She has trace bilateral leg edema.  Her blood pressure and heart rate is in normal range.      ECG in clinic today showed that sinus arrhythmia with first-degree AV block, frequent PVC bigeminy pattern.    Past Medical History:     Patient Active Problem List   Diagnosis   ? Hammer Toe   ? Hemorrhoids   ? Psoriasis   ? Generalized Osteoarthritis Of The Hand   ? Osteoarthritis Of The Knee   ? Vitamin D Deficiency   ? Palpitations   ? Hypothyroidism   ? Neck Pain   ? Upper Back Pain (Between Shoulder Blades)   ? Osteopenia   ? Coronary artery disease   ? S/P AVR   ? Constipation   ? Acute diastolic CHF (congestive heart failure) (H)   ? Essential hypertension   ? Hyperlipidemia   ? Coronary artery disease involving coronary bypass graft of native heart with other forms of angina pectoris (H)   ? Psoriatic arthritis (H)   ? High risk medication use   ? Thrombocytopenia (H)       Past Surgical History:     Past Surgical History:   Procedure Laterality Date   ? ANGIOPLASTY / STENTING FEMORAL     ? AORTIC VALVE REPLACEMENT      ? CARDIAC SURGERY      CABG   ? EYE SURGERY Bilateral     cataracts   ? DE LIGATE FALLOPIAN TUBE      Description: Tubal Ligation;  Recorded: 03/20/2008;   ? DE REMOVAL GALLBLADDER      Description: Cholecystectomy;  Recorded: 03/20/2008;   ? DE TOTAL HIP ARTHROPLASTY Right     Description: Total Hip Replacement;  Recorded: 03/20/2008; right       Family History:     Family History   Problem Relation Age of Onset   ? Asthma Mother    ? Coronary artery disease Neg Hx         Social History:    reports that she quit smoking about 24 years ago. Her smoking use included cigarettes. She has a 30.00 pack-year smoking history. she has never used smokeless tobacco. She reports that she drinks alcohol. She reports that she does not use drugs.    Review of Systems:   General: WNL  Eyes: WNL  Ears/Nose/Throat: WNL  Lungs: Shortness of Breath  Heart: Shortness of Breath with activity, Irregular Heartbeat(Palpitations )  Stomach: WNL  Bladder: WNL  Muscle/Joints: WNL  Skin: WNL  Nervous System: Daytime Sleepiness  Mental Health: WNL     Blood: WNL    Meds:     Current Outpatient Medications:   ?  amLODIPine (NORVASC) 5 MG tablet, TAKE ONE TABLET BY MOUTH EVERY DAY FOR REYNAUDS, Disp: 90 tablet, Rfl: 1  ?  aspirin 81 MG EC tablet, Take 81 mg by mouth daily., Disp: , Rfl:   ?  atorvastatin (LIPITOR) 40 MG tablet, Take 1 tablet (40 mg total) by mouth daily., Disp: 90 tablet, Rfl: 2  ?  CALCIUM CARBONATE (CALCIUM 500 ORAL), Take by mouth., Disp: , Rfl:   ?  cholecalciferol, vitamin D3, 1,000 unit tablet, Take 2,000 Units by mouth daily., Disp: , Rfl:   ?  donepezil (ARICEPT) 10 MG tablet, Take 1 tablet (10 mg total) by mouth at bedtime., Disp: 5 tablet, Rfl: 1  ?  folic acid (FOLVITE) 1 MG tablet, Take 1 tablet (1,000 mcg total) by mouth daily., Disp: 90 tablet, Rfl: 3  ?  furosemide (LASIX) 40 MG tablet, Take 1 tablet (40 mg total) by mouth as needed (If gained weight more than 1 pound a day or more leg edema)., Disp: 30 tablet,  "Rfl: 1  ?  levothyroxine (SYNTHROID, LEVOTHROID) 100 MCG tablet, TAKE ONE TABLET BY MOUTH EVERY DAY AT 6:00 A.M., Disp: 90 tablet, Rfl: 0  ?  methotrexate 2.5 MG tablet, TAKE THREE TABLETS (7.5MG) BY MOUTH ONCE A WEEK, Disp: 36 tablet, Rfl: 0  ?  MV,CALCIUM,MIN/IRON/FOLIC/VITK (MULTI FOR HER ORAL), Take by mouth., Disp: , Rfl:   ?  nitroglycerin (NITROSTAT) 0.4 MG SL tablet, Place 1 tablet (0.4 mg total) under the tongue every 5 (five) minutes as needed for chest pain., Disp: 25 tablet, Rfl: 0  ?  VIT C/E/ZN/COPPR/LUTEIN/ZEAXAN (PRESERVISION AREDS 2 ORAL), Take 1 tablet by mouth 2 (two) times a day., Disp: , Rfl:   ?  meclizine (ANTIVERT) 12.5 mg tablet, Take 1 tablet (12.5 mg total) by mouth 3 (three) times a day as needed for dizziness., Disp: 30 tablet, Rfl: 0    Allergies:   Patient has no known allergies.      Objective:      Physical Exam  126 lb (57.2 kg)  5' 3\" (1.6 m)  Body mass index is 22.32 kg/m .  /62 (Patient Site: Left Arm, Patient Position: Sitting, Cuff Size: Adult Regular)   Pulse (!) 44 Comment: Pulse OX: 41  Resp 14   Ht 5' 3\" (1.6 m)   Wt 126 lb (57.2 kg)   LMP 08/17/1974   BMI 22.32 kg/m      General Appearance:   Awake, Alert, No acute distress.   HEENT:  Pupil equal and reactive to light. No scleral icterus; the mucous membranes were moist.   Neck: Bilateral cervical bruits. No JVD. No thyromegaly.     Chest: The spine was straight. The chest was symmetric.   Lungs:   Respirations unlabored; Lungs are clear to auscultation. No crackles. No wheezing.   Cardiovascular:   Regular rhythm and rate, normal first and second heart sounds with II/VI systolic murmurs at RUSB. No rubs or gallops.    Abdomen:  Soft. No tenderness. Non-distended. Bowels sounds are present   Extremities: Equal tibial pulses. No leg edema.   Skin: No rashes or ulcers. Warm, Dry.   Musculoskeletal: No tenderness. No deformity.   Neurologic: Mood and affect are appropriate. No focal deficits.         EKG: " Personally reviewed  Normal sinus rhythm with frequent PVCs    Cardiac Imaging Studies  Coronary angiography with PCI on 7-5-2016:  - Moderate proximal and distal left main disease  - LAD has only mild disease. There is severe disease in the  first diagonal and there is a patent LIMA feeding it distally.  - The proximal circumflex had severe calcific disease and was  stented with a Promus CHEN with good results.  - There is diffuse mild proximal RCA disease and severe  disease in the mid segment which was stented with a Promus CHEN  with good results. There is mild distal RCA system disease.    ECHO on 12/23/2014:  Summary  Mild concentric left ventricular hypertrophy.  Left ventricular ejection fraction is visually estimated to be 55%.  Normal function of the bioprosthetic valve in aortic position.  Trace aortic regurgitation is noted.  There is a small pericardial effusion.  Large pleural effusion is noted.      ECHO on 2-1-2018:  1. Normal left ventricular size and systolic performance with a visually estimated ejection fraction of 55%.   2. The aortic valve is not well visualized, but suspected to be comprised of three cusps.  There is mild to moderate calcification of the aortic valve.  Spectral Doppler does not reveal any significant stenosis, however.  3. Normal right ventricular size and systolic performance.   4. There is mild left atrial enlargement.   5. There is an echo dense region in the anterior aspect of the left atrium which appears somewhat calcified.  This, however, does not appear to be new and is present on the prior examination dated 23 December 2014.     When compared to the prior real-time echocardiogram dated 23 December 2014, the small pericardial effusion noted on the prior examination is no longer appreciated.  Otherwise, there has been no major change    Lab Review   Lab Results   Component Value Date     03/12/2019    K 5.0 03/12/2019     03/12/2019    CO2 29 03/12/2019    BUN  13 03/12/2019    CREATININE 0.96 03/15/2019    CALCIUM 9.7 03/12/2019     Lab Results   Component Value Date    WBC 7.8 03/15/2019    WBC 6.4 09/29/2015    HGB 12.8 03/15/2019    HCT 38.1 03/15/2019    MCV 99 03/15/2019     03/15/2019     Lab Results   Component Value Date    CHOL 139 08/21/2018    CHOL 135 08/04/2017    CHOL 141 12/06/2016     Lab Results   Component Value Date    HDL 79 08/21/2018    HDL 65 08/04/2017    HDL 75 12/06/2016     Lab Results   Component Value Date    LDLCALC 46 08/21/2018    LDLCALC 58 08/04/2017    LDLCALC 54 12/06/2016     Lab Results   Component Value Date    TRIG 70 08/21/2018    TRIG 62 08/04/2017    TRIG 61 12/06/2016     No components found for: CHOLHDL  Lab Results   Component Value Date    TROPONINI 0.04 12/24/2014     Lab Results   Component Value Date     (H) 12/23/2014     Lab Results   Component Value Date    TSH 2.20 03/12/2019

## 2021-06-28 NOTE — PROGRESS NOTES
Progress Notes by Shilpa Wilburn MD at 2/13/2020  1:30 PM     Author: Shilpa Wilburn MD Service: -- Author Type: Physician    Filed: 2/13/2020  2:02 PM Encounter Date: 2/13/2020 Status: Signed    : Shilpa Wilburn MD (Physician)           Click to link to [facility] HEART CARE NOTE      Assessment/Plan:   An 84 years old woman presents to this clinic for  routine cardiology follow-up.     1.  Three-vessel coronary artery disease, status post the LIMA to D1, s/p CHEN to mid LCx and RCA 7/2016: The patient has no chest pain. Stress cardiac MRI is negative for inducible myocardial ischemia, no evidence of previous myocardial infarction or myocardial scar, normal left ventricular size and function.   Continue aspirin, Lipitor and metoprolol XL at this time.     2.  Frequent PVCs, bigeminy pattern: The patient complains of fatigue, but no palpitations.  She has no palpitations, no PVC induced cardiomyopathy.  Continue metoprolol succinate 25 mg daily.     3.  Severe aortic valve stenosis, status post the bioprosthetic aortic valve replacement: She had an echocardiogram on February 1, 2018, no change compared to previous one, no evidence of aortic valve stenosis or regurgitation.  Cardiac MRI also demonstrated normal function of prosthetic aortic valve.     4.  Essential hypertension: Her blood pressure is controlled  with metoprolol XL 25 mg daily and lisinopril to 10 mg daily.       5.  Hyperlipidemia: Continue pravastatin 40 mg nightly.  LDL was controlled well.     Thank you for the opportunity to be involved in the care of Rox Zavala. If you have any questions, please feel free to contact me.  I will see the patient again in 6 months and as needed.      History of Present Illness:   It is my pleasure to see Rox Zavala at the [facility] clinic for evaluation of uncontrolled hypertension and fatigue.  Rox Zavala is an 84 y.o. female with a medical history of severe  aortic valve stenosis status post bioprosthetic aortic valve replacement on Dec15, 2014, one-vessel bypass surgery LIMA to D1, 3 vessel coronary artery disease s/p CHEN to mid LCx and mid RCA on 7-5-2016, essential hypertension, hyperlipidemia, hypothyroidism, frequent PVCs.     The patient states that she has occasional fluttering, irregular heartbeats.  She had no chest pain, dizziness, orthopnea, PND or leg edema.  She complains of fatigue.  She has been doing exercise regularly.    She has mild dyspnea on exertion which has been stable.   Her blood pressure and heart rate are controlled.      Past Medical History:     Patient Active Problem List   Diagnosis   ? Hammer Toe   ? Hemorrhoids   ? Psoriasis   ? Generalized Osteoarthritis Of The Hand   ? Osteoarthritis Of The Knee   ? Vitamin D Deficiency   ? Palpitations   ? Hypothyroidism   ? Neck Pain   ? Upper Back Pain (Between Shoulder Blades)   ? Osteopenia   ? Coronary artery disease   ? S/P AVR   ? Constipation   ? Acute diastolic CHF (congestive heart failure) (H)   ? Essential hypertension   ? Hyperlipidemia   ? Coronary artery disease involving coronary bypass graft of native heart with other forms of angina pectoris (H)   ? Psoriatic arthritis (H)   ? High risk medication use   ? Thrombocytopenia (H)   ? Sacral insufficiency fracture, initial encounter   ? Hypertension   ? Back pain   ? Pressure injury of buttock, stage 1, unspecified laterality   ? MCI (mild cognitive impairment)   ? Frequent PVCs   ? Senile osteoporosis       Past Surgical History:     Past Surgical History:   Procedure Laterality Date   ? ANGIOPLASTY / STENTING FEMORAL     ? AORTIC VALVE REPLACEMENT     ? CARDIAC SURGERY      CABG   ? EYE SURGERY Bilateral     cataracts   ? PA LIGATE FALLOPIAN TUBE      Description: Tubal Ligation;  Recorded: 03/20/2008;   ? PA REMOVAL GALLBLADDER      Description: Cholecystectomy;  Recorded: 03/20/2008;   ? PA TOTAL HIP ARTHROPLASTY Right      Description: Total Hip Replacement;  Recorded: 03/20/2008; right       Family History:     Family History   Problem Relation Age of Onset   ? Asthma Mother    ? Coronary artery disease Neg Hx    ? Breast cancer Neg Hx         Social History:    reports that she quit smoking about 25 years ago. Her smoking use included cigarettes. She has a 30.00 pack-year smoking history. She has never used smokeless tobacco. She reports current alcohol use of about 1.0 standard drinks of alcohol per week. She reports that she does not use drugs.    Review of Systems:   General: WNL  Eyes: WNL  Ears/Nose/Throat: WNL  Lungs: Shortness of Breath  Heart: Shortness of Breath with activity  Stomach: WNL  Bladder: WNL  Muscle/Joints: WNL  Skin: WNL  Nervous System: WNL  Mental Health: WNL     Blood: Easy Bruising    Meds:     Current Outpatient Medications:   ?  acetaminophen (TYLENOL) 500 MG tablet, Take 1-2 tablets (500-1,000 mg total) by mouth every 4 (four) hours as needed., Disp: , Rfl: 0  ?  aspirin 81 MG EC tablet, Take 81 mg by mouth daily., Disp: , Rfl:   ?  atorvastatin (LIPITOR) 40 MG tablet, Take 1 tablet (40 mg total) by mouth daily., Disp: 90 tablet, Rfl: 2  ?  calcium-vitamin D 500 mg(1,250mg) -200 unit per tablet, Take 1 tablet by mouth 2 (two) times a day. (600mg calcium + 800 IU Vitamin D), Disp: , Rfl: 0  ?  cholecalciferol, vitamin D3, 2,000 unit Tab, Take 2,000 Units by mouth daily., Disp: , Rfl:   ?  donepezil (ARICEPT) 10 MG tablet, Take 1 tablet (10 mg total) by mouth at bedtime., Disp: 30 tablet, Rfl: 6  ?  folic acid (FOLVITE) 1 MG tablet, Take 1 tablet (1,000 mcg total) by mouth daily., Disp: 90 tablet, Rfl: 3  ?  levothyroxine (SYNTHROID, LEVOTHROID) 100 MCG tablet, TAKE ONE TABLET BY MOUTH EVERY DAY AT 6:00 A.M., Disp: 90 tablet, Rfl: 2  ?  lisinopril (PRINIVIL,ZESTRIL) 10 MG tablet, Take 1 tablet (10 mg total) by mouth daily., Disp: 90 tablet, Rfl: 3  ?  methotrexate 2.5 MG tablet, TAKE THREE TABLETS (7.5MG)  BY MOUTH ONCE A WEEK, Disp: 24 tablet, Rfl: 0  ?  metoprolol succinate (TOPROL-XL) 25 MG, Take 1 tablet (25 mg total) by mouth daily., Disp: 180 tablet, Rfl: 3  ?  nitroglycerin (NITROSTAT) 0.4 MG SL tablet, Place 1 tablet (0.4 mg total) under the tongue every 5 (five) minutes as needed for chest pain., Disp: 25 tablet, Rfl: 0  ?  polyethylene glycol 3350 (MIRALAX ORAL), Take 1 tablet by mouth daily as needed., Disp: , Rfl:   ?  VIT C/E/ZN/COPPR/LUTEIN/ZEAXAN (PRESERVISION AREDS 2 ORAL), Take 1 tablet by mouth 2 (two) times a day., Disp: , Rfl:     Allergies:   Patient has no known allergies.      Objective:      Physical Exam  126 lb (57.2 kg)     Body mass index is 22.32 kg/m .  /60 (Patient Site: Left Arm, Patient Position: Sitting, Cuff Size: Adult Small)   Pulse (!) 59   Resp 14   Wt 126 lb (57.2 kg)   LMP 08/17/1974   SpO2 94%   Breastfeeding No   BMI 22.32 kg/m      General Appearance:   Awake, Alert, No acute distress.   HEENT:  Pupil equal and reactive to light. No scleral icterus; the mucous membranes were moist.   Neck: No cervical bruits. No JVD. No thyromegaly.     Chest: The spine was straight. The chest was symmetric.   Lungs:   Respirations unlabored; Lungs are clear to auscultation. No crackles. No wheezing.   Cardiovascular:   Regularly irregular rhythm and rate, normal first and second heart sounds with no murmurs. No rubs or gallops.    Abdomen:  Soft. No tenderness. Non-distended. Bowels sounds are present   Extremities: Equal tibial pulses. No leg edema.   Skin: No rashes or ulcers. Warm, Dry.   Musculoskeletal: No tenderness. No deformity.   Neurologic: Mood and affect are appropriate. No focal deficits.         EKG: Personally reviewed  Normal sinus rhythm with frequent PVCs     Cardiac Imaging Studies  Coronary angiography with PCI on 7-5-2016:  - Moderate proximal and distal left main disease  - LAD has only mild disease. There is severe disease in the  first diagonal and there  is a patent LIMA feeding it distally.  - The proximal circumflex had severe calcific disease and was  stented with a Promus CHEN with good results.  - There is diffuse mild proximal RCA disease and severe  disease in the mid segment which was stented with a Promus CHEN  with good results. There is mild distal RCA system disease.     ECHO on 12/23/2014:  Summary  Mild concentric left ventricular hypertrophy.  Left ventricular ejection fraction is visually estimated to be 55%.  Normal function of the bioprosthetic valve in aortic position.  Trace aortic regurgitation is noted.  There is a small pericardial effusion.  Large pleural effusion is noted.        ECHO on 2-1-2018:  1. Normal left ventricular size and systolic performance with a visually estimated ejection fraction of 55%.   2. The aortic valve is not well visualized, but suspected to be comprised of three cusps.  There is mild to moderate calcification of the aortic valve.  Spectral Doppler does not reveal any significant stenosis, however.  3. Normal right ventricular size and systolic performance.   4. There is mild left atrial enlargement.   5. There is an echo dense region in the anterior aspect of the left atrium which appears somewhat calcified.  This, however, does not appear to be new and is present on the prior examination dated 23 December 2014.     When compared to the prior real-time echocardiogram dated 23 December 2014, the small pericardial effusion noted on the prior examination is no longer appreciated.  Otherwise, there has been no major change     Stress cardiac MRI on 4-:  1. Lexiscan stress cardiac MRI is negative for inducible myocardial ischemia.   2. Lexiscan stress ECG is negative for inducible myocardial ischemia.  3. No previous myocardial infarction is identified.  4. Normal left ventricular size, wall thickness and function. The calculated left ventricular ejection fraction is 55%.  5. Normal right ventricular size and  function.  6. Bioprosthetic aortic valve with normal function.     Holter on 4-3-2019:    Abnormal Holter monitor tracing by virtue of the increased burden of PVCs.  Given the somewhat complex nature of the PVCs patient may warrant further electrophysiology workup particularly if the LVEF is <40%.    The patient's symptomatic recordings did not differ significantly from the remainder of his tracing which showed roughly the same burden of PVCs.    No sustained atrial or ventricular tachyarrhythmia.    The profound bradycardia or pauses.    Lab Review   Lab Results   Component Value Date     (L) 06/05/2019    K 5.0 05/15/2019    CL 94 (L) 05/15/2019    CO2 28 05/15/2019    BUN 11 05/15/2019    CREATININE 0.89 10/09/2019    CALCIUM 8.9 05/15/2019     Lab Results   Component Value Date    WBC 7.9 10/09/2019    WBC 6.4 09/29/2015    HGB 13.2 10/09/2019    HCT 38.9 10/09/2019     (H) 10/09/2019     10/09/2019     Lab Results   Component Value Date    CHOL 139 08/21/2018    CHOL 135 08/04/2017    CHOL 141 12/06/2016     Lab Results   Component Value Date    HDL 79 08/21/2018    HDL 65 08/04/2017    HDL 75 12/06/2016     Lab Results   Component Value Date    LDLCALC 46 08/21/2018    LDLCALC 58 08/04/2017    LDLCALC 54 12/06/2016     Lab Results   Component Value Date    TRIG 70 08/21/2018    TRIG 62 08/04/2017    TRIG 61 12/06/2016     No components found for: CHOLHDL  Lab Results   Component Value Date    TROPONINI 0.04 12/24/2014     Lab Results   Component Value Date     (H) 04/23/2019     Lab Results   Component Value Date    TSH 2.29 08/14/2019

## 2021-06-29 NOTE — PROGRESS NOTES
Progress Notes by Shilpa Wilburn MD at 9/24/2020  1:30 PM     Author: Shilpa Wilburn MD Service: -- Author Type: Physician    Filed: 9/24/2020  2:17 PM Encounter Date: 9/24/2020 Status: Signed    : Shilpa Wilburn MD (Physician)           Click to link to [facility] Heart Care     [facility] HEART CARE NOTE      Assessment/Plan:   An 85 years old woman presents to this clinic for routine cardiology follow-up complaining dyspnea on exertion over last 1 to 2 months.     1.  Three-vessel coronary artery disease, status post the LIMA to D1, s/p CHEN to mid LCx and RCA 7/2016 dyspnea on exertion: The patient has no chest pain, but her dyspnea on exertion could be anginal equivalent.  She clearly states that she developed dyspnea on exertion over last 1 to 2 months which is new to her and limited her exercise activity.  We discussed further evaluation.  Lexiscan nuclear stress test is requested for ischemic evaluation.  Meantime echocardiogram is requested for evaluation of her bioprosthetic aortic valve and also diastolic dysfunction.  Routine labs today including CBC, BMP, BNP, ALT and direct LDL.  Continue aspirin, Lipitor and metoprolol XL at this time.     2.  Frequent PVCs, bigeminy pattern: The patient had no palpitations.  Continue metoprolol succinate 25 mg daily.     3.  Severe aortic valve stenosis, status post the bioprosthetic aortic valve replacement: Echocardiogram is requested as mentioned above.     4.  Essential hypertension: Her blood pressure is controlled  with metoprolol XL 25 mg daily and lisinopril 10 mg daily.       5.  Hyperlipidemia: Continue pravastatin 40 mg nightly.      We will follow-up her Lexiscan nuclear stress test, echo and lab reports, discuss the findings with the patient.  Thank you for the opportunity to be involved in the care of Rox Zavala. If you have any questions, please feel free to contact me.  I will see the patient again in 4 weeks and as needed.      History of Present  Illness:   It is my pleasure to see Rox Zavala at the [facility] clinic for evaluation of dyspnea on exertion.  Rox Zavala is an 85 y.o. female with a medical history of severe aortic valve stenosis status post bioprosthetic aortic valve replacement on Dec15, 2014, one-vessel bypass surgery LIMA to D1, 3 vessel coronary artery disease s/p CHEN to mid LCx and mid RCA on 7-5-2016, essential hypertension, hyperlipidemia, hypothyroidism, frequent PVCs.     The patient states that she developed dyspnea on exertion over last 1 to 2 months which is new to her.  She had no palpitations or fluttering heartbeats.  She had no chest pain, dizziness, orthopnea, PND or leg edema.  She complains of fatigue.    She lost a few pounds over last 7 months.   Her blood pressure and heart rate are controlled.      Past Medical History:     Patient Active Problem List   Diagnosis   ? Hammer Toe   ? Hemorrhoids   ? Psoriasis   ? Generalized Osteoarthritis Of The Hand   ? Osteoarthritis Of The Knee   ? Vitamin D Deficiency   ? Palpitations   ? Hypothyroidism   ? Neck Pain   ? Upper Back Pain (Between Shoulder Blades)   ? Osteopenia   ? Coronary artery disease   ? S/P AVR   ? Constipation   ? Acute diastolic CHF (congestive heart failure) (H)   ? Essential hypertension   ? Hyperlipidemia   ? Coronary artery disease involving coronary bypass graft of native heart with other forms of angina pectoris (H)   ? Psoriatic arthritis (H)   ? High risk medication use   ? Sacral insufficiency fracture, initial encounter   ? Hypertension   ? Back pain   ? Pressure injury of buttock, stage 1, unspecified laterality   ? MCI (mild cognitive impairment)   ? Frequent PVCs   ? Senile osteoporosis       Past Surgical History:     Past Surgical History:   Procedure Laterality Date   ? ANGIOPLASTY / STENTING FEMORAL     ? AORTIC VALVE REPLACEMENT     ? CARDIAC SURGERY      CABG   ? EYE SURGERY Bilateral     cataracts   ? [place] LIGATE  FALLOPIAN TUBE      Description: Tubal Ligation;  Recorded: 03/20/2008;   ? CT REMOVAL GALLBLADDER      Description: Cholecystectomy;  Recorded: 03/20/2008;   ? CT TOTAL HIP ARTHROPLASTY Right     Description: Total Hip Replacement;  Recorded: 03/20/2008; right       Family History:     Family History   Problem Relation Age of Onset   ? Asthma Mother    ? Coronary artery disease Neg Hx    ? Breast cancer Neg Hx         Social History:    reports that she quit smoking about 25 years ago. Her smoking use included cigarettes. She has a 30.00 pack-year smoking history. She has never used smokeless tobacco. She reports current alcohol use of about 1.0 standard drinks of alcohol per week. She reports that she does not use drugs.    Review of Systems:   General: WNL  Eyes: WNL  Ears/Nose/Throat: WNL  Lungs: Shortness of Breath  Heart: Shortness of Breath with activity  Stomach: WNL  Bladder: WNL  Muscle/Joints: WNL  Skin: WNL  Nervous System: WNL  Mental Health: WNL     Blood: WNL    Meds:     Current Outpatient Medications:   ?  acetaminophen (TYLENOL) 500 MG tablet, Take 1-2 tablets (500-1,000 mg total) by mouth every 4 (four) hours as needed., Disp: , Rfl: 0  ?  aspirin 81 MG EC tablet, Take 81 mg by mouth daily., Disp: , Rfl:   ?  atorvastatin (LIPITOR) 40 MG tablet, Take 1 tablet (40 mg total) by mouth daily., Disp: 90 tablet, Rfl: 2  ?  calcium-vitamin D 500 mg(1,250mg) -200 unit per tablet, Take 1 tablet by mouth 2 (two) times a day. (600mg calcium + 800 IU Vitamin D), Disp: , Rfl: 0  ?  cholecalciferol, vitamin D3, 2,000 unit Tab, Take 2,000 Units by mouth daily., Disp: , Rfl:   ?  donepeziL (ARICEPT) 10 MG tablet, Take 1 tablet (10 mg total) by mouth at bedtime., Disp: 90 tablet, Rfl: 3  ?  fluticasone propionate (FLONASE) 50 mcg/actuation nasal spray, 2 sprays into each nostril daily., Disp: 16 g, Rfl: 12  ?  folic acid (FOLVITE) 1 MG tablet, Take 1 tablet (1,000 mcg total) by mouth daily., Disp: 90 tablet, Rfl:  "3  ?  levothyroxine (SYNTHROID, LEVOTHROID) 100 MCG tablet, TAKE ONE TABLET BY MOUTH EVERY DAY AT 6:00 A.M., Disp: 90 tablet, Rfl: 3  ?  lisinopriL (PRINIVIL,ZESTRIL) 10 MG tablet, Take 1 tablet (10 mg total) by mouth daily., Disp: 90 tablet, Rfl: 1  ?  methotrexate 2.5 MG tablet, TAKE 3 TABLETS (7.5MG) 1 TIME WEEKLY, Disp: 36 tablet, Rfl: 0  ?  metoprolol succinate (TOPROL-XL) 25 MG, Take 1 tablet (25 mg total) by mouth daily., Disp: 180 tablet, Rfl: 1  ?  nitroglycerin (NITROSTAT) 0.4 MG SL tablet, Place 1 tablet (0.4 mg total) under the tongue every 5 (five) minutes as needed for chest pain., Disp: 25 tablet, Rfl: 0  ?  polyethylene glycol 3350 (MIRALAX ORAL), Take 1 tablet by mouth daily as needed., Disp: , Rfl:   ?  VIT C/E/ZN/COPPR/LUTEIN/ZEAXAN (PRESERVISION AREDS 2 ORAL), Take 1 tablet by mouth 2 (two) times a day., Disp: , Rfl:     Allergies:   Patient has no known allergies.      Objective:      Physical Exam  119 lb (54 kg)  5' 3\" (1.6 m)  Body mass index is 21.08 kg/m .  /42 (Patient Site: Right Arm, Patient Position: Sitting, Cuff Size: Adult Regular)   Pulse 62   Resp 14   Ht 5' 3\" (1.6 m)   Wt 119 lb (54 kg) Comment: With shoes.  LMP 08/17/1974   SpO2 96%   BMI 21.08 kg/m      General Appearance:   Awake, Alert, No acute distress.   HEENT:  Pupil equal and reactive to light. No scleral icterus; the mucous membranes were moist.   Neck: Mild bilateral cervical bruits. No JVD. No thyromegaly.     Chest: The spine was straight. The chest was symmetric.   Lungs:   Respirations unlabored; Lungs are clear to auscultation. No crackles. No wheezing.   Cardiovascular:   Regularly irregular rhythm and rate, normal first and second heart sounds with I/VI systolic murmurs at RUSB. No rubs or gallops.    Abdomen:  Soft. No tenderness. Non-distended. Bowels sounds are present   Extremities: Equal tibial pulses. No leg edema.   Skin: No rashes or ulcers. Warm, Dry.   Musculoskeletal: No tenderness. No " deformity.   Neurologic: Mood and affect are appropriate. No focal deficits.         EKG: Personally reviewed  Normal sinus rhythm with frequent PVCs     Cardiac Imaging Studies  Coronary angiography with PCI on 7-5-2016:  - Moderate proximal and distal left main disease  - LAD has only mild disease. There is severe disease in the  first diagonal and there is a patent LIMA feeding it distally.  - The proximal circumflex had severe calcific disease and was  stented with a Promus CHEN with good results.  - There is diffuse mild proximal RCA disease and severe  disease in the mid segment which was stented with a Promus CHEN  with good results. There is mild distal RCA system disease.     ECHO on 12/23/2014:  Summary  Mild concentric left ventricular hypertrophy.  Left ventricular ejection fraction is visually estimated to be 55%.  Normal function of the bioprosthetic valve in aortic position.  Trace aortic regurgitation is noted.  There is a small pericardial effusion.  Large pleural effusion is noted.        ECHO on 2-1-2018:  1. Normal left ventricular size and systolic performance with a visually estimated ejection fraction of 55%.   2. The aortic valve is not well visualized, but suspected to be comprised of three cusps.  There is mild to moderate calcification of the aortic valve.  Spectral Doppler does not reveal any significant stenosis, however.  3. Normal right ventricular size and systolic performance.   4. There is mild left atrial enlargement.   5. There is an echo dense region in the anterior aspect of the left atrium which appears somewhat calcified.  This, however, does not appear to be new and is present on the prior examination dated 23 December 2014.     When compared to the prior real-time echocardiogram dated 23 December 2014, the small pericardial effusion noted on the prior examination is no longer appreciated.  Otherwise, there has been no major change     Stress cardiac MRI on 4-:  1.  Lexiscan stress cardiac MRI is negative for inducible myocardial ischemia.   2. Lexiscan stress ECG is negative for inducible myocardial ischemia.  3. No previous myocardial infarction is identified.  4. Normal left ventricular size, wall thickness and function. The calculated left ventricular ejection fraction is 55%.  5. Normal right ventricular size and function.  6. Bioprosthetic aortic valve with normal function.     Holter on 4-3-2019:    Abnormal Holter monitor tracing by virtue of the increased burden of PVCs.  Given the somewhat complex nature of the PVCs patient may warrant further electrophysiology workup particularly if the LVEF is <40%.    The patient's symptomatic recordings did not differ significantly from the remainder of his tracing which showed roughly the same burden of PVCs.    No sustained atrial or ventricular tachyarrhythmia.    The profound bradycardia or pauses.    Lab Review   Lab Results   Component Value Date     (L) 07/02/2020    K 5.2 (H) 07/02/2020    CL 98 07/02/2020    CO2 25 07/02/2020    BUN 19 07/02/2020    CREATININE 0.90 08/14/2020    CALCIUM 10.0 07/02/2020     Lab Results   Component Value Date    WBC 7.4 08/14/2020    WBC 6.4 09/29/2015    HGB 12.9 08/14/2020    HCT 37.3 08/14/2020    MCV 99 08/14/2020     (L) 08/14/2020     Lab Results   Component Value Date    CHOL 139 08/21/2018    CHOL 135 08/04/2017    CHOL 141 12/06/2016     Lab Results   Component Value Date    HDL 79 08/21/2018    HDL 65 08/04/2017    HDL 75 12/06/2016     Lab Results   Component Value Date    LDLCALC 46 08/21/2018    LDLCALC 58 08/04/2017    LDLCALC 54 12/06/2016     Lab Results   Component Value Date    TRIG 70 08/21/2018    TRIG 62 08/04/2017    TRIG 61 12/06/2016     No components found for: CHOLHDL  Lab Results   Component Value Date    TROPONINI 0.04 12/24/2014     Lab Results   Component Value Date     (H) 04/23/2019     Lab Results   Component Value Date    TSH 0.95  07/02/2020

## 2021-06-29 NOTE — PROGRESS NOTES
Progress Notes by Shilpa Wilburn MD at 11/10/2020  7:50 AM     Author: Shilpa Wilburn MD Service: -- Author Type: Physician    Filed: 11/10/2020  9:29 AM Encounter Date: 11/10/2020 Status: Signed    : Shilpa Wilburn MD (Physician)           Click to link to [facility]     [facility] HEART CARE NOTE      Assessment/Plan:   An 85 years old woman presents to this clinic for routine cardiology follow-up complaining dyspnea on exertion and discussing nuclear, echo and laboratory test reports:     1.  Three-vessel coronary artery disease, status post the LIMA to D1, s/p CHEN to mid LCx and RCA 7/2016: The patient had negative nuclear stress test, normal heart function.  Less likely her dyspnea on exertion is caused by myocardial ischemia.  Encouraged her to increase exercise activity, also better control her blood pressure.  Continue aspirin, Lipitor and metoprolol XL.     2.  Frequent PVCs, bigeminy pattern: The patient had no palpitations.  Continue metoprolol succinate 25 mg daily.     3.  Severe aortic valve stenosis, status post the bioprosthetic aortic valve replacement: Echocardiogram was reported normal functioning of bioprosthetic aortic valve.     4.  Essential hypertension: Her blood pressure is mildly high.  Continue metoprolol XL 25 mg daily and increase lisinopril to 20 mg daily.    She will monitor her blood pressure in the next 10 days and then call me back.     5.  Hyperlipidemia: Continue pravastatin 40 mg nightly.  LDL is well controlled.     Thank you for the opportunity to be involved in the care of Rox Zavala. If you have any questions, please feel free to contact me.  I will see the patient again in 6 months and as needed.      History of Present Illness:   It is my pleasure to see Rox Zavala at the [facility] clinic for evaluation of dyspnea on exertion and at discussing echo, nuclear stress test and lab reports.  Rox Zavala is an 85 y.o. female with a medical  history of severe aortic valve stenosis status post bioprosthetic aortic valve replacement on Dec15, 2014, one-vessel bypass surgery LIMA to D1, 3 vessel coronary artery disease s/p CHEN to mid LCx and mid RCA on 7-5-2016, essential hypertension, hyperlipidemia, hypothyroidism, frequent PVCs.     The patient states that she still has mild dyspnea on exertion which has been stable.  She had no palpitations or fluttering heartbeats.  She had no chest pain, dizziness, orthopnea, PND or leg edema.  She complains of fatigue.  Her weight has been stable.   Her blood pressure and heart rate are controlled.      Her echocardiogram was reviewed and discussed, normal functioning of her bioprosthetic aortic valve, no obvious valvular disease, normal systolic function, no obvious pulmonary hypertension.  The nuclear stress test there was negative for inducible myocardial ischemia, normal left ventricular systolic function.  Her laboratory test are reviewed nothing significant.    Past Medical History:     Patient Active Problem List   Diagnosis   ? Hammer Toe   ? Hemorrhoids   ? Psoriasis   ? Generalized Osteoarthritis Of The Hand   ? Osteoarthritis Of The Knee   ? Vitamin D Deficiency   ? Palpitations   ? Hypothyroidism   ? Neck Pain   ? Upper Back Pain (Between Shoulder Blades)   ? Osteopenia   ? Coronary artery disease   ? S/P AVR   ? Constipation   ? Acute diastolic CHF (congestive heart failure) (H)   ? Essential hypertension   ? Hyperlipidemia   ? Coronary artery disease involving coronary bypass graft of native heart with other forms of angina pectoris (H)   ? Psoriatic arthritis (H)   ? High risk medication use   ? Sacral insufficiency fracture, initial encounter   ? Hypertension   ? Back pain   ? Pressure injury of buttock, stage 1, unspecified laterality   ? MCI (mild cognitive impairment)   ? Frequent PVCs   ? Senile osteoporosis       Past Surgical History:     Past Surgical History:   Procedure Laterality Date   ?  ANGIOPLASTY / STENTING FEMORAL     ? AORTIC VALVE REPLACEMENT     ? CARDIAC SURGERY      CABG   ? EYE SURGERY Bilateral     cataracts   ? [place] LIGATE FALLOPIAN TUBE      Description: Tubal Ligation;  Recorded: 03/20/2008;   ? [place] REMOVAL GALLBLADDER      Description: Cholecystectomy;  Recorded: 03/20/2008;   ? [place] TOTAL HIP ARTHROPLASTY Right     Description: Total Hip Replacement;  Recorded: 03/20/2008; right       Family History:     Family History   Problem Relation Age of Onset   ? Asthma Mother    ? Coronary artery disease Neg Hx    ? Breast cancer Neg Hx         Social History:    reports that she quit smoking about 25 years ago. Her smoking use included cigarettes. She has a 30.00 pack-year smoking history. She has never used smokeless tobacco. She reports current alcohol use of about 1.0 standard drinks of alcohol per week. She reports that she does not use drugs.    Review of Systems:   General: WNL  Eyes: WNL  Ears/Nose/Throat: WNL  Lungs: WNL  Heart: WNL  Stomach: WNL  Bladder: WNL  Muscle/Joints: WNL  Skin: WNL  Nervous System: WNL  Mental Health: WNL     Blood: WNL    Meds:     Current Outpatient Medications:   ?  acetaminophen (TYLENOL) 500 MG tablet, Take 1-2 tablets (500-1,000 mg total) by mouth every 4 (four) hours as needed., Disp: , Rfl: 0  ?  aspirin 81 MG EC tablet, Take 81 mg by mouth daily., Disp: , Rfl:   ?  atorvastatin (LIPITOR) 40 MG tablet, Take 1 tablet (40 mg total) by mouth daily., Disp: 90 tablet, Rfl: 2  ?  calcium-vitamin D 500 mg(1,250mg) -200 unit per tablet, Take 1 tablet by mouth 2 (two) times a day. (600mg calcium + 800 IU Vitamin D), Disp: , Rfl: 0  ?  cholecalciferol, vitamin D3, 2,000 unit Tab, Take 2,000 Units by mouth daily., Disp: , Rfl:   ?  donepeziL (ARICEPT) 10 MG tablet, Take 1 tablet (10 mg total) by mouth at bedtime., Disp: 90 tablet, Rfl: 3  ?  fluticasone propionate (FLONASE) 50 mcg/actuation nasal spray, 2 sprays into each nostril daily., Disp: 16 g, Rfl: 12  ?   folic acid (FOLVITE) 1 MG tablet, Take 1 tablet (1,000 mcg total) by mouth daily., Disp: 90 tablet, Rfl: 3  ?  levothyroxine (SYNTHROID, LEVOTHROID) 100 MCG tablet, TAKE ONE TABLET BY MOUTH EVERY DAY AT 6:00 A.M., Disp: 90 tablet, Rfl: 3  ?  lisinopriL (PRINIVIL,ZESTRIL) 20 MG tablet, Take 1 tablet (20 mg total) by mouth daily., Disp: 30 tablet, Rfl: 11  ?  methotrexate 2.5 MG tablet, TAKE 3 TABLETS (7.5MG) 1 TIME WEEKLY, Disp: 36 tablet, Rfl: 0  ?  metoprolol succinate (TOPROL-XL) 25 MG, Take 1 tablet (25 mg total) by mouth daily., Disp: 180 tablet, Rfl: 1  ?  nitroglycerin (NITROSTAT) 0.4 MG SL tablet, Place 1 tablet (0.4 mg total) under the tongue every 5 (five) minutes as needed for chest pain., Disp: 25 tablet, Rfl: 0  ?  polyethylene glycol 3350 (MIRALAX ORAL), Take 1 tablet by mouth daily as needed., Disp: , Rfl:   ?  VIT C/E/ZN/COPPR/LUTEIN/ZEAXAN (PRESERVISION AREDS 2 ORAL), Take 1 tablet by mouth 2 (two) times a day., Disp: , Rfl:     Allergies:   Patient has no known allergies.      Objective:      Physical Exam  120 lb (54.4 kg)  5' (1.524 m)  Body mass index is 23.44 kg/m .  /74 (Patient Site: Right Arm, Patient Position: Sitting, Cuff Size: Adult Regular)   Pulse (!) 58   Resp 16   Ht 5' (1.524 m)   Wt 120 lb (54.4 kg)   LMP 08/17/1974   BMI 23.44 kg/m      General Appearance:   Awake, Alert, No acute distress.   HEENT:  Pupil equal and reactive to light. No scleral icterus; the mucous membranes were moist.   Neck: Mild bilateral cervical bruits. No JVD. No thyromegaly.     Chest: The spine was straight. The chest was symmetric.   Lungs:   Respirations unlabored; Lungs are clear to auscultation. No crackles. No wheezing.   Cardiovascular:   Regularly irregular rhythm and rate, normal first and second heart sounds with I/VI systolic murmurs at RUSB. No rubs or gallops.    Abdomen:  Soft. No tenderness. Non-distended. Bowels sounds are present   Extremities: Equal tibial pulses. No leg edema.    Skin: No rashes or ulcers. Warm, Dry.   Musculoskeletal: No tenderness. No deformity.   Neurologic: Mood and affect are appropriate. No focal deficits.         EKG: Personally reviewed  Normal sinus rhythm with frequent PVCs     Cardiac Imaging Studies  Coronary angiography with PCI on 7-5-2016:  - Moderate proximal and distal left main disease  - LAD has only mild disease. There is severe disease in the  first diagonal and there is a patent LIMA feeding it distally.  - The proximal circumflex had severe calcific disease and was  stented with a Promus CHEN with good results.  - There is diffuse mild proximal RCA disease and severe  disease in the mid segment which was stented with a Promus CHEN  with good results. There is mild distal RCA system disease.     ECHO on 10-5-2020:    Normal left ventricular size. Borderline left ventricular hypertrophy.    Left ventricle ejection fraction is normal. The calculated left ventricular ejection fraction is 58%.    Normal right ventricular size and systolic function.    Mild biatrial enlargement    Bioprosthetic valve in the aortic position. Type and size of valve not provided. Valve appears grossly well seated. Mean gradient of 12 mmHg with peak velocity of 2.3 m/s. No observed prosthetic valve insufficiency.    Mild mitral regurgitation. Mild tricuspid regurgitation.  When compared to the previous study dated 2/1/2018, the mean gradient on the current study is 12 mmHg across the bioprosthetic valve compared to 8 mmHg on the prior study. Left ventricular systolic function appears similar..      ECHO on 2-1-2018:  1. Normal left ventricular size and systolic performance with a visually estimated ejection fraction of 55%.   2. The aortic valve is not well visualized, but suspected to be comprised of three cusps.  There is mild to moderate calcification of the aortic valve.  Spectral Doppler does not reveal any significant stenosis, however.  3. Normal right ventricular size  and systolic performance.   4. There is mild left atrial enlargement.   5. There is an echo dense region in the anterior aspect of the left atrium which appears somewhat calcified.  This, however, does not appear to be new and is present on the prior examination dated 23 December 2014.     When compared to the prior real-time echocardiogram dated 23 December 2014, the small pericardial effusion noted on the prior examination is no longer appreciated.  Otherwise, there has been no major change     Stress cardiac MRI on 4-:  1. Lexiscan stress cardiac MRI is negative for inducible myocardial ischemia.   2. Lexiscan stress ECG is negative for inducible myocardial ischemia.  3. No previous myocardial infarction is identified.  4. Normal left ventricular size, wall thickness and function. The calculated left ventricular ejection fraction is 55%.  5. Normal right ventricular size and function.  6. Bioprosthetic aortic valve with normal function.     Nuclear stress test on 10-5-2020:  ?  The nuclear stress test is mildly abnormal.  ?  Nuclear images demonstrate a small area of fixed defect involving the distal anterior wall which may reflect breast attenuation.  No ischemia identified.  ?  The left ventricular ejection fraction at stress is greater than 70% without wall motion abnormality..  ?  A prior study was conducted on 6/19/2015.  Distal anterior defect appears more fixed on the current study.    Holter on 4-3-2019:    Abnormal Holter monitor tracing by virtue of the increased burden of PVCs.  Given the somewhat complex nature of the PVCs patient may warrant further electrophysiology workup particularly if the LVEF is <40%.    The patient's symptomatic recordings did not differ significantly from the remainder of his tracing which showed roughly the same burden of PVCs.    No sustained atrial or ventricular tachyarrhythmia.    The profound bradycardia or pauses.    Lab Review   Lab Results   Component Value  Date     09/24/2020    K 5.2 (H) 09/24/2020    CL 97 (L) 09/24/2020    CO2 31 09/24/2020    BUN 18 09/24/2020    CREATININE 1.05 09/24/2020    CALCIUM 10.2 09/24/2020     Lab Results   Component Value Date    WBC 7.9 09/24/2020    WBC 6.4 09/29/2015    HGB 13.0 09/24/2020    HCT 39.2 09/24/2020     (H) 09/24/2020     09/24/2020     Lab Results   Component Value Date    CHOL 139 08/21/2018    CHOL 135 08/04/2017    CHOL 141 12/06/2016     Lab Results   Component Value Date    HDL 79 08/21/2018    HDL 65 08/04/2017    HDL 75 12/06/2016     Lab Results   Component Value Date    LDLCALC 46 08/21/2018    LDLCALC 58 08/04/2017    LDLCALC 54 12/06/2016     Lab Results   Component Value Date    TRIG 70 08/21/2018    TRIG 62 08/04/2017    TRIG 61 12/06/2016     No components found for: CHOLHDL  Lab Results   Component Value Date    TROPONINI 0.04 12/24/2014     Lab Results   Component Value Date     (H) 09/24/2020     Lab Results   Component Value Date    TSH 0.95 07/02/2020

## 2021-06-30 ENCOUNTER — RECORDS - HEALTHEAST (OUTPATIENT)
Dept: ADMINISTRATIVE | Facility: OTHER | Age: 86
End: 2021-06-30

## 2021-06-30 NOTE — PROGRESS NOTES
Progress Notes by Shilpa Wilburn MD at 5/6/2021 11:30 AM     Author: Shilpa Wilburn MD Service: -- Author Type: Physician    Filed: 5/6/2021 12:13 PM Encounter Date: 5/6/2021 Status: Signed    : Shilpa Wilburn MD (Physician)           Click to link to [facility] NOTE      Assessment/Plan:   An 86 years old woman presents to this clinic for routine cardiology follow-up complaining intermittent chest pain:     1.  Three-vessel coronary artery disease, status post the LIMA to D1, s/p CHEN to mid LCx and RCA 7/2016, intermittent chest pain and dyspnea on exertion: The patient felt dyspnea on exertion and the intermittent chest pain over last few months, typically caused by exertion, relieved in 3 to 5 minutes by rest.  Her symptoms could be caused by worsening coronary artery disease.  We discussed further evaluation and management.  Stress cardiac MRI is requested for ischemic evaluation, evaluate bioprosthetic aortic valve function and also heart function and structure.  Meantime, isosorbide mononitrate 30 mg is initiated.  Continue aspirin, Lipitor and metoprolol XL.     2.  Frequent PVCs, bigeminy pattern: The patient had no palpitations.  Continue metoprolol succinate 25 mg daily.     3.  Severe aortic valve stenosis, status post the bioprosthetic aortic valve replacement: Echocardiogram was reported normal functioning of bioprosthetic aortic valve.     4.  Essential hypertension: Her blood pressure has been.  Continue metoprolol XL 25 mg daily and lisinopril 20 mg daily.      5.  Hyperlipidemia: Continue pravastatin 40 mg nightly.  LDL is well controlled.     Thank you for the opportunity to be involved in the care of Rox Zavala. If you have any questions, please feel free to contact me.  I will see the patient again in 6 months and as needed.      History of Present Illness:   It is my pleasure to see Rox Zavala at the [facility] clinic for  routine  cardiology follow-up and complaining of intermittent chest pain.  Rox Zavala is an 86y.o. female with a medical history of severe aortic valve stenosis status post bioprosthetic aortic valve replacement on Dec15, 2014, one-vessel bypass surgery LIMA to D1, 3 vessel coronary artery disease s/p CHEN to mid LCx and mid RCA on 7-5-2016, essential hypertension, hyperlipidemia, hypothyroidism, frequent PVCs.     The patient states that she developed intermittent chest pain and dyspnea on exertion over last few months.  She described her chest pain as located anterior chest, tight pain, mild in severity, no radiation, typically associated with exertion, relieved in 3 to 5 minutes by rest.  She complains of dyspnea on exertion at a similar time.  She had no palpitations, dizziness, orthopnea or PND.  She has no leg edema.  Her weight has been stable.  Her blood pressure and heart rate are controlled.   Her LDL was well controlled.        Past Medical History:     Patient Active Problem List   Diagnosis   ? Hammer Toe   ? Hemorrhoids   ? Psoriasis   ? Generalized Osteoarthritis Of The Hand   ? Osteoarthritis Of The Knee   ? Vitamin D Deficiency   ? Palpitations   ? Hypothyroidism   ? Neck Pain   ? Upper Back Pain (Between Shoulder Blades)   ? Osteopenia   ? Coronary artery disease   ? S/P AVR   ? Constipation   ? Acute diastolic CHF (congestive heart failure) (H)   ? Essential hypertension   ? Hyperlipidemia   ? Coronary artery disease involving coronary bypass graft of native heart with other forms of angina pectoris (H)   ? Psoriatic arthritis (H)   ? High risk medication use   ? Sacral insufficiency fracture, initial encounter   ? Hypertension   ? Back pain   ? Pressure injury of buttock, stage 1, unspecified laterality   ? MCI (mild cognitive impairment)   ? Frequent PVCs   ? Senile osteoporosis       Past Surgical History:     Past Surgical History:   Procedure Laterality Date   ? ANGIOPLASTY / STENTING FEMORAL      ? AORTIC VALVE REPLACEMENT     ? CARDIAC SURGERY      CABG   ? EYE SURGERY Bilateral     cataracts   ? OR LIGATE FALLOPIAN TUBE      Description: Tubal Ligation;  Recorded: 03/20/2008;   ? OR REMOVAL GALLBLADDER      Description: Cholecystectomy;  Recorded: 03/20/2008;   ? OR TOTAL HIP ARTHROPLASTY Right     Description: Total Hip Replacement;  Recorded: 03/20/2008; right       Family History:     Family History   Problem Relation Age of Onset   ? Asthma Mother    ? Coronary artery disease Neg Hx    ? Breast cancer Neg Hx         Social History:    reports that she quit smoking about 26 years ago. Her smoking use included cigarettes. She has a 30.00 pack-year smoking history. She has never used smokeless tobacco. She reports current alcohol use of about 1.0 standard drinks of alcohol per week. She reports that she does not use drugs.    Review of Systems:   General: WNL  Eyes: WNL  Ears/Nose/Throat: WNL  Lungs: Shortness of Breath  Heart: Chest Pain, Shortness of Breath with activity, Irregular Heartbeat  Stomach: WNL  Bladder: WNL  Muscle/Joints: WNL  Skin: WNL  Nervous System: WNL  Mental Health: WNL     Blood: WNL    Meds:     Current Outpatient Medications:   ?  acetaminophen (TYLENOL) 500 MG tablet, Take 1-2 tablets (500-1,000 mg total) by mouth every 4 (four) hours as needed., Disp: , Rfl: 0  ?  aspirin 81 MG EC tablet, Take 81 mg by mouth daily., Disp: , Rfl:   ?  atorvastatin (LIPITOR) 40 MG tablet, Take 1 tablet (40 mg total) by mouth daily., Disp: 90 tablet, Rfl: 2  ?  calcium-vitamin D 500 mg(1,250mg) -200 unit per tablet, Take 1 tablet by mouth 2 (two) times a day. (600mg calcium + 800 IU Vitamin D), Disp: , Rfl: 0  ?  cholecalciferol, vitamin D3, 2,000 unit Tab, Take 2,000 Units by mouth daily., Disp: , Rfl:   ?  donepeziL (ARICEPT) 10 MG tablet, Take 1 tablet (10 mg total) by mouth at bedtime., Disp: 90 tablet, Rfl: 3  ?  fluticasone propionate (FLONASE) 50 mcg/actuation nasal spray, 2 sprays into  each nostril daily., Disp: 16 g, Rfl: 12  ?  folic acid (FOLVITE) 1 MG tablet, Take 1 tablet (1,000 mcg total) by mouth daily., Disp: 90 tablet, Rfl: 3  ?  levothyroxine (SYNTHROID, LEVOTHROID) 100 MCG tablet, TAKE ONE TABLET BY MOUTH EVERY DAY AT 6:00 A.M., Disp: 90 tablet, Rfl: 3  ?  lisinopriL (PRINIVIL,ZESTRIL) 20 MG tablet, Take 1 tablet (20 mg total) by mouth daily., Disp: 30 tablet, Rfl: 11  ?  methotrexate 2.5 MG tablet, TAKE 3 TABLETS (7.5MG) 1 TIME WEEKLY, Disp: 36 tablet, Rfl: 0  ?  metoprolol succinate (TOPROL-XL) 25 MG, Take 1 tablet (25 mg total) by mouth daily., Disp: 180 tablet, Rfl: 1  ?  nitroglycerin (NITROSTAT) 0.4 MG SL tablet, Place 1 tablet (0.4 mg total) under the tongue every 5 (five) minutes as needed for chest pain., Disp: 25 tablet, Rfl: 0  ?  VIT C/E/ZN/COPPR/LUTEIN/ZEAXAN (PRESERVISION AREDS 2 ORAL), Take 1 tablet by mouth 2 (two) times a day., Disp: , Rfl:   ?  isosorbide mononitrate (IMDUR) 30 MG 24 hr tablet, Take 1 tablet (30 mg total) by mouth daily., Disp: 30 tablet, Rfl: 11  ?  polyethylene glycol 3350 (MIRALAX ORAL), Take 1 tablet by mouth daily as needed., Disp: , Rfl:     Allergies:   Patient has no known allergies.      Objective:      Physical Exam  124 lb (56.2 kg)  5' (1.524 m)  Body mass index is 24.22 kg/m .  /60 (Patient Site: Left Arm, Patient Position: Sitting, Cuff Size: Adult Small)   Pulse 60   Resp 16   Ht 5' (1.524 m)   Wt 124 lb (56.2 kg)   LMP 08/17/1974   BMI 24.22 kg/m      General Appearance:   Awake, Alert, No acute distress.   HEENT:  Pupil equal and reactive to light. No scleral icterus; the mucous membranes were moist.   Neck: Mild bilateral cervical bruits. No JVD. No thyromegaly.     Chest: The spine was straight. The chest was symmetric.   Lungs:   Respirations unlabored; Lungs are clear to auscultation. No crackles. No wheezing.   Cardiovascular:   RRR and rate, normal first and second heart sounds with I/VI systolic murmurs at RUSB. No  rubs or gallops.    Abdomen:  Soft. No tenderness. Non-distended. Bowels sounds are present   Extremities: Equal tibial pulses. No leg edema.   Skin: No rashes or ulcers. Warm, Dry.   Musculoskeletal: No tenderness. No deformity.   Neurologic: Mood and affect are appropriate. No focal deficits.         EKG: Personally reviewed  Normal sinus rhythm with frequent PVCs     Cardiac Imaging Studies  Coronary angiography with PCI on 7-5-2016:  - Moderate proximal and distal left main disease  - LAD has only mild disease. There is severe disease in the  first diagonal and there is a patent LIMA feeding it distally.  - The proximal circumflex had severe calcific disease and was  stented with a Promus CHEN with good results.  - There is diffuse mild proximal RCA disease and severe  disease in the mid segment which was stented with a Promus CHEN  with good results. There is mild distal RCA system disease.     ECHO on 10-5-2020:    Normal left ventricular size. Borderline left ventricular hypertrophy.    Left ventricle ejection fraction is normal. The calculated left ventricular ejection fraction is 58%.    Normal right ventricular size and systolic function.    Mild biatrial enlargement    Bioprosthetic valve in the aortic position. Type and size of valve not provided. Valve appears grossly well seated. Mean gradient of 12 mmHg with peak velocity of 2.3 m/s. No observed prosthetic valve insufficiency.    Mild mitral regurgitation. Mild tricuspid regurgitation.  When compared to the previous study dated 2/1/2018, the mean gradient on the current study is 12 mmHg across the bioprosthetic valve compared to 8 mmHg on the prior study. Left ventricular systolic function appears similar..      ECHO on 2-1-2018:  1. Normal left ventricular size and systolic performance with a visually estimated ejection fraction of 55%.   2. The aortic valve is not well visualized, but suspected to be comprised of three cusps.  There is mild to  moderate calcification of the aortic valve.  Spectral Doppler does not reveal any significant stenosis, however.  3. Normal right ventricular size and systolic performance.   4. There is mild left atrial enlargement.   5. There is an echo dense region in the anterior aspect of the left atrium which appears somewhat calcified.  This, however, does not appear to be new and is present on the prior examination dated 23 December 2014.     When compared to the prior real-time echocardiogram dated 23 December 2014, the small pericardial effusion noted on the prior examination is no longer appreciated.  Otherwise, there has been no major change     Stress cardiac MRI on 4-:  1. Lexiscan stress cardiac MRI is negative for inducible myocardial ischemia.   2. Lexiscan stress ECG is negative for inducible myocardial ischemia.  3. No previous myocardial infarction is identified.  4. Normal left ventricular size, wall thickness and function. The calculated left ventricular ejection fraction is 55%.  5. Normal right ventricular size and function.  6. Bioprosthetic aortic valve with normal function.     Nuclear stress test on 10-5-2020:  ?  The nuclear stress test is mildly abnormal.  ?  Nuclear images demonstrate a small area of fixed defect involving the distal anterior wall which may reflect breast attenuation.  No ischemia identified.  ?  The left ventricular ejection fraction at stress is greater than 70% without wall motion abnormality..  ?  A prior study was conducted on 6/19/2015.  Distal anterior defect appears more fixed on the current study.    Holter on 4-3-2019:    Abnormal Holter monitor tracing by virtue of the increased burden of PVCs.  Given the somewhat complex nature of the PVCs patient may warrant further electrophysiology workup particularly if the LVEF is <40%.    The patient's symptomatic recordings did not differ significantly from the remainder of his tracing which showed roughly the same burden of  PVCs.    No sustained atrial or ventricular tachyarrhythmia.    The profound bradycardia or pauses.    Lab Review   Lab Results   Component Value Date     09/24/2020    K 5.2 (H) 09/24/2020    CL 97 (L) 09/24/2020    CO2 31 09/24/2020    BUN 18 09/24/2020    CREATININE 1.01 01/18/2021    CALCIUM 10.2 09/24/2020     Lab Results   Component Value Date    WBC 7.9 01/18/2021    WBC 6.4 09/29/2015    HGB 13.6 01/18/2021    HCT 40.0 01/18/2021     01/18/2021     (L) 01/18/2021     Lab Results   Component Value Date    CHOL 139 08/21/2018    CHOL 135 08/04/2017    CHOL 141 12/06/2016     Lab Results   Component Value Date    HDL 79 08/21/2018    HDL 65 08/04/2017    HDL 75 12/06/2016     Lab Results   Component Value Date    LDLCALC 46 08/21/2018    LDLCALC 58 08/04/2017    LDLCALC 54 12/06/2016     Lab Results   Component Value Date    TRIG 70 08/21/2018    TRIG 62 08/04/2017    TRIG 61 12/06/2016     No components found for: CHOLHDL  Lab Results   Component Value Date    TROPONINI 0.04 12/24/2014     Lab Results   Component Value Date     (H) 09/24/2020     Lab Results   Component Value Date    TSH 0.95 07/02/2020

## 2021-07-01 ENCOUNTER — TRANSFERRED RECORDS (OUTPATIENT)
Dept: HEALTH INFORMATION MANAGEMENT | Facility: CLINIC | Age: 86
End: 2021-07-01

## 2021-07-03 NOTE — ADDENDUM NOTE
Addendum Note by Carlos Gomez, PharmD at 8/20/2019  2:00 PM     Author: Carlos Gomez, PharmD Service: -- Author Type: Pharmacist    Filed: 9/23/2019 12:05 PM Encounter Date: 8/20/2019 Status: Signed    : Carlos Gomez, PharmD (Pharmacist)    Addended by: CARLOS GOMEZ on: 9/23/2019 12:05 PM        Modules accepted: Orders

## 2021-07-03 NOTE — ADDENDUM NOTE
Addendum Note by Cookie Gomez CMA at 8/16/2018 12:21 PM     Author: Cookie Gomez CMA Service: -- Author Type: Certified Medical Assistant    Filed: 8/16/2018 12:21 PM Date of Service: 8/16/2018 12:21 PM Status: Signed    : Cookie Gomez CMA (Certified Medical Assistant)    Encounter addended by: Cookie Gomez CMA on: 8/16/2018 12:21 PM<BR>     Actions taken: Charge Capture section accepted

## 2021-07-03 NOTE — ADDENDUM NOTE
Addendum Note by Iris Packer RN at 9/29/2017  9:27 AM     Author: Iris Packer RN Service: -- Author Type: Registered Nurse    Filed: 9/29/2017  9:27 AM Encounter Date: 9/28/2017 Status: Signed    : Iris Packer RN (Registered Nurse)    Addended by: IRIS PACKER on: 9/29/2017 09:27 AM        Modules accepted: Orders

## 2021-07-03 NOTE — ADDENDUM NOTE
Addendum Note by Spencer Metz OT at 10/8/2018  3:29 PM     Author: Spencer Metz OT Service: -- Author Type: Occupational Therapist    Filed: 10/9/2018  7:52 AM Date of Service: 10/8/2018  3:29 PM Status: Signed    : Spencer Metz OT (Occupational Therapist)    Encounter addended by: Spencer Metz OT on: 10/9/2018  7:52 AM<BR>     Actions taken: Sign clinical note

## 2021-07-03 NOTE — ADDENDUM NOTE
Addendum Note by Cookie Gomez CMA at 10/4/2018 11:29 AM     Author: Cookie Gomez CMA Service: -- Author Type: Certified Medical Assistant    Filed: 10/4/2018 11:29 AM Date of Service: 10/4/2018 11:29 AM Status: Signed    : Cookie Gomez CMA (Certified Medical Assistant)    Encounter addended by: Cookie Gomez CMA on: 10/4/2018 11:29 AM<BR>     Actions taken: Charge Capture section accepted

## 2021-07-04 NOTE — LETTER
Letter by Janett Barnard CNP at      Author: Janett Barnard CNP Service: -- Author Type: --    Filed:  Encounter Date: 6/16/2021 Status: (Other)         Patient: Rox Zavala   MR Number: [id number]   YOB: 1935   Date of Visit: 6/16/2021     Code Status:  DNR  Visit Type: Problem Visit (GI bleed)     Facility:  [facility] [[id number]]             History of Present Illness: Rox Zavala is a 86 y.o. female who I am seeing today for follow-up at the TCU.  Patient recently hospitalized on 6/6/2021 secondary to syncope.  Past medical history includes CAD, status post one-vessel CABG and CHEN to the mid left circumflex and RCA as of 7/16, aortic valve stenosis, status post bioprosthetic AVR in 2014, hypertension chin, hyperlipidemia and psoriatic arthritis.  Patient found to have recurrent GI bleed with acute blood loss anemia.  Repeat EGD on 6/7 demonstrated two 5 mm gastric ulcers 1 with visible vessel which had been clipped.  Patient also found to be positive for H. pylori antigen.  Patient did undergo clipping x2.  No further episodes of melena.  Per GI patient is able to resume her aspirin on 6/15.  Patient continues on omeprazole 40 mg twice daily until repeat EGD in 8 to 10 weeks.  Patient is being treated for H. pylori antigen for a total of 10 days with tetracycline, bismuth and metronidazole.  Acute blood loss anemia.  Patient did receive 3 units of packed red blood cells.  Initially her Lasix was stopped however this was resumed secondary to volume overload from IV fluid resuscitation and blood transfusion.  Hypertension.  She was initially hypotensive so her antihypertensives were held however with fluid overload patient did have elevated blood pressure and meds were resumed.  History of mild cognitive impairment on Aricept.  Coronary artery disease with bioprosthetic AVR.  Aspirin can be resumed on 6/15.  Psoriatic arthritis on methotrexate.    Today  patient sitting up in bedside chair.  Yesterday hemoglobin was obtained which was 7.4 down from 8.1.  Initially guaiacs were ordered however they have been positive which may be related to the bismuth that she is receiving for H. pylori.  She continues on bismuth, tetracycline and metronidazole until 6/21 for her H. pylori.  She denies any abdominal pain or discomfort.  She does have a 2-3+ edema in the lower extremities from all the fluids that she has received.  Hemodynamically she is stable.  She is eating well.  I will continue to hold her aspirin until we have some follow-up laboratory.  She did receive a dose of her methotrexate.  I did have nursing staff call her rheumatologist about holding this until bleed is stable.  However for 3 days there is been no return call.  No current outbreak.    Active Ambulatory Problems     Diagnosis Date Noted   ? Hammer Toe    ? Hemorrhoids    ? Psoriasis    ? Generalized Osteoarthritis Of The Hand    ? Osteoarthritis Of The Knee    ? Vitamin D Deficiency    ? Palpitations    ? Hypothyroidism    ? Neck Pain    ? Upper Back Pain (Between Shoulder Blades)    ? Osteopenia    ? Coronary artery disease 11/17/2014   ? S/P AVR 12/15/2014   ? Constipation 12/20/2014   ? Acute diastolic CHF (congestive heart failure) (H) 12/23/2014   ? Essential hypertension 12/08/2015   ? Hyperlipidemia 12/08/2015   ? Coronary artery disease involving coronary bypass graft of native heart with other forms of angina pectoris (H)    ? Psoriatic arthritis (H) 07/11/2017   ? High risk medication use 07/11/2017   ? Sacral insufficiency fracture, initial encounter 05/06/2019   ? Hypertension 05/07/2019   ? Back pain 05/08/2019   ? Pressure injury of buttock, stage 1, unspecified laterality 06/16/2019   ? MCI (mild cognitive impairment) 06/16/2019   ? Frequent PVCs 08/21/2019   ? Senile osteoporosis 09/23/2019   ? Hemorrhagic shock (H) 06/01/2021   ? JOSIAH (acute kidney injury) (H) 06/01/2021   ? Lactic  acidemia 06/01/2021   ? GI bleed 06/01/2021   ? Acute blood loss anemia 06/01/2021   ? Melena 06/01/2021   ? Syncope, unspecified syncope type 06/06/2021   ? Anemia due to blood loss, acute 06/06/2021   ? Dieulafoy lesion of stomach    ? Gastric ulcer due to Helicobacter pylori, acute 06/12/2021     Resolved Ambulatory Problems     Diagnosis Date Noted   ? Callus    ? Psoriatic arthropathy (H)    ? Aortic Stenosis    ? Cough    ? Rheumatoid arthritis (H)    ? Aortic stenosis, severe 11/17/2014   ? Fever 12/20/2014   ? Upset stomach 12/20/2014   ? Pain at surgical incision 12/20/2014   ? Healthcare-associated pneumonia 12/23/2014   ? Acute respiratory distress 12/23/2014   ? Bilateral pleural effusion 12/23/2014   ? Hypertension 12/23/2014   ? Postoperative (PCI) anemia due to acute blood loss 07/13/2016   ? Thigh hematoma, right, initial encounter 07/13/2016   ? Extensor tenosynovitis of wrist, left 06/08/2018   ? Thrombocytopenia (H) 03/12/2019     Past Medical History:   Diagnosis Date   ? Acute renal failure (H)    ? Aortic valve stenosis, severe    ? Arthritis    ? Disease of thyroid gland    ? Hammer toe    ? Macular disorder        Current Outpatient Medications   Medication Sig Note   ? acetaminophen (TYLENOL) 500 MG tablet Take 1-2 tablets (500-1,000 mg total) by mouth every 4 (four) hours as needed.    ? atorvastatin (LIPITOR) 40 MG tablet Take 1 tablet (40 mg total) by mouth daily.    ? bismuth subsalicylate (PEPTO-BISMOL) 262 mg Chew chew tab Chew 2 tablets (524 mg total) 4 (four) times a day for 9 days.    ? calcium-vitamin D 500 mg(1,250mg) -200 unit per tablet Take 1 tablet by mouth 2 (two) times a day. (600mg calcium + 800 IU Vitamin D)    ? cholecalciferol, vitamin D3, 2,000 unit Tab Take 2,000 Units by mouth daily.    ? donepeziL (ARICEPT) 10 MG tablet Take 1 tablet (10 mg total) by mouth at bedtime.    ? fluticasone propionate (FLONASE) 50 mcg/actuation nasal spray Apply 2 sprays into each nostril  daily as needed for rhinitis.    ? folic acid (FOLVITE) 1 MG tablet Take 1 tablet (1,000 mcg total) by mouth daily.    ? isosorbide mononitrate (IMDUR) 30 MG 24 hr tablet Take 1 tablet (30 mg total) by mouth daily.    ? levothyroxine (SYNTHROID, LEVOTHROID) 100 MCG tablet TAKE ONE TABLET BY MOUTH EVERY DAY AT 6:00 A.M.    ? lisinopriL (PRINIVIL,ZESTRIL) 20 MG tablet Take 1 tablet (20 mg total) by mouth daily.    ? methotrexate 2.5 MG tablet TAKE 3 TABLETS (7.5MG) 1 TIME WEEKLY 6/6/2021: Fridays   ? metoprolol succinate (TOPROL-XL) 25 MG Take 1 tablet (25 mg total) by mouth daily.    ? metroNIDAZOLE (FLAGYL) 500 MG tablet Take 1 tablet (500 mg total) by mouth 3 (three) times a day for 9 days.    ? nitroglycerin (NITROSTAT) 0.4 MG SL tablet Place 1 tablet (0.4 mg total) under the tongue every 5 (five) minutes as needed for chest pain.    ? omeprazole (PRILOSEC) 40 MG capsule Take 1 capsule (40 mg total) by mouth 2 (two) times a day before meals.    ? polyethylene glycol (MIRALAX) 17 gram packet Take 17 g by mouth daily as needed.    ? tetracycline 500 MG capsule Take 1 capsule (500 mg total) by mouth 4 (four) times a day for 9 days.    ? VIT C/E/ZN/COPPR/LUTEIN/ZEAXAN (PRESERVISION AREDS 2 ORAL) Take 1 tablet by mouth 2 (two) times a day.        No Known Allergies  .      Review of Systems  No fevers or chills. No headache, lightheadedness or dizziness. No SOB, chest pains or palpitations. Appetite is improving.  She denies any abdominal pain.  No nausea, vomiting, constipation or diarrhea.  Nursing staff deny any active bleed noted in her stool.  However guaiac was positive x2.  No dysuria, frequency, burning or pain with urination. Otherwise review of systems are negative.     Physical Exam  PHYSICAL EXAMINATION:  Vital signs: /60, pulse 65, respirations 20, temperature 97.3, O2 sat 94% on room air.  Weight 132.4 pounds.  General: Awake, Alert, oriented x3, appropriately, follows simple commands,  conversant  HEENT:PERRLA, Pink conjunctiva, anicteric sclerae, moist oral mucosa  NECK: Supple, without any lymphadenopathy, or masses  CVS:  S1  S2, without murmur or gallop.   LUNG: Clear to auscultation, No wheezes, rales or rhonci.  BACK: No kyphosis of the thoracic spine  ABDOMEN: Soft, nontender to palpation, with positive bowel sounds  EXTREMITIES: Good range of motion on both upper and lower extremities, 2-3+ pedal edema, no calf tenderness  SKIN: Warm and dry, no rashes or erythema noted  NEUROLOGIC: Intact, pulses palpable  PSYCHIATRIC: Mild cognitive impairment at baseline however she does answer questions appropriately.      Labs:    Lab Results   Component Value Date    WBC 7.4 06/15/2021    HGB 7.4 (L) 06/15/2021    HCT 24.1 (L) 06/15/2021    MCV 96 06/15/2021     06/15/2021     Results for orders placed or performed in visit on 06/15/21   Basic Metabolic Panel   Result Value Ref Range    Sodium 137 136 - 145 mmol/L    Potassium 4.3 3.5 - 5.0 mmol/L    Chloride 108 (H) 98 - 107 mmol/L    CO2 23 22 - 31 mmol/L    Anion Gap, Calculation 6 5 - 18 mmol/L    Glucose 81 70 - 125 mg/dL    Calcium 8.1 (L) 8.5 - 10.5 mg/dL    BUN 11 8 - 28 mg/dL    Creatinine 0.79 0.60 - 1.10 mg/dL    GFR MDRD Af Amer >60 >60 mL/min/1.73m2    GFR MDRD Non Af Amer >60 >60 mL/min/1.73m2           Assessment/Plan:  1. Acute GI bleeding   patient underwent Hemoclip x2.  Continue to hold aspirin.   2. Acute blood loss anemia   hemoglobin trending downward now 7.4 from 8.1.  This could be hemodilution.  She did receive guaiac which was positive however she continues on H. pylori treatment which includes bismuth which can cause false positives.  We will repeat hemoglobin in the a.m.  Continue to monitor.   3. Helicobacter pylori gastritis   patient continues on tetracycline, omeprazole and bismuth until 6/21/2021.   4. Essential hypertension   satisfactory control.   5. Coronary artery disease involving coronary bypass graft of  native heart with other forms of angina pectoris (H)   we will hold aspirin x2 weeks.  She continues on isosorbide.   6. Psoriatic arthritis (H)   she was given methotrexate yesterday.  She does receive this weekly.  No current outbreak.  Will hold x2 weeks.  Continue PPI.  EPI can cause methotrexate toxicity.   7.   Lower extremity edema  secondary to IV fluids from hospital.  Knee-high teds on a.m. off at at bedtime.  Follow-up BMP in the a.m.         35 minutes spent of which greater than 50% was face to face communication with the patient about positive guaiac, hemoglobin trending downward, recent clipping as well as treatment for RA and psoriatic arthritis as well as collaborating with the nursing staff regarding laboratory follow-up and medication management.    This note has been dictated using voice recognition software. Any grammatical or context distortions are unintentional and inherent to the software    Electronically signed by: Janett Barnard, CNP

## 2021-07-04 NOTE — LETTER
Letter by Enrico Tamayo DO at      Author: Enrico Tamayo DO Service: -- Author Type: --    Filed:  Encounter Date: 2021 Status: (Other)         Patient: Rox Zavala   MR Number: [id number]   YOB: 1935   Date of Visit: 2021     [facility]      Facility:    [facility] [[id number]]  Code Status: DNR      Chief Complaint/Reason for Visit:  Chief Complaint   Patient presents with   ? H & P     Coronary artery disease, status post CABG with one-vessel, bioprosthetic aortic valve replacement , essential hypertension, recent hospitalization for previous admission for hemorrhagic shock secondary to GI bleed no source of bleeding was identified at the time the readmission to the hospital almost immediately from the TCU after syncopal episode recurrent GI bleed and acute blood loss anemia.       HPI:   Rox is a 86 y.o. female who  at the [facility].  She had a previous hospitalization for hemorrhagic shock secondary to GI bleed.  At that time there is no clear source of bleeding identified.  She was then brought here to the TCU and was witnessed by the nurses to have a syncopal episode.  She was then sent to the hospital and repeat EGD on  demonstrated two 5 mm gastric ulcers one of them visible vessel which was clipped with EGD.  She did receive 3 units of packed red blood cells at this time for acute blood loss anemia and patient was seen by GI and critical care.    Patient did have positive H. pylori antigen and she had no further episodes of melena.  She did resume her aspirin in 7 days.  Drake troponin on the  and I believe we will do that.  She was treated with bismuth tetracycline and Flagyl.    She did some volume overload did receive Lasix in the hospital and because of this hemoglobin was somewhat erratic.  It was stable on discharge.    She has essential hypertension was hypotensive initially hypertensive  "medications were held and metoprolol and lisinopril were restarted.  Kidney function was stable on discharge.    Patient was monitored in the ICU in critical care and no arrhythmias were noted.  Head CT was negative.  Patient was treated properly and transferred here to the TCU at [facility] in stable condition.    Patient is sitting in a chair comfortably.  She has no concerns Daniel she claims she does have a loss of memory but she says she gets by rather well.  We will do some cognitive testing she has had no signs of bleeding no signs of infection and hemoglobin is pending for tomorrow.  It was stable in the hospital.  She will continue her treatment for H. pylori until done at this time.  She denies any chest pain shortness of breath and she is moving her bowels without difficulty.    Past Medical History:  Past Medical History:   Diagnosis Date   ? Acute renal failure (H)    ? Aortic valve stenosis, severe    ? Arthritis    ? Coronary artery disease 11/17/2014   ? Coronary artery disease    ? Disease of thyroid gland    ? Hammer toe    ? Hyperlipidemia    ? Hypertension    ? Macular disorder     \"wrinkle\"   ? MCI (mild cognitive impairment) 6/16/2019   ? Pressure injury of buttock, stage 1, unspecified laterality 6/16/2019   ? Rheumatoid arthritis (H)    ? S/P AVR 12/15/2014   ? Sacral insufficiency fracture, initial encounter 5/6/2019           Surgical History:  Past Surgical History:   Procedure Laterality Date   ? ANGIOPLASTY / STENTING FEMORAL     ? AORTIC VALVE REPLACEMENT     ? CARDIAC SURGERY      CABG   ? EYE SURGERY Bilateral     cataracts   ? MO ESOPHAGOGASTRODUODENOSCOPY TRANSORAL DIAGNOSTIC N/A 6/1/2021    Procedure: ESOPHAGOGASTRODUODENOSCOPY (EGD) with biopsies;  Surgeon: Julio Lopez MD;  Location: [facility];  Service: Gastroenterology   ? MO ESOPHAGOGASTRODUODENOSCOPY TRANSORAL DIAGNOSTIC N/A 6/6/2021    Procedure: ESOPHAGOGASTRODUODENOSCOPY (EGD) with bicap cauterization;  " Surgeon: Julio Lopez MD;  Location: [facility];  Service: Gastroenterology   ? SD ESOPHAGOGASTRODUODENOSCOPY TRANSORAL DIAGNOSTIC N/A 2021    Procedure: ESOPHAGOGASTRODUODENOSCOPY (EGD) with hemoclip;  Surgeon: Sam Brock MD;  Location: [facility];  Service: Gastroenterology   ? SD LIGATE FALLOPIAN TUBE      Description: Tubal Ligation;  Recorded: 2008;   ? SD REMOVAL GALLBLADDER      Description: Cholecystectomy;  Recorded: 2008;   ? SD TOTAL HIP ARTHROPLASTY Right     Description: Total Hip Replacement;  Recorded: 2008; right       Family History:   Family History   Problem Relation Age of Onset   ? Asthma Mother    ? Coronary artery disease Neg Hx    ? Breast cancer Neg Hx        Social History:    Social History     Socioeconomic History   ? Marital status:      Spouse name: None   ? Number of children: None   ? Years of education: None   ? Highest education level: None   Occupational History   ? None   Social Needs   ? Financial resource strain: None   ? Food insecurity     Worry: None     Inability: None   ? Transportation needs     Medical: None     Non-medical: None   Tobacco Use   ? Smoking status: Former Smoker     Packs/day: 1.00     Years: 30.00     Pack years: 30.00     Types: Cigarettes     Quit date: 1995     Years since quittin.4   ? Smokeless tobacco: Never Used   Substance and Sexual Activity   ? Alcohol use: Yes     Alcohol/week: 1.0 standard drinks     Types: 1 Glasses of wine per week   ? Drug use: No   ? Sexual activity: None   Lifestyle   ? Physical activity     Days per week: None     Minutes per session: None   ? Stress: None   Relationships   ? Social connections     Talks on phone: None     Gets together: None     Attends [employer] service: None     Active member of club or organization: None     Attends meetings of clubs or organizations: None     Relationship status: None   ? Intimate partner violence     Fear of current or ex partner:  "None     Emotionally abused: None     Physically abused: None     Forced sexual activity: None   Other Topics Concern   ? None   Social History Narrative    , patient notes she and her  were  for 60 years and he passed away 12 years ago following an illness and complications. Patient notes they had a happy marriage.    Children: Patient notes she had 4 children, one daughter passed away following a MVA when daughter was 57 years old. Leisa states she is close to her two boys, daughter, grandchildren, and great-grandchildren and finds her family supportive.         Lives in a town home independently.. Now in senior living.        Lives at \" [facility] independent living.        Misbah Barone MD  7/2/2020                  Review of Systems   Constitutional:        Patient denies any pain fevers chills nausea vomit diarrhea change in vision hearing taste or smell weakness one-sided chest pain shortness of breath.  Denies any current shortness stool polyphagia polydipsia polyuria depression or anxiety and the remainder the review of systems is negative.       Vitals:    06/14/21 0920   BP: 134/70   Pulse: 64   Resp: 16   Temp: 97.3  F (36.3  C)   SpO2: 97%       Physical Exam  Constitutional:       General: She is not in acute distress.     Appearance: She is not toxic-appearing.   HENT:      Head: Normocephalic and atraumatic.      Nose: Nose normal.      Mouth/Throat:      Mouth: Mucous membranes are moist.      Pharynx: Oropharynx is clear.   Eyes:      General:         Right eye: No discharge.         Left eye: No discharge.   Cardiovascular:      Rate and Rhythm: Normal rate and regular rhythm.   Pulmonary:      Effort: Pulmonary effort is normal. No respiratory distress.      Breath sounds: No wheezing.   Abdominal:      General: Bowel sounds are normal. There is distension.      Palpations: Abdomen is soft.      Tenderness: There is no abdominal tenderness.   Musculoskeletal:      Right " lower leg: No edema.      Left lower leg: No edema.   Skin:     General: Skin is warm and dry.   Neurological:      Mental Status: She is alert. Mental status is at baseline.   Psychiatric:         Mood and Affect: Mood normal.         Medication List:  Current Outpatient Medications   Medication Sig   ? acetaminophen (TYLENOL) 500 MG tablet Take 1-2 tablets (500-1,000 mg total) by mouth every 4 (four) hours as needed.   ? atorvastatin (LIPITOR) 40 MG tablet Take 1 tablet (40 mg total) by mouth daily.   ? bismuth subsalicylate (PEPTO-BISMOL) 262 mg Chew chew tab Chew 2 tablets (524 mg total) 4 (four) times a day for 9 days.   ? calcium-vitamin D 500 mg(1,250mg) -200 unit per tablet Take 1 tablet by mouth 2 (two) times a day. (600mg calcium + 800 IU Vitamin D)   ? cholecalciferol, vitamin D3, 2,000 unit Tab Take 2,000 Units by mouth daily.   ? donepeziL (ARICEPT) 10 MG tablet Take 1 tablet (10 mg total) by mouth at bedtime.   ? fluticasone propionate (FLONASE) 50 mcg/actuation nasal spray Apply 2 sprays into each nostril daily as needed for rhinitis.   ? folic acid (FOLVITE) 1 MG tablet Take 1 tablet (1,000 mcg total) by mouth daily.   ? isosorbide mononitrate (IMDUR) 30 MG 24 hr tablet Take 1 tablet (30 mg total) by mouth daily.   ? levothyroxine (SYNTHROID, LEVOTHROID) 100 MCG tablet TAKE ONE TABLET BY MOUTH EVERY DAY AT 6:00 A.M.   ? lisinopriL (PRINIVIL,ZESTRIL) 20 MG tablet Take 1 tablet (20 mg total) by mouth daily.   ? methotrexate 2.5 MG tablet TAKE 3 TABLETS (7.5MG) 1 TIME WEEKLY   ? metoprolol succinate (TOPROL-XL) 25 MG Take 1 tablet (25 mg total) by mouth daily.   ? metroNIDAZOLE (FLAGYL) 500 MG tablet Take 1 tablet (500 mg total) by mouth 3 (three) times a day for 9 days.   ? nitroglycerin (NITROSTAT) 0.4 MG SL tablet Place 1 tablet (0.4 mg total) under the tongue every 5 (five) minutes as needed for chest pain.   ? omeprazole (PRILOSEC) 40 MG capsule Take 1 capsule (40 mg total) by mouth 2 (two) times a  day before meals.   ? polyethylene glycol (MIRALAX) 17 gram packet Take 17 g by mouth daily as needed.   ? tetracycline 500 MG capsule Take 1 capsule (500 mg total) by mouth 4 (four) times a day for 9 days.   ? VIT C/E/ZN/COPPR/LUTEIN/ZEAXAN (PRESERVISION AREDS 2 ORAL) Take 1 tablet by mouth 2 (two) times a day.       Labs: 6/12/2021 white count was 8.4, hemoglobin is 8.3, hematocrit was 26.8, platelets 172,000.  Sodium was 139, potassium 4.1, chloride 109, CO2 is 23, anion gap was 7, glucose 88, calcium was 7.8, BUN was 14, creatinine 0.75, GFR was greater than 60.  On 611 hemoglobin was 8.1 and did go up to 8.4.  We will continue to monitor.  H. pylori was positive in the hospital.      Assessment:    ICD-10-CM    1. Acute GI bleeding  K92.2    2. Acute blood loss anemia  D62    3. Helicobacter pylori gastritis  K29.70     B96.81    4. Essential hypertension  I10    5. Psoriatic arthritis (H)  L40.50        Plan: Plan at this time we will continue to monitor but hemoglobin will be done tomorrow to continue to monitor that.  We will continue with H. pylori treatment until patient is done with that and    I did look at her blood pressures thoroughly no needing change of medication she has not been hypotensive nor hypertensive at this time.    Psoriatic arthritis is in good condition at this time and no signs of any relapses.    Cognitive impairment; we will do cognitive testing here this week while she is here and she will continue on her medications without any new changes.  She will continue to undergo physical and Occupational Therapy with no new changes.    The be no other changes to care plan at this time I will continue to monitor above medical problems.        Electronically signed by: Enrico Tamayo,

## 2021-07-06 VITALS
WEIGHT: 133.4 LBS | BODY MASS INDEX: 21.7 KG/M2 | WEIGHT: 127.9 LBS | BODY MASS INDEX: 22.9 KG/M2 | WEIGHT: 132.9 LBS | BODY MASS INDEX: 22.69 KG/M2 | BODY MASS INDEX: 22.81 KG/M2 | HEIGHT: 64 IN | BODY MASS INDEX: 21.95 KG/M2 | WEIGHT: 134 LBS | WEIGHT: 132.2 LBS | BODY MASS INDEX: 21.95 KG/M2 | BODY MASS INDEX: 22.81 KG/M2 | WEIGHT: 126.4 LBS | BODY MASS INDEX: 23 KG/M2 | HEIGHT: 64 IN

## 2021-07-06 VITALS
BODY MASS INDEX: 20.79 KG/M2 | HEIGHT: 64 IN | BODY MASS INDEX: 24.41 KG/M2 | WEIGHT: 125 LBS | WEIGHT: 121.1 LBS | BODY MASS INDEX: 20.79 KG/M2

## 2021-07-06 VITALS
BODY MASS INDEX: 22.69 KG/M2 | WEIGHT: 126.4 LBS | HEIGHT: 64 IN | BODY MASS INDEX: 22.81 KG/M2 | WEIGHT: 127.9 LBS | WEIGHT: 132.2 LBS | WEIGHT: 133.4 LBS | WEIGHT: 132.9 LBS | WEIGHT: 134 LBS | BODY MASS INDEX: 22.81 KG/M2 | BODY MASS INDEX: 21.95 KG/M2 | BODY MASS INDEX: 21.7 KG/M2 | BODY MASS INDEX: 22.9 KG/M2 | BODY MASS INDEX: 21.95 KG/M2 | HEIGHT: 64 IN | BODY MASS INDEX: 23 KG/M2

## 2021-07-06 VITALS — BODY MASS INDEX: 23.07 KG/M2 | WEIGHT: 128.4 LBS | WEIGHT: 134.4 LBS | BODY MASS INDEX: 22.04 KG/M2

## 2021-07-07 NOTE — PROGRESS NOTES
[facility] Patient Outreach:     Discharge Date::  6/24/21      Reason for TCU stay (discharge diagnosis)::  Acute GI bleed; Anemia      Are you feeling better, the same or worse since your discharge?:  Patient is feeling better          As part of your discharge plan, did they discuss home care with you?: No            Did you receive any new medications, or was there a change to your medications?: No            Do you have any follow up visits scheduled with your PCP or Specialist?:  Yes, with PCP      (RN) Is it scheduled soon enough (3-5 days)?: Yes

## 2021-07-08 ENCOUNTER — DOCUMENTATION ONLY (OUTPATIENT)
Dept: OTHER | Facility: CLINIC | Age: 86
End: 2021-07-08

## 2021-07-09 ENCOUNTER — COMMUNICATION - HEALTHEAST (OUTPATIENT)
Dept: FAMILY MEDICINE | Facility: CLINIC | Age: 86
End: 2021-07-09

## 2021-07-09 NOTE — TELEPHONE ENCOUNTER
Telephone Encounter by Jennyfer Hartman MA at 7/9/2021  1:25 PM     Author: Jennyfer Hartman MA Service: -- Author Type: Certified Medical Assistant    Filed: 7/9/2021  1:28 PM Encounter Date: 7/9/2021 Status: Signed    : Jennyfer Hartman MA (Certified Medical Assistant)       Case management with [facility] reached out to Rox and pt reports things are going well at home. Completing rehab exercises. Has shortness of breath with exertion but is improving.  is working to connect with her son Billy to discuss medications as he sets up her pills. Will be closing case after she connects with her son. If you have concerns you would like her to address while she is working with the pt please let her know. Otherwise no need to call back       [phone]  Elisa-

## 2021-07-12 ENCOUNTER — OFFICE VISIT (OUTPATIENT)
Dept: FAMILY MEDICINE | Facility: CLINIC | Age: 86
End: 2021-07-12
Payer: COMMERCIAL

## 2021-07-12 VITALS
OXYGEN SATURATION: 93 % | HEART RATE: 59 BPM | DIASTOLIC BLOOD PRESSURE: 61 MMHG | SYSTOLIC BLOOD PRESSURE: 130 MMHG | WEIGHT: 120.8 LBS | BODY MASS INDEX: 20.62 KG/M2 | HEIGHT: 64 IN

## 2021-07-12 DIAGNOSIS — G31.84 MCI (MILD COGNITIVE IMPAIRMENT): ICD-10-CM

## 2021-07-12 DIAGNOSIS — E03.9 ACQUIRED HYPOTHYROIDISM: ICD-10-CM

## 2021-07-12 DIAGNOSIS — D69.6 THROMBOCYTOPENIA (H): ICD-10-CM

## 2021-07-12 DIAGNOSIS — Z09 HOSPITAL DISCHARGE FOLLOW-UP: Primary | ICD-10-CM

## 2021-07-12 DIAGNOSIS — I99.9 MILD VASCULAR NEUROCOGNITIVE DISORDER: ICD-10-CM

## 2021-07-12 DIAGNOSIS — N18.32 STAGE 3B CHRONIC KIDNEY DISEASE (H): ICD-10-CM

## 2021-07-12 DIAGNOSIS — D62 ANEMIA DUE TO BLOOD LOSS, ACUTE: ICD-10-CM

## 2021-07-12 DIAGNOSIS — F06.70 MILD VASCULAR NEUROCOGNITIVE DISORDER: ICD-10-CM

## 2021-07-12 DIAGNOSIS — I50.31 ACUTE DIASTOLIC CHF (CONGESTIVE HEART FAILURE) (H): ICD-10-CM

## 2021-07-12 PROBLEM — N18.30 CHRONIC KIDNEY DISEASE, STAGE 3 (H): Status: ACTIVE | Noted: 2021-07-12

## 2021-07-12 LAB
ERYTHROCYTE [DISTWIDTH] IN BLOOD BY AUTOMATED COUNT: 18.7 % (ref 10–15)
HCT VFR BLD AUTO: 32.7 % (ref 35–47)
HGB BLD-MCNC: 9.9 G/DL (ref 11.7–15.7)
MCH RBC QN AUTO: 29.8 PG (ref 26.5–33)
MCHC RBC AUTO-ENTMCNC: 30.3 G/DL (ref 31.5–36.5)
MCV RBC AUTO: 99 FL (ref 78–100)
PLATELET # BLD AUTO: 186 10E3/UL (ref 150–450)
RBC # BLD AUTO: 3.32 10E6/UL (ref 3.8–5.2)
TSH SERPL DL<=0.005 MIU/L-ACNC: 1.78 UIU/ML (ref 0.3–5)
WBC # BLD AUTO: 6.7 10E3/UL (ref 4–11)

## 2021-07-12 PROCEDURE — 84443 ASSAY THYROID STIM HORMONE: CPT | Performed by: FAMILY MEDICINE

## 2021-07-12 PROCEDURE — 36415 COLL VENOUS BLD VENIPUNCTURE: CPT | Performed by: FAMILY MEDICINE

## 2021-07-12 PROCEDURE — 83880 ASSAY OF NATRIURETIC PEPTIDE: CPT | Performed by: FAMILY MEDICINE

## 2021-07-12 PROCEDURE — 99214 OFFICE O/P EST MOD 30 MIN: CPT | Performed by: FAMILY MEDICINE

## 2021-07-12 PROCEDURE — 85027 COMPLETE CBC AUTOMATED: CPT | Performed by: FAMILY MEDICINE

## 2021-07-12 RX ORDER — LISINOPRIL 20 MG/1
20 TABLET ORAL DAILY
Status: ON HOLD | COMMUNITY
Start: 2021-06-05 | End: 2021-10-14

## 2021-07-12 RX ORDER — OMEPRAZOLE 40 MG/1
40 CAPSULE, DELAYED RELEASE ORAL 2 TIMES DAILY
Qty: 60 CAPSULE | Refills: 0
Start: 2021-07-12 | End: 2021-07-19

## 2021-07-12 RX ORDER — ASCORBIC ACID 500 MG
500 TABLET ORAL DAILY
Status: ON HOLD | COMMUNITY
End: 2021-10-14

## 2021-07-12 RX ORDER — ISOSORBIDE MONONITRATE 30 MG/1
30 TABLET, EXTENDED RELEASE ORAL DAILY
Status: ON HOLD | COMMUNITY
Start: 2021-05-06 | End: 2021-10-14

## 2021-07-12 RX ORDER — FERROUS SULFATE 325(65) MG
1 TABLET ORAL
Status: ON HOLD | COMMUNITY
End: 2021-10-14

## 2021-07-12 RX ORDER — FOLIC ACID 1 MG/1
1000 TABLET ORAL DAILY
Status: ON HOLD | COMMUNITY
Start: 2021-05-27 | End: 2021-10-14

## 2021-07-12 RX ORDER — FLUTICASONE PROPIONATE 50 MCG
2 SPRAY, SUSPENSION (ML) NASAL
COMMUNITY
End: 2021-10-10

## 2021-07-12 RX ORDER — DONEPEZIL HYDROCHLORIDE 10 MG/1
10 TABLET, FILM COATED ORAL
COMMUNITY
Start: 2020-06-25 | End: 2021-08-24

## 2021-07-12 RX ORDER — LEVOTHYROXINE SODIUM 100 UG/1
100 TABLET ORAL DAILY
Status: ON HOLD | COMMUNITY
End: 2021-10-14

## 2021-07-12 RX ORDER — ATORVASTATIN CALCIUM 40 MG/1
40 TABLET, FILM COATED ORAL DAILY
Status: ON HOLD | COMMUNITY
Start: 2021-05-20 | End: 2021-10-14

## 2021-07-12 RX ORDER — OMEGA-3S/DHA/EPA/FISH OIL 300-1000MG
1 CAPSULE ORAL
COMMUNITY
End: 2021-10-10

## 2021-07-12 RX ORDER — METHOTREXATE 2.5 MG/1
3 TABLET ORAL WEEKLY
Status: ON HOLD | COMMUNITY
Start: 2021-05-28 | End: 2021-10-14

## 2021-07-12 RX ORDER — POLYETHYLENE GLYCOL 3350 17 G/17G
17 POWDER, FOR SOLUTION ORAL DAILY
Status: ON HOLD | COMMUNITY
End: 2021-10-14

## 2021-07-12 RX ORDER — METOPROLOL SUCCINATE 25 MG/1
25 TABLET, EXTENDED RELEASE ORAL DAILY
Status: ON HOLD | COMMUNITY
Start: 2021-04-26 | End: 2021-10-14

## 2021-07-12 ASSESSMENT — MIFFLIN-ST. JEOR: SCORE: 982.95

## 2021-07-12 NOTE — PATIENT INSTRUCTIONS
Wt Readings from Last 7 Encounters:   07/12/21 54.8 kg (120 lb 12.8 oz)   06/09/21 60.3 kg (132 lb 14.4 oz)   06/09/21 60 kg (132 lb 3.2 oz)   06/09/21 60.3 kg (132 lb 14.4 oz)   06/09/21 60 kg (132 lb 3.2 oz)   06/08/21 60.5 kg (133 lb 6.4 oz)   06/08/21 60.5 kg (133 lb 6.4 oz)     Weight was 123 lb July 2020

## 2021-07-12 NOTE — PROGRESS NOTES
ICD-10-CM    1. Hospital discharge follow-up  Z09    2. Stage 3b chronic kidney disease  N18.32    3. Mild vascular neurocognitive disorder (H)  F01.50    4. Acute diastolic CHF (congestive heart failure) (H)  I50.31 BNP-N terminal pro     omeprazole (PRILOSEC) 40 MG DR capsule     BNP-N terminal pro   5. Thrombocytopenia (H)  D69.6    6. MCI (mild cognitive impairment)  G31.84    7. Anemia due to blood loss, acute  D62 CBC with platelets     CBC with platelets   8. Acquired hypothyroidism  E03.9 TSH with free T4 reflex     TSH with free T4 reflex     Medical decision making: Patient is a 86-year-old female with comorbidities of congestive heart failure, cognitive impairment, who was admitted to hospital for acute GI bleed and subsequently was transferred to TCU.  She was discharged once her hemoglobin stabilized.  She did receive blood transfusion during this visit.    Repeat hemoglobin today shows slight increase.  We will check labs for her hypothyroidism also today.  As documented records are reviewed from hospitalization and from TCU along with endoscopies and lab results.     Follow up: Return in about 3 months (around 10/12/2021) for Routine preventive.       Hospital Follow-up Visit:    Hospital/Nursing Home/IP Rehab Facility: [facility]  Date of Admission: 6/12/2021  Date of Discharge: 6/24/2021  Reason(s) for Admission: GI bleed      Was your hospitalization related to COVID-19? No   Problems taking medications regularly:  None  Medication changes since discharge: None  Problems adhering to non-medication therapy:  None    Summary of hospitalization:  [facility] discharge summary reviewed from [facility] notes and discharge are also available in care everywhere  Patient was hospitalized at [facility] on 6/6/2021 secondary to syncope.  She was found to have recurrent GI bleed with acute blood  loss anemia.  Initial EGD did not show the cause.  However, repeat EGD on 6/7 demonstrated to 5 mm gastric ulcers.  Patient did undergo clipping x2.  No further episodes of hematoma or melena.  Patient is on omeprazole 40 mg twice daily and needs a repeat EGD in 8 to 10 weeks.  During course of hospitalization, patient had fluid overload from IV fluid resuscitation and needed diuretics.  Since discharge, patient has been living at [facility] living independently.  She has meals available in the cafeteria.  She feels she is stable but still feels very weak.  She denies any new swelling in the legs or any melena or hematemesis.  She does get short of breath with activities.    Diagnostic Tests/Treatments reviewed.  Follow up needed: Needs repeat hemoglobin check and EGD in couple of months.  Other Healthcare Providers Involved in Patient s Care:         None  Update since discharge: improved.       Post Discharge Medication Reconciliation: discharge medications reconciled, continue medications without change.  Plan of care communicated with patient and family

## 2021-07-13 ENCOUNTER — RECORDS - HEALTHEAST (OUTPATIENT)
Dept: ADMINISTRATIVE | Facility: CLINIC | Age: 86
End: 2021-07-13

## 2021-07-14 LAB — NT-PROBNP SERPL-MCNC: 1136 PG/ML (ref 0–450)

## 2021-07-19 ENCOUNTER — MYC MEDICAL ADVICE (OUTPATIENT)
Dept: FAMILY MEDICINE | Facility: CLINIC | Age: 86
End: 2021-07-19

## 2021-07-19 DIAGNOSIS — I50.31 ACUTE DIASTOLIC CHF (CONGESTIVE HEART FAILURE) (H): ICD-10-CM

## 2021-07-19 RX ORDER — OMEPRAZOLE 40 MG/1
40 CAPSULE, DELAYED RELEASE ORAL 2 TIMES DAILY
Qty: 60 CAPSULE | Refills: 0 | Status: SHIPPED | OUTPATIENT
Start: 2021-07-19 | End: 2021-07-23

## 2021-07-21 ENCOUNTER — RECORDS - HEALTHEAST (OUTPATIENT)
Dept: ADMINISTRATIVE | Facility: CLINIC | Age: 86
End: 2021-07-21

## 2021-07-22 ENCOUNTER — MYC MEDICAL ADVICE (OUTPATIENT)
Dept: FAMILY MEDICINE | Facility: CLINIC | Age: 86
End: 2021-07-22

## 2021-07-22 DIAGNOSIS — I50.31 ACUTE DIASTOLIC CHF (CONGESTIVE HEART FAILURE) (H): ICD-10-CM

## 2021-07-23 RX ORDER — OMEPRAZOLE 40 MG/1
40 CAPSULE, DELAYED RELEASE ORAL 2 TIMES DAILY
Qty: 60 CAPSULE | Refills: 0 | Status: SHIPPED | OUTPATIENT
Start: 2021-07-23 | End: 2021-09-09

## 2021-07-27 ENCOUNTER — HOSPITAL ENCOUNTER (OUTPATIENT)
Dept: MRI IMAGING | Facility: HOSPITAL | Age: 86
End: 2021-07-27
Attending: INTERNAL MEDICINE
Payer: COMMERCIAL

## 2021-07-27 VITALS
RESPIRATION RATE: 18 BRPM | SYSTOLIC BLOOD PRESSURE: 145 MMHG | OXYGEN SATURATION: 95 % | DIASTOLIC BLOOD PRESSURE: 57 MMHG | HEART RATE: 69 BPM

## 2021-07-27 DIAGNOSIS — I25.708 CORONARY ARTERY DISEASE INVOLVING CORONARY BYPASS GRAFT OF NATIVE HEART WITH OTHER FORMS OF ANGINA PECTORIS (H): Primary | ICD-10-CM

## 2021-07-27 DIAGNOSIS — I25.709 CORONARY ARTERY DISEASE INVOLVING CORONARY BYPASS GRAFT OF NATIVE HEART WITH ANGINA PECTORIS (H): ICD-10-CM

## 2021-07-27 PROCEDURE — 999N000127 HC STATISTIC PERIPHERAL IV START W US GUIDANCE

## 2021-07-27 PROCEDURE — 93010 ELECTROCARDIOGRAM REPORT: CPT | Performed by: INTERNAL MEDICINE

## 2021-07-27 PROCEDURE — 93005 ELECTROCARDIOGRAM TRACING: CPT

## 2021-07-27 PROCEDURE — 999N000122 MR MYOCARDIUM  OVERREAD

## 2021-07-27 PROCEDURE — 255N000002 HC RX 255 OP 636: Performed by: INTERNAL MEDICINE

## 2021-07-27 PROCEDURE — 75563 CARD MRI W/STRESS IMG & DYE: CPT

## 2021-07-27 PROCEDURE — A9585 GADOBUTROL INJECTION: HCPCS | Performed by: INTERNAL MEDICINE

## 2021-07-27 PROCEDURE — 75563 CARD MRI W/STRESS IMG & DYE: CPT | Mod: 26 | Performed by: GENERAL ACUTE CARE HOSPITAL

## 2021-07-27 PROCEDURE — 250N000011 HC RX IP 250 OP 636: Performed by: INTERNAL MEDICINE

## 2021-07-27 RX ORDER — GADOBUTROL 604.72 MG/ML
16 INJECTION INTRAVENOUS ONCE
Status: COMPLETED | OUTPATIENT
Start: 2021-07-27 | End: 2021-07-27

## 2021-07-27 RX ORDER — AMINOPHYLLINE 25 MG/ML
50 INJECTION, SOLUTION INTRAVENOUS
Status: DISCONTINUED | OUTPATIENT
Start: 2021-07-27 | End: 2021-07-27 | Stop reason: HOSPADM

## 2021-07-27 RX ORDER — REGADENOSON 0.08 MG/ML
0.4 INJECTION, SOLUTION INTRAVENOUS ONCE
Status: COMPLETED | OUTPATIENT
Start: 2021-07-27 | End: 2021-07-27

## 2021-07-27 RX ADMIN — GADOBUTROL 16 ML: 604.72 INJECTION INTRAVENOUS at 11:17

## 2021-07-27 RX ADMIN — REGADENOSON 0.4 MG: 0.08 INJECTION, SOLUTION INTRAVENOUS at 10:31

## 2021-07-27 RX ADMIN — AMINOPHYLLINE 50 MG: 25 INJECTION, SOLUTION INTRAVENOUS at 10:42

## 2021-07-27 NOTE — PROGRESS NOTES
Post stress MRI no changes to EKG.  IV d/lianne and pt sent home in care of Son Billy Carlsonsal Pat RN

## 2021-07-27 NOTE — PROGRESS NOTES
Pt here for Cardiac stress MRI.  PICC called for IV  Start.  Denies caffeine use in the last 24 hours and medications held per instructions.  Son to answer questions due to pt short term memory lost.  No c/o  Chest pain at this time.    DAQUAN Pat RN

## 2021-07-28 LAB
ATRIAL RATE - MUSE: 60 BPM
ATRIAL RATE - MUSE: 65 BPM
DIASTOLIC BLOOD PRESSURE - MUSE: NORMAL MMHG
DIASTOLIC BLOOD PRESSURE - MUSE: NORMAL MMHG
INTERPRETATION ECG - MUSE: NORMAL
INTERPRETATION ECG - MUSE: NORMAL
P AXIS - MUSE: 82 DEGREES
P AXIS - MUSE: 93 DEGREES
PR INTERVAL - MUSE: 220 MS
PR INTERVAL - MUSE: 224 MS
QRS DURATION - MUSE: 132 MS
QRS DURATION - MUSE: 144 MS
QT - MUSE: 470 MS
QT - MUSE: 478 MS
QTC - MUSE: 497 MS
QTC - MUSE: 514 MS
R AXIS - MUSE: -47 DEGREES
R AXIS - MUSE: -49 DEGREES
SYSTOLIC BLOOD PRESSURE - MUSE: NORMAL MMHG
SYSTOLIC BLOOD PRESSURE - MUSE: NORMAL MMHG
T AXIS - MUSE: 14 DEGREES
T AXIS - MUSE: 16 DEGREES
VENTRICULAR RATE- MUSE: 65 BPM
VENTRICULAR RATE- MUSE: 72 BPM

## 2021-08-02 ENCOUNTER — TRANSFERRED RECORDS (OUTPATIENT)
Dept: HEALTH INFORMATION MANAGEMENT | Facility: CLINIC | Age: 86
End: 2021-08-02

## 2021-08-03 PROBLEM — D69.6 THROMBOCYTOPENIA (H): Status: RESOLVED | Noted: 2019-03-12 | Resolved: 2020-06-16

## 2021-08-09 ENCOUNTER — OFFICE VISIT (OUTPATIENT)
Dept: CARDIOLOGY | Facility: CLINIC | Age: 86
End: 2021-08-09
Payer: COMMERCIAL

## 2021-08-09 VITALS
BODY MASS INDEX: 20.39 KG/M2 | SYSTOLIC BLOOD PRESSURE: 142 MMHG | HEART RATE: 60 BPM | RESPIRATION RATE: 16 BRPM | WEIGHT: 118.8 LBS | DIASTOLIC BLOOD PRESSURE: 82 MMHG

## 2021-08-09 DIAGNOSIS — Z95.3 S/P AORTIC VALVE REPLACEMENT WITH BIOPROSTHETIC VALVE: ICD-10-CM

## 2021-08-09 DIAGNOSIS — I10 ESSENTIAL HYPERTENSION: ICD-10-CM

## 2021-08-09 DIAGNOSIS — I25.810 CORONARY ARTERY DISEASE INVOLVING CORONARY BYPASS GRAFT OF NATIVE HEART WITHOUT ANGINA PECTORIS: ICD-10-CM

## 2021-08-09 DIAGNOSIS — I50.31 ACUTE DIASTOLIC HEART FAILURE (H): Primary | ICD-10-CM

## 2021-08-09 DIAGNOSIS — E78.2 MIXED HYPERLIPIDEMIA: ICD-10-CM

## 2021-08-09 DIAGNOSIS — I49.3 FREQUENT PVCS: ICD-10-CM

## 2021-08-09 PROCEDURE — 99215 OFFICE O/P EST HI 40 MIN: CPT | Performed by: INTERNAL MEDICINE

## 2021-08-09 RX ORDER — FUROSEMIDE 20 MG
20 TABLET ORAL DAILY
Qty: 30 TABLET | Refills: 3 | Status: SHIPPED | OUTPATIENT
Start: 2021-08-09 | End: 2021-08-20

## 2021-08-09 RX ORDER — POTASSIUM CHLORIDE 750 MG/1
10 TABLET, EXTENDED RELEASE ORAL 2 TIMES DAILY
Qty: 30 TABLET | Refills: 3 | Status: SHIPPED | OUTPATIENT
Start: 2021-08-09 | End: 2021-08-20

## 2021-08-09 NOTE — PATIENT INSTRUCTIONS
Rox Zavala,    It is my pleasure to see you today at the [facility].    My recommendations for this visit are:    1.  Start Furosemide 20 mg with 10 mEq of potassium every morning, monitor your weight every day and write down the numbers, also monitor your shortness of breath on activities. If your symptoms are much improved, remember your weight at that time.  You start to take furosemide as needed to keep your weight at similar numbers.    2.  Come back for the lab in 7 to 10 days.    3.  Continue other medications.    4.  Will see you again in 4 months      Shilpa Wilburn MD, PhD

## 2021-08-09 NOTE — PROGRESS NOTES
Assessment/Plan:   1.  Acute diastolic congestive heart failure: The patient complains of shortness of breath on exertion.  She has enough risk factors for developing diastolic congestive heart failure.  She has few crackles in bases in both lungs.  She has elevated BNP.  The diagnosis of acute diastolic congestive heart failure is established.  We discussed that the management.  Lasix 20 mg daily with potassium chloride 10 mEq is initiated.  She will monitor her weight, symptoms as instructed and directed.  Repeat BMP in 7 to 10 days.    2.  Coronary artery disease status post the LIMA to D1, s/p CHEN to mid LCx and RCA July 2016: Her chest pain is improved.  Continue isosorbide mononitrate 30 mg daily.  Continue Lipitor and metoprolol XL.  The patient is not on aspirin because history of for GI bleeding.    3.  Frequent PVCs: The patient has occasional palpitations.  Continue metoprolol succinate 25 mg daily.    4.  S/p bioprosthetic aortic valve replacement: Both echo and MRI reported normal function of bioprosthetic aortic valve, just mild regurgitation.    5.  Dyslipidemia: Continue Lipitor.  LDL was controlled.    6.  Essential hypertension: Her blood pressure is controlled with current medications.    Thank you for the opportunity to be involved in the care of Rox Zavala. If you have any questions, please feel free to contact me.  I will see the patient again in 4 months and as needed.    Much or all of the text in this note was generated through the use of Dragon Dictate voice-to-text software. Errors in spelling or words which seem out of context are unintentional.   Sound alike errors, in particular, may have escaped editing.       History of Present Illness:   It is my pleasure to see Rox Zavala at the [facility] for routine cardiology follow up.  Rox Zavala is a 86 year old female with a medical history of severe aortic valve stenosis status post  bioprosthetic aortic valve replacement on Dec15, 2014, one-vessel bypass surgery LIMA to D1, 3 vessel coronary artery disease s/p CHEN to mid LCx and mid RCA on 7-5-2016, essential hypertension, hyperlipidemia, hypothyroidism, frequent PVCs.    Ally had stress cardiac MRI me 6/20/2021 due to exertional chest pain.  Her stress cardiac MRI was reported a small size of inducible myocardial ischemia in distal lateral segment, normal left ventricular systolic function LVEF 64%, normal function of bioprosthetic aortic valve.  The patient was treated medically with adding isosorbide mononitrate.  The patient states that she did not notice chest pain over last 2 months.  She complains of her dyspnea on exertion.  She has exertion no palpitations, no dizziness, no orthopnea or leg edema.  She did not noticed significant weight gain.  Her blood pressure and heart rate controlled.    Her BNP is significantly elevated to 600.    Past Medical History:     Patient Active Problem List   Diagnosis     Hammer Toe     Hemorrhoids     Psoriasis     Generalized Osteoarthritis Of The Hand     Osteoarthritis Of The Knee     Vitamin D Deficiency     Palpitations     Hypothyroidism     Neck Pain     Upper Back Pain (Between Shoulder Blades)     Osteopenia     Coronary artery disease     S/P AVR     Constipation     Acute diastolic CHF (congestive heart failure) (H)     Essential hypertension     Hyperlipidemia     Coronary artery disease involving coronary bypass graft of native heart with other forms of angina pectoris (H)     Psoriatic arthritis (H)     High risk medication use     Sacral insufficiency fracture, initial encounter     Hypertension     Back pain     Pressure injury of buttock, stage 1, unspecified laterality     MCI (mild cognitive impairment)     Frequent PVCs     Senile osteoporosis     Hemorrhagic shock (H)     JOSIAH (acute kidney injury) (H)     Lactic acidemia     GI bleed     Acute blood loss anemia     Melena      Syncope, unspecified syncope type     Anemia due to blood loss, acute     Dieulafoy lesion of stomach     Gastric ulcer due to Helicobacter pylori, acute     Chronic kidney disease, stage 3     Mild vascular neurocognitive disorder (H)     Thrombocytopenia (H)       Past Surgical History:     Past Surgical History:   Procedure Laterality Date     ANGIOPLASTY / STENTING FEMORAL       AORTIC VALVE REPLACEMENT       C LIGATE FALLOPIAN TUBE      Description: Tubal Ligation;  Recorded: 03/20/2008;     C TOTAL HIP ARTHROPLASTY Right     Description: Total Hip Replacement;  Recorded: 03/20/2008; right     CARDIAC SURGERY      CABG     EYE SURGERY Bilateral     cataracts     HC REMOVAL GALLBLADDER      Description: Cholecystectomy;  Recorded: 03/20/2008;     MS ESOPHAGOGASTRODUODENOSCOPY TRANSORAL DIAGNOSTIC N/A 6/1/2021    Procedure: ESOPHAGOGASTRODUODENOSCOPY (EGD) with biopsies;  Surgeon: Julio Lopez MD;  Location: [facility];  Service: Gastroenterology     MS ESOPHAGOGASTRODUODENOSCOPY TRANSORAL DIAGNOSTIC N/A 6/6/2021    Procedure: ESOPHAGOGASTRODUODENOSCOPY (EGD) with bicap cauterization;  Surgeon: Julio Lopez MD;  Location: [facility];  Service: Gastroenterology     MS ESOPHAGOGASTRODUODENOSCOPY TRANSORAL DIAGNOSTIC N/A 6/7/2021    Procedure: ESOPHAGOGASTRODUODENOSCOPY (EGD) with hemoclip;  Surgeon: Sam Brock MD;  Location: [facility];  Service: Gastroenterology       Family History:     Family History   Problem Relation Age of Onset     Asthma Mother      Coronary Artery Disease No family hx of      Breast Cancer No family hx of         Social History:    reports that she quit smoking about 26 years ago. Her smoking use included cigarettes. She has a 30.00 pack-year smoking history. She has never used smokeless tobacco. She reports current alcohol use of about 1.0 standard drinks of alcohol per week. She reports that she does not use drugs.    Review of Systems:   12 systems are reviewed  negative except for in HPI.    Meds:     Current Outpatient Medications:      atorvastatin (LIPITOR) 40 MG tablet, Take 40 mg by mouth, Disp: , Rfl:      calcium carbonate-vitamin D (OYSTER SHELL CALCIUM/D) 500-200 MG-UNIT tablet, Take 1 tablet by mouth, Disp: , Rfl:      cholecalciferol 50 MCG (2000 UT) tablet, Take 2,000 Units by mouth, Disp: , Rfl:      donepezil (ARICEPT) 10 MG tablet, Take 10 mg by mouth, Disp: , Rfl:      ferrous sulfate (FEROSUL) 325 (65 Fe) MG tablet, Take 1 tablet by mouth, Disp: , Rfl:      fluticasone (FLONASE) 50 MCG/ACT nasal spray, 2 sprays, Disp: , Rfl:      folic acid (FOLVITE) 1 MG tablet, Take 1,000 mcg by mouth, Disp: , Rfl:      furosemide (LASIX) 20 MG tablet, Take 1 tablet (20 mg) by mouth daily, Disp: 30 tablet, Rfl: 3     isosorbide mononitrate (IMDUR) 30 MG 24 hr tablet, Take 30 mg by mouth, Disp: , Rfl:      levothyroxine (SYNTHROID/LEVOTHROID) 100 MCG tablet, Take 100 mcg by mouth, Disp: , Rfl:      lisinopril (ZESTRIL) 20 MG tablet, Take 20 mg by mouth, Disp: , Rfl:      methotrexate sodium 2.5 MG TABS, , Disp: , Rfl:      metoprolol succinate ER (TOPROL-XL) 25 MG 24 hr tablet, Take 25 mg by mouth, Disp: , Rfl:      Omega-3 Fatty Acids (OMEGA-3 FISH OIL) 300 MG CAPS, Take 1 capsule by mouth, Disp: , Rfl:      omeprazole (PRILOSEC) 40 MG DR capsule, Take 1 capsule (40 mg) by mouth 2 times daily, Disp: 60 capsule, Rfl: 0     polyethylene glycol (MIRALAX) 17 GM/Dose powder, Take 17 g by mouth, Disp: , Rfl:      potassium chloride ER (KLOR-CON M) 10 MEQ CR tablet, Take 1 tablet (10 mEq) by mouth 2 times daily, Disp: 30 tablet, Rfl: 3     vitamin C (ASCORBIC ACID) 500 MG tablet, Take 500 mg by mouth, Disp: , Rfl:     Allergies:   Patient has no known allergies.      Objective:      Physical Exam  53.9 kg (118 lb 12.8 oz)     Body mass index is 20.39 kg/m .  BP (!) 142/82 (BP Location: Right arm, Patient Position: Sitting, Cuff Size: Adult Regular)   Pulse 60   Resp 16   Wt  53.9 kg (118 lb 12.8 oz)   BMI 20.39 kg/m      General Appearance:   Awake, Alert, No acute distress.   HEENT:  Pupil equal and reactive to light. No scleral icterus; the mucous membranes were moist.   Neck: No cervical bruits. Elevated JVD. No thyromegaly.     Chest: The spine was straight. The chest was symmetric.   Lungs:   A few crackles in both bases, R>L. No wheezing.   Cardiovascular:   Regular rhythm and rate, normal first and second heart sounds with no murmurs. No rubs or gallops.    Abdomen:  Soft. No tenderness. Non-distended. Bowels sounds are present   Extremities: Equal tibial pulses. No leg edema.   Skin: No rashes or ulcers. Warm, Dry.   Musculoskeletal: No tenderness. No deformity.   Neurologic: Mood and affect are appropriate. No focal deficits.         EKG: Personally reviewed  Sinus rhythm with 1st degree A-V block with occasional Premature ventricular complexes and Possible Premature atrial complexes with Aberrant   conduction   Right bundle branch block with repolarization abnormality   Left anterior fascicular block   Bifascicular block   Abnormal ECG   When compared with ECG of 27-JUL-2021 09:47,   No significant    Cardiac Imaging Studies  EKG: Personally reviewed  Normal sinus rhythm with frequent PVCs     Cardiac Imaging Studies  Coronary angiography with PCI on 7-5-2016:  - Moderate proximal and distal left main disease  - LAD has only mild disease. There is severe disease in the  first diagonal and there is a patent LIMA feeding it distally.  - The proximal circumflex had severe calcific disease and was  stented with a Promus CHEN with good results.  - There is diffuse mild proximal RCA disease and severe  disease in the mid segment which was stented with a Promus CHEN  with good results. There is mild distal RCA system disease.     ECHO on 10-5-2020:    Normal left ventricular size. Borderline left ventricular hypertrophy.    Left ventricle ejection fraction is normal. The calculated  left ventricular ejection fraction is 58%.    Normal right ventricular size and systolic function.    Mild biatrial enlargement    Bioprosthetic valve in the aortic position. Type and size of valve not provided. Valve appears grossly well seated. Mean gradient of 12 mmHg with peak velocity of 2.3 m/s. No observed prosthetic valve insufficiency.    Mild mitral regurgitation. Mild tricuspid regurgitation.  When compared to the previous study dated 2/1/2018, the mean gradient on the current study is 12 mmHg across the bioprosthetic valve compared to 8 mmHg on the prior study. Left ventricular systolic function appears similar..      ECHO on 2-1-2018:  1. Normal left ventricular size and systolic performance with a visually estimated ejection fraction of 55%.   2. The aortic valve is not well visualized, but suspected to be comprised of three cusps.  There is mild to moderate calcification of the aortic valve.  Spectral Doppler does not reveal any significant stenosis, however.  3. Normal right ventricular size and systolic performance.   4. There is mild left atrial enlargement.   5. There is an echo dense region in the anterior aspect of the left atrium which appears somewhat calcified.  This, however, does not appear to be new and is present on the prior examination dated 23 December 2014.     When compared to the prior real-time echocardiogram dated 23 December 2014, the small pericardial effusion noted on the prior examination is no longer appreciated.  Otherwise, there has been no major change     Stress cardiac MRI on 5-6-2021:  1.  Pharmacological Regadenoson stress cardiac MRI is abnormal. There is a small stress-induced perfusion  defect in the apical lateral wall. This may suggest ischemia in the territory of the left circumflex artery.  2.  Pharmacological stress ECG is negative for inducible myocardial ischemia.   3.  No evidence of prior myocardial infarction or other scar or fibrosis.  4.  Normal left  ventricular size, wall thickness, and systolic function. The quantified left ventricular ejection fraction is 64%.    5.  Normal right ventricular size and systolic function.  The quantified right ventricular ejection fraction is 53%.    6.  A well-seated 21 mm Lalitha-Blackwell Magna Ease bovine pericardial valve is present in the aortic position. No prosthetic stenosis with a peak forward velocity 2.8 m/s. Mild prosthetic aortic regurgitation with a regurgitant fraction of 12 %.      Nuclear stress test on 10-5-2020:     The nuclear stress test is mildly abnormal.     Nuclear images demonstrate a small area of fixed defect involving the distal anterior wall which may reflect breast attenuation.  No ischemia identified.     The left ventricular ejection fraction at stress is greater than 70% without wall motion abnormality..     A prior study was conducted on 6/19/2015.  Distal anterior defect appears more fixed on the current study.     Holter on 4-3-2019:    Abnormal Holter monitor tracing by virtue of the increased burden of PVCs.  Given the somewhat complex nature of the PVCs patient may warrant further electrophysiology workup particularly if the LVEF is <40%.    The patient's symptomatic recordings did not differ significantly from the remainder of his tracing which showed roughly the same burden of PVCs.    No sustained atrial or ventricular tachyarrhythmia.    The profound bradycardia or pauses.    Lab Review   Lab Results   Component Value Date     06/24/2021    CO2 25 06/24/2021    BUN 8 06/24/2021     Lab Results   Component Value Date    WBC 6.7 07/12/2021    HGB 9.9 07/12/2021    HCT 32.7 07/12/2021    MCV 99 07/12/2021     07/12/2021     Lab Results   Component Value Date    CHOL 139 08/21/2018    CHOL 135 08/04/2017    CHOL 141 12/06/2016     Lab Results   Component Value Date    HDL 79 08/21/2018    HDL 65 08/04/2017    HDL 75 12/06/2016     No components found for: LDLCALC  Lab  Results   Component Value Date    TRIG 70 08/21/2018    TRIG 62 08/04/2017    TRIG 61 12/06/2016     No components found for: CHOLHDL  Lab Results   Component Value Date    TROPONINI 0.09 06/06/2021     Lab Results   Component Value Date     06/23/2021     Lab Results   Component Value Date    TSH 1.78 07/12/2021

## 2021-08-09 NOTE — LETTER
8/9/2021    Misbah Barone MD  [address]  [facility] [address]    RE: Rox Zavala       Dear Colleague,    I had the pleasure of seeing Rox Zavala in the [facility].              Assessment/Plan:   1.  Acute diastolic congestive heart failure: The patient complains of shortness of breath on exertion.  She has enough risk factors for developing diastolic congestive heart failure.  She has few crackles in bases in both lungs.  She has elevated BNP.  The diagnosis of acute diastolic congestive heart failure is established.  We discussed that the management.  Lasix 20 mg daily with potassium chloride 10 mEq is initiated.  She will monitor her weight, symptoms as instructed and directed.  Repeat BMP in 7 to 10 days.    2.  Coronary artery disease status post the LIMA to D1, s/p CHEN to mid LCx and RCA July 2016: Her chest pain is improved.  Continue isosorbide mononitrate 30 mg daily.  Continue Lipitor and metoprolol XL.  The patient is not on aspirin because history of for GI bleeding.    3.  Frequent PVCs: The patient has occasional palpitations.  Continue metoprolol succinate 25 mg daily.    4.  S/p bioprosthetic aortic valve replacement: Both echo and MRI reported normal function of bioprosthetic aortic valve, just mild regurgitation.    5.  Dyslipidemia: Continue Lipitor.  LDL was controlled.    6.  Essential hypertension: Her blood pressure is controlled with current medications.    Thank you for the opportunity to be involved in the care of Rox Zavala. If you have any questions, please feel free to contact me.  I will see the patient again in 4 months and as needed.    Much or all of the text in this note was generated through the use of Dragon Dictate voice-to-text software. Errors in spelling or words which seem out of context are unintentional.   Sound alike errors, in particular, may have escaped editing.       History of  Present Illness:   It is my pleasure to see Rox Zavala at the [facility] clinic for routine cardiology follow up.  Rox Zavala is a 86 year old female with a medical history of severe aortic valve stenosis status post bioprosthetic aortic valve replacement on Dec15, 2014, one-vessel bypass surgery LIMA to D1, 3 vessel coronary artery disease s/p CHEN to mid LCx and mid RCA on 7-5-2016, essential hypertension, hyperlipidemia, hypothyroidism, frequent PVCs.    Ally had stress cardiac MRI me 6/20/2021 due to exertional chest pain.  Her stress cardiac MRI was reported a small size of inducible myocardial ischemia in distal lateral segment, normal left ventricular systolic function LVEF 64%, normal function of bioprosthetic aortic valve.  The patient was treated medically with adding isosorbide mononitrate.  The patient states that she did not notice chest pain over last 2 months.  She complains of her dyspnea on exertion.  She has exertion no palpitations, no dizziness, no orthopnea or leg edema.  She did not noticed significant weight gain.  Her blood pressure and heart rate controlled.    Her BNP is significantly elevated to 600.    Past Medical History:     Patient Active Problem List   Diagnosis     Hammer Toe     Hemorrhoids     Psoriasis     Generalized Osteoarthritis Of The Hand     Osteoarthritis Of The Knee     Vitamin D Deficiency     Palpitations     Hypothyroidism     Neck Pain     Upper Back Pain (Between Shoulder Blades)     Osteopenia     Coronary artery disease     S/P AVR     Constipation     Acute diastolic CHF (congestive heart failure) (H)     Essential hypertension     Hyperlipidemia     Coronary artery disease involving coronary bypass graft of native heart with other forms of angina pectoris (H)     Psoriatic arthritis (H)     High risk medication use     Sacral insufficiency fracture, initial encounter     Hypertension     Back pain     Pressure injury of buttock,  stage 1, unspecified laterality     MCI (mild cognitive impairment)     Frequent PVCs     Senile osteoporosis     Hemorrhagic shock (H)     JOSIAH (acute kidney injury) (H)     Lactic acidemia     GI bleed     Acute blood loss anemia     Melena     Syncope, unspecified syncope type     Anemia due to blood loss, acute     Dieulafoy lesion of stomach     Gastric ulcer due to Helicobacter pylori, acute     Chronic kidney disease, stage 3     Mild vascular neurocognitive disorder (H)     Thrombocytopenia (H)       Past Surgical History:     Past Surgical History:   Procedure Laterality Date     ANGIOPLASTY / STENTING FEMORAL       AORTIC VALVE REPLACEMENT       C LIGATE FALLOPIAN TUBE      Description: Tubal Ligation;  Recorded: 03/20/2008;     C TOTAL HIP ARTHROPLASTY Right     Description: Total Hip Replacement;  Recorded: 03/20/2008; right     CARDIAC SURGERY      CABG     EYE SURGERY Bilateral     cataracts     HC REMOVAL GALLBLADDER      Description: Cholecystectomy;  Recorded: 03/20/2008;     NV ESOPHAGOGASTRODUODENOSCOPY TRANSORAL DIAGNOSTIC N/A 6/1/2021    Procedure: ESOPHAGOGASTRODUODENOSCOPY (EGD) with biopsies;  Surgeon: Julio Lopez MD;  Location: [facility];  Service: Gastroenterology     NV ESOPHAGOGASTRODUODENOSCOPY TRANSORAL DIAGNOSTIC N/A 6/6/2021    Procedure: ESOPHAGOGASTRODUODENOSCOPY (EGD) with bicap cauterization;  Surgeon: Julio Lopez MD;  Location: [facility];  Service: Gastroenterology     NV ESOPHAGOGASTRODUODENOSCOPY TRANSORAL DIAGNOSTIC N/A 6/7/2021    Procedure: ESOPHAGOGASTRODUODENOSCOPY (EGD) with hemoclip;  Surgeon: Sam Brock MD;  Location: [facility];  Service: Gastroenterology       Family History:     Family History   Problem Relation Age of Onset     Asthma Mother      Coronary Artery Disease No family hx of      Breast Cancer No family hx of         Social History:    reports that she quit smoking about 26 years ago. Her smoking use included cigarettes. She has a  30.00 pack-year smoking history. She has never used smokeless tobacco. She reports current alcohol use of about 1.0 standard drinks of alcohol per week. She reports that she does not use drugs.    Review of Systems:   12 systems are reviewed negative except for in HPI.    Meds:     Current Outpatient Medications:      atorvastatin (LIPITOR) 40 MG tablet, Take 40 mg by mouth, Disp: , Rfl:      calcium carbonate-vitamin D (OYSTER SHELL CALCIUM/D) 500-200 MG-UNIT tablet, Take 1 tablet by mouth, Disp: , Rfl:      cholecalciferol 50 MCG (2000 UT) tablet, Take 2,000 Units by mouth, Disp: , Rfl:      donepezil (ARICEPT) 10 MG tablet, Take 10 mg by mouth, Disp: , Rfl:      ferrous sulfate (FEROSUL) 325 (65 Fe) MG tablet, Take 1 tablet by mouth, Disp: , Rfl:      fluticasone (FLONASE) 50 MCG/ACT nasal spray, 2 sprays, Disp: , Rfl:      folic acid (FOLVITE) 1 MG tablet, Take 1,000 mcg by mouth, Disp: , Rfl:      furosemide (LASIX) 20 MG tablet, Take 1 tablet (20 mg) by mouth daily, Disp: 30 tablet, Rfl: 3     isosorbide mononitrate (IMDUR) 30 MG 24 hr tablet, Take 30 mg by mouth, Disp: , Rfl:      levothyroxine (SYNTHROID/LEVOTHROID) 100 MCG tablet, Take 100 mcg by mouth, Disp: , Rfl:      lisinopril (ZESTRIL) 20 MG tablet, Take 20 mg by mouth, Disp: , Rfl:      methotrexate sodium 2.5 MG TABS, , Disp: , Rfl:      metoprolol succinate ER (TOPROL-XL) 25 MG 24 hr tablet, Take 25 mg by mouth, Disp: , Rfl:      Omega-3 Fatty Acids (OMEGA-3 FISH OIL) 300 MG CAPS, Take 1 capsule by mouth, Disp: , Rfl:      omeprazole (PRILOSEC) 40 MG DR capsule, Take 1 capsule (40 mg) by mouth 2 times daily, Disp: 60 capsule, Rfl: 0     polyethylene glycol (MIRALAX) 17 GM/Dose powder, Take 17 g by mouth, Disp: , Rfl:      potassium chloride ER (KLOR-CON M) 10 MEQ CR tablet, Take 1 tablet (10 mEq) by mouth 2 times daily, Disp: 30 tablet, Rfl: 3     vitamin C (ASCORBIC ACID) 500 MG tablet, Take 500 mg by mouth, Disp: , Rfl:     Allergies:   Patient  has no known allergies.      Objective:      Physical Exam  53.9 kg (118 lb 12.8 oz)     Body mass index is 20.39 kg/m .  BP (!) 142/82 (BP Location: Right arm, Patient Position: Sitting, Cuff Size: Adult Regular)   Pulse 60   Resp 16   Wt 53.9 kg (118 lb 12.8 oz)   BMI 20.39 kg/m      General Appearance:   Awake, Alert, No acute distress.   HEENT:  Pupil equal and reactive to light. No scleral icterus; the mucous membranes were moist.   Neck: No cervical bruits. Elevated JVD. No thyromegaly.     Chest: The spine was straight. The chest was symmetric.   Lungs:   A few crackles in both bases, R>L. No wheezing.   Cardiovascular:   Regular rhythm and rate, normal first and second heart sounds with no murmurs. No rubs or gallops.    Abdomen:  Soft. No tenderness. Non-distended. Bowels sounds are present   Extremities: Equal tibial pulses. No leg edema.   Skin: No rashes or ulcers. Warm, Dry.   Musculoskeletal: No tenderness. No deformity.   Neurologic: Mood and affect are appropriate. No focal deficits.         EKG: Personally reviewed  Sinus rhythm with 1st degree A-V block with occasional Premature ventricular complexes and Possible Premature atrial complexes with Aberrant   conduction   Right bundle branch block with repolarization abnormality   Left anterior fascicular block   Bifascicular block   Abnormal ECG   When compared with ECG of 27-JUL-2021 09:47,   No significant    Cardiac Imaging Studies  EKG: Personally reviewed  Normal sinus rhythm with frequent PVCs     Cardiac Imaging Studies  Coronary angiography with PCI on 7-5-2016:  - Moderate proximal and distal left main disease  - LAD has only mild disease. There is severe disease in the  first diagonal and there is a patent LIMA feeding it distally.  - The proximal circumflex had severe calcific disease and was  stented with a Promus CHEN with good results.  - There is diffuse mild proximal RCA disease and severe  disease in the mid segment which was  stented with a Promus CHEN  with good results. There is mild distal RCA system disease.     ECHO on 10-5-2020:    Normal left ventricular size. Borderline left ventricular hypertrophy.    Left ventricle ejection fraction is normal. The calculated left ventricular ejection fraction is 58%.    Normal right ventricular size and systolic function.    Mild biatrial enlargement    Bioprosthetic valve in the aortic position. Type and size of valve not provided. Valve appears grossly well seated. Mean gradient of 12 mmHg with peak velocity of 2.3 m/s. No observed prosthetic valve insufficiency.    Mild mitral regurgitation. Mild tricuspid regurgitation.  When compared to the previous study dated 2/1/2018, the mean gradient on the current study is 12 mmHg across the bioprosthetic valve compared to 8 mmHg on the prior study. Left ventricular systolic function appears similar..      ECHO on 2-1-2018:  1. Normal left ventricular size and systolic performance with a visually estimated ejection fraction of 55%.   2. The aortic valve is not well visualized, but suspected to be comprised of three cusps.  There is mild to moderate calcification of the aortic valve.  Spectral Doppler does not reveal any significant stenosis, however.  3. Normal right ventricular size and systolic performance.   4. There is mild left atrial enlargement.   5. There is an echo dense region in the anterior aspect of the left atrium which appears somewhat calcified.  This, however, does not appear to be new and is present on the prior examination dated 23 December 2014.     When compared to the prior real-time echocardiogram dated 23 December 2014, the small pericardial effusion noted on the prior examination is no longer appreciated.  Otherwise, there has been no major change     Stress cardiac MRI on 5-6-2021:  1.  Pharmacological Regadenoson stress cardiac MRI is abnormal. There is a small stress-induced perfusion  defect in the apical lateral wall.  This may suggest ischemia in the territory of the left circumflex artery.  2.  Pharmacological stress ECG is negative for inducible myocardial ischemia.   3.  No evidence of prior myocardial infarction or other scar or fibrosis.  4.  Normal left ventricular size, wall thickness, and systolic function. The quantified left ventricular ejection fraction is 64%.    5.  Normal right ventricular size and systolic function.  The quantified right ventricular ejection fraction is 53%.    6.  A well-seated 21 mm Lalitha-Blackwell Magna Ease bovine pericardial valve is present in the aortic position. No prosthetic stenosis with a peak forward velocity 2.8 m/s. Mild prosthetic aortic regurgitation with a regurgitant fraction of 12 %.      Nuclear stress test on 10-5-2020:     The nuclear stress test is mildly abnormal.     Nuclear images demonstrate a small area of fixed defect involving the distal anterior wall which may reflect breast attenuation.  No ischemia identified.     The left ventricular ejection fraction at stress is greater than 70% without wall motion abnormality..     A prior study was conducted on 6/19/2015.  Distal anterior defect appears more fixed on the current study.     Holter on 4-3-2019:    Abnormal Holter monitor tracing by virtue of the increased burden of PVCs.  Given the somewhat complex nature of the PVCs patient may warrant further electrophysiology workup particularly if the LVEF is <40%.    The patient's symptomatic recordings did not differ significantly from the remainder of his tracing which showed roughly the same burden of PVCs.    No sustained atrial or ventricular tachyarrhythmia.    The profound bradycardia or pauses.    Lab Review   Lab Results   Component Value Date     06/24/2021    CO2 25 06/24/2021    BUN 8 06/24/2021     Lab Results   Component Value Date    WBC 6.7 07/12/2021    HGB 9.9 07/12/2021    HCT 32.7 07/12/2021    MCV 99 07/12/2021     07/12/2021     Lab  Results   Component Value Date    CHOL 139 08/21/2018    CHOL 135 08/04/2017    CHOL 141 12/06/2016     Lab Results   Component Value Date    HDL 79 08/21/2018    HDL 65 08/04/2017    HDL 75 12/06/2016     No components found for: LDLCALC  Lab Results   Component Value Date    TRIG 70 08/21/2018    TRIG 62 08/04/2017    TRIG 61 12/06/2016     No components found for: CHOLHDL  Lab Results   Component Value Date    TROPONINI 0.09 06/06/2021     Lab Results   Component Value Date     06/23/2021     Lab Results   Component Value Date    TSH 1.78 07/12/2021                 Thank you for allowing me to participate in the care of your patient.      Sincerely,     Shilpa Wilburn MD     [facility]  cc:   No referring provider defined for this encounter.

## 2021-08-17 ENCOUNTER — LAB (OUTPATIENT)
Dept: LAB | Facility: CLINIC | Age: 86
End: 2021-08-17
Payer: COMMERCIAL

## 2021-08-17 DIAGNOSIS — I50.31 ACUTE DIASTOLIC HEART FAILURE (H): ICD-10-CM

## 2021-08-17 LAB
ANION GAP SERPL CALCULATED.3IONS-SCNC: 10 MMOL/L (ref 5–18)
BUN SERPL-MCNC: 34 MG/DL (ref 8–28)
CALCIUM SERPL-MCNC: 9.9 MG/DL (ref 8.5–10.5)
CHLORIDE BLD-SCNC: 106 MMOL/L (ref 98–107)
CO2 SERPL-SCNC: 26 MMOL/L (ref 22–31)
CREAT SERPL-MCNC: 1.49 MG/DL (ref 0.6–1.1)
GFR SERPL CREATININE-BSD FRML MDRD: 32 ML/MIN/1.73M2
GLUCOSE BLD-MCNC: 101 MG/DL (ref 70–125)
POTASSIUM BLD-SCNC: 6 MMOL/L (ref 3.5–5)
SODIUM SERPL-SCNC: 142 MMOL/L (ref 136–145)

## 2021-08-17 PROCEDURE — 80048 BASIC METABOLIC PNL TOTAL CA: CPT

## 2021-08-17 PROCEDURE — 36415 COLL VENOUS BLD VENIPUNCTURE: CPT

## 2021-08-18 DIAGNOSIS — E87.5 HYPERKALEMIA: Primary | ICD-10-CM

## 2021-08-21 NOTE — PROGRESS NOTES
Furosemide and potassium discontinued.  -rah    ===View-only below this line===  ----- Message -----  From: Shilpa Wilburn MD  Sent: 8/18/2021   7:58 AM CDT  To: Hailey Chaudhari RN    I have called the patient's son Billy and discussed abnormal laboratory reports including potassium 6.0 and creatinine 1.49.  After she started diuretics Lasix 20 mg daily and potassium chloride 10 mEq daily, she lost 5 pounds.  Her shortness of breath is improved.  The patient has some lightheadedness, not quite sure that is new to her or not.  The patient is going to discontinue Lasix and potassium.  If she is not feeling well, I asked her son Trace to bring her to emergency room or to my office have her ECG to make sure no potassium level related cardiac arrhythmia or conduction abnormality.  ECG is ordered.  Thanks  Martin

## 2021-08-24 DIAGNOSIS — I99.9 MILD VASCULAR NEUROCOGNITIVE DISORDER: Primary | ICD-10-CM

## 2021-08-24 DIAGNOSIS — F06.70 MILD VASCULAR NEUROCOGNITIVE DISORDER: Primary | ICD-10-CM

## 2021-08-25 RX ORDER — DONEPEZIL HYDROCHLORIDE 10 MG/1
10 TABLET, FILM COATED ORAL AT BEDTIME
Qty: 90 TABLET | Refills: 3 | Status: SHIPPED | OUTPATIENT
Start: 2021-08-25 | End: 2021-08-31

## 2021-08-31 ENCOUNTER — OFFICE VISIT (OUTPATIENT)
Dept: FAMILY MEDICINE | Facility: CLINIC | Age: 86
End: 2021-08-31
Payer: COMMERCIAL

## 2021-08-31 VITALS
BODY MASS INDEX: 20.14 KG/M2 | SYSTOLIC BLOOD PRESSURE: 90 MMHG | WEIGHT: 118 LBS | HEART RATE: 60 BPM | HEIGHT: 64 IN | OXYGEN SATURATION: 96 % | DIASTOLIC BLOOD PRESSURE: 70 MMHG

## 2021-08-31 DIAGNOSIS — E55.9 VITAMIN D DEFICIENCY: ICD-10-CM

## 2021-08-31 DIAGNOSIS — Z79.899 OTHER LONG TERM (CURRENT) DRUG THERAPY: ICD-10-CM

## 2021-08-31 DIAGNOSIS — I99.9 MILD VASCULAR NEUROCOGNITIVE DISORDER: ICD-10-CM

## 2021-08-31 DIAGNOSIS — N18.32 STAGE 3B CHRONIC KIDNEY DISEASE (H): Primary | ICD-10-CM

## 2021-08-31 DIAGNOSIS — F06.70 MILD VASCULAR NEUROCOGNITIVE DISORDER: ICD-10-CM

## 2021-08-31 DIAGNOSIS — K92.2 GASTROINTESTINAL HEMORRHAGE, UNSPECIFIED GASTROINTESTINAL HEMORRHAGE TYPE: ICD-10-CM

## 2021-08-31 LAB
ANION GAP SERPL CALCULATED.3IONS-SCNC: 11 MMOL/L (ref 5–18)
BASOPHILS # BLD AUTO: 0 10E3/UL (ref 0–0.2)
BASOPHILS NFR BLD AUTO: 0 %
BUN SERPL-MCNC: 16 MG/DL (ref 8–28)
CALCIUM SERPL-MCNC: 9.2 MG/DL (ref 8.5–10.5)
CHLORIDE BLD-SCNC: 106 MMOL/L (ref 98–107)
CO2 SERPL-SCNC: 23 MMOL/L (ref 22–31)
CREAT SERPL-MCNC: 0.99 MG/DL (ref 0.6–1.1)
EOSINOPHIL # BLD AUTO: 0.1 10E3/UL (ref 0–0.7)
EOSINOPHIL NFR BLD AUTO: 2 %
ERYTHROCYTE [DISTWIDTH] IN BLOOD BY AUTOMATED COUNT: 16.6 % (ref 10–15)
GFR SERPL CREATININE-BSD FRML MDRD: 52 ML/MIN/1.73M2
GLUCOSE BLD-MCNC: 77 MG/DL (ref 70–125)
HCT VFR BLD AUTO: 35.7 % (ref 35–47)
HGB BLD-MCNC: 11.2 G/DL (ref 11.7–15.7)
IMM GRANULOCYTES # BLD: 0 10E3/UL
IMM GRANULOCYTES NFR BLD: 0 %
LYMPHOCYTES # BLD AUTO: 1.5 10E3/UL (ref 0.8–5.3)
LYMPHOCYTES NFR BLD AUTO: 24 %
MCH RBC QN AUTO: 30.1 PG (ref 26.5–33)
MCHC RBC AUTO-ENTMCNC: 31.4 G/DL (ref 31.5–36.5)
MCV RBC AUTO: 96 FL (ref 78–100)
MONOCYTES # BLD AUTO: 0.6 10E3/UL (ref 0–1.3)
MONOCYTES NFR BLD AUTO: 10 %
NEUTROPHILS # BLD AUTO: 3.9 10E3/UL (ref 1.6–8.3)
NEUTROPHILS NFR BLD AUTO: 64 %
PLATELET # BLD AUTO: 119 10E3/UL (ref 150–450)
POTASSIUM BLD-SCNC: 4.7 MMOL/L (ref 3.5–5)
RBC # BLD AUTO: 3.72 10E6/UL (ref 3.8–5.2)
SODIUM SERPL-SCNC: 140 MMOL/L (ref 136–145)
WBC # BLD AUTO: 6.1 10E3/UL (ref 4–11)

## 2021-08-31 PROCEDURE — 82306 VITAMIN D 25 HYDROXY: CPT | Performed by: FAMILY MEDICINE

## 2021-08-31 PROCEDURE — 85025 COMPLETE CBC W/AUTO DIFF WBC: CPT | Performed by: FAMILY MEDICINE

## 2021-08-31 PROCEDURE — 99214 OFFICE O/P EST MOD 30 MIN: CPT | Performed by: FAMILY MEDICINE

## 2021-08-31 PROCEDURE — 36415 COLL VENOUS BLD VENIPUNCTURE: CPT | Performed by: FAMILY MEDICINE

## 2021-08-31 PROCEDURE — 80048 BASIC METABOLIC PNL TOTAL CA: CPT | Performed by: FAMILY MEDICINE

## 2021-08-31 RX ORDER — POTASSIUM CHLORIDE 750 MG/1
TABLET, EXTENDED RELEASE ORAL
COMMUNITY
Start: 2021-08-20 | End: 2021-08-31

## 2021-08-31 RX ORDER — DONEPEZIL HYDROCHLORIDE 10 MG/1
10 TABLET, FILM COATED ORAL AT BEDTIME
Qty: 90 TABLET | Refills: 3 | Status: ON HOLD | OUTPATIENT
Start: 2021-08-31 | End: 2021-10-14

## 2021-08-31 ASSESSMENT — MIFFLIN-ST. JEOR: SCORE: 970.24

## 2021-08-31 NOTE — PATIENT INSTRUCTIONS
We will try check BP today     If home BP reading is very low that is <90 systolic or ,50 diatolic AND you feel dizzy then we can consider lowering BP meds ( Lisinopril) otherwise Continue    Lab today    Kidney function, Blood count and Vit D level    I will send a message      Usual Vit D 1000 Unit daily  Calcium 1200 mg daily ( from food and supplement)    Follow for EGD with Dr Lopez Gastroenterologist per previous reccomendation    Follow with Dr Santoyo in November

## 2021-08-31 NOTE — PROGRESS NOTES
Assessment & Plan     ICD-10-CM    1. Stage 3b chronic kidney disease  N18.32 cholecalciferol 50 MCG (2000 UT) tablet   2. Mild vascular neurocognitive disorder (H)  F01.50 donepezil (ARICEPT) 10 MG tablet     Basic metabolic panel  (Ca, Cl, CO2, Creat, Gluc, K, Na, BUN)     Vitamin D Deficiency     Basic metabolic panel  (Ca, Cl, CO2, Creat, Gluc, K, Na, BUN)     Vitamin D Deficiency   3. Vitamin D deficiency  E55.9    4. Other long term (current) drug therapy   Z79.899 Vitamin D Deficiency     Vitamin D Deficiency   5. Gastrointestinal hemorrhage, unspecified gastrointestinal hemorrhage type  K92.2 CBC with platelets and differential     CBC with platelets and differential     Medical history making: Patient is an 86-year-old complain who was told by the cardiologist to follow-up with PCP earlier than scheduled.  Lab and testing as above.    Patient Instructions     We will try check BP today     If home BP reading is very low that is <90 systolic or ,50 diatolic AND you feel dizzy then we can consider lowering BP meds ( Lisinopril) otherwise Continue    Lab today    Kidney function, Blood count and Vit D level    I will send a message      Usual Vit D 1000 Unit daily  Calcium 1200 mg daily ( from food and supplement)    Follow for EGD with Dr Lopez Gastroenterologist per previous reccomendation    Follow with Dr Santoyo in November      Return in about 6 months (around 2/28/2022) for Routine preventive.    Misbah Barone MD  [facility]    Subjective    Chief Complaint   Patient presents with     RECHECK     Follow up from all recent blood work. Talk about vitamin D.        Rox is a 86 year old who presents for the following health issues  accompanied by her son, Billy:    HPI: Patient was admitted in June with GI bleed and was in long-term care facility and discharged home.  I'll see him for follow-up in July.  During hospitalization she had acute CHF and follow-up with  "cardiology, she was initiated on Lasix and potassium.  Her potassium was high with increasing creatinine and this was discontinued.  Patient has lost about 5 pounds of fluid that was considered to be from overload.  She feels well and denies any shortness of breath or congestion.  She denies any chest pain or swelling in lower extremities.  Her son is questioning if she should be taking 1000 or 2000 unit of vitamin D daily  She doesn't have an EGD scheduled yet with GI.  She needs refill of her donepezil for her neurocognitive disorder.      Review of Systems   Constitutional, HEENT, cardiovascular, pulmonary, gi and gu systems are negative, except as otherwise noted.      Objective    BP 90/70   Pulse 60   Ht 1.626 m (5' 4\")   Wt 53.5 kg (118 lb)   SpO2 96%   BMI 20.25 kg/m    Body mass index is 20.25 kg/m .  Physical Exam   GENERAL: alert and no distress  NECK: no adenopathy, no asymmetry, masses, or scars and thyroid normal to palpation  RESP: lungs clear to auscultation - no rales, rhonchi or wheezes  CV: regular rates and rhythm and no peripheral edema  ABDOMEN: soft, nontender, no hepatosplenomegaly, no masses and bowel sounds normal  MS: no gross musculoskeletal defects noted, no edema            "

## 2021-09-01 LAB — DEPRECATED CALCIDIOL+CALCIFEROL SERPL-MC: 61 UG/L (ref 30–80)

## 2021-09-08 DIAGNOSIS — I50.31 ACUTE DIASTOLIC CHF (CONGESTIVE HEART FAILURE) (H): ICD-10-CM

## 2021-09-09 RX ORDER — OMEPRAZOLE 40 MG/1
40 CAPSULE, DELAYED RELEASE ORAL 2 TIMES DAILY
Qty: 60 CAPSULE | Refills: 0 | Status: SHIPPED | OUTPATIENT
Start: 2021-09-09 | End: 2021-10-06

## 2021-09-09 NOTE — TELEPHONE ENCOUNTER
"Routing refill request to provider for review/approval because:  Has osteoporosis    Last Written Prescription Date:  7/23/21  Last Fill Quantity: 60,  # refills: 0   Last office visit provider:  8/31/21     Requested Prescriptions   Pending Prescriptions Disp Refills     omeprazole (PRILOSEC) 40 MG DR capsule 60 capsule 0     Sig: Take 1 capsule (40 mg) by mouth 2 times daily       PPI Protocol Failed - 9/8/2021  7:55 AM        Failed - No diagnosis of osteoporosis on record        Passed - Not on Clopidogrel (unless Pantoprazole ordered)        Passed - Recent (12 mo) or future (30 days) visit within the authorizing provider's specialty     Patient has had an office visit with the authorizing provider or a provider within the authorizing providers department within the previous 12 mos or has a future within next 30 days. See \"Patient Info\" tab in inbasket, or \"Choose Columns\" in Meds & Orders section of the refill encounter.              Passed - Medication is active on med list        Passed - Patient is age 18 or older        Passed - No active pregnacy on record        Passed - No positive pregnancy test in past 12 months             Rachel Killian RN 09/09/21 4:58 AM  "

## 2021-09-29 ENCOUNTER — TELEPHONE (OUTPATIENT)
Dept: RHEUMATOLOGY | Facility: CLINIC | Age: 86
End: 2021-09-29

## 2021-09-29 DIAGNOSIS — L40.50 PSORIATIC ARTHRITIS (H): Primary | ICD-10-CM

## 2021-09-29 NOTE — TELEPHONE ENCOUNTER
Refill request received from [facility] for methotrexate. Pt need liver enzyme labs for a refill. Please call the pt to schedule a lab appt.

## 2021-10-05 DIAGNOSIS — I50.31 ACUTE DIASTOLIC CHF (CONGESTIVE HEART FAILURE) (H): ICD-10-CM

## 2021-10-05 NOTE — TELEPHONE ENCOUNTER
"Routing refill request to provider for review/approval because:  Osteoporosis Dx on file.    Last Written Prescription Date:  9/9/21  Last Fill Quantity: 60,  # refills: 0   Last office visit provider:  8/31/21    Requested Prescriptions   Pending Prescriptions Disp Refills     omeprazole (PRILOSEC) 40 MG DR capsule 60 capsule 0     Sig: Take 1 capsule (40 mg) by mouth 2 times daily       PPI Protocol Failed - 10/5/2021  2:36 PM        Failed - No diagnosis of osteoporosis on record        Passed - Not on Clopidogrel (unless Pantoprazole ordered)        Passed - Recent (12 mo) or future (30 days) visit within the authorizing provider's specialty     Patient has had an office visit with the authorizing provider or a provider within the authorizing providers department within the previous 12 mos or has a future within next 30 days. See \"Patient Info\" tab in inbasket, or \"Choose Columns\" in Meds & Orders section of the refill encounter.              Passed - Medication is active on med list        Passed - Patient is age 18 or older        Passed - No active pregnacy on record        Passed - No positive pregnancy test in past 12 months             Rachel Killian RN 10/05/21 6:14 PM  "

## 2021-10-06 RX ORDER — OMEPRAZOLE 40 MG/1
40 CAPSULE, DELAYED RELEASE ORAL 2 TIMES DAILY
Qty: 60 CAPSULE | Refills: 0 | Status: SHIPPED | OUTPATIENT
Start: 2021-10-06 | End: 2021-10-10

## 2021-10-10 ENCOUNTER — APPOINTMENT (OUTPATIENT)
Dept: RADIOLOGY | Facility: HOSPITAL | Age: 86
DRG: 535 | End: 2021-10-10
Attending: EMERGENCY MEDICINE
Payer: COMMERCIAL

## 2021-10-10 ENCOUNTER — APPOINTMENT (OUTPATIENT)
Dept: CT IMAGING | Facility: HOSPITAL | Age: 86
DRG: 535 | End: 2021-10-10
Attending: EMERGENCY MEDICINE
Payer: COMMERCIAL

## 2021-10-10 ENCOUNTER — HOSPITAL ENCOUNTER (INPATIENT)
Facility: HOSPITAL | Age: 86
LOS: 4 days | Discharge: NURSING, REHAB OR LONG-TERM CARE | DRG: 535 | End: 2021-10-14
Attending: EMERGENCY MEDICINE | Admitting: INTERNAL MEDICINE
Payer: COMMERCIAL

## 2021-10-10 ENCOUNTER — HEALTH MAINTENANCE LETTER (OUTPATIENT)
Age: 86
End: 2021-10-10

## 2021-10-10 DIAGNOSIS — L40.50 PSORIATIC ARTHRITIS (H): ICD-10-CM

## 2021-10-10 DIAGNOSIS — R52 PAIN: ICD-10-CM

## 2021-10-10 DIAGNOSIS — Z95.2 S/P AVR: ICD-10-CM

## 2021-10-10 DIAGNOSIS — S72.114A CLOSED NONDISPLACED FRACTURE OF GREATER TROCHANTER OF RIGHT FEMUR, INITIAL ENCOUNTER (H): ICD-10-CM

## 2021-10-10 DIAGNOSIS — K31.82 DIEULAFOY LESION OF STOMACH: ICD-10-CM

## 2021-10-10 DIAGNOSIS — K59.01 SLOW TRANSIT CONSTIPATION: ICD-10-CM

## 2021-10-10 DIAGNOSIS — K25.3 GASTRIC ULCER DUE TO HELICOBACTER PYLORI, ACUTE: Primary | ICD-10-CM

## 2021-10-10 DIAGNOSIS — E78.2 MIXED HYPERLIPIDEMIA: ICD-10-CM

## 2021-10-10 DIAGNOSIS — D62 ACUTE BLOOD LOSS ANEMIA: ICD-10-CM

## 2021-10-10 DIAGNOSIS — N18.32 STAGE 3B CHRONIC KIDNEY DISEASE (H): ICD-10-CM

## 2021-10-10 DIAGNOSIS — B96.81 GASTRIC ULCER DUE TO HELICOBACTER PYLORI, ACUTE: Primary | ICD-10-CM

## 2021-10-10 DIAGNOSIS — I99.9 MILD VASCULAR NEUROCOGNITIVE DISORDER: ICD-10-CM

## 2021-10-10 DIAGNOSIS — F06.70 MILD VASCULAR NEUROCOGNITIVE DISORDER: ICD-10-CM

## 2021-10-10 DIAGNOSIS — E03.9 HYPOTHYROIDISM, UNSPECIFIED TYPE: ICD-10-CM

## 2021-10-10 LAB
ANION GAP SERPL CALCULATED.3IONS-SCNC: 9 MMOL/L (ref 5–18)
APTT PPP: 32 SECONDS (ref 22–38)
ATRIAL RATE - MUSE: 74 BPM
BUN SERPL-MCNC: 17 MG/DL (ref 8–28)
CALCIUM SERPL-MCNC: 9.3 MG/DL (ref 8.5–10.5)
CHLORIDE BLD-SCNC: 104 MMOL/L (ref 98–107)
CO2 SERPL-SCNC: 25 MMOL/L (ref 22–31)
CREAT SERPL-MCNC: 1.07 MG/DL (ref 0.6–1.1)
DIASTOLIC BLOOD PRESSURE - MUSE: NORMAL MMHG
ERYTHROCYTE [DISTWIDTH] IN BLOOD BY AUTOMATED COUNT: 15.7 % (ref 10–15)
GFR SERPL CREATININE-BSD FRML MDRD: 48 ML/MIN/1.73M2
GLUCOSE BLD-MCNC: 105 MG/DL (ref 70–125)
HCT VFR BLD AUTO: 36.8 % (ref 35–47)
HGB BLD-MCNC: 11.9 G/DL (ref 11.7–15.7)
INR PPP: 1.25 (ref 0.9–1.15)
INTERPRETATION ECG - MUSE: NORMAL
MAGNESIUM SERPL-MCNC: 2 MG/DL (ref 1.8–2.6)
MCH RBC QN AUTO: 31.7 PG (ref 26.5–33)
MCHC RBC AUTO-ENTMCNC: 32.3 G/DL (ref 31.5–36.5)
MCV RBC AUTO: 98 FL (ref 78–100)
P AXIS - MUSE: NORMAL DEGREES
PLATELET # BLD AUTO: 125 10E3/UL (ref 150–450)
POTASSIUM BLD-SCNC: 4.5 MMOL/L (ref 3.5–5)
PR INTERVAL - MUSE: 204 MS
QRS DURATION - MUSE: 132 MS
QT - MUSE: 448 MS
QTC - MUSE: 497 MS
R AXIS - MUSE: -55 DEGREES
RBC # BLD AUTO: 3.75 10E6/UL (ref 3.8–5.2)
SARS-COV-2 RNA RESP QL NAA+PROBE: NEGATIVE
SODIUM SERPL-SCNC: 138 MMOL/L (ref 136–145)
SYSTOLIC BLOOD PRESSURE - MUSE: NORMAL MMHG
T AXIS - MUSE: 57 DEGREES
VENTRICULAR RATE- MUSE: 74 BPM
WBC # BLD AUTO: 6.9 10E3/UL (ref 4–11)

## 2021-10-10 PROCEDURE — 250N000011 HC RX IP 250 OP 636: Performed by: EMERGENCY MEDICINE

## 2021-10-10 PROCEDURE — 87635 SARS-COV-2 COVID-19 AMP PRB: CPT | Performed by: EMERGENCY MEDICINE

## 2021-10-10 PROCEDURE — 96374 THER/PROPH/DIAG INJ IV PUSH: CPT

## 2021-10-10 PROCEDURE — 85730 THROMBOPLASTIN TIME PARTIAL: CPT | Performed by: EMERGENCY MEDICINE

## 2021-10-10 PROCEDURE — 73552 X-RAY EXAM OF FEMUR 2/>: CPT | Mod: RT

## 2021-10-10 PROCEDURE — 85018 HEMOGLOBIN: CPT | Performed by: EMERGENCY MEDICINE

## 2021-10-10 PROCEDURE — 99285 EMERGENCY DEPT VISIT HI MDM: CPT | Mod: 25

## 2021-10-10 PROCEDURE — C9803 HOPD COVID-19 SPEC COLLECT: HCPCS

## 2021-10-10 PROCEDURE — 250N000013 HC RX MED GY IP 250 OP 250 PS 637: Performed by: INTERNAL MEDICINE

## 2021-10-10 PROCEDURE — 73700 CT LOWER EXTREMITY W/O DYE: CPT | Mod: RT

## 2021-10-10 PROCEDURE — 120N000001 HC R&B MED SURG/OB

## 2021-10-10 PROCEDURE — 73502 X-RAY EXAM HIP UNI 2-3 VIEWS: CPT

## 2021-10-10 PROCEDURE — 36415 COLL VENOUS BLD VENIPUNCTURE: CPT | Performed by: EMERGENCY MEDICINE

## 2021-10-10 PROCEDURE — 99223 1ST HOSP IP/OBS HIGH 75: CPT | Performed by: INTERNAL MEDICINE

## 2021-10-10 PROCEDURE — 85610 PROTHROMBIN TIME: CPT | Performed by: EMERGENCY MEDICINE

## 2021-10-10 PROCEDURE — 93005 ELECTROCARDIOGRAM TRACING: CPT | Performed by: EMERGENCY MEDICINE

## 2021-10-10 PROCEDURE — 250N000011 HC RX IP 250 OP 636: Performed by: INTERNAL MEDICINE

## 2021-10-10 PROCEDURE — C9113 INJ PANTOPRAZOLE SODIUM, VIA: HCPCS | Performed by: INTERNAL MEDICINE

## 2021-10-10 PROCEDURE — 80048 BASIC METABOLIC PNL TOTAL CA: CPT | Performed by: EMERGENCY MEDICINE

## 2021-10-10 PROCEDURE — 258N000003 HC RX IP 258 OP 636: Performed by: INTERNAL MEDICINE

## 2021-10-10 PROCEDURE — 93005 ELECTROCARDIOGRAM TRACING: CPT | Performed by: STUDENT IN AN ORGANIZED HEALTH CARE EDUCATION/TRAINING PROGRAM

## 2021-10-10 PROCEDURE — 83735 ASSAY OF MAGNESIUM: CPT | Performed by: INTERNAL MEDICINE

## 2021-10-10 RX ORDER — FOLIC ACID 1 MG/1
1000 TABLET ORAL DAILY
Status: DISCONTINUED | OUTPATIENT
Start: 2021-10-11 | End: 2021-10-14 | Stop reason: HOSPADM

## 2021-10-10 RX ORDER — SODIUM CHLORIDE 9 MG/ML
INJECTION, SOLUTION INTRAVENOUS CONTINUOUS
Status: ACTIVE | OUTPATIENT
Start: 2021-10-11 | End: 2021-10-11

## 2021-10-10 RX ORDER — MORPHINE SULFATE 2 MG/ML
2 INJECTION, SOLUTION INTRAMUSCULAR; INTRAVENOUS ONCE
Status: COMPLETED | OUTPATIENT
Start: 2021-10-10 | End: 2021-10-10

## 2021-10-10 RX ORDER — LEVOTHYROXINE SODIUM 100 UG/1
100 TABLET ORAL
Status: DISCONTINUED | OUTPATIENT
Start: 2021-10-11 | End: 2021-10-14 | Stop reason: HOSPADM

## 2021-10-10 RX ORDER — LIDOCAINE 40 MG/G
CREAM TOPICAL
Status: DISCONTINUED | OUTPATIENT
Start: 2021-10-10 | End: 2021-10-14 | Stop reason: HOSPADM

## 2021-10-10 RX ORDER — METOPROLOL SUCCINATE 25 MG/1
25 TABLET, EXTENDED RELEASE ORAL DAILY
Status: DISCONTINUED | OUTPATIENT
Start: 2021-10-10 | End: 2021-10-14 | Stop reason: HOSPADM

## 2021-10-10 RX ORDER — ACETAMINOPHEN 325 MG/1
975 TABLET ORAL EVERY 8 HOURS
Status: DISCONTINUED | OUTPATIENT
Start: 2021-10-10 | End: 2021-10-14 | Stop reason: HOSPADM

## 2021-10-10 RX ORDER — POLYETHYLENE GLYCOL 3350 17 G/17G
17 POWDER, FOR SOLUTION ORAL DAILY
Status: DISCONTINUED | OUTPATIENT
Start: 2021-10-11 | End: 2021-10-14 | Stop reason: HOSPADM

## 2021-10-10 RX ORDER — ISOSORBIDE MONONITRATE 30 MG/1
30 TABLET, EXTENDED RELEASE ORAL DAILY
Status: DISCONTINUED | OUTPATIENT
Start: 2021-10-11 | End: 2021-10-14 | Stop reason: HOSPADM

## 2021-10-10 RX ORDER — DONEPEZIL HYDROCHLORIDE 5 MG/1
10 TABLET, FILM COATED ORAL AT BEDTIME
Status: DISCONTINUED | OUTPATIENT
Start: 2021-10-10 | End: 2021-10-14 | Stop reason: HOSPADM

## 2021-10-10 RX ORDER — AMOXICILLIN 250 MG
1 CAPSULE ORAL 2 TIMES DAILY PRN
Status: DISCONTINUED | OUTPATIENT
Start: 2021-10-10 | End: 2021-10-14 | Stop reason: HOSPADM

## 2021-10-10 RX ORDER — AMOXICILLIN 250 MG
2 CAPSULE ORAL 2 TIMES DAILY PRN
Status: DISCONTINUED | OUTPATIENT
Start: 2021-10-10 | End: 2021-10-14 | Stop reason: HOSPADM

## 2021-10-10 RX ORDER — ATORVASTATIN CALCIUM 40 MG/1
40 TABLET, FILM COATED ORAL EVERY EVENING
Status: DISCONTINUED | OUTPATIENT
Start: 2021-10-10 | End: 2021-10-14 | Stop reason: HOSPADM

## 2021-10-10 RX ADMIN — PANTOPRAZOLE SODIUM 40 MG: 40 INJECTION, POWDER, FOR SOLUTION INTRAVENOUS at 21:05

## 2021-10-10 RX ADMIN — ATORVASTATIN CALCIUM 40 MG: 40 TABLET, FILM COATED ORAL at 21:06

## 2021-10-10 RX ADMIN — SODIUM CHLORIDE: 9 INJECTION, SOLUTION INTRAVENOUS at 23:31

## 2021-10-10 RX ADMIN — MORPHINE SULFATE 2 MG: 2 INJECTION, SOLUTION INTRAMUSCULAR; INTRAVENOUS at 17:14

## 2021-10-10 RX ADMIN — ACETAMINOPHEN 975 MG: 325 TABLET ORAL at 21:06

## 2021-10-10 RX ADMIN — DONEPEZIL HYDROCHLORIDE 10 MG: 5 TABLET, FILM COATED ORAL at 21:12

## 2021-10-10 RX ADMIN — METOPROLOL SUCCINATE 25 MG: 25 TABLET, EXTENDED RELEASE ORAL at 21:06

## 2021-10-10 ASSESSMENT — ACTIVITIES OF DAILY LIVING (ADL): DEPENDENT_IADLS:: TRANSPORTATION;SHOPPING

## 2021-10-10 NOTE — ED PROVIDER NOTES
Emergency Department Encounter     Evaluation Date & Time:   10/10/2021  4:40 PM    CHIEF COMPLAINT:  Hip Pain      Triage Note:Patient presents here for evaluation of right hip pain and injury related to a fall earlier today. She is unable to bear weight on the extremity with severe pain at the hip area. She states that she tripped and fell in her home. She caught her heel on her doorway.    Additionally, it was found that her heart rate is in the 30's in triage, verified with auscultation.         Impression and Plan       FINAL IMPRESSION:    ICD-10-CM    1. Closed nondisplaced fracture of greater trochanter of right femur, initial encounter (H)  S72.114A     periprosthetic         ED COURSE & MEDICAL DECISION MAKIN:55 PM I met with the patient, obtained history, performed an initial exam, and discussed options and plan for diagnostics and treatment here in the ED.  6:30 PM Updated patient.    86 year old female, history of CAD, CHF, s/p aortic valve replacement (bioprosthetic), HTN, HLD, stage 3 CKD and s/p right hip replacement, who presents for evaluation of right hip pain after a mechanical fall.  She has been unable to bear weight on RLE secondary to pain, but denies weakness / paresthesias.    She denies other injuries.     On exam, she has tenderness to palpation over the lateral aspect of the right proximal femur with CMS intact.    Patient placed on cardiac monitor, IV access established and blood sent for labs.  Patient given IV morphine for pain.    EKG performed and demonstrated sinus rhythm with PVCs in a bigeminy pattern, RBBB, poor quality EKG with motion artifact, however no obvious ST or T wave changes consistent with ACS or pericarditis.    Labs otherwise remarkable for no leukocytosis or anemia.  No significant electrolyte derangements with stable renal insufficiency (creatinine 1.07 with GFR 48).  INR mildly elevated at 1.25; no known anticoagulants.    X-rays right hip and pelvis  performed and demonstrated:  1. There is a periprosthetic fracture of the right greater trochanter. This is displaced up to 1 cm and is new since the 8/28/2019 radiographs.  2. Moderate to severe degenerative changes in the lower lumbar spine  3. Mild to moderate degenerative changes in the SI joints.  4. Moderate osteoarthrosis of the left hip.    X-rays right femur performed and demonstrated:  1. Comminuted periprosthetic fracture of the right greater trochanter. Fracture fragments are displaced up to 1 cm superiorly and 5 mm laterally.  2. Total hip arthroplasty. Arterial calcifications.    [facility] consulted and requested CT femur for further evaluation to guide management decisions.    CT femur without IV contrast performed and demonstrated:  1.  Comminuted periprosthetic fracture of the proximal right femur. This involves the greater trochanter and there is up to 14 mm of displacement.  2.  Moderate diffuse enlargement of the vastus intermedius muscle, likely related to intramuscular hemorrhage.  3.  Degenerative changes in the lower lumbar spine, right SI joint, pubic symphysis and knee.    Patient admitted to hospitalist service.  Covid test negative.  Patient stable throughout ED course.      At the conclusion of the encounter I discussed the results of all the tests and the disposition. The questions were answered. The patient or family acknowledged understanding and was agreeable with the care plan.      MEDICATIONS GIVEN IN THE EMERGENCY DEPARTMENT:  Medications   atorvastatin (LIPITOR) tablet 40 mg (40 mg Oral Given 10/10/21 2106)   donepezil (ARICEPT) tablet 10 mg (10 mg Oral Given 10/10/21 2112)   folic acid (FOLVITE) tablet 1,000 mcg (1,000 mcg Oral Not Given 10/11/21 0858)   isosorbide mononitrate (IMDUR) 24 hr tablet 30 mg (30 mg Oral Not Given 10/11/21 0904)   levothyroxine (SYNTHROID/LEVOTHROID) tablet 100 mcg (100 mcg Oral Given 10/11/21 0905)   lisinopril (ZESTRIL) tablet 20 mg (has  no administration in time range)   metoprolol succinate ER (TOPROL-XL) 24 hr tablet 25 mg (25 mg Oral Not Given 10/11/21 0857)   polyethylene glycol (MIRALAX) powder 17 g (17 g Oral Not Given 10/11/21 0858)   lidocaine 1 % 0.1-1 mL (has no administration in time range)   lidocaine (LMX4) cream (has no administration in time range)   sodium chloride (PF) 0.9% PF flush 3 mL (3 mLs Intracatheter Not Given 10/11/21 0312)   sodium chloride (PF) 0.9% PF flush 3 mL (has no administration in time range)   acetaminophen (TYLENOL) tablet 975 mg (975 mg Oral Given 10/11/21 0422)   HYDROmorphone (DILAUDID) injection 0.2 mg (has no administration in time range)   senna-docusate (SENOKOT-S/PERICOLACE) 8.6-50 MG per tablet 1 tablet (has no administration in time range)     Or   senna-docusate (SENOKOT-S/PERICOLACE) 8.6-50 MG per tablet 2 tablet (has no administration in time range)   pantoprazole (PROTONIX) IV push injection 40 mg (40 mg Intravenous Given 10/11/21 0857)   sodium chloride 0.9% infusion ( Intravenous New Bag 10/10/21 2751)   naloxone (NARCAN) injection 0.2 mg (has no administration in time range)     Or   naloxone (NARCAN) injection 0.4 mg (has no administration in time range)     Or   naloxone (NARCAN) injection 0.2 mg (has no administration in time range)     Or   naloxone (NARCAN) injection 0.4 mg (has no administration in time range)   morphine (PF) injection 2 mg (2 mg Intravenous Given 10/10/21 4064)       NEW PRESCRIPTIONS STARTED AT TODAY'S ED VISIT:  Current Discharge Medication List          HPI     HPI     Rox COHN Lucas is a 86 year old female with a pertinent history of CAD, CHF, s/p aortic valve replacement, HTN, HLD and stage 3 CKD, who presents to this ED via walk in for evaluation of hip pain.    At approximately 1:30 pm (3 hours ago), patient was opening her door when her shoe got caught causing her to fall onto her right hip.  Since the fall, she has been unable to walk or bear weight onto her  RLE secondary to pain. Moving the RLE worsens her pain as well.     She denies other injuries. She did not hit her head, sustain LOC and she denies HA. She denies neck pain, extremity weakness / paresthesias. She also denies chest pain, SOB, abdominal pain and back pain.     She otherwise denies recent illness; no N/V/D, cough, fever or other concerns.     REVIEW OF SYSTEMS:  All other systems reviewed and are negative.      Medical History     Past Medical History:   Diagnosis Date     Acute diastolic CHF (congestive heart failure) (H) 12/23/2014     Acute renal failure (H)      Aortic valve stenosis, severe      Arthritis      Coronary artery disease 11/17/2014     Coronary artery disease      Dieulafoy lesion of stomach      Disease of thyroid gland      Essential hypertension 12/8/2015     GI bleed 6/1/2021     Hammer toe      Hyperlipidemia      Hypertension      Hypothyroidism      Macular disorder      MCI (mild cognitive impairment) 6/16/2019     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     Pressure injury of buttock, stage 1, unspecified laterality 6/16/2019     Rheumatoid arthritis (H)      S/P AVR 12/15/2014     Sacral insufficiency fracture, initial encounter 5/6/2019     Senile osteoporosis 9/23/2019       Past Surgical History:   Procedure Laterality Date     ANGIOPLASTY / STENTING FEMORAL       AORTIC VALVE REPLACEMENT       C LIGATE FALLOPIAN TUBE      Description: Tubal Ligation;  Recorded: 03/20/2008;     C TOTAL HIP ARTHROPLASTY Right     Description: Total Hip Replacement;  Recorded: 03/20/2008; right     CARDIAC SURGERY      CABG     EYE SURGERY Bilateral     cataracts     HC REMOVAL GALLBLADDER      Description: Cholecystectomy;  Recorded: 03/20/2008;     [place] ESOPHAGOGASTRODUODENOSCOPY TRANSORAL DIAGNOSTIC N/A 6/1/2021    Procedure: ESOPHAGOGASTRODUODENOSCOPY (EGD) with biopsies;  Surgeon: Julio Lopez MD;  Location: [facility];  Service: Gastroenterology     [place]  ESOPHAGOGASTRODUODENOSCOPY TRANSORAL DIAGNOSTIC N/A 2021    Procedure: ESOPHAGOGASTRODUODENOSCOPY (EGD) with bicap cauterization;  Surgeon: Julio Lopez MD;  Location: [facility];  Service: Gastroenterology     [place] ESOPHAGOGASTRODUODENOSCOPY TRANSORAL DIAGNOSTIC N/A 2021    Procedure: ESOPHAGOGASTRODUODENOSCOPY (EGD) with hemoclip;  Surgeon: Sam Brock MD;  Location: [facility];  Service: Gastroenterology       Family History   Problem Relation Age of Onset     Asthma Mother      Coronary Artery Disease No family hx of      Breast Cancer No family hx of        Social History     Tobacco Use     Smoking status: Former Smoker     Packs/day: 1.00     Years: 30.00     Pack years: 30.00     Types: Cigarettes     Quit date: 1995     Years since quittin.7     Smokeless tobacco: Never Used   Substance Use Topics     Alcohol use: Yes     Alcohol/week: 1.0 standard drinks     Drug use: No       No current outpatient medications on file.      Physical Exam     First Vitals:  Patient Vitals for the past 24 hrs:   BP Temp Temp src Pulse Resp SpO2 Weight   10/11/21 0913 -- -- -- -- -- 95 % --   10/11/21 0721 95/46 98  F (36.7  C) Oral 60 16 97 % --   10/11/21 0409 93/52 98  F (36.7  C) Oral 63 16 100 % --   10/10/21 2344 103/55 97.6  F (36.4  C) Oral 51 16 91 % --   10/10/21 2017 105/60 98.3  F (36.8  C) Oral 63 20 92 % --   10/10/21 1919 -- -- -- 67 (!) 49 92 % --   10/10/21 1915 -- -- -- 71 20 91 % --   10/10/21 1900 (!) 191 -- -- 71 27 (!) 89 % --   10/10/21 1845 (!) 161/97 -- -- 92 20 92 % --   10/10/21 1830 (!) 148/87 -- -- 75 -- 90 % --   10/10/21 181 (!) 165/102 -- -- 71 28 91 % --   10/10/21 1715 118/68 -- -- 78 29 -- --   10/10/21 1700 -- -- -- 61 21 -- --   10/10/21 1638 123/62 98.4  F (36.9  C) Oral (!) 36 16 94 % 52.6 kg (116 lb)       PHYSICAL EXAM:   Physical Exam    GENERAL: Awake, alert.  In no acute distress.   HEENT: Normocephalic, atraumatic. Pupils equal, round and reactive.  Conjunctiva normal.   NECK: No midline tenderness to palpation of cervical spine. No stridor.  PULMONARY: Chest is atraumatic. Symmetrical breath sounds without distress.  Lungs clear to auscultation bilaterally without wheezes, rhonchi or rales.  CARDIO: Regular rate and rhythm.  No significant murmur, rub or gallop.  Radial and pedal pulses strong and symmetrical.  ABDOMINAL: Abdomen atraumatic, soft, non-distended and non-tender to palpation. Pelvis stable to compression. EXTREMITIES:  Tenderness to palpation over the lateral aspect of the right proximal femur.  No lower extremity swelling or edema.  RLE warm and well perfused with strong and symmetrical pedal pulses.  Patient able to move all toes. Sensation to light touch RLE grossly intact.       NEURO: Alert and oriented to person, place and time.  Cranial nerves grossly intact.  No focal motor deficit.   PSYCH: Normal mood and affect.  SKIN: No rashes.     Results     LAB:  All pertinent labs reviewed and interpreted  Labs Ordered and Resulted from Time of ED Arrival Up to the Time of Departure from the ED   CBC WITH PLATELETS - Abnormal; Notable for the following components:       Result Value    RBC Count 3.75 (*)     RDW 15.7 (*)     Platelet Count 125 (*)     All other components within normal limits   BASIC METABOLIC PANEL - Abnormal; Notable for the following components:    GFR Estimate 48 (*)     All other components within normal limits   INR - Abnormal; Notable for the following components:    INR 1.25 (*)     All other components within normal limits   PARTIAL THROMBOPLASTIN TIME - Normal   COVID-19 VIRUS (CORONAVIRUS) BY PCR - Normal    Narrative:     Testing was performed using the brittney  SARS-CoV-2 & Influenza A/B Assay on the brittney  Julienne  System.  This test should be ordered for the detection of SARS-COV-2 in individuals who meet SARS-CoV-2 clinical and/or epidemiological criteria. Test performance is unknown in asymptomatic patients.  This  test is for in vitro diagnostic use under the FDA EUA for laboratories certified under CLIA to perform moderate and/or high complexity testing. This test has not been FDA cleared or approved.  A negative test does not rule out the presence of PCR inhibitors in the specimen or target RNA in concentration below the limit of detection for the assay. The possibility of a false negative should be considered if the patient's recent exposure or clinical presentation suggests COVID-19.  [facility] are certified under the Clinical Laboratory Improvement Amendments of 1988 (CLIA-88) as qualified to perform moderate and/or high complexity laboratory testing.   CARDIAC CONTINUOUS MONITORING   MAY SALINE LOCK IV   ASSIGN ATTENDING PROVIDER   VITAL SIGNS   PERIPHERAL IV CATHETER   ACTIVITY   PULSE OXIMETRY NURSING   TELEMETRY MONITORING MED/SURG   INTAKE AND OUTPUT   NEURO CHECKS   INCENTIVE SPIROMETRY NURSING   APPLY PNEUMATIC COMPRESSION DEVICE (PCD)   ICE TO AFFECTED AREA       RADIOLOGY:  CT Femur Thigh Right w/o Contrast   Final Result   IMPRESSION:   1.  Comminuted periprosthetic fracture of the proximal right femur. This involves the greater trochanter and there is up to 14 mm of displacement.   2.  Moderate diffuse enlargement of the vastus intermedius muscle, likely related to intramuscular hemorrhage.   3.  Degenerative changes in the lower lumbar spine, right SI joint, pubic symphysis and knee.         XR Femur Right 2 Views   Final Result   IMPRESSION:    1. There is a comminuted periprosthetic fracture of the right greater trochanter. Fracture fragments are displaced up to 1 cm superiorly and 5 mm laterally.   2. Total hip arthroplasty. Arterial calcifications.      XR Pelvis and Hip Right 2 Views   Final Result   IMPRESSION:    1. There is a periprosthetic fracture of the right greater trochanter. This is displaced up to 1 cm and is new since the 8/28/2019 radiographs.   2. Moderate to severe  degenerative changes in the lower lumbar spine   3. Mild to moderate degenerative changes in the SI joints.   4. Moderate osteoarthrosis of the left hip.         XR Chest Port 1 View    (Results Pending)       ECG:  10/10/2021, 16:51; sinus rhythm with rate of 74 bpm; PVCs in bigeminy pattern; RBBB; poor quality EKG with motion artifact and no obvious ST-T wave changes consistent with ACS or pericarditis; compared to previous EKG dated 7/27/2021, the DE interval has decreased, T waves no longer inverted in inferior leads, PVCs more frequent    EKG independently reviewed and interpreted by Ellyn Alexis MD      I, Fanta Jain, am serving as a scribe to document services personally performed by Ellyn Alexis MD based on my observation and the provider's statements to me. I, Ellyn Alexis MD attest that Fanta Jain is acting in a scribe capacity, has observed my performance of the services and has documented them in accordance with my direction.    Ellyn Alexis MD  Emergency Medicine  [facility] EMERGENCY DEPARTMENT           Ellyn Alexis MD  10/11/21 1012

## 2021-10-10 NOTE — PHARMACY-ADMISSION MEDICATION HISTORY
Pharmacy Note - Admission Medication History    Pertinent Provider Information: Patient states she took some medications today but still needs at least 6 tonight. She is unsure which she took this morning.     ______________________________________________________________________    Prior To Admission (PTA) med list completed and updated in EMR.       PTA Med List   Medication Sig Last Dose     atorvastatin (LIPITOR) 40 MG tablet Take 40 mg by mouth daily  Unknown at Unknown time     calcium carbonate-vitamin D (OYSTER SHELL CALCIUM/D) 500-200 MG-UNIT tablet Take 1 tablet by mouth daily  Unknown at Unknown time     cholecalciferol 50 MCG (2000 UT) tablet Take 1 tablet (50 mcg) by mouth daily Unknown at Unknown time     donepezil (ARICEPT) 10 MG tablet Take 1 tablet (10 mg) by mouth At Bedtime 10/9/2021 at Unknown time     ferrous sulfate (FEROSUL) 325 (65 Fe) MG tablet Take 1 tablet by mouth daily (with breakfast)  10/10/2021 at Unknown time     folic acid (FOLVITE) 1 MG tablet Take 1,000 mcg by mouth daily  Unknown at Unknown time     isosorbide mononitrate (IMDUR) 30 MG 24 hr tablet Take 30 mg by mouth daily  Unknown at Unknown time     levothyroxine (SYNTHROID/LEVOTHROID) 100 MCG tablet Take 100 mcg by mouth daily  10/10/2021 at Unknown time     lisinopril (ZESTRIL) 20 MG tablet Take 20 mg by mouth daily  Unknown at Unknown time     methotrexate sodium 2.5 MG TABS Take 3 tablets by mouth once a week Fridays Past Week at Unknown time     metoprolol succinate ER (TOPROL-XL) 25 MG 24 hr tablet Take 25 mg by mouth daily  Unknown at Unknown time     polyethylene glycol (MIRALAX) 17 GM/Dose powder Take 17 g by mouth daily  Unknown at Unknown time     vitamin C (ASCORBIC ACID) 500 MG tablet Take 500 mg by mouth daily  Unknown at Unknown time       Information source(s): [facility] and CareEverywhere/[facility]  Method of interview communication: in-person    Summary of Changes to PTA Med List  New: None  Discontinued:  Flonase, omeprazole, fish oil  Changed: Added directions to most    Patient was asked about OTC/herbal products specifically.  PTA med list reflects this.    In the past week, patient estimated taking medication this percent of the time:  greater than 90%.    Allergies were reviewed, assessed, and updated with the patient.      Patient does not use any multi-dose medications prior to admission.    The information provided in this note is only as accurate as the sources available at the time of the update(s).    Thank you for the opportunity to participate in the care of this patient.    Monica Concepcion [facility]  10/10/2021 6:31 PM

## 2021-10-10 NOTE — ED TRIAGE NOTES
Patient presents here for evaluation of right hip pain and injury related to a fall earlier today. She is unable to bear weight on the extremity with severe pain at the hip area. She states that she tripped and fell in her home. She caught her heel on her doorway.    Additionally, it was found that her heart rate is in the 30's in triage, verified with auscultation.

## 2021-10-10 NOTE — CONSULTS
"Care Management Initial Consult    General Information  Assessment completed with: Patient, Children, Rox and daughter Yeimi via i pad  Type of CM/SW Visit: Initial Assessment    Primary Care Provider verified and updated as needed: Yes   Readmission within the last 30 days: no previous admission in last 30 days      Reason for Consult: discharge planning  Advance Care Planning: Advance Care Planning Reviewed: present on chart          Communication Assessment  Patient's communication style: spoken language (English or Bilingual)    Hearing Difficulty or Deaf: yes   Wear Glasses or Blind: yes    Cognitive  Cognitive/Neuro/Behavioral: WDL                      Living Environment:   People in home: alone     Current living Arrangements: apartment, independent living facility ([facility])      Able to return to prior arrangements: other (see comments) (unknown at this time)       Family/Social Support:  Care provided by: self  Provides care for: no one  Marital Status:   Children          Description of Support System: Supportive, Involved    Support Assessment: Adequate family and caregiver support, Adequate social supports, Patient communicates needs well met    Current Resources:   Patient receiving home care services: No     Community Resources: None  Equipment currently used at home:  (\"she has a walker at home but doesn't use it normally\")  Supplies currently used at home: Other (\"dentures, glasses\")    Employment/Financial:  Employment Status: retired        Financial Concerns:     Referral to Financial Counselor: No       Lifestyle & Psychosocial Needs:  Social Determinants of Health     Tobacco Use: Medium Risk     Smoking Tobacco Use: Former Smoker     Smokeless Tobacco Use: Never Used   Alcohol Use:      Frequency of Alcohol Consumption:      Average Number of Drinks:      Frequency of Binge Drinking:    Financial Resource Strain:      Difficulty of " "Paying Living Expenses:    Food Insecurity:      Worried About Running Out of Food in the Last Year:      Ran Out of Food in the Last Year:    Transportation Needs:      Lack of Transportation (Medical):      Lack of Transportation (Non-Medical):    Physical Activity:      Days of Exercise per Week:      Minutes of Exercise per Session:    Stress:      Feeling of Stress :    Social Connections:      Frequency of Communication with Friends and Family:      Frequency of Social Gatherings with Friends and Family:      Attends Mu-ism Services:      Active Member of Clubs or Organizations:      Attends Club or Organization Meetings:      Marital Status:    Intimate Partner Violence:      Fear of Current or Ex-Partner:      Emotionally Abused:      Physically Abused:      Sexually Abused:    Depression: Not at risk     PHQ-2 Score: 1   Housing Stability:      Unable to Pay for Housing in the Last Year:      Number of Places Lived in the Last Year:      Unstable Housing in the Last Year:        Functional Status:  Prior to admission patient needed assistance:   Dependent ADLs:: Independent  Dependent IADLs:: Transportation, Shopping (\"family helps\")  Assesssment of Functional Status: Not at baseline with ADL Functioning, Not at baseline with mobility, Not at  functional baseline    Mental Health Status:          Chemical Dependency Status:                Values/Beliefs:  Spiritual, Cultural Beliefs, Mu-ism Practices, Values that affect care:                 Additional Information:  Rox lives at [facility] alone. She is normally independent with ADLs. She does not drive and family helps with shopping and transportation.    She has a walker at home, but \"normally does not use it anymore\".    If TCU is needed she has recently been at and would want to go to [facility].    Family to transport at discharge.    Fanta Burdick RN      "

## 2021-10-10 NOTE — H&P
[facility]    History and Physical - Hospitalist Service       Date of Admission:  10/10/2021    Assessment & Plan      Rox D Lucas is a 86 year old female admitted on 10/10/2021. She has a hx of CAD, CABG(CHEN to mid left circ and RCA--2016), AVR(bio-2014), diastolic HF,htn, psoriatic arthritis, hx of gib, gastric ulcers, comes today after fall at home and fell onto right hip and had pain immediately. She was found in ER to have sustained a mechanical fall with comminuted periprosthetic fracture of the right greater trochanter    1.Mechanical fall  -got foot caught on door  -no loc  -fall precautions    2.Comminuted periprosthetic fracture of the right greater trochanter  -er to call ortho  -suspect this fx might be candidate for conservative measures  -however, will make npo at [place] incase needs or  -pain control  -bedrest  -ortho to see    3.hx of Serious GIB  -hx of gastric ulcers and Dieulafoy  -NOT on anticoagulation  -watch her hgb    4.Hx of AVR--bio--2014    5.hx of CAD  -s/p cabg  -no cp    6.hx of diastolic HF-compensated  -clarify meds    7.hx of htn  -clarify meds    8.hx of psoriatic arthritis  -on methotrexate(takes on Fridays, and folic acid daily    9.Mild thrombocytopenia  -trend daily    10.Pre-op eval  -pt is at increased risk maribel-op due to age, hx of CAD, AVR, diastolic HF.  However, her HF is compensated at this time.  -EKG NSR with PVC's.  No further cardiac work up recommended. Would proceed with surgery if ortho deems necessary      11.DVT prevent- scd    12.code status- clarified with her and daughter present, DNR/DNI      Clinically Significant Risk Factors Present on Admission             # Coagulation Defect: INR = 1.25 (Ref range: 0.90 - 1.15) and/or PTT = 32 Seconds (Ref range: 22 - 38 Seconds) on admission, will monitor for bleeding  # Thrombocytopenia: Plts = 125 10e3/uL (Ref range: 150 - 450 10e3/uL) on admission, will monitor for bleeding       Disposition Plan   -ortho eval  -I think high chance will need TCU, lives alone indep living     The patient's care was discussed with the Patient and Patient's Family. Daughter here, but she notes pt son, Billy is main contact tomorrow    Poonam Roca MD  [facility]  Securely message with the Vocera Web Console (learn more here)  Text page via [employer] Paging/Directory      ______________________________________________________________________    Chief Complaint   Pain in hip had fall today    History is obtained from the patient    History of Present Illness       oRx COHN Lucas is a 86 year old female admitted on 10/10/2021. She has a hx of CAD, CABG(CHEN to mid left circ and RCA--2016), AVR(bio-2014), diastolic HF,htn, psoriatic arthritis, hx of gib, gastric ulcers, comes today after fall at home and fell onto right hip and had pain immediately.  She notes when she got up today she felt fine. She lives alone in [facility] living.  She usually walks everyday around the building. She felt fine. However, she was coming through the door there and got the bottom of her foot caught on the threshold and fell onto the right hip. She notes she had pain immediately. She had a ANIYA 2008 already.    No LOC  No cp  No sob    Got her Covid vaccine this year        Review of Systems    CONSTITUTIONAL:  negative  EYES:  negative  HEENT:  negative  RESPIRATORY:  negative  CARDIOVASCULAR:  No cp, but hx of cabg, avr-bio, not on anticoagulation due to gib  GASTROINTESTINAL:  positive for constipation, hx of gastric ulcers and gib  GENITOURINARY:  negative  INTEGUMENT/BREAST:  negative  HEMATOLOGIC/LYMPHATIC:  Hx of anemia  ALLERGIC/IMMUNOLOGIC:  negative  ENDOCRINE:  negative  MUSCULOSKELETAL:  positive for  Right hip pain, hx of DJD, hx of ANIYA  NEUROLOGICAL:  negative  BEHAVIOR/PSYCH:  negative    Past Medical History    I have reviewed this patient's medical history and updated it with pertinent  "information if needed.   Past Medical History:   Diagnosis Date     Acute diastolic CHF (congestive heart failure) (H) 12/23/2014     Acute renal failure (H)      Aortic valve stenosis, severe      Arthritis      Coronary artery disease 11/17/2014     Coronary artery disease      Dieulafoy lesion of stomach      Disease of thyroid gland      Essential hypertension 12/8/2015     GI bleed 6/1/2021     Hammer toe      Hyperlipidemia      Hypertension      Hypothyroidism     Created by [employer] updated for latest IMO load      Macular disorder     \"wrinkle\"     MCI (mild cognitive impairment) 6/16/2019     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  Past Surgical History:   Procedure Laterality Date     ANGIOPLASTY / STENTING FEMORAL       AORTIC VALVE REPLACEMENT       C LIGATE FALLOPIAN TUBE      Description: Tubal Ligation;  Recorded: 03/20/2008;     C TOTAL HIP ARTHROPLASTY Right     Description: Total Hip Replacement;  Recorded: 03/20/2008; right     CARDIAC SURGERY      CABG     EYE SURGERY Bilateral     cataracts     HC REMOVAL GALLBLADDER      Description: Cholecystectomy;  Recorded: 03/20/2008;     TN ESOPHAGOGASTRODUODENOSCOPY TRANSORAL DIAGNOSTIC N/A 6/1/2021    Procedure: ESOPHAGOGASTRODUODENOSCOPY (EGD) with biopsies;  Surgeon: Julio Lopez MD;  Location: [facility];  Service: Gastroenterology     TN ESOPHAGOGASTRODUODENOSCOPY TRANSORAL DIAGNOSTIC N/A 6/6/2021    Procedure: ESOPHAGOGASTRODUODENOSCOPY (EGD) with bicap cauterization;  Surgeon: Julio Lopez MD;  Location: [facility];  Service: Gastroenterology     TN " ESOPHAGOGASTRODUODENOSCOPY TRANSORAL DIAGNOSTIC N/A 2021    Procedure: ESOPHAGOGASTRODUODENOSCOPY (EGD) with hemoclip;  Surgeon: Sam Brock MD;  Location: [facility];  Service: Gastroenterology       Social History   I have reviewed this patient's social history and updated it with pertinent information if needed.  Social History     Tobacco Use     Smoking status: Former Smoker     Packs/day: 1.00     Years: 30.00     Pack years: 30.00     Types: Cigarettes     Quit date: 1995     Years since quittin.7     Smokeless tobacco: Never Used   Substance Use Topics     Alcohol use: Yes     Alcohol/week: 1.0 standard drinks     Drug use: No       Family History   I have reviewed this patient's family history and updated it with pertinent information if needed.  Family History   Problem Relation Age of Onset     Asthma Mother      Coronary Artery Disease No family hx of      Breast Cancer No family hx of        Prior to Admission Medications   Prior to Admission Medications   Prescriptions Last Dose Informant Patient Reported? Taking?   Omega-3 Fatty Acids (OMEGA-3 FISH OIL) 300 MG CAPS   Yes No   Sig: Take 1 capsule by mouth   atorvastatin (LIPITOR) 40 MG tablet   Yes No   Sig: Take 40 mg by mouth   calcium carbonate-vitamin D (OYSTER SHELL CALCIUM/D) 500-200 MG-UNIT tablet   Yes No   Sig: Take 1 tablet by mouth   cholecalciferol 50 MCG (2000 UT) tablet   No No   Sig: Take 1 tablet (50 mcg) by mouth daily   donepezil (ARICEPT) 10 MG tablet   No No   Sig: Take 1 tablet (10 mg) by mouth At Bedtime   ferrous sulfate (FEROSUL) 325 (65 Fe) MG tablet   Yes No   Sig: Take 1 tablet by mouth   fluticasone (FLONASE) 50 MCG/ACT nasal spray   Yes No   Si sprays   folic acid (FOLVITE) 1 MG tablet   Yes No   Sig: Take 1,000 mcg by mouth   isosorbide mononitrate (IMDUR) 30 MG 24 hr tablet   Yes No   Sig: Take 30 mg by mouth   levothyroxine (SYNTHROID/LEVOTHROID) 100 MCG tablet   Yes No   Sig: Take 100 mcg by mouth    lisinopril (ZESTRIL) 20 MG tablet   Yes No   Sig: Take 20 mg by mouth   methotrexate sodium 2.5 MG TABS   Yes No   metoprolol succinate ER (TOPROL-XL) 25 MG 24 hr tablet   Yes No   Sig: Take 25 mg by mouth   omeprazole (PRILOSEC) 40 MG DR capsule   No No   Sig: Take 1 capsule (40 mg) by mouth 2 times daily   polyethylene glycol (MIRALAX) 17 GM/Dose powder   Yes No   Sig: Take 17 g by mouth   vitamin C (ASCORBIC ACID) 500 MG tablet   Yes No   Sig: Take 500 mg by mouth      Facility-Administered Medications: None     Allergies   No Known Allergies    Physical Exam   Vital Signs: Temp: 98.4  F (36.9  C) Temp src: Oral BP: 118/68 Pulse: 78   Resp: 29 SpO2: 94 %      Weight: 116 lbs 0 oz    Constitutional: awake and alert  Eyes: sclera clear  ENT: normocepalic, without obvious abnormality  Respiratory: No increased work of breathing, good air exchange, clear to auscultation bilaterally, no crackles or wheezing  Cardiovascular: Normal apical impulse, regular rate and rhythm, normal S1 and S2, no S3 or S4, and no murmur noted  GI: normal bowel sounds, soft, non-distended and non-tender  Skin: no bruising or bleeding  Musculoskeletal: no edema in legs, but limited movement right hip due to pain, not shortened or rotated  Neurologic: Mental Status Exam:  Level of Alertness:   awake  Neuropsychiatric: General: normal, calm and normal eye contact    Data   Data reviewed today: I reviewed all medications, new labs and imaging results over the last 24 hours. I personally reviewed no images or EKG's today.    Recent Labs   Lab 10/10/21  1707   WBC 6.9   HGB 11.9   MCV 98   *   INR 1.25*      POTASSIUM 4.5   CHLORIDE 104   CO2 25   BUN 17   CR 1.07   ANIONGAP 9   CARLA 9.3          XR Pelvis and Hip Right 2 Views    Result Date: 10/10/2021  EXAM: XR PELVIS AND HIP RIGHT 2 VIEWS LOCATION: [facility] DATE/TIME: 10/10/2021 5:34 PM INDICATION: Injury and pain related to fall. COMPARISON:  Radiographs from 8/28/2019.     IMPRESSION: 1. There is a periprosthetic fracture of the right greater trochanter. This is displaced up to 1 cm and is new since the 8/28/2019 radiographs. 2. Moderate to severe degenerative changes in the lower lumbar spine 3. Mild to moderate degenerative changes in the SI joints. 4. Moderate osteoarthrosis of the left hip.     XR Femur Right 2 Views    Result Date: 10/10/2021  EXAM: XR FEMUR RIGHT 2 VIEW LOCATION: [facility] DATE/TIME: 10/10/2021 5:33 PM INDICATION: Fall, right hip pain. COMPARISON: None.     IMPRESSION: 1. There is a comminuted periprosthetic fracture of the right greater trochanter. Fracture fragments are displaced up to 1 cm superiorly and 5 mm laterally. 2. Total hip arthroplasty. Arterial calcifications.

## 2021-10-11 ENCOUNTER — APPOINTMENT (OUTPATIENT)
Dept: RADIOLOGY | Facility: HOSPITAL | Age: 86
DRG: 535 | End: 2021-10-11
Attending: HOSPITALIST
Payer: COMMERCIAL

## 2021-10-11 ENCOUNTER — APPOINTMENT (OUTPATIENT)
Dept: OCCUPATIONAL THERAPY | Facility: HOSPITAL | Age: 86
DRG: 535 | End: 2021-10-11
Attending: HOSPITALIST
Payer: COMMERCIAL

## 2021-10-11 ENCOUNTER — APPOINTMENT (OUTPATIENT)
Dept: PHYSICAL THERAPY | Facility: HOSPITAL | Age: 86
DRG: 535 | End: 2021-10-11
Attending: HOSPITALIST
Payer: COMMERCIAL

## 2021-10-11 LAB
ANION GAP SERPL CALCULATED.3IONS-SCNC: 5 MMOL/L (ref 5–18)
BUN SERPL-MCNC: 20 MG/DL (ref 8–28)
CALCIUM SERPL-MCNC: 8.2 MG/DL (ref 8.5–10.5)
CHLORIDE BLD-SCNC: 107 MMOL/L (ref 98–107)
CO2 SERPL-SCNC: 26 MMOL/L (ref 22–31)
CREAT SERPL-MCNC: 1.25 MG/DL (ref 0.6–1.1)
ERYTHROCYTE [DISTWIDTH] IN BLOOD BY AUTOMATED COUNT: 15.7 % (ref 10–15)
GFR SERPL CREATININE-BSD FRML MDRD: 40 ML/MIN/1.73M2
GLUCOSE BLD-MCNC: 97 MG/DL (ref 70–125)
HCT VFR BLD AUTO: 27.7 % (ref 35–47)
HGB BLD-MCNC: 8.7 G/DL (ref 11.7–15.7)
MCH RBC QN AUTO: 30.7 PG (ref 26.5–33)
MCHC RBC AUTO-ENTMCNC: 31.4 G/DL (ref 31.5–36.5)
MCV RBC AUTO: 98 FL (ref 78–100)
PLATELET # BLD AUTO: 108 10E3/UL (ref 150–450)
POTASSIUM BLD-SCNC: 4.3 MMOL/L (ref 3.5–5)
RBC # BLD AUTO: 2.83 10E6/UL (ref 3.8–5.2)
SODIUM SERPL-SCNC: 138 MMOL/L (ref 136–145)
WBC # BLD AUTO: 7 10E3/UL (ref 4–11)

## 2021-10-11 PROCEDURE — 97530 THERAPEUTIC ACTIVITIES: CPT | Mod: GP

## 2021-10-11 PROCEDURE — 250N000011 HC RX IP 250 OP 636: Performed by: HOSPITALIST

## 2021-10-11 PROCEDURE — 85014 HEMATOCRIT: CPT | Performed by: INTERNAL MEDICINE

## 2021-10-11 PROCEDURE — 36415 COLL VENOUS BLD VENIPUNCTURE: CPT | Performed by: INTERNAL MEDICINE

## 2021-10-11 PROCEDURE — 71045 X-RAY EXAM CHEST 1 VIEW: CPT

## 2021-10-11 PROCEDURE — 97166 OT EVAL MOD COMPLEX 45 MIN: CPT | Mod: GO

## 2021-10-11 PROCEDURE — 120N000001 HC R&B MED SURG/OB

## 2021-10-11 PROCEDURE — 80048 BASIC METABOLIC PNL TOTAL CA: CPT | Performed by: INTERNAL MEDICINE

## 2021-10-11 PROCEDURE — 250N000013 HC RX MED GY IP 250 OP 250 PS 637: Performed by: INTERNAL MEDICINE

## 2021-10-11 PROCEDURE — 93005 ELECTROCARDIOGRAM TRACING: CPT

## 2021-10-11 PROCEDURE — C9113 INJ PANTOPRAZOLE SODIUM, VIA: HCPCS | Performed by: INTERNAL MEDICINE

## 2021-10-11 PROCEDURE — 97110 THERAPEUTIC EXERCISES: CPT | Mod: GP

## 2021-10-11 PROCEDURE — 99233 SBSQ HOSP IP/OBS HIGH 50: CPT | Performed by: HOSPITALIST

## 2021-10-11 PROCEDURE — 97162 PT EVAL MOD COMPLEX 30 MIN: CPT | Mod: GP

## 2021-10-11 PROCEDURE — 250N000011 HC RX IP 250 OP 636: Performed by: INTERNAL MEDICINE

## 2021-10-11 PROCEDURE — 93010 ELECTROCARDIOGRAM REPORT: CPT | Performed by: GENERAL ACUTE CARE HOSPITAL

## 2021-10-11 PROCEDURE — 250N000013 HC RX MED GY IP 250 OP 250 PS 637: Performed by: HOSPITALIST

## 2021-10-11 RX ORDER — NALOXONE HYDROCHLORIDE 0.4 MG/ML
0.2 INJECTION, SOLUTION INTRAMUSCULAR; INTRAVENOUS; SUBCUTANEOUS
Status: DISCONTINUED | OUTPATIENT
Start: 2021-10-11 | End: 2021-10-14 | Stop reason: HOSPADM

## 2021-10-11 RX ORDER — NALOXONE HYDROCHLORIDE 0.4 MG/ML
0.4 INJECTION, SOLUTION INTRAMUSCULAR; INTRAVENOUS; SUBCUTANEOUS
Status: DISCONTINUED | OUTPATIENT
Start: 2021-10-11 | End: 2021-10-14 | Stop reason: HOSPADM

## 2021-10-11 RX ORDER — LISINOPRIL 20 MG/1
20 TABLET ORAL DAILY
Status: DISCONTINUED | OUTPATIENT
Start: 2021-10-11 | End: 2021-10-14 | Stop reason: HOSPADM

## 2021-10-11 RX ORDER — TRAMADOL HYDROCHLORIDE 50 MG/1
50 TABLET ORAL EVERY 6 HOURS PRN
Status: DISCONTINUED | OUTPATIENT
Start: 2021-10-11 | End: 2021-10-14 | Stop reason: HOSPADM

## 2021-10-11 RX ORDER — PANTOPRAZOLE SODIUM 20 MG/1
40 TABLET, DELAYED RELEASE ORAL
Status: DISCONTINUED | OUTPATIENT
Start: 2021-10-12 | End: 2021-10-14 | Stop reason: HOSPADM

## 2021-10-11 RX ORDER — LIDOCAINE 4 G/G
1-2 PATCH TOPICAL
Status: DISCONTINUED | OUTPATIENT
Start: 2021-10-12 | End: 2021-10-14 | Stop reason: HOSPADM

## 2021-10-11 RX ADMIN — ACETAMINOPHEN 975 MG: 325 TABLET ORAL at 04:22

## 2021-10-11 RX ADMIN — ACETAMINOPHEN 975 MG: 325 TABLET ORAL at 11:19

## 2021-10-11 RX ADMIN — PANTOPRAZOLE SODIUM 40 MG: 40 INJECTION, POWDER, FOR SOLUTION INTRAVENOUS at 08:57

## 2021-10-11 RX ADMIN — DONEPEZIL HYDROCHLORIDE 10 MG: 5 TABLET, FILM COATED ORAL at 21:06

## 2021-10-11 RX ADMIN — ATORVASTATIN CALCIUM 40 MG: 40 TABLET, FILM COATED ORAL at 21:06

## 2021-10-11 RX ADMIN — ACETAMINOPHEN 975 MG: 325 TABLET ORAL at 21:06

## 2021-10-11 RX ADMIN — LEVOTHYROXINE SODIUM 100 MCG: 0.1 TABLET ORAL at 09:05

## 2021-10-11 RX ADMIN — HYDROMORPHONE HYDROCHLORIDE 0.2 MG: 1 INJECTION, SOLUTION INTRAMUSCULAR; INTRAVENOUS; SUBCUTANEOUS at 14:05

## 2021-10-11 RX ADMIN — FOLIC ACID 1000 MCG: 1 TABLET ORAL at 08:58

## 2021-10-11 NOTE — PROGRESS NOTES
"Care Management Follow Up    Length of Stay (days): 1    Expected Discharge Date: 10/13/2021     Concerns to be Addressed:  Ortho consult       Patient plan of care discussed at interdisciplinary rounds: Yes    Anticipated Discharge Disposition:  TBD     Anticipated Discharge Services: TBD        Additional Information: Patient admitted for closed nondisplaced fracture-right. Orthopedic consult.      Assessment History: Rox lives at [facility] alone. She is normally independent with ADLs. She does not drive and family helps with shopping and transportation. She has a walker at home, but \"normally does not use it anymore\".  If TCU is needed she has recently been at and would want to go to [facility].  Family to transport at discharge.    Patient is covid vaccinated Moderna 2/9/21, 1/12/21.    Final discharge plan pending progression and recommendations.      Lian Del Rio RN        "

## 2021-10-11 NOTE — PLAN OF CARE
Tech monitor called for a change in tele reading which was type one second degree block with PVC's, Dr Rangel notified with with order of EKG which was sinus ema with first degree AV block with frequent PVC.pt asymptomatic . Dr Rangel aware with order to  continue monitoring.

## 2021-10-11 NOTE — PROVIDER NOTIFICATION
Dr. Dennis paged d/t only 5 ml urine output in with purewick in place.  Bladder scanned for 60 ml.  Pt encouraged to drink.  240 ml of water consumed this shift and pt given another glass of water.

## 2021-10-11 NOTE — PLAN OF CARE
Pt came from the ED was assisted in bed, pt a/o x4, VSS, c/o pain on right hip at 7/10 when moved and no pain when not moving, had scheduled tylenol which was effective,  went down for CT on the right femur and thigh which indicated fracture of the greater trochanter, pt NPO from midnight possible surgery or POP, pt on tele reading sinus ema with bigeminy PVC's  when sleeping, put on supplemental oxygen on 1.5L, now normal sinus rhythm with bigeminy PVC's. C/o feeling constipated decline prn stool softener, states ' she will wait till AM ' will continue to monitor.

## 2021-10-11 NOTE — UTILIZATION REVIEW
Admission Status; Secondary Review Determination       Under the authority of the Utilization Management Committee, the utilization review process indicated a secondary review on the above patient. The review outcome is based on review of the medical records, discussions with staff, and applying clinical experience noted on the date of the review.     (x) Inpatient Status Appropriate - This patient's medical care is consistent with medical management for inpatient care and reasonable inpatient medical practice.     RATIONALE FOR DETERMINATION   Ms. Zavala is a 87 yo female with a PMH of CAD, CABG/AVR, diastolic HR and GI bleed who presents to the ED with severe right hip pain s/p mechanical fall.  Imaging revealed comminuted periprosthetic fx of the right greater trochanter.  Ortho consult requested: no plan for surgical fixation at this time.  She has been hypoxic intermittently during her stay and CXR ordered and is pending.  Currently she is hypotensive and has new JOSIAH/elevated creat this am despite IVF.  She is having significant pain with any movement and has not yet been up out of bed to trial activity with weight-bearing restrictions.  She is requiring ongoing hospital treatment and monitoring today.  At the time of admission with the information available to the attending physician more than 2 nights Hospital complex care was anticipated, based on patient risk of adverse outcome if treated as outpatient and complex care required. Inpatient admission is appropriate based on the Medicare guidelines.   The information on this document is developed by the utilization review team in order for the business office to ensure compliance. This only denotes the appropriateness of proper admission status and does not reflect the quality of care rendered.   The definitions of Inpatient Status and Observation Status used in making the determination above are those provided in the CMS Coverage Manual, Chapter 1 and  Chapter 6, section 70.4.     Sincerely,     Christy Brown, DO  Utilization Review  Physician Advisor

## 2021-10-11 NOTE — PROGRESS NOTES
[facility]    Medicine Progress Note - Hospitalist Service       Date of Admission:  10/10/2021    Assessment & Plan           Rox COHN Lucas is a 86 year old female admitted on 10/10/2021. She has history of CAD, CABG (CHEN to mid left circ and RCA--2016), AVR (bio-2014), diastolic HF, htn, psoriatic arthritis, hx of GIB, gastric ulcers, presented following mechanical fall found to have comminuted periprosthetic fracture of the right greater trochanter      #Comminuted periprosthetic fracture of the right greater trochanter  -Ortho saw and recommended conservative management.  She is to be nonweightbearing RLE except may be TTWB for transfers.  No abduction of the right hip.  No flexion greater than 75 degrees.  -pain control-scheduled Tylenol, lidocaine patch.  Tramadol as needed.  -PT/OT.  Expect she will likely need placement TCU     #Mechanical fall  -got foot caught on door  -no loc  -fall precautions    #hx of Serious GIB  -hx of gastric ulcers and Dieulafoy  -NOT on anticoagulation  -Hgb 11.9-->8.7 and CT showing some likely intramuscular hemorrhage of the vastus intermedius muscle.  Do not think she is having GI bleed at this time.  -Continue empiric PPI for now. Can change to PO    #Acute blood loss anemia  -Likely due to intramuscular hematoma as above  -Trend Hgb    #JOSIAH  -Baseline Cr 1, here up to 1.25 today  -Hold home lisinopril     #Hx of AVR--bio--2014     #hx of CAD   #hx of diastolic HF-compensated  -s/p cabg  -no cp  -home statin, metoprolol and Imdur. Not on ASA    #Hypothyroidism  -Home Synthroid     #htn  -home lisinopril, metoprolol and Imdur with hold parameters     #hx of psoriatic arthritis  -on methotrexate (takes on Fridays, and folic acid daily     #Mild thrombocytopenia  -trend daily     #Mild cognitive impairment  -Home Aricept    #Hypoxia, from opioids, resolved  Had spO2 of 89% after getting IV morphine  Now satting normally on room air  CXR normal        Diet: Regular Diet Adult    DVT Prophylaxis: Moderate risk. Pharmacologic prophylaxis contraindicated due to likely active intramuscular bleeding   Rogers Catheter: Not present  Central Lines: None  Code Status: No CPR- Do NOT Intubate      Disposition Plan   Disposition: TCU when ready, likely multiple days  Discharge barriers: pain control, progress with PT, monitor Hgb and Cr  Medically ready to discharge today: No  Estimated discharge date: 10/13/2021     The patient's care was discussed with the Patient and Patient's Family.    Chiquis Dennis MD  Hospitalist Service  [facility]  Text page via [employer] Paging/Directory      Clinically Significant Risk Factors Present on Admission             # Coagulation Defect: INR = 1.25 (Ref range: 0.90 - 1.15) and/or PTT = 32 Seconds (Ref range: 22 - 38 Seconds) on admission, will monitor for bleeding  # Thrombocytopenia: Plts = 108 10e3/uL (Ref range: 150 - 450 10e3/uL) on admission, will monitor for bleeding      ____________        Physical Exam   Vital Signs: Temp: 97.9  F (36.6  C) Temp src: Oral BP: 96/50 Pulse: 62   Resp: 16 SpO2: 92 % O2 Device: None (Room air) Oxygen Delivery: 2 LPM  Weight: 116 lbs 0 oz  General: in no apparent distress, non-toxic and alert female lying in hospital bed oriented x3  HEENT: Head normocephalic atraumatic, oral mucosa moist. Sclerae anicteric  CV: Regular rhythm, normal rate, no murmurs  Resp: No wheezes, no rales or rhonchi, no focal consolidations  GI: Belly soft, nondistended, nontender, bowel sounds present  Skin: No rashes or lesions  Extremities: No peripheral edema  Psych: Normal affect, mood euthymic  Neuro: CNII-XII grossly intact, moving all 4 extremities      Data   Recent Results (from the past 24 hour(s))   ECG 12-LEAD WITH MUSE ([facility])    Collection Time: 10/10/21  4:51 PM   Result Value Ref Range    Systolic Blood Pressure  mmHg    Diastolic Blood Pressure  mmHg    Ventricular Rate 74 BPM    Atrial  Rate 74 BPM    MO Interval 204 ms    QRS Duration 132 ms     ms    QTc 497 ms    P Axis  degrees    R AXIS -55 degrees    T Axis 57 degrees    Interpretation ECG       Sinus rhythm with frequent Premature ventricular complexes in a pattern of bigeminy  Left axis deviation  Right bundle branch block  Abnormal ECG  When compared with ECG of 27-JUL-2021 11:16,  Aberrant conduction is no longer Present  T wave inversion no longer evident in Inferior leads  Confirmed by SEE ED PROVIDER NOTE FOR, ECG INTERPRETATION ([id number]),  [place] CASTRO ([id number]) on 10/10/2021 6:25:18 PM     CBC (+ platelets, no diff)    Collection Time: 10/10/21  5:07 PM   Result Value Ref Range    WBC Count 6.9 4.0 - 11.0 10e3/uL    RBC Count 3.75 (L) 3.80 - 5.20 10e6/uL    Hemoglobin 11.9 11.7 - 15.7 g/dL    Hematocrit 36.8 35.0 - 47.0 %    MCV 98 78 - 100 fL    MCH 31.7 26.5 - 33.0 pg    MCHC 32.3 31.5 - 36.5 g/dL    RDW 15.7 (H) 10.0 - 15.0 %    Platelet Count 125 (L) 150 - 450 10e3/uL   Basic metabolic panel    Collection Time: 10/10/21  5:07 PM   Result Value Ref Range    Sodium 138 136 - 145 mmol/L    Potassium 4.5 3.5 - 5.0 mmol/L    Chloride 104 98 - 107 mmol/L    Carbon Dioxide (CO2) 25 22 - 31 mmol/L    Anion Gap 9 5 - 18 mmol/L    Urea Nitrogen 17 8 - 28 mg/dL    Creatinine 1.07 0.60 - 1.10 mg/dL    Calcium 9.3 8.5 - 10.5 mg/dL    Glucose 105 70 - 125 mg/dL    GFR Estimate 48 (L) >60 mL/min/1.73m2   INR    Collection Time: 10/10/21  5:07 PM   Result Value Ref Range    INR 1.25 (H) 0.90 - 1.15   PTT    Collection Time: 10/10/21  5:07 PM   Result Value Ref Range    aPTT 32 22 - 38 Seconds   Magnesium    Collection Time: 10/10/21  5:07 PM   Result Value Ref Range    Magnesium 2.0 1.8 - 2.6 mg/dL   Asymptomatic COVID-19 Virus (Coronavirus) by PCR Nasopharyngeal    Collection Time: 10/10/21  6:42 PM    Specimen: Nasopharyngeal; Swab   Result Value Ref Range    SARS CoV2 PCR Negative Negative   ECG 12-LEAD WITH MUSE ([facility])    Collection  Time: 10/11/21  4:47 AM   Result Value Ref Range    Systolic Blood Pressure  mmHg    Diastolic Blood Pressure  mmHg    Ventricular Rate 55 BPM    Atrial Rate 55 BPM    OR Interval 250 ms    QRS Duration 140 ms     ms    QTc 466 ms    P Axis 94 degrees    R AXIS -46 degrees    T Axis 85 degrees    Interpretation ECG       Sinus bradycardia with 1st degree A-V block with frequent Premature ventricular complexes in a pattern of bigeminy  Right bundle branch block  Left anterior fascicular block  ** Bifascicular block **  Abnormal ECG  When compared with ECG of 10-OCT-2021 16:51,  OR interval has increased  T wave amplitude has decreased in Inferior leads     Basic metabolic panel    Collection Time: 10/11/21  5:47 AM   Result Value Ref Range    Sodium 138 136 - 145 mmol/L    Potassium 4.3 3.5 - 5.0 mmol/L    Chloride 107 98 - 107 mmol/L    Carbon Dioxide (CO2) 26 22 - 31 mmol/L    Anion Gap 5 5 - 18 mmol/L    Urea Nitrogen 20 8 - 28 mg/dL    Creatinine 1.25 (H) 0.60 - 1.10 mg/dL    Calcium 8.2 (L) 8.5 - 10.5 mg/dL    Glucose 97 70 - 125 mg/dL    GFR Estimate 40 (L) >60 mL/min/1.73m2   CBC with platelets    Collection Time: 10/11/21  5:47 AM   Result Value Ref Range    WBC Count 7.0 4.0 - 11.0 10e3/uL    RBC Count 2.83 (L) 3.80 - 5.20 10e6/uL    Hemoglobin 8.7 (L) 11.7 - 15.7 g/dL    Hematocrit 27.7 (L) 35.0 - 47.0 %    MCV 98 78 - 100 fL    MCH 30.7 26.5 - 33.0 pg    MCHC 31.4 (L) 31.5 - 36.5 g/dL    RDW 15.7 (H) 10.0 - 15.0 %    Platelet Count 108 (L) 150 - 450 10e3/uL     ____________  Interval History   Data reviewed today: I reviewed all medications, new labs and imaging results over the last 24 hours. I personally reviewed no images or EKG's today.  Patient doing ok today. Bad pain R leg whenever she moves, but comfortable at rest.  Ortho were to be called from ED but no documentation as such. Consult placed. Patient has been NPO since yesterday AM.    Hypoxia requiring 2L NC. Lungs clear on exam. Check  CXR.    Son notes BP tends to be labile. Entered hold parameters on Imdur.

## 2021-10-11 NOTE — CONSULTS
ORTHOPEDIC CONSULTATION    Consultation  NIKIA Francis 1935, MRN [id number]    [unfilled]  Closed nondisplaced fracture of greater trochanter of right femur, initial encounter (H) [S72.114A]    PCP: Misbah Barone, [phone]   Code status:  No CPR- Do NOT Intubate       Extended Emergency Contact Information  Primary Emergency Contact: Billy Zavala  Address: [address] [place] [address] [place] [address] [facility]  Home Phone: [phone]  Mobile Phone: [phone]  Relation: Son  Secondary Emergency Contact: Kwesi Zavala  Address:  [place] [address] [facility]  Home Phone: [phone]  Mobile Phone: [phone]  Relation: Son         CHIEF COMPLAINT: Right hip pain      HISTORY OF PRESENT ILLNESS:  The patient is seen in orthopedic consultation at the request of Dr Chiquis Dennis.  The patient is a 86 year old female with moderate pain of the right  hip after falling at her primary residence yesterday.  States that while walking through a doorway foot caught her heel fell landing on the lateral aspect of her right hip.  Immediate pain unable to fully weight-bear as well as ambulate secondary to discomfort.  Prior to the fall denies any right hip pain.  Status post right total hip arthroplasty 5+ years ago.  No ambulatory aids prior to her fall.  Denies any right knee or right ankle discomfort.  Localizes her pain to the lateral aspect of the right hip as well as into the proximal anterior lateral right thigh.    PAST MEDICAL HISTORY:  [unfilled]  [unfilled]      ALLERGIES:   Review of patient's allergies indicates No Known Allergies      MEDICATIONS UPON ADMISSION:  Medications were reviewed.  They include:   Medications Prior to Admission   Medication Sig Dispense Refill Last Dose     atorvastatin (LIPITOR) 40 MG tablet Take 40 mg by mouth daily    Unknown at Unknown time     calcium carbonate-vitamin D (OYSTER SHELL CALCIUM/D) 500-200 MG-UNIT  tablet Take 1 tablet by mouth daily    Unknown at Unknown time     cholecalciferol 50 MCG (2000 UT) tablet Take 1 tablet (50 mcg) by mouth daily 100 tablet 1 Unknown at Unknown time     donepezil (ARICEPT) 10 MG tablet Take 1 tablet (10 mg) by mouth At Bedtime 90 tablet 3 10/9/2021 at Unknown time     ferrous sulfate (FEROSUL) 325 (65 Fe) MG tablet Take 1 tablet by mouth daily (with breakfast)    10/10/2021 at Unknown time     folic acid (FOLVITE) 1 MG tablet Take 1,000 mcg by mouth daily    Unknown at Unknown time     isosorbide mononitrate (IMDUR) 30 MG 24 hr tablet Take 30 mg by mouth daily    Unknown at Unknown time     levothyroxine (SYNTHROID/LEVOTHROID) 100 MCG tablet Take 100 mcg by mouth daily    10/10/2021 at Unknown time     lisinopril (ZESTRIL) 20 MG tablet Take 20 mg by mouth daily    Unknown at Unknown time     methotrexate sodium 2.5 MG TABS Take 3 tablets by mouth once a week Fridays   Past Week at Unknown time     metoprolol succinate ER (TOPROL-XL) 25 MG 24 hr tablet Take 25 mg by mouth daily    Unknown at Unknown time     polyethylene glycol (MIRALAX) 17 GM/Dose powder Take 17 g by mouth daily    Unknown at Unknown time     vitamin C (ASCORBIC ACID) 500 MG tablet Take 500 mg by mouth daily    Unknown at Unknown time         SOCIAL HISTORY:   she  reports that she quit smoking about 26 years ago. Her smoking use included cigarettes. She has a 30.00 pack-year smoking history. She has never used smokeless tobacco. She reports current alcohol use of about 1.0 standard drinks of alcohol per week. She reports that she does not use drugs.      FAMILY HISTORY:  family history includes Asthma in her mother.      REVIEW OF SYSTEMS:   See HPI, otherwise negative       PHYSICAL EXAMINATION:  Vitals: Temp:  [97.6  F (36.4  C)-98.4  F (36.9  C)] 97.9  F (36.6  C)  Pulse:  [36-92] 62  Resp:  [16-49] 16  BP: ()/() 96/50  SpO2:  [89 %-100 %] 92 %  General: On examination, the patient is Alert with son  present at bedside, resting comfortably, NAD, awake and alert and oriented to person, place, time, and and general circumstances   SKIN: There is no evidence of erythema, ecchymosis, abrasions, or and lacerations   Pulses:  dorsalis pedis and posterior tibial  Sensation: Intact and equal bilaterally  Tenderness: Tender to palpation over the lateral aspect of the right hip as well as into the proximal aspect of the anterior lateral thigh circumferentially  ROM: Deferred about the right hip secondary to known fracture.  Full range of motion right knee both active and passive.  Motor: Appropriate quad contraction as well as plantar and dorsiflexion  Contralateral side= Full range of motion, Negative joint instability findings, 5/5 motor groups about the joint, Non-tender.        RADIOGRAPHIC EVALUATION:   AP and lateral of the right hip reviewed today shows minimally displaced noncomminuted greater trochanteric fracture, right total hip arthroplasty components in appropriate position no subsidence of the femoral stem    Pertinent Labs  Lab Results: personally reviewed.   Office Visit on 08/31/2021   Component Date Value     Sodium 08/31/2021 140      Potassium 08/31/2021 4.7      Chloride 08/31/2021 106      Carbon Dioxide (CO2) 08/31/2021 23      Anion Gap 08/31/2021 11      Urea Nitrogen 08/31/2021 16      Creatinine 08/31/2021 0.99      Calcium 08/31/2021 9.2      Glucose 08/31/2021 77      GFR Estimate 08/31/2021 52*     Vitamin D, Total (25-Hyd* 08/31/2021 61      WBC Count 08/31/2021 6.1      RBC Count 08/31/2021 3.72*     Hemoglobin 08/31/2021 11.2*     Hematocrit 08/31/2021 35.7      MCV 08/31/2021 96      MCH 08/31/2021 30.1      MCHC 08/31/2021 31.4*     RDW 08/31/2021 16.6*     Platelet Count 08/31/2021 119*     % Neutrophils 08/31/2021 64      % Lymphocytes 08/31/2021 24      % Monocytes 08/31/2021 10      % Eosinophils 08/31/2021 2      % Basophils 08/31/2021 0      % Immature Granulocytes 08/31/2021 0       Absolute Neutrophils 08/31/2021 3.9      Absolute Lymphocytes 08/31/2021 1.5      Absolute Monocytes 08/31/2021 0.6      Absolute Eosinophils 08/31/2021 0.1      Absolute Basophils 08/31/2021 0.0      Absolute Immature Granul* 08/31/2021 0.0    Lab on 08/17/2021   Component Date Value     Sodium 08/17/2021 142      Potassium 08/17/2021 6.0*     Chloride 08/17/2021 106      Carbon Dioxide (CO2) 08/17/2021 26      Anion Gap 08/17/2021 10      Urea Nitrogen 08/17/2021 34*     Creatinine 08/17/2021 1.49*     Calcium 08/17/2021 9.9      Glucose 08/17/2021 101      GFR Estimate 08/17/2021 32*   Telephone on 08/17/2021   Component Date Value     WBC Count 08/17/2021 6.1      RBC Count 08/17/2021 3.91      Hemoglobin 08/17/2021 11.9      Hematocrit 08/17/2021 37.8      MCV 08/17/2021 97      MCH 08/17/2021 30.4      MCHC 08/17/2021 31.5      RDW 08/17/2021 17.1*     Platelet Count 08/17/2021 131*       IMPRESSION:   Minimally displaced noncomminuted periprosthetic greater trochanteric fracture3     PLAN:    Recommend nonoperative management given stable appearance of total hip arthroplasty and minimal displacement of greater trochanteric fracture fragment  Nonweightbearing right lower extremity except for transfers may be toe-touch weightbearing.  No isolated abduction about the right hip.  Limited flexion 75 degrees  Discussed obtaining flexion/abduction stop brace however patient hesitant states willingness to follow precautions  PT/OT focus on transfers within weightbearing status  DC n.p.o. status  Anticoagulation 81 mg aspirin daily, compression stockings      Thank you for including [facility] in the care of Rox Zavala. It has been a pleasure participating in their care.    Jass Orr PA-C      CC1:   Chiquis Dennis MD    CC2:   Misbah Barone

## 2021-10-11 NOTE — PLAN OF CARE
Problem: Adult Inpatient Plan of Care  Goal: Optimal Comfort and Wellbeing  Outcome: Improving   Pt has minimal c/o pain with T&R'ing. Pt up at bedside with PT/OT, minimal pain.   IV dilaudid prior to for anticipatory pain with moving. Continue to monitor.

## 2021-10-11 NOTE — PROGRESS NOTES
10/11/21 1400   Quick Adds   Type of Visit Initial PT Evaluation   Living Environment   People in home alone   Current Living Arrangements independent living facility   Home Accessibility no concerns   Transportation Anticipated family or friend will provide   Self-Care   Usual Activity Tolerance good   Current Activity Tolerance fair   Regular Exercise Yes   Equipment Currently Used at Home walker, rolling  (4WW, FWW- owns but not using at baseline )   Activity/Exercise/Self-Care Comment Indpendent with ADLs, but does not drive.    Disability/Function   Walking or Climbing Stairs Difficulty no   Toileting issues no   Doing Errands Independently Difficulty (such as shopping) no   General Information   Onset of Illness/Injury or Date of Surgery 10/10/21   Referring Physician Chiquis Dennis MD   Patient/Family Therapy Goals Statement (PT) continued therapy, AR vs. TCU   Pertinent History of Current Problem (include personal factors and/or comorbidities that impact the POC) Fall, R LE minimal displacement of greater trochanteric fracture fragment   Existing Precautions/Restrictions no active hip ABD;weight bearing;other (see comments)  (75 degree flexion restriction )   Weight-Bearing Status - RLE nonweight-bearing  (TTWB for transfers only )   Strength   Manual Muscle Testing Quick Adds Deficits observed during functional mobility   Strength Comments General weakness    Bed Mobility   Bed Mobility supine-sit   Supine-Sit [place] (Bed Mobility) supervision   Bed Mobility Limitations impaired ability to control trunk for mobility   Assistive Device (Bed Mobility) bed rails   Comment (Bed Mobility) Max cues for maintaining precautions during bed mobility.    Transfers   Transfers sit-stand transfer;bed-chair transfer   Impairments Contributing to Impaired Transfers decreased strength   Bed-Chair Transfer   Bed-Chair [place] (Transfers) moderate assist (50% patient effort);2 person assist   Assistive Device  (Bed-Chair Transfers) walker, front-wheeled   Bed/Chair Transfer Comments Stand-pivot. Cues for TTWB.    Sit-Stand Transfer   Sit-Stand [place] (Transfers) minimum assist (75% patient effort);2 person assist   Assistive Device (Sit-Stand Transfers) walker, front-wheeled   Sit/Stand Transfer Comments Cues for proper hand placement and safety. Pt able to dem NWB and TTWB during standing.    Gait/Stairs (Locomotion)   Comment (Gait/Stairs) n/a- pt unable to amb at this time    Clinical Impression   Criteria for Skilled Therapeutic Intervention yes, treatment indicated   PT Diagnosis (PT) Impaired functional mobility    Influenced by the following impairments Impaired strength, impaired transfers, impaired balance, impaired endurance    Functional limitations due to impairments Weakness, WB restrictions    Clinical Presentation Stable/Uncomplicated   Clinical Presentation Rationale Presents as diagnosed    Clinical Decision Making (Complexity) moderate complexity   Therapy Frequency (PT) 2x/day   Predicted Duration of Therapy Intervention (days/wks) 5 days    Planned Therapy Interventions (PT) balance training;bed mobility training;gait training;strengthening;transfer training   Anticipated Equipment Needs at Discharge (PT) walker, rolling  (FWW)   Risk & Benefits of therapy have been explained patient;son   PT Discharge Planning    PT Discharge Recommendation (DC Rec) Acute Rehab Center-Motivated patient will benefit from intensive, interdisciplinary therapy.  Anticipate will be able to tolerate 3 hours of therapy per day;Transitional Care Facility;home with home care physical therapy   PT Rationale for DC Rec Pt would need 24 hr supervision and Ax2 in order to return home safely.    Total Evaluation Time   Total Evaluation Time (Minutes) 10       Maria T Mina, PT, DPT  10/11/2021

## 2021-10-12 ENCOUNTER — APPOINTMENT (OUTPATIENT)
Dept: PHYSICAL THERAPY | Facility: HOSPITAL | Age: 86
DRG: 535 | End: 2021-10-12
Payer: COMMERCIAL

## 2021-10-12 ENCOUNTER — APPOINTMENT (OUTPATIENT)
Dept: OCCUPATIONAL THERAPY | Facility: HOSPITAL | Age: 86
DRG: 535 | End: 2021-10-12
Payer: COMMERCIAL

## 2021-10-12 LAB
ANION GAP SERPL CALCULATED.3IONS-SCNC: 4 MMOL/L (ref 5–18)
ATRIAL RATE - MUSE: 55 BPM
BUN SERPL-MCNC: 18 MG/DL (ref 8–28)
CALCIUM SERPL-MCNC: 8 MG/DL (ref 8.5–10.5)
CHLORIDE BLD-SCNC: 106 MMOL/L (ref 98–107)
CO2 SERPL-SCNC: 25 MMOL/L (ref 22–31)
CREAT SERPL-MCNC: 0.94 MG/DL (ref 0.6–1.1)
DIASTOLIC BLOOD PRESSURE - MUSE: NORMAL MMHG
GFR SERPL CREATININE-BSD FRML MDRD: 56 ML/MIN/1.73M2
GLUCOSE BLD-MCNC: 88 MG/DL (ref 70–125)
HGB BLD-MCNC: 8.4 G/DL (ref 11.7–15.7)
INTERPRETATION ECG - MUSE: NORMAL
MAGNESIUM SERPL-MCNC: 2 MG/DL (ref 1.8–2.6)
P AXIS - MUSE: 94 DEGREES
POTASSIUM BLD-SCNC: 4.2 MMOL/L (ref 3.5–5)
PR INTERVAL - MUSE: 250 MS
QRS DURATION - MUSE: 140 MS
QT - MUSE: 488 MS
QTC - MUSE: 466 MS
R AXIS - MUSE: -46 DEGREES
SODIUM SERPL-SCNC: 135 MMOL/L (ref 136–145)
SYSTOLIC BLOOD PRESSURE - MUSE: NORMAL MMHG
T AXIS - MUSE: 85 DEGREES
VENTRICULAR RATE- MUSE: 55 BPM

## 2021-10-12 PROCEDURE — 250N000013 HC RX MED GY IP 250 OP 250 PS 637: Performed by: HOSPITALIST

## 2021-10-12 PROCEDURE — 97535 SELF CARE MNGMENT TRAINING: CPT | Mod: GO

## 2021-10-12 PROCEDURE — 83735 ASSAY OF MAGNESIUM: CPT | Performed by: INTERNAL MEDICINE

## 2021-10-12 PROCEDURE — 85018 HEMOGLOBIN: CPT | Performed by: HOSPITALIST

## 2021-10-12 PROCEDURE — 36415 COLL VENOUS BLD VENIPUNCTURE: CPT | Performed by: HOSPITALIST

## 2021-10-12 PROCEDURE — 99232 SBSQ HOSP IP/OBS MODERATE 35: CPT | Performed by: INTERNAL MEDICINE

## 2021-10-12 PROCEDURE — 120N000001 HC R&B MED SURG/OB

## 2021-10-12 PROCEDURE — 97110 THERAPEUTIC EXERCISES: CPT | Mod: GP

## 2021-10-12 PROCEDURE — 80048 BASIC METABOLIC PNL TOTAL CA: CPT | Performed by: HOSPITALIST

## 2021-10-12 PROCEDURE — 250N000013 HC RX MED GY IP 250 OP 250 PS 637: Performed by: INTERNAL MEDICINE

## 2021-10-12 PROCEDURE — 97530 THERAPEUTIC ACTIVITIES: CPT | Mod: GP

## 2021-10-12 RX ADMIN — LIDOCAINE 1 PATCH: 246 PATCH TOPICAL at 08:46

## 2021-10-12 RX ADMIN — ISOSORBIDE MONONITRATE 30 MG: 30 TABLET, EXTENDED RELEASE ORAL at 08:46

## 2021-10-12 RX ADMIN — ATORVASTATIN CALCIUM 40 MG: 40 TABLET, FILM COATED ORAL at 20:08

## 2021-10-12 RX ADMIN — ACETAMINOPHEN 975 MG: 325 TABLET ORAL at 20:07

## 2021-10-12 RX ADMIN — LEVOTHYROXINE SODIUM 100 MCG: 0.1 TABLET ORAL at 06:49

## 2021-10-12 RX ADMIN — PANTOPRAZOLE SODIUM 40 MG: 20 TABLET, DELAYED RELEASE ORAL at 06:49

## 2021-10-12 RX ADMIN — METOPROLOL SUCCINATE 25 MG: 25 TABLET, EXTENDED RELEASE ORAL at 08:46

## 2021-10-12 RX ADMIN — DONEPEZIL HYDROCHLORIDE 10 MG: 5 TABLET, FILM COATED ORAL at 20:08

## 2021-10-12 RX ADMIN — ACETAMINOPHEN 975 MG: 325 TABLET ORAL at 04:45

## 2021-10-12 RX ADMIN — FOLIC ACID 1000 MCG: 1 TABLET ORAL at 08:46

## 2021-10-12 NOTE — PLAN OF CARE
Problem: Adult Inpatient Plan of Care  Goal: Plan of Care Review  Outcome: No Change   Pt working with therapy. Pain managed with tylenol and lidocaine patch. Pt voiding via purewick.

## 2021-10-12 NOTE — PLAN OF CARE
Problem: Adult Inpatient Plan of Care  Goal: Optimal Comfort and Wellbeing  Outcome: No Change     Problem: Risk for Delirium  Goal: Optimal Coping  Outcome: Improving  Goal: Improved Behavioral Control  Outcome: Improving  Goal: Improved Attention and Thought Clarity  Outcome: Improving  Goal: Improved Sleep  Outcome: Improving     Minimal pain when lying in bed. Notices pain if she touches her thigh. Scheduled Tylenol for discomfort. Lidocaine patches starting today. Alert and oriented x 4. Slept well between cares.

## 2021-10-12 NOTE — PLAN OF CARE
Problem: Functional Ability Impaired (Hip Fracture Medical Management)  Goal: Optimal Functional Performance  Outcome: No Change  Intervention: Promote Optimal Functional Status  Recent Flowsheet Documentation  Taken 10/12/2021 1600 by Jaclyn Olivier RN  Activity Management: up in chair   Right Femur Fx, TTWB, No surgical. Assist 1 with walker from chair to bed, tolerated well.  Tele monitor.    Problem: Pain (Hip Fracture Medical Management)  Goal: Acceptable Pain Level  Outcome: No Change  Intervention: Manage Acute Orthopaedic-Related Pain  Recent Flowsheet Documentation  Taken 10/12/2021 1600 by Jaclyn Olivier RN  Pain Management Interventions: (hurts only w/movement, pt declines now) declines   Pt reports pain only with movement, Tylenol & lido path help/    Plan is to discharge to TCU Thursday.    Jaclyn Olivier, ARIE

## 2021-10-12 NOTE — PROGRESS NOTES
"Care Management Follow Up    Length of Stay (days): 2    Expected Discharge Date: 10/13/2021 or 10/14/2021     Concerns to be Addressed:  pain       Patient plan of care discussed at interdisciplinary rounds: Yes     Anticipated Discharge Disposition:  TCU vs AR     Anticipated Discharge Services:          Additional Information: Patient admitted for closed nondisplaced fracture-right. Orthopedic consulted, recommending non-operative management. Recommendation is for TCU vs Acute rehab        Assessment History: Rox lives at [facility] alone. She is normally independent with ADLs. She does not drive and family helps with shopping and transportation. She has a walker at home, but \"normally does not use it anymore\".  If TCU is needed she has recently been at and would want to go to [facility].  Family to transport at discharge.     Patient is covid vaccinated Moderna 2/9/21, 1/12/21.     Referral faxed to [facility] for review. TCU referral faxed to [facility].     11:19 AM [facility] accepted medically pending bed availability. Possible opening tomorrow or Thursday. CM to check with admissions tomorrow morning.               Lian Del Rio RN        "

## 2021-10-12 NOTE — PROGRESS NOTES
[facility]    Medicine Progress Note - Hospitalist Service       Date of Admission:  10/10/2021    Assessment & Plan           Rox COHN Lucas is a 86 year old female admitted on 10/10/2021. She has history of CAD, CABG (CHEN to mid left circ and RCA--2016), AVR (bio-2014), diastolic HF, htn, psoriatic arthritis, hx of GIB, gastric ulcers, presented following mechanical fall found to have comminuted periprosthetic fracture of the right greater trochanter      1.Comminuted periprosthetic fracture of the right greater trochanter  -Ortho saw and recommended conservative management.  She is to be nonweightbearing RLE except may be TTWB for transfers.    -No abduction of the right hip.  No flexion greater than 75 degrees.  -pain control-scheduled Tylenol, lidocaine patch.  Tramadol as needed.  -PT/OT.  Expect she will likely need placement TCU     2.Mechanical fall  -got foot caught on door  -no loc  -fall precautions     3.hx of Serious GIB  -hx of gastric ulcers and Dieulafoy  -NOT on anticoagulation  -Hgb 11.9-->8.4 and CT showing some likely intramuscular hemorrhage of the vastus intermedius muscle.  Do not think she is having GI bleed at this time.  -Continue empiric PPI      4.Acute blood loss anemia  -Likely due to intramuscular hematoma as above  -Trend Hgb     5.JOSIAH  -Baseline Cr 1, here up to 1.25 , now today 0.94  -Hold home lisinopril     6.Hx of AVR--bio--2014     7.hx of CAD   -s/p cabg  -home statin, metoprolol and Imdur. Not on ASA    8.hx of diastolic HF-compensated     9.Hypothyroidism  -Home Synthroid     10.htn  -home lisinopril, metoprolol and Imdur with hold parameters     11.hx of psoriatic arthritis  -on methotrexate (takes on Fridays, and folic acid daily)     12.Mild thrombocytopenia  -trend      13.Mild cognitive impairment  - Aricept     14.Acute resp hypoxic failure  - from opioids, resolved  -Had spO2 of 89% after getting IV morphine  -Now satting normally on room  air  -CXR normal        Diet: Regular Diet Adult    DVT Prophylaxis: no hep or lovenox due to risk bleeding for her  Rogers Catheter: Not present  Central Lines: None  Code Status: No CPR- Do NOT Intubate      Disposition Plan   Expected discharge: 10/14/2021   Needs TCU     The patient's care was discussed with the Patient. I called son to update    Poonam Roca MD  Hospitalist Service  [facility]  Securely message with the Vocera Web Console (learn more here)  Text page via [email or web address] Paging/Directory        ______________________________________________________________________    Interval History   She feels ok  Still has pain in leg  She is eating ok  No other concerns  Understands need for tcu and limits on wt bearing      Data reviewed today: I reviewed all medications, new labs and imaging results over the last 24 hours. I personally reviewed no images or EKG's today.    Physical Exam   Vital Signs: Temp: 98.3  F (36.8  C) Temp src: Oral BP: 111/58 Pulse: 87   Resp: 16 SpO2: 94 % O2 Device: None (Room air)    Weight: 116 lbs 0 oz  Constitutional: awake  Eyes: sclera clear  Respiratory: No increased work of breathing, good air exchange, clear to auscultation bilaterally, no crackles or wheezing  Cardiovascular: Normal apical impulse, regular rate and rhythm, normal S1 and S2, no S3 or S4, and no murmur noted  GI: normal bowel sounds, soft, non-distended and non-tender  Skin: no bruising or bleeding  Musculoskeletal: no lower extremity pitting edema present  Neurologic: Mental Status Exam:  Level of Alertness:   awake  Neuropsychiatric: General: normal, calm and normal eye contact    Data   Recent Labs   Lab 10/12/21  0515 10/11/21  0547 10/10/21  1707   WBC  --  7.0 6.9   HGB 8.4* 8.7* 11.9   MCV  --  98 98   PLT  --  108* 125*   INR  --   --  1.25*   * 138 138   POTASSIUM 4.2 4.3 4.5   CHLORIDE 106 107 104   CO2 25 26 25   BUN 18 20 17   CR 0.94 1.25* 1.07   ANIONGAP 4* 5 9    CARLA 8.0* 8.2* 9.3   GLC 88 97 105     No results found for this or any previous visit (from the past 24 hour(s)).

## 2021-10-12 NOTE — PLAN OF CARE
Problem: Adult Inpatient Plan of Care  Goal: Optimal Comfort and Wellbeing  Outcome: Improving     Problem: Adult Inpatient Plan of Care  Goal: Absence of Hospital-Acquired Illness or Injury  Intervention: Prevent and Manage VTE (Venous Thromboembolism) Risk  Recent Flowsheet Documentation  Taken 10/11/2021 1915 by Leslie Scott, RN  VTE Prevention/Management:   ambulation promoted   anticoagulant therapy maintained   fluids promoted  Taken 10/11/2021 1555 by Leslie Scott, RN  VTE Prevention/Management:   ambulation promoted   anticoagulant therapy maintained   fluids promoted     Problem: Risk for Delirium  Goal: Improved Attention and Thought Clarity  Outcome: Improving     Pt is very pleasant and cooperative.  Denies pain to right hip except with movement.  Scheduled tylenol given.  Fluids encouraged.  Provider was made aware of low urine output.  Purewick in place.  100 out in canister in addition to wet brief x 1.  TTWB with pivot transfers to E.  Pt alert and oriented.  VSS.  Will continue to monitor.

## 2021-10-13 ENCOUNTER — APPOINTMENT (OUTPATIENT)
Dept: OCCUPATIONAL THERAPY | Facility: HOSPITAL | Age: 86
DRG: 535 | End: 2021-10-13
Payer: COMMERCIAL

## 2021-10-13 ENCOUNTER — APPOINTMENT (OUTPATIENT)
Dept: PHYSICAL THERAPY | Facility: HOSPITAL | Age: 86
DRG: 535 | End: 2021-10-13
Payer: COMMERCIAL

## 2021-10-13 LAB
ANION GAP SERPL CALCULATED.3IONS-SCNC: 5 MMOL/L (ref 5–18)
BUN SERPL-MCNC: 20 MG/DL (ref 8–28)
CALCIUM SERPL-MCNC: 8.5 MG/DL (ref 8.5–10.5)
CHLORIDE BLD-SCNC: 106 MMOL/L (ref 98–107)
CO2 SERPL-SCNC: 25 MMOL/L (ref 22–31)
CREAT SERPL-MCNC: 0.82 MG/DL (ref 0.6–1.1)
GFR SERPL CREATININE-BSD FRML MDRD: 66 ML/MIN/1.73M2
GLUCOSE BLD-MCNC: 89 MG/DL (ref 70–125)
HGB BLD-MCNC: 8.6 G/DL (ref 11.7–15.7)
PLATELET # BLD AUTO: 105 10E3/UL (ref 150–450)
POTASSIUM BLD-SCNC: 4.4 MMOL/L (ref 3.5–5)
SODIUM SERPL-SCNC: 136 MMOL/L (ref 136–145)

## 2021-10-13 PROCEDURE — 85049 AUTOMATED PLATELET COUNT: CPT | Performed by: INTERNAL MEDICINE

## 2021-10-13 PROCEDURE — 97535 SELF CARE MNGMENT TRAINING: CPT | Mod: GO

## 2021-10-13 PROCEDURE — 120N000001 HC R&B MED SURG/OB

## 2021-10-13 PROCEDURE — 82374 ASSAY BLOOD CARBON DIOXIDE: CPT | Performed by: INTERNAL MEDICINE

## 2021-10-13 PROCEDURE — 97110 THERAPEUTIC EXERCISES: CPT | Mod: GP

## 2021-10-13 PROCEDURE — 250N000013 HC RX MED GY IP 250 OP 250 PS 637: Performed by: HOSPITALIST

## 2021-10-13 PROCEDURE — 85018 HEMOGLOBIN: CPT | Performed by: INTERNAL MEDICINE

## 2021-10-13 PROCEDURE — 97530 THERAPEUTIC ACTIVITIES: CPT | Mod: GP

## 2021-10-13 PROCEDURE — 97110 THERAPEUTIC EXERCISES: CPT | Mod: GO

## 2021-10-13 PROCEDURE — 250N000013 HC RX MED GY IP 250 OP 250 PS 637: Performed by: INTERNAL MEDICINE

## 2021-10-13 PROCEDURE — 36415 COLL VENOUS BLD VENIPUNCTURE: CPT | Performed by: INTERNAL MEDICINE

## 2021-10-13 PROCEDURE — 99232 SBSQ HOSP IP/OBS MODERATE 35: CPT | Performed by: INTERNAL MEDICINE

## 2021-10-13 RX ORDER — SENNOSIDES 8.6 MG
8.6 TABLET ORAL DAILY
Status: DISCONTINUED | OUTPATIENT
Start: 2021-10-13 | End: 2021-10-14

## 2021-10-13 RX ADMIN — DOCUSATE SODIUM 50 MG AND SENNOSIDES 8.6 MG 1 TABLET: 8.6; 5 TABLET, FILM COATED ORAL at 20:50

## 2021-10-13 RX ADMIN — ISOSORBIDE MONONITRATE 30 MG: 30 TABLET, EXTENDED RELEASE ORAL at 08:22

## 2021-10-13 RX ADMIN — PANTOPRAZOLE SODIUM 40 MG: 20 TABLET, DELAYED RELEASE ORAL at 06:46

## 2021-10-13 RX ADMIN — ACETAMINOPHEN 975 MG: 325 TABLET ORAL at 05:07

## 2021-10-13 RX ADMIN — STANDARDIZED SENNA CONCENTRATE 8.6 MG: 8.6 TABLET ORAL at 11:24

## 2021-10-13 RX ADMIN — ATORVASTATIN CALCIUM 40 MG: 40 TABLET, FILM COATED ORAL at 20:50

## 2021-10-13 RX ADMIN — FOLIC ACID 1000 MCG: 1 TABLET ORAL at 08:22

## 2021-10-13 RX ADMIN — ACETAMINOPHEN 975 MG: 325 TABLET ORAL at 20:53

## 2021-10-13 RX ADMIN — LIDOCAINE 1 PATCH: 246 PATCH TOPICAL at 08:22

## 2021-10-13 RX ADMIN — LEVOTHYROXINE SODIUM 100 MCG: 0.1 TABLET ORAL at 06:46

## 2021-10-13 RX ADMIN — METOPROLOL SUCCINATE 25 MG: 25 TABLET, EXTENDED RELEASE ORAL at 08:22

## 2021-10-13 RX ADMIN — POLYETHYLENE GLYCOL 3350 17 G: 17 POWDER, FOR SOLUTION ORAL at 08:23

## 2021-10-13 RX ADMIN — DONEPEZIL HYDROCHLORIDE 10 MG: 5 TABLET, FILM COATED ORAL at 20:50

## 2021-10-13 NOTE — PROGRESS NOTES
[facility]    Medicine Progress Note - Hospitalist Service       Date of Admission:  10/10/2021    Assessment & Plan             Rox COHN Lucas is a 86 year old female admitted on 10/10/2021. She has history of CAD, CABG (CHEN to mid left circ and RCA--2016), AVR (bio-2014), diastolic HF, htn, psoriatic arthritis, hx of GIB, gastric ulcers, presented following mechanical fall found to have comminuted periprosthetic fracture of the right greater trochanter      1.Comminuted periprosthetic fracture of the right greater trochanter  -Ortho saw and recommended conservative management.  She is to be nonweightbearing RLE except may be TTWB for transfers.    -No abduction of the right hip.  No flexion greater than 75 degrees.  -pain control-scheduled Tylenol, lidocaine patch.  Tramadol as needed.  -PT/OT.  Expect she will likely need placement TCU. PT has brought up possibility of acute rehab as well. Care management is looking into options     2.Mechanical fall  -got foot caught on door  -no loc  -fall precautions     3.hx of Serious GIB  -hx of gastric ulcers and Dieulafoy  -NOT on anticoagulation  -Hgb 11.9-->8.6 and CT showing some likely intramuscular hemorrhage of the vastus intermedius muscle.  Do not think she is having GI bleed at this time.  -Continue empiric PPI      4.Acute blood loss anemia  -Likely due to intramuscular hematoma as above  -Trend Hgb     5.JOSIAH  -Baseline Cr 1, here up to 1.25 , now today 0.82  -Held home lisinopril, but should be able to restart soon  -avoid hypotension     6.Hx of AVR--bio--2014     7.hx of CAD   -s/p cabg  -home statin, metoprolol and Imdur. Not on ASA     8.hx of diastolic HF-compensated     9.Hypothyroidism  -Home Synthroid     10.htn  -held lisinopril(elevated creat and lower bp), metoprolol and Imdur with hold parameters     11.hx of psoriatic arthritis  -on methotrexate (takes on Fridays, and folic acid daily)     12.Mild  thrombocytopenia  -trend      13.Mild cognitive impairment  - Aricept     14.Acute resp hypoxic failure  - from opioids, resolved  -Had spO2 of 89% after getting IV morphine  -Now satting normally on room air  -CXR normal            Diet: Regular Diet Adult    DVT Prophylaxis: Pneumatic Compression Devices  Rogers Catheter: Not present  Central Lines: None  Code Status: No CPR- Do NOT Intubate      Disposition Plan   Expected discharge: 10/14/2021   recommended to transitional care unit once placement acute rehab vs tcu.     The patient's care was discussed with the Patient. I called son to update at 1121    Poonma Roca MD  Hospitalist Service  [facility]  Securely message with the Vocera Web Console (learn more here)  Text page via [employer] Paging/Directory        ______________________________________________________________________    Interval History   She feels good today  She notes less pain  Eating ok  Still thinks she is constipated today    Data reviewed today: I reviewed all medications, new labs and imaging results over the last 24 hours. I personally reviewed no images or EKG's today.    Physical Exam   Vital Signs: Temp: 98  F (36.7  C) Temp src: Oral BP: (!) 143/49 Pulse: 64   Resp: 16 SpO2: 95 % O2 Device: None (Room air)    Weight: 116 lbs 0 oz  Constitutional: awake  Eyes: sclera clear  Respiratory: No increased work of breathing, good air exchange, clear to auscultation bilaterally, no crackles or wheezing  Cardiovascular: Normal apical impulse, regular rate and rhythm, normal S1 and S2, no S3 or S4, and no murmur noted  GI: normal bowel sounds, soft, non-distended and non-tender  Skin: no bruising or bleeding  Musculoskeletal: no lower extremity pitting edema present  Neurologic: Mental Status Exam:  Level of Alertness:   awake  Neuropsychiatric: General: normal, calm and normal eye contact    Data   Recent Labs   Lab 10/13/21  0650 10/12/21  0515 10/11/21  0538  10/10/21  1707 10/10/21  1707   WBC  --   --  7.0  --  6.9   HGB 8.6* 8.4* 8.7*   < > 11.9   MCV  --   --  98  --  98   *  --  108*  --  125*   INR  --   --   --   --  1.25*    135* 138   < > 138   POTASSIUM 4.4 4.2 4.3   < > 4.5   CHLORIDE 106 106 107   < > 104   CO2 25 25 26   < > 25   BUN 20 18 20   < > 17   CR 0.82 0.94 1.25*   < > 1.07   ANIONGAP 5 4* 5   < > 9   CARLA 8.5 8.0* 8.2*   < > 9.3   GLC 89 88 97   < > 105    < > = values in this interval not displayed.

## 2021-10-13 NOTE — PLAN OF CARE
Problem: Adult Inpatient Plan of Care  Goal: Plan of Care Review  Outcome: Improving   Pt having good pain relief with tylenol. Pt has been up to chair, transferring well with TTWB on right leg. Pt remains on tele.

## 2021-10-13 NOTE — PLAN OF CARE
Problem: Adult Inpatient Plan of Care  Goal: Plan of Care Review  Outcome: Improving  Goal: Patient-Specific Goal (Individualized)  Outcome: Improving  Goal: Absence of Hospital-Acquired Illness or Injury  Outcome: Improving  Intervention: Identify and Manage Fall Risk  Recent Flowsheet Documentation  Taken 10/13/2021 0200 by Dinorah Oates RN  Safety Promotion/Fall Prevention: bed alarm on  Goal: Optimal Comfort and Wellbeing  Outcome: Improving  Goal: Readiness for Transition of Care  Outcome: Improving     Problem: Risk for Delirium  Goal: Optimal Coping  Outcome: Improving  Goal: Improved Behavioral Control  Outcome: Improving  Goal: Improved Attention and Thought Clarity  Outcome: Improving  Goal: Improved Sleep  Outcome: Improving     Problem: OT General Care Plan  Goal: Transfer (OT)  Description: Transfer (OT)  Outcome: Improving     Problem: Adjustment to Injury (Hip Fracture Medical Management)  Goal: Optimal Coping with Change in Health Status  Outcome: Improving     Problem: Bleeding (Hip Fracture Medical Management)  Goal: Absence of Bleeding  Outcome: Improving     Problem: Bowel Elimination Impaired (Hip Fracture Medical Management)  Goal: Effective Bowel Elimination  Outcome: Improving     Problem: Cognitive Decline Risk (Hip Fracture Medical Management)  Goal: Baseline Cognitive Function Maintained  Outcome: Improving     Problem: Embolism (Hip Fracture Medical Management)  Goal: Absence of Embolism  Outcome: Improving     Problem: Fracture Stabilization and Management (Hip Fracture Medical Management)  Goal: Fracture Stability  Outcome: Improving     Problem: Functional Ability Impaired (Hip Fracture Medical Management)  Goal: Optimal Functional Performance  Outcome: Improving     Problem: Pain (Hip Fracture Medical Management)  Goal: Acceptable Pain Level  Outcome: Improving     Problem: Urinary Elimination Impaired (Hip Fracture Medical Management)  Goal: Effective Urinary  Elimination  Outcome: Improving   Minimal pain with movement. Declined need for PRN pain meds. Took scheduled Tylenol x1. Patient makes her needs known, call light within reach.

## 2021-10-13 NOTE — PLAN OF CARE
Problem: Adult Inpatient Plan of Care  Goal: Plan of Care Review  Outcome: Improving   Pt doing well. Tolerates with minimal pain transfer from chair to bed.   Tylenol prn. Purewick in place.    Anticipate discharge to TCU tomorrow.    Jaclyn Olivier RN

## 2021-10-13 NOTE — PROGRESS NOTES
"Care Management Follow Up    Length of Stay (days): 3    Expected Discharge Date: 10/14/2021or 10/15/2021     Concerns to be Addressed:  TCU placement     Patient plan of care discussed at interdisciplinary rounds: Yes    Anticipated Discharge Disposition:  TCU     Anticipated Discharge Services:  TCU  Anticipated Discharge DME: n/a     Patient/family educated on Medicare website which has current facility and service quality ratings:  yes  Education Provided on the Discharge Plan:  yes  Patient/Family in Agreement with the Plan:  yes    Referrals Placed by CM/SW:  TCU         Additional Information: Patient admitted for closed nondisplaced fracture-right. Orthopedic consulted, recommending non-operative management. Recommendation is for TCU vs Acute rehab        Assessment History: Rox lives at [facility] alone. She is normally independent with ADLs. She does not drive and family helps with shopping and transportation. She has a walker at home, but \"normally does not use it anymore\".  If TCU is needed she has recently been at and would want to go to [facility].  Family to transport at discharge.     Patient is covid vaccinated Moderna 2/9/21, 1/12/21.    [facility] declined as patient does not meet criteria. [facility] medically accepted pending bed availability. CM to call admissions tomorrow.  Met with patient and son-Billy. Reviewed above information. They state understanding. Provided them with [facility] TCU list and requested two more TCU preferences.           Lian Del Rio RN        "

## 2021-10-14 ENCOUNTER — APPOINTMENT (OUTPATIENT)
Dept: PHYSICAL THERAPY | Facility: HOSPITAL | Age: 86
DRG: 535 | End: 2021-10-14
Payer: COMMERCIAL

## 2021-10-14 VITALS
OXYGEN SATURATION: 93 % | BODY MASS INDEX: 19.91 KG/M2 | DIASTOLIC BLOOD PRESSURE: 60 MMHG | TEMPERATURE: 98.2 F | HEART RATE: 70 BPM | RESPIRATION RATE: 16 BRPM | SYSTOLIC BLOOD PRESSURE: 133 MMHG | WEIGHT: 116 LBS

## 2021-10-14 DIAGNOSIS — Z11.59 ENCOUNTER FOR SCREENING FOR OTHER VIRAL DISEASES: Primary | ICD-10-CM

## 2021-10-14 LAB
ANION GAP SERPL CALCULATED.3IONS-SCNC: 7 MMOL/L (ref 5–18)
BUN SERPL-MCNC: 17 MG/DL (ref 8–28)
CALCIUM SERPL-MCNC: 8.7 MG/DL (ref 8.5–10.5)
CHLORIDE BLD-SCNC: 104 MMOL/L (ref 98–107)
CO2 SERPL-SCNC: 25 MMOL/L (ref 22–31)
CREAT SERPL-MCNC: 0.85 MG/DL (ref 0.6–1.1)
GFR SERPL CREATININE-BSD FRML MDRD: 63 ML/MIN/1.73M2
GLUCOSE BLD-MCNC: 86 MG/DL (ref 70–125)
HGB BLD-MCNC: 8.3 G/DL (ref 11.7–15.7)
PLATELET # BLD AUTO: 114 10E3/UL (ref 150–450)
POTASSIUM BLD-SCNC: 4.5 MMOL/L (ref 3.5–5)
SARS-COV-2 RNA RESP QL NAA+PROBE: NEGATIVE
SODIUM SERPL-SCNC: 136 MMOL/L (ref 136–145)

## 2021-10-14 PROCEDURE — 97530 THERAPEUTIC ACTIVITIES: CPT | Mod: GP

## 2021-10-14 PROCEDURE — 99239 HOSP IP/OBS DSCHRG MGMT >30: CPT | Performed by: INTERNAL MEDICINE

## 2021-10-14 PROCEDURE — 85018 HEMOGLOBIN: CPT | Performed by: INTERNAL MEDICINE

## 2021-10-14 PROCEDURE — 87635 SARS-COV-2 COVID-19 AMP PRB: CPT | Performed by: INTERNAL MEDICINE

## 2021-10-14 PROCEDURE — 250N000011 HC RX IP 250 OP 636: Performed by: HOSPITALIST

## 2021-10-14 PROCEDURE — 250N000013 HC RX MED GY IP 250 OP 250 PS 637: Performed by: HOSPITALIST

## 2021-10-14 PROCEDURE — 90662 IIV NO PRSV INCREASED AG IM: CPT | Performed by: HOSPITALIST

## 2021-10-14 PROCEDURE — 80048 BASIC METABOLIC PNL TOTAL CA: CPT | Performed by: INTERNAL MEDICINE

## 2021-10-14 PROCEDURE — 97110 THERAPEUTIC EXERCISES: CPT | Mod: GP

## 2021-10-14 PROCEDURE — 85049 AUTOMATED PLATELET COUNT: CPT | Performed by: INTERNAL MEDICINE

## 2021-10-14 PROCEDURE — 250N000013 HC RX MED GY IP 250 OP 250 PS 637: Performed by: INTERNAL MEDICINE

## 2021-10-14 PROCEDURE — G0008 ADMIN INFLUENZA VIRUS VAC: HCPCS | Performed by: HOSPITALIST

## 2021-10-14 PROCEDURE — 36415 COLL VENOUS BLD VENIPUNCTURE: CPT | Performed by: INTERNAL MEDICINE

## 2021-10-14 RX ORDER — POLYETHYLENE GLYCOL 3350 17 G/17G
17 POWDER, FOR SOLUTION ORAL DAILY
DISCHARGE
Start: 2021-10-14

## 2021-10-14 RX ORDER — FOLIC ACID 1 MG/1
1000 TABLET ORAL DAILY
DISCHARGE
Start: 2021-10-14 | End: 2022-01-01

## 2021-10-14 RX ORDER — ISOSORBIDE MONONITRATE 30 MG/1
30 TABLET, EXTENDED RELEASE ORAL DAILY
DISCHARGE
Start: 2021-10-14 | End: 2022-01-01

## 2021-10-14 RX ORDER — BISACODYL 10 MG
10 SUPPOSITORY, RECTAL RECTAL ONCE
Status: DISCONTINUED | OUTPATIENT
Start: 2021-10-14 | End: 2021-10-14

## 2021-10-14 RX ORDER — ACETAMINOPHEN 325 MG/1
975 TABLET ORAL EVERY 8 HOURS
Qty: 63 TABLET | Refills: 0 | Status: SHIPPED | OUTPATIENT
Start: 2021-10-14 | End: 2021-10-21

## 2021-10-14 RX ORDER — LEVOTHYROXINE SODIUM 100 UG/1
100 TABLET ORAL DAILY
DISCHARGE
Start: 2021-10-14 | End: 2021-10-31

## 2021-10-14 RX ORDER — LISINOPRIL 20 MG/1
20 TABLET ORAL DAILY
DISCHARGE
Start: 2021-10-14 | End: 2021-11-01

## 2021-10-14 RX ORDER — METHOTREXATE 2.5 MG/1
3 TABLET ORAL WEEKLY
DISCHARGE
Start: 2021-10-14 | End: 2021-12-13

## 2021-10-14 RX ORDER — AMOXICILLIN 250 MG
1 CAPSULE ORAL 2 TIMES DAILY
Status: DISCONTINUED | OUTPATIENT
Start: 2021-10-14 | End: 2021-10-14 | Stop reason: HOSPADM

## 2021-10-14 RX ORDER — ATORVASTATIN CALCIUM 40 MG/1
40 TABLET, FILM COATED ORAL DAILY
DISCHARGE
Start: 2021-10-14 | End: 2021-01-01

## 2021-10-14 RX ORDER — METOPROLOL SUCCINATE 25 MG/1
25 TABLET, EXTENDED RELEASE ORAL DAILY
DISCHARGE
Start: 2021-10-14 | End: 2022-01-01

## 2021-10-14 RX ORDER — AMOXICILLIN 250 MG
1 CAPSULE ORAL 2 TIMES DAILY
DISCHARGE
Start: 2021-10-14 | End: 2022-01-01

## 2021-10-14 RX ORDER — ASCORBIC ACID 500 MG
500 TABLET ORAL DAILY
DISCHARGE
Start: 2021-10-14 | End: 2022-01-01

## 2021-10-14 RX ORDER — FERROUS SULFATE 325(65) MG
325 TABLET ORAL
DISCHARGE
Start: 2021-10-14 | End: 2021-11-18

## 2021-10-14 RX ORDER — LIDOCAINE 4 G/G
1-2 PATCH TOPICAL EVERY 24 HOURS
Qty: 14 PATCH | Refills: 0 | Status: SHIPPED | OUTPATIENT
Start: 2021-10-14 | End: 2021-10-21

## 2021-10-14 RX ORDER — DONEPEZIL HYDROCHLORIDE 10 MG/1
10 TABLET, FILM COATED ORAL AT BEDTIME
Qty: 90 TABLET | Refills: 3 | DISCHARGE
Start: 2021-10-14

## 2021-10-14 RX ORDER — PANTOPRAZOLE SODIUM 40 MG/1
40 TABLET, DELAYED RELEASE ORAL
DISCHARGE
Start: 2021-10-15 | End: 2021-10-31

## 2021-10-14 RX ADMIN — ACETAMINOPHEN 975 MG: 325 TABLET ORAL at 03:54

## 2021-10-14 RX ADMIN — LISINOPRIL 20 MG: 20 TABLET ORAL at 08:42

## 2021-10-14 RX ADMIN — PANTOPRAZOLE SODIUM 40 MG: 20 TABLET, DELAYED RELEASE ORAL at 06:15

## 2021-10-14 RX ADMIN — METOPROLOL SUCCINATE 25 MG: 25 TABLET, EXTENDED RELEASE ORAL at 08:41

## 2021-10-14 RX ADMIN — LEVOTHYROXINE SODIUM 100 MCG: 0.1 TABLET ORAL at 06:15

## 2021-10-14 RX ADMIN — FOLIC ACID 1000 MCG: 1 TABLET ORAL at 08:41

## 2021-10-14 RX ADMIN — INFLUENZA A VIRUS A/VICTORIA/2570/2019 IVR-215 (H1N1) ANTIGEN (FORMALDEHYDE INACTIVATED), INFLUENZA A VIRUS A/TASMANIA/503/2020 IVR-221 (H3N2) ANTIGEN (FORMALDEHYDE INACTIVATED), INFLUENZA B VIRUS B/PHUKET/3073/2013 ANTIGEN (FORMALDEHYDE INACTIVATED), AND INFLUENZA B VIRUS B/WASHINGTON/02/2019 ANTIGEN (FORMALDEHYDE INACTIVATED) 0.7 ML: 60; 60; 60; 60 INJECTION, SUSPENSION INTRAMUSCULAR at 10:38

## 2021-10-14 RX ADMIN — ISOSORBIDE MONONITRATE 30 MG: 30 TABLET, EXTENDED RELEASE ORAL at 08:41

## 2021-10-14 RX ADMIN — LIDOCAINE 1 PATCH: 246 PATCH TOPICAL at 08:41

## 2021-10-14 RX ADMIN — DOCUSATE SODIUM 50 MG AND SENNOSIDES 8.6 MG 1 TABLET: 8.6; 5 TABLET, FILM COATED ORAL at 08:41

## 2021-10-14 NOTE — PLAN OF CARE
Problem: Adult Inpatient Plan of Care  Goal: Absence of Hospital-Acquired Illness or Injury  Intervention: Identify and Manage Fall Risk  Recent Flowsheet Documentation  Taken 10/14/2021 0201 by Dinorah Oates RN  Safety Promotion/Fall Prevention: bed alarm on     Problem: Adult Inpatient Plan of Care  Goal: Absence of Hospital-Acquired Illness or Injury  Intervention: Prevent and Manage VTE (Venous Thromboembolism) Risk  Recent Flowsheet Documentation  Taken 10/14/2021 0201 by Dinorah Oates RN  VTE Prevention/Management: anticoagulant therapy maintained     Problem: Adult Inpatient Plan of Care  Goal: Optimal Comfort and Wellbeing  Outcome: Improving   Patient is doing well. However  states that she wants to get moving, she is not used to having restrictions. Minimal pain except when moving.

## 2021-10-14 NOTE — PLAN OF CARE
Occupational Therapy Discharge Summary    Reason for therapy discharge:    Discharged to transitional care facility.    Progress towards therapy goal(s). See goals on Care Plan in [facility] electronic health record for goal details.  Goals partially met.  Barriers to achieving goals:   discharge from facility.    Therapy recommendation(s):    Continued therapy is recommended.  Rationale/Recommendations:  UE strength, LB dressing within precautions using ADMaximiliano Awad, OTS

## 2021-10-14 NOTE — PLAN OF CARE
Physical Therapy Discharge Summary    Reason for therapy discharge:    Discharged to transitional care facility.    Progress towards therapy goal(s). See goals on Care Plan in [facility] electronic health record for goal details.  Goals partially met.  Barriers to achieving goals:   discharge from facility.    Therapy recommendation(s):    Continued therapy is recommended.  Rationale/Recommendations:  PT goals not fully met .

## 2021-10-14 NOTE — PROGRESS NOTES
"Care Management Follow Up    Length of Stay (days): 4    Expected Discharge Date: 10/14/2021or 10/15/2021       Concerns to be Addressed:  TCU placement     Patient plan of care discussed at interdisciplinary rounds: Yes     Anticipated Discharge Disposition:  TCU     Anticipated Discharge Services:  TCU  Anticipated Discharge DME: n/a      Patient/family educated on Medicare website which has current facility and service quality ratings:  yes  Education Provided on the Discharge Plan:  yes  Patient/Family in Agreement with the Plan:  yes     Referrals Placed by CM/SW:  TCU           Additional Information: Patient admitted for closed nondisplaced fracture-right. Orthopedic consulted, recommending non-operative management. Recommendation is for TCU vs Acute rehab        Assessment History: Rox lives at [facility] alone. She is normally independent with ADLs. She does not drive and family helps with shopping and transportation. She has a walker at home, but \"normally does not use it anymore\".  If TCU is needed she has recently been at and would want to go to [facility]. Main family contact is son-Billy.  Family to transport at discharge.     Patient is covid vaccinated Moderna 2/9/21, 1/12/21.    [facility] TCU accepted patient pending bed availability. Son and patient agreeable. Voice message left with admissions this morning requesting status of bed.  PAS needed.    11:32 AM Accepted at [facility] TCU. PAS completed. Notified patient and Billy. Both are in agreement. Covid retest collected. [facility] ride scheduled for 1345.         Lian Del Rio RN        "

## 2021-10-14 NOTE — DISCHARGE SUMMARY
[facility] MEDICINE  DISCHARGE SUMMARY     Primary Care Physician: Misbah Barone  Admission Date: 10/10/2021   Discharge Provider: Poonam Roca MD Discharge Date: 10/14/2021   Diet:   Active Diet and Nourishment Order   Procedures     Regular Diet Adult     Advance Diet as Tolerated       Code Status: No CPR- Do NOT Intubate   Activity: non weight bearing on affected leg--see orders        Condition at Discharge: Stable     REASON FOR PRESENTATION(See Admission Note for Details)   Fall and pain    PRINCIPAL & ACTIVE DISCHARGE DIAGNOSES     Active Problems:    Closed nondisplaced fracture of greater trochanter of right femur, initial encounter (H)      PENDING LABS     Unresulted Labs Ordered in the Past 30 Days of this Admission     Date and Time Order Name Status Description    10/14/2021 10:31 AM Asymptomatic COVID-19 Virus (Coronavirus) by PCR Nose In process             PROCEDURES ( this hospitalization only)          RECOMMENDATIONS TO OUTPATIENT PROVIDER FOR F/U VISIT     Follow-up Appointments     Follow Up and recommended labs and tests      Follow up with detention physician. The following labs/tests are   recommended: bmp and hgb in 2 days.  Follow up with primary care provider in 2 weeks   Follow up with ortho clinic 2 weeks                 DISPOSITION     Skilled Nursing Facility    SUMMARY OF HOSPITAL COURSE:      Rox Zavala is a 86 year old female admitted on 10/10/2021. She has history of CAD, CABG (CHEN to mid left circ and RCA--2016), AVR (bio-2014), diastolic HF, htn, psoriatic arthritis, hx of GIB, gastric ulcers, presented following mechanical fall found to have comminuted periprosthetic fracture of the right greater trochanter      1.Comminuted periprosthetic fracture of the right greater trochanter  -Ortho saw and recommended conservative management.  She is to be nonweightbearing RLE except may be TTWB for transfers.    -No abduction of  the right hip.  No flexion greater than 75 degrees.  -pain control-scheduled Tylenol, lidocaine patch.  Tramadol as needed-she has not taken this in over 3 days so will stop.  -PT/OT.  Expect she will likely need placement TCU. PT has brought up possibility of acute rehab as well, but not accepted for that at this time. So now looking for TCU     2.Mechanical fall  -got foot caught on door  -no loc  -fall precautions     3.hx of Serious GIB  -hx of gastric ulcers and Dieulafoy  -NOT on anticoagulation  -Hgb 11.9-->8.3 and CT showing some likely intramuscular hemorrhage of the vastus intermedius muscle.  Do not think she is having GI bleed at this time.  -Continue empiric PPI      4.Acute blood loss anemia  -Likely due to intramuscular hematoma as above  -Trend Hgb     5.JOSIAH  -Baseline Cr 1, here up to 1.25 , now today 0.85  -Held home lisinopril, but should be able to restart soon   -avoid hypotension     6.Hx of AVR--bio--2014     7.hx of CAD   -s/p cabg  -home statin, metoprolol and Imdur. Not on ASA     8.hx of diastolic HF-compensated     9.Hypothyroidism  -Home Synthroid     10.htn  -held lisinopril(elevated creat and lower bp), metoprolol and Imdur with hold parameters     11.hx of psoriatic arthritis  -on methotrexate (takes on Fridays, and folic acid daily)     12.Mild thrombocytopenia  -trend , 114     13.Mild cognitive impairment  - Aricept     14.Acute resp hypoxic failure  - from opioids, resolved  -Had spO2 of 89% after getting IV morphine  -Now satting normally on room air  -CXR normal    Covid test negative 10/10 and today 10/14/21            Discharge Medications with Med changes:     Current Discharge Medication List      START taking these medications    Details   acetaminophen (TYLENOL) 325 MG tablet Take 3 tablets (975 mg) by mouth every 8 hours for 7 days For fracture pain  Qty: 63 tablet, Refills: 0    Associated Diagnoses: Pain      calcium carbonate-vitamin D (OYSTER SHELL CALCIUM/D) 500-200  MG-UNIT tablet Take 1 tablet by mouth daily    Associated Diagnoses: Psoriatic arthritis (H)      Lidocaine (LIDOCARE) 4 % Patch Place 1-2 patches onto the skin every 24 hours for 7 days To prevent lidocaine toxicity, patient should be patch free for 12 hrs daily.  Qty: 14 patch, Refills: 0    Associated Diagnoses: Pain      pantoprazole (PROTONIX) 40 MG EC tablet Take 1 tablet (40 mg) by mouth every morning (before breakfast)    Associated Diagnoses: Gastric ulcer due to Helicobacter pylori, acute; Dieulafoy lesion of stomach      senna-docusate (SENOKOT-S/PERICOLACE) 8.6-50 MG tablet Take 1 tablet by mouth 2 times daily Hold if loose stools    Associated Diagnoses: Slow transit constipation         CONTINUE these medications which have CHANGED    Details   atorvastatin (LIPITOR) 40 MG tablet Take 1 tablet (40 mg) by mouth daily For lipids    Associated Diagnoses: Mixed hyperlipidemia      cholecalciferol 50 MCG (2000 UT) tablet Take 1 tablet (50 mcg) by mouth daily  Qty: 100 tablet, Refills: 1    Associated Diagnoses: Stage 3b chronic kidney disease (H)      donepezil (ARICEPT) 10 MG tablet Take 1 tablet (10 mg) by mouth At Bedtime dementia  Qty: 90 tablet, Refills: 3    Associated Diagnoses: Mild vascular neurocognitive disorder (H)      ferrous sulfate (FEROSUL) 325 (65 Fe) MG tablet Take 1 tablet (325 mg) by mouth daily (with breakfast)    Associated Diagnoses: Acute blood loss anemia      folic acid (FOLVITE) 1 MG tablet Take 1 tablet (1,000 mcg) by mouth daily General health    Associated Diagnoses: Psoriatic arthritis (H)      isosorbide mononitrate (IMDUR) 30 MG 24 hr tablet Take 1 tablet (30 mg) by mouth daily    Associated Diagnoses: S/P AVR      levothyroxine (SYNTHROID/LEVOTHROID) 100 MCG tablet Take 1 tablet (100 mcg) by mouth daily For hypothyroidism    Associated Diagnoses: Hypothyroidism, unspecified type      lisinopril (ZESTRIL) 20 MG tablet Take 1 tablet (20 mg) by mouth daily htn    Associated  Diagnoses: S/P AVR      methotrexate sodium 2.5 MG TABS Take 3 tablets (7.5 mg) by mouth once a week Fridays    Associated Diagnoses: Psoriatic arthritis (H)      metoprolol succinate ER (TOPROL-XL) 25 MG 24 hr tablet Take 1 tablet (25 mg) by mouth daily htn    Associated Diagnoses: S/P AVR      polyethylene glycol (MIRALAX) 17 GM/Dose powder Take 17 g by mouth daily To prevent constiaption    Associated Diagnoses: Slow transit constipation      vitamin C (ASCORBIC ACID) 500 MG tablet Take 1 tablet (500 mg) by mouth daily    Associated Diagnoses: Psoriatic arthritis (H)         STOP taking these medications       calcium carbonate-vitamin D (OYSTER SHELL CALCIUM/D) 500-200 MG-UNIT tablet Comments:   Reason for Stopping:                     Rationale for medication changes:      Lidocaine patch for pain  Tylenol         Consults     SOCIAL WORK IP CONSULT  ORTHOPEDIC SURGERY IP CONSULT  PHYSICAL THERAPY ADULT IP CONSULT  OCCUPATIONAL THERAPY ADULT IP CONSULT  PHYSICAL THERAPY ADULT IP CONSULT  PHYSICAL THERAPY ADULT IP CONSULT  OCCUPATIONAL THERAPY ADULT IP CONSULT    Immunizations given this encounter     Most Recent Immunizations   Administered Date(s) Administered     COVID-19,PF,Moderna 02/09/2021     DT (PEDS <7y) 07/12/2005     FLU 6-35 months 09/16/2010     Flu, Unspecified 10/21/2008     HepA-Adult 01/26/2018     Influenza (H1N1) 01/18/2010     Influenza (High Dose) 3 valent vaccine 09/24/2019     Influenza (IIV3) PF 10/09/2014     Influenza Vaccine, 6+MO IM (QUADRIVALENT W/PRESERVATIVES) 10/09/2014     Influenza, Quad, High Dose, Pf, 65yr+ (Fluzone HD) 10/14/2021     Pneumo Conj 13-V (2010&after) 07/30/2015     Pneumococcal 23 valent 11/11/1997     Td (Adult), Adsorbed 07/12/2005     Td,adult,historic,unspecified 07/12/2005     Tdap (Adacel,Boostrix) 07/29/2016     Zoster vaccine recombinant adjuvanted (SHINGRIX) 08/21/2018     Zoster vaccine, live 09/17/2010           Anticoagulation Information      Recent  INR results:   Recent Labs   Lab 10/10/21  1707   INR 1.25*     Warfarin doses (if applicable) or name of other anticoagulant: no      SIGNIFICANT IMAGING FINDINGS     Results for orders placed or performed during the hospital encounter of 10/10/21   XR Pelvis and Hip Right 2 Views    Impression    IMPRESSION:   1. There is a periprosthetic fracture of the right greater trochanter. This is displaced up to 1 cm and is new since the 8/28/2019 radiographs.  2. Moderate to severe degenerative changes in the lower lumbar spine  3. Mild to moderate degenerative changes in the SI joints.  4. Moderate osteoarthrosis of the left hip.     XR Femur Right 2 Views    Impression    IMPRESSION:   1. There is a comminuted periprosthetic fracture of the right greater trochanter. Fracture fragments are displaced up to 1 cm superiorly and 5 mm laterally.  2. Total hip arthroplasty. Arterial calcifications.   CT Femur Thigh Right w/o Contrast    Impression    IMPRESSION:  1.  Comminuted periprosthetic fracture of the proximal right femur. This involves the greater trochanter and there is up to 14 mm of displacement.  2.  Moderate diffuse enlargement of the vastus intermedius muscle, likely related to intramuscular hemorrhage.  3.  Degenerative changes in the lower lumbar spine, right SI joint, pubic symphysis and knee.     XR Chest Port 1 View    Impression    IMPRESSION: Similar chronic right pleural thickening/scarring versus small effusion. Mild bibasilar atelectasis/scarring. No pneumothorax. Heart size is normal. CABG changes and sternotomy wires. Atherosclerosis. Degenerative changes of the spine and   bilateral shoulders. No acute osseous findings.       SIGNIFICANT LABORATORY FINDINGS     Most Recent 3 CBC's:Recent Labs   Lab Test 10/14/21  0517 10/13/21  0650 10/12/21  0515 10/11/21  0547 10/11/21  0547 10/10/21  1707 10/10/21  1707 08/31/21  1127 08/31/21  1127   WBC  --   --   --   --  7.0  --  6.9  --  6.1   HGB 8.3* 8.6*  8.4*   < > 8.7*   < > 11.9   < > 11.2*   MCV  --   --   --   --  98  --  98  --  96   * 105*  --   --  108*   < > 125*   < > 119*    < > = values in this interval not displayed.     Most Recent 3 BMP's:Recent Labs   Lab Test 10/14/21  0517 10/13/21  0650 10/12/21  0515    136 135*   POTASSIUM 4.5 4.4 4.2   CHLORIDE 104 106 106   CO2 25 25 25   BUN 17 20 18   CR 0.85 0.82 0.94   ANIONGAP 7 5 4*   CARLA 8.7 8.5 8.0*   GLC 86 89 88     Most Recent 2 LFT's:Recent Labs   Lab Test 06/03/21  0348 06/01/21  1108   AST 37 38   ALT 23 18   ALKPHOS 39* 33*   BILITOTAL 0.6 0.3       CT Femur Thigh Right w/o Contrast    Result Date: 10/10/2021  EXAM: CT FEMUR THIGH RIGHT WITHOUT CONTRAST LOCATION: [facility] DATE/TIME: 10/10/2021 8:36 PM INDICATION: Right femur pain. COMPARISON: Radiographs from today. TECHNIQUE: Noncontrast. Axial, sagittal and coronal thin-section reconstruction. Dose reduction techniques were used. FINDINGS: BONES AND JOINTS: -Right total hip arthroplasty. There is a periprosthetic fracture about the proximal aspect of the femoral component. The greater trochanter is comminuted and displaced laterally up to 14 mm and cephalad up to 10 mm. Moderate to severe degenerative changes in the visualized lower lumbar spine. Mild osteoarthrosis in the right SI joint and in the pubic symphysis. Mild degenerative changes in the knee. Chondrocalcinosis in the lateral compartment. SOFT TISSUES: -Moderate enlargement of the vastus intermedius muscle diffusely. This is heterogeneous and mainly isodense to muscle, but there are areas of slightly lower and higher density suggesting a component of hemorrhage.     IMPRESSION: 1.  Comminuted periprosthetic fracture of the proximal right femur. This involves the greater trochanter and there is up to 14 mm of displacement. 2.  Moderate diffuse enlargement of the vastus intermedius muscle, likely related to intramuscular hemorrhage. 3.   Degenerative changes in the lower lumbar spine, right SI joint, pubic symphysis and knee.     XR Pelvis and Hip Right 2 Views    Result Date: 10/10/2021  EXAM: XR PELVIS AND HIP RIGHT 2 VIEWS LOCATION: [facility] DATE/TIME: 10/10/2021 5:34 PM INDICATION: Injury and pain related to fall. COMPARISON: Radiographs from 8/28/2019.     IMPRESSION: 1. There is a periprosthetic fracture of the right greater trochanter. This is displaced up to 1 cm and is new since the 8/28/2019 radiographs. 2. Moderate to severe degenerative changes in the lower lumbar spine 3. Mild to moderate degenerative changes in the SI joints. 4. Moderate osteoarthrosis of the left hip.     XR Chest Port 1 View    Result Date: 10/11/2021  EXAM: XR CHEST PORT 1 VIEW LOCATION: [facility] DATE/TIME: 10/11/2021 1:24 PM INDICATION: hypoxia, fall COMPARISON: 6/1/2021     IMPRESSION: Similar chronic right pleural thickening/scarring versus small effusion. Mild bibasilar atelectasis/scarring. No pneumothorax. Heart size is normal. CABG changes and sternotomy wires. Atherosclerosis. Degenerative changes of the spine and bilateral shoulders. No acute osseous findings.    XR Femur Right 2 Views    Result Date: 10/10/2021  EXAM: XR FEMUR RIGHT 2 VIEW LOCATION: [facility] DATE/TIME: 10/10/2021 5:33 PM INDICATION: Fall, right hip pain. COMPARISON: None.     IMPRESSION: 1. There is a comminuted periprosthetic fracture of the right greater trochanter. Fracture fragments are displaced up to 1 cm superiorly and 5 mm laterally. 2. Total hip arthroplasty. Arterial calcifications.      Discharge Orders        General info for SNF    Length of Stay Estimate: Short Term Care: Estimated # of Days <30  Condition at Discharge: Improving  Level of care:skilled   Rehabilitation Potential: Fair  Admission H&P remains valid and up-to-date: Yes  Recent Chemotherapy: N/A  Use Nursing Home Standing Orders: Yes      Follow Up and recommended labs and tests    Follow up with [facility] physician. The following labs/tests are recommended: bmp and hgb in 2 days.  Follow up with primary care provider in 2 weeks   Follow up with ortho clinic 2 weeks     Reason for your hospital stay    Fall with fracture, non operative     Intake and output    Every shift     Daily weights    Call Provider for weight gain of more than 2 pounds per day or 5 pounds per week.     Weight bearing status    Nonweightbearing right lower extremity except for transfers may be toe-touch weightbearing.  No isolated abduction about the right hip.  Limited flexion 75 degrees     Activity - Up with nursing assistance    Nonweightbearing right lower extremity except for transfers may be toe-touch weightbearing.  No isolated abduction about the right hip.  Limited flexion 75 degrees     No CPR- Do NOT Intubate     Physical Therapy Adult Consult    Evaluate and treat as clinically indicated.  Hip fx, restricted weight bearing     Occupational Therapy Adult Consult    Evaluate and treat as clinically indicated.    Eval and treat, restricted weight bearing     Fall precautions     Advance Diet as Tolerated    Follow this diet upon discharge: Orders Placed This Encounter      Regular Diet Adult       Examination   Physical Exam   Temp:  [97.7  F (36.5  C)-98.7  F (37.1  C)] 98.2  F (36.8  C)  Pulse:  [61-70] 70  Resp:  [16-18] 16  BP: (122-168)/(57-73) 168/73  SpO2:  [91 %-94 %] 93 %  Wt Readings from Last 1 Encounters:   10/10/21 52.6 kg (116 lb)     See today's note      Please see EMR for more detailed significant labs, imaging, consultant notes etc.    I, Poonam Roca MD, personally saw the patient today and spent greater than 30 minutes discharging this patient.    Poonam Roca MD  [facility]    CC:Misbah Barone

## 2021-10-14 NOTE — PROGRESS NOTES
[facility]    Medicine Progress Note - Hospitalist Service       Date of Admission:  10/10/2021    Assessment & Plan           Rox COHN Lucas is a 86 year old female admitted on 10/10/2021. She has history of CAD, CABG (CHEN to mid left circ and RCA--2016), AVR (bio-2014), diastolic HF, htn, psoriatic arthritis, hx of GIB, gastric ulcers, presented following mechanical fall found to have comminuted periprosthetic fracture of the right greater trochanter      1.Comminuted periprosthetic fracture of the right greater trochanter  -Ortho saw and recommended conservative management.  She is to be nonweightbearing RLE except may be TTWB for transfers.    -No abduction of the right hip.  No flexion greater than 75 degrees.  -pain control-scheduled Tylenol, lidocaine patch.  Tramadol as needed.  -PT/OT.  Expect she will likely need placement TCU. PT has brought up possibility of acute rehab as well, but not accepted for that at this time. So now looking for TCU     2.Mechanical fall  -got foot caught on door  -no loc  -fall precautions     3.hx of Serious GIB  -hx of gastric ulcers and Dieulafoy  -NOT on anticoagulation  -Hgb 11.9-->8.3 and CT showing some likely intramuscular hemorrhage of the vastus intermedius muscle.  Do not think she is having GI bleed at this time.  -Continue empiric PPI      4.Acute blood loss anemia  -Likely due to intramuscular hematoma as above  -Trend Hgb     5.JOSIAH  -Baseline Cr 1, here up to 1.25 , now today 0.85  -Held home lisinopril, but should be able to restart soon   -avoid hypotension     6.Hx of AVR--bio--2014     7.hx of CAD   -s/p cabg  -home statin, metoprolol and Imdur. Not on ASA     8.hx of diastolic HF-compensated     9.Hypothyroidism  -Home Synthroid     10.htn  -held lisinopril(elevated creat and lower bp), metoprolol and Imdur with hold parameters     11.hx of psoriatic arthritis  -on methotrexate (takes on Fridays, and folic acid daily)     12.Mild  thrombocytopenia  -trend , 114     13.Mild cognitive impairment  - Aricept     14.Acute resp hypoxic failure  - from opioids, resolved  -Had spO2 of 89% after getting IV morphine  -Now satting normally on room air  -CXR normal            Diet: Regular Diet Adult    DVT Prophylaxis: Pneumatic Compression Devices  Rogers Catheter: Not present  Central Lines: None  Code Status: No CPR- Do NOT Intubate      Disposition Plan   Expected discharge: 10/14/2021   recommended to transitional care unit once needs placement.     The patient's care was discussed with the Patient.    Poonam Roca MD  Hospitalist Service  [employer]  Securely message with the Vocera Web Console (learn more here)  Text page via [email or web address] Paging/Directory        ______________________________________________________________________    Interval History     She feels good  She notes she still passing gas, but  No stool in days  Ok with supp  Eating ok    Data reviewed today: I reviewed all medications, new labs and imaging results over the last 24 hours. I personally reviewed no images or EKG's today.    Physical Exam   Vital Signs: Temp: 98.2  F (36.8  C) Temp src: Oral BP: (!) 168/73 Pulse: 70   Resp: 16 SpO2: 93 % O2 Device: None (Room air)    Weight: 116 lbs 0 oz  Constitutional: awake  Respiratory: No increased work of breathing, good air exchange, clear to auscultation bilaterally, no crackles or wheezing  Cardiovascular: Normal apical impulse, regular rate and rhythm, normal S1 and S2, no S3 or S4, and no murmur noted  GI: normal bowel sounds, soft, non-distended and non-tender  Skin: no bruising or bleeding  Musculoskeletal: no lower extremity pitting edema present  Neurologic: Mental Status Exam:  Level of Alertness:   awake  Neuropsychiatric: General: normal, calm and normal eye contact    Data   Recent Labs   Lab 10/14/21  0517 10/13/21  0650 10/12/21  0515 10/11/21  0547 10/11/21  0547 10/10/21  1707  10/10/21  1707   WBC  --   --   --   --  7.0  --  6.9   HGB 8.3* 8.6* 8.4*   < > 8.7*   < > 11.9   MCV  --   --   --   --  98  --  98   * 105*  --   --  108*   < > 125*   INR  --   --   --   --   --   --  1.25*    136 135*   < > 138   < > 138   POTASSIUM 4.5 4.4 4.2   < > 4.3   < > 4.5   CHLORIDE 104 106 106   < > 107   < > 104   CO2 25 25 25   < > 26   < > 25   BUN 17 20 18   < > 20   < > 17   CR 0.85 0.82 0.94   < > 1.25*   < > 1.07   ANIONGAP 7 5 4*   < > 5   < > 9   CARLA 8.7 8.5 8.0*   < > 8.2*   < > 9.3   GLC 86 89 88   < > 97   < > 105    < > = values in this interval not displayed.

## 2021-10-15 ENCOUNTER — LAB REQUISITION (OUTPATIENT)
Dept: LAB | Facility: CLINIC | Age: 86
End: 2021-10-15
Payer: COMMERCIAL

## 2021-10-15 DIAGNOSIS — I10 ESSENTIAL (PRIMARY) HYPERTENSION: ICD-10-CM

## 2021-10-16 LAB
ANION GAP SERPL CALCULATED.3IONS-SCNC: 10 MMOL/L (ref 5–18)
BUN SERPL-MCNC: 16 MG/DL (ref 8–28)
CALCIUM SERPL-MCNC: 9 MG/DL (ref 8.5–10.5)
CHLORIDE BLD-SCNC: 99 MMOL/L (ref 98–107)
CO2 SERPL-SCNC: 22 MMOL/L (ref 22–31)
CREAT SERPL-MCNC: 0.91 MG/DL (ref 0.6–1.1)
GFR SERPL CREATININE-BSD FRML MDRD: 58 ML/MIN/1.73M2
GLUCOSE BLD-MCNC: 83 MG/DL (ref 70–125)
HGB BLD-MCNC: 8.5 G/DL (ref 11.7–15.7)
POTASSIUM BLD-SCNC: 4.2 MMOL/L (ref 3.5–5)
SODIUM SERPL-SCNC: 131 MMOL/L (ref 136–145)

## 2021-10-16 PROCEDURE — 85018 HEMOGLOBIN: CPT | Mod: ORL | Performed by: FAMILY MEDICINE

## 2021-10-16 PROCEDURE — 36415 COLL VENOUS BLD VENIPUNCTURE: CPT | Mod: ORL | Performed by: FAMILY MEDICINE

## 2021-10-16 PROCEDURE — P9603 ONE-WAY ALLOW PRORATED MILES: HCPCS | Mod: ORL | Performed by: FAMILY MEDICINE

## 2021-10-16 PROCEDURE — 80048 BASIC METABOLIC PNL TOTAL CA: CPT | Mod: ORL | Performed by: FAMILY MEDICINE

## 2021-10-18 ENCOUNTER — TRANSITIONAL CARE UNIT VISIT (OUTPATIENT)
Dept: GERIATRICS | Facility: CLINIC | Age: 86
End: 2021-10-18
Payer: COMMERCIAL

## 2021-10-18 VITALS
HEART RATE: 60 BPM | TEMPERATURE: 97.3 F | WEIGHT: 121.4 LBS | RESPIRATION RATE: 16 BRPM | SYSTOLIC BLOOD PRESSURE: 157 MMHG | DIASTOLIC BLOOD PRESSURE: 72 MMHG | BODY MASS INDEX: 20.73 KG/M2 | HEIGHT: 64 IN | OXYGEN SATURATION: 95 %

## 2021-10-18 DIAGNOSIS — I99.9 MILD VASCULAR NEUROCOGNITIVE DISORDER: ICD-10-CM

## 2021-10-18 DIAGNOSIS — S72.114A CLOSED NONDISPLACED FRACTURE OF GREATER TROCHANTER OF RIGHT FEMUR, INITIAL ENCOUNTER (H): Primary | ICD-10-CM

## 2021-10-18 DIAGNOSIS — B96.81 GASTRIC ULCER DUE TO HELICOBACTER PYLORI, ACUTE: ICD-10-CM

## 2021-10-18 DIAGNOSIS — F06.70 MILD VASCULAR NEUROCOGNITIVE DISORDER: ICD-10-CM

## 2021-10-18 DIAGNOSIS — N18.31 STAGE 3A CHRONIC KIDNEY DISEASE (H): ICD-10-CM

## 2021-10-18 DIAGNOSIS — D62 ANEMIA DUE TO BLOOD LOSS, ACUTE: ICD-10-CM

## 2021-10-18 DIAGNOSIS — D69.6 THROMBOCYTOPENIA (H): ICD-10-CM

## 2021-10-18 DIAGNOSIS — K25.3 GASTRIC ULCER DUE TO HELICOBACTER PYLORI, ACUTE: ICD-10-CM

## 2021-10-18 PROCEDURE — 99305 1ST NF CARE MODERATE MDM 35: CPT | Performed by: NURSE PRACTITIONER

## 2021-10-18 ASSESSMENT — MIFFLIN-ST. JEOR: SCORE: 985.67

## 2021-10-19 NOTE — PROGRESS NOTES
[facility] GERIATRIC SERVICES    Code Status:  DNI   Visit Type:   Chief Complaint   Patient presents with     Nursing Home Acute     TCU Admission     Facility:  [facility] ([facility]) [[id number]]            History of Present Illness: Rox Zavala is a 86 year old female who I am seeing today for admit to the TCU.  Patient recently hospitalized on 10/10/2021 secondary to right greater trochanteric fracture.  Past medical history includes CAD, CAD CABG with CHEN to the mid left circumflex and RCA 2016, AVR with bioprosthetic valve in 2014, diastolic heart failure, hypertension, cirrhotic arthritis, history of GIB with gastric ulcers.  Patient had a mechanical fall and was found to have a comminuted periprosthetic fracture of the right greater trochanter.  She was seen by Ortho and recommended conservative management.  She is to be nonweightbearing to the right lower extremity except for toe-touch weightbearing for transfers.  No flexion greater than 75 degrees.  She continues on tramadol, Tylenol and lidocaine patch for pain.  History of GIB with gastric ulcers and Dieulafoy post clipping x2. Patient is not on anticoagulation.  She plans to undergo EGD on 11/11/2021.  Patient continues on PPI.  Hemoglobin 11.9.  Down to 8.3.  Acute kidney injury.  Baseline creatinine initially elevated at 1.25 at the time of discharge 0.85.  Her lisinopril was held. Acute blood loss anemia.  She did have intramuscular hematoma.  History of AVR with bioprosthetic valve in 2014.  History of CAD status post CABG.  Continues on statin, metoprolol and Imdur.  Not on aspirin secondary to history of GI bleed.  History of diastolic heart failure compensated.  Hypothyroidism on Synthroid.  Hypertension.  Lisinopril held during hospitalization.  She continued on metoprolol and Imdur.  History of psoriatic arthritis on methotrexate.  Mild thrombocytopenia with platelets at 114,000.  Cognitive impairment on Aricept.  She did have some  acute respiratory hypoxic failure secondary to opioids.  Chest x-ray was normal.  This did resolve.    Today patient sitting up in bedside chair.  She does report being bored.  She does have underlying cognitive impairment.  She continues on Aricept.  She is forgetful of her recent hip fracture.  Pain well controlled with Tylenol.  Recent hemoglobin 8.5.  No evidence of bleed.  She continues on PPI.  Acute kidney injury.  Sodium 131.  The on-call was notified over the weekend and she was placed on a 2000 cc fluid restriction.  At baseline sodium is 135.  Hypertension.  Blood pressure is within normal limits.  She does continue on Imdur, lisinopril and metoprolol.  Patient denies any shortness of breath.  No chest pain.  Heart failure.  Compensated.        Active Ambulatory Problems     Diagnosis Date Noted     Hammer Toe      Hemorrhoids      Psoriasis      Generalized Osteoarthritis Of The Hand      Osteoarthritis Of The Knee      Vitamin D Deficiency      Palpitations      Hypothyroidism      Neck Pain      Upper Back Pain (Between Shoulder Blades)      Osteopenia      Coronary artery disease 11/17/2014     S/P AVR 12/15/2014     Constipation 12/20/2014     Acute diastolic heart failure (H) 12/23/2014     Essential hypertension 12/08/2015     Hyperlipidemia 12/08/2015     Coronary artery disease involving coronary bypass graft of native heart with other forms of angina pectoris (H)      Psoriatic arthritis (H) 07/11/2017     High risk medication use 07/11/2017     Sacral insufficiency fracture, initial encounter 05/06/2019     Hypertension 05/07/2019     Back pain 05/08/2019     Pressure injury of buttock, stage 1, unspecified laterality 06/16/2019     MCI (mild cognitive impairment) 06/16/2019     Frequent PVCs 08/21/2019     Senile osteoporosis 09/23/2019     Hemorrhagic shock (H) 06/01/2021     JOSIAH (acute kidney injury) (H) 06/01/2021     Lactic acidemia 06/01/2021     GI bleed 06/01/2021     Acute blood loss  anemia 06/01/2021     Melena 06/01/2021     Syncope, unspecified syncope type 06/06/2021     Anemia due to blood loss, acute 06/06/2021     Dieulafoy lesion of stomach      Gastric ulcer due to Helicobacter pylori, acute 06/12/2021     Chronic kidney disease, stage 3 (H) 07/12/2021     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     Thrombocytopenia (H) 07/12/2021     Closed nondisplaced fracture of greater trochanter of right femur, initial encounter (H) 10/10/2021     Resolved Ambulatory Problems     Diagnosis Date Noted     Hypertension 12/23/2014     Psoriatic arthropathy (H)      Rheumatoid arthritis (H)      Thrombocytopenia (H) 03/12/2019     Past Medical History:   Diagnosis Date     Acute diastolic CHF (congestive heart failure) (H) 12/23/2014     Acute renal failure (H)      Aortic valve stenosis, severe      Arthritis      Disease of thyroid gland      Hammer toe      Macular disorder        Current Outpatient Medications:      acetaminophen (TYLENOL) 325 MG tablet, Take 3 tablets (975 mg) by mouth every 8 hours for 7 days For fracture pain, Disp: 63 tablet, Rfl: 0     atorvastatin (LIPITOR) 40 MG tablet, Take 1 tablet (40 mg) by mouth daily For lipids, Disp: , Rfl:      calcium carbonate-vitamin D (OYSTER SHELL CALCIUM/D) 500-200 MG-UNIT tablet, Take 1 tablet by mouth daily, Disp: , Rfl:      cholecalciferol 50 MCG (2000 UT) tablet, Take 1 tablet (50 mcg) by mouth daily, Disp: 100 tablet, Rfl: 1     donepezil (ARICEPT) 10 MG tablet, Take 1 tablet (10 mg) by mouth At Bedtime dementia, Disp: 90 tablet, Rfl: 3     ferrous sulfate (FEROSUL) 325 (65 Fe) MG tablet, Take 1 tablet (325 mg) by mouth daily (with breakfast), Disp: , Rfl:      folic acid (FOLVITE) 1 MG tablet, Take 1 tablet (1,000 mcg) by mouth daily General health, Disp: , Rfl:      isosorbide mononitrate (IMDUR) 30 MG 24 hr tablet, Take 1 tablet (30 mg) by mouth daily, Disp: , Rfl:      levothyroxine (SYNTHROID/LEVOTHROID) 100 MCG tablet,  "Take 1 tablet (100 mcg) by mouth daily For hypothyroidism, Disp: , Rfl:      Lidocaine (LIDOCARE) 4 % Patch, Place 1-2 patches onto the skin every 24 hours for 7 days To prevent lidocaine toxicity, patient should be patch free for 12 hrs daily., Disp: 14 patch, Rfl: 0     lisinopril (ZESTRIL) 20 MG tablet, Take 1 tablet (20 mg) by mouth daily htn, Disp: , Rfl:      methotrexate sodium 2.5 MG TABS, Take 3 tablets (7.5 mg) by mouth once a week Fridays, Disp: , Rfl:      metoprolol succinate ER (TOPROL-XL) 25 MG 24 hr tablet, Take 1 tablet (25 mg) by mouth daily htn, Disp: , Rfl:      pantoprazole (PROTONIX) 40 MG EC tablet, Take 1 tablet (40 mg) by mouth every morning (before breakfast), Disp: , Rfl:      polyethylene glycol (MIRALAX) 17 GM/Dose powder, Take 17 g by mouth daily To prevent constiaption, Disp: , Rfl:      senna-docusate (SENOKOT-S/PERICOLACE) 8.6-50 MG tablet, Take 1 tablet by mouth 2 times daily Hold if loose stools, Disp: , Rfl:      vitamin C (ASCORBIC ACID) 500 MG tablet, Take 1 tablet (500 mg) by mouth daily, Disp: , Rfl:   No Known Allergies    All Meds and Allergies reviewed in the record at the facility and is the most up-to-date    REVIEW OF SYSTEMS:   Review of Systems  No fevers or chills. No headache, lightheadedness or dizziness. No SOB, chest pains or palpitations. Appetite is fair. No nausea, vomiting, constipation or diarrhea. No dysuria, frequency, burning or pain with urination.  Pain control with Tylenol.  Otherwise review of systems are negative.     PHYSICAL EXAMINATION:  Physical Exam     Vital signs: BP (!) 157/72   Pulse 60   Temp 97.3  F (36.3  C)   Resp 16   Ht 1.626 m (5' 4\")   Wt 55.1 kg (121 lb 6.4 oz)   SpO2 95%   BMI 20.84 kg/m    General: Awake, Alert, oriented x3, appropriately, follows simple commands, conversant  HEENT:  Pink conjunctiva, anicteric sclerae, moist oral mucosa  NECK: Supple, without any lymphadenopathy, or masses  CVS:  S1  S2, without murmur or " gallop.   LUNG: Clear to auscultation, No wheezes, rales or rhonci.  BACK: Mild kyphosis of the thoracic spine  ABDOMEN: Soft, thin, nontender to palpation, with positive bowel sounds  EXTREMITIES: Good range of motion on both upper and lower extremities, no pedal edema, no calf tenderness  SKIN: Warm and dry, no rashes or erythema noted  NEUROLOGIC: Intact, pulses palpable  PSYCHIATRIC: Cognitive impairment noted      Labs:  All labs reviewed in the nursing home record and Epic   @  Lab Results   Component Value Date    WBC 7.0 10/11/2021     Lab Results   Component Value Date    RBC 2.83 10/11/2021     Lab Results   Component Value Date    HGB 8.5 10/16/2021     Lab Results   Component Value Date    HCT 27.7 10/11/2021     Lab Results   Component Value Date    MCV 98 10/11/2021     Lab Results   Component Value Date    MCH 30.7 10/11/2021     Lab Results   Component Value Date    MCHC 31.4 10/11/2021     Lab Results   Component Value Date    RDW 15.7 10/11/2021     Lab Results   Component Value Date     10/14/2021        @Last Comprehensive Metabolic Panel:  Sodium   Date Value Ref Range Status   10/16/2021 131 (L) 136 - 145 mmol/L Final     Potassium   Date Value Ref Range Status   10/16/2021 4.2 3.5 - 5.0 mmol/L Final     Chloride   Date Value Ref Range Status   10/16/2021 99 98 - 107 mmol/L Final     Carbon Dioxide (CO2)   Date Value Ref Range Status   10/16/2021 22 22 - 31 mmol/L Final     Anion Gap   Date Value Ref Range Status   10/16/2021 10 5 - 18 mmol/L Final     Glucose   Date Value Ref Range Status   10/16/2021 83 70 - 125 mg/dL Final     Urea Nitrogen   Date Value Ref Range Status   10/16/2021 16 8 - 28 mg/dL Final     Creatinine   Date Value Ref Range Status   10/16/2021 0.91 0.60 - 1.10 mg/dL Final     GFR Estimate   Date Value Ref Range Status   10/16/2021 58 (L) >60 mL/min/1.73m2 Final     Comment:     As of July 11, 2021, eGFR is calculated by the CKD-EPI creatinine equation, without race  adjustment. eGFR can be influenced by muscle mass, exercise, and diet. The reported eGFR is an estimation only and is only applicable if the renal function is stable.   06/24/2021 >60 >60 mL/min/1.73m2 Final     Calcium   Date Value Ref Range Status   10/16/2021 9.0 8.5 - 10.5 mg/dL Final         Assessment/Plan:    ICD-10-CM    1. Closed nondisplaced fracture of greater trochanter of right femur, initial encounter (H)  S72.114A  okay for PT OT eval and treat.  Patient continues to be nonweightbearing except for toe-touch weightbearing with transfers.  No greater than 70% flexion.  Patient continues on Tylenol for pain.   2. Gastric ulcer due to Helicobacter pylori, acute  K25.3  plans for upcoming EGD on 11/11/2021.  Patient continues on PPI.    B96.81    3. Anemia due to blood loss, acute  D62  no evidence of bleed.  Hemoglobin 8.5.  Follow-up hemoglobin on Thursday.   4. Thrombocytopenia (H)  D69.6  platelets 114,000.  Follow-up CBC on Thursday.   5. Mild vascular neurocognitive disorder (H)  F01.50  pleasantly confused.  Continues on Aricept.   6.   Hypertension   blood pressure satisfactory.   7.   Hyponatremia   sodium 131.  Previously 135.  Repeat BMP on Thursday.  Continue 2000 cc fluid restriction.   8. Stage 3a chronic kidney disease (H)  N18.31  creatinine stable.       Total time spent for this visit was 35 minutes with greater than 50% of time spent face-to-face with patient reviewing current medications, laboratory, pain management as well as collaborating with nursing staff and therapy.    This note has been dictated using voice recognition software. Any grammatical or context distortions are unintentional and inherent to the software    Electronically signed by: Janett Barnard, CNP

## 2021-10-20 ENCOUNTER — LAB REQUISITION (OUTPATIENT)
Dept: LAB | Facility: CLINIC | Age: 86
End: 2021-10-20
Payer: COMMERCIAL

## 2021-10-21 ENCOUNTER — TRANSITIONAL CARE UNIT VISIT (OUTPATIENT)
Dept: GERIATRICS | Facility: CLINIC | Age: 86
End: 2021-10-21
Payer: COMMERCIAL

## 2021-10-21 VITALS
TEMPERATURE: 97.6 F | DIASTOLIC BLOOD PRESSURE: 60 MMHG | OXYGEN SATURATION: 92 % | BODY MASS INDEX: 21.25 KG/M2 | HEART RATE: 61 BPM | SYSTOLIC BLOOD PRESSURE: 117 MMHG | WEIGHT: 123.8 LBS | RESPIRATION RATE: 18 BRPM

## 2021-10-21 DIAGNOSIS — D69.6 THROMBOCYTOPENIA (H): ICD-10-CM

## 2021-10-21 DIAGNOSIS — N18.30 STAGE 3 CHRONIC KIDNEY DISEASE, UNSPECIFIED WHETHER STAGE 3A OR 3B CKD (H): ICD-10-CM

## 2021-10-21 DIAGNOSIS — F06.70 MILD VASCULAR NEUROCOGNITIVE DISORDER: ICD-10-CM

## 2021-10-21 DIAGNOSIS — I99.9 MILD VASCULAR NEUROCOGNITIVE DISORDER: ICD-10-CM

## 2021-10-21 DIAGNOSIS — R55 SYNCOPE, UNSPECIFIED SYNCOPE TYPE: ICD-10-CM

## 2021-10-21 DIAGNOSIS — B96.81 GASTRIC ULCER DUE TO HELICOBACTER PYLORI, ACUTE: ICD-10-CM

## 2021-10-21 DIAGNOSIS — D62 ANEMIA DUE TO BLOOD LOSS, ACUTE: ICD-10-CM

## 2021-10-21 DIAGNOSIS — K25.3 GASTRIC ULCER DUE TO HELICOBACTER PYLORI, ACUTE: ICD-10-CM

## 2021-10-21 DIAGNOSIS — S72.114A CLOSED NONDISPLACED FRACTURE OF GREATER TROCHANTER OF RIGHT FEMUR, INITIAL ENCOUNTER (H): Primary | ICD-10-CM

## 2021-10-21 LAB
ANION GAP SERPL CALCULATED.3IONS-SCNC: 8 MMOL/L (ref 5–18)
BASOPHILS # BLD AUTO: 0.1 10E3/UL (ref 0–0.2)
BASOPHILS NFR BLD AUTO: 1 %
BUN SERPL-MCNC: 11 MG/DL (ref 8–28)
CALCIUM SERPL-MCNC: 9.5 MG/DL (ref 8.5–10.5)
CHLORIDE BLD-SCNC: 98 MMOL/L (ref 98–107)
CO2 SERPL-SCNC: 26 MMOL/L (ref 22–31)
CREAT SERPL-MCNC: 0.8 MG/DL (ref 0.6–1.1)
EOSINOPHIL # BLD AUTO: 0.3 10E3/UL (ref 0–0.7)
EOSINOPHIL NFR BLD AUTO: 4 %
ERYTHROCYTE [DISTWIDTH] IN BLOOD BY AUTOMATED COUNT: 17.2 % (ref 10–15)
GFR SERPL CREATININE-BSD FRML MDRD: 68 ML/MIN/1.73M2
GLUCOSE BLD-MCNC: 87 MG/DL (ref 70–125)
HCT VFR BLD AUTO: 28.2 % (ref 35–47)
HGB BLD-MCNC: 9 G/DL (ref 11.7–15.7)
IMM GRANULOCYTES # BLD: 0 10E3/UL
IMM GRANULOCYTES NFR BLD: 1 %
LYMPHOCYTES # BLD AUTO: 1.7 10E3/UL (ref 0.8–5.3)
LYMPHOCYTES NFR BLD AUTO: 26 %
MCH RBC QN AUTO: 32.7 PG (ref 26.5–33)
MCHC RBC AUTO-ENTMCNC: 31.9 G/DL (ref 31.5–36.5)
MCV RBC AUTO: 103 FL (ref 78–100)
MONOCYTES # BLD AUTO: 0.8 10E3/UL (ref 0–1.3)
MONOCYTES NFR BLD AUTO: 11 %
NEUTROPHILS # BLD AUTO: 3.9 10E3/UL (ref 1.6–8.3)
NEUTROPHILS NFR BLD AUTO: 57 %
NRBC # BLD AUTO: 0 10E3/UL
NRBC BLD AUTO-RTO: 0 /100
PLATELET # BLD AUTO: 219 10E3/UL (ref 150–450)
POTASSIUM BLD-SCNC: 5.2 MMOL/L (ref 3.5–5)
RBC # BLD AUTO: 2.75 10E6/UL (ref 3.8–5.2)
SODIUM SERPL-SCNC: 132 MMOL/L (ref 136–145)
WBC # BLD AUTO: 6.7 10E3/UL (ref 4–11)

## 2021-10-21 PROCEDURE — P9604 ONE-WAY ALLOW PRORATED TRIP: HCPCS | Mod: ORL | Performed by: NURSE PRACTITIONER

## 2021-10-21 PROCEDURE — 85025 COMPLETE CBC W/AUTO DIFF WBC: CPT | Mod: ORL | Performed by: NURSE PRACTITIONER

## 2021-10-21 PROCEDURE — 80048 BASIC METABOLIC PNL TOTAL CA: CPT | Mod: ORL | Performed by: NURSE PRACTITIONER

## 2021-10-21 PROCEDURE — 99309 SBSQ NF CARE MODERATE MDM 30: CPT | Performed by: NURSE PRACTITIONER

## 2021-10-21 PROCEDURE — 36415 COLL VENOUS BLD VENIPUNCTURE: CPT | Mod: ORL | Performed by: NURSE PRACTITIONER

## 2021-10-21 NOTE — PROGRESS NOTES
[facility] GERIATRIC SERVICES    Code Status:  DNI   Visit Type:   Chief Complaint   Patient presents with     Nursing Home Acute     Facility:  [facility] ([facility]) [[id number]]            History of Present Illness: Rox Zavala is a 86 year old female who I am seeing today for admit to the TCU.  Patient recently hospitalized on 10/10/2021 secondary to right greater trochanteric fracture.  Past medical history includes CAD, CAD CABG with CHEN to the mid left circumflex and RCA 2016, AVR with bioprosthetic valve in 2014, diastolic heart failure, hypertension, cirrhotic arthritis, history of GIB with gastric ulcers.  Patient had a mechanical fall and was found to have a comminuted periprosthetic fracture of the right greater trochanter.  She was seen by Ortho and recommended conservative management.  She is to be nonweightbearing to the right lower extremity except for toe-touch weightbearing for transfers.  No flexion greater than 75 degrees.  She continues on tramadol, Tylenol and lidocaine patch for pain.  History of GIB with gastric ulcers and Dieulafoy post clipping x2. Patient is not on anticoagulation.  She plans to undergo EGD on 11/11/2021.  Patient continues on PPI.  Hemoglobin 11.9.  Down to 8.3.  Acute kidney injury.  Baseline creatinine initially elevated at 1.25 at the time of discharge 0.85.  Her lisinopril was held. Acute blood loss anemia.  She did have intramuscular hematoma.  History of AVR with bioprosthetic valve in 2014.  History of CAD status post CABG.  Continues on statin, metoprolol and Imdur.  Not on aspirin secondary to history of GI bleed.  History of diastolic heart failure compensated.  Hypothyroidism on Synthroid.  Hypertension.  Lisinopril held during hospitalization.  She continued on metoprolol and Imdur.  History of psoriatic arthritis on methotrexate.  Mild thrombocytopenia with platelets at 114,000.  Cognitive impairment on Aricept.  She did have some acute respiratory  hypoxic failure secondary to opioids.  Chest x-ray was normal.  This did resolve.    Today patient sitting up in bedside chair.  Pt with recent fall with pelvic fracture. She continues to be NWB except for transfer and is TTWB. She continues with some pain in her right side. She continues on tylenol for pain. Underlying cognitive impairment. She continues on Aricept. Hx of GI ulcer with bleed. She continues on PPI. No evidence of bleed. Hgb today 9.0. . She is on iron supplement. Plans are to scope again on 11/11/21. JOSIAH. Sodium 132. She continues on 2000 ml fluid restriction. HTN. Satisfactory controlled. Heart failure.  Compensated.        Active Ambulatory Problems     Diagnosis Date Noted     Hammer Toe      Hemorrhoids      Psoriasis      Generalized Osteoarthritis Of The Hand      Osteoarthritis Of The Knee      Vitamin D Deficiency      Palpitations      Hypothyroidism      Neck Pain      Upper Back Pain (Between Shoulder Blades)      Osteopenia      Coronary artery disease 11/17/2014     S/P AVR 12/15/2014     Constipation 12/20/2014     Acute diastolic heart failure (H) 12/23/2014     Essential hypertension 12/08/2015     Hyperlipidemia 12/08/2015     Coronary artery disease involving coronary bypass graft of native heart with other forms of angina pectoris (H)      Psoriatic arthritis (H) 07/11/2017     High risk medication use 07/11/2017     Sacral insufficiency fracture, initial encounter 05/06/2019     Hypertension 05/07/2019     Back pain 05/08/2019     Pressure injury of buttock, stage 1, unspecified laterality 06/16/2019     MCI (mild cognitive impairment) 06/16/2019     Frequent PVCs 08/21/2019     Senile osteoporosis 09/23/2019     Hemorrhagic shock (H) 06/01/2021     JOSIAH (acute kidney injury) (H) 06/01/2021     Lactic acidemia 06/01/2021     GI bleed 06/01/2021     Acute blood loss anemia 06/01/2021     Melena 06/01/2021     Syncope, unspecified syncope type 06/06/2021     Anemia due to  blood loss, acute 06/06/2021     Dieulafoy lesion of stomach      Gastric ulcer due to Helicobacter pylori, acute 06/12/2021     Chronic kidney disease, stage 3 (H) 07/12/2021     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     Thrombocytopenia (H) 07/12/2021     Closed nondisplaced fracture of greater trochanter of right femur, initial encounter (H) 10/10/2021     Resolved Ambulatory Problems     Diagnosis Date Noted     Hypertension 12/23/2014     Psoriatic arthropathy (H)      Rheumatoid arthritis (H)      Thrombocytopenia (H) 03/12/2019     Past Medical History:   Diagnosis Date     Acute diastolic CHF (congestive heart failure) (H) 12/23/2014     Acute renal failure (H)      Aortic valve stenosis, severe      Arthritis      Disease of thyroid gland      Hammer toe      Macular disorder        Current Outpatient Medications:      acetaminophen (TYLENOL) 325 MG tablet, Take 3 tablets (975 mg) by mouth every 8 hours for 7 days For fracture pain, Disp: 63 tablet, Rfl: 0     atorvastatin (LIPITOR) 40 MG tablet, Take 1 tablet (40 mg) by mouth daily For lipids, Disp: , Rfl:      calcium carbonate-vitamin D (OYSTER SHELL CALCIUM/D) 500-200 MG-UNIT tablet, Take 1 tablet by mouth daily, Disp: , Rfl:      cholecalciferol 50 MCG (2000 UT) tablet, Take 1 tablet (50 mcg) by mouth daily, Disp: 100 tablet, Rfl: 1     donepezil (ARICEPT) 10 MG tablet, Take 1 tablet (10 mg) by mouth At Bedtime dementia, Disp: 90 tablet, Rfl: 3     ferrous sulfate (FEROSUL) 325 (65 Fe) MG tablet, Take 1 tablet (325 mg) by mouth daily (with breakfast), Disp: , Rfl:      folic acid (FOLVITE) 1 MG tablet, Take 1 tablet (1,000 mcg) by mouth daily General health, Disp: , Rfl:      isosorbide mononitrate (IMDUR) 30 MG 24 hr tablet, Take 1 tablet (30 mg) by mouth daily, Disp: , Rfl:      levothyroxine (SYNTHROID/LEVOTHROID) 100 MCG tablet, Take 1 tablet (100 mcg) by mouth daily For hypothyroidism, Disp: , Rfl:      Lidocaine (LIDOCARE) 4 %  Patch, Place 1-2 patches onto the skin every 24 hours for 7 days To prevent lidocaine toxicity, patient should be patch free for 12 hrs daily., Disp: 14 patch, Rfl: 0     lisinopril (ZESTRIL) 20 MG tablet, Take 1 tablet (20 mg) by mouth daily htn, Disp: , Rfl:      methotrexate sodium 2.5 MG TABS, Take 3 tablets (7.5 mg) by mouth once a week Fridays, Disp: , Rfl:      metoprolol succinate ER (TOPROL-XL) 25 MG 24 hr tablet, Take 1 tablet (25 mg) by mouth daily htn, Disp: , Rfl:      pantoprazole (PROTONIX) 40 MG EC tablet, Take 1 tablet (40 mg) by mouth every morning (before breakfast), Disp: , Rfl:      polyethylene glycol (MIRALAX) 17 GM/Dose powder, Take 17 g by mouth daily To prevent constiaption, Disp: , Rfl:      senna-docusate (SENOKOT-S/PERICOLACE) 8.6-50 MG tablet, Take 1 tablet by mouth 2 times daily Hold if loose stools, Disp: , Rfl:      vitamin C (ASCORBIC ACID) 500 MG tablet, Take 1 tablet (500 mg) by mouth daily, Disp: , Rfl:   No Known Allergies    All Meds and Allergies reviewed in the record at the facility and is the most up-to-date    REVIEW OF SYSTEMS:   Review of Systems  No fevers or chills. No headache, lightheadedness or dizziness. No SOB, chest pains or palpitations. Appetite is fair. No nausea, vomiting, constipation or diarrhea. No dysuria, frequency, burning or pain with urination.  Pain control with Tylenol.  Otherwise review of systems are negative.     PHYSICAL EXAMINATION:  Physical Exam     Vital signs: /60   Pulse 61   Temp 97.6  F (36.4  C)   Resp 18   Wt 56.2 kg (123 lb 12.8 oz)   SpO2 92%   BMI 21.25 kg/m    General: Awake, Alert, oriented x3, appropriately, follows simple commands, conversant  HEENT:  Pink conjunctiva, anicteric sclerae, moist oral mucosa  NECK: Supple, without any lymphadenopathy, or masses  CVS:  S1  S2, without murmur or gallop.   LUNG: Clear to auscultation, No wheezes, rales or rhonci.  BACK: Mild kyphosis of the thoracic spine  ABDOMEN: Soft,  thin, nontender to palpation, with positive bowel sounds  EXTREMITIES: Good range of motion on both upper and lower extremities, no pedal edema, no calf tenderness  SKIN: Warm and dry, no rashes or erythema noted  NEUROLOGIC: Intact, pulses palpable  PSYCHIATRIC: Cognitive impairment noted. SLUMS 10      Labs:  All labs reviewed in the nursing home record and Epic   @  Lab Results   Component Value Date    WBC 7.0 10/11/2021     Lab Results   Component Value Date    RBC 2.83 10/11/2021     Lab Results   Component Value Date    HGB 8.5 10/16/2021     Lab Results   Component Value Date    HCT 27.7 10/11/2021     Lab Results   Component Value Date    MCV 98 10/11/2021     Lab Results   Component Value Date    MCH 30.7 10/11/2021     Lab Results   Component Value Date    MCHC 31.4 10/11/2021     Lab Results   Component Value Date    RDW 15.7 10/11/2021     Lab Results   Component Value Date     10/14/2021        @Last Comprehensive Metabolic Panel:  Sodium   Date Value Ref Range Status   10/21/2021 132 (L) 136 - 145 mmol/L Final     Potassium   Date Value Ref Range Status   10/21/2021 5.2 (H) 3.5 - 5.0 mmol/L Final     Chloride   Date Value Ref Range Status   10/21/2021 98 98 - 107 mmol/L Final     Carbon Dioxide (CO2)   Date Value Ref Range Status   10/21/2021 26 22 - 31 mmol/L Final     Anion Gap   Date Value Ref Range Status   10/21/2021 8 5 - 18 mmol/L Final     Glucose   Date Value Ref Range Status   10/21/2021 87 70 - 125 mg/dL Final     Urea Nitrogen   Date Value Ref Range Status   10/21/2021 11 8 - 28 mg/dL Final     Creatinine   Date Value Ref Range Status   10/21/2021 0.80 0.60 - 1.10 mg/dL Final     GFR Estimate   Date Value Ref Range Status   10/21/2021 68 >60 mL/min/1.73m2 Final     Comment:     As of July 11, 2021, eGFR is calculated by the CKD-EPI creatinine equation, without race adjustment. eGFR can be influenced by muscle mass, exercise, and diet. The reported eGFR is an estimation only and is  only applicable if the renal function is stable.   06/24/2021 >60 >60 mL/min/1.73m2 Final     Calcium   Date Value Ref Range Status   10/21/2021 9.5 8.5 - 10.5 mg/dL Final         Assessment/Plan:    ICD-10-CM    1. Closed nondisplaced fracture of greater trochanter of right femur, initial encounter (H)  S72.114A Patient continues to be nonweightbearing except for toe-touch weightbearing with transfers.  No greater than 70% flexion.  Patient continues on Tylenol for pain.   2. Gastric ulcer due to Helicobacter pylori, acute  K25.3  plans for upcoming EGD on 11/11/2021.  Patient continues on PPI.    B96.81    3. Anemia due to blood loss, acute  D62  no evidence of bleed.  Hemoglobin 9.0. . Pt is on iron supplement.  Follow-up hemoglobin on Monday.    4. Thrombocytopenia (H)  D69.6  platelets 219,000.    5. Mild vascular neurocognitive disorder (H)  F01.50  pleasantly confused.  Continues on Aricept.   6.   Hypertension   blood pressure satisfactory.   7.   Hyponatremia   sodium 132.   Previously 135.  Continue 2000 cc fluid restriction.   8. Stage 3a chronic kidney disease (H)  N18.31  creatinine stable.        This note has been dictated using voice recognition software. Any grammatical or context distortions are unintentional and inherent to the software    Electronically signed by: Janett Barnard CNP

## 2021-10-23 ENCOUNTER — LAB REQUISITION (OUTPATIENT)
Dept: LAB | Facility: CLINIC | Age: 86
End: 2021-10-23
Payer: COMMERCIAL

## 2021-10-23 DIAGNOSIS — S72.114D NONDISPLACED FRACTURE OF GREATER TROCHANTER OF RIGHT FEMUR, SUBSEQUENT ENCOUNTER FOR CLOSED FRACTURE WITH ROUTINE HEALING: ICD-10-CM

## 2021-10-23 DIAGNOSIS — R55 SYNCOPE AND COLLAPSE: ICD-10-CM

## 2021-10-25 ENCOUNTER — TRANSFERRED RECORDS (OUTPATIENT)
Dept: HEALTH INFORMATION MANAGEMENT | Facility: CLINIC | Age: 86
End: 2021-10-25
Payer: COMMERCIAL

## 2021-10-25 PROBLEM — N18.31 STAGE 3A CHRONIC KIDNEY DISEASE (H): Status: ACTIVE | Noted: 2021-07-12

## 2021-10-25 LAB
ANION GAP SERPL CALCULATED.3IONS-SCNC: 7 MMOL/L (ref 5–18)
BUN SERPL-MCNC: 19 MG/DL (ref 8–28)
CALCIUM SERPL-MCNC: 9 MG/DL (ref 8.5–10.5)
CHLORIDE BLD-SCNC: 101 MMOL/L (ref 98–107)
CO2 SERPL-SCNC: 26 MMOL/L (ref 22–31)
CREAT SERPL-MCNC: 0.76 MG/DL (ref 0.6–1.1)
ERYTHROCYTE [DISTWIDTH] IN BLOOD BY AUTOMATED COUNT: 17.8 % (ref 10–15)
GFR SERPL CREATININE-BSD FRML MDRD: 72 ML/MIN/1.73M2
GLUCOSE BLD-MCNC: 84 MG/DL (ref 70–125)
HCT VFR BLD AUTO: 25.7 % (ref 35–47)
HGB BLD-MCNC: 8.3 G/DL (ref 11.7–15.7)
MCH RBC QN AUTO: 33.1 PG (ref 26.5–33)
MCHC RBC AUTO-ENTMCNC: 32.3 G/DL (ref 31.5–36.5)
MCV RBC AUTO: 102 FL (ref 78–100)
PLATELET # BLD AUTO: 195 10E3/UL (ref 150–450)
POTASSIUM BLD-SCNC: 4.8 MMOL/L (ref 3.5–5)
RBC # BLD AUTO: 2.51 10E6/UL (ref 3.8–5.2)
SODIUM SERPL-SCNC: 134 MMOL/L (ref 136–145)
WBC # BLD AUTO: 7.3 10E3/UL (ref 4–11)

## 2021-10-25 PROCEDURE — P9604 ONE-WAY ALLOW PRORATED TRIP: HCPCS | Mod: ORL | Performed by: FAMILY MEDICINE

## 2021-10-25 PROCEDURE — 36415 COLL VENOUS BLD VENIPUNCTURE: CPT | Mod: ORL | Performed by: FAMILY MEDICINE

## 2021-10-25 PROCEDURE — 85027 COMPLETE CBC AUTOMATED: CPT | Mod: ORL | Performed by: FAMILY MEDICINE

## 2021-10-25 PROCEDURE — 80048 BASIC METABOLIC PNL TOTAL CA: CPT | Mod: ORL | Performed by: FAMILY MEDICINE

## 2021-10-26 ENCOUNTER — TRANSITIONAL CARE UNIT VISIT (OUTPATIENT)
Dept: GERIATRICS | Facility: CLINIC | Age: 86
End: 2021-10-26
Payer: COMMERCIAL

## 2021-10-26 VITALS
HEART RATE: 77 BPM | BODY MASS INDEX: 20.93 KG/M2 | SYSTOLIC BLOOD PRESSURE: 160 MMHG | WEIGHT: 122.6 LBS | HEIGHT: 64 IN | DIASTOLIC BLOOD PRESSURE: 72 MMHG | TEMPERATURE: 97.1 F | OXYGEN SATURATION: 89 % | RESPIRATION RATE: 18 BRPM

## 2021-10-26 DIAGNOSIS — D62 ANEMIA DUE TO BLOOD LOSS, ACUTE: ICD-10-CM

## 2021-10-26 DIAGNOSIS — K25.3 GASTRIC ULCER DUE TO HELICOBACTER PYLORI, ACUTE: ICD-10-CM

## 2021-10-26 DIAGNOSIS — B96.81 GASTRIC ULCER DUE TO HELICOBACTER PYLORI, ACUTE: ICD-10-CM

## 2021-10-26 DIAGNOSIS — D69.6 THROMBOCYTOPENIA (H): ICD-10-CM

## 2021-10-26 DIAGNOSIS — F06.70 MILD VASCULAR NEUROCOGNITIVE DISORDER: ICD-10-CM

## 2021-10-26 DIAGNOSIS — I99.9 MILD VASCULAR NEUROCOGNITIVE DISORDER: ICD-10-CM

## 2021-10-26 DIAGNOSIS — R55 SYNCOPE, UNSPECIFIED SYNCOPE TYPE: ICD-10-CM

## 2021-10-26 DIAGNOSIS — S72.114A CLOSED NONDISPLACED FRACTURE OF GREATER TROCHANTER OF RIGHT FEMUR, INITIAL ENCOUNTER (H): Primary | ICD-10-CM

## 2021-10-26 DIAGNOSIS — N18.30 STAGE 3 CHRONIC KIDNEY DISEASE, UNSPECIFIED WHETHER STAGE 3A OR 3B CKD (H): ICD-10-CM

## 2021-10-26 PROCEDURE — 99309 SBSQ NF CARE MODERATE MDM 30: CPT | Performed by: NURSE PRACTITIONER

## 2021-10-26 ASSESSMENT — MIFFLIN-ST. JEOR: SCORE: 991.11

## 2021-10-26 NOTE — PROGRESS NOTES
[facility] GERIATRIC SERVICES    Code Status:  DNI   Visit Type:   Chief Complaint   Patient presents with     Nursing Home Acute     TCU Follow up     Facility:  [facility] ([facility]) [[id number]]            History of Present Illness: Rox Zavala is a 86 year old female who I am seeing today for admit to the TCU.  Patient recently hospitalized on 10/10/2021 secondary to right greater trochanteric fracture.  Past medical history includes CAD, CAD CABG with CHEN to the mid left circumflex and RCA 2016, AVR with bioprosthetic valve in 2014, diastolic heart failure, hypertension, cirrhotic arthritis, history of GIB with gastric ulcers.  Patient had a mechanical fall and was found to have a comminuted periprosthetic fracture of the right greater trochanter.  She was seen by Ortho and recommended conservative management.  She is to be nonweightbearing to the right lower extremity except for toe-touch weightbearing for transfers.  No flexion greater than 75 degrees.  She continues on tramadol, Tylenol and lidocaine patch for pain.  History of GIB with gastric ulcers and Dieulafoy post clipping x2. Patient is not on anticoagulation.  She plans to undergo EGD on 11/11/2021.  Patient continues on PPI.  Hemoglobin 11.9.  Down to 8.3.  Acute kidney injury.  Baseline creatinine initially elevated at 1.25 at the time of discharge 0.85.  Her lisinopril was held. Acute blood loss anemia.  She did have intramuscular hematoma.  History of AVR with bioprosthetic valve in 2014.  History of CAD status post CABG.  Continues on statin, metoprolol and Imdur.  Not on aspirin secondary to history of GI bleed.  History of diastolic heart failure compensated.  Hypothyroidism on Synthroid.  Hypertension.  Lisinopril held during hospitalization.  She continued on metoprolol and Imdur.  History of psoriatic arthritis on methotrexate.  Mild thrombocytopenia with platelets at 114,000.  Cognitive impairment on Aricept.  She did have some  acute respiratory hypoxic failure secondary to opioids.  Chest x-ray was normal.  This did resolve.    Today patient sitting up in bedside chair. Pt s/p pelvic fracture due to fall. Pt continues to be NWB with TTWB with transfers. Pain controlled with tylenol. Hx of GI ulcer with GI bleed. She continues on PPI. No evidence of bleed. Hgb 9.0. Pt continues on iron supplement. Pt plans to undergo EGD on 11/11/21. Advanced cognitive impairment. Pleasantly confused. JOSIAH. Sodium 132. She continues on 2000 ml fluid restriction. HTN. Satisfactory controlled. Heart failure.  Compensated.        Active Ambulatory Problems     Diagnosis Date Noted     Hammer Toe      Hemorrhoids      Psoriasis      Generalized Osteoarthritis Of The Hand      Osteoarthritis Of The Knee      Vitamin D Deficiency      Palpitations      Hypothyroidism      Neck Pain      Upper Back Pain (Between Shoulder Blades)      Osteopenia      Coronary artery disease 11/17/2014     S/P AVR 12/15/2014     Constipation 12/20/2014     Acute diastolic heart failure (H) 12/23/2014     Essential hypertension 12/08/2015     Hyperlipidemia 12/08/2015     Coronary artery disease involving coronary bypass graft of native heart with other forms of angina pectoris (H)      Psoriatic arthritis (H) 07/11/2017     High risk medication use 07/11/2017     Sacral insufficiency fracture, initial encounter 05/06/2019     Hypertension 05/07/2019     Back pain 05/08/2019     Pressure injury of buttock, stage 1, unspecified laterality 06/16/2019     MCI (mild cognitive impairment) 06/16/2019     Frequent PVCs 08/21/2019     Senile osteoporosis 09/23/2019     Hemorrhagic shock (H) 06/01/2021     JOSIAH (acute kidney injury) (H) 06/01/2021     Lactic acidemia 06/01/2021     GI bleed 06/01/2021     Acute blood loss anemia 06/01/2021     Melena 06/01/2021     Syncope, unspecified syncope type 06/06/2021     Anemia due to blood loss, acute 06/06/2021     Dieulafoy lesion of stomach       Gastric ulcer due to Helicobacter pylori, acute 06/12/2021     Stage 3 chronic kidney disease, unspecified whether stage 3a or 3b CKD (H) 07/12/2021     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     Thrombocytopenia (H) 07/12/2021     Closed nondisplaced fracture of greater trochanter of right femur, initial encounter (H) 10/10/2021     Resolved Ambulatory Problems     Diagnosis Date Noted     Hypertension 12/23/2014     Psoriatic arthropathy (H)      Rheumatoid arthritis (H)      Thrombocytopenia (H) 03/12/2019     Past Medical History:   Diagnosis Date     Acute diastolic CHF (congestive heart failure) (H) 12/23/2014     Acute renal failure (H)      Aortic valve stenosis, severe      Arthritis      Disease of thyroid gland      Hammer toe      Macular disorder        Current Outpatient Medications:      atorvastatin (LIPITOR) 40 MG tablet, Take 1 tablet (40 mg) by mouth daily For lipids, Disp: , Rfl:      calcium carbonate-vitamin D (OYSTER SHELL CALCIUM/D) 500-200 MG-UNIT tablet, Take 1 tablet by mouth daily, Disp: , Rfl:      cholecalciferol 50 MCG (2000 UT) tablet, Take 1 tablet (50 mcg) by mouth daily, Disp: 100 tablet, Rfl: 1     donepezil (ARICEPT) 10 MG tablet, Take 1 tablet (10 mg) by mouth At Bedtime dementia, Disp: 90 tablet, Rfl: 3     ferrous sulfate (FEROSUL) 325 (65 Fe) MG tablet, Take 1 tablet (325 mg) by mouth daily (with breakfast), Disp: , Rfl:      folic acid (FOLVITE) 1 MG tablet, Take 1 tablet (1,000 mcg) by mouth daily General health, Disp: , Rfl:      isosorbide mononitrate (IMDUR) 30 MG 24 hr tablet, Take 1 tablet (30 mg) by mouth daily, Disp: , Rfl:      levothyroxine (SYNTHROID/LEVOTHROID) 100 MCG tablet, Take 1 tablet (100 mcg) by mouth daily For hypothyroidism, Disp: , Rfl:      lisinopril (ZESTRIL) 20 MG tablet, Take 1 tablet (20 mg) by mouth daily htn, Disp: , Rfl:      methotrexate sodium 2.5 MG TABS, Take 3 tablets (7.5 mg) by mouth once a week Fridays, Disp: , Rfl:       "metoprolol succinate ER (TOPROL-XL) 25 MG 24 hr tablet, Take 1 tablet (25 mg) by mouth daily htn, Disp: , Rfl:      pantoprazole (PROTONIX) 40 MG EC tablet, Take 1 tablet (40 mg) by mouth every morning (before breakfast), Disp: , Rfl:      polyethylene glycol (MIRALAX) 17 GM/Dose powder, Take 17 g by mouth daily To prevent constiaption, Disp: , Rfl:      senna-docusate (SENOKOT-S/PERICOLACE) 8.6-50 MG tablet, Take 1 tablet by mouth 2 times daily Hold if loose stools, Disp: , Rfl:      vitamin C (ASCORBIC ACID) 500 MG tablet, Take 1 tablet (500 mg) by mouth daily, Disp: , Rfl:   No Known Allergies    All Meds and Allergies reviewed in the record at the facility and is the most up-to-date    REVIEW OF SYSTEMS:   Review of Systems  No fevers or chills. No headache, lightheadedness or dizziness. No SOB, chest pains or palpitations. Appetite is fair. No nausea, vomiting, constipation or diarrhea. No dysuria, frequency, burning or pain with urination.  Pain control with Tylenol.  Otherwise review of systems are negative.     PHYSICAL EXAMINATION:  Physical Exam     Vital signs: BP (!) 160/72   Pulse 77   Temp 97.1  F (36.2  C)   Resp 18   Ht 1.626 m (5' 4\")   Wt 55.6 kg (122 lb 9.6 oz)   SpO2 (!) 89%   BMI 21.04 kg/m    General: Awake, Alert, oriented x3, appropriately, follows simple commands, conversant  HEENT:  Pink conjunctiva, anicteric sclerae, moist oral mucosa  NECK: Supple, without any lymphadenopathy, or masses  CVS:  S1  S2, without murmur or gallop.   LUNG: Clear to auscultation, No wheezes, rales or rhonci.  BACK: Mild kyphosis of the thoracic spine  ABDOMEN: Soft, thin, nontender to palpation, with positive bowel sounds  EXTREMITIES: Good range of motion on both upper and lower extremities, tr pedal edema, no calf tenderness  SKIN: Warm and dry, no rashes or erythema noted  NEUROLOGIC: Intact, pulses palpable  PSYCHIATRIC: Cognitive impairment noted. SLUMS 10      Labs:  All labs reviewed in the " nursing home record and Epic   @  Lab Results   Component Value Date    WBC 7.0 10/11/2021     Lab Results   Component Value Date    RBC 2.83 10/11/2021     Lab Results   Component Value Date    HGB 8.5 10/16/2021     Lab Results   Component Value Date    HCT 27.7 10/11/2021     Lab Results   Component Value Date    MCV 98 10/11/2021     Lab Results   Component Value Date    MCH 30.7 10/11/2021     Lab Results   Component Value Date    MCHC 31.4 10/11/2021     Lab Results   Component Value Date    RDW 15.7 10/11/2021     Lab Results   Component Value Date     10/14/2021        @Last Comprehensive Metabolic Panel:  Sodium   Date Value Ref Range Status   10/25/2021 134 (L) 136 - 145 mmol/L Final     Potassium   Date Value Ref Range Status   10/25/2021 4.8 3.5 - 5.0 mmol/L Final     Chloride   Date Value Ref Range Status   10/25/2021 101 98 - 107 mmol/L Final     Carbon Dioxide (CO2)   Date Value Ref Range Status   10/25/2021 26 22 - 31 mmol/L Final     Anion Gap   Date Value Ref Range Status   10/25/2021 7 5 - 18 mmol/L Final     Glucose   Date Value Ref Range Status   10/25/2021 84 70 - 125 mg/dL Final     Urea Nitrogen   Date Value Ref Range Status   10/25/2021 19 8 - 28 mg/dL Final     Creatinine   Date Value Ref Range Status   10/25/2021 0.76 0.60 - 1.10 mg/dL Final     GFR Estimate   Date Value Ref Range Status   10/25/2021 72 >60 mL/min/1.73m2 Final     Comment:     As of July 11, 2021, eGFR is calculated by the CKD-EPI creatinine equation, without race adjustment. eGFR can be influenced by muscle mass, exercise, and diet. The reported eGFR is an estimation only and is only applicable if the renal function is stable.   06/24/2021 >60 >60 mL/min/1.73m2 Final     Calcium   Date Value Ref Range Status   10/25/2021 9.0 8.5 - 10.5 mg/dL Final         Assessment/Plan:    ICD-10-CM    1. Closed nondisplaced fracture of greater trochanter of right femur, initial encounter (H)  S72.114A Patient continues to be  nonweightbearing except for toe-touch weightbearing with transfers.  No greater than 70% flexion.  Patient continues on Tylenol for pain.   2. Gastric ulcer due to Helicobacter pylori, acute  K25.3  plans for upcoming EGD on 11/11/2021.  Patient continues on PPI.    B96.81    3. Anemia due to blood loss, acute  D62  no evidence of bleed.  Hemoglobin 9.0. . Pt is on iron supplement.   4. Thrombocytopenia (H)  D69.6  platelets 219,000.    5. Mild vascular neurocognitive disorder (H)  F01.50  pleasantly confused.  Continues on Aricept.   6.   Hypertension   blood pressure satisfactory.   7.   Hyponatremia   sodium 132.   Previously 135.  Continue 2000 cc fluid restriction.   8. Stage 3a chronic kidney disease (H)  N18.31  creatinine stable.        This note has been dictated using voice recognition software. Any grammatical or context distortions are unintentional and inherent to the software    Electronically signed by: Janett Barnard, CNP

## 2021-10-29 ENCOUNTER — TELEPHONE (OUTPATIENT)
Dept: FAMILY MEDICINE | Facility: CLINIC | Age: 86
End: 2021-10-29
Payer: COMMERCIAL

## 2021-10-29 DIAGNOSIS — K25.3 GASTRIC ULCER DUE TO HELICOBACTER PYLORI, ACUTE: ICD-10-CM

## 2021-10-29 DIAGNOSIS — B96.81 GASTRIC ULCER DUE TO HELICOBACTER PYLORI, ACUTE: ICD-10-CM

## 2021-10-29 DIAGNOSIS — L40.50 PSORIATIC ARTHRITIS (H): ICD-10-CM

## 2021-10-29 DIAGNOSIS — K31.82 DIEULAFOY LESION OF STOMACH: ICD-10-CM

## 2021-10-29 DIAGNOSIS — E03.9 HYPOTHYROIDISM, UNSPECIFIED TYPE: ICD-10-CM

## 2021-10-29 NOTE — TELEPHONE ENCOUNTER
Patient is requesting omeprazole 40 mg capsule. Medication is not on  Current medication list. Please advise.

## 2021-10-31 PROBLEM — K25.0 ACUTE GASTRIC ULCER WITH HEMORRHAGE: Status: ACTIVE | Noted: 2021-10-31

## 2021-10-31 RX ORDER — PANTOPRAZOLE SODIUM 40 MG/1
40 TABLET, DELAYED RELEASE ORAL
Qty: 90 TABLET | Refills: 0 | Status: SHIPPED | OUTPATIENT
Start: 2021-10-31 | End: 2022-01-01

## 2021-10-31 RX ORDER — PANTOPRAZOLE SODIUM 40 MG/1
40 TABLET, DELAYED RELEASE ORAL
Qty: 90 TABLET | Refills: 0 | Status: SHIPPED | OUTPATIENT
Start: 2021-10-31 | End: 2021-10-31

## 2021-10-31 RX ORDER — LEVOTHYROXINE SODIUM 100 MCG
TABLET ORAL
Qty: 90 TABLET | Refills: 3 | Status: SHIPPED | OUTPATIENT
Start: 2021-10-31

## 2021-10-31 RX ORDER — POTASSIUM CHLORIDE 750 MG/1
TABLET, EXTENDED RELEASE ORAL
COMMUNITY
Start: 2021-09-07 | End: 2022-01-01

## 2021-10-31 NOTE — TELEPHONE ENCOUNTER
"Routing refill request to provider for review/approval because:  Patient needs to be seen because it has been more than 1 year since last office visit.  Last office visit provider:  7/2/20     Requested Prescriptions   Pending Prescriptions Disp Refills     SYNTHROID 100 MCG tablet [Pharmacy Med Name: SYNTHROID 100MCG TABLET] 90 tablet      Sig: TAKE 1 TABLET EVERY DAY AT 6:00 A.M.       Thyroid Protocol Passed - 10/29/2021 12:16 PM        Passed - Patient is 12 years or older        Passed - Recent (12 mo) or future (30 days) visit within the authorizing provider's specialty     Patient has had an office visit with the authorizing provider or a provider within the authorizing providers department within the previous 12 mos or has a future within next 30 days. See \"Patient Info\" tab in inbasket, or \"Choose Columns\" in Meds & Orders section of the refill encounter.              Passed - Medication is active on med list        Passed - Normal TSH on file in past 12 months     Recent Labs   Lab Test 07/12/21  1205   TSH 1.78              Passed - No active pregnancy on record     If patient is pregnant or has had a positive pregnancy test, please check TSH.          Passed - No positive pregnancy test in past 12 months     If patient is pregnant or has had a positive pregnancy test, please check TSH.               lola escudero RN 10/31/21 6:58 AM  "

## 2021-11-01 DIAGNOSIS — Z95.2 S/P AVR: ICD-10-CM

## 2021-11-01 RX ORDER — LISINOPRIL 20 MG/1
20 TABLET ORAL DAILY
Qty: 90 TABLET | Refills: 1 | Status: SHIPPED | OUTPATIENT
Start: 2021-11-01 | End: 2022-01-01

## 2021-11-02 ENCOUNTER — TRANSITIONAL CARE UNIT VISIT (OUTPATIENT)
Dept: GERIATRICS | Facility: CLINIC | Age: 86
End: 2021-11-02
Payer: COMMERCIAL

## 2021-11-02 VITALS
BODY MASS INDEX: 21.41 KG/M2 | WEIGHT: 125.4 LBS | HEIGHT: 64 IN | TEMPERATURE: 98.5 F | HEART RATE: 70 BPM | RESPIRATION RATE: 15 BRPM | DIASTOLIC BLOOD PRESSURE: 56 MMHG | OXYGEN SATURATION: 92 % | SYSTOLIC BLOOD PRESSURE: 122 MMHG

## 2021-11-02 DIAGNOSIS — D62 ANEMIA DUE TO BLOOD LOSS, ACUTE: ICD-10-CM

## 2021-11-02 DIAGNOSIS — D69.6 THROMBOCYTOPENIA (H): ICD-10-CM

## 2021-11-02 DIAGNOSIS — F06.70 MILD VASCULAR NEUROCOGNITIVE DISORDER: ICD-10-CM

## 2021-11-02 DIAGNOSIS — I99.9 MILD VASCULAR NEUROCOGNITIVE DISORDER: ICD-10-CM

## 2021-11-02 DIAGNOSIS — S72.114A CLOSED NONDISPLACED FRACTURE OF GREATER TROCHANTER OF RIGHT FEMUR, INITIAL ENCOUNTER (H): Primary | ICD-10-CM
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
## 2021-11-02 DIAGNOSIS — K25.3 GASTRIC ULCER DUE TO HELICOBACTER PYLORI, ACUTE: ICD-10-CM

## 2021-11-02 DIAGNOSIS — B96.81 GASTRIC ULCER DUE TO HELICOBACTER PYLORI, ACUTE: ICD-10-CM

## 2021-11-02 PROCEDURE — 99309 SBSQ NF CARE MODERATE MDM 30: CPT | Performed by: NURSE PRACTITIONER

## 2021-11-02 ASSESSMENT — MIFFLIN-ST. JEOR: SCORE: 1003.81

## 2021-11-03 ENCOUNTER — LAB REQUISITION (OUTPATIENT)
Dept: LAB | Facility: CLINIC | Age: 86
End: 2021-11-03
Payer: COMMERCIAL

## 2021-11-03 DIAGNOSIS — S72.114D NONDISPLACED FRACTURE OF GREATER TROCHANTER OF RIGHT FEMUR, SUBSEQUENT ENCOUNTER FOR CLOSED FRACTURE WITH ROUTINE HEALING: ICD-10-CM

## 2021-11-03 DIAGNOSIS — M85.80 OTHER SPECIFIED DISORDERS OF BONE DENSITY AND STRUCTURE, UNSPECIFIED SITE: ICD-10-CM

## 2021-11-03 DIAGNOSIS — R55 SYNCOPE AND COLLAPSE: ICD-10-CM

## 2021-11-03 NOTE — PROGRESS NOTES
[facility] GERIATRIC SERVICES    Code Status:  DNI   Visit Type:   Chief Complaint   Patient presents with     Nursing Home Acute     TCU Follow up     Facility:  [facility] ([facility]) [[id number]]            History of Present Illness: Rox Zavala is a 86 year old female who I am seeing today for for follow-up on the TCU.  Patient recently hospitalized on 10/10/2021 secondary to right greater trochanteric fracture.  Past medical history includes CAD, CAD CABG with CHEN to the mid left circumflex and RCA 2016, AVR with bioprosthetic valve in 2014, diastolic heart failure, hypertension, cirrhotic arthritis, history of GIB with gastric ulcers.  Patient had a mechanical fall and was found to have a comminuted periprosthetic fracture of the right greater trochanter.  She was seen by Ortho and recommended conservative management.  She is to be nonweightbearing to the right lower extremity except for toe-touch weightbearing for transfers.  No flexion greater than 75 degrees.  She continues on tramadol, Tylenol and lidocaine patch for pain.  History of GIB with gastric ulcers and Dieulafoy post clipping x2. Patient is not on anticoagulation.  She plans to undergo EGD on 11/11/2021.  Patient continues on PPI.  Hemoglobin 11.9.  Down to 8.3.  Acute kidney injury.  Baseline creatinine initially elevated at 1.25 at the time of discharge 0.85.  Her lisinopril was held. Acute blood loss anemia.  She did have intramuscular hematoma.  History of AVR with bioprosthetic valve in 2014.  History of CAD status post CABG.  Continues on statin, metoprolol and Imdur.  Not on aspirin secondary to history of GI bleed.  History of diastolic heart failure compensated.  Hypothyroidism on Synthroid.  Hypertension.  Lisinopril held during hospitalization.  She continued on metoprolol and Imdur.  History of psoriatic arthritis on methotrexate.  Mild thrombocytopenia with platelets at 114,000.  Cognitive impairment on Aricept.  She did have  some acute respiratory hypoxic failure secondary to opioids.  Chest x-ray was normal.  This did resolve.    Today patient sitting up in bedside chair. Pt s/p pelvic fracture due to fall. Pt continues to be NWB with TTWB with transfers.  Patient was self transferring over the weekend and had a fall.  No injury.  No loss of consciousness.  Patient did not hit her head.  Patient continues on Tylenol for pain.  History of GI bleed secondary to ulcer.  She continues on PPI.  Hemoglobin stable at 9.  She is also on iron supplement.  She does have underlying thrombocytopenia.  She plans to undergo EGD on 11/11/2021. HTN. Satisfactory controlled. Heart failure.  Compensated.      Active Ambulatory Problems     Diagnosis Date Noted     Hammer Toe      Hemorrhoids      Psoriasis      Generalized Osteoarthritis Of The Hand      Osteoarthritis Of The Knee      Vitamin D Deficiency      Palpitations      Hypothyroidism      Neck Pain      Upper Back Pain (Between Shoulder Blades)      Osteopenia      Coronary artery disease 11/17/2014     S/P AVR 12/15/2014     Constipation 12/20/2014     Acute diastolic heart failure (H) 12/23/2014     Essential hypertension 12/08/2015     Hyperlipidemia 12/08/2015     Coronary artery disease involving coronary bypass graft of native heart with other forms of angina pectoris (H)      Psoriatic arthritis (H) 07/11/2017     High risk medication use 07/11/2017     Sacral insufficiency fracture, initial encounter 05/06/2019     Hypertension 05/07/2019     Back pain 05/08/2019     Pressure injury of buttock, stage 1, unspecified laterality 06/16/2019     MCI (mild cognitive impairment) 06/16/2019     Frequent PVCs 08/21/2019     Senile osteoporosis 09/23/2019     Hemorrhagic shock (H) 06/01/2021     JOSIAH (acute kidney injury) (H) 06/01/2021     Lactic acidemia 06/01/2021     Acute GI bleeding 06/01/2021     Acute blood loss anemia 06/01/2021     Melena 06/01/2021     Syncope, unspecified syncope type  06/06/2021     Anemia due to blood loss, acute 06/06/2021     Dieulafoy lesion of stomach      Gastric ulcer due to Helicobacter pylori, acute 06/12/2021     Stage 3a chronic kidney disease (H) 07/12/2021     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     Thrombocytopenia (H) 07/12/2021     Closed nondisplaced fracture of greater trochanter of right femur, initial encounter (H) 10/10/2021     Acute gastric ulcer with hemorrhage 10/31/2021     Resolved Ambulatory Problems     Diagnosis Date Noted     Hypertension 12/23/2014     Psoriatic arthropathy (H)      Rheumatoid arthritis (H)      Thrombocytopenia (H) 03/12/2019     Past Medical History:   Diagnosis Date     Acute diastolic CHF (congestive heart failure) (H) 12/23/2014     Acute renal failure (H)      Aortic valve stenosis, severe      Arthritis      Disease of thyroid gland      GI bleed 6/1/2021     Hammer toe      Macular disorder        Current Outpatient Medications:      SYNTHROID 100 MCG tablet, TAKE 1 TABLET EVERY DAY AT 6:00 A.M., Disp: 90 tablet, Rfl: 3     atorvastatin (LIPITOR) 40 MG tablet, Take 1 tablet (40 mg) by mouth daily For lipids, Disp: , Rfl:      calcium carbonate-vitamin D (OYSTER SHELL CALCIUM/D) 500-200 MG-UNIT tablet, Take 1 tablet by mouth daily, Disp: , Rfl:      cholecalciferol 50 MCG (2000 UT) tablet, Take 1 tablet (50 mcg) by mouth daily, Disp: 100 tablet, Rfl: 1     donepezil (ARICEPT) 10 MG tablet, Take 1 tablet (10 mg) by mouth At Bedtime dementia, Disp: 90 tablet, Rfl: 3     ferrous sulfate (FEROSUL) 325 (65 Fe) MG tablet, Take 1 tablet (325 mg) by mouth daily (with breakfast), Disp: , Rfl:      folic acid (FOLVITE) 1 MG tablet, Take 1 tablet (1,000 mcg) by mouth daily General health, Disp: , Rfl:      isosorbide mononitrate (IMDUR) 30 MG 24 hr tablet, Take 1 tablet (30 mg) by mouth daily, Disp: , Rfl:      lisinopril (ZESTRIL) 20 MG tablet, Take 1 tablet (20 mg) by mouth daily htn, Disp: 90 tablet, Rfl: 1      "methotrexate sodium 2.5 MG TABS, Take 3 tablets (7.5 mg) by mouth once a week Fridays, Disp: , Rfl:      metoprolol succinate ER (TOPROL-XL) 25 MG 24 hr tablet, Take 1 tablet (25 mg) by mouth daily htn, Disp: , Rfl:      pantoprazole (PROTONIX) 40 MG EC tablet, Take 1 tablet (40 mg) by mouth every morning (before breakfast), Disp: 90 tablet, Rfl: 0     polyethylene glycol (MIRALAX) 17 GM/Dose powder, Take 17 g by mouth daily To prevent constiaption, Disp: , Rfl:      potassium chloride ER (KLOR-CON M) 10 MEQ CR tablet, , Disp: , Rfl:      senna-docusate (SENOKOT-S/PERICOLACE) 8.6-50 MG tablet, Take 1 tablet by mouth 2 times daily Hold if loose stools, Disp: , Rfl:      vitamin C (ASCORBIC ACID) 500 MG tablet, Take 1 tablet (500 mg) by mouth daily, Disp: , Rfl:   No Known Allergies    All Meds and Allergies reviewed in the record at the facility and is the most up-to-date    REVIEW OF SYSTEMS:   Review of Systems  No fevers or chills. No headache, lightheadedness or dizziness. No SOB, chest pains or palpitations. Appetite is fair. No nausea, vomiting, constipation or diarrhea. No dysuria, frequency, burning or pain with urination.  Pain control with Tylenol.  Patient denies hitting her head or loss of consciousness with fall.  Denies any pain from fall.  Otherwise review of systems are negative.     PHYSICAL EXAMINATION:  Physical Exam     Vital signs: /56   Pulse 70   Temp 98.5  F (36.9  C)   Resp 15   Ht 1.626 m (5' 4\")   Wt 56.9 kg (125 lb 6.4 oz)   SpO2 92%   BMI 21.52 kg/m    General: Awake, Alert, oriented x2, appropriately, follows simple commands, conversant  HEENT:  Pink conjunctiva, anicteric sclerae, moist oral mucosa  NECK: Supple, without any lymphadenopathy, or masses  CVS:  S1  S2, without murmur or gallop.   LUNG: Clear to auscultation, No wheezes, rales or rhonci.  BACK: Mild kyphosis of the thoracic spine  ABDOMEN: Soft, thin, nontender to palpation, with positive bowel " sounds  EXTREMITIES: Good range of motion on both upper and lower extremities, tr pedal edema, no calf tenderness  SKIN: Warm and dry, no rashes or erythema noted  NEUROLOGIC: Intact, pulses palpable  PSYCHIATRIC: Cognitive impairment noted. SLUMS 10      Labs:  All labs reviewed in the nursing home record and Epic   @  Lab Results   Component Value Date    WBC 7.0 10/11/2021     Lab Results   Component Value Date    RBC 2.83 10/11/2021     Lab Results   Component Value Date    HGB 8.5 10/16/2021     Lab Results   Component Value Date    HCT 27.7 10/11/2021     Lab Results   Component Value Date    MCV 98 10/11/2021     Lab Results   Component Value Date    MCH 30.7 10/11/2021     Lab Results   Component Value Date    MCHC 31.4 10/11/2021     Lab Results   Component Value Date    RDW 15.7 10/11/2021     Lab Results   Component Value Date     10/14/2021        @Last Comprehensive Metabolic Panel:  Sodium   Date Value Ref Range Status   10/25/2021 134 (L) 136 - 145 mmol/L Final     Potassium   Date Value Ref Range Status   10/25/2021 4.8 3.5 - 5.0 mmol/L Final     Chloride   Date Value Ref Range Status   10/25/2021 101 98 - 107 mmol/L Final     Carbon Dioxide (CO2)   Date Value Ref Range Status   10/25/2021 26 22 - 31 mmol/L Final     Anion Gap   Date Value Ref Range Status   10/25/2021 7 5 - 18 mmol/L Final     Glucose   Date Value Ref Range Status   10/25/2021 84 70 - 125 mg/dL Final     Urea Nitrogen   Date Value Ref Range Status   10/25/2021 19 8 - 28 mg/dL Final     Creatinine   Date Value Ref Range Status   10/25/2021 0.76 0.60 - 1.10 mg/dL Final     GFR Estimate   Date Value Ref Range Status   10/25/2021 72 >60 mL/min/1.73m2 Final     Comment:     As of July 11, 2021, eGFR is calculated by the CKD-EPI creatinine equation, without race adjustment. eGFR can be influenced by muscle mass, exercise, and diet. The reported eGFR is an estimation only and is only applicable if the renal function is stable.    06/24/2021 >60 >60 mL/min/1.73m2 Final     Calcium   Date Value Ref Range Status   10/25/2021 9.0 8.5 - 10.5 mg/dL Final         Assessment/Plan:    ICD-10-CM    1. Closed nondisplaced fracture of greater trochanter of right femur, initial encounter (H)  S72.114A Patient continues to be nonweightbearing except for toe-touch weightbearing with transfers.  No greater than 70% flexion.  Patient continues on Tylenol for pain.   2. Gastric ulcer due to Helicobacter pylori, acute  K25.3  plans for upcoming EGD on 11/11/2021.  Patient continues on PPI.    B96.81    3. Anemia due to blood loss, acute  D62  no evidence of bleed.  Hemoglobin 9.0. . Pt is on iron supplement.   4. Thrombocytopenia (H)  D69.6  platelets 219,000.    5. Mild vascular neurocognitive disorder (H)  F01.50  pleasantly confused.  Continues on Aricept.   6.   Hypertension   blood pressure satisfactory.   7.   Fall   no loss of conscious.  Patient did not hit her head.  Denies injury.   8. Stage 3a chronic kidney disease (H)  N18.31  creatinine stable.        This note has been dictated using voice recognition software. Any grammatical or context distortions are unintentional and inherent to the software    Electronically signed by: Janett Barnard CNP

## 2021-11-04 ENCOUNTER — TRANSITIONAL CARE UNIT VISIT (OUTPATIENT)
Dept: GERIATRICS | Facility: CLINIC | Age: 86
End: 2021-11-04
Payer: COMMERCIAL

## 2021-11-04 VITALS
DIASTOLIC BLOOD PRESSURE: 74 MMHG | RESPIRATION RATE: 20 BRPM | TEMPERATURE: 97.8 F | OXYGEN SATURATION: 95 % | BODY MASS INDEX: 20.96 KG/M2 | HEIGHT: 64 IN | WEIGHT: 122.8 LBS | SYSTOLIC BLOOD PRESSURE: 162 MMHG | HEART RATE: 67 BPM

## 2021-11-04 DIAGNOSIS — I99.9 MILD VASCULAR NEUROCOGNITIVE DISORDER: ICD-10-CM

## 2021-11-04 DIAGNOSIS — F06.70 MILD VASCULAR NEUROCOGNITIVE DISORDER: ICD-10-CM

## 2021-11-04 DIAGNOSIS — S72.114A CLOSED NONDISPLACED FRACTURE OF GREATER TROCHANTER OF RIGHT FEMUR, INITIAL ENCOUNTER (H): Primary | ICD-10-CM

## 2021-11-04 DIAGNOSIS — D69.6 THROMBOCYTOPENIA (H): ICD-10-CM

## 2021-11-04 DIAGNOSIS — B96.81 GASTRIC ULCER DUE TO HELICOBACTER PYLORI, ACUTE: ICD-10-CM

## 2021-11-04 DIAGNOSIS — K25.3 GASTRIC ULCER DUE TO HELICOBACTER PYLORI, ACUTE: ICD-10-CM

## 2021-11-04 DIAGNOSIS — D62 ANEMIA DUE TO BLOOD LOSS, ACUTE: ICD-10-CM

## 2021-11-04 LAB — HGB BLD-MCNC: 10.1 G/DL (ref 11.7–15.7)

## 2021-11-04 PROCEDURE — 99309 SBSQ NF CARE MODERATE MDM 30: CPT | Performed by: NURSE PRACTITIONER

## 2021-11-04 PROCEDURE — 36415 COLL VENOUS BLD VENIPUNCTURE: CPT | Mod: ORL | Performed by: FAMILY MEDICINE

## 2021-11-04 PROCEDURE — 85018 HEMOGLOBIN: CPT | Mod: ORL | Performed by: FAMILY MEDICINE

## 2021-11-04 PROCEDURE — P9604 ONE-WAY ALLOW PRORATED TRIP: HCPCS | Mod: ORL | Performed by: FAMILY MEDICINE

## 2021-11-04 ASSESSMENT — MIFFLIN-ST. JEOR: SCORE: 992.02

## 2021-11-05 NOTE — PROGRESS NOTES
[facility] GERIATRIC SERVICES    Code Status:  DNI   Visit Type:   Chief Complaint   Patient presents with     Nursing Home Acute     TCU Follow up     Facility:  [facility] [place] ([facility]) [[id number]]            History of Present Illness: Rox Zavala is a 86 year old female who I am seeing today for for follow-up on the TCU.  Patient recently hospitalized on 10/10/2021 secondary to right greater trochanteric fracture.  Past medical history includes CAD, CAD CABG with CHEN to the mid left circumflex and RCA 2016, AVR with bioprosthetic valve in 2014, diastolic heart failure, hypertension, cirrhotic arthritis, history of GIB with gastric ulcers.  Patient had a mechanical fall and was found to have a comminuted periprosthetic fracture of the right greater trochanter.  She was seen by Ortho and recommended conservative management.  She is to be nonweightbearing to the right lower extremity except for toe-touch weightbearing for transfers.  No flexion greater than 75 degrees.  She continues on tramadol, Tylenol and lidocaine patch for pain.  History of GIB with gastric ulcers and Dieulafoy post clipping x2. Patient is not on anticoagulation.  She plans to undergo EGD on 11/11/2021.  Patient continues on PPI.  Hemoglobin 11.9.  Down to 8.3.  Acute kidney injury.  Baseline creatinine initially elevated at 1.25 at the time of discharge 0.85.  Her lisinopril was held. Acute blood loss anemia.  She did have intramuscular hematoma.  History of AVR with bioprosthetic valve in 2014.  History of CAD status post CABG.  Continues on statin, metoprolol and Imdur.  Not on aspirin secondary to history of GI bleed.  History of diastolic heart failure compensated.  Hypothyroidism on Synthroid.  Hypertension.  Lisinopril held during hospitalization.  She continued on metoprolol and Imdur.  History of psoriatic arthritis on methotrexate.  Mild thrombocytopenia with platelets at 114,000.  Cognitive impairment on Aricept.  She did have  some acute respiratory hypoxic failure secondary to opioids.  Chest x-ray was normal.  This did resolve.    Today patient sitting up in bedside chair. Pt underlying cognitive impairment.  No behaviors.  Patient with recent fall with pelvic fracture.  She continues to be nonweightbearing with toe-touch weightbearing transfers.  Patient pain control with Tylenol.  History of GI bleed secondary to ulcer.  She continues on PPI.  Hemoglobin stable at 9.  She has a follow-up EGD scheduled on 11/16/2021.  Hypertension.  Satisfactory control.  Underlying heart failure compensated.  Patient with thrombocytopenia.  She continues on iron supplement.      Active Ambulatory Problems     Diagnosis Date Noted     Hammer Toe      Hemorrhoids      Psoriasis      Generalized Osteoarthritis Of The Hand      Osteoarthritis Of The Knee      Vitamin D Deficiency      Palpitations      Hypothyroidism      Neck Pain      Upper Back Pain (Between Shoulder Blades)      Osteopenia      Coronary artery disease 11/17/2014     S/P AVR 12/15/2014     Constipation 12/20/2014     Acute diastolic heart failure (H) 12/23/2014     Essential hypertension 12/08/2015     Hyperlipidemia 12/08/2015     Coronary artery disease involving coronary bypass graft of native heart with other forms of angina pectoris (H)      Psoriatic arthritis (H) 07/11/2017     High risk medication use 07/11/2017     Sacral insufficiency fracture, initial encounter 05/06/2019     Hypertension 05/07/2019     Back pain 05/08/2019     Pressure injury of buttock, stage 1, unspecified laterality 06/16/2019     MCI (mild cognitive impairment) 06/16/2019     Frequent PVCs 08/21/2019     Senile osteoporosis 09/23/2019     Hemorrhagic shock (H) 06/01/2021     JOSIAH (acute kidney injury) (H) 06/01/2021     Lactic acidemia 06/01/2021     Acute GI bleeding 06/01/2021     Acute blood loss anemia 06/01/2021     Melena 06/01/2021     Syncope, unspecified syncope type 06/06/2021     Anemia due to  blood loss, acute 06/06/2021     Dieulafoy lesion of stomach      Gastric ulcer due to Helicobacter pylori, acute 06/12/2021     Stage 3a chronic kidney disease (H) 07/12/2021     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     Thrombocytopenia (H) 07/12/2021     Closed nondisplaced fracture of greater trochanter of right femur, initial encounter (H) 10/10/2021     Acute gastric ulcer with hemorrhage 10/31/2021     Resolved Ambulatory Problems     Diagnosis Date Noted     Hypertension 12/23/2014     Psoriatic arthropathy (H)      Rheumatoid arthritis (H)      Thrombocytopenia (H) 03/12/2019     Past Medical History:   Diagnosis Date     Acute diastolic CHF (congestive heart failure) (H) 12/23/2014     Acute renal failure (H)      Aortic valve stenosis, severe      Arthritis      Disease of thyroid gland      GI bleed 6/1/2021     Hammer toe      Macular disorder        Current Outpatient Medications:      SYNTHROID 100 MCG tablet, TAKE 1 TABLET EVERY DAY AT 6:00 A.M., Disp: 90 tablet, Rfl: 3     atorvastatin (LIPITOR) 40 MG tablet, Take 1 tablet (40 mg) by mouth daily For lipids, Disp: , Rfl:      calcium carbonate-vitamin D (OYSTER SHELL CALCIUM/D) 500-200 MG-UNIT tablet, Take 1 tablet by mouth daily, Disp: , Rfl:      cholecalciferol 50 MCG (2000 UT) tablet, Take 1 tablet (50 mcg) by mouth daily, Disp: 100 tablet, Rfl: 1     donepezil (ARICEPT) 10 MG tablet, Take 1 tablet (10 mg) by mouth At Bedtime dementia, Disp: 90 tablet, Rfl: 3     ferrous sulfate (FEROSUL) 325 (65 Fe) MG tablet, Take 1 tablet (325 mg) by mouth daily (with breakfast), Disp: , Rfl:      folic acid (FOLVITE) 1 MG tablet, Take 1 tablet (1,000 mcg) by mouth daily General health, Disp: , Rfl:      isosorbide mononitrate (IMDUR) 30 MG 24 hr tablet, Take 1 tablet (30 mg) by mouth daily, Disp: , Rfl:      lisinopril (ZESTRIL) 20 MG tablet, Take 1 tablet (20 mg) by mouth daily htn, Disp: 90 tablet, Rfl: 1     methotrexate sodium 2.5 MG TABS,  "Take 3 tablets (7.5 mg) by mouth once a week Fridays, Disp: , Rfl:      metoprolol succinate ER (TOPROL-XL) 25 MG 24 hr tablet, Take 1 tablet (25 mg) by mouth daily htn, Disp: , Rfl:      pantoprazole (PROTONIX) 40 MG EC tablet, Take 1 tablet (40 mg) by mouth every morning (before breakfast), Disp: 90 tablet, Rfl: 0     polyethylene glycol (MIRALAX) 17 GM/Dose powder, Take 17 g by mouth daily To prevent constiaption, Disp: , Rfl:      potassium chloride ER (KLOR-CON M) 10 MEQ CR tablet, , Disp: , Rfl:      senna-docusate (SENOKOT-S/PERICOLACE) 8.6-50 MG tablet, Take 1 tablet by mouth 2 times daily Hold if loose stools, Disp: , Rfl:      vitamin C (ASCORBIC ACID) 500 MG tablet, Take 1 tablet (500 mg) by mouth daily, Disp: , Rfl:   No Known Allergies    All Meds and Allergies reviewed in the record at the facility and is the most up-to-date    REVIEW OF SYSTEMS:   Review of Systems  No fevers or chills. No headache, lightheadedness or dizziness. No SOB, chest pains or palpitations. Appetite is fair. No nausea, vomiting, constipation or diarrhea. No dysuria, frequency, burning or pain with urination.  Pain control with Tylenol.  Patient denies hitting her head or loss of consciousness with fall.  Denies any pain from fall.  Otherwise review of systems are negative.     PHYSICAL EXAMINATION:  Physical Exam     Vital signs: BP (!) 162/74   Pulse 67   Temp 97.8  F (36.6  C)   Resp 20   Ht 1.626 m (5' 4\")   Wt 55.7 kg (122 lb 12.8 oz)   SpO2 95%   BMI 21.08 kg/m    General: Awake, Alert, oriented x2, appropriately, follows simple commands, conversant  HEENT:  Pink conjunctiva, anicteric sclerae, moist oral mucosa  NECK: Supple, without any lymphadenopathy, or masses  CVS:  S1  S2, without murmur or gallop.   LUNG: Clear to auscultation, No wheezes, rales or rhonci.  BACK: Mild kyphosis of the thoracic spine  ABDOMEN: Soft, thin, nontender to palpation, with positive bowel sounds  EXTREMITIES: Good range of motion on " both upper and lower extremities, tr pedal edema, no calf tenderness  SKIN: Warm and dry, no rashes or erythema noted  NEUROLOGIC: Intact, pulses palpable  PSYCHIATRIC: Cognitive impairment noted. SLUMS 10      Labs:  All labs reviewed in the nursing home record and Epic   @  Lab Results   Component Value Date    WBC 7.0 10/11/2021     Lab Results   Component Value Date    RBC 2.83 10/11/2021     Lab Results   Component Value Date    HGB 8.5 10/16/2021     Lab Results   Component Value Date    HCT 27.7 10/11/2021     Lab Results   Component Value Date    MCV 98 10/11/2021     Lab Results   Component Value Date    MCH 30.7 10/11/2021     Lab Results   Component Value Date    MCHC 31.4 10/11/2021     Lab Results   Component Value Date    RDW 15.7 10/11/2021     Lab Results   Component Value Date     10/14/2021        @Last Comprehensive Metabolic Panel:  Sodium   Date Value Ref Range Status   10/25/2021 134 (L) 136 - 145 mmol/L Final     Potassium   Date Value Ref Range Status   10/25/2021 4.8 3.5 - 5.0 mmol/L Final     Chloride   Date Value Ref Range Status   10/25/2021 101 98 - 107 mmol/L Final     Carbon Dioxide (CO2)   Date Value Ref Range Status   10/25/2021 26 22 - 31 mmol/L Final     Anion Gap   Date Value Ref Range Status   10/25/2021 7 5 - 18 mmol/L Final     Glucose   Date Value Ref Range Status   10/25/2021 84 70 - 125 mg/dL Final     Urea Nitrogen   Date Value Ref Range Status   10/25/2021 19 8 - 28 mg/dL Final     Creatinine   Date Value Ref Range Status   10/25/2021 0.76 0.60 - 1.10 mg/dL Final     GFR Estimate   Date Value Ref Range Status   10/25/2021 72 >60 mL/min/1.73m2 Final     Comment:     As of July 11, 2021, eGFR is calculated by the CKD-EPI creatinine equation, without race adjustment. eGFR can be influenced by muscle mass, exercise, and diet. The reported eGFR is an estimation only and is only applicable if the renal function is stable.   06/24/2021 >60 >60 mL/min/1.73m2 Final      Calcium   Date Value Ref Range Status   10/25/2021 9.0 8.5 - 10.5 mg/dL Final         Assessment/Plan:    ICD-10-CM    1. Closed nondisplaced fracture of greater trochanter of right femur, initial encounter (H)  S72.114A Patient continues to be nonweightbearing except for toe-touch weightbearing with transfers.  No greater than 70% flexion.  Patient continues on Tylenol for pain.   2. Gastric ulcer due to Helicobacter pylori, acute  K25.3  plans for upcoming EGD on 11/16/2021.  Patient continues on PPI.    B96.81    3. Anemia due to blood loss, acute  D62  no evidence of bleed.  Hemoglobin 9.0. . Pt is on iron supplement.   4. Thrombocytopenia (H)  D69.6  platelets 219,000.    5. Mild vascular neurocognitive disorder (H)  F01.50  pleasantly confused.  Continues on Aricept.   6.   Hypertension   blood pressure satisfactory.       This note has been dictated using voice recognition software. Any grammatical or context distortions are unintentional and inherent to the software    Electronically signed by: Janett Barnard, CNP

## 2021-11-09 ENCOUNTER — TRANSITIONAL CARE UNIT VISIT (OUTPATIENT)
Dept: GERIATRICS | Facility: CLINIC | Age: 86
End: 2021-11-09
Payer: COMMERCIAL

## 2021-11-09 VITALS
BODY MASS INDEX: 21.1 KG/M2 | DIASTOLIC BLOOD PRESSURE: 80 MMHG | WEIGHT: 123.6 LBS | HEART RATE: 66 BPM | SYSTOLIC BLOOD PRESSURE: 183 MMHG | OXYGEN SATURATION: 91 % | HEIGHT: 64 IN | RESPIRATION RATE: 18 BRPM | TEMPERATURE: 98 F

## 2021-11-09 DIAGNOSIS — F06.70 MILD VASCULAR NEUROCOGNITIVE DISORDER: ICD-10-CM

## 2021-11-09 DIAGNOSIS — L40.50 PSORIATIC ARTHRITIS (H): ICD-10-CM

## 2021-11-09 DIAGNOSIS — K25.4 GASTROINTESTINAL HEMORRHAGE ASSOCIATED WITH GASTRIC ULCER: ICD-10-CM

## 2021-11-09 DIAGNOSIS — S72.114A CLOSED NONDISPLACED FRACTURE OF GREATER TROCHANTER OF RIGHT FEMUR, INITIAL ENCOUNTER (H): Primary | ICD-10-CM

## 2021-11-09 DIAGNOSIS — E03.1 CONGENITAL HYPOTHYROIDISM WITHOUT GOITER: ICD-10-CM

## 2021-11-09 DIAGNOSIS — B96.81 GASTRIC ULCER DUE TO HELICOBACTER PYLORI, ACUTE: ICD-10-CM

## 2021-11-09 DIAGNOSIS — I99.9 MILD VASCULAR NEUROCOGNITIVE DISORDER: ICD-10-CM

## 2021-11-09 DIAGNOSIS — N18.30 STAGE 3 CHRONIC KIDNEY DISEASE, UNSPECIFIED WHETHER STAGE 3A OR 3B CKD (H): ICD-10-CM

## 2021-11-09 DIAGNOSIS — D69.6 THROMBOCYTOPENIA (H): ICD-10-CM

## 2021-11-09 DIAGNOSIS — I10 HYPERTENSION, UNSPECIFIED TYPE: ICD-10-CM

## 2021-11-09 DIAGNOSIS — K25.3 GASTRIC ULCER DUE TO HELICOBACTER PYLORI, ACUTE: ICD-10-CM

## 2021-11-09 DIAGNOSIS — D62 ANEMIA DUE TO BLOOD LOSS, ACUTE: ICD-10-CM

## 2021-11-09 DIAGNOSIS — I25.708 CORONARY ARTERY DISEASE INVOLVING CORONARY BYPASS GRAFT OF NATIVE HEART WITH OTHER FORMS OF ANGINA PECTORIS (H): ICD-10-CM
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
## 2021-11-09 PROCEDURE — 99316 NF DSCHRG MGMT 30 MIN+: CPT | Performed by: NURSE PRACTITIONER

## 2021-11-09 ASSESSMENT — MIFFLIN-ST. JEOR: SCORE: 995.65

## 2021-11-09 NOTE — PROGRESS NOTES
[facility] GERIATRIC SERVICES    Code Status:  DNI   Visit Type:   Chief Complaint   Patient presents with     Nursing Home Acute     TCU Follow up     Facility:  [facility] ([facility]) [[id number]]            History of Present Illness: Rox Zavala is a 86 year old female who I am seeing today for discharge from the TCU as well as Pre op for EGD on 11/16/21.  Patient recently hospitalized on 10/10/2021 secondary to right greater trochanteric fracture.  Past medical history includes CAD, CAD CABG with CHEN to the mid left circumflex and RCA 2016, AVR with bioprosthetic valve in 2014, diastolic heart failure, hypertension, cirrhotic arthritis, history of GIB with gastric ulcers.  Patient had a mechanical fall and was found to have a comminuted periprosthetic fracture of the right greater trochanter.  She was seen by Ortho and recommended conservative management.  She is to be nonweightbearing to the right lower extremity except for toe-touch weightbearing for transfers.  No flexion greater than 75 degrees.  She continues on tramadol, Tylenol and lidocaine patch for pain.  History of GIB with gastric ulcers and Dieulafoy post clipping x2. Patient is not on anticoagulation.  She plans to undergo EGD on 11/11/2021.  Patient continues on PPI.  Hemoglobin 11.9.  Down to 8.3.  Acute kidney injury.  Baseline creatinine initially elevated at 1.25 at the time of discharge 0.85.  Her lisinopril was held. Acute blood loss anemia.  She did have intramuscular hematoma.  History of AVR with bioprosthetic valve in 2014.  History of CAD status post CABG.  Continues on statin, metoprolol and Imdur.  Not on aspirin secondary to history of GI bleed.  History of diastolic heart failure compensated.  Hypothyroidism on Synthroid.  Hypertension.  Lisinopril held during hospitalization.  She continued on metoprolol and Imdur.  History of psoriatic arthritis on methotrexate.  Mild thrombocytopenia with platelets at 114,000.  Cognitive  impairment on Aricept.  She did have some acute respiratory hypoxic failure secondary to opioids.  Chest x-ray was normal.  This did resolve.    Today patient sitting up in bedside chair. Pt underlying cognitive impairment and is a poor historian. Recent fall with pelvic fracture. She continues to be nonweightbearing with toe-touch weightbearing transfers. Patient pain controlled with Tylenol. History of GI bleed secondary to ulcer. She continues on PPI. She was also treated for H. Pylori. She has a scheduled EGD on 11/16/21.  Hemoglobin stable at 9. No evidence of bleed.  Hypertension.  Satisfactory control.  Underlying heart failure compensated.  Patient with thrombocytopenia.  She continues on iron supplement. Psoriatic Arthritis on suppressive therapy. HTN satisfactory controlled.       Active Ambulatory Problems     Diagnosis Date Noted     Hammer Toe      Hemorrhoids      Psoriasis      Generalized Osteoarthritis Of The Hand      Osteoarthritis Of The Knee      Vitamin D Deficiency      Palpitations      Hypothyroidism      Neck Pain      Upper Back Pain (Between Shoulder Blades)      Osteopenia      Coronary artery disease 11/17/2014     S/P AVR 12/15/2014     Constipation 12/20/2014     Acute diastolic heart failure (H) 12/23/2014     Essential hypertension 12/08/2015     Hyperlipidemia 12/08/2015     Coronary artery disease involving coronary bypass graft of native heart with other forms of angina pectoris (H)      Psoriatic arthritis (H) 07/11/2017     High risk medication use 07/11/2017     Sacral insufficiency fracture, initial encounter 05/06/2019     Hypertension 05/07/2019     Back pain 05/08/2019     Pressure injury of buttock, stage 1, unspecified laterality 06/16/2019     MCI (mild cognitive impairment) 06/16/2019     Frequent PVCs 08/21/2019     Senile osteoporosis 09/23/2019     Hemorrhagic shock (H) 06/01/2021     JOSIAH (acute kidney injury) (H) 06/01/2021     Lactic acidemia 06/01/2021     Acute  GI bleeding 06/01/2021     Acute blood loss anemia 06/01/2021     Melena 06/01/2021     Syncope, unspecified syncope type 06/06/2021     Anemia due to blood loss, acute 06/06/2021     Dieulafoy lesion of stomach      Gastric ulcer due to Helicobacter pylori, acute 06/12/2021     Stage 3 chronic kidney disease, unspecified whether stage 3a or 3b CKD (H) 07/12/2021     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     Thrombocytopenia (H) 07/12/2021     Closed nondisplaced fracture of greater trochanter of right femur, initial encounter (H) 10/10/2021     Acute gastric ulcer with hemorrhage 10/31/2021     Fall, initial encounter 11/09/2021     Resolved Ambulatory Problems     Diagnosis Date Noted     Hypertension 12/23/2014     Psoriatic arthropathy (H)      Rheumatoid arthritis (H)      Thrombocytopenia (H) 03/12/2019     Past Medical History:   Diagnosis Date     Acute diastolic CHF (congestive heart failure) (H) 12/23/2014     Acute renal failure (H)      Aortic valve stenosis, severe      Arthritis      Disease of thyroid gland      GI bleed 6/1/2021     Hammer toe      Macular disorder        Current Outpatient Medications:      SYNTHROID 100 MCG tablet, TAKE 1 TABLET EVERY DAY AT 6:00 A.M., Disp: 90 tablet, Rfl: 3     atorvastatin (LIPITOR) 40 MG tablet, Take 1 tablet (40 mg) by mouth daily For lipids, Disp: , Rfl:      calcium carbonate-vitamin D (OYSTER SHELL CALCIUM/D) 500-200 MG-UNIT tablet, Take 1 tablet by mouth daily, Disp: , Rfl:      cholecalciferol 50 MCG (2000 UT) tablet, Take 1 tablet (50 mcg) by mouth daily, Disp: 100 tablet, Rfl: 1     donepezil (ARICEPT) 10 MG tablet, Take 1 tablet (10 mg) by mouth At Bedtime dementia, Disp: 90 tablet, Rfl: 3     ferrous sulfate (FEROSUL) 325 (65 Fe) MG tablet, Take 1 tablet (325 mg) by mouth daily (with breakfast), Disp: , Rfl:      folic acid (FOLVITE) 1 MG tablet, Take 1 tablet (1,000 mcg) by mouth daily General health, Disp: , Rfl:      isosorbide  "mononitrate (IMDUR) 30 MG 24 hr tablet, Take 1 tablet (30 mg) by mouth daily, Disp: , Rfl:      lisinopril (ZESTRIL) 20 MG tablet, Take 1 tablet (20 mg) by mouth daily htn, Disp: 90 tablet, Rfl: 1     methotrexate sodium 2.5 MG TABS, Take 3 tablets (7.5 mg) by mouth once a week Fridays, Disp: , Rfl:      metoprolol succinate ER (TOPROL-XL) 25 MG 24 hr tablet, Take 1 tablet (25 mg) by mouth daily htn, Disp: , Rfl:      pantoprazole (PROTONIX) 40 MG EC tablet, Take 1 tablet (40 mg) by mouth every morning (before breakfast), Disp: 90 tablet, Rfl: 0     polyethylene glycol (MIRALAX) 17 GM/Dose powder, Take 17 g by mouth daily To prevent constiaption, Disp: , Rfl:      potassium chloride ER (KLOR-CON M) 10 MEQ CR tablet, , Disp: , Rfl:      senna-docusate (SENOKOT-S/PERICOLACE) 8.6-50 MG tablet, Take 1 tablet by mouth 2 times daily Hold if loose stools, Disp: , Rfl:      vitamin C (ASCORBIC ACID) 500 MG tablet, Take 1 tablet (500 mg) by mouth daily, Disp: , Rfl:   No Known Allergies    All Meds and Allergies reviewed in the record at the facility and is the most up-to-date    REVIEW OF SYSTEMS:   Review of Systems  No fevers or chills. No headache, lightheadedness or dizziness. No SOB, chest pains or palpitations. Appetite is fair. No nausea, vomiting, constipation or diarrhea. No dysuria, frequency, burning or pain with urination.  Pain control with Tylenol.  Patient denies hitting her head or loss of consciousness with fall.  Denies any pain from fall.  Otherwise review of systems are negative.     PHYSICAL EXAMINATION:  Physical Exam     Vital signs: BP (!) 183/80   Pulse 66   Temp 98  F (36.7  C)   Resp 18   Ht 1.626 m (5' 4\")   Wt 56.1 kg (123 lb 9.6 oz)   SpO2 91%   BMI 21.22 kg/m    General: Awake, Alert, oriented x2, appropriately, follows simple commands, conversant  HEENT:  Pink conjunctiva, anicteric sclerae, moist oral mucosa  NECK: Supple, without any lymphadenopathy, or masses  CVS:  S1  S2, without " murmur or gallop.   LUNG: Clear to auscultation, No wheezes, rales or rhonci.  BACK: Mild kyphosis of the thoracic spine  ABDOMEN: Soft, thin, nontender to palpation, with positive bowel sounds  EXTREMITIES: Good range of motion on both upper and lower extremities, tr pedal edema, no calf tenderness  SKIN: Warm and dry, no rashes or erythema noted  NEUROLOGIC: Intact, pulses palpable  PSYCHIATRIC: Cognitive impairment noted. SLUMS 10      Labs:  All labs reviewed in the nursing home record and Epic   @  Lab Results   Component Value Date    WBC 7.0 10/11/2021     Lab Results   Component Value Date    RBC 2.83 10/11/2021     Lab Results   Component Value Date    HGB 8.5 10/16/2021     Lab Results   Component Value Date    HCT 27.7 10/11/2021     Lab Results   Component Value Date    MCV 98 10/11/2021     Lab Results   Component Value Date    MCH 30.7 10/11/2021     Lab Results   Component Value Date    MCHC 31.4 10/11/2021     Lab Results   Component Value Date    RDW 15.7 10/11/2021     Lab Results   Component Value Date     10/14/2021        @Last Comprehensive Metabolic Panel:  Sodium   Date Value Ref Range Status   10/25/2021 134 (L) 136 - 145 mmol/L Final     Potassium   Date Value Ref Range Status   10/25/2021 4.8 3.5 - 5.0 mmol/L Final     Chloride   Date Value Ref Range Status   10/25/2021 101 98 - 107 mmol/L Final     Carbon Dioxide (CO2)   Date Value Ref Range Status   10/25/2021 26 22 - 31 mmol/L Final     Anion Gap   Date Value Ref Range Status   10/25/2021 7 5 - 18 mmol/L Final     Glucose   Date Value Ref Range Status   10/25/2021 84 70 - 125 mg/dL Final     Urea Nitrogen   Date Value Ref Range Status   10/25/2021 19 8 - 28 mg/dL Final     Creatinine   Date Value Ref Range Status   10/25/2021 0.76 0.60 - 1.10 mg/dL Final     GFR Estimate   Date Value Ref Range Status   10/25/2021 72 >60 mL/min/1.73m2 Final     Comment:     As of July 11, 2021, eGFR is calculated by the CKD-EPI creatinine  equation, without race adjustment. eGFR can be influenced by muscle mass, exercise, and diet. The reported eGFR is an estimation only and is only applicable if the renal function is stable.   06/24/2021 >60 >60 mL/min/1.73m2 Final     Calcium   Date Value Ref Range Status   10/25/2021 9.0 8.5 - 10.5 mg/dL Final         Assessment/Plan:    ICD-10-CM    1. Closed nondisplaced fracture of greater trochanter of right femur, initial encounter (H)  S72.114A Patient continues to be nonweightbearing except for toe-touch weightbearing with transfers.  No greater than 70% flexion.  Patient continues on Tylenol for pain.   2. Gastrointestinal hemorrhage associated with gastric ulcer  K25.4 Pt treated for H. Pylori. She has completed tx.   She continues on PPI.   Planned f/u EGD on 11/16/21.     B96.81    3. Anemia due to blood loss, acute  D62 Hgb now 10.1 up from 8.3.  Patient continues on iron supplement.  Follow-up CBC in the a.m.     4. Mild vascular neurocognitive disorder (H)  F01.50 Stable.    5. Thrombocytopenia (H)  D69.6  WBC 7.3.  Hemoglobin 10.1.  Platelets 195,000.   6. Stage 3 chronic kidney disease, unspecified whether stage 3a or 3b CKD (H)  N18.30  so follow-up BMP in the a.m.  Dium 134.  Stable.Creatinine 0.76.  GFR 72.     8. Coronary artery disease involving coronary bypass graft of native heart with other forms of angina pectoris (H)  I25.708  history of CABG.  Patient continues on statin, metoprolol and Imdur.  She is not on aspirin.   9. Congenital hypothyroidism without goiter  E03.1 Continues on Synthroid.  Follow-up TSH in the a.m.   10. Hypertension, unspecified type  I10  satisfactory control.   11. Psoriatic arthritis (H)  L40.50  continues on methotrexate on Fridays.  She is on folic acid daily.     Okay to DC home with current meds and treatments.  Home PT, OT, home health aide and RN for medication management.  Follow-up with primary care provider in 1 week.  Follow-up with rheumatology  outpatient.  Follow-up for EGD on 11/16/2021.  I have asked nursing together GI prep for prior procedure.  She will need to be n.p.o.  Follow-up with cardiology outpatient.    DISCHARGE PLAN/FACE TO FACE:  I certify that this patient is under my care and that I, or a nurse practitioner or physician's assistant working with me, had a face-to-face encounter that meets the physician face-to-face encounter requirements with this patient.       I certify that, based on my findings, the following services are medically necessary home health services.    My clinical findings support the need for the above skilled services.    This patient is homebound because: Recent fall with right femur fracture with ORIF.  History of GI bleed.    The patient is, or has been, under my care and I have initiated the establishment of the plan of care. This patient will be followed by a physician who will periodically review the plan of care.    Total time spent for this visit was 35 minutes with greater than 50% of time spent face-to-face with patient as well as collaborating with nursing staff on upcoming procedure as well as discharge medications, home care services and follow-ups.    This note has been dictated using voice recognition software. Any grammatical or context distortions are unintentional and inherent to the software    Electronically signed by: Janett Barnard CNP

## 2021-11-10 ENCOUNTER — LAB REQUISITION (OUTPATIENT)
Dept: LAB | Facility: CLINIC | Age: 86
End: 2021-11-10
Payer: COMMERCIAL

## 2021-11-10 DIAGNOSIS — E03.9 HYPOTHYROIDISM, UNSPECIFIED: ICD-10-CM

## 2021-11-10 LAB
ANION GAP SERPL CALCULATED.3IONS-SCNC: 10 MMOL/L (ref 5–18)
BUN SERPL-MCNC: 15 MG/DL (ref 8–28)
CALCIUM SERPL-MCNC: 9 MG/DL (ref 8.5–10.5)
CHLORIDE BLD-SCNC: 101 MMOL/L (ref 98–107)
CO2 SERPL-SCNC: 26 MMOL/L (ref 22–31)
CREAT SERPL-MCNC: 0.83 MG/DL (ref 0.6–1.1)
ERYTHROCYTE [DISTWIDTH] IN BLOOD BY AUTOMATED COUNT: 16.4 % (ref 10–15)
GFR SERPL CREATININE-BSD FRML MDRD: 65 ML/MIN/1.73M2
GLUCOSE BLD-MCNC: 76 MG/DL (ref 70–125)
HCT VFR BLD AUTO: 31.4 % (ref 35–47)
HGB BLD-MCNC: 10.2 G/DL (ref 11.7–15.7)
MCH RBC QN AUTO: 34.5 PG (ref 26.5–33)
MCHC RBC AUTO-ENTMCNC: 32.5 G/DL (ref 31.5–36.5)
MCV RBC AUTO: 106 FL (ref 78–100)
PLATELET # BLD AUTO: 207 10E3/UL (ref 150–450)
POTASSIUM BLD-SCNC: 5.3 MMOL/L (ref 3.5–5)
RBC # BLD AUTO: 2.96 10E6/UL (ref 3.8–5.2)
SODIUM SERPL-SCNC: 137 MMOL/L (ref 136–145)
TSH SERPL DL<=0.005 MIU/L-ACNC: 1.28 UIU/ML (ref 0.3–5)
WBC # BLD AUTO: 5.6 10E3/UL (ref 4–11)

## 2021-11-10 PROCEDURE — 80048 BASIC METABOLIC PNL TOTAL CA: CPT | Mod: ORL | Performed by: NURSE PRACTITIONER

## 2021-11-10 PROCEDURE — 84443 ASSAY THYROID STIM HORMONE: CPT | Mod: ORL | Performed by: NURSE PRACTITIONER

## 2021-11-10 PROCEDURE — 85027 COMPLETE CBC AUTOMATED: CPT | Mod: ORL | Performed by: NURSE PRACTITIONER

## 2021-11-10 PROCEDURE — 36415 COLL VENOUS BLD VENIPUNCTURE: CPT | Mod: ORL | Performed by: NURSE PRACTITIONER

## 2021-11-11 ENCOUNTER — TELEPHONE (OUTPATIENT)
Dept: FAMILY MEDICINE | Facility: CLINIC | Age: 86
End: 2021-11-11
Payer: COMMERCIAL

## 2021-11-11 ENCOUNTER — MEDICAL CORRESPONDENCE (OUTPATIENT)
Dept: HEALTH INFORMATION MANAGEMENT | Facility: CLINIC | Age: 86
End: 2021-11-11
Payer: COMMERCIAL

## 2021-11-12 PROCEDURE — U0003 INFECTIOUS AGENT DETECTION BY NUCLEIC ACID (DNA OR RNA); SEVERE ACUTE RESPIRATORY SYNDROME CORONAVIRUS 2 (SARS-COV-2) (CORONAVIRUS DISEASE [COVID-19]), AMPLIFIED PROBE TECHNIQUE, MAKING USE OF HIGH THROUGHPUT TECHNOLOGIES AS DESCRIBED BY CMS-2020-01-R: HCPCS | Mod: ORL

## 2021-11-12 PROCEDURE — U0005 INFEC AGEN DETEC AMPLI PROBE: HCPCS | Mod: ORL | Performed by: FAMILY MEDICINE

## 2021-11-12 NOTE — TELEPHONE ENCOUNTER
Informed Samina from [facility] for requested orders per Dr. Barone. Samina wondering about the medication interaction between Methotrexate and Pantoprazole. Please advise.   No

## 2021-11-13 ENCOUNTER — LAB REQUISITION (OUTPATIENT)
Dept: LAB | Facility: CLINIC | Age: 86
End: 2021-11-13
Payer: COMMERCIAL

## 2021-11-13 DIAGNOSIS — U07.1 COVID-19: ICD-10-CM

## 2021-11-13 NOTE — TELEPHONE ENCOUNTER
Patient is to continue on omeprazole because of her gastric bleeding.  Regarding questions on continuing methotrexate, please contact primary rheumatologist.    Misbah Barone MD

## 2021-11-14 LAB — SARS-COV-2 RNA RESP QL NAA+PROBE: NEGATIVE

## 2021-11-15 ENCOUNTER — MEDICAL CORRESPONDENCE (OUTPATIENT)
Dept: HEALTH INFORMATION MANAGEMENT | Facility: CLINIC | Age: 86
End: 2021-11-15
Payer: COMMERCIAL

## 2021-11-16 ENCOUNTER — ANESTHESIA EVENT (OUTPATIENT)
Dept: SURGERY | Facility: CLINIC | Age: 86
End: 2021-11-16
Payer: COMMERCIAL

## 2021-11-16 ENCOUNTER — ANESTHESIA (OUTPATIENT)
Dept: SURGERY | Facility: CLINIC | Age: 86
End: 2021-11-16
Payer: COMMERCIAL

## 2021-11-16 ENCOUNTER — HOSPITAL ENCOUNTER (OUTPATIENT)
Facility: CLINIC | Age: 86
Discharge: HOME | End: 2021-11-16
Attending: INTERNAL MEDICINE | Admitting: INTERNAL MEDICINE
Payer: COMMERCIAL

## 2021-11-16 VITALS
RESPIRATION RATE: 19 BRPM | TEMPERATURE: 98.8 F | HEART RATE: 64 BPM | OXYGEN SATURATION: 95 % | DIASTOLIC BLOOD PRESSURE: 55 MMHG | SYSTOLIC BLOOD PRESSURE: 112 MMHG

## 2021-11-16 LAB — UPPER GI ENDOSCOPY: NORMAL

## 2021-11-16 PROCEDURE — 258N000003 HC RX IP 258 OP 636: Performed by: NURSE ANESTHETIST, CERTIFIED REGISTERED

## 2021-11-16 PROCEDURE — 250N000011 HC RX IP 250 OP 636: Performed by: NURSE ANESTHETIST, CERTIFIED REGISTERED

## 2021-11-16 PROCEDURE — 370N000017 HC ANESTHESIA TECHNICAL FEE, PER MIN: Performed by: INTERNAL MEDICINE

## 2021-11-16 PROCEDURE — 272N000001 HC OR GENERAL SUPPLY STERILE: Performed by: INTERNAL MEDICINE

## 2021-11-16 PROCEDURE — 250N000009 HC RX 250: Performed by: NURSE ANESTHETIST, CERTIFIED REGISTERED

## 2021-11-16 PROCEDURE — 360N000075 HC SURGERY LEVEL 2, PER MIN: Performed by: INTERNAL MEDICINE

## 2021-11-16 PROCEDURE — 999N000141 HC STATISTIC PRE-PROCEDURE NURSING ASSESSMENT: Performed by: INTERNAL MEDICINE

## 2021-11-16 PROCEDURE — 258N000003 HC RX IP 258 OP 636: Performed by: ANESTHESIOLOGY

## 2021-11-16 PROCEDURE — 250N000011 HC RX IP 250 OP 636: Performed by: ANESTHESIOLOGY

## 2021-11-16 RX ORDER — SODIUM CHLORIDE, SODIUM LACTATE, POTASSIUM CHLORIDE, CALCIUM CHLORIDE 600; 310; 30; 20 MG/100ML; MG/100ML; MG/100ML; MG/100ML
INJECTION, SOLUTION INTRAVENOUS CONTINUOUS
Status: DISCONTINUED | OUTPATIENT
Start: 2021-11-16 | End: 2021-11-16 | Stop reason: HOSPADM

## 2021-11-16 RX ORDER — FENTANYL CITRATE 50 UG/ML
50 INJECTION, SOLUTION INTRAMUSCULAR; INTRAVENOUS
Status: DISCONTINUED | OUTPATIENT
Start: 2021-11-16 | End: 2021-11-16 | Stop reason: HOSPADM

## 2021-11-16 RX ORDER — FENTANYL CITRATE 50 UG/ML
25 INJECTION, SOLUTION INTRAMUSCULAR; INTRAVENOUS
Status: DISCONTINUED | OUTPATIENT
Start: 2021-11-16 | End: 2021-11-16 | Stop reason: HOSPADM

## 2021-11-16 RX ORDER — DEXAMETHASONE SODIUM PHOSPHATE 10 MG/ML
INJECTION, SOLUTION INTRAMUSCULAR; INTRAVENOUS PRN
Status: DISCONTINUED | OUTPATIENT
Start: 2021-11-16 | End: 2021-11-16

## 2021-11-16 RX ORDER — ONDANSETRON 4 MG/1
4 TABLET, ORALLY DISINTEGRATING ORAL EVERY 30 MIN PRN
Status: DISCONTINUED | OUTPATIENT
Start: 2021-11-16 | End: 2021-11-16 | Stop reason: HOSPADM

## 2021-11-16 RX ORDER — ONDANSETRON 2 MG/ML
4 INJECTION INTRAMUSCULAR; INTRAVENOUS EVERY 6 HOURS PRN
Status: DISCONTINUED | OUTPATIENT
Start: 2021-11-16 | End: 2021-11-16 | Stop reason: HOSPADM

## 2021-11-16 RX ORDER — LIDOCAINE HYDROCHLORIDE 10 MG/ML
INJECTION, SOLUTION INFILTRATION; PERINEURAL PRN
Status: DISCONTINUED | OUTPATIENT
Start: 2021-11-16 | End: 2021-11-16

## 2021-11-16 RX ORDER — OXYCODONE HYDROCHLORIDE 5 MG/1
5 TABLET ORAL EVERY 4 HOURS PRN
Status: DISCONTINUED | OUTPATIENT
Start: 2021-11-16 | End: 2021-11-16 | Stop reason: HOSPADM

## 2021-11-16 RX ORDER — MEPERIDINE HYDROCHLORIDE 25 MG/ML
12.5 INJECTION INTRAMUSCULAR; INTRAVENOUS; SUBCUTANEOUS
Status: DISCONTINUED | OUTPATIENT
Start: 2021-11-16 | End: 2021-11-16 | Stop reason: HOSPADM

## 2021-11-16 RX ORDER — ONDANSETRON 2 MG/ML
4 INJECTION INTRAMUSCULAR; INTRAVENOUS EVERY 30 MIN PRN
Status: DISCONTINUED | OUTPATIENT
Start: 2021-11-16 | End: 2021-11-16 | Stop reason: HOSPADM

## 2021-11-16 RX ORDER — FENTANYL CITRATE 50 UG/ML
25 INJECTION, SOLUTION INTRAMUSCULAR; INTRAVENOUS EVERY 5 MIN PRN
Status: DISCONTINUED | OUTPATIENT
Start: 2021-11-16 | End: 2021-11-16 | Stop reason: HOSPADM

## 2021-11-16 RX ORDER — FENTANYL CITRATE 50 UG/ML
100 INJECTION, SOLUTION INTRAMUSCULAR; INTRAVENOUS ONCE
Status: DISCONTINUED | OUTPATIENT
Start: 2021-11-16 | End: 2021-11-16 | Stop reason: HOSPADM

## 2021-11-16 RX ORDER — NALOXONE HYDROCHLORIDE 0.4 MG/ML
0.2 INJECTION, SOLUTION INTRAMUSCULAR; INTRAVENOUS; SUBCUTANEOUS
Status: DISCONTINUED | OUTPATIENT
Start: 2021-11-16 | End: 2021-11-16 | Stop reason: HOSPADM

## 2021-11-16 RX ORDER — ONDANSETRON 4 MG/1
4 TABLET, ORALLY DISINTEGRATING ORAL EVERY 6 HOURS PRN
Status: DISCONTINUED | OUTPATIENT
Start: 2021-11-16 | End: 2021-11-16 | Stop reason: HOSPADM

## 2021-11-16 RX ORDER — LIDOCAINE 40 MG/G
CREAM TOPICAL
Status: DISCONTINUED | OUTPATIENT
Start: 2021-11-16 | End: 2021-11-16 | Stop reason: HOSPADM

## 2021-11-16 RX ORDER — NALOXONE HYDROCHLORIDE 0.4 MG/ML
0.4 INJECTION, SOLUTION INTRAMUSCULAR; INTRAVENOUS; SUBCUTANEOUS
Status: DISCONTINUED | OUTPATIENT
Start: 2021-11-16 | End: 2021-11-16 | Stop reason: HOSPADM

## 2021-11-16 RX ORDER — PROCHLORPERAZINE MALEATE 5 MG
5 TABLET ORAL EVERY 6 HOURS PRN
Status: DISCONTINUED | OUTPATIENT
Start: 2021-11-16 | End: 2021-11-16 | Stop reason: HOSPADM

## 2021-11-16 RX ORDER — PROPOFOL 10 MG/ML
INJECTION, EMULSION INTRAVENOUS CONTINUOUS PRN
Status: DISCONTINUED | OUTPATIENT
Start: 2021-11-16 | End: 2021-11-16

## 2021-11-16 RX ORDER — FLUMAZENIL 0.1 MG/ML
0.2 INJECTION, SOLUTION INTRAVENOUS
Status: DISCONTINUED | OUTPATIENT
Start: 2021-11-16 | End: 2021-11-16 | Stop reason: HOSPADM

## 2021-11-16 RX ORDER — HYDROMORPHONE HCL IN WATER/PF 6 MG/30 ML
0.2 PATIENT CONTROLLED ANALGESIA SYRINGE INTRAVENOUS EVERY 5 MIN PRN
Status: DISCONTINUED | OUTPATIENT
Start: 2021-11-16 | End: 2021-11-16 | Stop reason: HOSPADM

## 2021-11-16 RX ADMIN — ONDANSETRON 4 MG: 2 INJECTION INTRAMUSCULAR; INTRAVENOUS at 09:48

## 2021-11-16 RX ADMIN — LIDOCAINE HYDROCHLORIDE 20 MG: 10 INJECTION, SOLUTION INFILTRATION; PERINEURAL at 09:40

## 2021-11-16 RX ADMIN — PHENYLEPHRINE HYDROCHLORIDE 100 MCG: 10 INJECTION INTRAVENOUS at 09:44

## 2021-11-16 RX ADMIN — SODIUM CHLORIDE, POTASSIUM CHLORIDE, SODIUM LACTATE AND CALCIUM CHLORIDE 1000 ML: 600; 310; 30; 20 INJECTION, SOLUTION INTRAVENOUS at 09:30

## 2021-11-16 RX ADMIN — PHENYLEPHRINE HYDROCHLORIDE 100 MCG: 10 INJECTION INTRAVENOUS at 09:43

## 2021-11-16 RX ADMIN — PHENYLEPHRINE HYDROCHLORIDE 100 MCG: 10 INJECTION INTRAVENOUS at 09:45

## 2021-11-16 RX ADMIN — PROPOFOL 200 MCG/KG/MIN: 10 INJECTION, EMULSION INTRAVENOUS at 09:40

## 2021-11-16 RX ADMIN — DEXAMETHASONE SODIUM PHOSPHATE 4 MG: 10 INJECTION, SOLUTION INTRAMUSCULAR; INTRAVENOUS at 09:43

## 2021-11-16 NOTE — H&P
GENERAL PRE-PROCEDURE:   Procedure:  EGD - F/U Gastric Ulcer  Date/Time:  11/16/2021 9:27 AM    Verbal consent obtained?: Yes    Written consent obtained?: Yes    Risks and benefits: Risks, benefits and alternatives were discussed    Consent given by:  Patient  Patient states understanding of procedure being performed: Yes    Patient's understanding of procedure matches consent: Yes    Procedure consent matches procedure scheduled: Yes    Expected level of sedation:  Deep  Appropriately NPO:  Yes  ASA Class:  3  Mallampati  :  Grade 3- soft palate visible, posterior pharyngeal wall not visible  Lungs:  Lungs clear with good breath sounds bilaterally  Heart:  Normal heart sounds and rate and systolic murmur  History & Physical reviewed:  History and physical reviewed and no updates needed  Statement of review:  I have reviewed the lab findings, diagnostic data, medications, and the plan for sedation

## 2021-11-16 NOTE — PROVIDER NOTIFICATION
"Patient had a concern about a \"hardened bhupendra in her stomach\". Dr. Galan was notified to come speak to the patient and daughter to discuss if there was any finding based on the patients concern.   "

## 2021-11-16 NOTE — ANESTHESIA CARE TRANSFER NOTE
Patient: Rox Zavala    Procedure: Procedure(s):  ESOPHAGOGASTRODUODENOSCOPY       Diagnosis: Acute gastric ulcer with hemorrhage [K25.0]  Diagnosis Additional Information: No value filed.    Anesthesia Type:   MAC     Note:    Oropharynx: oropharynx clear of all foreign objects and spontaneously breathing  Level of Consciousness: awake  Oxygen Supplementation: face mask  Level of Supplemental Oxygen (L/min / FiO2): 8  Independent Airway: airway patency satisfactory and stable  Dentition: dentition unchanged  Vital Signs Stable: post-procedure vital signs reviewed and stable  Report to RN Given: handoff report given  Patient transferred to: Phase II    Handoff Report: Identifed the Patient, Identified the Reponsible Provider, Reviewed the pertinent medical history, Discussed the surgical course, Reviewed Intra-OP anesthesia mangement and issues during anesthesia, Set expectations for post-procedure period and Allowed opportunity for questions and acknowledgement of understanding      Vitals:  Vitals Value Taken Time   /61 0956   Temp 98.8F    Pulse 59    Resp 16    SpO2 99%        Electronically Signed By: NISA Carmen CRNA  November 16, 2021  10:02 AM

## 2021-11-16 NOTE — ANESTHESIA PREPROCEDURE EVALUATION
"Anesthesia Pre-Procedure Evaluation    Patient: Rox Zavala   MRN: [id number] : 1935        Preoperative Diagnosis: Acute gastric ulcer with hemorrhage [K25.0]    Procedure : Procedure(s):  ESOPHAGOGASTRODUODENOSCOPY          Past Medical History:   Diagnosis Date     Acute diastolic CHF (congestive heart failure) (H) 2014     Acute renal failure (H)      Anemia      Aortic valve stenosis, severe      Arrhythmia      Arthritis      Coronary artery disease 2014     Coronary artery disease      Dieulafoy lesion of stomach      Disease of thyroid gland      Essential hypertension 2015     Gastroesophageal reflux disease      GI bleed 2021     Hammer toe      Heart murmur      History of blood transfusion      Hyperlipidemia      Hypertension      Hypothyroidism     Created by [employer] updated for latest IMO load      Irregular heart beat      Macular disorder     \"wrinkle\"     MCI (mild cognitive impairment) 2019     Mild vascular neurocognitive disorder (H) 2021     Pressure injury of buttock, stage 1, unspecified laterality 2019     Rheumatoid arthritis (H)      S/P AVR 12/15/2014     Sacral insufficiency fracture, initial encounter 2019     Senile osteoporosis 2019      Past Surgical History:   Procedure Laterality Date     ANGIOPLASTY / STENTING FEMORAL       AORTIC VALVE REPLACEMENT       C LIGATE FALLOPIAN TUBE      Description: Tubal Ligation;  Recorded: 2008;     C TOTAL HIP ARTHROPLASTY Right     Description: Total Hip Replacement;  Recorded: 2008; right     CARDIAC SURGERY      CABG     EYE SURGERY Bilateral     cataracts     HC REMOVAL GALLBLADDER      Description: Cholecystectomy;  Recorded: 2008;     GA ESOPHAGOGASTRODUODENOSCOPY TRANSORAL DIAGNOSTIC N/A 2021    Procedure: ESOPHAGOGASTRODUODENOSCOPY (EGD) with biopsies;  Surgeon: Julio Lopez MD;  Location: [facility];  Service: Gastroenterology "     CO ESOPHAGOGASTRODUODENOSCOPY TRANSORAL DIAGNOSTIC N/A 2021    Procedure: ESOPHAGOGASTRODUODENOSCOPY (EGD) with bicap cauterization;  Surgeon: Julio Lopez MD;  Location: [facility];  Service: Gastroenterology     CO ESOPHAGOGASTRODUODENOSCOPY TRANSORAL DIAGNOSTIC N/A 2021    Procedure: ESOPHAGOGASTRODUODENOSCOPY (EGD) with hemoclip;  Surgeon: Sam Brock MD;  Location: [facility];  Service: Gastroenterology      No Known Allergies   Social History     Tobacco Use     Smoking status: Former Smoker     Packs/day: 1.00     Years: 30.00     Pack years: 30.00     Types: Cigarettes     Quit date: 1995     Years since quittin.8     Smokeless tobacco: Never Used   Substance Use Topics     Alcohol use: Yes     Alcohol/week: 1.0 standard drink      Wt Readings from Last 1 Encounters:   21 56.1 kg (123 lb 9.6 oz)        Anesthesia Evaluation            ROS/MED HX  ENT/Pulmonary:  - neg pulmonary ROS     Neurologic:  - neg neurologic ROS     Cardiovascular:       METS/Exercise Tolerance:     Hematologic:  - neg hematologic  ROS     Musculoskeletal:  - neg musculoskeletal ROS     GI/Hepatic:       Renal/Genitourinary:       Endo:       Psychiatric/Substance Use:  - neg psychiatric ROS     Infectious Disease:  - neg infectious disease ROS     Malignancy:  - neg malignancy ROS     Other:  - neg other ROS          Physical Exam    Airway  airway exam normal      Mallampati: II       Respiratory Devices and Support         Dental  no notable dental history         Cardiovascular   cardiovascular exam normal          Pulmonary   pulmonary exam normal                OUTSIDE LABS:  CBC:   Lab Results   Component Value Date    WBC 5.6 11/10/2021    WBC 7.3 10/25/2021    HGB 10.2 (L) 11/10/2021    HGB 10.1 (L) 2021    HCT 31.4 (L) 11/10/2021    HCT 25.7 (L) 10/25/2021     11/10/2021     10/25/2021     BMP:   Lab Results   Component Value Date     11/10/2021     (L)  10/25/2021    POTASSIUM 5.3 (H) 11/10/2021    POTASSIUM 4.8 10/25/2021    CHLORIDE 101 11/10/2021    CHLORIDE 101 10/25/2021    CO2 26 11/10/2021    CO2 26 10/25/2021    BUN 15 11/10/2021    BUN 19 10/25/2021    CR 0.83 11/10/2021    CR 0.76 10/25/2021    GLC 76 11/10/2021    GLC 84 10/25/2021     COAGS:   Lab Results   Component Value Date    PTT 32 10/10/2021    INR 1.25 (H) 10/10/2021     POC: No results found for: BGM, HCG, HCGS  HEPATIC:   Lab Results   Component Value Date    ALBUMIN 2.4 (L) 06/03/2021    PROTTOTAL 4.6 (L) 06/03/2021    ALT 23 06/03/2021    AST 37 06/03/2021    ALKPHOS 39 (L) 06/03/2021    BILITOTAL 0.6 06/03/2021     OTHER:   Lab Results   Component Value Date    LACT 2.0 06/06/2021    A1C 5.1 11/17/2014    CARLA 9.0 11/10/2021    MAG 2.0 10/12/2021    TSH 1.28 11/10/2021       Anesthesia Plan    ASA Status:  3      Anesthesia Type: MAC.              Consents    Anesthesia Plan(s) and associated risks, benefits, and realistic alternatives discussed. Questions answered and patient/representative(s) expressed understanding.     - Discussed with:  Patient         Postoperative Care            Comments:                Brennen Ellis MD

## 2021-11-16 NOTE — ANESTHESIA POSTPROCEDURE EVALUATION
Patient: Rox Zavala    Procedure: Procedure(s):  ESOPHAGOGASTRODUODENOSCOPY       Diagnosis:Acute gastric ulcer with hemorrhage [K25.0]  Diagnosis Additional Information: No value filed.    Anesthesia Type:  MAC    Note:     Postop Pain Control: Uneventful            Sign Out: Well controlled pain   PONV: No   Neuro/Psych: Uneventful            Sign Out: Acceptable/Baseline neuro status   Airway/Respiratory: Uneventful            Sign Out: Acceptable/Baseline resp. status   CV/Hemodynamics: Uneventful            Sign Out: Acceptable CV status   Other NRE: NONE   DID A NON-ROUTINE EVENT OCCUR? No           Last vitals:  Vitals Value Taken Time   /55 11/16/21 1020   Temp 37.1  C (98.8  F) 11/16/21 0955   Pulse 64 11/16/21 1020   Resp 19 11/16/21 1020   SpO2 95 % 11/16/21 1020       Electronically Signed By: Brennen Ellis MD  November 16, 2021  2:22 PM

## 2021-11-17 ENCOUNTER — TRANSFERRED RECORDS (OUTPATIENT)
Dept: HEALTH INFORMATION MANAGEMENT | Facility: CLINIC | Age: 86
End: 2021-11-17
Payer: COMMERCIAL

## 2021-11-18 ENCOUNTER — VIRTUAL VISIT (OUTPATIENT)
Dept: FAMILY MEDICINE | Facility: CLINIC | Age: 86
End: 2021-11-18
Payer: COMMERCIAL

## 2021-11-18 DIAGNOSIS — I99.9 MILD VASCULAR NEUROCOGNITIVE DISORDER: ICD-10-CM

## 2021-11-18 DIAGNOSIS — K25.3 GASTRIC ULCER DUE TO HELICOBACTER PYLORI, ACUTE: ICD-10-CM

## 2021-11-18 DIAGNOSIS — B96.81 GASTRIC ULCER DUE TO HELICOBACTER PYLORI, ACUTE: ICD-10-CM

## 2021-11-18 DIAGNOSIS — F06.70 MILD VASCULAR NEUROCOGNITIVE DISORDER: ICD-10-CM

## 2021-11-18 DIAGNOSIS — D62 ANEMIA DUE TO BLOOD LOSS, ACUTE: ICD-10-CM

## 2021-11-18 DIAGNOSIS — S72.114A CLOSED NONDISPLACED FRACTURE OF GREATER TROCHANTER OF RIGHT FEMUR, INITIAL ENCOUNTER (H): ICD-10-CM

## 2021-11-18 DIAGNOSIS — Z09 NEED FOR FOLLOW-UP CARE AFTER DISCHARGE: Primary | ICD-10-CM

## 2021-11-18 PROCEDURE — 99214 OFFICE O/P EST MOD 30 MIN: CPT | Mod: GT | Performed by: FAMILY MEDICINE

## 2021-11-18 RX ORDER — FERROUS GLUCONATE 324(38)MG
324 TABLET ORAL
Qty: 90 TABLET | Refills: 0 | Status: SHIPPED | OUTPATIENT
Start: 2021-11-18 | End: 2022-01-01

## 2021-11-18 NOTE — PROGRESS NOTES
Rox is a 86 year old who is being evaluated via a billable video visit.      How would you like to obtain your AVS? MyChart  If the video visit is dropped, the invitation should be resent by: Text to cell phone: [phone]  Will anyone else be joining your video visit? No    Video Start Time: 2:50 PM  Start: 11/18/2021 02:50 pm  Stop: 11/18/2021 03:02 pm  Assessment & Plan     ICD-10-CM    1. Need for follow-up care after discharge  Z09    2. Gastric ulcer due to Helicobacter pylori, acute  K25.3     B96.81    3. Anemia due to blood loss, acute  D62 ferrous gluconate (FERGON) 324 (38 Fe) MG tablet   4. Mild vascular neurocognitive disorder (H)  F01.50    5. Closed nondisplaced fracture of greater trochanter of right femur, initial encounter (H)  S72.114A      Medical decision making: Patient discharged from TCU after sustaining greater trochanteric fracture.  She has other comorbidity including H. pylori positive gastric ulcer in the past which was adequately treated and endoscopy done 2 days ago was normal.  She does have mild cognitive disorder.  She is currently receiving home cares and has a face-to-face encounter today for ongoing cares.  At this time patient and her son required any changes in her medical condition.  She seems stable.  She is continue current medication.  She is not on aspirin secondary to GI bleed.  She needs to be on PPI secondary to GI bleed.  Hemoglobin was trending upwards at the time of discharge.  She is on suppressive therapy for psoriatic arthritis.      MEDICATIONS:  Continue current medications without change    Return in about 3 months (around 2/18/2022) for Routine preventive.    Misbah Barone MD  [facility]    Subjective    Chief Complaint   Patient presents with     Hospital F/U     Go over recent lab work, was in transitional care- follow up from there.        Rox is a 86 year old who presents for the following health issues   accompanied by her son.    [facility]       Hospital Follow-up Visit:    Hospital/Nursing Home/IP Rehab Facility: [facility]  Date of Admission: 10/18/2021  Date of Discharge: 11/09/2021  Reason(s) for Admission: Right greater trochanteric fracture      Was your hospitalization related to COVID-19? No   Problems taking medications regularly:  None  Medication changes since discharge: None  Problems adhering to non-medication therapy:  None    Summary of hospitalization:  [facility] discharge summary reviewed  Diagnostic Tests/Treatments reviewed.  Follow up needed: Pleated EGD on 11/16/2021  Other Healthcare Providers Involved in Patient s Care:         Homecare  Update since discharge: improved.   Post Discharge Medication Reconciliation: discharge medications reconciled, continue medications without change.  Plan of care communicated with patient and family              Review of Systems   Constitutional, HEENT, cardiovascular, pulmonary, gi and gu systems are negative, except as otherwise noted.      Objective           Vitals:  No vitals were obtained today due to virtual visit.    Physical Exam   GENERAL: Healthy, alert and no distress  EYES: Eyes grossly normal to inspection.  No discharge or erythema, or obvious scleral/conjunctival abnormalities.  RESP: No audible wheeze, cough, or visible cyanosis.  No visible retractions or increased work of breathing.    SKIN: Visible skin clear. No significant rash, abnormal pigmentation or lesions.  NEURO: Cranial nerves grossly intact.  Mentation and speech appropriate for age.  PSYCH: Mentation appears normal, affect normal/bright, judgement and insight intact, normal speech and appearance well-groomed.    Reviewed lab work done in recent past including endoscopy done on 11/16/2021 that revealed normal esophagus, stomach and duodenum.            Video-Visit Details    Type of service:  Video Visit    Video End Time:3:02  PM    Originating Location (pt. Location): Home    Distant Location (provider location):  [facility]     Platform used for Video Visit: Lolita

## 2021-11-19 ENCOUNTER — TELEPHONE (OUTPATIENT)
Dept: FAMILY MEDICINE | Facility: CLINIC | Age: 86
End: 2021-11-19
Payer: COMMERCIAL

## 2021-11-19 NOTE — TELEPHONE ENCOUNTER
Reason for Call:  Home Health Care    Virgie with [facility] called regarding (reason for call): verbal order    Orders are needed for this patient. PT     PT: 1x a week for 1 week and 2x a week for 4 weeks    OT: N/A    Skilled Nursing: N/A    Pt Provider: Dr. Barone    Phone Number Homecare Nurse can be reached at: [phone]     Can we leave a detailed message on this number? YES    Call taken on 11/19/2021 at 3:56 PM by Katelin Calles

## 2021-11-22 RX ORDER — METHOTREXATE 2.5 MG/1
3 TABLET ORAL WEEKLY
Qty: 24 TABLET | Refills: 0 | OUTPATIENT
Start: 2021-11-22

## 2021-12-01 ENCOUNTER — MEDICAL CORRESPONDENCE (OUTPATIENT)
Dept: HEALTH INFORMATION MANAGEMENT | Facility: CLINIC | Age: 86
End: 2021-12-01
Payer: COMMERCIAL

## 2021-12-02 ENCOUNTER — VIRTUAL VISIT (OUTPATIENT)
Dept: RHEUMATOLOGY | Facility: CLINIC | Age: 86
End: 2021-12-02
Payer: COMMERCIAL

## 2021-12-02 ENCOUNTER — TELEPHONE (OUTPATIENT)
Dept: RHEUMATOLOGY | Facility: CLINIC | Age: 86
End: 2021-12-02

## 2021-12-02 DIAGNOSIS — L40.8 OTHER PSORIASIS: ICD-10-CM

## 2021-12-02 DIAGNOSIS — L40.50 PSORIATIC ARTHRITIS (H): Primary | ICD-10-CM

## 2021-12-02 DIAGNOSIS — Z79.899 HIGH RISK MEDICATION USE: ICD-10-CM

## 2021-12-02 DIAGNOSIS — M15.9 GENERALIZED OSTEOARTHROSIS OF HAND: ICD-10-CM

## 2021-12-02 PROCEDURE — 99214 OFFICE O/P EST MOD 30 MIN: CPT | Mod: GT | Performed by: INTERNAL MEDICINE

## 2021-12-02 NOTE — TELEPHONE ENCOUNTER
LMTCB -   patient needs ALT lab done ASAP it has been missed on lab draws. Also needs 3 month follow up with labs 1 week prior.

## 2021-12-02 NOTE — PROGRESS NOTES
Rox is a 86 year old who is being evaluated via a billable video visit.      How would you like to obtain your AVS? MyChart  If the video visit is dropped, the invitation should be resent by: [phone]  Will anyone else be joining your video visit? Pt's son will be on the video call with pt.        Video-Visit Details    Type of service:  Video Visit    Start: 12/02/2021 12:10 pm  Stop: 12/02/2021 12:19 pm    Originating Location (pt. Location): Home    Distant Location (provider location):  [facility]     Platform used for Video Visit: Zoombu     This document was created using a software with less than 100% fidelity, at times resulting in unintended, even erroneous syntax and grammar.  The reader is advised to keep this under consideration while reviewing, interpreting this note.           ASSESSMENT AND PLAN:    Diagnoses and all orders for this visit:  Psoriatic arthritis (H)  Generalized osteoarthrosis of hand  Other psoriasis  High risk medication use      Follow up in 3 months  This patient with psoriatic arthritis, psoriasis, background of osteoarthritis of the hands, doing great with methotrexate, fully vaccinated, however for some reason has not been getting her labs as ordered although the blood was drawn by home care her ALT has not been done for some time.  This is discussed.  Standing orders were confirmed with the son who coordinates the care.  We will have to wait for the labs before methotrexate can be refilled.  We will meet here at this time sooner in 3 months.      HISTORY OF PRESENTING ILLNESS:  Rox COHN Lucas 86 year old is evaluated here via video/audio link.   She is here for follow-up.  She has psoriatic arthritis, psoriasis, osteoarthritis, on methotrexate, folic acid, has done well with this, intermittent pain at the base of the thumb which she had corticosteroid injection with good benefit.  Her visit today was facilitated by her son.  She has noted  "recent labs.  Review of those shows ALT was not done.  This needs to be rectified.  This is discussed with the son.     ROS enquiry held for fever, ocular symptoms, rash, headache,  GI issues.  Today we also discussed the issues related to the current pandemic, the pros and cons of the current treatment plan, the CDC guidelines such as social distancing washing the hands covering the cough.  ALLERGIES:Patient has no known allergies.    PAST MEDICAL/ACTIVE PROBLEMS/MEDICATION/SOCIAL DATA  Past Medical History:   Diagnosis Date     Acute diastolic CHF (congestive heart failure) (H) 12/23/2014     Acute renal failure (H)      Anemia      Aortic valve stenosis, severe      Arrhythmia      Arthritis      Coronary artery disease 11/17/2014     Coronary artery disease      Dieulafoy lesion of stomach      Disease of thyroid gland      Essential hypertension 12/8/2015     Gastroesophageal reflux disease      GI bleed 6/1/2021     Hammer toe      Heart murmur      History of blood transfusion      Hyperlipidemia      Hypertension      Hypothyroidism     Created by [employer]version [employer] updated for latest IMO load      Irregular heart beat      Macular disorder     \"wrinkle\"     MCI (mild cognitive impairment) 6/16/2019     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     Pressure injury of buttock, stage 1, unspecified laterality 6/16/2019     Rheumatoid arthritis (H)      S/P AVR 12/15/2014     Sacral insufficiency fracture, initial encounter 5/6/2019     Senile osteoporosis 9/23/2019     History   Smoking Status     Former Smoker     Packs/day: 1.00     Years: 30.00     Types: Cigarettes     Quit date: 1/1/1995   Smokeless Tobacco     Never Used     Patient Active Problem List   Diagnosis     Hammer Toe     Hemorrhoids     Psoriasis     Generalized Osteoarthritis Of The Hand     Osteoarthritis Of The Knee     Vitamin D Deficiency     Palpitations     Hypothyroidism     Neck Pain     Upper Back Pain (Between " Shoulder Blades)     Osteopenia     Coronary artery disease     S/P AVR     Constipation     Acute diastolic heart failure (H)     Essential hypertension     Hyperlipidemia     Coronary artery disease involving coronary bypass graft of native heart with other forms of angina pectoris (H)     Psoriatic arthritis (H)     High risk medication use     Sacral insufficiency fracture, initial encounter     Hypertension     Back pain     Pressure injury of buttock, stage 1, unspecified laterality     MCI (mild cognitive impairment)     Frequent PVCs     Senile osteoporosis     Hemorrhagic shock (H)     JOSIAH (acute kidney injury) (H)     Lactic acidemia     Acute GI bleeding     Acute blood loss anemia     Melena     Syncope, unspecified syncope type     Anemia due to blood loss, acute     Dieulafoy lesion of stomach     Gastric ulcer due to Helicobacter pylori, acute     Stage 3 chronic kidney disease, unspecified whether stage 3a or 3b CKD (H)     Mild vascular neurocognitive disorder (H)     Thrombocytopenia (H)     Closed nondisplaced fracture of greater trochanter of right femur, initial encounter (H)     Acute gastric ulcer with hemorrhage     Fall, initial encounter     Current Outpatient Medications   Medication Sig Dispense Refill     atorvastatin (LIPITOR) 40 MG tablet Take 1 tablet (40 mg) by mouth daily For lipids       calcium carbonate-vitamin D (OYSTER SHELL CALCIUM/D) 500-200 MG-UNIT tablet Take 1 tablet by mouth daily       cholecalciferol 50 MCG (2000 UT) tablet Take 1 tablet (50 mcg) by mouth daily 100 tablet 1     donepezil (ARICEPT) 10 MG tablet Take 1 tablet (10 mg) by mouth At Bedtime dementia 90 tablet 3     ferrous gluconate (FERGON) 324 (38 Fe) MG tablet Take 1 tablet (324 mg) by mouth daily (with breakfast) 90 tablet 0     folic acid (FOLVITE) 1 MG tablet Take 1 tablet (1,000 mcg) by mouth daily General health       isosorbide mononitrate (IMDUR) 30 MG 24 hr tablet Take 1 tablet (30 mg) by mouth  daily       lisinopril (ZESTRIL) 20 MG tablet Take 1 tablet (20 mg) by mouth daily htn 90 tablet 1     methotrexate sodium 2.5 MG TABS Take 3 tablets (7.5 mg) by mouth once a week Fridays       metoprolol succinate ER (TOPROL-XL) 25 MG 24 hr tablet Take 1 tablet (25 mg) by mouth daily htn       pantoprazole (PROTONIX) 40 MG EC tablet Take 1 tablet (40 mg) by mouth every morning (before breakfast) 90 tablet 0     polyethylene glycol (MIRALAX) 17 GM/Dose powder Take 17 g by mouth daily To prevent constiaption       potassium chloride ER (KLOR-CON M) 10 MEQ CR tablet        senna-docusate (SENOKOT-S/PERICOLACE) 8.6-50 MG tablet Take 1 tablet by mouth 2 times daily Hold if loose stools       SYNTHROID 100 MCG tablet TAKE 1 TABLET EVERY DAY AT 6:00 A.M. 90 tablet 3     vitamin C (ASCORBIC ACID) 500 MG tablet Take 1 tablet (500 mg) by mouth daily           EXAMINATION:    Using the audio and video link as best as possible the constitutional, neck, neurologic, psych, skin, both upper extremities areas/organ system were evaluated during this assessment.  Some of the important findings: Alert, oriented, speech fluent.    Able to fully flex the digits, into fists bilaterally, wrist and elbow range of motion appear normal, abduction of the shoulder is normal.      LAB / IMAGING DATA:  ALT   Date Value Ref Range Status   06/03/2021 23 0 - 45 U/L Final   06/01/2021 18 0 - 45 U/L Final   05/20/2021 21 0 - 45 U/L Final     Albumin   Date Value Ref Range Status   06/03/2021 2.4 (L) 3.5 - 5.0 g/dL Final   06/01/2021 2.1 (L) 3.5 - 5.0 g/dL Final   05/20/2021 3.7 3.5 - 5.0 g/dL Final       WBC Count   Date Value Ref Range Status   11/10/2021 5.6 4.0 - 11.0 10e3/uL Final   10/25/2021 7.3 4.0 - 11.0 10e3/uL Final     Hemoglobin   Date Value Ref Range Status   11/10/2021 10.2 (L) 11.7 - 15.7 g/dL Final   11/04/2021 10.1 (L) 11.7 - 15.7 g/dL Final   10/25/2021 8.3 (L) 11.7 - 15.7 g/dL Final     Platelet Count   Date Value Ref Range Status    11/10/2021 207 150 - 450 10e3/uL Final   10/25/2021 195 150 - 450 10e3/uL Final   10/21/2021 219 150 - 450 10e3/uL Final       No results found for: ARSLAN

## 2021-12-03 ENCOUNTER — LAB (OUTPATIENT)
Dept: LAB | Facility: CLINIC | Age: 86
End: 2021-12-03
Payer: COMMERCIAL

## 2021-12-03 DIAGNOSIS — L40.50 PSORIATIC ARTHRITIS (H): ICD-10-CM

## 2021-12-03 LAB
ALBUMIN SERPL-MCNC: 3.4 G/DL (ref 3.5–5)
ALT SERPL W P-5'-P-CCNC: 10 U/L (ref 0–45)
CREAT SERPL-MCNC: 0.83 MG/DL (ref 0.6–1.1)
ERYTHROCYTE [DISTWIDTH] IN BLOOD BY AUTOMATED COUNT: 14.5 % (ref 10–15)
GFR SERPL CREATININE-BSD FRML MDRD: 65 ML/MIN/1.73M2
HCT VFR BLD AUTO: 34.8 % (ref 35–47)
HGB BLD-MCNC: 11.1 G/DL (ref 11.7–15.7)
MCH RBC QN AUTO: 33.2 PG (ref 26.5–33)
MCHC RBC AUTO-ENTMCNC: 31.9 G/DL (ref 31.5–36.5)
MCV RBC AUTO: 104 FL (ref 78–100)
PLATELET # BLD AUTO: 146 10E3/UL (ref 150–450)
RBC # BLD AUTO: 3.34 10E6/UL (ref 3.8–5.2)
WBC # BLD AUTO: 6.7 10E3/UL (ref 4–11)

## 2021-12-03 PROCEDURE — 85027 COMPLETE CBC AUTOMATED: CPT

## 2021-12-03 PROCEDURE — 36415 COLL VENOUS BLD VENIPUNCTURE: CPT

## 2021-12-03 PROCEDURE — 84460 ALANINE AMINO (ALT) (SGPT): CPT

## 2021-12-03 PROCEDURE — 82040 ASSAY OF SERUM ALBUMIN: CPT

## 2021-12-03 PROCEDURE — 82565 ASSAY OF CREATININE: CPT

## 2021-12-07 DIAGNOSIS — B96.81 GASTRIC ULCER DUE TO HELICOBACTER PYLORI, ACUTE: Primary | ICD-10-CM

## 2021-12-07 DIAGNOSIS — K25.3 GASTRIC ULCER DUE TO HELICOBACTER PYLORI, ACUTE: Primary | ICD-10-CM

## 2021-12-08 ENCOUNTER — MEDICAL CORRESPONDENCE (OUTPATIENT)
Dept: HEALTH INFORMATION MANAGEMENT | Facility: CLINIC | Age: 86
End: 2021-12-08
Payer: COMMERCIAL

## 2021-12-08 RX ORDER — OMEPRAZOLE 40 MG/1
40 CAPSULE, DELAYED RELEASE ORAL DAILY
Qty: 60 CAPSULE | Refills: 3 | Status: SHIPPED | OUTPATIENT
Start: 2021-12-08 | End: 2022-01-01

## 2021-12-13 ENCOUNTER — DOCUMENTATION ONLY (OUTPATIENT)
Dept: OTHER | Facility: CLINIC | Age: 86
End: 2021-12-13
Payer: COMMERCIAL

## 2021-12-13 ENCOUNTER — MEDICAL CORRESPONDENCE (OUTPATIENT)
Dept: HEALTH INFORMATION MANAGEMENT | Facility: CLINIC | Age: 86
End: 2021-12-13
Payer: COMMERCIAL

## 2021-12-13 DIAGNOSIS — L40.50 PSORIATIC ARTHRITIS (H): ICD-10-CM

## 2021-12-13 RX ORDER — METHOTREXATE 2.5 MG/1
3 TABLET ORAL WEEKLY
Qty: 36 TABLET | Refills: 0 | Status: SHIPPED | OUTPATIENT
Start: 2021-12-13

## 2021-12-13 NOTE — TELEPHONE ENCOUNTER
Refill request from [facility]    Medication: Methotrexate 2.5mg  Last Refill: 10/14/21  Last OV: 12/2/21  Last lab: 12/3/21  Future OV: 3/7/21

## 2021-12-15 ENCOUNTER — TRANSFERRED RECORDS (OUTPATIENT)
Dept: HEALTH INFORMATION MANAGEMENT | Facility: CLINIC | Age: 86
End: 2021-12-15
Payer: COMMERCIAL

## 2021-12-20 ENCOUNTER — MEDICAL CORRESPONDENCE (OUTPATIENT)
Dept: HEALTH INFORMATION MANAGEMENT | Facility: CLINIC | Age: 86
End: 2021-12-20
Payer: COMMERCIAL

## 2022-01-01 ENCOUNTER — APPOINTMENT (OUTPATIENT)
Dept: CT IMAGING | Facility: HOSPITAL | Age: 87
End: 2022-01-01
Attending: EMERGENCY MEDICINE
Payer: COMMERCIAL

## 2022-01-01 ENCOUNTER — TRANSFERRED RECORDS (OUTPATIENT)
Dept: HEALTH INFORMATION MANAGEMENT | Facility: CLINIC | Age: 87
End: 2022-01-01
Payer: COMMERCIAL

## 2022-01-01 ENCOUNTER — TELEPHONE (OUTPATIENT)
Dept: CARDIOLOGY | Facility: CLINIC | Age: 87
End: 2022-01-01

## 2022-01-01 ENCOUNTER — NURSE TRIAGE (OUTPATIENT)
Dept: NURSING | Facility: CLINIC | Age: 87
End: 2022-01-01
Payer: COMMERCIAL

## 2022-01-01 ENCOUNTER — APPOINTMENT (OUTPATIENT)
Dept: PHYSICAL THERAPY | Facility: HOSPITAL | Age: 87
DRG: 291 | End: 2022-01-01
Payer: COMMERCIAL

## 2022-01-01 ENCOUNTER — APPOINTMENT (OUTPATIENT)
Dept: OCCUPATIONAL THERAPY | Facility: HOSPITAL | Age: 87
DRG: 291 | End: 2022-01-01
Attending: HOSPITALIST
Payer: COMMERCIAL

## 2022-01-01 ENCOUNTER — OFFICE VISIT (OUTPATIENT)
Dept: CARDIOLOGY | Facility: CLINIC | Age: 87
End: 2022-01-01
Attending: NURSE PRACTITIONER
Payer: COMMERCIAL

## 2022-01-01 ENCOUNTER — MYC REFILL (OUTPATIENT)
Dept: FAMILY MEDICINE | Facility: CLINIC | Age: 87
End: 2022-01-01

## 2022-01-01 ENCOUNTER — OFFICE VISIT (OUTPATIENT)
Dept: CARDIOLOGY | Facility: CLINIC | Age: 87
End: 2022-01-01
Payer: COMMERCIAL

## 2022-01-01 ENCOUNTER — HOSPITAL ENCOUNTER (OUTPATIENT)
Dept: RADIOLOGY | Facility: HOSPITAL | Age: 87
Discharge: HOME | End: 2022-03-03
Attending: SURGERY | Admitting: SURGERY
Payer: OTHER MISCELLANEOUS

## 2022-01-01 ENCOUNTER — APPOINTMENT (OUTPATIENT)
Dept: PHYSICAL THERAPY | Facility: HOSPITAL | Age: 87
End: 2022-01-01
Payer: COMMERCIAL

## 2022-01-01 ENCOUNTER — PATIENT OUTREACH (OUTPATIENT)
Dept: CARE COORDINATION | Facility: CLINIC | Age: 87
End: 2022-01-01
Payer: COMMERCIAL

## 2022-01-01 ENCOUNTER — HOSPITAL ENCOUNTER (OUTPATIENT)
Facility: HOSPITAL | Age: 87
Setting detail: OBSERVATION
Discharge: NURSING, REHAB OR LONG-TERM CARE | End: 2022-02-24
Attending: EMERGENCY MEDICINE | Admitting: HOSPITALIST
Payer: COMMERCIAL

## 2022-01-01 ENCOUNTER — MYC MEDICAL ADVICE (OUTPATIENT)
Dept: FAMILY MEDICINE | Facility: CLINIC | Age: 87
End: 2022-01-01
Payer: COMMERCIAL

## 2022-01-01 ENCOUNTER — TELEPHONE (OUTPATIENT)
Dept: FAMILY MEDICINE | Facility: CLINIC | Age: 87
End: 2022-01-01

## 2022-01-01 ENCOUNTER — APPOINTMENT (OUTPATIENT)
Dept: OCCUPATIONAL THERAPY | Facility: HOSPITAL | Age: 87
DRG: 291 | End: 2022-01-01
Payer: COMMERCIAL

## 2022-01-01 ENCOUNTER — APPOINTMENT (OUTPATIENT)
Dept: PHYSICAL THERAPY | Facility: HOSPITAL | Age: 87
DRG: 291 | End: 2022-01-01
Attending: HOSPITALIST
Payer: COMMERCIAL

## 2022-01-01 ENCOUNTER — APPOINTMENT (OUTPATIENT)
Dept: OCCUPATIONAL THERAPY | Facility: HOSPITAL | Age: 87
End: 2022-01-01
Attending: HOSPITALIST
Payer: COMMERCIAL

## 2022-01-01 ENCOUNTER — TELEPHONE (OUTPATIENT)
Dept: GERIATRICS | Facility: CLINIC | Age: 87
End: 2022-01-01
Payer: COMMERCIAL

## 2022-01-01 ENCOUNTER — APPOINTMENT (OUTPATIENT)
Dept: RADIOLOGY | Facility: HOSPITAL | Age: 87
DRG: 291 | End: 2022-01-01
Attending: EMERGENCY MEDICINE
Payer: COMMERCIAL

## 2022-01-01 ENCOUNTER — LAB REQUISITION (OUTPATIENT)
Dept: LAB | Facility: CLINIC | Age: 87
End: 2022-01-01
Payer: COMMERCIAL

## 2022-01-01 ENCOUNTER — OFFICE VISIT (OUTPATIENT)
Dept: FAMILY MEDICINE | Facility: CLINIC | Age: 87
End: 2022-01-01
Payer: COMMERCIAL

## 2022-01-01 ENCOUNTER — TRANSFERRED RECORDS (OUTPATIENT)
Dept: HEALTH INFORMATION MANAGEMENT | Facility: CLINIC | Age: 87
End: 2022-01-01

## 2022-01-01 ENCOUNTER — APPOINTMENT (OUTPATIENT)
Dept: CARDIOLOGY | Facility: HOSPITAL | Age: 87
DRG: 291 | End: 2022-01-01
Attending: HOSPITALIST
Payer: COMMERCIAL

## 2022-01-01 ENCOUNTER — OFFICE VISIT (OUTPATIENT)
Dept: CARDIOLOGY | Facility: CLINIC | Age: 87
End: 2022-01-01
Attending: INTERNAL MEDICINE
Payer: COMMERCIAL

## 2022-01-01 ENCOUNTER — MYC REFILL (OUTPATIENT)
Dept: FAMILY MEDICINE | Facility: CLINIC | Age: 87
End: 2022-01-01
Payer: COMMERCIAL

## 2022-01-01 ENCOUNTER — APPOINTMENT (OUTPATIENT)
Dept: CARDIOLOGY | Facility: HOSPITAL | Age: 87
End: 2022-01-01
Attending: INTERNAL MEDICINE
Payer: COMMERCIAL

## 2022-01-01 ENCOUNTER — HEALTH MAINTENANCE LETTER (OUTPATIENT)
Age: 87
End: 2022-01-01

## 2022-01-01 ENCOUNTER — DOCUMENTATION ONLY (OUTPATIENT)
Dept: HOME HEALTH SERVICES | Facility: CLINIC | Age: 87
End: 2022-01-01
Payer: COMMERCIAL

## 2022-01-01 ENCOUNTER — HOSPITAL ENCOUNTER (INPATIENT)
Facility: HOSPITAL | Age: 87
LOS: 15 days | Discharge: HOME HEALTH | DRG: 291 | End: 2022-06-17
Attending: EMERGENCY MEDICINE | Admitting: HOSPITALIST
Payer: COMMERCIAL

## 2022-01-01 ENCOUNTER — APPOINTMENT (OUTPATIENT)
Dept: OCCUPATIONAL THERAPY | Facility: HOSPITAL | Age: 87
End: 2022-01-01
Payer: COMMERCIAL

## 2022-01-01 ENCOUNTER — APPOINTMENT (OUTPATIENT)
Dept: LAB | Facility: CLINIC | Age: 87
End: 2022-01-01
Payer: COMMERCIAL

## 2022-01-01 ENCOUNTER — VIRTUAL VISIT (OUTPATIENT)
Dept: RHEUMATOLOGY | Facility: CLINIC | Age: 87
End: 2022-01-01
Payer: COMMERCIAL

## 2022-01-01 ENCOUNTER — PATIENT OUTREACH (OUTPATIENT)
Dept: CARE COORDINATION | Facility: CLINIC | Age: 87
End: 2022-01-01

## 2022-01-01 ENCOUNTER — VIRTUAL VISIT (OUTPATIENT)
Dept: NEUROSURGERY | Facility: CLINIC | Age: 87
End: 2022-01-01
Payer: COMMERCIAL

## 2022-01-01 ENCOUNTER — TELEPHONE (OUTPATIENT)
Dept: FAMILY MEDICINE | Facility: CLINIC | Age: 87
End: 2022-01-01
Payer: COMMERCIAL

## 2022-01-01 ENCOUNTER — APPOINTMENT (OUTPATIENT)
Dept: CT IMAGING | Facility: HOSPITAL | Age: 87
End: 2022-01-01
Attending: INTERNAL MEDICINE
Payer: COMMERCIAL

## 2022-01-01 ENCOUNTER — APPOINTMENT (OUTPATIENT)
Dept: RADIOLOGY | Facility: HOSPITAL | Age: 87
End: 2022-01-01
Attending: INTERNAL MEDICINE
Payer: COMMERCIAL

## 2022-01-01 ENCOUNTER — APPOINTMENT (OUTPATIENT)
Dept: PHYSICAL THERAPY | Facility: HOSPITAL | Age: 87
End: 2022-01-01
Attending: HOSPITALIST
Payer: COMMERCIAL

## 2022-01-01 ENCOUNTER — TRANSFERRED RECORDS (OUTPATIENT)
Dept: MEDSURG UNIT | Facility: HOSPITAL | Age: 87
End: 2022-01-01

## 2022-01-01 ENCOUNTER — APPOINTMENT (OUTPATIENT)
Dept: RADIOLOGY | Facility: HOSPITAL | Age: 87
End: 2022-01-01
Attending: EMERGENCY MEDICINE
Payer: COMMERCIAL

## 2022-01-01 ENCOUNTER — TELEPHONE (OUTPATIENT)
Dept: NEUROSURGERY | Facility: CLINIC | Age: 87
End: 2022-01-01

## 2022-01-01 ENCOUNTER — APPOINTMENT (OUTPATIENT)
Dept: RADIOLOGY | Facility: HOSPITAL | Age: 87
End: 2022-01-01
Attending: NURSE PRACTITIONER
Payer: COMMERCIAL

## 2022-01-01 ENCOUNTER — TRANSITIONAL CARE UNIT VISIT (OUTPATIENT)
Dept: GERIATRICS | Facility: CLINIC | Age: 87
End: 2022-01-01
Payer: COMMERCIAL

## 2022-01-01 ENCOUNTER — TELEPHONE (OUTPATIENT)
Dept: CARDIOLOGY | Facility: CLINIC | Age: 87
End: 2022-01-01
Payer: COMMERCIAL

## 2022-01-01 VITALS
BODY MASS INDEX: 19.63 KG/M2 | DIASTOLIC BLOOD PRESSURE: 60 MMHG | WEIGHT: 115 LBS | HEART RATE: 51 BPM | HEIGHT: 64 IN | SYSTOLIC BLOOD PRESSURE: 110 MMHG | RESPIRATION RATE: 16 BRPM

## 2022-01-01 VITALS
WEIGHT: 132.2 LBS | BODY MASS INDEX: 22.57 KG/M2 | HEIGHT: 64 IN | HEIGHT: 64 IN | WEIGHT: 132.2 LBS | BODY MASS INDEX: 22.57 KG/M2

## 2022-01-01 VITALS
RESPIRATION RATE: 16 BRPM | BODY MASS INDEX: 19.91 KG/M2 | HEART RATE: 72 BPM | DIASTOLIC BLOOD PRESSURE: 52 MMHG | WEIGHT: 116 LBS | SYSTOLIC BLOOD PRESSURE: 90 MMHG

## 2022-01-01 VITALS
SYSTOLIC BLOOD PRESSURE: 95 MMHG | TEMPERATURE: 97.8 F | BODY MASS INDEX: 19.67 KG/M2 | HEART RATE: 74 BPM | OXYGEN SATURATION: 96 % | DIASTOLIC BLOOD PRESSURE: 59 MMHG | WEIGHT: 115.2 LBS | HEIGHT: 64 IN | RESPIRATION RATE: 18 BRPM

## 2022-01-01 VITALS
RESPIRATION RATE: 16 BRPM | HEART RATE: 72 BPM | SYSTOLIC BLOOD PRESSURE: 118 MMHG | WEIGHT: 117 LBS | DIASTOLIC BLOOD PRESSURE: 61 MMHG | HEIGHT: 64 IN | BODY MASS INDEX: 19.97 KG/M2

## 2022-01-01 VITALS
BODY MASS INDEX: 21.86 KG/M2 | TEMPERATURE: 96.9 F | RESPIRATION RATE: 24 BRPM | HEIGHT: 64 IN | WEIGHT: 128.06 LBS | SYSTOLIC BLOOD PRESSURE: 150 MMHG | HEART RATE: 68 BPM | DIASTOLIC BLOOD PRESSURE: 66 MMHG | OXYGEN SATURATION: 93 %

## 2022-01-01 VITALS
BODY MASS INDEX: 20.96 KG/M2 | DIASTOLIC BLOOD PRESSURE: 64 MMHG | HEIGHT: 64 IN | RESPIRATION RATE: 18 BRPM | TEMPERATURE: 98.4 F | SYSTOLIC BLOOD PRESSURE: 111 MMHG | OXYGEN SATURATION: 98 % | HEART RATE: 87 BPM | WEIGHT: 122.8 LBS

## 2022-01-01 VITALS
HEART RATE: 62 BPM | DIASTOLIC BLOOD PRESSURE: 42 MMHG | OXYGEN SATURATION: 96 % | RESPIRATION RATE: 14 BRPM | SYSTOLIC BLOOD PRESSURE: 122 MMHG | WEIGHT: 119 LBS | BODY MASS INDEX: 21.09 KG/M2 | HEIGHT: 63 IN

## 2022-01-01 VITALS
HEIGHT: 64 IN | WEIGHT: 115 LBS | RESPIRATION RATE: 16 BRPM | DIASTOLIC BLOOD PRESSURE: 52 MMHG | BODY MASS INDEX: 19.63 KG/M2 | SYSTOLIC BLOOD PRESSURE: 92 MMHG | HEART RATE: 100 BPM

## 2022-01-01 VITALS
SYSTOLIC BLOOD PRESSURE: 142 MMHG | DIASTOLIC BLOOD PRESSURE: 85 MMHG | WEIGHT: 127 LBS | RESPIRATION RATE: 18 BRPM | OXYGEN SATURATION: 99 % | BODY MASS INDEX: 21.68 KG/M2 | HEART RATE: 79 BPM | HEIGHT: 64 IN | TEMPERATURE: 98.9 F

## 2022-01-01 VITALS
BODY MASS INDEX: 21.93 KG/M2 | DIASTOLIC BLOOD PRESSURE: 70 MMHG | HEART RATE: 42 BPM | WEIGHT: 123.8 LBS | SYSTOLIC BLOOD PRESSURE: 124 MMHG | OXYGEN SATURATION: 96 % | RESPIRATION RATE: 12 BRPM

## 2022-01-01 VITALS
DIASTOLIC BLOOD PRESSURE: 54 MMHG | TEMPERATURE: 97.5 F | WEIGHT: 120.38 LBS | OXYGEN SATURATION: 97 % | BODY MASS INDEX: 20.66 KG/M2 | HEART RATE: 66 BPM | SYSTOLIC BLOOD PRESSURE: 102 MMHG | RESPIRATION RATE: 16 BRPM

## 2022-01-01 VITALS
HEART RATE: 64 BPM | TEMPERATURE: 97.3 F | OXYGEN SATURATION: 97 % | DIASTOLIC BLOOD PRESSURE: 70 MMHG | RESPIRATION RATE: 16 BRPM | SYSTOLIC BLOOD PRESSURE: 134 MMHG

## 2022-01-01 VITALS
SYSTOLIC BLOOD PRESSURE: 164 MMHG | RESPIRATION RATE: 16 BRPM | DIASTOLIC BLOOD PRESSURE: 74 MMHG | BODY MASS INDEX: 23.56 KG/M2 | HEIGHT: 60 IN | WEIGHT: 120 LBS | HEART RATE: 58 BPM

## 2022-01-01 VITALS
DIASTOLIC BLOOD PRESSURE: 60 MMHG | BODY MASS INDEX: 24.35 KG/M2 | HEART RATE: 60 BPM | HEIGHT: 60 IN | SYSTOLIC BLOOD PRESSURE: 130 MMHG | WEIGHT: 124 LBS | RESPIRATION RATE: 16 BRPM

## 2022-01-01 VITALS — SYSTOLIC BLOOD PRESSURE: 144 MMHG | DIASTOLIC BLOOD PRESSURE: 76 MMHG | BODY MASS INDEX: 21.59 KG/M2 | WEIGHT: 121.9 LBS

## 2022-01-01 VITALS
BODY MASS INDEX: 20.94 KG/M2 | WEIGHT: 122 LBS | SYSTOLIC BLOOD PRESSURE: 98 MMHG | HEART RATE: 72 BPM | RESPIRATION RATE: 16 BRPM | DIASTOLIC BLOOD PRESSURE: 46 MMHG

## 2022-01-01 VITALS — WEIGHT: 134.4 LBS | HEIGHT: 64 IN | BODY MASS INDEX: 22.94 KG/M2

## 2022-01-01 DIAGNOSIS — I50.32 CHRONIC HEART FAILURE WITH PRESERVED EJECTION FRACTION (H): Primary | ICD-10-CM

## 2022-01-01 DIAGNOSIS — K25.3 GASTRIC ULCER DUE TO HELICOBACTER PYLORI, ACUTE: ICD-10-CM

## 2022-01-01 DIAGNOSIS — I50.32 CHRONIC HEART FAILURE WITH PRESERVED EJECTION FRACTION (H): ICD-10-CM

## 2022-01-01 DIAGNOSIS — I10 ESSENTIAL HYPERTENSION: ICD-10-CM

## 2022-01-01 DIAGNOSIS — E78.2 MIXED HYPERLIPIDEMIA: ICD-10-CM

## 2022-01-01 DIAGNOSIS — S22.040D COMPRESSION FRACTURE OF T4 VERTEBRA WITH ROUTINE HEALING, SUBSEQUENT ENCOUNTER: ICD-10-CM

## 2022-01-01 DIAGNOSIS — R00.0 TACHYCARDIA: ICD-10-CM

## 2022-01-01 DIAGNOSIS — S22.068A OTHER CLOSED FRACTURE OF SEVENTH THORACIC VERTEBRA, INITIAL ENCOUNTER (H): ICD-10-CM

## 2022-01-01 DIAGNOSIS — F03.90 UNSPECIFIED DEMENTIA WITHOUT BEHAVIORAL DISTURBANCE: ICD-10-CM

## 2022-01-01 DIAGNOSIS — I48.92 ATRIAL FLUTTER WITH RAPID VENTRICULAR RESPONSE (H): Primary | ICD-10-CM

## 2022-01-01 DIAGNOSIS — Z79.899 HIGH RISK MEDICATION USE: ICD-10-CM

## 2022-01-01 DIAGNOSIS — I10 ESSENTIAL HYPERTENSION: Primary | ICD-10-CM

## 2022-01-01 DIAGNOSIS — S32.599A CLOSED FRACTURE OF MULTIPLE PUBIC RAMI, UNSPECIFIED LATERALITY, INITIAL ENCOUNTER (H): ICD-10-CM

## 2022-01-01 DIAGNOSIS — D62 ANEMIA DUE TO BLOOD LOSS, ACUTE: ICD-10-CM

## 2022-01-01 DIAGNOSIS — S32.9XXA CLOSED DISPLACED FRACTURE OF PELVIS, UNSPECIFIED PART OF PELVIS, INITIAL ENCOUNTER (H): Primary | ICD-10-CM

## 2022-01-01 DIAGNOSIS — I50.31 ACUTE DIASTOLIC HEART FAILURE (H): ICD-10-CM

## 2022-01-01 DIAGNOSIS — Z71.89 OTHER SPECIFIED COUNSELING: ICD-10-CM

## 2022-01-01 DIAGNOSIS — S22.000A THORACIC COMPRESSION FRACTURE (H): Primary | ICD-10-CM

## 2022-01-01 DIAGNOSIS — B96.81 GASTRIC ULCER DUE TO HELICOBACTER PYLORI, ACUTE: ICD-10-CM

## 2022-01-01 DIAGNOSIS — D64.9 ANEMIA, UNSPECIFIED TYPE: Primary | ICD-10-CM

## 2022-01-01 DIAGNOSIS — J98.11 ATELECTASIS: ICD-10-CM

## 2022-01-01 DIAGNOSIS — N28.9 RENAL INSUFFICIENCY: ICD-10-CM

## 2022-01-01 DIAGNOSIS — L40.50 PSORIATIC ARTHRITIS (H): ICD-10-CM

## 2022-01-01 DIAGNOSIS — Z95.2 S/P AVR: ICD-10-CM

## 2022-01-01 DIAGNOSIS — E03.9 HYPOTHYROIDISM, UNSPECIFIED TYPE: ICD-10-CM

## 2022-01-01 DIAGNOSIS — R06.02 SHORTNESS OF BREATH: ICD-10-CM

## 2022-01-01 DIAGNOSIS — S22.040G COMPRESSION FRACTURE OF T4 VERTEBRA WITH DELAYED HEALING, SUBSEQUENT ENCOUNTER: Primary | ICD-10-CM

## 2022-01-01 DIAGNOSIS — E55.9 VITAMIN D DEFICIENCY: ICD-10-CM

## 2022-01-01 DIAGNOSIS — I48.92 ATRIAL FLUTTER WITH RAPID VENTRICULAR RESPONSE (H): ICD-10-CM

## 2022-01-01 DIAGNOSIS — R06.02 SHORTNESS OF BREATH: Primary | ICD-10-CM

## 2022-01-01 DIAGNOSIS — S32.599A CLOSED FRACTURE OF MULTIPLE PUBIC RAMI, UNSPECIFIED LATERALITY, INITIAL ENCOUNTER (H): Primary | ICD-10-CM

## 2022-01-01 DIAGNOSIS — S22.068G OTHER CLOSED FRACTURE OF SEVENTH THORACIC VERTEBRA WITH DELAYED HEALING, SUBSEQUENT ENCOUNTER: ICD-10-CM

## 2022-01-01 DIAGNOSIS — I25.810 CORONARY ARTERY DISEASE INVOLVING CORONARY BYPASS GRAFT OF NATIVE HEART WITHOUT ANGINA PECTORIS: ICD-10-CM

## 2022-01-01 DIAGNOSIS — I25.10 CORONARY ARTERY DISEASE INVOLVING NATIVE CORONARY ARTERY OF NATIVE HEART WITHOUT ANGINA PECTORIS: ICD-10-CM

## 2022-01-01 DIAGNOSIS — I48.0 PAROXYSMAL ATRIAL FIBRILLATION (H): ICD-10-CM

## 2022-01-01 DIAGNOSIS — N18.30 CHRONIC KIDNEY DISEASE, STAGE 3 UNSPECIFIED (H): ICD-10-CM

## 2022-01-01 DIAGNOSIS — R53.83 OTHER FATIGUE: Primary | ICD-10-CM

## 2022-01-01 DIAGNOSIS — I10 ESSENTIAL (PRIMARY) HYPERTENSION: ICD-10-CM

## 2022-01-01 DIAGNOSIS — R19.7 DIARRHEA, UNSPECIFIED TYPE: ICD-10-CM

## 2022-01-01 DIAGNOSIS — S22.048A OTHER CLOSED FRACTURE OF FOURTH THORACIC VERTEBRA, INITIAL ENCOUNTER (H): ICD-10-CM

## 2022-01-01 DIAGNOSIS — Z12.11 SCREEN FOR COLON CANCER: ICD-10-CM

## 2022-01-01 DIAGNOSIS — I50.31 ACUTE DIASTOLIC HEART FAILURE (H): Primary | ICD-10-CM

## 2022-01-01 DIAGNOSIS — D64.9 LOW HEMOGLOBIN: Primary | ICD-10-CM

## 2022-01-01 DIAGNOSIS — N18.32 STAGE 3B CHRONIC KIDNEY DISEASE (H): ICD-10-CM

## 2022-01-01 DIAGNOSIS — I50.32 CHRONIC DIASTOLIC (CONGESTIVE) HEART FAILURE (H): ICD-10-CM
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
## 2022-01-01 DIAGNOSIS — L40.8 OTHER PSORIASIS: ICD-10-CM

## 2022-01-01 DIAGNOSIS — R06.02 SOB (SHORTNESS OF BREATH): Primary | ICD-10-CM

## 2022-01-01 DIAGNOSIS — L40.50 PSORIATIC ARTHRITIS (H): Primary | ICD-10-CM

## 2022-01-01 DIAGNOSIS — S22.000A THORACIC COMPRESSION FRACTURE (H): ICD-10-CM

## 2022-01-01 DIAGNOSIS — I49.3 FREQUENT PVCS: ICD-10-CM

## 2022-01-01 DIAGNOSIS — Z51.81 ENCOUNTER FOR THERAPEUTIC DRUG LEVEL MONITORING: ICD-10-CM

## 2022-01-01 DIAGNOSIS — M15.9 GENERALIZED OSTEOARTHROSIS OF HAND: ICD-10-CM

## 2022-01-01 DIAGNOSIS — R53.81 PHYSICAL DECONDITIONING: ICD-10-CM

## 2022-01-01 DIAGNOSIS — N18.30 STAGE 3 CHRONIC KIDNEY DISEASE, UNSPECIFIED WHETHER STAGE 3A OR 3B CKD (H): ICD-10-CM

## 2022-01-01 DIAGNOSIS — S32.9XXD CLOSED NONDISPLACED FRACTURE OF PELVIS WITH ROUTINE HEALING, UNSPECIFIED PART OF PELVIS, SUBSEQUENT ENCOUNTER: Primary | ICD-10-CM

## 2022-01-01 DIAGNOSIS — I50.9 ACUTE CONGESTIVE HEART FAILURE, UNSPECIFIED HEART FAILURE TYPE (H): ICD-10-CM

## 2022-01-01 DIAGNOSIS — Z13.220 SCREENING FOR HYPERLIPIDEMIA: ICD-10-CM

## 2022-01-01 DIAGNOSIS — R52 PAIN MANAGEMENT: ICD-10-CM

## 2022-01-01 LAB
ALBUMIN SERPL-MCNC: 3 G/DL (ref 3.5–5)
ALBUMIN SERPL-MCNC: 3.1 G/DL (ref 3.5–5)
ALBUMIN SERPL-MCNC: 3.3 G/DL (ref 3.5–5)
ALBUMIN SERPL-MCNC: 3.6 G/DL (ref 3.5–5)
ALBUMIN SERPL-MCNC: 3.6 G/DL (ref 3.5–5)
ALP SERPL-CCNC: 102 U/L (ref 45–120)
ALP SERPL-CCNC: 111 U/L (ref 45–120)
ALP SERPL-CCNC: 88 U/L (ref 45–120)
ALP SERPL-CCNC: 92 U/L (ref 45–120)
ALP SERPL-CCNC: 95 U/L (ref 45–120)
ALT SERPL W P-5'-P-CCNC: 11 U/L (ref 10–35)
ALT SERPL W P-5'-P-CCNC: 13 U/L (ref 0–45)
ALT SERPL W P-5'-P-CCNC: 14 U/L (ref 0–45)
ALT SERPL W P-5'-P-CCNC: 15 U/L (ref 0–45)
ALT SERPL W P-5'-P-CCNC: 16 U/L (ref 0–45)
ALT SERPL W P-5'-P-CCNC: 18 U/L (ref 0–45)
ANION GAP SERPL CALCULATED.3IONS-SCNC: 10 MMOL/L (ref 5–18)
ANION GAP SERPL CALCULATED.3IONS-SCNC: 11 MMOL/L (ref 5–18)
ANION GAP SERPL CALCULATED.3IONS-SCNC: 12 MMOL/L (ref 7–15)
ANION GAP SERPL CALCULATED.3IONS-SCNC: 13 MMOL/L (ref 5–18)
ANION GAP SERPL CALCULATED.3IONS-SCNC: 13 MMOL/L (ref 5–18)
ANION GAP SERPL CALCULATED.3IONS-SCNC: 6 MMOL/L (ref 5–18)
ANION GAP SERPL CALCULATED.3IONS-SCNC: 6 MMOL/L (ref 5–18)
ANION GAP SERPL CALCULATED.3IONS-SCNC: 7 MMOL/L (ref 5–18)
ANION GAP SERPL CALCULATED.3IONS-SCNC: 8 MMOL/L (ref 5–18)
ANION GAP SERPL CALCULATED.3IONS-SCNC: 8 MMOL/L (ref 5–18)
ANION GAP SERPL CALCULATED.3IONS-SCNC: 8 MMOL/L (ref 7–15)
ANION GAP SERPL CALCULATED.3IONS-SCNC: 9 MMOL/L (ref 5–18)
AST SERPL W P-5'-P-CCNC: 19 U/L (ref 0–40)
AST SERPL W P-5'-P-CCNC: 24 U/L (ref 0–40)
AST SERPL W P-5'-P-CCNC: 27 U/L (ref 0–40)
AST SERPL W P-5'-P-CCNC: 29 U/L (ref 0–40)
AST SERPL W P-5'-P-CCNC: 32 U/L (ref 0–40)
ATRIAL RATE - MUSE: 124 BPM
ATRIAL RATE - MUSE: 60 BPM
ATRIAL RATE - MUSE: 60 BPM
ATRIAL RATE - MUSE: 64 BPM
BASOPHILS # BLD AUTO: 0 10E3/UL (ref 0–0.2)
BASOPHILS # BLD AUTO: 0 10E3/UL (ref 0–0.2)
BASOPHILS # BLD AUTO: 0.1 10E3/UL (ref 0–0.2)
BASOPHILS # BLD AUTO: 0.1 10E3/UL (ref 0–0.2)
BASOPHILS NFR BLD AUTO: 0 %
BASOPHILS NFR BLD AUTO: 0 %
BASOPHILS NFR BLD AUTO: 1 %
BASOPHILS NFR BLD AUTO: 1 %
BILIRUB SERPL-MCNC: 0.7 MG/DL (ref 0–1)
BILIRUB SERPL-MCNC: 0.7 MG/DL (ref 0–1)
BILIRUB SERPL-MCNC: 0.9 MG/DL (ref 0–1)
BILIRUB SERPL-MCNC: 1 MG/DL (ref 0–1)
BILIRUB SERPL-MCNC: 1.2 MG/DL (ref 0–1)
BNP SERPL-MCNC: 333 PG/ML (ref 0–167)
BNP SERPL-MCNC: 441 PG/ML (ref 0–167)
BNP SERPL-MCNC: 494 PG/ML (ref 0–167)
BNP SERPL-MCNC: 779 PG/ML (ref 0–167)
BNP SERPL-MCNC: 938 PG/ML (ref 0–167)
BUN SERPL-MCNC: 15 MG/DL (ref 8–28)
BUN SERPL-MCNC: 15 MG/DL (ref 8–28)
BUN SERPL-MCNC: 16 MG/DL (ref 8–28)
BUN SERPL-MCNC: 17 MG/DL (ref 8–28)
BUN SERPL-MCNC: 19 MG/DL (ref 8–28)
BUN SERPL-MCNC: 19 MG/DL (ref 8–28)
BUN SERPL-MCNC: 20 MG/DL (ref 8–28)
BUN SERPL-MCNC: 21 MG/DL (ref 8–28)
BUN SERPL-MCNC: 22 MG/DL (ref 8–28)
BUN SERPL-MCNC: 23 MG/DL (ref 8–28)
BUN SERPL-MCNC: 24 MG/DL (ref 8–28)
BUN SERPL-MCNC: 25 MG/DL (ref 8–28)
BUN SERPL-MCNC: 25.2 MG/DL (ref 8–23)
BUN SERPL-MCNC: 34 MG/DL (ref 8–28)
BUN SERPL-MCNC: 37 MG/DL (ref 8–28)
BUN SERPL-MCNC: 37 MG/DL (ref 8–28)
BUN SERPL-MCNC: 39 MG/DL (ref 8–23)
C COLI+JEJUNI+LARI FUSA STL QL NAA+PROBE: NOT DETECTED
C DIFF TOX B STL QL: NEGATIVE
CALCIUM SERPL-MCNC: 7.9 MG/DL (ref 8.5–10.5)
CALCIUM SERPL-MCNC: 8.4 MG/DL (ref 8.5–10.5)
CALCIUM SERPL-MCNC: 8.6 MG/DL (ref 8.5–10.5)
CALCIUM SERPL-MCNC: 8.6 MG/DL (ref 8.5–10.5)
CALCIUM SERPL-MCNC: 8.7 MG/DL (ref 8.5–10.5)
CALCIUM SERPL-MCNC: 8.8 MG/DL (ref 8.5–10.5)
CALCIUM SERPL-MCNC: 8.8 MG/DL (ref 8.5–10.5)
CALCIUM SERPL-MCNC: 8.9 MG/DL (ref 8.5–10.5)
CALCIUM SERPL-MCNC: 8.9 MG/DL (ref 8.8–10.2)
CALCIUM SERPL-MCNC: 9 MG/DL (ref 8.5–10.5)
CALCIUM SERPL-MCNC: 9 MG/DL (ref 8.5–10.5)
CALCIUM SERPL-MCNC: 9 MG/DL (ref 8.8–10.2)
CALCIUM SERPL-MCNC: 9.1 MG/DL (ref 8.5–10.5)
CALCIUM SERPL-MCNC: 9.3 MG/DL (ref 8.5–10.5)
CHLORIDE BLD-SCNC: 100 MMOL/L (ref 98–107)
CHLORIDE BLD-SCNC: 100 MMOL/L (ref 98–107)
CHLORIDE BLD-SCNC: 101 MMOL/L (ref 98–107)
CHLORIDE BLD-SCNC: 103 MMOL/L (ref 98–107)
CHLORIDE BLD-SCNC: 104 MMOL/L (ref 98–107)
CHLORIDE BLD-SCNC: 85 MMOL/L (ref 98–107)
CHLORIDE BLD-SCNC: 90 MMOL/L (ref 98–107)
CHLORIDE BLD-SCNC: 90 MMOL/L (ref 98–107)
CHLORIDE BLD-SCNC: 91 MMOL/L (ref 98–107)
CHLORIDE BLD-SCNC: 92 MMOL/L (ref 98–107)
CHLORIDE BLD-SCNC: 95 MMOL/L (ref 98–107)
CHLORIDE BLD-SCNC: 96 MMOL/L (ref 98–107)
CHLORIDE BLD-SCNC: 97 MMOL/L (ref 98–107)
CHLORIDE BLD-SCNC: 98 MMOL/L (ref 98–107)
CHLORIDE BLD-SCNC: 99 MMOL/L (ref 98–107)
CHLORIDE BLD-SCNC: 99 MMOL/L (ref 98–107)
CHLORIDE SERPL-SCNC: 100 MMOL/L (ref 98–107)
CHLORIDE SERPL-SCNC: 95 MMOL/L (ref 98–107)
CHOLEST SERPL-MCNC: 169 MG/DL
CK SERPL-CCNC: 126 U/L (ref 30–190)
CO2 SERPL-SCNC: 18 MMOL/L (ref 22–31)
CO2 SERPL-SCNC: 22 MMOL/L (ref 22–31)
CO2 SERPL-SCNC: 26 MMOL/L (ref 22–31)
CO2 SERPL-SCNC: 27 MMOL/L (ref 22–31)
CO2 SERPL-SCNC: 29 MMOL/L (ref 22–31)
CO2 SERPL-SCNC: 29 MMOL/L (ref 22–31)
CO2 SERPL-SCNC: 30 MMOL/L (ref 22–31)
CO2 SERPL-SCNC: 32 MMOL/L (ref 22–31)
CO2 SERPL-SCNC: 34 MMOL/L (ref 22–31)
CO2 SERPL-SCNC: 35 MMOL/L (ref 22–31)
CO2 SERPL-SCNC: 36 MMOL/L (ref 22–31)
CO2 SERPL-SCNC: 37 MMOL/L (ref 22–31)
CO2 SERPL-SCNC: 42 MMOL/L (ref 22–31)
CO2 SERPL-SCNC: 42 MMOL/L (ref 22–31)
CO2 SERPL-SCNC: 43 MMOL/L (ref 22–31)
CO2 SERPL-SCNC: 43 MMOL/L (ref 22–31)
CREAT SERPL-MCNC: 0.83 MG/DL (ref 0.6–1.1)
CREAT SERPL-MCNC: 0.85 MG/DL (ref 0.6–1.1)
CREAT SERPL-MCNC: 0.85 MG/DL (ref 0.6–1.1)
CREAT SERPL-MCNC: 0.88 MG/DL (ref 0.6–1.1)
CREAT SERPL-MCNC: 0.91 MG/DL (ref 0.6–1.1)
CREAT SERPL-MCNC: 0.93 MG/DL (ref 0.6–1.1)
CREAT SERPL-MCNC: 0.93 MG/DL (ref 0.6–1.1)
CREAT SERPL-MCNC: 0.95 MG/DL (ref 0.6–1.1)
CREAT SERPL-MCNC: 0.96 MG/DL (ref 0.6–1.1)
CREAT SERPL-MCNC: 1 MG/DL (ref 0.6–1.1)
CREAT SERPL-MCNC: 1.01 MG/DL (ref 0.6–1.1)
CREAT SERPL-MCNC: 1.03 MG/DL (ref 0.6–1.1)
CREAT SERPL-MCNC: 1.08 MG/DL (ref 0.51–0.95)
CREAT SERPL-MCNC: 1.09 MG/DL (ref 0.6–1.1)
CREAT SERPL-MCNC: 1.11 MG/DL (ref 0.6–1.1)
CREAT SERPL-MCNC: 1.11 MG/DL (ref 0.6–1.1)
CREAT SERPL-MCNC: 1.14 MG/DL (ref 0.6–1.1)
CREAT SERPL-MCNC: 1.16 MG/DL (ref 0.6–1.1)
CREAT SERPL-MCNC: 1.19 MG/DL (ref 0.6–1.1)
CREAT SERPL-MCNC: 1.26 MG/DL (ref 0.51–0.95)
CREAT SERPL-MCNC: 1.27 MG/DL (ref 0.6–1.1)
CREAT SERPL-MCNC: 1.6 MG/DL (ref 0.6–1.1)
D DIMER PPP FEU-MCNC: 3.16 UG/ML FEU (ref 0–0.5)
DEPRECATED CALCIDIOL+CALCIFEROL SERPL-MC: 62 UG/L (ref 20–75)
DEPRECATED HCO3 PLAS-SCNC: 28 MMOL/L (ref 22–29)
DEPRECATED HCO3 PLAS-SCNC: 30 MMOL/L (ref 22–29)
DIASTOLIC BLOOD PRESSURE - MUSE: NORMAL MMHG
EC STX1 GENE STL QL NAA+PROBE: NOT DETECTED
EC STX2 GENE STL QL NAA+PROBE: NOT DETECTED
EOSINOPHIL # BLD AUTO: 0 10E3/UL (ref 0–0.7)
EOSINOPHIL # BLD AUTO: 0.1 10E3/UL (ref 0–0.7)
EOSINOPHIL # BLD AUTO: 0.1 10E3/UL (ref 0–0.7)
EOSINOPHIL # BLD AUTO: 0.2 10E3/UL (ref 0–0.7)
EOSINOPHIL NFR BLD AUTO: 0 %
EOSINOPHIL NFR BLD AUTO: 1 %
EOSINOPHIL NFR BLD AUTO: 2 %
EOSINOPHIL NFR BLD AUTO: 3 %
ERYTHROCYTE [DISTWIDTH] IN BLOOD BY AUTOMATED COUNT: 13.6 % (ref 10–15)
ERYTHROCYTE [DISTWIDTH] IN BLOOD BY AUTOMATED COUNT: 14 % (ref 10–15)
ERYTHROCYTE [DISTWIDTH] IN BLOOD BY AUTOMATED COUNT: 14.4 % (ref 10–15)
ERYTHROCYTE [DISTWIDTH] IN BLOOD BY AUTOMATED COUNT: 14.9 % (ref 10–15)
ERYTHROCYTE [DISTWIDTH] IN BLOOD BY AUTOMATED COUNT: 15.1 % (ref 10–15)
ERYTHROCYTE [DISTWIDTH] IN BLOOD BY AUTOMATED COUNT: 15.2 % (ref 10–15)
ERYTHROCYTE [DISTWIDTH] IN BLOOD BY AUTOMATED COUNT: 15.3 % (ref 10–15)
ERYTHROCYTE [DISTWIDTH] IN BLOOD BY AUTOMATED COUNT: 15.5 % (ref 10–15)
ERYTHROCYTE [DISTWIDTH] IN BLOOD BY AUTOMATED COUNT: 15.6 % (ref 10–15)
ERYTHROCYTE [DISTWIDTH] IN BLOOD BY AUTOMATED COUNT: 16.5 % (ref 10–15)
FLUAV RNA SPEC QL NAA+PROBE: NEGATIVE
FLUBV RNA RESP QL NAA+PROBE: NEGATIVE
GFR SERPL CREATININE-BSD FRML MDRD: 31 ML/MIN/1.73M2
GFR SERPL CREATININE-BSD FRML MDRD: 41 ML/MIN/1.73M2
GFR SERPL CREATININE-BSD FRML MDRD: 42 ML/MIN/1.73M2
GFR SERPL CREATININE-BSD FRML MDRD: 45 ML/MIN/1.73M2
GFR SERPL CREATININE-BSD FRML MDRD: 46 ML/MIN/1.73M2
GFR SERPL CREATININE-BSD FRML MDRD: 47 ML/MIN/1.73M2
GFR SERPL CREATININE-BSD FRML MDRD: 48 ML/MIN/1.73M2
GFR SERPL CREATININE-BSD FRML MDRD: 49 ML/MIN/1.73M2
GFR SERPL CREATININE-BSD FRML MDRD: 50 ML/MIN/1.73M2
GFR SERPL CREATININE-BSD FRML MDRD: 50 ML/MIN/1.73M2
GFR SERPL CREATININE-BSD FRML MDRD: 53 ML/MIN/1.73M2
GFR SERPL CREATININE-BSD FRML MDRD: 54 ML/MIN/1.73M2
GFR SERPL CREATININE-BSD FRML MDRD: 55 ML/MIN/1.73M2
GFR SERPL CREATININE-BSD FRML MDRD: 58 ML/MIN/1.73M2
GFR SERPL CREATININE-BSD FRML MDRD: 58 ML/MIN/1.73M2
GFR SERPL CREATININE-BSD FRML MDRD: 60 ML/MIN/1.73M2
GFR SERPL CREATININE-BSD FRML MDRD: 60 ML/MIN/1.73M2
GFR SERPL CREATININE-BSD FRML MDRD: 62 ML/MIN/1.73M2
GFR SERPL CREATININE-BSD FRML MDRD: 64 ML/MIN/1.73M2
GFR SERPL CREATININE-BSD FRML MDRD: 67 ML/MIN/1.73M2
GFR SERPL CREATININE-BSD FRML MDRD: 67 ML/MIN/1.73M2
GFR SERPL CREATININE-BSD FRML MDRD: 69 ML/MIN/1.73M2
GLUCOSE BLD-MCNC: 100 MG/DL (ref 70–125)
GLUCOSE BLD-MCNC: 103 MG/DL (ref 70–125)
GLUCOSE BLD-MCNC: 104 MG/DL (ref 70–125)
GLUCOSE BLD-MCNC: 105 MG/DL (ref 70–125)
GLUCOSE BLD-MCNC: 88 MG/DL (ref 70–125)
GLUCOSE BLD-MCNC: 89 MG/DL (ref 70–125)
GLUCOSE BLD-MCNC: 92 MG/DL (ref 70–125)
GLUCOSE BLD-MCNC: 93 MG/DL (ref 70–125)
GLUCOSE BLD-MCNC: 94 MG/DL (ref 70–125)
GLUCOSE BLD-MCNC: 95 MG/DL (ref 70–125)
GLUCOSE BLD-MCNC: 97 MG/DL (ref 70–125)
GLUCOSE BLD-MCNC: 99 MG/DL (ref 70–125)
GLUCOSE BLDC GLUCOMTR-MCNC: 116 MG/DL (ref 70–99)
GLUCOSE BLDC GLUCOMTR-MCNC: 96 MG/DL (ref 70–99)
GLUCOSE SERPL-MCNC: 89 MG/DL (ref 70–99)
GLUCOSE SERPL-MCNC: 94 MG/DL (ref 70–99)
HCT VFR BLD AUTO: 28.1 % (ref 35–47)
HCT VFR BLD AUTO: 30.3 % (ref 35–47)
HCT VFR BLD AUTO: 31.2 % (ref 35–47)
HCT VFR BLD AUTO: 33.7 % (ref 35–47)
HCT VFR BLD AUTO: 33.7 % (ref 35–47)
HCT VFR BLD AUTO: 33.9 % (ref 35–47)
HCT VFR BLD AUTO: 34.9 % (ref 35–47)
HCT VFR BLD AUTO: 37.1 % (ref 35–47)
HCT VFR BLD AUTO: 37.9 % (ref 35–47)
HCT VFR BLD AUTO: 38.2 % (ref 35–47)
HDLC SERPL-MCNC: 64 MG/DL
HGB BLD-MCNC: 10.1 G/DL (ref 11.7–15.7)
HGB BLD-MCNC: 10.3 G/DL (ref 11.7–15.7)
HGB BLD-MCNC: 10.8 G/DL (ref 11.7–15.7)
HGB BLD-MCNC: 10.9 G/DL (ref 11.7–15.7)
HGB BLD-MCNC: 11 G/DL (ref 11.7–15.7)
HGB BLD-MCNC: 11.5 G/DL (ref 11.7–15.7)
HGB BLD-MCNC: 11.8 G/DL (ref 11.7–15.7)
HGB BLD-MCNC: 12.3 G/DL (ref 11.7–15.7)
HGB BLD-MCNC: 12.4 G/DL (ref 11.7–15.7)
HGB BLD-MCNC: 8.8 G/DL (ref 11.7–15.7)
HOLD SPECIMEN: NORMAL
IMM GRANULOCYTES # BLD: 0 10E3/UL
IMM GRANULOCYTES # BLD: 0.1 10E3/UL
IMM GRANULOCYTES NFR BLD: 1 %
INR PPP: 1.25 (ref 0.9–1.15)
INTERPRETATION ECG - MUSE: NORMAL
LACTATE SERPL-SCNC: 1 MMOL/L (ref 0.7–2)
LDLC SERPL CALC-MCNC: 86 MG/DL
LIPASE SERPL-CCNC: 29 U/L (ref 0–52)
LVEF ECHO: NORMAL
LYMPHOCYTES # BLD AUTO: 0.7 10E3/UL (ref 0.8–5.3)
LYMPHOCYTES # BLD AUTO: 0.8 10E3/UL (ref 0.8–5.3)
LYMPHOCYTES # BLD AUTO: 1.4 10E3/UL (ref 0.8–5.3)
LYMPHOCYTES # BLD AUTO: 1.7 10E3/UL (ref 0.8–5.3)
LYMPHOCYTES NFR BLD AUTO: 13 %
LYMPHOCYTES NFR BLD AUTO: 21 %
LYMPHOCYTES NFR BLD AUTO: 22 %
LYMPHOCYTES NFR BLD AUTO: 8 %
MAGNESIUM SERPL-MCNC: 1.7 MG/DL (ref 1.8–2.6)
MAGNESIUM SERPL-MCNC: 1.9 MG/DL (ref 1.8–2.6)
MAGNESIUM SERPL-MCNC: 2 MG/DL (ref 1.8–2.6)
MAGNESIUM SERPL-MCNC: 2.1 MG/DL (ref 1.8–2.6)
MAGNESIUM SERPL-MCNC: 2.2 MG/DL (ref 1.8–2.6)
MCH RBC QN AUTO: 27.8 PG (ref 26.5–33)
MCH RBC QN AUTO: 32.1 PG (ref 26.5–33)
MCH RBC QN AUTO: 32.2 PG (ref 26.5–33)
MCH RBC QN AUTO: 32.3 PG (ref 26.5–33)
MCH RBC QN AUTO: 32.5 PG (ref 26.5–33)
MCH RBC QN AUTO: 32.6 PG (ref 26.5–33)
MCH RBC QN AUTO: 32.8 PG (ref 26.5–33)
MCH RBC QN AUTO: 33 PG (ref 26.5–33)
MCH RBC QN AUTO: 33 PG (ref 26.5–33)
MCH RBC QN AUTO: 33.3 PG (ref 26.5–33)
MCHC RBC AUTO-ENTMCNC: 31.3 G/DL (ref 31.5–36.5)
MCHC RBC AUTO-ENTMCNC: 31.8 G/DL (ref 31.5–36.5)
MCHC RBC AUTO-ENTMCNC: 32 G/DL (ref 31.5–36.5)
MCHC RBC AUTO-ENTMCNC: 32.3 G/DL (ref 31.5–36.5)
MCHC RBC AUTO-ENTMCNC: 32.4 G/DL (ref 31.5–36.5)
MCHC RBC AUTO-ENTMCNC: 32.5 G/DL (ref 31.5–36.5)
MCHC RBC AUTO-ENTMCNC: 32.5 G/DL (ref 31.5–36.5)
MCHC RBC AUTO-ENTMCNC: 33 G/DL (ref 31.5–36.5)
MCHC RBC AUTO-ENTMCNC: 33 G/DL (ref 31.5–36.5)
MCHC RBC AUTO-ENTMCNC: 33.3 G/DL (ref 31.5–36.5)
MCV RBC AUTO: 100 FL (ref 78–100)
MCV RBC AUTO: 100 FL (ref 78–100)
MCV RBC AUTO: 101 FL (ref 78–100)
MCV RBC AUTO: 103 FL (ref 78–100)
MCV RBC AUTO: 89 FL (ref 78–100)
MCV RBC AUTO: 98 FL (ref 78–100)
MCV RBC AUTO: 99 FL (ref 78–100)
MONOCYTES # BLD AUTO: 0.7 10E3/UL (ref 0–1.3)
MONOCYTES # BLD AUTO: 0.7 10E3/UL (ref 0–1.3)
MONOCYTES # BLD AUTO: 0.8 10E3/UL (ref 0–1.3)
MONOCYTES # BLD AUTO: 0.8 10E3/UL (ref 0–1.3)
MONOCYTES NFR BLD AUTO: 10 %
MONOCYTES NFR BLD AUTO: 10 %
MONOCYTES NFR BLD AUTO: 12 %
MONOCYTES NFR BLD AUTO: 8 %
NEUTROPHILS # BLD AUTO: 4.7 10E3/UL (ref 1.6–8.3)
NEUTROPHILS # BLD AUTO: 4.7 10E3/UL (ref 1.6–8.3)
NEUTROPHILS # BLD AUTO: 5.1 10E3/UL (ref 1.6–8.3)
NEUTROPHILS # BLD AUTO: 7.3 10E3/UL (ref 1.6–8.3)
NEUTROPHILS NFR BLD AUTO: 63 %
NEUTROPHILS NFR BLD AUTO: 66 %
NEUTROPHILS NFR BLD AUTO: 73 %
NEUTROPHILS NFR BLD AUTO: 83 %
NONHDLC SERPL-MCNC: 105 MG/DL
NOROV GI+II ORF1-ORF2 JNC STL QL NAA+PR: NOT DETECTED
NRBC # BLD AUTO: 0 10E3/UL
NRBC BLD AUTO-RTO: 0 /100
P AXIS - MUSE: 102 DEGREES
P AXIS - MUSE: 76 DEGREES
P AXIS - MUSE: 79 DEGREES
P AXIS - MUSE: NORMAL DEGREES
PLAT MORPH BLD: NORMAL
PLATELET # BLD AUTO: 114 10E3/UL (ref 150–450)
PLATELET # BLD AUTO: 114 10E3/UL (ref 150–450)
PLATELET # BLD AUTO: 131 10E3/UL (ref 150–450)
PLATELET # BLD AUTO: 137 10E3/UL (ref 150–450)
PLATELET # BLD AUTO: 153 10E3/UL (ref 150–450)
PLATELET # BLD AUTO: 156 10E3/UL (ref 150–450)
PLATELET # BLD AUTO: 162 10E3/UL (ref 150–450)
PLATELET # BLD AUTO: 169 10E3/UL (ref 150–450)
PLATELET # BLD AUTO: 176 10E3/UL (ref 150–450)
PLATELET # BLD AUTO: 191 10E3/UL (ref 150–450)
PLATELET # BLD AUTO: 208 10E3/UL (ref 150–450)
POTASSIUM BLD-SCNC: 3.8 MMOL/L (ref 3.5–5)
POTASSIUM BLD-SCNC: 3.9 MMOL/L (ref 3.5–5)
POTASSIUM BLD-SCNC: 4 MMOL/L (ref 3.5–5)
POTASSIUM BLD-SCNC: 4.1 MMOL/L (ref 3.5–5)
POTASSIUM BLD-SCNC: 4.2 MMOL/L (ref 3.5–5)
POTASSIUM BLD-SCNC: 4.6 MMOL/L (ref 3.5–5)
POTASSIUM BLD-SCNC: 4.8 MMOL/L (ref 3.5–5)
POTASSIUM BLD-SCNC: 4.9 MMOL/L (ref 3.5–5)
POTASSIUM BLD-SCNC: 5 MMOL/L (ref 3.5–5)
POTASSIUM BLD-SCNC: 5.1 MMOL/L (ref 3.5–5)
POTASSIUM BLD-SCNC: 5.6 MMOL/L (ref 3.5–5)
POTASSIUM SERPL-SCNC: 3.8 MMOL/L (ref 3.4–5.3)
POTASSIUM SERPL-SCNC: 4.6 MMOL/L (ref 3.4–5.3)
PR INTERVAL - MUSE: 184 MS
PR INTERVAL - MUSE: 204 MS
PR INTERVAL - MUSE: 212 MS
PR INTERVAL - MUSE: 238 MS
PROT SERPL-MCNC: 6 G/DL (ref 6–8)
PROT SERPL-MCNC: 6.1 G/DL (ref 6–8)
PROT SERPL-MCNC: 6.3 G/DL (ref 6–8)
PROT SERPL-MCNC: 7 G/DL (ref 6–8)
PROT SERPL-MCNC: 7.1 G/DL (ref 6–8)
QRS DURATION - MUSE: 130 MS
QRS DURATION - MUSE: 136 MS
QRS DURATION - MUSE: 136 MS
QRS DURATION - MUSE: 144 MS
QT - MUSE: 362 MS
QT - MUSE: 468 MS
QT - MUSE: 470 MS
QT - MUSE: 474 MS
QTC - MUSE: 470 MS
QTC - MUSE: 482 MS
QTC - MUSE: 515 MS
QTC - MUSE: 520 MS
R AXIS - MUSE: -35 DEGREES
R AXIS - MUSE: -55 DEGREES
R AXIS - MUSE: -58 DEGREES
R AXIS - MUSE: -70 DEGREES
RBC # BLD AUTO: 3.06 10E6/UL (ref 3.8–5.2)
RBC # BLD AUTO: 3.17 10E6/UL (ref 3.8–5.2)
RBC # BLD AUTO: 3.19 10E6/UL (ref 3.8–5.2)
RBC # BLD AUTO: 3.27 10E6/UL (ref 3.8–5.2)
RBC # BLD AUTO: 3.35 10E6/UL (ref 3.8–5.2)
RBC # BLD AUTO: 3.38 10E6/UL (ref 3.8–5.2)
RBC # BLD AUTO: 3.45 10E6/UL (ref 3.8–5.2)
RBC # BLD AUTO: 3.68 10E6/UL (ref 3.8–5.2)
RBC # BLD AUTO: 3.78 10E6/UL (ref 3.8–5.2)
RBC # BLD AUTO: 3.8 10E6/UL (ref 3.8–5.2)
RBC MORPH BLD: NORMAL
RVA NSP5 STL QL NAA+PROBE: NOT DETECTED
SALMONELLA SP RPOD STL QL NAA+PROBE: NOT DETECTED
SARS-COV-2 RNA RESP QL NAA+PROBE: NEGATIVE
SHIGELLA SP+EIEC IPAH STL QL NAA+PROBE: NOT DETECTED
SODIUM SERPL-SCNC: 130 MMOL/L (ref 136–145)
SODIUM SERPL-SCNC: 133 MMOL/L (ref 136–145)
SODIUM SERPL-SCNC: 135 MMOL/L (ref 136–145)
SODIUM SERPL-SCNC: 137 MMOL/L (ref 136–145)
SODIUM SERPL-SCNC: 138 MMOL/L (ref 136–145)
SODIUM SERPL-SCNC: 139 MMOL/L (ref 136–145)
SODIUM SERPL-SCNC: 139 MMOL/L (ref 136–145)
SODIUM SERPL-SCNC: 140 MMOL/L (ref 136–145)
SODIUM SERPL-SCNC: 141 MMOL/L (ref 136–145)
SYSTOLIC BLOOD PRESSURE - MUSE: NORMAL MMHG
T AXIS - MUSE: 44 DEGREES
T AXIS - MUSE: 46 DEGREES
T AXIS - MUSE: 52 DEGREES
T AXIS - MUSE: 94 DEGREES
TRIGL SERPL-MCNC: 96 MG/DL
TROPONIN I SERPL-MCNC: 0.02 NG/ML (ref 0–0.29)
TROPONIN I SERPL-MCNC: 0.03 NG/ML (ref 0–0.29)
TROPONIN I SERPL-MCNC: 0.03 NG/ML (ref 0–0.29)
TSH SERPL DL<=0.005 MIU/L-ACNC: 3.14 UIU/ML (ref 0.3–5)
TSH SERPL DL<=0.005 MIU/L-ACNC: 4.26 UIU/ML (ref 0.3–5)
V CHOL+PARA RFBL+TRKH+TNAA STL QL NAA+PR: NOT DETECTED
VENTRICULAR RATE- MUSE: 124 BPM
VENTRICULAR RATE- MUSE: 60 BPM
VENTRICULAR RATE- MUSE: 64 BPM
VENTRICULAR RATE- MUSE: 71 BPM
VIT B12 SERPL-MCNC: 452 PG/ML (ref 213–816)
WBC # BLD AUTO: 6.5 10E3/UL (ref 4–11)
WBC # BLD AUTO: 6.5 10E3/UL (ref 4–11)
WBC # BLD AUTO: 6.6 10E3/UL (ref 4–11)
WBC # BLD AUTO: 6.7 10E3/UL (ref 4–11)
WBC # BLD AUTO: 7 10E3/UL (ref 4–11)
WBC # BLD AUTO: 7.2 10E3/UL (ref 4–11)
WBC # BLD AUTO: 7.4 10E3/UL (ref 4–11)
WBC # BLD AUTO: 7.5 10E3/UL (ref 4–11)
WBC # BLD AUTO: 7.9 10E3/UL (ref 4–11)
WBC # BLD AUTO: 8.5 10E3/UL (ref 4–11)
WBC # BLD AUTO: 8.7 10E3/UL (ref 4–11)
Y ENTERO RECN STL QL NAA+PROBE: NOT DETECTED

## 2022-01-01 PROCEDURE — 99215 OFFICE O/P EST HI 40 MIN: CPT | Performed by: INTERNAL MEDICINE

## 2022-01-01 PROCEDURE — 97129 THER IVNTJ 1ST 15 MIN: CPT | Mod: GO

## 2022-01-01 PROCEDURE — 97535 SELF CARE MNGMENT TRAINING: CPT | Mod: GO

## 2022-01-01 PROCEDURE — 250N000013 HC RX MED GY IP 250 OP 250 PS 637: Performed by: INTERNAL MEDICINE

## 2022-01-01 PROCEDURE — G0378 HOSPITAL OBSERVATION PER HR: HCPCS

## 2022-01-01 PROCEDURE — 36415 COLL VENOUS BLD VENIPUNCTURE: CPT | Performed by: INTERNAL MEDICINE

## 2022-01-01 PROCEDURE — 36415 COLL VENOUS BLD VENIPUNCTURE: CPT | Performed by: NURSE PRACTITIONER

## 2022-01-01 PROCEDURE — 96375 TX/PRO/DX INJ NEW DRUG ADDON: CPT

## 2022-01-01 PROCEDURE — 97161 PT EVAL LOW COMPLEX 20 MIN: CPT | Mod: GP

## 2022-01-01 PROCEDURE — 85027 COMPLETE CBC AUTOMATED: CPT | Performed by: HOSPITALIST

## 2022-01-01 PROCEDURE — 99285 EMERGENCY DEPT VISIT HI MDM: CPT | Mod: CS,25

## 2022-01-01 PROCEDURE — 93325 DOPPLER ECHO COLOR FLOW MAPG: CPT

## 2022-01-01 PROCEDURE — 80048 BASIC METABOLIC PNL TOTAL CA: CPT | Performed by: INTERNAL MEDICINE

## 2022-01-01 PROCEDURE — 210N000001 HC R&B IMCU HEART CARE

## 2022-01-01 PROCEDURE — 97530 THERAPEUTIC ACTIVITIES: CPT | Mod: GP

## 2022-01-01 PROCEDURE — 250N000011 HC RX IP 250 OP 636: Performed by: INTERNAL MEDICINE

## 2022-01-01 PROCEDURE — 83880 ASSAY OF NATRIURETIC PEPTIDE: CPT | Performed by: EMERGENCY MEDICINE

## 2022-01-01 PROCEDURE — 96372 THER/PROPH/DIAG INJ SC/IM: CPT | Performed by: INTERNAL MEDICINE

## 2022-01-01 PROCEDURE — 99232 SBSQ HOSP IP/OBS MODERATE 35: CPT | Performed by: INTERNAL MEDICINE

## 2022-01-01 PROCEDURE — P9604 ONE-WAY ALLOW PRORATED TRIP: HCPCS | Mod: ORL | Performed by: NURSE PRACTITIONER

## 2022-01-01 PROCEDURE — 250N000009 HC RX 250: Performed by: HOSPITALIST

## 2022-01-01 PROCEDURE — 80053 COMPREHEN METABOLIC PANEL: CPT | Performed by: EMERGENCY MEDICINE

## 2022-01-01 PROCEDURE — 36415 COLL VENOUS BLD VENIPUNCTURE: CPT | Performed by: EMERGENCY MEDICINE

## 2022-01-01 PROCEDURE — 99214 OFFICE O/P EST MOD 30 MIN: CPT | Performed by: NURSE PRACTITIONER

## 2022-01-01 PROCEDURE — 99214 OFFICE O/P EST MOD 30 MIN: CPT | Performed by: INTERNAL MEDICINE

## 2022-01-01 PROCEDURE — 97116 GAIT TRAINING THERAPY: CPT | Mod: GP

## 2022-01-01 PROCEDURE — 97130 THER IVNTJ EA ADDL 15 MIN: CPT | Mod: GO

## 2022-01-01 PROCEDURE — 99233 SBSQ HOSP IP/OBS HIGH 50: CPT | Performed by: INTERNAL MEDICINE

## 2022-01-01 PROCEDURE — 99214 OFFICE O/P EST MOD 30 MIN: CPT | Performed by: HOSPITALIST

## 2022-01-01 PROCEDURE — 80048 BASIC METABOLIC PNL TOTAL CA: CPT | Mod: ORL | Performed by: NURSE PRACTITIONER

## 2022-01-01 PROCEDURE — 250N000013 HC RX MED GY IP 250 OP 250 PS 637: Performed by: HOSPITALIST

## 2022-01-01 PROCEDURE — 97110 THERAPEUTIC EXERCISES: CPT | Mod: GP | Performed by: PHYSICAL THERAPIST

## 2022-01-01 PROCEDURE — 82310 ASSAY OF CALCIUM: CPT | Performed by: EMERGENCY MEDICINE

## 2022-01-01 PROCEDURE — 99442 PR PHYSICIAN TELEPHONE EVALUATION 11-20 MIN: CPT | Performed by: NURSE PRACTITIONER

## 2022-01-01 PROCEDURE — 97530 THERAPEUTIC ACTIVITIES: CPT | Mod: GP | Performed by: PHYSICAL THERAPIST

## 2022-01-01 PROCEDURE — 85049 AUTOMATED PLATELET COUNT: CPT | Performed by: INTERNAL MEDICINE

## 2022-01-01 PROCEDURE — 99310 SBSQ NF CARE HIGH MDM 45: CPT | Performed by: NURSE PRACTITIONER

## 2022-01-01 PROCEDURE — 258N000003 HC RX IP 258 OP 636: Performed by: INTERNAL MEDICINE

## 2022-01-01 PROCEDURE — 84460 ALANINE AMINO (ALT) (SGPT): CPT | Performed by: INTERNAL MEDICINE

## 2022-01-01 PROCEDURE — 71045 X-RAY EXAM CHEST 1 VIEW: CPT

## 2022-01-01 PROCEDURE — 93306 TTE W/DOPPLER COMPLETE: CPT

## 2022-01-01 PROCEDURE — 80061 LIPID PANEL: CPT | Performed by: INTERNAL MEDICINE

## 2022-01-01 PROCEDURE — 83735 ASSAY OF MAGNESIUM: CPT | Performed by: INTERNAL MEDICINE

## 2022-01-01 PROCEDURE — 99220 PR INITIAL OBSERVATION CARE,LEVEL III: CPT | Performed by: INTERNAL MEDICINE

## 2022-01-01 PROCEDURE — 93005 ELECTROCARDIOGRAM TRACING: CPT | Performed by: EMERGENCY MEDICINE

## 2022-01-01 PROCEDURE — 85025 COMPLETE CBC W/AUTO DIFF WBC: CPT | Performed by: FAMILY MEDICINE

## 2022-01-01 PROCEDURE — 97110 THERAPEUTIC EXERCISES: CPT | Mod: GP

## 2022-01-01 PROCEDURE — 250N000012 HC RX MED GY IP 250 OP 636 PS 637: Performed by: HOSPITALIST

## 2022-01-01 PROCEDURE — 93005 ELECTROCARDIOGRAM TRACING: CPT | Performed by: STUDENT IN AN ORGANIZED HEALTH CARE EDUCATION/TRAINING PROGRAM

## 2022-01-01 PROCEDURE — 80048 BASIC METABOLIC PNL TOTAL CA: CPT | Mod: ORL | Performed by: PHYSICIAN ASSISTANT

## 2022-01-01 PROCEDURE — 97110 THERAPEUTIC EXERCISES: CPT | Mod: GO

## 2022-01-01 PROCEDURE — 96361 HYDRATE IV INFUSION ADD-ON: CPT

## 2022-01-01 PROCEDURE — 87635 SARS-COV-2 COVID-19 AMP PRB: CPT | Performed by: EMERGENCY MEDICINE

## 2022-01-01 PROCEDURE — 36415 COLL VENOUS BLD VENIPUNCTURE: CPT | Performed by: HOSPITALIST

## 2022-01-01 PROCEDURE — 84484 ASSAY OF TROPONIN QUANT: CPT | Performed by: INTERNAL MEDICINE

## 2022-01-01 PROCEDURE — 93010 ELECTROCARDIOGRAM REPORT: CPT | Performed by: INTERNAL MEDICINE

## 2022-01-01 PROCEDURE — 258N000003 HC RX IP 258 OP 636: Performed by: EMERGENCY MEDICINE

## 2022-01-01 PROCEDURE — 82607 VITAMIN B-12: CPT | Mod: ORL | Performed by: INTERNAL MEDICINE

## 2022-01-01 PROCEDURE — 82040 ASSAY OF SERUM ALBUMIN: CPT | Performed by: EMERGENCY MEDICINE

## 2022-01-01 PROCEDURE — 250N000009 HC RX 250: Performed by: EMERGENCY MEDICINE

## 2022-01-01 PROCEDURE — 82565 ASSAY OF CREATININE: CPT | Performed by: INTERNAL MEDICINE

## 2022-01-01 PROCEDURE — 82310 ASSAY OF CALCIUM: CPT | Performed by: HOSPITALIST

## 2022-01-01 PROCEDURE — 80048 BASIC METABOLIC PNL TOTAL CA: CPT | Performed by: NURSE PRACTITIONER

## 2022-01-01 PROCEDURE — 99215 OFFICE O/P EST HI 40 MIN: CPT | Performed by: FAMILY MEDICINE

## 2022-01-01 PROCEDURE — 83880 ASSAY OF NATRIURETIC PEPTIDE: CPT | Performed by: INTERNAL MEDICINE

## 2022-01-01 PROCEDURE — 80053 COMPREHEN METABOLIC PANEL: CPT | Mod: ORL | Performed by: INTERNAL MEDICINE

## 2022-01-01 PROCEDURE — 87636 SARSCOV2 & INF A&B AMP PRB: CPT | Performed by: EMERGENCY MEDICINE

## 2022-01-01 PROCEDURE — 87493 C DIFF AMPLIFIED PROBE: CPT | Mod: 59 | Performed by: FAMILY MEDICINE

## 2022-01-01 PROCEDURE — 250N000009 HC RX 250: Performed by: INTERNAL MEDICINE

## 2022-01-01 PROCEDURE — 36415 COLL VENOUS BLD VENIPUNCTURE: CPT | Mod: ORL | Performed by: PHYSICIAN ASSISTANT

## 2022-01-01 PROCEDURE — 85004 AUTOMATED DIFF WBC COUNT: CPT | Performed by: EMERGENCY MEDICINE

## 2022-01-01 PROCEDURE — P9604 ONE-WAY ALLOW PRORATED TRIP: HCPCS | Mod: ORL | Performed by: PHYSICIAN ASSISTANT

## 2022-01-01 PROCEDURE — 85379 FIBRIN DEGRADATION QUANT: CPT | Performed by: INTERNAL MEDICINE

## 2022-01-01 PROCEDURE — 82550 ASSAY OF CK (CPK): CPT | Performed by: INTERNAL MEDICINE

## 2022-01-01 PROCEDURE — 99214 OFFICE O/P EST MOD 30 MIN: CPT | Mod: 95 | Performed by: INTERNAL MEDICINE

## 2022-01-01 PROCEDURE — 999N000157 HC STATISTIC RCP TIME EA 10 MIN

## 2022-01-01 PROCEDURE — 250N000011 HC RX IP 250 OP 636: Performed by: EMERGENCY MEDICINE

## 2022-01-01 PROCEDURE — 255N000002 HC RX 255 OP 636: Performed by: FAMILY MEDICINE

## 2022-01-01 PROCEDURE — 99233 SBSQ HOSP IP/OBS HIGH 50: CPT | Performed by: EMERGENCY MEDICINE

## 2022-01-01 PROCEDURE — 72131 CT LUMBAR SPINE W/O DYE: CPT

## 2022-01-01 PROCEDURE — 85025 COMPLETE CBC W/AUTO DIFF WBC: CPT | Performed by: EMERGENCY MEDICINE

## 2022-01-01 PROCEDURE — 80053 COMPREHEN METABOLIC PANEL: CPT | Performed by: FAMILY MEDICINE

## 2022-01-01 PROCEDURE — 93325 DOPPLER ECHO COLOR FLOW MAPG: CPT | Mod: 26 | Performed by: INTERNAL MEDICINE

## 2022-01-01 PROCEDURE — 99239 HOSP IP/OBS DSCHRG MGMT >30: CPT | Performed by: STUDENT IN AN ORGANIZED HEALTH CARE EDUCATION/TRAINING PROGRAM

## 2022-01-01 PROCEDURE — 82306 VITAMIN D 25 HYDROXY: CPT | Performed by: FAMILY MEDICINE

## 2022-01-01 PROCEDURE — 99225 PR SUBSEQUENT OBSERVATION CARE,LEVEL II: CPT | Performed by: HOSPITALIST

## 2022-01-01 PROCEDURE — 96372 THER/PROPH/DIAG INJ SC/IM: CPT | Mod: XU | Performed by: INTERNAL MEDICINE

## 2022-01-01 PROCEDURE — 82310 ASSAY OF CALCIUM: CPT | Performed by: INTERNAL MEDICINE

## 2022-01-01 PROCEDURE — 93321 DOPPLER ECHO F-UP/LMTD STD: CPT

## 2022-01-01 PROCEDURE — 36415 COLL VENOUS BLD VENIPUNCTURE: CPT | Performed by: FAMILY MEDICINE

## 2022-01-01 PROCEDURE — 72072 X-RAY EXAM THORAC SPINE 3VWS: CPT

## 2022-01-01 PROCEDURE — 99217 PR OBSERVATION CARE DISCHARGE: CPT | Performed by: HOSPITALIST

## 2022-01-01 PROCEDURE — 85610 PROTHROMBIN TIME: CPT | Performed by: EMERGENCY MEDICINE

## 2022-01-01 PROCEDURE — 96376 TX/PRO/DX INJ SAME DRUG ADON: CPT

## 2022-01-01 PROCEDURE — 36415 COLL VENOUS BLD VENIPUNCTURE: CPT | Mod: ORL | Performed by: INTERNAL MEDICINE

## 2022-01-01 PROCEDURE — 83605 ASSAY OF LACTIC ACID: CPT | Performed by: EMERGENCY MEDICINE

## 2022-01-01 PROCEDURE — 93321 DOPPLER ECHO F-UP/LMTD STD: CPT | Mod: 26 | Performed by: INTERNAL MEDICINE

## 2022-01-01 PROCEDURE — 97165 OT EVAL LOW COMPLEX 30 MIN: CPT | Mod: GO

## 2022-01-01 PROCEDURE — 85027 COMPLETE CBC AUTOMATED: CPT | Performed by: INTERNAL MEDICINE

## 2022-01-01 PROCEDURE — 80048 BASIC METABOLIC PNL TOTAL CA: CPT | Performed by: EMERGENCY MEDICINE

## 2022-01-01 PROCEDURE — 93308 TTE F-UP OR LMTD: CPT | Mod: 26 | Performed by: INTERNAL MEDICINE

## 2022-01-01 PROCEDURE — 72192 CT PELVIS W/O DYE: CPT

## 2022-01-01 PROCEDURE — 85014 HEMATOCRIT: CPT | Performed by: INTERNAL MEDICINE

## 2022-01-01 PROCEDURE — 85048 AUTOMATED LEUKOCYTE COUNT: CPT | Performed by: INTERNAL MEDICINE

## 2022-01-01 PROCEDURE — 87506 IADNA-DNA/RNA PROBE TQ 6-11: CPT | Performed by: FAMILY MEDICINE

## 2022-01-01 PROCEDURE — 250N000013 HC RX MED GY IP 250 OP 250 PS 637: Performed by: EMERGENCY MEDICINE

## 2022-01-01 PROCEDURE — 84484 ASSAY OF TROPONIN QUANT: CPT | Performed by: EMERGENCY MEDICINE

## 2022-01-01 PROCEDURE — 93005 ELECTROCARDIOGRAM TRACING: CPT

## 2022-01-01 PROCEDURE — 99305 1ST NF CARE MODERATE MDM 35: CPT | Performed by: FAMILY MEDICINE

## 2022-01-01 PROCEDURE — 99207 PR NO BILLABLE SERVICE THIS VISIT: CPT | Performed by: FAMILY MEDICINE

## 2022-01-01 PROCEDURE — 97116 GAIT TRAINING THERAPY: CPT | Mod: GP | Performed by: PHYSICAL THERAPIST

## 2022-01-01 PROCEDURE — 85027 COMPLETE CBC AUTOMATED: CPT | Performed by: NURSE PRACTITIONER

## 2022-01-01 PROCEDURE — 96374 THER/PROPH/DIAG INJ IV PUSH: CPT | Mod: XU

## 2022-01-01 PROCEDURE — 93306 TTE W/DOPPLER COMPLETE: CPT | Mod: 26 | Performed by: INTERNAL MEDICINE

## 2022-01-01 PROCEDURE — 120N000001 HC R&B MED SURG/OB

## 2022-01-01 PROCEDURE — 71046 X-RAY EXAM CHEST 2 VIEWS: CPT

## 2022-01-01 PROCEDURE — 97162 PT EVAL MOD COMPLEX 30 MIN: CPT | Mod: GP

## 2022-01-01 PROCEDURE — 85025 COMPLETE CBC W/AUTO DIFF WBC: CPT | Mod: ORL | Performed by: INTERNAL MEDICINE

## 2022-01-01 PROCEDURE — 80053 COMPREHEN METABOLIC PANEL: CPT | Performed by: INTERNAL MEDICINE

## 2022-01-01 PROCEDURE — 83690 ASSAY OF LIPASE: CPT | Performed by: EMERGENCY MEDICINE

## 2022-01-01 PROCEDURE — C9803 HOPD COVID-19 SPEC COLLECT: HCPCS

## 2022-01-01 PROCEDURE — 84443 ASSAY THYROID STIM HORMONE: CPT | Mod: ORL | Performed by: INTERNAL MEDICINE

## 2022-01-01 PROCEDURE — 97166 OT EVAL MOD COMPLEX 45 MIN: CPT | Mod: GO

## 2022-01-01 PROCEDURE — 84443 ASSAY THYROID STIM HORMONE: CPT | Performed by: FAMILY MEDICINE

## 2022-01-01 PROCEDURE — 93005 ELECTROCARDIOGRAM TRACING: CPT | Mod: 76

## 2022-01-01 PROCEDURE — 82962 GLUCOSE BLOOD TEST: CPT

## 2022-01-01 PROCEDURE — 96374 THER/PROPH/DIAG INJ IV PUSH: CPT

## 2022-01-01 PROCEDURE — 36415 COLL VENOUS BLD VENIPUNCTURE: CPT | Mod: ORL | Performed by: NURSE PRACTITIONER

## 2022-01-01 PROCEDURE — 71275 CT ANGIOGRAPHY CHEST: CPT

## 2022-01-01 PROCEDURE — 99285 EMERGENCY DEPT VISIT HI MDM: CPT | Mod: 25

## 2022-01-01 PROCEDURE — 72128 CT CHEST SPINE W/O DYE: CPT

## 2022-01-01 PROCEDURE — P9604 ONE-WAY ALLOW PRORATED TRIP: HCPCS | Mod: ORL | Performed by: INTERNAL MEDICINE

## 2022-01-01 PROCEDURE — 99205 OFFICE O/P NEW HI 60 MIN: CPT | Performed by: NURSE PRACTITIONER

## 2022-01-01 PROCEDURE — 87635 SARS-COV-2 COVID-19 AMP PRB: CPT | Performed by: INTERNAL MEDICINE

## 2022-01-01 PROCEDURE — 99223 1ST HOSP IP/OBS HIGH 75: CPT | Mod: 25 | Performed by: INTERNAL MEDICINE

## 2022-01-01 PROCEDURE — 96360 HYDRATION IV INFUSION INIT: CPT

## 2022-01-01 PROCEDURE — 87635 SARS-COV-2 COVID-19 AMP PRB: CPT | Performed by: HOSPITALIST

## 2022-01-01 PROCEDURE — P9604 ONE-WAY ALLOW PRORATED TRIP: HCPCS | Performed by: NURSE PRACTITIONER

## 2022-01-01 PROCEDURE — 72070 X-RAY EXAM THORAC SPINE 2VWS: CPT

## 2022-01-01 PROCEDURE — 84132 ASSAY OF SERUM POTASSIUM: CPT | Performed by: INTERNAL MEDICINE

## 2022-01-01 PROCEDURE — 99207 PR CDG-CODE CATEGORY CHANGED: CPT | Performed by: HOSPITALIST

## 2022-01-01 RX ORDER — FERROUS GLUCONATE 324(38)MG
324 TABLET ORAL
Qty: 90 TABLET | Refills: 0 | Status: SHIPPED | OUTPATIENT
Start: 2022-01-01 | End: 2022-01-01

## 2022-01-01 RX ORDER — FUROSEMIDE 40 MG
20 TABLET ORAL DAILY
Qty: 7 TABLET | Refills: 0 | Status: SHIPPED | OUTPATIENT
Start: 2022-01-01 | End: 2022-01-01 | Stop reason: DRUGHIGH

## 2022-01-01 RX ORDER — LEVOTHYROXINE SODIUM 100 UG/1
CAPSULE ORAL
COMMUNITY
Start: 2022-01-01 | End: 2022-01-01

## 2022-01-01 RX ORDER — DONEPEZIL HYDROCHLORIDE 5 MG/1
10 TABLET, FILM COATED ORAL AT BEDTIME
Status: DISCONTINUED | OUTPATIENT
Start: 2022-01-01 | End: 2022-01-01 | Stop reason: HOSPADM

## 2022-01-01 RX ORDER — DOCUSATE SODIUM 100 MG/1
100 CAPSULE, LIQUID FILLED ORAL 2 TIMES DAILY PRN
Status: DISCONTINUED | OUTPATIENT
Start: 2022-01-01 | End: 2022-01-01 | Stop reason: HOSPADM

## 2022-01-01 RX ORDER — METOPROLOL SUCCINATE 25 MG/1
50 TABLET, EXTENDED RELEASE ORAL DAILY
Status: DISCONTINUED | OUTPATIENT
Start: 2022-01-01 | End: 2022-01-01 | Stop reason: HOSPADM

## 2022-01-01 RX ORDER — NALOXONE HYDROCHLORIDE 0.4 MG/ML
0.4 INJECTION, SOLUTION INTRAMUSCULAR; INTRAVENOUS; SUBCUTANEOUS
Status: DISCONTINUED | OUTPATIENT
Start: 2022-01-01 | End: 2022-01-01 | Stop reason: HOSPADM

## 2022-01-01 RX ORDER — ACETAMINOPHEN 325 MG/1
975 TABLET ORAL 3 TIMES DAILY PRN
Start: 2022-01-01 | End: 2022-01-01

## 2022-01-01 RX ORDER — FUROSEMIDE 10 MG/ML
20 INJECTION INTRAMUSCULAR; INTRAVENOUS ONCE
Status: DISCONTINUED | OUTPATIENT
Start: 2022-01-01 | End: 2022-01-01

## 2022-01-01 RX ORDER — NALOXONE HYDROCHLORIDE 0.4 MG/ML
0.2 INJECTION, SOLUTION INTRAMUSCULAR; INTRAVENOUS; SUBCUTANEOUS
Status: DISCONTINUED | OUTPATIENT
Start: 2022-01-01 | End: 2022-01-01 | Stop reason: HOSPADM

## 2022-01-01 RX ORDER — DILTIAZEM HYDROCHLORIDE 180 MG/1
180 CAPSULE, COATED, EXTENDED RELEASE ORAL DAILY
Status: DISCONTINUED | OUTPATIENT
Start: 2022-01-01 | End: 2022-01-01 | Stop reason: HOSPADM

## 2022-01-01 RX ORDER — DOCUSATE SODIUM 50MG AND SENNOSIDES 8.6MG 8.6; 5 MG/1; MG/1
TABLET, FILM COATED ORAL
COMMUNITY
Start: 2022-01-01

## 2022-01-01 RX ORDER — LEVOTHYROXINE SODIUM 100 UG/1
100 TABLET ORAL DAILY
Status: DISCONTINUED | OUTPATIENT
Start: 2022-01-01 | End: 2022-01-01 | Stop reason: HOSPADM

## 2022-01-01 RX ORDER — POLYETHYLENE GLYCOL 3350 17 G/17G
17 POWDER, FOR SOLUTION ORAL DAILY
Status: DISCONTINUED | OUTPATIENT
Start: 2022-01-01 | End: 2022-01-01 | Stop reason: HOSPADM

## 2022-01-01 RX ORDER — FERROUS SULFATE 325(65) MG
TABLET ORAL
COMMUNITY
Start: 2022-01-01 | End: 2022-01-01

## 2022-01-01 RX ORDER — VITAMIN B COMPLEX
50 TABLET ORAL DAILY
Status: DISCONTINUED | OUTPATIENT
Start: 2022-01-01 | End: 2022-01-01 | Stop reason: HOSPADM

## 2022-01-01 RX ORDER — ISOSORBIDE MONONITRATE 30 MG/1
30 TABLET, EXTENDED RELEASE ORAL DAILY
Status: DISCONTINUED | OUTPATIENT
Start: 2022-01-01 | End: 2022-01-01 | Stop reason: HOSPADM

## 2022-01-01 RX ORDER — LISINOPRIL 2.5 MG/1
2.5 TABLET ORAL DAILY
Qty: 90 TABLET | Refills: 0 | Status: SHIPPED | OUTPATIENT
Start: 2022-01-01 | End: 2022-01-01

## 2022-01-01 RX ORDER — LIDOCAINE 50 MG/G
OINTMENT TOPICAL 4 TIMES DAILY PRN
Start: 2022-01-01 | End: 2022-01-01

## 2022-01-01 RX ORDER — LIDOCAINE 40 MG/G
CREAM TOPICAL
Status: DISCONTINUED | OUTPATIENT
Start: 2022-01-01 | End: 2022-01-01 | Stop reason: HOSPADM

## 2022-01-01 RX ORDER — TRAMADOL HYDROCHLORIDE 50 MG/1
25 TABLET ORAL 2 TIMES DAILY PRN
Qty: 30 TABLET | Refills: 0 | Status: SHIPPED | OUTPATIENT
Start: 2022-01-01 | End: 2022-01-01

## 2022-01-01 RX ORDER — MAGNESIUM OXIDE 400 MG/1
400 TABLET ORAL 2 TIMES DAILY
Status: DISCONTINUED | OUTPATIENT
Start: 2022-01-01 | End: 2022-01-01

## 2022-01-01 RX ORDER — LISINOPRIL 20 MG/1
TABLET ORAL
COMMUNITY
Start: 2022-01-01 | End: 2023-04-07

## 2022-01-01 RX ORDER — TRAMADOL HYDROCHLORIDE 50 MG/1
25 TABLET ORAL EVERY 4 HOURS PRN
Qty: 5 TABLET | Refills: 0 | Status: SHIPPED | OUTPATIENT
Start: 2022-01-01 | End: 2022-01-01

## 2022-01-01 RX ORDER — ONDANSETRON 2 MG/ML
4 INJECTION INTRAMUSCULAR; INTRAVENOUS EVERY 6 HOURS PRN
Status: DISCONTINUED | OUTPATIENT
Start: 2022-01-01 | End: 2022-01-01 | Stop reason: HOSPADM

## 2022-01-01 RX ORDER — DILTIAZEM HYDROCHLORIDE 180 MG/1
180 CAPSULE, COATED, EXTENDED RELEASE ORAL DAILY
Qty: 30 CAPSULE | Refills: 3 | Status: SHIPPED | OUTPATIENT
Start: 2022-01-01

## 2022-01-01 RX ORDER — ATORVASTATIN CALCIUM 40 MG/1
40 TABLET, FILM COATED ORAL DAILY
Qty: 90 TABLET | Refills: 0 | Status: SHIPPED | OUTPATIENT
Start: 2022-01-01 | End: 2022-01-01

## 2022-01-01 RX ORDER — LOPERAMIDE HCL 2 MG
2 CAPSULE ORAL 4 TIMES DAILY PRN
Status: DISCONTINUED | OUTPATIENT
Start: 2022-01-01 | End: 2022-01-01 | Stop reason: HOSPADM

## 2022-01-01 RX ORDER — LISINOPRIL 2.5 MG/1
2.5 TABLET ORAL DAILY
Start: 2022-01-01 | End: 2022-01-01

## 2022-01-01 RX ORDER — ONDANSETRON 4 MG/1
4 TABLET, ORALLY DISINTEGRATING ORAL EVERY 6 HOURS PRN
Status: DISCONTINUED | OUTPATIENT
Start: 2022-01-01 | End: 2022-01-01

## 2022-01-01 RX ORDER — FUROSEMIDE 20 MG
20 TABLET ORAL 2 TIMES DAILY
Qty: 180 TABLET | Refills: 3 | Status: SHIPPED | OUTPATIENT
Start: 2022-01-01 | End: 2022-01-01

## 2022-01-01 RX ORDER — MAGNESIUM OXIDE 400 MG/1
400 TABLET ORAL 2 TIMES DAILY
Status: COMPLETED | OUTPATIENT
Start: 2022-01-01 | End: 2022-01-01

## 2022-01-01 RX ORDER — FUROSEMIDE 40 MG
TABLET ORAL
COMMUNITY
Start: 2022-01-01

## 2022-01-01 RX ORDER — FUROSEMIDE 20 MG
40 TABLET ORAL DAILY
Status: DISCONTINUED | OUTPATIENT
Start: 2022-01-01 | End: 2022-01-01

## 2022-01-01 RX ORDER — METHOTREXATE SODIUM/PF 25 MG/ML
VIAL (ML) INJECTION
COMMUNITY
Start: 2022-01-01

## 2022-01-01 RX ORDER — TRAMADOL HYDROCHLORIDE 50 MG/1
25 TABLET ORAL 2 TIMES DAILY PRN
Qty: 10 TABLET | Refills: 0 | Status: SHIPPED | OUTPATIENT
Start: 2022-01-01

## 2022-01-01 RX ORDER — ATORVASTATIN CALCIUM 40 MG/1
40 TABLET, FILM COATED ORAL DAILY
Qty: 90 TABLET | Refills: 3 | Status: SHIPPED | OUTPATIENT
Start: 2022-01-01

## 2022-01-01 RX ORDER — ACETAMINOPHEN 325 MG/1
975 TABLET ORAL 3 TIMES DAILY
Status: DISCONTINUED | OUTPATIENT
Start: 2022-01-01 | End: 2022-01-01 | Stop reason: HOSPADM

## 2022-01-01 RX ORDER — FUROSEMIDE 10 MG/ML
40 INJECTION INTRAMUSCULAR; INTRAVENOUS EVERY 12 HOURS
Status: DISCONTINUED | OUTPATIENT
Start: 2022-01-01 | End: 2022-01-01

## 2022-01-01 RX ORDER — LOPERAMIDE HYDROCHLORIDE 2 MG/1
2 TABLET ORAL 4 TIMES DAILY PRN
Qty: 60 TABLET | Refills: 1 | Status: SHIPPED | OUTPATIENT
Start: 2022-01-01 | End: 2022-01-01

## 2022-01-01 RX ORDER — ACETAMINOPHEN 325 MG/1
975 TABLET ORAL ONCE
Status: COMPLETED | OUTPATIENT
Start: 2022-01-01 | End: 2022-01-01

## 2022-01-01 RX ORDER — NITROGLYCERIN 0.4 MG/1
0.4 TABLET SUBLINGUAL EVERY 5 MIN PRN
COMMUNITY

## 2022-01-01 RX ORDER — ATORVASTATIN CALCIUM 40 MG/1
40 TABLET, FILM COATED ORAL DAILY
Status: DISCONTINUED | OUTPATIENT
Start: 2022-01-01 | End: 2022-01-01 | Stop reason: HOSPADM

## 2022-01-01 RX ORDER — FERROUS GLUCONATE 324(38)MG
324 TABLET ORAL
Status: DISCONTINUED | OUTPATIENT
Start: 2022-01-01 | End: 2022-01-01 | Stop reason: CLARIF

## 2022-01-01 RX ORDER — FOLIC ACID 1 MG/1
TABLET ORAL
Qty: 90 TABLET | Refills: 3 | OUTPATIENT
Start: 2022-01-01

## 2022-01-01 RX ORDER — MAGNESIUM SULFATE HEPTAHYDRATE 40 MG/ML
2 INJECTION, SOLUTION INTRAVENOUS ONCE
Status: COMPLETED | OUTPATIENT
Start: 2022-01-01 | End: 2022-01-01

## 2022-01-01 RX ORDER — IOPAMIDOL 755 MG/ML
100 INJECTION, SOLUTION INTRAVASCULAR ONCE
Status: COMPLETED | OUTPATIENT
Start: 2022-01-01 | End: 2022-01-01

## 2022-01-01 RX ORDER — LIDOCAINE 50 MG/G
OINTMENT TOPICAL 4 TIMES DAILY
Status: DISCONTINUED | OUTPATIENT
Start: 2022-01-01 | End: 2022-01-01 | Stop reason: HOSPADM

## 2022-01-01 RX ORDER — FOLIC ACID 1 MG/1
TABLET ORAL
Qty: 90 TABLET | Refills: 0 | Status: SHIPPED | OUTPATIENT
Start: 2022-01-01 | End: 2022-01-01

## 2022-01-01 RX ORDER — TRAMADOL HYDROCHLORIDE 50 MG/1
50 TABLET ORAL EVERY 4 HOURS PRN
Status: ON HOLD | COMMUNITY
End: 2022-01-01

## 2022-01-01 RX ORDER — ONDANSETRON 4 MG/1
4 TABLET, ORALLY DISINTEGRATING ORAL EVERY 6 HOURS PRN
Status: DISCONTINUED | OUTPATIENT
Start: 2022-01-01 | End: 2022-01-01 | Stop reason: HOSPADM

## 2022-01-01 RX ORDER — OMEPRAZOLE 40 MG/1
40 CAPSULE, DELAYED RELEASE ORAL DAILY
Qty: 60 CAPSULE | Refills: 3 | Status: SHIPPED | OUTPATIENT
Start: 2022-01-01

## 2022-01-01 RX ORDER — FUROSEMIDE 20 MG
20 TABLET ORAL DAILY
Qty: 90 TABLET | Refills: 1 | Status: SHIPPED | OUTPATIENT
Start: 2022-01-01 | End: 2022-01-01

## 2022-01-01 RX ORDER — OMEPRAZOLE 40 MG/1
40 CAPSULE, DELAYED RELEASE ORAL DAILY
Qty: 60 CAPSULE | Refills: 3 | Status: SHIPPED | OUTPATIENT
Start: 2022-01-01 | End: 2022-01-01

## 2022-01-01 RX ORDER — LISINOPRIL 20 MG/1
20 TABLET ORAL DAILY
Qty: 90 TABLET | Refills: 0 | Status: ON HOLD | OUTPATIENT
Start: 2022-01-01 | End: 2022-01-01

## 2022-01-01 RX ORDER — HYDRALAZINE HYDROCHLORIDE 20 MG/ML
10 INJECTION INTRAMUSCULAR; INTRAVENOUS EVERY 6 HOURS PRN
Status: DISCONTINUED | OUTPATIENT
Start: 2022-01-01 | End: 2022-01-01 | Stop reason: HOSPADM

## 2022-01-01 RX ORDER — DILTIAZEM HYDROCHLORIDE 5 MG/ML
10 INJECTION INTRAVENOUS ONCE
Status: COMPLETED | OUTPATIENT
Start: 2022-01-01 | End: 2022-01-01

## 2022-01-01 RX ORDER — FUROSEMIDE 20 MG
TABLET ORAL
Qty: 30 TABLET | Refills: 1 | Status: SHIPPED | OUTPATIENT
Start: 2022-01-01 | End: 2022-01-01

## 2022-01-01 RX ORDER — FOLIC ACID 1 MG/1
1 TABLET ORAL DAILY
COMMUNITY
Start: 2022-01-01 | End: 2022-01-01

## 2022-01-01 RX ORDER — PANTOPRAZOLE SODIUM 40 MG/1
40 TABLET, DELAYED RELEASE ORAL
Status: DISCONTINUED | OUTPATIENT
Start: 2022-01-01 | End: 2022-01-01 | Stop reason: HOSPADM

## 2022-01-01 RX ORDER — FUROSEMIDE 40 MG
40 TABLET ORAL 2 TIMES DAILY
Qty: 60 TABLET | Refills: 3 | Status: SHIPPED | OUTPATIENT
Start: 2022-01-01 | End: 2022-01-01

## 2022-01-01 RX ORDER — OXYCODONE HYDROCHLORIDE 5 MG/1
5 TABLET ORAL ONCE
Status: COMPLETED | OUTPATIENT
Start: 2022-01-01 | End: 2022-01-01

## 2022-01-01 RX ORDER — ADENOSINE 3 MG/ML
6 INJECTION, SOLUTION INTRAVENOUS ONCE
Status: COMPLETED | OUTPATIENT
Start: 2022-01-01 | End: 2022-01-01

## 2022-01-01 RX ORDER — ACETAMINOPHEN 325 MG/1
975 TABLET ORAL 3 TIMES DAILY PRN
Status: DISCONTINUED | OUTPATIENT
Start: 2022-01-01 | End: 2022-01-01 | Stop reason: HOSPADM

## 2022-01-01 RX ORDER — BISACODYL 10 MG
10 SUPPOSITORY, RECTAL RECTAL DAILY PRN
Status: DISCONTINUED | OUTPATIENT
Start: 2022-01-01 | End: 2022-01-01 | Stop reason: HOSPADM

## 2022-01-01 RX ORDER — DILTIAZEM HYDROCHLORIDE 120 MG/1
CAPSULE, EXTENDED RELEASE ORAL
COMMUNITY
Start: 2022-01-01 | End: 2022-01-01

## 2022-01-01 RX ORDER — ANTIOX #8/OM3/DHA/EPA/LUT/ZEAX 250-2.5 MG
CAPSULE ORAL
COMMUNITY
Start: 2022-01-01 | End: 2022-01-01

## 2022-01-01 RX ORDER — POLYETHYLENE GLYCOL 3350 17 G/17G
POWDER, FOR SOLUTION ORAL
COMMUNITY
Start: 2022-01-01 | End: 2022-01-01

## 2022-01-01 RX ORDER — METHOTREXATE 2.5 MG/1
3 TABLET ORAL WEEKLY
Status: DISCONTINUED | OUTPATIENT
Start: 2022-01-01 | End: 2022-01-01 | Stop reason: HOSPADM

## 2022-01-01 RX ORDER — METOPROLOL SUCCINATE 50 MG/1
50 TABLET, EXTENDED RELEASE ORAL DAILY
COMMUNITY

## 2022-01-01 RX ORDER — FUROSEMIDE 20 MG
40 TABLET ORAL
Status: DISCONTINUED | OUTPATIENT
Start: 2022-01-01 | End: 2022-01-01 | Stop reason: HOSPADM

## 2022-01-01 RX ORDER — ISOSORBIDE MONONITRATE 30 MG/1
30 TABLET, EXTENDED RELEASE ORAL DAILY
Qty: 90 TABLET | Refills: 1 | Status: SHIPPED | OUTPATIENT
Start: 2022-01-01

## 2022-01-01 RX ORDER — LISINOPRIL 2.5 MG/1
2.5 TABLET ORAL DAILY
Status: DISCONTINUED | OUTPATIENT
Start: 2022-01-01 | End: 2022-01-01 | Stop reason: HOSPADM

## 2022-01-01 RX ORDER — FOLIC ACID 1 MG/1
1000 TABLET ORAL DAILY
Status: DISCONTINUED | OUTPATIENT
Start: 2022-01-01 | End: 2022-01-01 | Stop reason: HOSPADM

## 2022-01-01 RX ORDER — PANTOPRAZOLE SODIUM 40 MG/1
TABLET, DELAYED RELEASE ORAL
COMMUNITY
Start: 2022-01-01

## 2022-01-01 RX ORDER — HYDROCODONE BITARTRATE AND ACETAMINOPHEN 5; 325 MG/1; MG/1
1 TABLET ORAL EVERY 4 HOURS PRN
Status: ON HOLD | COMMUNITY
End: 2022-01-01

## 2022-01-01 RX ORDER — FUROSEMIDE 10 MG/ML
40 INJECTION INTRAMUSCULAR; INTRAVENOUS ONCE
Status: COMPLETED | OUTPATIENT
Start: 2022-01-01 | End: 2022-01-01

## 2022-01-01 RX ORDER — FERROUS SULFATE 325(65) MG
325 TABLET ORAL
Status: DISCONTINUED | OUTPATIENT
Start: 2022-01-01 | End: 2022-01-01 | Stop reason: HOSPADM

## 2022-01-01 RX ORDER — FUROSEMIDE 10 MG/ML
20 INJECTION INTRAMUSCULAR; INTRAVENOUS EVERY 12 HOURS
Status: DISCONTINUED | OUTPATIENT
Start: 2022-01-01 | End: 2022-01-01

## 2022-01-01 RX ORDER — DILTIAZEM HYDROCHLORIDE 180 MG/1
CAPSULE, EXTENDED RELEASE ORAL
COMMUNITY
Start: 2022-01-01 | End: 2022-01-01

## 2022-01-01 RX ORDER — FUROSEMIDE 40 MG
40 TABLET ORAL DAILY
Status: ON HOLD | COMMUNITY
End: 2022-01-01

## 2022-01-01 RX ORDER — ASCORBIC ACID 500 MG
500 TABLET ORAL DAILY
Qty: 90 TABLET | Refills: 0 | Status: SHIPPED | OUTPATIENT
Start: 2022-01-01 | End: 2022-01-01

## 2022-01-01 RX ORDER — TRAMADOL HYDROCHLORIDE 50 MG/1
25 TABLET ORAL EVERY 4 HOURS PRN
Qty: 30 TABLET | Refills: 0 | Status: SHIPPED | OUTPATIENT
Start: 2022-01-01 | End: 2022-01-01

## 2022-01-01 RX ORDER — ASCORBIC ACID 500 MG
500 TABLET ORAL DAILY
Qty: 90 TABLET | Refills: 3 | Status: SHIPPED | OUTPATIENT
Start: 2022-01-01 | End: 2022-01-01

## 2022-01-01 RX ORDER — ONDANSETRON 2 MG/ML
4 INJECTION INTRAMUSCULAR; INTRAVENOUS EVERY 6 HOURS PRN
Status: DISCONTINUED | OUTPATIENT
Start: 2022-01-01 | End: 2022-01-01

## 2022-01-01 RX ORDER — METOPROLOL SUCCINATE 25 MG/1
25 TABLET, EXTENDED RELEASE ORAL DAILY
Status: DISCONTINUED | OUTPATIENT
Start: 2022-01-01 | End: 2022-01-01 | Stop reason: HOSPADM

## 2022-01-01 RX ORDER — SODIUM CHLORIDE 9 MG/ML
INJECTION, SOLUTION INTRAVENOUS CONTINUOUS
Status: DISCONTINUED | OUTPATIENT
Start: 2022-01-01 | End: 2022-01-01

## 2022-01-01 RX ORDER — DILTIAZEM HCL IN NACL,ISO-OSM 125 MG/125
5-15 PLASTIC BAG, INJECTION (ML) INTRAVENOUS CONTINUOUS
Status: DISCONTINUED | OUTPATIENT
Start: 2022-01-01 | End: 2022-01-01

## 2022-01-01 RX ORDER — LEVOTHYROXINE SODIUM 100 UG/1
100 TABLET ORAL
Status: DISCONTINUED | OUTPATIENT
Start: 2022-01-01 | End: 2022-01-01 | Stop reason: HOSPADM

## 2022-01-01 RX ADMIN — LIDOCAINE: 50 OINTMENT TOPICAL at 17:20

## 2022-01-01 RX ADMIN — ATORVASTATIN CALCIUM 40 MG: 40 TABLET, FILM COATED ORAL at 08:40

## 2022-01-01 RX ADMIN — ATORVASTATIN CALCIUM 40 MG: 40 TABLET, FILM COATED ORAL at 09:11

## 2022-01-01 RX ADMIN — FERROUS SULFATE TAB 325 MG (65 MG ELEMENTAL FE) 325 MG: 325 (65 FE) TAB at 08:47

## 2022-01-01 RX ADMIN — ISOSORBIDE MONONITRATE 30 MG: 30 TABLET, EXTENDED RELEASE ORAL at 08:00

## 2022-01-01 RX ADMIN — PANTOPRAZOLE SODIUM 40 MG: 40 TABLET, DELAYED RELEASE ORAL at 07:04

## 2022-01-01 RX ADMIN — FERROUS SULFATE TAB 325 MG (65 MG ELEMENTAL FE) 325 MG: 325 (65 FE) TAB at 08:12

## 2022-01-01 RX ADMIN — ATORVASTATIN CALCIUM 40 MG: 40 TABLET, FILM COATED ORAL at 08:32

## 2022-01-01 RX ADMIN — POLYETHYLENE GLYCOL 3350 17 GRAM ORAL POWDER PACKET 17 G: at 10:02

## 2022-01-01 RX ADMIN — FOLIC ACID 1000 MCG: 1 TABLET ORAL at 07:55

## 2022-01-01 RX ADMIN — PANTOPRAZOLE SODIUM 40 MG: 40 TABLET, DELAYED RELEASE ORAL at 08:22

## 2022-01-01 RX ADMIN — DONEPEZIL HYDROCHLORIDE 10 MG: 5 TABLET, FILM COATED ORAL at 20:49

## 2022-01-01 RX ADMIN — DONEPEZIL HYDROCHLORIDE 10 MG: 5 TABLET, FILM COATED ORAL at 21:59

## 2022-01-01 RX ADMIN — DONEPEZIL HYDROCHLORIDE 10 MG: 5 TABLET, FILM COATED ORAL at 21:01

## 2022-01-01 RX ADMIN — PANTOPRAZOLE SODIUM 40 MG: 40 TABLET, DELAYED RELEASE ORAL at 06:22

## 2022-01-01 RX ADMIN — METOPROLOL SUCCINATE 50 MG: 25 TABLET, EXTENDED RELEASE ORAL at 08:27

## 2022-01-01 RX ADMIN — DONEPEZIL HYDROCHLORIDE 10 MG: 5 TABLET, FILM COATED ORAL at 21:20

## 2022-01-01 RX ADMIN — APIXABAN 2.5 MG: 2.5 TABLET, FILM COATED ORAL at 21:20

## 2022-01-01 RX ADMIN — ACETAMINOPHEN 975 MG: 325 TABLET, FILM COATED ORAL at 20:49

## 2022-01-01 RX ADMIN — IOPAMIDOL 100 ML: 755 INJECTION, SOLUTION INTRAVENOUS at 11:27

## 2022-01-01 RX ADMIN — Medication 400 MG: at 21:20

## 2022-01-01 RX ADMIN — APIXABAN 2.5 MG: 2.5 TABLET, FILM COATED ORAL at 20:13

## 2022-01-01 RX ADMIN — DONEPEZIL HYDROCHLORIDE 10 MG: 5 TABLET, FILM COATED ORAL at 21:32

## 2022-01-01 RX ADMIN — POLYETHYLENE GLYCOL 3350 17 GRAM ORAL POWDER PACKET 17 G: at 08:03

## 2022-01-01 RX ADMIN — APIXABAN 2.5 MG: 2.5 TABLET, FILM COATED ORAL at 20:56

## 2022-01-01 RX ADMIN — DILTIAZEM HYDROCHLORIDE 180 MG: 180 CAPSULE, COATED, EXTENDED RELEASE ORAL at 08:22

## 2022-01-01 RX ADMIN — LIDOCAINE: 50 OINTMENT TOPICAL at 17:42

## 2022-01-01 RX ADMIN — LIDOCAINE: 50 OINTMENT TOPICAL at 21:17

## 2022-01-01 RX ADMIN — ISOSORBIDE MONONITRATE 30 MG: 30 TABLET, EXTENDED RELEASE ORAL at 10:03

## 2022-01-01 RX ADMIN — LIDOCAINE: 50 OINTMENT TOPICAL at 08:20

## 2022-01-01 RX ADMIN — OMEPRAZOLE 40 MG: 20 CAPSULE, DELAYED RELEASE ORAL at 08:47

## 2022-01-01 RX ADMIN — ACETAMINOPHEN 975 MG: 325 TABLET, FILM COATED ORAL at 22:42

## 2022-01-01 RX ADMIN — DILTIAZEM HYDROCHLORIDE 180 MG: 180 CAPSULE, COATED, EXTENDED RELEASE ORAL at 08:00

## 2022-01-01 RX ADMIN — DONEPEZIL HYDROCHLORIDE 10 MG: 5 TABLET, FILM COATED ORAL at 21:12

## 2022-01-01 RX ADMIN — METOPROLOL SUCCINATE 50 MG: 25 TABLET, EXTENDED RELEASE ORAL at 09:13

## 2022-01-01 RX ADMIN — LIDOCAINE: 50 OINTMENT TOPICAL at 14:01

## 2022-01-01 RX ADMIN — ISOSORBIDE MONONITRATE 30 MG: 30 TABLET, EXTENDED RELEASE ORAL at 08:50

## 2022-01-01 RX ADMIN — OMEPRAZOLE 40 MG: 20 CAPSULE, DELAYED RELEASE ORAL at 09:10

## 2022-01-01 RX ADMIN — ACETAMINOPHEN 975 MG: 325 TABLET, FILM COATED ORAL at 21:00

## 2022-01-01 RX ADMIN — PANTOPRAZOLE SODIUM 40 MG: 40 TABLET, DELAYED RELEASE ORAL at 06:32

## 2022-01-01 RX ADMIN — LEVOTHYROXINE SODIUM 100 MCG: 100 TABLET ORAL at 06:58

## 2022-01-01 RX ADMIN — CALCIUM CARBONATE-VITAMIN D TAB 500 MG-200 UNIT 1 TABLET: 500-200 TAB at 07:54

## 2022-01-01 RX ADMIN — ATORVASTATIN CALCIUM 40 MG: 40 TABLET, FILM COATED ORAL at 08:36

## 2022-01-01 RX ADMIN — LIDOCAINE 1 G: 50 OINTMENT TOPICAL at 07:54

## 2022-01-01 RX ADMIN — DILTIAZEM HYDROCHLORIDE 180 MG: 180 CAPSULE, COATED, EXTENDED RELEASE ORAL at 09:16

## 2022-01-01 RX ADMIN — OMEPRAZOLE 40 MG: 20 CAPSULE, DELAYED RELEASE ORAL at 08:03

## 2022-01-01 RX ADMIN — ACETAMINOPHEN 975 MG: 325 TABLET, FILM COATED ORAL at 08:40

## 2022-01-01 RX ADMIN — DILTIAZEM HYDROCHLORIDE 180 MG: 180 CAPSULE, COATED, EXTENDED RELEASE ORAL at 08:48

## 2022-01-01 RX ADMIN — Medication 15 MG/HR: at 02:53

## 2022-01-01 RX ADMIN — FOLIC ACID 1000 MCG: 1 TABLET ORAL at 08:48

## 2022-01-01 RX ADMIN — METOPROLOL SUCCINATE 25 MG: 25 TABLET, EXTENDED RELEASE ORAL at 08:03

## 2022-01-01 RX ADMIN — LEVOTHYROXINE SODIUM 100 MCG: 100 TABLET ORAL at 05:51

## 2022-01-01 RX ADMIN — DILTIAZEM HYDROCHLORIDE 180 MG: 180 CAPSULE, COATED, EXTENDED RELEASE ORAL at 10:02

## 2022-01-01 RX ADMIN — LEVOTHYROXINE SODIUM 100 MCG: 100 TABLET ORAL at 06:36

## 2022-01-01 RX ADMIN — DILTIAZEM HYDROCHLORIDE 180 MG: 180 CAPSULE, COATED, EXTENDED RELEASE ORAL at 08:36

## 2022-01-01 RX ADMIN — METOPROLOL SUCCINATE 50 MG: 25 TABLET, EXTENDED RELEASE ORAL at 08:00

## 2022-01-01 RX ADMIN — FOLIC ACID 1000 MCG: 1 TABLET ORAL at 08:02

## 2022-01-01 RX ADMIN — ATORVASTATIN CALCIUM 40 MG: 40 TABLET, FILM COATED ORAL at 08:11

## 2022-01-01 RX ADMIN — APIXABAN 2.5 MG: 2.5 TABLET, FILM COATED ORAL at 08:50

## 2022-01-01 RX ADMIN — LIDOCAINE: 50 OINTMENT TOPICAL at 17:16

## 2022-01-01 RX ADMIN — OMEPRAZOLE 40 MG: 20 CAPSULE, DELAYED RELEASE ORAL at 07:55

## 2022-01-01 RX ADMIN — LIDOCAINE: 50 OINTMENT TOPICAL at 09:11

## 2022-01-01 RX ADMIN — METOPROLOL SUCCINATE 50 MG: 25 TABLET, EXTENDED RELEASE ORAL at 08:55

## 2022-01-01 RX ADMIN — ISOSORBIDE MONONITRATE 30 MG: 30 TABLET, EXTENDED RELEASE ORAL at 08:28

## 2022-01-01 RX ADMIN — PANTOPRAZOLE SODIUM 40 MG: 40 TABLET, DELAYED RELEASE ORAL at 06:30

## 2022-01-01 RX ADMIN — ENOXAPARIN SODIUM 40 MG: 40 INJECTION SUBCUTANEOUS at 08:48

## 2022-01-01 RX ADMIN — FUROSEMIDE 40 MG: 20 TABLET ORAL at 18:23

## 2022-01-01 RX ADMIN — ACETAMINOPHEN 975 MG: 325 TABLET, FILM COATED ORAL at 20:01

## 2022-01-01 RX ADMIN — ATORVASTATIN CALCIUM 40 MG: 40 TABLET, FILM COATED ORAL at 10:02

## 2022-01-01 RX ADMIN — LIDOCAINE 1 G: 50 OINTMENT TOPICAL at 13:03

## 2022-01-01 RX ADMIN — FUROSEMIDE 40 MG: 20 TABLET ORAL at 11:25

## 2022-01-01 RX ADMIN — PERFLUTREN 3 ML: 6.52 INJECTION, SUSPENSION INTRAVENOUS at 10:31

## 2022-01-01 RX ADMIN — FOLIC ACID 1000 MCG: 1 TABLET ORAL at 08:12

## 2022-01-01 RX ADMIN — LEVOTHYROXINE SODIUM 100 MCG: 100 TABLET ORAL at 06:30

## 2022-01-01 RX ADMIN — Medication 50 MCG: at 08:40

## 2022-01-01 RX ADMIN — Medication 400 MG: at 20:19

## 2022-01-01 RX ADMIN — LEVOTHYROXINE SODIUM 100 MCG: 100 TABLET ORAL at 06:31

## 2022-01-01 RX ADMIN — DONEPEZIL HYDROCHLORIDE 10 MG: 5 TABLET, FILM COATED ORAL at 21:03

## 2022-01-01 RX ADMIN — LEVOTHYROXINE SODIUM 100 MCG: 100 TABLET ORAL at 06:26

## 2022-01-01 RX ADMIN — Medication 1 MG: at 21:06

## 2022-01-01 RX ADMIN — ISOSORBIDE MONONITRATE 30 MG: 30 TABLET, EXTENDED RELEASE ORAL at 08:33

## 2022-01-01 RX ADMIN — DONEPEZIL HYDROCHLORIDE 10 MG: 5 TABLET, FILM COATED ORAL at 21:17

## 2022-01-01 RX ADMIN — Medication 400 MG: at 08:09

## 2022-01-01 RX ADMIN — TRAMADOL HYDROCHLORIDE 25 MG: 50 TABLET, COATED ORAL at 05:43

## 2022-01-01 RX ADMIN — FERROUS SULFATE TAB 325 MG (65 MG ELEMENTAL FE) 325 MG: 325 (65 FE) TAB at 08:33

## 2022-01-01 RX ADMIN — DOCUSATE SODIUM 100 MG: 100 CAPSULE, LIQUID FILLED ORAL at 04:03

## 2022-01-01 RX ADMIN — PANTOPRAZOLE SODIUM 40 MG: 40 TABLET, DELAYED RELEASE ORAL at 06:26

## 2022-01-01 RX ADMIN — ATORVASTATIN CALCIUM 40 MG: 40 TABLET, FILM COATED ORAL at 08:55

## 2022-01-01 RX ADMIN — METOPROLOL SUCCINATE 25 MG: 25 TABLET, EXTENDED RELEASE ORAL at 08:18

## 2022-01-01 RX ADMIN — LEVOTHYROXINE SODIUM 100 MCG: 100 TABLET ORAL at 06:33

## 2022-01-01 RX ADMIN — ACETAMINOPHEN 975 MG: 325 TABLET, FILM COATED ORAL at 20:27

## 2022-01-01 RX ADMIN — ISOSORBIDE MONONITRATE 30 MG: 30 TABLET, EXTENDED RELEASE ORAL at 08:40

## 2022-01-01 RX ADMIN — ISOSORBIDE MONONITRATE 30 MG: 30 TABLET, EXTENDED RELEASE ORAL at 08:53

## 2022-01-01 RX ADMIN — FUROSEMIDE 40 MG: 20 TABLET ORAL at 17:29

## 2022-01-01 RX ADMIN — APIXABAN 2.5 MG: 2.5 TABLET, FILM COATED ORAL at 08:13

## 2022-01-01 RX ADMIN — LISINOPRIL 2.5 MG: 2.5 TABLET ORAL at 08:11

## 2022-01-01 RX ADMIN — LIDOCAINE: 50 OINTMENT TOPICAL at 12:18

## 2022-01-01 RX ADMIN — LEVOTHYROXINE SODIUM 100 MCG: 100 TABLET ORAL at 06:43

## 2022-01-01 RX ADMIN — POLYETHYLENE GLYCOL 3350 17 GRAM ORAL POWDER PACKET 17 G: at 08:49

## 2022-01-01 RX ADMIN — DILTIAZEM HYDROCHLORIDE 180 MG: 180 CAPSULE, COATED, EXTENDED RELEASE ORAL at 08:14

## 2022-01-01 RX ADMIN — FUROSEMIDE 20 MG: 10 INJECTION, SOLUTION INTRAMUSCULAR; INTRAVENOUS at 13:45

## 2022-01-01 RX ADMIN — ACETAMINOPHEN 975 MG: 325 TABLET, FILM COATED ORAL at 08:04

## 2022-01-01 RX ADMIN — Medication 50 MCG: at 08:02

## 2022-01-01 RX ADMIN — DONEPEZIL HYDROCHLORIDE 10 MG: 5 TABLET, FILM COATED ORAL at 21:00

## 2022-01-01 RX ADMIN — FERROUS SULFATE TAB 325 MG (65 MG ELEMENTAL FE) 325 MG: 325 (65 FE) TAB at 08:40

## 2022-01-01 RX ADMIN — SODIUM CHLORIDE 50 ML/HR: 9 INJECTION, SOLUTION INTRAVENOUS at 18:30

## 2022-01-01 RX ADMIN — LEVOTHYROXINE SODIUM 100 MCG: 100 TABLET ORAL at 07:04

## 2022-01-01 RX ADMIN — ACETAMINOPHEN 975 MG: 325 TABLET, FILM COATED ORAL at 21:40

## 2022-01-01 RX ADMIN — DILTIAZEM HYDROCHLORIDE 180 MG: 180 CAPSULE, COATED, EXTENDED RELEASE ORAL at 08:52

## 2022-01-01 RX ADMIN — APIXABAN 2.5 MG: 2.5 TABLET, FILM COATED ORAL at 10:03

## 2022-01-01 RX ADMIN — ISOSORBIDE MONONITRATE 30 MG: 30 TABLET, EXTENDED RELEASE ORAL at 08:19

## 2022-01-01 RX ADMIN — METOPROLOL SUCCINATE 25 MG: 25 TABLET, EXTENDED RELEASE ORAL at 08:36

## 2022-01-01 RX ADMIN — FUROSEMIDE 40 MG: 10 INJECTION, SOLUTION INTRAMUSCULAR; INTRAVENOUS at 10:28

## 2022-01-01 RX ADMIN — FUROSEMIDE 40 MG: 20 TABLET ORAL at 08:13

## 2022-01-01 RX ADMIN — ATORVASTATIN CALCIUM 40 MG: 40 TABLET, FILM COATED ORAL at 08:18

## 2022-01-01 RX ADMIN — DONEPEZIL HYDROCHLORIDE 10 MG: 5 TABLET, FILM COATED ORAL at 20:19

## 2022-01-01 RX ADMIN — DILTIAZEM HYDROCHLORIDE 180 MG: 180 CAPSULE, COATED, EXTENDED RELEASE ORAL at 10:03

## 2022-01-01 RX ADMIN — ISOSORBIDE MONONITRATE 30 MG: 30 TABLET, EXTENDED RELEASE ORAL at 09:10

## 2022-01-01 RX ADMIN — CALCIUM CARBONATE-VITAMIN D TAB 500 MG-200 UNIT 1 TABLET: 500-200 TAB at 08:02

## 2022-01-01 RX ADMIN — Medication 1 MG: at 00:46

## 2022-01-01 RX ADMIN — FUROSEMIDE 40 MG: 20 TABLET ORAL at 17:52

## 2022-01-01 RX ADMIN — FUROSEMIDE 40 MG: 20 TABLET ORAL at 17:46

## 2022-01-01 RX ADMIN — Medication 50 MCG: at 08:18

## 2022-01-01 RX ADMIN — FOLIC ACID 1000 MCG: 1 TABLET ORAL at 08:45

## 2022-01-01 RX ADMIN — FERROUS SULFATE TAB 325 MG (65 MG ELEMENTAL FE) 325 MG: 325 (65 FE) TAB at 07:55

## 2022-01-01 RX ADMIN — METOPROLOL SUCCINATE 50 MG: 25 TABLET, EXTENDED RELEASE ORAL at 08:13

## 2022-01-01 RX ADMIN — FUROSEMIDE 40 MG: 20 TABLET ORAL at 18:50

## 2022-01-01 RX ADMIN — APIXABAN 2.5 MG: 2.5 TABLET, FILM COATED ORAL at 10:04

## 2022-01-01 RX ADMIN — ISOSORBIDE MONONITRATE 30 MG: 30 TABLET, EXTENDED RELEASE ORAL at 08:08

## 2022-01-01 RX ADMIN — POLYETHYLENE GLYCOL 3350 17 GRAM ORAL POWDER PACKET 17 G: at 08:29

## 2022-01-01 RX ADMIN — ISOSORBIDE MONONITRATE 30 MG: 30 TABLET, EXTENDED RELEASE ORAL at 08:03

## 2022-01-01 RX ADMIN — ATORVASTATIN CALCIUM 40 MG: 40 TABLET, FILM COATED ORAL at 08:13

## 2022-01-01 RX ADMIN — METOPROLOL SUCCINATE 50 MG: 25 TABLET, EXTENDED RELEASE ORAL at 08:03

## 2022-01-01 RX ADMIN — POLYETHYLENE GLYCOL 3350 17 G: 17 POWDER, FOR SOLUTION ORAL at 09:10

## 2022-01-01 RX ADMIN — FUROSEMIDE 40 MG: 20 TABLET ORAL at 17:24

## 2022-01-01 RX ADMIN — DILTIAZEM HYDROCHLORIDE 180 MG: 180 CAPSULE, COATED, EXTENDED RELEASE ORAL at 08:28

## 2022-01-01 RX ADMIN — LEVOTHYROXINE SODIUM 100 MCG: 100 TABLET ORAL at 05:41

## 2022-01-01 RX ADMIN — Medication 400 MG: at 08:13

## 2022-01-01 RX ADMIN — DILTIAZEM HYDROCHLORIDE 180 MG: 180 CAPSULE, COATED, EXTENDED RELEASE ORAL at 08:13

## 2022-01-01 RX ADMIN — OXYCODONE HYDROCHLORIDE 5 MG: 5 TABLET ORAL at 14:39

## 2022-01-01 RX ADMIN — PANTOPRAZOLE SODIUM 40 MG: 40 TABLET, DELAYED RELEASE ORAL at 06:54

## 2022-01-01 RX ADMIN — ISOSORBIDE MONONITRATE 30 MG: 30 TABLET, EXTENDED RELEASE ORAL at 08:11

## 2022-01-01 RX ADMIN — ACETAMINOPHEN 975 MG: 325 TABLET, FILM COATED ORAL at 08:18

## 2022-01-01 RX ADMIN — ISOSORBIDE MONONITRATE 30 MG: 30 TABLET, EXTENDED RELEASE ORAL at 08:22

## 2022-01-01 RX ADMIN — Medication 50 MCG: at 08:33

## 2022-01-01 RX ADMIN — ATORVASTATIN CALCIUM 40 MG: 40 TABLET, FILM COATED ORAL at 08:09

## 2022-01-01 RX ADMIN — ISOSORBIDE MONONITRATE 30 MG: 30 TABLET, EXTENDED RELEASE ORAL at 08:13

## 2022-01-01 RX ADMIN — PANTOPRAZOLE SODIUM 40 MG: 40 TABLET, DELAYED RELEASE ORAL at 06:36

## 2022-01-01 RX ADMIN — POLYETHYLENE GLYCOL 3350 17 GRAM ORAL POWDER PACKET 17 G: at 08:00

## 2022-01-01 RX ADMIN — FUROSEMIDE 40 MG: 20 TABLET ORAL at 18:58

## 2022-01-01 RX ADMIN — LIDOCAINE 1 G: 50 OINTMENT TOPICAL at 08:38

## 2022-01-01 RX ADMIN — PANTOPRAZOLE SODIUM 40 MG: 40 TABLET, DELAYED RELEASE ORAL at 06:43

## 2022-01-01 RX ADMIN — LIDOCAINE: 50 OINTMENT TOPICAL at 20:28

## 2022-01-01 RX ADMIN — METOPROLOL SUCCINATE 50 MG: 25 TABLET, EXTENDED RELEASE ORAL at 08:36

## 2022-01-01 RX ADMIN — Medication 400 MG: at 08:03

## 2022-01-01 RX ADMIN — LIDOCAINE: 50 OINTMENT TOPICAL at 17:05

## 2022-01-01 RX ADMIN — SODIUM CHLORIDE 250 ML: 9 INJECTION, SOLUTION INTRAVENOUS at 12:20

## 2022-01-01 RX ADMIN — DILTIAZEM HYDROCHLORIDE 180 MG: 180 CAPSULE, COATED, EXTENDED RELEASE ORAL at 08:50

## 2022-01-01 RX ADMIN — OMEPRAZOLE 40 MG: 20 CAPSULE, DELAYED RELEASE ORAL at 08:32

## 2022-01-01 RX ADMIN — DONEPEZIL HYDROCHLORIDE 10 MG: 5 TABLET, FILM COATED ORAL at 21:23

## 2022-01-01 RX ADMIN — ATORVASTATIN CALCIUM 40 MG: 40 TABLET, FILM COATED ORAL at 08:51

## 2022-01-01 RX ADMIN — Medication 5 MG/HR: at 01:28

## 2022-01-01 RX ADMIN — FERROUS SULFATE TAB 325 MG (65 MG ELEMENTAL FE) 325 MG: 325 (65 FE) TAB at 08:02

## 2022-01-01 RX ADMIN — DONEPEZIL HYDROCHLORIDE 10 MG: 5 TABLET, FILM COATED ORAL at 20:27

## 2022-01-01 RX ADMIN — PANTOPRAZOLE SODIUM 40 MG: 40 TABLET, DELAYED RELEASE ORAL at 07:41

## 2022-01-01 RX ADMIN — SODIUM CHLORIDE 250 ML: 9 INJECTION, SOLUTION INTRAVENOUS at 10:02

## 2022-01-01 RX ADMIN — LIDOCAINE 1 G: 50 OINTMENT TOPICAL at 11:55

## 2022-01-01 RX ADMIN — LIDOCAINE 1 G: 50 OINTMENT TOPICAL at 08:32

## 2022-01-01 RX ADMIN — LIDOCAINE 1 G: 50 OINTMENT TOPICAL at 11:49

## 2022-01-01 RX ADMIN — ACETAMINOPHEN 975 MG: 325 TABLET, FILM COATED ORAL at 09:10

## 2022-01-01 RX ADMIN — APIXABAN 2.5 MG: 2.5 TABLET, FILM COATED ORAL at 22:00

## 2022-01-01 RX ADMIN — OMEPRAZOLE 40 MG: 20 CAPSULE, DELAYED RELEASE ORAL at 08:40

## 2022-01-01 RX ADMIN — FOLIC ACID 1000 MCG: 1 TABLET ORAL at 09:10

## 2022-01-01 RX ADMIN — ADENOSINE 6 MG: 3 INJECTION, SOLUTION INTRAVENOUS at 20:18

## 2022-01-01 RX ADMIN — LEVOTHYROXINE SODIUM 100 MCG: 100 TABLET ORAL at 06:35

## 2022-01-01 RX ADMIN — FOLIC ACID 1000 MCG: 1 TABLET ORAL at 08:33

## 2022-01-01 RX ADMIN — FUROSEMIDE 40 MG: 20 TABLET ORAL at 10:02

## 2022-01-01 RX ADMIN — FUROSEMIDE 40 MG: 20 TABLET ORAL at 17:32

## 2022-01-01 RX ADMIN — APIXABAN 2.5 MG: 2.5 TABLET, FILM COATED ORAL at 08:09

## 2022-01-01 RX ADMIN — TRAMADOL HYDROCHLORIDE 25 MG: 50 TABLET, COATED ORAL at 07:02

## 2022-01-01 RX ADMIN — ENOXAPARIN SODIUM 40 MG: 40 INJECTION SUBCUTANEOUS at 08:19

## 2022-01-01 RX ADMIN — LISINOPRIL 2.5 MG: 2.5 TABLET ORAL at 08:47

## 2022-01-01 RX ADMIN — FUROSEMIDE 40 MG: 20 TABLET ORAL at 09:16

## 2022-01-01 RX ADMIN — ENOXAPARIN SODIUM 40 MG: 40 INJECTION SUBCUTANEOUS at 08:11

## 2022-01-01 RX ADMIN — LISINOPRIL 2.5 MG: 2.5 TABLET ORAL at 10:31

## 2022-01-01 RX ADMIN — ENOXAPARIN SODIUM 40 MG: 40 INJECTION SUBCUTANEOUS at 08:33

## 2022-01-01 RX ADMIN — LEVOTHYROXINE SODIUM 100 MCG: 0.1 TABLET ORAL at 05:17

## 2022-01-01 RX ADMIN — LIDOCAINE: 50 OINTMENT TOPICAL at 08:24

## 2022-01-01 RX ADMIN — LEVOTHYROXINE SODIUM 100 MCG: 0.1 TABLET ORAL at 05:43

## 2022-01-01 RX ADMIN — DILTIAZEM HYDROCHLORIDE 10 MG: 5 INJECTION INTRAVENOUS at 20:29

## 2022-01-01 RX ADMIN — FERROUS SULFATE TAB 325 MG (65 MG ELEMENTAL FE) 325 MG: 325 (65 FE) TAB at 08:18

## 2022-01-01 RX ADMIN — FUROSEMIDE 40 MG: 10 INJECTION, SOLUTION INTRAMUSCULAR; INTRAVENOUS at 21:06

## 2022-01-01 RX ADMIN — Medication 400 MG: at 08:28

## 2022-01-01 RX ADMIN — ACETAMINOPHEN 975 MG: 325 TABLET, FILM COATED ORAL at 16:40

## 2022-01-01 RX ADMIN — SODIUM CHLORIDE 500 ML: 9 INJECTION, SOLUTION INTRAVENOUS at 19:44

## 2022-01-01 RX ADMIN — ACETAMINOPHEN 975 MG: 325 TABLET, FILM COATED ORAL at 08:32

## 2022-01-01 RX ADMIN — POLYETHYLENE GLYCOL 3350 17 G: 17 POWDER, FOR SOLUTION ORAL at 08:01

## 2022-01-01 RX ADMIN — POLYETHYLENE GLYCOL 3350 17 GRAM ORAL POWDER PACKET 17 G: at 09:01

## 2022-01-01 RX ADMIN — LISINOPRIL 2.5 MG: 2.5 TABLET ORAL at 08:18

## 2022-01-01 RX ADMIN — LEVOTHYROXINE SODIUM 100 MCG: 0.1 TABLET ORAL at 06:29

## 2022-01-01 RX ADMIN — LEVOTHYROXINE SODIUM 100 MCG: 100 TABLET ORAL at 05:50

## 2022-01-01 RX ADMIN — APIXABAN 2.5 MG: 2.5 TABLET, FILM COATED ORAL at 21:12

## 2022-01-01 RX ADMIN — ACETAMINOPHEN 975 MG: 325 TABLET, FILM COATED ORAL at 14:02

## 2022-01-01 RX ADMIN — ACETAMINOPHEN 975 MG: 325 TABLET, FILM COATED ORAL at 13:09

## 2022-01-01 RX ADMIN — ATORVASTATIN CALCIUM 40 MG: 40 TABLET, FILM COATED ORAL at 09:13

## 2022-01-01 RX ADMIN — METOPROLOL SUCCINATE 25 MG: 25 TABLET, EXTENDED RELEASE ORAL at 08:39

## 2022-01-01 RX ADMIN — METOPROLOL SUCCINATE 25 MG: 25 TABLET, EXTENDED RELEASE ORAL at 07:54

## 2022-01-01 RX ADMIN — Medication 1 MG: at 00:59

## 2022-01-01 RX ADMIN — METHOTREXATE 7.5 MG: 2.5 TABLET ORAL at 10:02

## 2022-01-01 RX ADMIN — FUROSEMIDE 40 MG: 20 TABLET ORAL at 08:36

## 2022-01-01 RX ADMIN — LIDOCAINE: 50 OINTMENT TOPICAL at 21:41

## 2022-01-01 RX ADMIN — ENOXAPARIN SODIUM 40 MG: 40 INJECTION SUBCUTANEOUS at 09:05

## 2022-01-01 RX ADMIN — ATORVASTATIN CALCIUM 40 MG: 40 TABLET, FILM COATED ORAL at 10:03

## 2022-01-01 RX ADMIN — CALCIUM CARBONATE-VITAMIN D TAB 500 MG-200 UNIT 1 TABLET: 500-200 TAB at 08:47

## 2022-01-01 RX ADMIN — PANTOPRAZOLE SODIUM 40 MG: 40 TABLET, DELAYED RELEASE ORAL at 05:50

## 2022-01-01 RX ADMIN — APIXABAN 2.5 MG: 2.5 TABLET, FILM COATED ORAL at 20:19

## 2022-01-01 RX ADMIN — ATORVASTATIN CALCIUM 40 MG: 40 TABLET, FILM COATED ORAL at 07:54

## 2022-01-01 RX ADMIN — DONEPEZIL HYDROCHLORIDE 10 MG: 5 TABLET, FILM COATED ORAL at 20:56

## 2022-01-01 RX ADMIN — CALCIUM CARBONATE-VITAMIN D TAB 500 MG-200 UNIT 1 TABLET: 500-200 TAB at 09:10

## 2022-01-01 RX ADMIN — DONEPEZIL HYDROCHLORIDE 10 MG: 5 TABLET, FILM COATED ORAL at 21:06

## 2022-01-01 RX ADMIN — ACETAMINOPHEN 975 MG: 325 TABLET, FILM COATED ORAL at 14:08

## 2022-01-01 RX ADMIN — ENOXAPARIN SODIUM 40 MG: 40 INJECTION SUBCUTANEOUS at 08:38

## 2022-01-01 RX ADMIN — ACETAMINOPHEN 975 MG: 325 TABLET ORAL at 11:46

## 2022-01-01 RX ADMIN — MAGNESIUM SULFATE IN WATER 2 G: 40 INJECTION, SOLUTION INTRAVENOUS at 15:56

## 2022-01-01 RX ADMIN — APIXABAN 2.5 MG: 2.5 TABLET, FILM COATED ORAL at 08:22

## 2022-01-01 RX ADMIN — PANTOPRAZOLE SODIUM 40 MG: 40 TABLET, DELAYED RELEASE ORAL at 06:59

## 2022-01-01 RX ADMIN — Medication 10 MG/HR: at 02:13

## 2022-01-01 RX ADMIN — FUROSEMIDE 40 MG: 20 TABLET ORAL at 18:01

## 2022-01-01 RX ADMIN — PANTOPRAZOLE SODIUM 40 MG: 40 TABLET, DELAYED RELEASE ORAL at 07:02

## 2022-01-01 RX ADMIN — CALCIUM CARBONATE-VITAMIN D TAB 500 MG-200 UNIT 1 TABLET: 500-200 TAB at 08:32

## 2022-01-01 RX ADMIN — ATORVASTATIN CALCIUM 40 MG: 40 TABLET, FILM COATED ORAL at 08:03

## 2022-01-01 RX ADMIN — PANTOPRAZOLE SODIUM 40 MG: 40 TABLET, DELAYED RELEASE ORAL at 06:45

## 2022-01-01 RX ADMIN — CALCIUM CARBONATE-VITAMIN D TAB 500 MG-200 UNIT 1 TABLET: 500-200 TAB at 08:11

## 2022-01-01 RX ADMIN — APIXABAN 2.5 MG: 2.5 TABLET, FILM COATED ORAL at 21:05

## 2022-01-01 RX ADMIN — FUROSEMIDE 40 MG: 20 TABLET ORAL at 08:50

## 2022-01-01 RX ADMIN — ATORVASTATIN CALCIUM 40 MG: 40 TABLET, FILM COATED ORAL at 08:02

## 2022-01-01 RX ADMIN — FUROSEMIDE 40 MG: 20 TABLET ORAL at 08:00

## 2022-01-01 RX ADMIN — ATORVASTATIN CALCIUM 40 MG: 40 TABLET, FILM COATED ORAL at 08:22

## 2022-01-01 RX ADMIN — LEVOTHYROXINE SODIUM 100 MCG: 0.1 TABLET ORAL at 06:54

## 2022-01-01 RX ADMIN — FUROSEMIDE 40 MG: 10 INJECTION, SOLUTION INTRAMUSCULAR; INTRAVENOUS at 09:13

## 2022-01-01 RX ADMIN — DILTIAZEM HYDROCHLORIDE 180 MG: 180 CAPSULE, COATED, EXTENDED RELEASE ORAL at 08:08

## 2022-01-01 RX ADMIN — METHOTREXATE 7.5 MG: 2.5 TABLET ORAL at 16:09

## 2022-01-01 RX ADMIN — METOPROLOL SUCCINATE 50 MG: 25 TABLET, EXTENDED RELEASE ORAL at 08:08

## 2022-01-01 RX ADMIN — POLYETHYLENE GLYCOL 3350 17 GRAM ORAL POWDER PACKET 17 G: at 08:51

## 2022-01-01 RX ADMIN — METHOTREXATE 7.5 MG: 2.5 TABLET ORAL at 11:48

## 2022-01-01 RX ADMIN — LEVOTHYROXINE SODIUM 100 MCG: 0.1 TABLET ORAL at 05:48

## 2022-01-01 RX ADMIN — LEVOTHYROXINE SODIUM 100 MCG: 100 TABLET ORAL at 06:21

## 2022-01-01 RX ADMIN — APIXABAN 2.5 MG: 2.5 TABLET, FILM COATED ORAL at 21:03

## 2022-01-01 RX ADMIN — LIDOCAINE: 50 OINTMENT TOPICAL at 22:45

## 2022-01-01 RX ADMIN — METHOTREXATE 7.5 MG: 2.5 TABLET ORAL at 08:03

## 2022-01-01 RX ADMIN — ISOSORBIDE MONONITRATE 30 MG: 30 TABLET, EXTENDED RELEASE ORAL at 09:13

## 2022-01-01 RX ADMIN — POLYETHYLENE GLYCOL 3350 17 G: 17 POWDER, FOR SOLUTION ORAL at 08:20

## 2022-01-01 RX ADMIN — Medication 400 MG: at 21:03

## 2022-01-01 RX ADMIN — DONEPEZIL HYDROCHLORIDE 10 MG: 5 TABLET, FILM COATED ORAL at 20:13

## 2022-01-01 RX ADMIN — LEVOTHYROXINE SODIUM 100 MCG: 0.1 TABLET ORAL at 06:23

## 2022-01-01 RX ADMIN — APIXABAN 2.5 MG: 2.5 TABLET, FILM COATED ORAL at 08:03

## 2022-01-01 RX ADMIN — METOPROLOL SUCCINATE 50 MG: 25 TABLET, EXTENDED RELEASE ORAL at 10:03

## 2022-01-01 RX ADMIN — PANTOPRAZOLE SODIUM 40 MG: 40 TABLET, DELAYED RELEASE ORAL at 06:33

## 2022-01-01 RX ADMIN — DILTIAZEM HYDROCHLORIDE 180 MG: 180 CAPSULE, COATED, EXTENDED RELEASE ORAL at 08:03

## 2022-01-01 RX ADMIN — ISOSORBIDE MONONITRATE 30 MG: 30 TABLET, EXTENDED RELEASE ORAL at 08:47

## 2022-01-01 RX ADMIN — LEVOTHYROXINE SODIUM 100 MCG: 0.1 TABLET ORAL at 06:32

## 2022-01-01 RX ADMIN — ATORVASTATIN CALCIUM 40 MG: 40 TABLET, FILM COATED ORAL at 08:28

## 2022-01-01 RX ADMIN — CALCIUM CARBONATE-VITAMIN D TAB 500 MG-200 UNIT 1 TABLET: 500-200 TAB at 08:39

## 2022-01-01 RX ADMIN — DOCUSATE SODIUM 100 MG: 100 CAPSULE, LIQUID FILLED ORAL at 08:03

## 2022-01-01 RX ADMIN — LIDOCAINE: 50 OINTMENT TOPICAL at 08:48

## 2022-01-01 RX ADMIN — APIXABAN 2.5 MG: 2.5 TABLET, FILM COATED ORAL at 09:11

## 2022-01-01 RX ADMIN — FUROSEMIDE 20 MG: 10 INJECTION, SOLUTION INTRAMUSCULAR; INTRAVENOUS at 01:40

## 2022-01-01 RX ADMIN — FUROSEMIDE 40 MG: 20 TABLET ORAL at 08:09

## 2022-01-01 RX ADMIN — PANTOPRAZOLE SODIUM 40 MG: 40 TABLET, DELAYED RELEASE ORAL at 06:35

## 2022-01-01 RX ADMIN — Medication 50 MCG: at 08:47

## 2022-01-01 RX ADMIN — FOLIC ACID 1000 MCG: 1 TABLET ORAL at 08:19

## 2022-01-01 RX ADMIN — APIXABAN 2.5 MG: 2.5 TABLET, FILM COATED ORAL at 21:47

## 2022-01-01 RX ADMIN — LEVOTHYROXINE SODIUM 100 MCG: 100 TABLET ORAL at 05:18

## 2022-01-01 RX ADMIN — ACETAMINOPHEN 975 MG: 325 TABLET, FILM COATED ORAL at 13:04

## 2022-01-01 RX ADMIN — LEVOTHYROXINE SODIUM 100 MCG: 100 TABLET ORAL at 06:45

## 2022-01-01 RX ADMIN — ATORVASTATIN CALCIUM 40 MG: 40 TABLET, FILM COATED ORAL at 08:00

## 2022-01-01 RX ADMIN — LIDOCAINE 1 G: 50 OINTMENT TOPICAL at 08:08

## 2022-01-01 RX ADMIN — APIXABAN 2.5 MG: 2.5 TABLET, FILM COATED ORAL at 21:01

## 2022-01-01 RX ADMIN — CALCIUM CARBONATE-VITAMIN D TAB 500 MG-200 UNIT 1 TABLET: 500-200 TAB at 08:18

## 2022-01-01 RX ADMIN — FUROSEMIDE 40 MG: 20 TABLET ORAL at 17:22

## 2022-01-01 RX ADMIN — FUROSEMIDE 40 MG: 20 TABLET ORAL at 08:46

## 2022-01-01 RX ADMIN — DONEPEZIL HYDROCHLORIDE 10 MG: 5 TABLET, FILM COATED ORAL at 21:47

## 2022-01-01 RX ADMIN — ATORVASTATIN CALCIUM 40 MG: 40 TABLET, FILM COATED ORAL at 08:48

## 2022-01-01 RX ADMIN — METOPROLOL SUCCINATE 25 MG: 25 TABLET, EXTENDED RELEASE ORAL at 08:11

## 2022-01-01 RX ADMIN — LIDOCAINE: 50 OINTMENT TOPICAL at 06:54

## 2022-01-01 RX ADMIN — DILTIAZEM HYDROCHLORIDE 180 MG: 180 CAPSULE, COATED, EXTENDED RELEASE ORAL at 08:05

## 2022-01-01 RX ADMIN — ACETAMINOPHEN 975 MG: 325 TABLET, FILM COATED ORAL at 13:46

## 2022-01-01 RX ADMIN — POLYETHYLENE GLYCOL 3350 17 GRAM ORAL POWDER PACKET 17 G: at 09:16

## 2022-01-01 RX ADMIN — LEVOTHYROXINE SODIUM 100 MCG: 100 TABLET ORAL at 06:34

## 2022-01-01 RX ADMIN — DONEPEZIL HYDROCHLORIDE 10 MG: 5 TABLET, FILM COATED ORAL at 21:40

## 2022-01-01 RX ADMIN — ACETAMINOPHEN 975 MG: 325 TABLET, FILM COATED ORAL at 08:47

## 2022-01-01 RX ADMIN — ACETAMINOPHEN 975 MG: 325 TABLET ORAL at 14:39

## 2022-01-01 RX ADMIN — FUROSEMIDE 40 MG: 20 TABLET ORAL at 08:02

## 2022-01-01 RX ADMIN — ISOSORBIDE MONONITRATE 30 MG: 30 TABLET, EXTENDED RELEASE ORAL at 08:36

## 2022-01-01 RX ADMIN — LIDOCAINE: 50 OINTMENT TOPICAL at 12:20

## 2022-01-01 RX ADMIN — FUROSEMIDE 40 MG: 10 INJECTION, SOLUTION INTRAMUSCULAR; INTRAVENOUS at 21:21

## 2022-01-01 RX ADMIN — DILTIAZEM HYDROCHLORIDE 180 MG: 180 CAPSULE, COATED, EXTENDED RELEASE ORAL at 09:27

## 2022-01-01 RX ADMIN — METOPROLOL SUCCINATE 25 MG: 25 TABLET, EXTENDED RELEASE ORAL at 09:11

## 2022-01-01 RX ADMIN — LIDOCAINE: 50 OINTMENT TOPICAL at 14:09

## 2022-01-01 RX ADMIN — ACETAMINOPHEN 975 MG: 325 TABLET, FILM COATED ORAL at 07:54

## 2022-01-01 RX ADMIN — Medication 50 MCG: at 08:11

## 2022-01-01 RX ADMIN — DONEPEZIL HYDROCHLORIDE 10 MG: 5 TABLET, FILM COATED ORAL at 01:40

## 2022-01-01 RX ADMIN — FUROSEMIDE 40 MG: 20 TABLET ORAL at 08:22

## 2022-01-01 RX ADMIN — Medication 400 MG: at 20:32

## 2022-01-01 RX ADMIN — METOPROLOL SUCCINATE 50 MG: 25 TABLET, EXTENDED RELEASE ORAL at 08:22

## 2022-01-01 RX ADMIN — APIXABAN 2.5 MG: 2.5 TABLET, FILM COATED ORAL at 08:28

## 2022-01-01 RX ADMIN — ACETAMINOPHEN 975 MG: 325 TABLET, FILM COATED ORAL at 21:17

## 2022-01-01 RX ADMIN — LIDOCAINE: 50 OINTMENT TOPICAL at 16:40

## 2022-01-01 RX ADMIN — APIXABAN 2.5 MG: 2.5 TABLET, FILM COATED ORAL at 21:23

## 2022-01-01 RX ADMIN — POLYETHYLENE GLYCOL 3350 17 GRAM ORAL POWDER PACKET 17 G: at 08:09

## 2022-01-01 RX ADMIN — METOPROLOL SUCCINATE 25 MG: 25 TABLET, EXTENDED RELEASE ORAL at 08:47

## 2022-01-01 RX ADMIN — LIDOCAINE: 50 OINTMENT TOPICAL at 20:49

## 2022-01-01 RX ADMIN — PANTOPRAZOLE SODIUM 40 MG: 40 TABLET, DELAYED RELEASE ORAL at 06:34

## 2022-01-01 RX ADMIN — ACETAMINOPHEN 975 MG: 325 TABLET, FILM COATED ORAL at 13:22

## 2022-01-01 RX ADMIN — FUROSEMIDE 40 MG: 10 INJECTION, SOLUTION INTRAMUSCULAR; INTRAVENOUS at 08:52

## 2022-01-01 RX ADMIN — APIXABAN 2.5 MG: 2.5 TABLET, FILM COATED ORAL at 09:13

## 2022-01-01 RX ADMIN — FUROSEMIDE 40 MG: 20 TABLET ORAL at 17:53

## 2022-01-01 RX ADMIN — Medication 50 MCG: at 09:11

## 2022-01-01 RX ADMIN — ISOSORBIDE MONONITRATE 30 MG: 30 TABLET, EXTENDED RELEASE ORAL at 07:54

## 2022-01-01 RX ADMIN — FUROSEMIDE 40 MG: 20 TABLET ORAL at 17:35

## 2022-01-01 RX ADMIN — FERROUS SULFATE TAB 325 MG (65 MG ELEMENTAL FE) 325 MG: 325 (65 FE) TAB at 09:11

## 2022-01-01 RX ADMIN — FUROSEMIDE 40 MG: 20 TABLET ORAL at 08:28

## 2022-01-01 RX ADMIN — APIXABAN 2.5 MG: 2.5 TABLET, FILM COATED ORAL at 21:17

## 2022-01-01 RX ADMIN — DONEPEZIL HYDROCHLORIDE 10 MG: 5 TABLET, FILM COATED ORAL at 20:32

## 2022-01-01 RX ADMIN — LIDOCAINE: 50 OINTMENT TOPICAL at 16:38

## 2022-01-01 RX ADMIN — APIXABAN 2.5 MG: 2.5 TABLET, FILM COATED ORAL at 08:47

## 2022-01-01 RX ADMIN — APIXABAN 2.5 MG: 2.5 TABLET, FILM COATED ORAL at 20:33

## 2022-01-01 RX ADMIN — APIXABAN 2.5 MG: 2.5 TABLET, FILM COATED ORAL at 08:00

## 2022-01-01 RX ADMIN — POLYETHYLENE GLYCOL 3350 17 GRAM ORAL POWDER PACKET 17 G: at 08:13

## 2022-01-01 RX ADMIN — LIDOCAINE: 50 OINTMENT TOPICAL at 21:27

## 2022-01-01 RX ADMIN — APIXABAN 2.5 MG: 2.5 TABLET, FILM COATED ORAL at 20:28

## 2022-01-01 RX ADMIN — LIDOCAINE: 50 OINTMENT TOPICAL at 21:01

## 2022-01-01 RX ADMIN — DONEPEZIL HYDROCHLORIDE 10 MG: 5 TABLET, FILM COATED ORAL at 22:44

## 2022-01-01 RX ADMIN — PANTOPRAZOLE SODIUM 40 MG: 40 TABLET, DELAYED RELEASE ORAL at 08:36

## 2022-01-01 RX ADMIN — APIXABAN 2.5 MG: 2.5 TABLET, FILM COATED ORAL at 08:36

## 2022-01-01 ASSESSMENT — ACTIVITIES OF DAILY LIVING (ADL)
VISION_MANAGEMENT: GLASSES
ADLS_ACUITY_SCORE: 32
ADLS_ACUITY_SCORE: 31
ADLS_ACUITY_SCORE: 32
ADLS_ACUITY_SCORE: 35
ADLS_ACUITY_SCORE: 31
ADLS_ACUITY_SCORE: 31
ADLS_ACUITY_SCORE: 35
ADLS_ACUITY_SCORE: 31
ADLS_ACUITY_SCORE: 35
ADLS_ACUITY_SCORE: 31
ADLS_ACUITY_SCORE: 39
WALKING_OR_CLIMBING_STAIRS_DIFFICULTY: NO
ADLS_ACUITY_SCORE: 28
ADLS_ACUITY_SCORE: 31
ADLS_ACUITY_SCORE: 27
ADLS_ACUITY_SCORE: 31
ADLS_ACUITY_SCORE: 31
ADLS_ACUITY_SCORE: 35
ADLS_ACUITY_SCORE: 39
ADLS_ACUITY_SCORE: 35
ADLS_ACUITY_SCORE: 28
ADLS_ACUITY_SCORE: 35
ADLS_ACUITY_SCORE: 31
ADLS_ACUITY_SCORE: 39
ADLS_ACUITY_SCORE: 27
ADLS_ACUITY_SCORE: 35
ADLS_ACUITY_SCORE: 30
ADLS_ACUITY_SCORE: 31
ADLS_ACUITY_SCORE: 31
ADLS_ACUITY_SCORE: 35
ADLS_ACUITY_SCORE: 31
ADLS_ACUITY_SCORE: 35
ADLS_ACUITY_SCORE: 31
ADLS_ACUITY_SCORE: 31
ADLS_ACUITY_SCORE: 35
ADLS_ACUITY_SCORE: 39
ADLS_ACUITY_SCORE: 31
DOING_ERRANDS_INDEPENDENTLY_DIFFICULTY: YES
ADLS_ACUITY_SCORE: 31
DIFFICULTY_EATING/SWALLOWING: NO
ADLS_ACUITY_SCORE: 31
ADLS_ACUITY_SCORE: 31
ADLS_ACUITY_SCORE: 39
ADLS_ACUITY_SCORE: 31
ADLS_ACUITY_SCORE: 31
ADLS_ACUITY_SCORE: 35
ADLS_ACUITY_SCORE: 28
ADLS_ACUITY_SCORE: 31
ADLS_ACUITY_SCORE: 31
ADLS_ACUITY_SCORE: 35
ADLS_ACUITY_SCORE: 31
CONCENTRATING,_REMEMBERING_OR_MAKING_DECISIONS_DIFFICULTY: YES
ADLS_ACUITY_SCORE: 31
ADLS_ACUITY_SCORE: 39
ADLS_ACUITY_SCORE: 35
ADLS_ACUITY_SCORE: 35
ADLS_ACUITY_SCORE: 31
ADLS_ACUITY_SCORE: 39
ADLS_ACUITY_SCORE: 34
ADLS_ACUITY_SCORE: 35
ADLS_ACUITY_SCORE: 31
TOILETING_ISSUES: NO
ADLS_ACUITY_SCORE: 32
ADLS_ACUITY_SCORE: 35
ADLS_ACUITY_SCORE: 31
ADLS_ACUITY_SCORE: 35
ADLS_ACUITY_SCORE: 31
ADLS_ACUITY_SCORE: 39
ADLS_ACUITY_SCORE: 31
ADLS_ACUITY_SCORE: 31
ADLS_ACUITY_SCORE: 35
ADLS_ACUITY_SCORE: 31
ADLS_ACUITY_SCORE: 31
ADLS_ACUITY_SCORE: 32
ADLS_ACUITY_SCORE: 31
ADLS_ACUITY_SCORE: 35
WEAR_GLASSES_OR_BLIND: YES
ADLS_ACUITY_SCORE: 35
ADLS_ACUITY_SCORE: 31
ADLS_ACUITY_SCORE: 30
ADLS_ACUITY_SCORE: 31
ADLS_ACUITY_SCORE: 30
ADLS_ACUITY_SCORE: 35
ADLS_ACUITY_SCORE: 31
ADLS_ACUITY_SCORE: 35
ADLS_ACUITY_SCORE: 30
ADLS_ACUITY_SCORE: 31
ADLS_ACUITY_SCORE: 27
ADLS_ACUITY_SCORE: 28
ADLS_ACUITY_SCORE: 39
ADLS_ACUITY_SCORE: 32
ADLS_ACUITY_SCORE: 30
ADLS_ACUITY_SCORE: 35
ADLS_ACUITY_SCORE: 35
ADLS_ACUITY_SCORE: 31
ADLS_ACUITY_SCORE: 35
ADLS_ACUITY_SCORE: 31
ADLS_ACUITY_SCORE: 31
ADLS_ACUITY_SCORE: 27
ADLS_ACUITY_SCORE: 31
ADLS_ACUITY_SCORE: 39
ADLS_ACUITY_SCORE: 31
ADLS_ACUITY_SCORE: 31
DRESSING/BATHING_DIFFICULTY: NO
ADLS_ACUITY_SCORE: 31
ADLS_ACUITY_SCORE: 35
ADLS_ACUITY_SCORE: 28
ADLS_ACUITY_SCORE: 31
ADLS_ACUITY_SCORE: 31
ADLS_ACUITY_SCORE: 35
ADLS_ACUITY_SCORE: 31
ADLS_ACUITY_SCORE: 39
ADLS_ACUITY_SCORE: 31
DIFFICULTY_COMMUNICATING: NO
ADLS_ACUITY_SCORE: 31
ADLS_ACUITY_SCORE: 32
ADLS_ACUITY_SCORE: 31
DEPENDENT_IADLS:: SHOPPING;MONEY MANAGEMENT;TRANSPORTATION
ADLS_ACUITY_SCORE: 31
ADLS_ACUITY_SCORE: 32
ADLS_ACUITY_SCORE: 30
ADLS_ACUITY_SCORE: 31
ADLS_ACUITY_SCORE: 35
ADLS_ACUITY_SCORE: 31
ADLS_ACUITY_SCORE: 35
ADLS_ACUITY_SCORE: 31
ADLS_ACUITY_SCORE: 31
ADLS_ACUITY_SCORE: 28
ADLS_ACUITY_SCORE: 35
ADLS_ACUITY_SCORE: 31
ADLS_ACUITY_SCORE: 35
ADLS_ACUITY_SCORE: 30
ADLS_ACUITY_SCORE: 31
ADLS_ACUITY_SCORE: 35
ADLS_ACUITY_SCORE: 31
ADLS_ACUITY_SCORE: 30
ADLS_ACUITY_SCORE: 35
ADLS_ACUITY_SCORE: 31
ADLS_ACUITY_SCORE: 35
EQUIPMENT_CURRENTLY_USED_AT_HOME: WALKER, ROLLING
ADLS_ACUITY_SCORE: 31
ADLS_ACUITY_SCORE: 32
ADLS_ACUITY_SCORE: 35
ADLS_ACUITY_SCORE: 35
ADLS_ACUITY_SCORE: 39
ADLS_ACUITY_SCORE: 31
ADLS_ACUITY_SCORE: 35
ADLS_ACUITY_SCORE: 31
ADLS_ACUITY_SCORE: 35
ADLS_ACUITY_SCORE: 32
ADLS_ACUITY_SCORE: 32
ADLS_ACUITY_SCORE: 34
ADLS_ACUITY_SCORE: 31

## 2022-01-01 ASSESSMENT — ENCOUNTER SYMPTOMS
SHORTNESS OF BREATH: 1
ARTHRALGIAS: 1
BACK PAIN: 1

## 2022-02-16 PROBLEM — S32.9XXA CLOSED DISPLACED FRACTURE OF PELVIS, UNSPECIFIED PART OF PELVIS, INITIAL ENCOUNTER (H): Status: ACTIVE | Noted: 2022-01-01

## 2022-02-16 PROBLEM — S32.599A FRACTURE OF MULTIPLE PUBIC RAMI (H): Status: ACTIVE | Noted: 2022-01-01

## 2022-02-16 PROBLEM — S22.068A: Status: ACTIVE | Noted: 2022-01-01

## 2022-02-16 PROBLEM — S22.048A: Status: ACTIVE | Noted: 2022-01-01

## 2022-02-16 NOTE — ED PROVIDER NOTES
"ED Provider In Triage Note  [facility]  Encounter Date: Feb 16, 2022    Chief Complaint   Patient presents with     Leg Injury     Back Pain       Brief HPI:   Rox Zavala is a 86 year old female presenting to the Emergency Department with a chief complaint of back and leg pain after mechanical fall.  Occurred yesterday, tripped. [facility]  Per daughter xrays showed pelvic fracture and issue with lumbar spine (daughter does not no further on spine issue).  Sent home.  Cannot walk due to pain and weakness.  Hypoxic in triage.  Pain in back distal thoracic.  Also with mild chest pain, mild sob.    Brief Physical Exam:  /59   Pulse 62   Temp 98.3  F (36.8  C)   Resp 20   Ht 1.626 m (5' 4\")   Wt 54.4 kg (120 lb)   SpO2 (!) 86%   BMI 20.60 kg/m    General: Non-toxic appearing  HEENT: Atraumatic  Resp: No respiratory distress  Abdomen: Non-peritoneal  Neuro: Alert, oriented, answers questions appropriately  Psych: Behavior appropriate    Plan Initiated in Triage:  Orders Placed This Encounter   Procedures     Lumbar spine CT w/o contrast     CT Thoracic Spine w/o Contrast     CT Pelvis Bone wo Contrast     Chest XR,  PA & LAT     INR     Comprehensive metabolic panel     Asymptomatic COVID-19 Virus (Coronavirus) by PCR     Troponin I (now)     ECG 12-LEAD WITH MUSE (LHE)     Peripheral IV catheter     CBC with platelets differential     PIT Dispo:   Place patient in the next available ED bed    Alexis Garrison MD on 2/16/2022 at 12:20 PM    Patient was evaluated by the Physician in Triage due to a limitation of available rooms in the Emergency Department. A plan of care was discussed based on the information obtained on the initial evaluation and patient was consuled to return back to the Emergency Department lobby after this initial evalutaiton until results were obtained or a room became available in the Emergency Department. Patient was counseled not to leave prior " to receiving the results of their workup.      Alexis Garrison MD  02/16/22 [id number]

## 2022-02-16 NOTE — ED NOTES
Patient back to room on 2L of oxygen, does normally wear oxygen at home.  Trial off oxygen and patients oxygen saturation dropped to 89% on room air.

## 2022-02-16 NOTE — ED PROVIDER NOTES
EMERGENCY DEPARTMENT ENCOUNTER      NAME: Rox COHN Lucas  AGE: 86 year old female  YOB: 1935  MRN: [id number]  EVALUATION DATE & TIME: No admission date for patient encounter.    PCP: Misbah Barone    ED PROVIDER: Robert Pino M.D.      Chief Complaint   Patient presents with     Leg Injury     Back Pain         IMPRESSION  1. Closed displaced fracture of pelvis, unspecified part of pelvis, initial encounter (H)    2. Fall from ground level    3. Other closed fracture of fourth thoracic vertebra, initial encounter (H)    4. Other closed fracture of seventh thoracic vertebra, initial encounter (H)        PLAN  - admit to hospitalist for further care with Orthopedic Surgery consulting; med/surg admit    ED COURSE & MEDICAL DECISION MAKING    ED Course as of 02/16/22 1524   Wed Feb 16, 2022   1516 86yoF with history of mild vascular dementia, HTN, HLD, no blood thinner use, diastolic CHF, right hip replacement (2008) who was found to have pelvis fracture at ortho clinic; discharged on Percocet but pain too much to walk so returned to the ED. Accompanied by her daughter. Usually lives alone in apartment and takes care of herself on her own. States she tripped and fell yesterday resulting in new right hip pain (groin fold) and mid back pain. No difficulty breathing, abdominal pain, nausea, vomiting, wound, bleeding, numbness, tingling, focal weakness. No head injury or LOC.    Imaging obtained and reveals right superior & inferior pubic rami fractures as well as transverse S2 sacral fracture, also with small endplate fractures at T4 & T7; no rib fractures noted. Fractures consistent with exam. No concern for spinal cord injury with normal neuro exam. No suggestion of ICH, skull fracture, c-spine injury. Screening workup obtained from triage after fall and blood tests unremarkable. Suspect mechanical fall. Discussed with Orthopedic Surgery who stated fractures are not operative; still requires  admission for pain control and likely rehab placement. Patient and daughter agreeable with this plan. Hospitalist paged for admission at this time.    Of note, patient with O2 sat measured at 86% on room air in triage, but now satting 94% on room air back in ED room; not requiring supplemental O2 at this time. Suspect may have been related to pain with poor breaths. Pain more controlled now in ED bed and after oxycodone/Tylenol. Patient agreeable with admission. Notes her code status is DNR/DNI.       1:30 PM I met with the patient for the initial interview and physical examination. Discussed plan for treatment and workup in the ED.  2:43 PM I rechecked patient.  2:57 PM I spoke to Dr. English with [facility] regarding plan for care. States fractures at not operative; recommends admission to hospitalist and ortho team will follow along.      This patient involved a high degree of complexity in medical decision making, as significant risks were present and assessed.    I wore the following PPE during this patient encounter:  N95 mask, face shield w/ eye protection, gloves    MEDICATIONS GIVEN IN THE EMERGENCY DEPARTMENT  Medications   oxyCODONE (ROXICODONE) tablet 5 mg (5 mg Oral Given 2/16/22 1439)   acetaminophen (TYLENOL) tablet 975 mg (975 mg Oral Given 2/16/22 1439)               =================================================================      HPI  Patient information was obtained from: Patient     Use of : N/A         Rox COHN Lucas is a 86 year old female with a pertinent history of CAD, diastolic CHF, CKD stage 3, HTN, HLD who presents to this ED via walk-in for evaluation of fall, leg pain.    Patient reports that she had a mechanical fall at her independent senior living facility yesterday. She was brought to [facility] by her son, had XR showing fractured pelvis. Patient endorses ongoing right hip and midline back pain which worsening overnight after the fall. States she has been  "unable to walk since the fall. She was given a prescription for percocet from [place], but states this has not provided any relief. Patient also endorses mild shortness of breath with sats 86% in triage, 3 L NC placed.      REVIEW OF SYSTEMS   Review of Systems   Respiratory: Positive for shortness of breath.    Musculoskeletal: Positive for arthralgias (right hip) and back pain.   All other systems reviewed and are negative.    All other systems reviewed and are negative except as noted above in HPI.        --------------- MEDICAL HISTORY ---------------  PAST MEDICAL HISTORY:  Past Medical History:   Diagnosis Date     Acute diastolic CHF (congestive heart failure) (H) 12/23/2014     Acute renal failure (H)      Anemia      Aortic valve stenosis, severe      Arrhythmia      Arthritis      Coronary artery disease 11/17/2014     Coronary artery disease      Dieulafoy lesion of stomach      Disease of thyroid gland      Essential hypertension 12/8/2015     Gastroesophageal reflux disease      GI bleed 6/1/2021     Hammer toe      Heart murmur      History of blood transfusion      Hyperlipidemia      Hypertension      Hypothyroidism     Created by [employer] updated for latest IMO load      Irregular heart beat      Macular disorder     \"wrinkle\"     MCI (mild cognitive impairment) 6/16/2019     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     Pressure injury of buttock, stage 1, unspecified laterality 6/16/2019     Rheumatoid arthritis (H)      S/P AVR 12/15/2014     Sacral insufficiency fracture, initial encounter 5/6/2019     Senile osteoporosis 9/23/2019     Patient Active Problem List   Diagnosis     Hammer Toe     Hemorrhoids     Psoriasis     Generalized Osteoarthritis Of The Hand     Osteoarthritis Of The Knee     Vitamin D Deficiency     Palpitations     Hypothyroidism     Neck Pain     Upper Back Pain (Between Shoulder Blades)     Osteopenia     Coronary artery disease     S/P AVR "     Constipation     Acute diastolic heart failure (H)     Essential hypertension     Hyperlipidemia     Coronary artery disease involving coronary bypass graft of native heart with other forms of angina pectoris (H)     Psoriatic arthritis (H)     High risk medication use     Sacral insufficiency fracture, initial encounter     Hypertension     Back pain     Pressure injury of buttock, stage 1, unspecified laterality     MCI (mild cognitive impairment)     Frequent PVCs     Senile osteoporosis     Hemorrhagic shock (H)     JOSIAH (acute kidney injury) (H)     Lactic acidemia     Acute GI bleeding     Acute blood loss anemia     Melena     Syncope, unspecified syncope type     Anemia due to blood loss, acute     Dieulafoy lesion of stomach     Gastric ulcer due to Helicobacter pylori, acute     Stage 3 chronic kidney disease, unspecified whether stage 3a or 3b CKD (H)     Mild vascular neurocognitive disorder (H)     Thrombocytopenia (H)     Closed nondisplaced fracture of greater trochanter of right femur, initial encounter (H)     Acute gastric ulcer with hemorrhage     Fall, initial encounter       PAST SURGICAL HISTORY:  Past Surgical History:   Procedure Laterality Date     ANGIOPLASTY / STENTING FEMORAL       AORTIC VALVE REPLACEMENT       CARDIAC SURGERY      CABG     ESOPHAGOSCOPY, GASTROSCOPY, DUODENOSCOPY (EGD), COMBINED N/A 11/16/2021    Procedure: ESOPHAGOGASTRODUODENOSCOPY;  Surgeon: Enrico Galan MD;  Location: [facility] Main OR     EYE SURGERY Bilateral     cataracts     HC REMOVAL GALLBLADDER      Description: Cholecystectomy;  Recorded: 03/20/2008;     [place] ESOPHAGOGASTRODUODENOSCOPY TRANSORAL DIAGNOSTIC N/A 6/1/2021    Procedure: ESOPHAGOGASTRODUODENOSCOPY (EGD) with biopsies;  Surgeon: Julio Lopez MD;  Location: [facility];  Service: Gastroenterology     CO ESOPHAGOGASTRODUODENOSCOPY TRANSORAL DIAGNOSTIC N/A 6/6/2021    Procedure: ESOPHAGOGASTRODUODENOSCOPY (EGD) with bicap cauterization;   Surgeon: Julio Lopez MD;  Location: [facility];  Service: Gastroenterology     [place] ESOPHAGOGASTRODUODENOSCOPY TRANSORAL DIAGNOSTIC N/A 6/7/2021    Procedure: ESOPHAGOGASTRODUODENOSCOPY (EGD) with hemoclip;  Surgeon: Sam Brock MD;  Location: [facility];  Service: Gastroenterology     [facility] LIGATE FALLOPIAN TUBE      Description: Tubal Ligation;  Recorded: 03/20/2008;     [facility] TOTAL HIP ARTHROPLASTY Right     Description: Total Hip Replacement;  Recorded: 03/20/2008; right       CURRENT MEDICATIONS:    No current facility-administered medications for this encounter.    Current Outpatient Medications:      atorvastatin (LIPITOR) 40 MG tablet, Take 1 tablet (40 mg) by mouth daily For lipids, Disp: 90 tablet, Rfl: 0     calcium carbonate-vitamin D (OYSTER SHELL CALCIUM/D) 500-200 MG-UNIT tablet, Take 1 tablet by mouth daily, Disp: , Rfl:      cholecalciferol 50 MCG (2000 UT) tablet, Take 1 tablet (50 mcg) by mouth daily, Disp: 100 tablet, Rfl: 1     donepezil (ARICEPT) 10 MG tablet, Take 1 tablet (10 mg) by mouth At Bedtime dementia, Disp: 90 tablet, Rfl: 3     ferrous gluconate (FERGON) 324 (38 Fe) MG tablet, Take 1 tablet (324 mg) by mouth daily (with breakfast), Disp: 90 tablet, Rfl: 0     folic acid (FOLVITE) 1 MG tablet, Take 1 tablet (1,000 mcg) by mouth daily General health, Disp: 90 tablet, Rfl: 0     isosorbide mononitrate (IMDUR) 30 MG 24 hr tablet, Take 1 tablet (30 mg) by mouth daily, Disp: , Rfl:      lisinopril (ZESTRIL) 20 MG tablet, Take 1 tablet (20 mg) by mouth daily htn, Disp: 90 tablet, Rfl: 0     methotrexate sodium 2.5 MG TABS, Take 3 tablets (7.5 mg) by mouth once a week Fridays, Disp: 36 tablet, Rfl: 0     metoprolol succinate ER (TOPROL-XL) 25 MG 24 hr tablet, Take 1 tablet (25 mg) by mouth daily htn, Disp: , Rfl:      omeprazole (PRILOSEC) 40 MG DR capsule, Take 1 capsule (40 mg) by mouth daily, Disp: 60 capsule, Rfl: 3     pantoprazole (PROTONIX) 40 MG EC tablet, Take 1 tablet (40  mg) by mouth every morning (before breakfast), Disp: 90 tablet, Rfl: 0     polyethylene glycol (MIRALAX) 17 GM/Dose powder, Take 17 g by mouth daily To prevent constiaption, Disp: , Rfl:      potassium chloride ER (KLOR-CON M) 10 MEQ CR tablet, , Disp: , Rfl:      senna-docusate (SENOKOT-S/PERICOLACE) 8.6-50 MG tablet, Take 1 tablet by mouth 2 times daily Hold if loose stools, Disp: , Rfl:      SYNTHROID 100 MCG tablet, TAKE 1 TABLET EVERY DAY AT 6:00 A.M., Disp: 90 tablet, Rfl: 3     vitamin C (ASCORBIC ACID) 500 MG tablet, Take 1 tablet (500 mg) by mouth daily, Disp: , Rfl:     ALLERGIES:  No Known Allergies    FAMILY HISTORY:  Family History   Problem Relation Age of Onset     Asthma Mother      Coronary Artery Disease No family hx of      Breast Cancer No family hx of        SOCIAL HISTORY:   Social History     Socioeconomic History     Marital status:      Spouse name: Not on file     Number of children: Not on file     Years of education: Not on file     Highest education level: Not on file   Occupational History     Not on file   Tobacco Use     Smoking status: Former Smoker     Packs/day: 1.00     Years: 30.00     Pack years: 30.00     Types: Cigarettes     Quit date: 1995     Years since quittin.1     Smokeless tobacco: Never Used   Substance and Sexual Activity     Alcohol use: Yes     Alcohol/week: 1.0 standard drink     Drug use: No     Sexual activity: Not on file   Other Topics Concern     Not on file   Social History Narrative    , patient notes she and her  were  for 60 years and he passed away 12 years ago following an illness and complications. Patient notes they had a happy marriage.  Children: Patient notes she had 4 children, one daughter passed away fo llowing a MVA when daughter was 57 years old. Leisa states she is close to her two boys, daughter, grandchildren, and great-grandchildren and finds her family supportive.     Lives in a town home independently..  "Now in senior living.    [facility] independent living.    Misbah Barone MD  7/2/2020           Social Determinants of Health     Financial Resource Strain: Not on file   Food Insecurity: Not on file   Transportation Needs: Not on file   Physical Activity: Not on file   Stress: Not on file   Social Connections: Not on file   Intimate Partner Violence: Not on file   Housing Stability: Not on file         --------------- PHYSICAL EXAM ---------------  Nursing notes and vitals reviewed by me.  VITALS:  Vitals:    02/16/22 1224 02/16/22 1500   BP: 109/59 134/80   Pulse: 62 69   Resp: 20    Temp: 98.3  F (36.8  C)    SpO2: (!) 86% 95%   Weight: 54.4 kg (120 lb)    Height: 1.626 m (5' 4\")        PHYSICAL EXAM:    General:  alert, interactive, no distress  Eyes:  conjunctivae clear, conjugate gaze  HENT:  atraumatic, nose with no rhinorrhea, oropharynx clear  Neck:  no meningismus, no c-spine tenderness with painless active ROM intact  Cardiovascular:  HR 60s during exam, regular rhythm, no murmurs, brisk cap refill  Chest:  no chest wall tenderness  Pulmonary:  no stridor, normal phonation, normal work of breathing, clear lungs bilaterally  Abdomen:  soft, nondistended, nontender  :  no CVA tenderness  Back:  Focal mid T-spine tenderness with no stepoff or visual evidence of trauma, no midline L spine tenderness, no crepitus  Musculoskeletal:  Pain with ROM of right hip and tenderness to pelvis, no pain with ROM of RLE knee or ankle with distal CMS intact, LLE atraumatic with painless active ROM intact, overlying skin intact, distal CMS intact  - BUE atraumatic with painless active ROM intact & distal CMS intact  Skin:  warm, dry, no rash  Neuro:  awake, alert, answers questions appropriately, follows commands, moves all limbs, CN 2-12 intact, negative pronator drift, 5/5 strength to all extremities with sensation to light touch intact, no tremor, no ankle clonus  Psych:  calm, normal " affect      --------------- RESULTS ---------------  EKG:    Reviewed and interpreted by me.  NSR at 64bpm, no ST or T wave changes, unchanged bifascicular block with  (prior 140) with bigeminy pattern  Unchanged from prior on 10/10/2021  My read.    LAB:  Reviewed and interpreted by me.  Results for orders placed or performed during the hospital encounter of 02/16/22   Lumbar spine CT w/o contrast    Impression    IMPRESSION:  1.  No evidence of acute displaced fracture.  2.  No evidence of traumatic subluxation.  3.  Multilevel degenerative change.  4.  CT of the pelvis is reported separately.   CT Thoracic Spine w/o Contrast    Impression    IMPRESSION:  1.  Mild T4 superior endplate fracture, favored to be acute.  2.  Mild T7 superior endplate fracture, favored to be acute.  3.  Moderate T10 vertebral body height loss. This is age-indeterminate but favored to represent chronic fracture deformity. Clinical correlation recommended.  4.  No evidence of traumatic subluxation.  5.  Degenerative changes.  6.  Osteopenia.     CT Pelvis Bone wo Contrast    Impression    IMPRESSION:  1.  Bilateral sacral ala fractures and transverse fracture across S2.  There are also mildly displaced right superior and inferior pubic  ramus fractures.  2.  Degenerative changes in the visualized lumbar spine.  3.  Right total hip arthroplasty with a subacute healing  periprosthetic fracture of the greater trochanter.  4.  Moderate to severe osteoarthrosis in the left hip.  5.  Gluteal muscle atrophy bilaterally.  6.  Open wound in the posteromedial aspect of the upper left thigh  with a small amount of underlying soft tissue gas and perhaps a small  fluid collection concerning for abscess. Clinical correlation needed.  There is no evidence of osteomyelitis.    KELTON CONKLIN MD         SYSTEM ID:  [id number]   Chest XR,  PA & LAT    Impression    IMPRESSION:   Sternotomy. AVR. CABG. Coronary artery stenting. Heart size upper  normal. Lungs may be mildly hyperinflated. A few strands of fibrosis or linear atelectasis in the lower lungs unchanged. No focal pulmonary consolidation. No pleural effusion. Calcified   tortuous thoracic aorta. Bones are demineralized. Mild age indeterminate compression T6 and T9 vertebral bodies.   Result Value Ref Range    INR 1.25 (H) 0.90 - 1.15   Comprehensive metabolic panel   Result Value Ref Range    Sodium 130 (L) 136 - 145 mmol/L    Potassium 5.6 (H) 3.5 - 5.0 mmol/L    Chloride 98 98 - 107 mmol/L    Carbon Dioxide (CO2) 22 22 - 31 mmol/L    Anion Gap 10 5 - 18 mmol/L    Urea Nitrogen 15 8 - 28 mg/dL    Creatinine 1.11 (H) 0.60 - 1.10 mg/dL    Calcium 9.1 8.5 - 10.5 mg/dL    Glucose 104 70 - 125 mg/dL    Alkaline Phosphatase 111 45 - 120 U/L    AST 27 0 - 40 U/L    ALT 16 0 - 45 U/L    Protein Total 7.0 6.0 - 8.0 g/dL    Albumin 3.6 3.5 - 5.0 g/dL    Bilirubin Total 1.2 (H) 0.0 - 1.0 mg/dL    GFR Estimate 49 (L) >60 mL/min/1.73m2   Troponin I (now)   Result Value Ref Range    Troponin I 0.03 0.00 - 0.29 ng/mL   CBC with platelets and differential   Result Value Ref Range    WBC Count 8.7 4.0 - 11.0 10e3/uL    RBC Count 3.78 (L) 3.80 - 5.20 10e6/uL    Hemoglobin 12.3 11.7 - 15.7 g/dL    Hematocrit 37.9 35.0 - 47.0 %     78 - 100 fL    MCH 32.5 26.5 - 33.0 pg    MCHC 32.5 31.5 - 36.5 g/dL    RDW 15.1 (H) 10.0 - 15.0 %    Platelet Count 131 (L) 150 - 450 10e3/uL   ECG 12-LEAD WITH MUSE (LHE)   Result Value Ref Range    Systolic Blood Pressure  mmHg    Diastolic Blood Pressure  mmHg    Ventricular Rate 64 BPM    Atrial Rate 64 BPM    MS Interval 204 ms    QRS Duration 130 ms     ms    QTc 482 ms    P Axis 76 degrees    R AXIS -58 degrees    T Axis 94 degrees    Interpretation ECG       Sinus rhythm in a pattern of bigeminy with Possible Premature atrial complexes with Aberrant conduction  Right bundle branch block  Left anterior fascicular block  ** Bifascicular block **  Abnormal ECG  When  compared with ECG of 11-OCT-2021 04:47,  Premature ventricular complexes are no longer Present  Aberrant conduction is now Present  AZ interval has decreased  Confirmed by SEE ED PROVIDER NOTE FOR, ECG INTERPRETATION ([id number]),  AZ CASTRO ([id number]) on 2/16/2022 3:16:35 PM         RADIOLOGY:  Reviewed by me. Please see official radiology report.  Recent Results (from the past 24 hour(s))   CT Thoracic Spine w/o Contrast    Narrative    EXAM: CT THORACIC SPINE W/O CONTRAST  LOCATION: [facility]  DATE/TIME: 2/16/2022 1:17 PM    INDICATION: Trauma, pain.  COMPARISON: None available.  TECHNIQUE: Routine CT Thoracic Spine without IV contrast. Multiplanar reformats. Dose reduction techniques were used.     FINDINGS:  ALIGNMENT: Slight exaggeration of the normal thoracic kyphosis. Mild T3-T4 anterolisthesis. Mild broad-based dextroconvex curvature.    BONES: There is 50-60% height loss of the T10 vertebral body with sclerosis along the inferior endplate. This is age-indeterminate but favored to be chronic. There is mild superior endplate height loss of the T7 vertebral body with slight buckling of the   posterior vertebral body cortex, favored to be acute. Mild T4 superior endplate height loss is also favored to represent an acute fracture, with 1 mm superior endplate retropulsion. There is diffuse osteopenia. No destructive osseous lesions are   demonstrated.    DISCS: No large posterior disc abnormalities. Mild multilevel spondylosis.    SPINAL CANAL: No evidence for epidural hematoma. No high-grade spinal canal stenosis.    NEURAL FORAMINA: Moderate left T10-T11 neural foraminal stenosis. Multilevel facet ankylosis in the upper thoracic spine.    PARASPINAL SOFT TISSUES: Unremarkable.    THORACIC CAVITY: Visualized lung fields are well-aerated. There are postoperative changes of prior CABG and aortic valve replacement.      Impression    IMPRESSION:  1.  Mild T4 superior endplate fracture,  favored to be acute.  2.  Mild T7 superior endplate fracture, favored to be acute.  3.  Moderate T10 vertebral body height loss. This is age-indeterminate but favored to represent chronic fracture deformity. Clinical correlation recommended.  4.  No evidence of traumatic subluxation.  5.  Degenerative changes.  6.  Osteopenia.     Lumbar spine CT w/o contrast    Narrative    EXAM: CT LUMBAR SPINE W/O CONTRAST  LOCATION: [facility]  DATE/TIME: 2/16/2022 1:16 PM    INDICATION: Trauma, pain.  COMPARISON: Radiograph 02/15/2022. Abdominal CT 06/06/2021. Lumbar spine MRI 05/06/2019.  TECHNIQUE: Routine CT Lumbar Spine without IV contrast. Multiplanar reformats. Dose reduction techniques were used.     FINDINGS:  NOMENCLATURE: Five lumbar-type vertebral bodies.    ALIGNMENT: Mild L3-L4, L2-L3 and L1-L2 retrolisthesis. Mild levoconvex curvature.    BONES: Vertebral body heights are unremarkable and unchanged. There is no evidence of acute displaced fractures. No destructive bony lesions are apparent. Unchanged chronic presumed fracture deformity of the S2 sacral segment. CT of the pelvis is   reported separately. Moderate lower lumbar facet arthropathy.    DISCS: Mild to moderate multilevel spondylosis, most pronounced at L5-S1, where there is moderate intervertebral disc height loss and broad-based disc bulging. There is also shallow disc bulging at L3-L4, L2-L3 and L1-L2.    SPINAL CANAL: No evidence of epidural hematoma. No high-grade stenosis demonstrated. Mild-to-moderate spinal canal narrowing at L3-L4. There is moderate bilateral lateral recess stenosis at this level, as well.    NEURAL FORAMINA: At L5-S1, there is moderate-to-severe right and moderate left neural foraminal stenosis. At L3-L4, there is severe right and moderate left neural foraminal stenosis.    SOFT TISSUES: Unremarkable.    ABDOMINAL CAVITY: Atherosclerotic calcification of the abdominal aorta and its branches. Status post  cholecystectomy.      Impression    IMPRESSION:  1.  No evidence of acute displaced fracture.  2.  No evidence of traumatic subluxation.  3.  Multilevel degenerative change.  4.  CT of the pelvis is reported separately.   CT Pelvis Bone wo Contrast    Narrative    EXAM: CT PELVIS BONE WO CONTRAST  LOCATION: [facility]  DATE/TIME: 2/16/2022 1:17 PM    INDICATION: Pain.  COMPARISON: None.  TECHNIQUE: CT scan of the pelvis was performed without IV contrast.  Multiplanar reformats were obtained. Dose reduction techniques were  used.  CONTRAST: None.    FINDINGS:    PELVIS ORGANS: No free fluid in the pelvis. Extensive arterial  calcifications.    MUSCULOSKELETAL:     BONES AND JOINTS: Moderate degenerative changes in the visualized  lumbar spine.    Mildly displaced sacral alar fracture bilaterally. There is also a  transverse fracture across the sacrum at the S2 level, mildly  displaced. Minimally displaced comminuted fracture in the right  inferior pubic ramus. Minimally displaced fracture of the lateral  aspect of the right superior pubic ramus.    Right total hip arthroplasty. There is a periprosthetic fracture of  the greater trochanter, mildly to moderately displaced with bridging  callus across fracture fragments indicating that they are subacute and  healing.    Moderate to severe osteoarthrosis in the left hip.    MUSCLES AND TENDONS: Moderate gluteus minimus muscle atrophy  bilaterally. Moderate right gluteus medius muscle atrophy. No gross  tendon pathology.    OTHER SOFT TISSUES: There is an open wound at the posteromedial aspect  of the upper left thigh. There is relatively low-attenuation centrally  over a 1.5 x 1.5 x 1 cm area, so abscess cannot be ruled out. There is  also a small amount of soft tissue gas in this area. There is adjacent  soft tissue edema. No evidence of underlying osteomyelitis.      Impression    IMPRESSION:  1.  Bilateral sacral ala fractures and transverse  fracture across S2.  There are also mildly displaced right superior and inferior pubic  ramus fractures.  2.  Degenerative changes in the visualized lumbar spine.  3.  Right total hip arthroplasty with a subacute healing  periprosthetic fracture of the greater trochanter.  4.  Moderate to severe osteoarthrosis in the left hip.  5.  Gluteal muscle atrophy bilaterally.  6.  Open wound in the posteromedial aspect of the upper left thigh  with a small amount of underlying soft tissue gas and perhaps a small  fluid collection concerning for abscess. Clinical correlation needed.  There is no evidence of osteomyelitis.    KELTON CONKLIN MD         SYSTEM ID:  [id number]   Chest XR,  PA & LAT    Narrative    EXAM: XR CHEST 2 VW  LOCATION: [facility]  DATE/TIME: 2/16/2022 1:20 PM    INDICATION: Hypoxia.  COMPARISON: 10/11/2021 CXR.      Impression    IMPRESSION:   Sternotomy. AVR. CABG. Coronary artery stenting. Heart size upper normal. Lungs may be mildly hyperinflated. A few strands of fibrosis or linear atelectasis in the lower lungs unchanged. No focal pulmonary consolidation. No pleural effusion. Calcified   tortuous thoracic aorta. Bones are demineralized. Mild age indeterminate compression T6 and T9 vertebral bodies.         I, Rodriguez Ye, am serving as a scribe to document services personally performed by Dr. Robert Pino based on my observation and the provider's statements to me. I, Robert Pino MD attest that Rodriguez Ye is acting in a scribe capacity, has observed my performance of the services and has documented them in accordance with my direction.      Robert Pino MD  02/16/22  Emergency Medicine  [facility] EMERGENCY DEPARTMENT  [address] [place] [id number]  [phone]  Dept: [phone]       Robert Pino MD  02/16/22 [id number]

## 2022-02-16 NOTE — ED PROVIDER NOTES
3:48 PM I spoke with hospitalist, Dr. Mccarty, who accepts for hospitalization.       Jass Lira MD  02/16/22 [id number]

## 2022-02-16 NOTE — H&P
[facility] History and Physical       A/P    Bilateral sacral ala fractures and transverse fracture across S2    Mildly displaced right superior and inferior pubic ramus fractures:  -orthopedic surgery notes NWB except for transfers, no surgical intervention, pain control and outpatient f/u in ortho clinic in 4 weeks    Acute T4/T7 superior endplate fractures:  -pain control  -neurosurgery consult    Acute hyperkalemia:  -low K diet  -check CK  -repeat K tonight and BMP a.m.  -NS 50ml/h x 15hrs    Vascular dementia:  Mild.  Stable. Monitor for delirium  -aricept    HTN:   -toprol xl  -hold lisinopril given high K  -hydralazine prn    CAD: no anginal complaints    S/P Bioprosthetic AVR:  TTE 10/202 notes valve is grossly well seated. Mean gradient of 12 mmHg with peak velocity of 2.3 m/s. No observed prosthetic valve insufficiency.    Left thigh wound: per CT report but no open wound present on exam.    DNR/I    covid neg 2/16/22          Chief Complaint:     Pelvic pain     HPI:    86yoF with history of mild vascular dementia, HTN, HLD, CAD, s/p bioprosthetic AVR not on chronic anticoagulation who sustained a mechanical fall at her independent senior living facility yesterday. She was brought to [facility] by her son yesterday where she had reported xray's performed that demonstrated pelvic fractures.  She was then sent home with Percocet.  She presented to the ED today due to poorly controlled pelvic pain, back pain, and inability to ambulate due to pain not controlled with the percocet.  She underwent CT of the pelvis which showed Bilateral sacral ala fractures and transverse fracture across S2, mildly displaced right superior and inferior pubic ramus fractures, Open wound in the posteromedial aspect of the upper left thigh with a small amount of underlying soft tissue gas and perhaps a small fluid collection concerning for abscess. CT L spine negative for acute fracture/dislocation.  " CT T spine showed acute T4/T7 superior endplate fractures.  Admitted under observation for further evaluation and management.         Past Medical History:     Past Medical History:   Diagnosis Date     Acute diastolic CHF (congestive heart failure) (H) 12/23/2014     Acute renal failure (H)      Anemia      Aortic valve stenosis, severe      Arrhythmia      Arthritis      Coronary artery disease 11/17/2014     Coronary artery disease      Dieulafoy lesion of stomach      Disease of thyroid gland      Essential hypertension 12/8/2015     Gastroesophageal reflux disease      GI bleed 6/1/2021     Hammer toe      Heart murmur      History of blood transfusion      Hyperlipidemia      Hypertension      Hypothyroidism     Created by [employer] updated for latest IMO load      Irregular heart beat      Macular disorder     \"wrinkle\"     MCI (mild cognitive impairment) 6/16/2019     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     Pressure injury of buttock, stage 1, unspecified laterality 6/16/2019     Rheumatoid arthritis (H)      S/P AVR 12/15/2014     Sacral insufficiency fracture, initial encounter 5/6/2019     Senile osteoporosis 9/23/2019             Past Surgical History:      Past Surgical History:   Procedure Laterality Date     ANGIOPLASTY / STENTING FEMORAL       AORTIC VALVE REPLACEMENT       CARDIAC SURGERY      CABG     ESOPHAGOSCOPY, GASTROSCOPY, DUODENOSCOPY (EGD), COMBINED N/A 11/16/2021    Procedure: ESOPHAGOGASTRODUODENOSCOPY;  Surgeon: Enrico Galan MD;  Location: [facility] Main OR     EYE SURGERY Bilateral     cataracts     HC REMOVAL GALLBLADDER      Description: Cholecystectomy;  Recorded: 03/20/2008;     NE ESOPHAGOGASTRODUODENOSCOPY TRANSORAL DIAGNOSTIC N/A 6/1/2021    Procedure: ESOPHAGOGASTRODUODENOSCOPY (EGD) with biopsies;  Surgeon: Julio Lopez MD;  Location: [facility];  Service: Gastroenterology     NE ESOPHAGOGASTRODUODENOSCOPY TRANSORAL DIAGNOSTIC " "N/A 2021    Procedure: ESOPHAGOGASTRODUODENOSCOPY (EGD) with bicap cauterization;  Surgeon: Julio Lopez MD;  Location: [facility];  Service: Gastroenterology     TX ESOPHAGOGASTRODUODENOSCOPY TRANSORAL DIAGNOSTIC N/A 2021    Procedure: ESOPHAGOGASTRODUODENOSCOPY (EGD) with hemoclip;  Surgeon: Sam Brock MD;  Location: [facility];  Service: Gastroenterology     [facility] LIGATE FALLOPIAN TUBE      Description: Tubal Ligation;  Recorded: 2008;     [facility] TOTAL HIP ARTHROPLASTY Right     Description: Total Hip Replacement;  Recorded: 2008; right             Social History:     Social History     Tobacco Use     Smoking status: Former Smoker     Packs/day: 1.00     Years: 30.00     Pack years: 30.00     Types: Cigarettes     Quit date: 1995     Years since quittin.1     Smokeless tobacco: Never Used   Substance Use Topics     Alcohol use: Yes     Alcohol/week: 1.0 standard drink             Family History:     Family History   Problem Relation Age of Onset     Asthma Mother      Coronary Artery Disease No family hx of      Breast Cancer No family hx of      Family history reviewed and updated in EPIC            Allergies:   No Known Allergies          Medications:   reviewed         Review of Systems:     The Review of Systems is negative other than noted in the HPI      /80   Pulse 69   Temp 98.3  F (36.8  C)   Resp 20   Ht 1.626 m (5' 4\")   Wt 54.4 kg (120 lb)   SpO2 95%   BMI 20.60 kg/m     Physical Examination:   General appearance - alert, and in no distress  Eyes - pupils equal and reactive, extraocular eye movements intact, sclera anicteric  Mouth - mucous membranes moist, pharynx normal without lesions  Lungs - clear to auscultation, no wheezes, rales or rhonchi, symmetric air entry  Heart - normal rate, regular rhythm, normal S1, S2, no murmurs, rubs, clicks or gallops. No peripheral edema.  Abdomen - soft, nontender, nondistended, no masses or organomegaly, " BS+  Neurological - alert, oriented, normal speech, no focal findings or movement disorder noted  Skin - no c/c/p   MSK                Data:   Lab/imaging studies reviewed           Kwesi Mccarty DO, DO

## 2022-02-16 NOTE — ED TRIAGE NOTES
Patient to triage via wheelchair, yesterday tripped and fell, pain at right grooin and back. Went to [facility] and xray showed fracture at pelvis. sats at 86% on RA. Oxygen 3l placed. Daughter will stay with patient in lobby, is aware no visitors when she gets back to room
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
## 2022-02-17 PROBLEM — N17.9 AKI (ACUTE KIDNEY INJURY) (H): Status: RESOLVED | Noted: 2021-06-01 | Resolved: 2022-01-01

## 2022-02-17 PROBLEM — G31.84 MCI (MILD COGNITIVE IMPAIRMENT): Status: ACTIVE | Noted: 2019-06-16

## 2022-02-17 PROBLEM — R55 SYNCOPE, UNSPECIFIED SYNCOPE TYPE: Status: RESOLVED | Noted: 2021-06-06 | Resolved: 2022-01-01

## 2022-02-17 PROBLEM — M81.0 SENILE OSTEOPOROSIS: Status: ACTIVE | Noted: 2019-09-23

## 2022-02-17 PROBLEM — D62 ACUTE BLOOD LOSS ANEMIA: Status: RESOLVED | Noted: 2021-06-01 | Resolved: 2022-01-01

## 2022-02-17 PROBLEM — K92.2 ACUTE GI BLEEDING: Status: RESOLVED | Noted: 2021-06-01 | Resolved: 2022-01-01

## 2022-02-17 PROBLEM — D62 ANEMIA DUE TO BLOOD LOSS, ACUTE: Status: RESOLVED | Noted: 2021-06-06 | Resolved: 2022-01-01

## 2022-02-17 PROBLEM — R57.8 HEMORRHAGIC SHOCK (H): Status: RESOLVED | Noted: 2021-06-01 | Resolved: 2022-01-01

## 2022-02-17 NOTE — CONSULTS
"Care Management Initial Consult    General Information  Assessment completed with: Stewart, Yeimi daughter via phone  Type of CM/SW Visit: Initial Assessment    Primary Care Provider verified and updated as needed: Yes   Readmission within the last 30 days: no previous admission in last 30 days      Reason for Consult: discharge planning  Advance Care Planning: Advance Care Planning Reviewed: present on chart          Communication Assessment  Patient's communication style: spoken language (English or Bilingual)             Cognitive  Cognitive/Neuro/Behavioral:                        Living Environment:   People in home: alone     Current living Arrangements: apartment, independent living facility (\"[facility]\")      Able to return to prior arrangements: no (likely needs TCU)       Family/Social Support:  Care provided by: self  Provides care for: no one, unable/limited ability to care for self  Marital Status:   Children          Description of Support System: Supportive, Involved    Support Assessment: Adequate family and caregiver support, Adequate social supports, Patient communicates needs well met    Current Resources:   Patient receiving home care services: No     Community Resources: None  Equipment currently used at home: walker, rolling  Supplies currently used at home: Other (\"dentures, glasses; does not use oxygen\")    Employment/Financial:  Employment Status: retired        Financial Concerns:     Referral to Financial Worker: No       Lifestyle & Psychosocial Needs:  Social Determinants of Health     Tobacco Use: Medium Risk     Smoking Tobacco Use: Former Smoker     Smokeless Tobacco Use: Never Used   Alcohol Use: Not on file   Financial Resource Strain: Not on file   Food Insecurity: Not on file   Transportation Needs: Not on file   Physical Activity: Not on file   Stress: Not on file   Social Connections: Not on file   Intimate Partner " "Violence: Not on file   Depression: Not at risk     PHQ-2 Score: 1   Housing Stability: Not on file       Functional Status:  Prior to admission patient needed assistance:   Dependent ADLs:: Ambulation-walker  Dependent IADLs:: Shopping, Money Management, Transportation (\"family helps\")  Assesssment of Functional Status: Not at baseline with ADL Functioning, Not at baseline with mobility, Not at  functional baseline    Mental Health Status:          Chemical Dependency Status:                Values/Beliefs:  Spiritual, Cultural Beliefs, Denominational Practices, Values that affect care:                 Additional Information:  Rox lives at [facility] in Independent Living.    She uses her walker for mobility. She has dentures and glasses. There is a note in the ER chart that she is on oxygen at home and per her son \"she is not on home oxygen\".    Family helps with \"shopping and transportation\". She is otherwise independent with ADLs.    She will need a TCU at discharge. Referrals sent to both of below choices.  Choices:  1. [facility]  2. [facility].    [facility] transport at discharge, unless she can get into family's car without too much pain.    Billy son [phone]. (\"Please call me for any questions. I will be the point of contact and my sister Yeimi will be the one visiting my mom in the hospital\").    Fanta Burdick RN      "

## 2022-02-17 NOTE — PROGRESS NOTES
Care Management Follow Up    Length of Stay (days): 0    Expected Discharge Date: 02/18/2022       Concerns to be Addressed:   Care post fall with pelvic and vertebral fractures. Therapy to evaluate when appropriate. Pain control.  Patient plan of care discussed at interdisciplinary rounds: Yes    Anticipated Discharge Disposition:  Likely to need transitional care (TCU) at discharge.      Anticipated Discharge Services:  Continued therapy and skilled nursing.   Anticipated Discharge DME:  Per therapy (if indicated).    Patient/family educated on Medicare website which has current facility and service quality ratings:  Yes  Education Provided on the Discharge Plan:  Per team  Patient/Family in Agreement with the Plan:  Yes    Referrals Placed by CM/SW:  See below  Private pay costs discussed: Observation services/MOON reviewed in ED.      Additional Information:  Patient lives at [facility] in an Independent Living apartment. She uses her walker for mobility and has dentures and glasses. Her son states that she does not use oxygen at home.  Family helps with shopping and transportation but she is otherwise independent with activities of daily living.   She will likely need a TCU at discharge. Referrals sent to both of below choices.  Choices:  1. [facility] (Does accept [facility] and will review. Might have a bed on Friday or Saturday)  2. [facility] (Does accept [facility] and will review)     [facility] transport at discharge (unless she can get into family's car without too much pain).     Primary family contact is patient's son Billy [phone] (he asks staff to call him for any questions. He will be the point of contact and his sister Yeimi will be the one visiting patient in the hospital).    2:39 PM:  TCU is wondering about skilled need: Per ortho note on 2/16, patient will be 'non-weightbearing' for 4 weeks. If unable to participate in therapy, will not qualify for TCU.   2:59 PM:   Reviewed with ortho who states patient is non-weight bearing but will re-evaluate tomorrow.  If patient is non-weight bearing and unable to participate in therapy will be considered 'longterm care' and may need long term care placement (at least until able to participate in therapy).   3:29 PM:  Per ortho, patient can be partial weight bearing for transfers. Update provided to admissions who is still awaiting therapy notes.     Mona Frazier RN

## 2022-02-17 NOTE — DISCHARGE INSTRUCTIONS
Due to the fracture in your back -   *No lifting, pushing or pulling greater than 5-10 pound (this is about a gallon of milk).  *No repetitive bending, twisting, or jarring activities  *No overhead work  *No aerobic or strenuous activity  *No activities with increased risk of falls  *You may move about your home as tolerated  *You may walk up and down stairs as tolerated    Neurosurgery will arrange follow up for your back fracture, if you have questions/concerns/worsening symptoms please call [phone]

## 2022-02-17 NOTE — PROGRESS NOTES
[facility]    Medicine Progress Note - Hospitalist Service       Date of Admission:  2/16/2022    Assessment & Plan            Rox COHN Lucas is a 86 year old female admitted on 2/16/2022. She has history of CHF, coronary artery disease, bioprosthetic aortic valve, hypertension, mild dementia, rheumatoid arthritis, osteoporosis presented for evaluation of worsening leg pain after a fall found to have vertebral fractures in addition to known pelvic fractures. Hospital Day: 2     #Bilateral sacral ala fractures and transverse fracture across S2   #Mildly displaced right superior and inferior pubic ramus fractures  -She fell on 2/15 (mechanical trip & fall), seen afterward [facility] but was initially able to walk and sent home with pain meds, now returns with worsening pain and unable to walk  -orthopedic surgery advise non weight bearing except for transfers, no surgical intervention, pain control and outpatient f/u in ortho clinic in 4 weeks  -She will benefit from rehabilitation and strengthening of leg and arm muscles, potentially learn self-propelled wheelchair while waiting for return of ability to weight-bear  -PT and OT to assess     #Acute T4/T7 superior endplate fractures:  -pain control  -neurosurgery saw and after discussion with patient, patient has elected no brace.  Planning x-rays now and in 2 weeks, and if worsening, the issue of brace will be revisited. Ok to be up ad kristine per Neurosurg (though remains NWB per Ortho as above)     #Acute hyperkalemia:  -5.6 on arrival. Better now after IVF and low K diet and holding lisinopril  -continue low K diet     #Vascular dementia:  Mild.  Stable. Monitor for delirium  -home aricept     #HTN:   -home toprol xl, Imdur  -holding lisinopril given high K  -hydralazine prn     #CAD: no anginal complaints.  Continue home statin, Imdur, metoprolol     #S/P Bioprosthetic AVR:  TTE 10/2020 notes valve is grossly well seated. Mean gradient  of 12 mmHg with peak velocity of 2.3 m/s. No observed prosthetic valve insufficiency.     #Left thigh/groin ?soft tissue infection: CT showing possible soft tissue infection in her left proximal posteromedial thigh. From what I can see, this is in a dependent region very near her groin (Series 4, image 127 on pelvis bone CT). Asymptomatic. Would monitor with routine skin checks by nursing.    #Acute hypoxic resp fail  Needing 1-2L O2 today  CXR unremarkable  ?Atelectasis  IS and trend    #Hypothyroidism, home Synthroid  #Rheumatoid arthritis, okay to resume home methotrexate       Diet: Regular Diet Adult Other - please comment    DVT Prophylaxis: Moderate risk. SCDs   Rogers Catheter: Not present  Central Lines: None  Code Status: No CPR- Do NOT Intubate      Disposition Plan   Disposition: TCU when bed found  Discharge barriers: placement  Medically ready to discharge today: Yes  Estimated discharge date: 02/18/2022     The patient's care was discussed with the Patient and Patient's Family.    Chiquis Dennis MD  Hospitalist Service  [facility]  Text page via [facility] Paging/Directory      Clinically Significant Risk Factors Present on Admission              # Coagulation Defect: INR = 1.25 (Ref range: 0.90 - 1.15) and/or PTT = N/A on admission, will monitor for bleeding  # Thrombocytopenia: Plts = 114 10e3/uL (Ref range: 150 - 450 10e3/uL) on admission, will monitor for bleeding       ____________        Physical Exam   Vital Signs: Temp: 98.3  F (36.8  C) Temp src: Oral BP: 95/54 Pulse: 61   Resp: 16 SpO2: 96 % O2 Device: Nasal cannula Oxygen Delivery: 1 LPM  Weight: 128 lbs 9.6 oz  General: in no apparent distress, non-toxic and alert female lying in hospital bed oriented x3  HEENT: Head normocephalic atraumatic, oral mucosa moist. Sclerae anicteric  CV: Regular rhythm, normal rate, soft holosystolic murmur heard best LUSB  Resp: No wheezes, no rales or rhonchi, no focal consolidations  GI:  Belly soft, nondistended, nontender, bowel sounds present  Skin: No rashes or lesions  Extremities: No peripheral edema  Psych: Normal affect, mood euthymic  Neuro: CNII-XII grossly intact, moving all 4 extremities      Data   Recent Results (from the past 24 hour(s))   Potassium    Collection Time: 02/16/22  8:14 PM   Result Value Ref Range    Potassium 4.0 3.5 - 5.0 mmol/L   Comprehensive metabolic panel    Collection Time: 02/17/22  8:14 AM   Result Value Ref Range    Sodium 133 (L) 136 - 145 mmol/L    Potassium 4.6 3.5 - 5.0 mmol/L    Chloride 101 98 - 107 mmol/L    Carbon Dioxide (CO2) 26 22 - 31 mmol/L    Anion Gap 6 5 - 18 mmol/L    Urea Nitrogen 20 8 - 28 mg/dL    Creatinine 1.03 0.60 - 1.10 mg/dL    Calcium 7.9 (L) 8.5 - 10.5 mg/dL    Glucose 95 70 - 125 mg/dL    Alkaline Phosphatase 95 45 - 120 U/L    AST 19 0 - 40 U/L    ALT 13 0 - 45 U/L    Protein Total 6.0 6.0 - 8.0 g/dL    Albumin 3.0 (L) 3.5 - 5.0 g/dL    Bilirubin Total 0.7 0.0 - 1.0 mg/dL    GFR Estimate 53 (L) >60 mL/min/1.73m2   CBC with platelets    Collection Time: 02/17/22  8:14 AM   Result Value Ref Range    WBC Count 8.5 4.0 - 11.0 10e3/uL    RBC Count 3.35 (L) 3.80 - 5.20 10e6/uL    Hemoglobin 11.0 (L) 11.7 - 15.7 g/dL    Hematocrit 33.9 (L) 35.0 - 47.0 %     (H) 78 - 100 fL    MCH 32.8 26.5 - 33.0 pg    MCHC 32.4 31.5 - 36.5 g/dL    RDW 15.2 (H) 10.0 - 15.0 %    Platelet Count 114 (L) 150 - 450 10e3/uL     ____________  Interval History   Data reviewed today: I reviewed all medications, new labs and imaging results over the last 24 hours. I personally reviewed no images or EKG's today.  Patient states she is doing okay today. She does not have any pain as long as she is lying still in the bed. Right leg is very painful if she tries to move it. She is eating and drinking okay. She is disappointed because she feels she was just recovering from previous fall and now had another fall. She states that she fell because she tripped over a  chair in the dining joseph.

## 2022-02-17 NOTE — PLAN OF CARE
"Goal Outcome Evaluation:    PRIMARY DIAGNOSIS: \"GENERIC\" NURSING  OUTPATIENT/OBSERVATION GOALS TO BE MET BEFORE DISCHARGE:  ADLs back to baseline: No    Activity and level of assistance: Pt has pubic ramus fractures and is NWB.    Pain status: Improved with use of alternative comfort measures i.e.: positioning    Return to near baseline physical activity: No     Discharge Planner Nurse   Safe discharge environment identified: No  Barriers to discharge: Yes       Entered by: Gloria Fowler 02/17/2022 11:31 AM     Please review provider order for any additional goals.   Nurse to notify provider when observation goals have been met and patient is ready for discharge.                    "

## 2022-02-17 NOTE — PHARMACY-ADMISSION MEDICATION HISTORY
Pharmacy Note - Admission Medication History    Pertinent Provider Information: none     ______________________________________________________________________    Prior To Admission (PTA) med list completed and updated in EMR.       PTA Med List   Medication Sig Note Last Dose     atorvastatin (LIPITOR) 40 MG tablet Take 1 tablet (40 mg) by mouth daily For lipids  2/16/2022 at am     calcium carbonate-vitamin D (OYSTER SHELL CALCIUM/D) 500-200 MG-UNIT tablet Take 1 tablet by mouth daily  2/16/2022 at am     cholecalciferol 50 MCG (2000 UT) tablet Take 1 tablet (50 mcg) by mouth daily  2/16/2022 at am     donepezil (ARICEPT) 10 MG tablet Take 1 tablet (10 mg) by mouth At Bedtime dementia  2/15/2022 at pm     ferrous gluconate (FERGON) 324 (38 Fe) MG tablet Take 1 tablet (324 mg) by mouth daily (with breakfast)  2/16/2022 at am     folic acid (FOLVITE) 1 MG tablet Take 1 tablet (1,000 mcg) by mouth daily General health  2/16/2022 at am     HYDROcodone-acetaminophen (NORCO) 5-325 MG tablet Take 1 tablet by mouth every 4 hours as needed for severe pain q4-6h prn 2/16/2022: New rx 2/15/22- for 5 tabs- has one tablet left 2/16/2022 at 10am     isosorbide mononitrate (IMDUR) 30 MG 24 hr tablet Take 1 tablet (30 mg) by mouth daily  2/16/2022 at am     lisinopril (ZESTRIL) 20 MG tablet Take 1 tablet (20 mg) by mouth daily htn  2/16/2022 at am     methotrexate sodium 2.5 MG TABS Take 3 tablets (7.5 mg) by mouth once a week Fridays 2/11/2022     metoprolol succinate ER (TOPROL-XL) 25 MG 24 hr tablet Take 1 tablet (25 mg) by mouth daily htn  2/16/2022 at am     Multiple Vitamins-Minerals (PRESERVISION AREDS 2 PO) Take 1 tablet by mouth 2 times daily  2/16/2022 at am     nitroGLYcerin (NITROSTAT) 0.4 MG sublingual tablet Place 0.4 mg under the tongue every 5 minutes as needed for chest pain For chest pain place 1 tablet under the tongue every 5 minutes for 3 doses. If symptoms persist 5 minutes after 1st dose call 911.  not  recently     omeprazole (PRILOSEC) 40 MG DR capsule Take 1 capsule (40 mg) by mouth daily  2/16/2022 at am     polyethylene glycol (MIRALAX) 17 GM/Dose powder Take 17 g by mouth daily To prevent constiaption  2/16/2022 at am     SYNTHROID 100 MCG tablet TAKE 1 TABLET EVERY DAY AT 6:00 A.M. (Patient taking differently: Take 100 mcg by mouth daily )  2/16/2022 at am     traMADol (ULTRAM) 50 MG tablet Take 50 mg by mouth every 4 hours as needed for severe pain q4-6hprn 2/16/2022: New rx 2/15/22 not started yet     vitamin C (ASCORBIC ACID) 500 MG tablet Take 1 tablet (500 mg) by mouth daily  2/16/2022 at am       Information source(s): Patient, Family member, Clinic records and [facility]  Method of interview communication: in-person    Summary of Changes to PTA Med List  New: norco prn and tramadol prn  Discontinued: protonix, potassium chloride, senokot,   Changed: none    Patient was asked about OTC/herbal products specifically.  PTA med list reflects this.    In the past week, patient estimated taking medication this percent of the time:  greater than 90%.    Allergies were reviewed, assessed, and updated with the patient.      Patient does not use any multi-dose medications prior to admission.    The information provided in this note is only as accurate as the sources available at the time of the update(s).    Thank you for the opportunity to participate in the care of this patient.    Amrit Fink RPH  2/16/2022 7:09 PM

## 2022-02-17 NOTE — CONSULTS
NEUROSURGERY CONSULTATION NOTE    Rox LALIT Zavala   [address]   [facility] [address]  86 year old female  Admission Date/Time: 2/16/2022  2:18 PM  Primary Care Provider: [facility] Attending Physician: Chiquis Dennis MD    Neurosurgery was asked to see this patient by Chiquis Dennis MD for evaluation of thoracic fractures.     PROBLEM LIST:  Active Problems:    Fracture of multiple pubic rami (H)    Other closed fracture of fourth thoracic vertebra, initial encounter (H)    Closed displaced fracture of pelvis, unspecified part of pelvis, initial encounter (H)    Other closed fracture of seventh thoracic vertebra, initial encounter (H)       Neurosurgery Attending: The patient's clinical examination, laboratory data, and plan was discussed with Dr. Johnson    CONSULTATION ASSESSMENT AND PLAN:  86 year old female with mechanical fall at her assisted living facility - resulted in immediate low back, right leg, mid back pain. She was found to have pelvic fractures as well as thoracic fractures. Neurosurgery was consulted as imaging revealed T4/T7 mild compression fractures with subtle buckling of the posterior cortex. Imaging and treatment options reviewed with Rox, as she will likely have a degree of decreased activity for the time being as well as relatively mild nature of the fractures and pain - Rox opted to not brace. We discussed that the fractures and retropulsion could get worse and she is okay with that. Will get some upright x-rays now and in two weeks, if worsening we can reapproach brace conversation.     Detailed voicemail left for patient's son Billy.     Plan:  1. Okay for activity and HOB as tolerated from neurosurgery perspective  2. Upright thoracic x-rays, okay for seated   3. If significant worsening now or in two weeks, will discuss bracing again - Rox understands that she may have worsening of the fracture and irreparable injury   4. No surgery needed  "or planned at this point.       HPI:  86 year old female presents after trip and fall at her new assisted living facility. She had just gotten done with coffee with some friends when she tripped over a chair and landed on her face. She developed right leg pain, groin pain, low back/pelvis pain and mid back pain. She has tenderness over the mid and upper thoracic spine - very mild. On imaging, she has mild appearing T4 fracture and mild appearing T7 fracture with mild buckling of the posterior cortex, no spinal canal stenosis. On exam, she has pain limiting right leg ROM, but able to DF/PF/ wiggle toes, sensation intact. No numbness or tingling. Ortho is following for her pelvis fractures, right greater trochanter fractures.   Past Medical History:   Diagnosis Date     Acute diastolic CHF (congestive heart failure) (H) 12/23/2014     Acute renal failure (H)      Anemia      Aortic valve stenosis, severe      Arrhythmia      Arthritis      Coronary artery disease 11/17/2014     Coronary artery disease      Dieulafoy lesion of stomach      Disease of thyroid gland      Essential hypertension 12/8/2015     Gastroesophageal reflux disease      GI bleed 6/1/2021     Hammer toe      Heart murmur      History of blood transfusion      Hyperlipidemia      Hypertension      Hypothyroidism     Created by [employer] updated for latest IMO load      Irregular heart beat      Macular disorder     \"wrinkle\"     MCI (mild cognitive impairment) 6/16/2019     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     Pressure injury of buttock, stage 1, unspecified laterality 6/16/2019     Rheumatoid arthritis (H)      S/P AVR 12/15/2014     Sacral insufficiency fracture, initial encounter 5/6/2019     Senile osteoporosis 9/23/2019     Past Surgical History:   Procedure Laterality Date     ANGIOPLASTY / STENTING FEMORAL       AORTIC VALVE REPLACEMENT       CARDIAC SURGERY      CABG     ESOPHAGOSCOPY, GASTROSCOPY, " DUODENOSCOPY (EGD), COMBINED N/A 11/16/2021    Procedure: ESOPHAGOGASTRODUODENOSCOPY;  Surgeon: Enrico Galan MD;  Location: [facility] Main OR     EYE SURGERY Bilateral     cataracts     HC REMOVAL GALLBLADDER      Description: Cholecystectomy;  Recorded: 03/20/2008;     ME ESOPHAGOGASTRODUODENOSCOPY TRANSORAL DIAGNOSTIC N/A 6/1/2021    Procedure: ESOPHAGOGASTRODUODENOSCOPY (EGD) with biopsies;  Surgeon: Julio Lopez MD;  Location: [facility];  Service: Gastroenterology     ME ESOPHAGOGASTRODUODENOSCOPY TRANSORAL DIAGNOSTIC N/A 6/6/2021    Procedure: ESOPHAGOGASTRODUODENOSCOPY (EGD) with bicap cauterization;  Surgeon: Julio Lopez MD;  Location: [facility];  Service: Gastroenterology     ME ESOPHAGOGASTRODUODENOSCOPY TRANSORAL DIAGNOSTIC N/A 6/7/2021    Procedure: ESOPHAGOGASTRODUODENOSCOPY (EGD) with hemoclip;  Surgeon: Sam Brock MD;  Location: [facility];  Service: Gastroenterology     [facility] LIGATE FALLOPIAN TUBE      Description: Tubal Ligation;  Recorded: 03/20/2008;     [facility] TOTAL HIP ARTHROPLASTY Right     Description: Total Hip Replacement;  Recorded: 03/20/2008; right       REVIEW OF SYSTEMS:  12 point review of systems is negative apart from HPI.     MEDICATIONS:  Current Outpatient Medications   Medication Sig Dispense Refill     atorvastatin (LIPITOR) 40 MG tablet Take 1 tablet (40 mg) by mouth daily For lipids 90 tablet 0     calcium carbonate-vitamin D (OYSTER SHELL CALCIUM/D) 500-200 MG-UNIT tablet Take 1 tablet by mouth daily       cholecalciferol 50 MCG (2000 UT) tablet Take 1 tablet (50 mcg) by mouth daily 100 tablet 1     donepezil (ARICEPT) 10 MG tablet Take 1 tablet (10 mg) by mouth At Bedtime dementia 90 tablet 3     ferrous gluconate (FERGON) 324 (38 Fe) MG tablet Take 1 tablet (324 mg) by mouth daily (with breakfast) 90 tablet 0     folic acid (FOLVITE) 1 MG tablet Take 1 tablet (1,000 mcg) by mouth daily General health 90 tablet 0     HYDROcodone-acetaminophen (NORCO)  5-325 MG tablet Take 1 tablet by mouth every 4 hours as needed for severe pain q4-6h prn       isosorbide mononitrate (IMDUR) 30 MG 24 hr tablet Take 1 tablet (30 mg) by mouth daily       lisinopril (ZESTRIL) 20 MG tablet Take 1 tablet (20 mg) by mouth daily htn 90 tablet 0     methotrexate sodium 2.5 MG TABS Take 3 tablets (7.5 mg) by mouth once a week  36 tablet 0     metoprolol succinate ER (TOPROL-XL) 25 MG 24 hr tablet Take 1 tablet (25 mg) by mouth daily htn       Multiple Vitamins-Minerals (PRESERVISION AREDS 2 PO) Take 1 tablet by mouth 2 times daily       nitroGLYcerin (NITROSTAT) 0.4 MG sublingual tablet Place 0.4 mg under the tongue every 5 minutes as needed for chest pain For chest pain place 1 tablet under the tongue every 5 minutes for 3 doses. If symptoms persist 5 minutes after 1st dose call 911.       omeprazole (PRILOSEC) 40 MG DR capsule Take 1 capsule (40 mg) by mouth daily 60 capsule 3     polyethylene glycol (MIRALAX) 17 GM/Dose powder Take 17 g by mouth daily To prevent constiaption       SYNTHROID 100 MCG tablet TAKE 1 TABLET EVERY DAY AT 6:00 A.M. (Patient taking differently: Take 100 mcg by mouth daily ) 90 tablet 3     traMADol (ULTRAM) 50 MG tablet Take 50 mg by mouth every 4 hours as needed for severe pain q4-6hprn       vitamin C (ASCORBIC ACID) 500 MG tablet Take 1 tablet (500 mg) by mouth daily           ALLERGIES/SENSITIVITIES:     No Known Allergies    PERTINENT SOCIAL HISTORY: recently moved to assisted living, son Billy is supportive.   Social History     Socioeconomic History     Marital status:      Spouse name: None     Number of children: None     Years of education: None     Highest education level: None   Occupational History     None   Tobacco Use     Smoking status: Former Smoker     Packs/day: 1.00     Years: 30.00     Pack years: 30.00     Types: Cigarettes     Quit date: 1995     Years since quittin.1     Smokeless tobacco: Never Used   Substance  "and Sexual Activity     Alcohol use: Yes     Alcohol/week: 1.0 standard drink     Drug use: No     Sexual activity: None   Other Topics Concern     None   Social History Narrative    , patient notes she and her  were  for 60 years and he passed away 12 years ago following an illness and complications. Patient notes they had a happy marriage.  Children: Patient notes she had 4 children, one daughter passed away fo llowing a MVA when daughter was 57 years old. Leisa states she is close to her two boys, daughter, grandchildren, and great-grandchildren and finds her family supportive.     Lives in a town home independently.. Now in senior living.    Lives at \" [facility] independent living.    Misbah Barone MD  7/2/2020           Social Determinants of Health     Financial Resource Strain: Not on file   Food Insecurity: Not on file   Transportation Needs: Not on file   Physical Activity: Not on file   Stress: Not on file   Social Connections: Not on file   Intimate Partner Violence: Not on file   Housing Stability: Not on file         FAMILY HISTORY:  Family History   Problem Relation Age of Onset     Asthma Mother      Coronary Artery Disease No family hx of      Breast Cancer No family hx of         PHYSICAL EXAM:   Constitutional: /61 (BP Location: Right arm, Patient Position: Semi-Betancur's)   Pulse 64   Temp 98  F (36.7  C) (Oral)   Resp 16   Ht 1.626 m (5' 4\")   Wt 54.4 kg (120 lb)   SpO2 93%   BMI 20.60 kg/m       Mental Status: A & O in no acute distress.  Affect is appropriate.  Speech is fluent.  Recent and remote memory are intact.  Attention span and concentration are normal.     Cranial Nerves: CN1: grossly intact per patient recall. CN2: No funduscopic exam performed. CN3,4 & 6: Pupillary light response, lateral and vertical gaze normal.  No nystagmus.  Visual fields are full to confrontation. CN5: Intact to touch CN7: No facial weakness, smile, facial symmetry intact. " CN8: Intact to spoken voice. CN9&10: Gag reflex, uvula midline, palate rises with phonation. CN11: Shoulder shrug 5/5 intact bilaterally. CN12: Tongue midline and moves freely from side to side.     Motor: No pronator drift of upper extremity. Normal bulk and tone all muscle groups of upper and lower extremities.    Strength: bilateral lower extremities intact apart from pain limiting in right leg.     Sensory: Sensation intact bilaterally to light touch.     Coordination; gait testing deferred.       IMAGING:  I personally reviewed all radiographic images   CT thoracic spine reviewed   2/16/2021  T4 mild fracture  T7 mild fracture     (non critical care) I spent more than 60 minutes in this apt, examining the pt, reviewing the scans, reviewing notes from chart, discussing treatment options with risks and benefits and coordinating care. >50 % clinic time was spent in face to face counseling and coordinating care    Lillie Blackwell NP      Cc:   No referring provider defined for this encounter.    Misbah Barone  [unfilled]

## 2022-02-17 NOTE — CONSULTS
ORTHOPEDIC CONSULTATION    Consultation  Rox Zavala,  1935, MRN [id number]    Fall from ground level [W18.30XA]  Other closed fracture of fourth thoracic vertebra, initial encounter (H) [S22.048A]  Closed displaced fracture of pelvis, unspecified part of pelvis, initial encounter (H) [S32.9XXA]  Other closed fracture of seventh thoracic vertebra, initial encounter (H) [S22.068A]    PCP: Misbah Barone, [phone]   Code status:  No CPR- Do NOT Intubate       Extended Emergency Contact Information  Primary Emergency Contact: Billy Zavala  Address: [address] [place] [address] [place] [address] [place]  Home Phone: [phone]  Mobile Phone: [phone]  Relation: Son  Secondary Emergency Contact: Kwesi Zavala  Address:  [place] [address] [facility]  Home Phone: [phone]  Mobile Phone: [phone]  Relation: Son         IMPRESSION:  Bilateral sacral ala fractures and transverse fracture across S2     PLAN:  This patient was discussed with Dr. Trace English and Zaira Guardado PA-C, on-call surgeon and PA for [facility] and they are in agreement with the following plan.  Imaging was reviewed by their team - demonstrates sacral ala fractures and transverse fracture across S2 as well as mildly displaced superior and inferior pubic ramus fractures.   -no surgical intervention for fractures  -NWB except for transfers she may bear weight - reports less/no pain in left hip please have her transfer with more weight favoring left at this point.   - PT/OT   -pain control prn   -outpatient follow up in 3 weeks with their team at [facility].     Thank you for including [facility] in the care of Rox Zavala. It has been a pleasure participating in her care.      CHIEF COMPLAINT: fall - pelvic fractures      HISTORY OF PRESENT ILLNESS:  The patient is seen in orthopedic consultation for evaluation of pelvis fractures.  The  patient is a 86 year old female who presented to the ED after a fall at her independent senior living facility in her room yesterday. She was initially seen at [facility] with her son, sent home with percocet, but presented to the ED later due to poor pain control, back pain, and inability to ambulate. CTs revealed bilateral sacral ala fractures and transverse fracture across S2 as well as mildly displaced superior and inferior pubic ramus fractures. CT T spine also shows T4/T7 endplate fractures, being managed by neurosurgery. Patient reports she is doing well at rest and has no pain if she does not move much. She states when she moves or transferred from bed to bed, she has pain mostly in the middle of the back and the right hip, ultimately no pain left hip. Denies numbness, tingling, weakness in the legs. Her son reports she had a pelvic fracture treated non-op just under 2 years ago. She denies any specific known diagnosis of osteoporosis or bone disease.       ALLERGIES:   Review of patient's allergies indicates No Known Allergies      MEDICATIONS UPON ADMISSION:  Medications were reviewed.  They include:   Medications Prior to Admission   Medication Sig Dispense Refill Last Dose     atorvastatin (LIPITOR) 40 MG tablet Take 1 tablet (40 mg) by mouth daily For lipids 90 tablet 0 2/16/2022 at am     calcium carbonate-vitamin D (OYSTER SHELL CALCIUM/D) 500-200 MG-UNIT tablet Take 1 tablet by mouth daily   2/16/2022 at am     cholecalciferol 50 MCG (2000 UT) tablet Take 1 tablet (50 mcg) by mouth daily 100 tablet 1 2/16/2022 at am     donepezil (ARICEPT) 10 MG tablet Take 1 tablet (10 mg) by mouth At Bedtime dementia 90 tablet 3 2/15/2022 at pm     ferrous gluconate (FERGON) 324 (38 Fe) MG tablet Take 1 tablet (324 mg) by mouth daily (with breakfast) 90 tablet 0 2/16/2022 at am     folic acid (FOLVITE) 1 MG tablet Take 1 tablet (1,000 mcg) by mouth daily General health 90 tablet 0 2/16/2022 at am      HYDROcodone-acetaminophen (NORCO) 5-325 MG tablet Take 1 tablet by mouth every 4 hours as needed for severe pain q4-6h prn   2/16/2022 at 10am     isosorbide mononitrate (IMDUR) 30 MG 24 hr tablet Take 1 tablet (30 mg) by mouth daily   2/16/2022 at am     lisinopril (ZESTRIL) 20 MG tablet Take 1 tablet (20 mg) by mouth daily htn 90 tablet 0 2/16/2022 at am     methotrexate sodium 2.5 MG TABS Take 3 tablets (7.5 mg) by mouth once a week Fridays 36 tablet 0 2/11/2022     metoprolol succinate ER (TOPROL-XL) 25 MG 24 hr tablet Take 1 tablet (25 mg) by mouth daily htn   2/16/2022 at am     Multiple Vitamins-Minerals (PRESERVISION AREDS 2 PO) Take 1 tablet by mouth 2 times daily   2/16/2022 at am     nitroGLYcerin (NITROSTAT) 0.4 MG sublingual tablet Place 0.4 mg under the tongue every 5 minutes as needed for chest pain For chest pain place 1 tablet under the tongue every 5 minutes for 3 doses. If symptoms persist 5 minutes after 1st dose call 911.   not recently     omeprazole (PRILOSEC) 40 MG DR capsule Take 1 capsule (40 mg) by mouth daily 60 capsule 3 2/16/2022 at am     polyethylene glycol (MIRALAX) 17 GM/Dose powder Take 17 g by mouth daily To prevent constiaption   2/16/2022 at am     SYNTHROID 100 MCG tablet TAKE 1 TABLET EVERY DAY AT 6:00 A.M. (Patient taking differently: Take 100 mcg by mouth daily ) 90 tablet 3 2/16/2022 at am     traMADol (ULTRAM) 50 MG tablet Take 50 mg by mouth every 4 hours as needed for severe pain q4-6hprn   not started yet     vitamin C (ASCORBIC ACID) 500 MG tablet Take 1 tablet (500 mg) by mouth daily   2/16/2022 at am         SOCIAL HISTORY:   she  reports that she quit smoking about 27 years ago. Her smoking use included cigarettes. She has a 30.00 pack-year smoking history. She has never used smokeless tobacco. She reports current alcohol use of about 1.0 standard drink of alcohol per week. She reports that she does not use drugs.      FAMILY HISTORY:  family history includes Asthma  in her mother.      REVIEW OF SYSTEMS:   Reviewed with patient. See HPI, otherwise negative       PHYSICAL EXAMINATION:  Vitals: Temp:  [97.7  F (36.5  C)-98.3  F (36.8  C)] 97.8  F (36.6  C)  Pulse:  [55-70] 61  Resp:  [16-20] 20  BP: ()/(54-82) 114/63  SpO2:  [85 %-100 %] 95 %  General: On examination, the patient is resting comfortably, NAD, awake and alert and oriented to person, place, time, and and general circumstances   SKIN: lateral hips and legs, no ecchymosis, erythema, abrasions visible. Could not visualize back of posterior pelvis. (no thigh wound as noted on CT)  Pulses:  dorsalis pedis pulse is intact and equal bilaterally  Sensation: intact and equal bilaterally to the distal lower extremities.  Tenderness: lateral hips nontender  ROM: did not test at this time due to known fractures  Motor: dorsiflexion plantarflexion intact and 5/5 strength     RADIOGRAPHIC EVALUATION:  Personally reviewed by surgeon team and myself.     EXAM: CT PELVIS BONE WO CONTRAST  LOCATION: [facility]  DATE/TIME: 2/16/2022 1:17 PM     INDICATION: Pain.  COMPARISON: None.  TECHNIQUE: CT scan of the pelvis was performed without IV contrast.  Multiplanar reformats were obtained. Dose reduction techniques were  used.  CONTRAST: None.     FINDINGS:     PELVIS ORGANS: No free fluid in the pelvis. Extensive arterial  calcifications.     MUSCULOSKELETAL:      BONES AND JOINTS: Moderate degenerative changes in the visualized  lumbar spine.     Mildly displaced sacral alar fracture bilaterally. There is also a  transverse fracture across the sacrum at the S2 level, mildly  displaced. Minimally displaced comminuted fracture in the right  inferior pubic ramus. Minimally displaced fracture of the lateral  aspect of the right superior pubic ramus.     Right total hip arthroplasty. There is a periprosthetic fracture of  the greater trochanter, mildly to moderately displaced with bridging  callus across  fracture fragments indicating that they are subacute and  healing.     Moderate to severe osteoarthrosis in the left hip.     MUSCLES AND TENDONS: Moderate gluteus minimus muscle atrophy  bilaterally. Moderate right gluteus medius muscle atrophy. No gross  tendon pathology.     OTHER SOFT TISSUES: There is an open wound at the posteromedial aspect  of the upper left thigh. There is relatively low-attenuation centrally  over a 1.5 x 1.5 x 1 cm area, so abscess cannot be ruled out. There is  also a small amount of soft tissue gas in this area. There is adjacent  soft tissue edema. No evidence of underlying osteomyelitis.                                                                      IMPRESSION:  1.  Bilateral sacral ala fractures and transverse fracture across S2.  There are also mildly displaced right superior and inferior pubic  ramus fractures.  2.  Degenerative changes in the visualized lumbar spine.  3.  Right total hip arthroplasty with a subacute healing  periprosthetic fracture of the greater trochanter.  4.  Moderate to severe osteoarthrosis in the left hip.  5.  Gluteal muscle atrophy bilaterally.  6.  Open wound in the posteromedial aspect of the upper left thigh  with a small amount of underlying soft tissue gas and perhaps a small  fluid collection concerning for abscess. Clinical correlation needed.  There is no evidence of osteomyelitis.   *(no open thigh wound on exam)       Janet Mina PA-C  Date: 2/17/2022  Time: 3:53 PM    CC1:   Chiquis Dennis MD    CC2:   Misbah Barone

## 2022-02-17 NOTE — PLAN OF CARE
PRIMARY DIAGNOSIS: ACUTE PAIN  OUTPATIENT/OBSERVATION GOALS TO BE MET BEFORE DISCHARGE:  1. Pain Status: Improved-controlled with oral pain medications when patient at rest no pain.    2. Return to near baseline physical activity: No, on bed rest    3. Cleared for discharge by consultants (if involved): No, being followed by ortho and neurosurg.     Discharge Planner Nurse   Safe discharge environment identified: No  Barriers to discharge: Yes       Entered by: Jaclyn Crawford 02/17/2022 2:23 PM     Please review provider order for any additional goals.   Nurse to notify provider when observation goals have been met and patient is ready for discharge.Goal Outcome Evaluation:

## 2022-02-17 NOTE — PLAN OF CARE
Problem: Pain Acute  Goal: Acceptable Pain Control and Functional Ability  Outcome: Ongoing, Progressing   Goal Outcome Evaluation:  Rox had tylenol & lidocaine cream on evening shift and has denied needing any pain med tonight.  She knows she can have more tylenol or tramadol but has declined.   Problem: Gas Exchange Impaired  Goal: Optimal Gas Exchange  Outcome: Ongoing, Progressing  Intervention: Optimize Oxygenation and Ventilation  Recent Flowsheet Documentation  Taken 2/16/2022 1202 by Sue English RN  Head of Bed (HOB) Positioning: HOB at 20-30 degrees   She was satting 100% on 3lt & asked to take the oxygen off; she desatted to 88%.  On 1lt she has been 93-97%.

## 2022-02-17 NOTE — PROGRESS NOTES
Orthopedic Remote Note;    Orthopedics has been made aware of patient, myself and Dr. Trace English reviewed images which demonstrates sacral ala fractures and transverse fracture across S2. Mildly displaced superior and inferior pubic ramus fractures. Plan would include non-weight bearing except for transfers. Pain control. Outpatient follow up in the office in 4 weeks.     Full orthopedic consult to follow 2/17/22    Zaira Guardado PA-C, CWS  [facility]

## 2022-02-18 NOTE — PLAN OF CARE
"PRIMARY DIAGNOSIS: \"GENERIC\" NURSING  OUTPATIENT/OBSERVATION GOALS TO BE MET BEFORE DISCHARGE:  1. ADLs back to baseline: No    2. Activity and level of assistance: Up with maximum assistance. Consider SW and/or PT evaluation.     3. Pain status: Improved-controlled with oral pain medications.    4. Return to near baseline physical activity: No     Discharge Planner Nurse   Safe discharge environment identified: Yes  Barriers to discharge: Yes       Entered by: Vicki Scott 02/18/2022 10:22 AM   Patient fairly  comfortable at rest-scheduled tylenol given- for pain  Rated at 3 assisted to commode and chair-with most of weight on left leg-pain in right leg and back with movement settled down once in chair.  Please review provider order for any additional goals.   Nurse to notify provider when observation goals have been met and patient is ready for discharge.Goal Outcome Evaluation:                          "

## 2022-02-18 NOTE — PROGRESS NOTES
Care Management Follow Up    Length of Stay (days): 0    Expected Discharge Date: 02/18/2022       Concerns to be Addressed:   Care post fall with pelvic and vertebral fractures. Therapy to evaluate when appropriate. Pain control.  Patient plan of care discussed at interdisciplinary rounds: Yes    Anticipated Discharge Disposition:  Likely to need transitional care (TCU) at discharge.      Anticipated Discharge Services:  Continued therapy and skilled nursing.   Anticipated Discharge DME:  Per therapy (if indicated).    Patient/family educated on Medicare website which has current facility and service quality ratings:  Yes  Education Provided on the Discharge Plan:  Per team  Patient/Family in Agreement with the Plan:  Yes    Referrals Placed by CM/SW:  See below  Private pay costs discussed: Observation services/MOON reviewed in ED.      Additional Information:  Patient lives at [facility] in an Independent Living apartment. She uses her walker for mobility and has dentures and glasses. Her son states that she does not use oxygen at home.  Family helps with shopping and transportation but she is otherwise independent with activities of daily living.   She will likely need a TCU at discharge. Referrals sent to both of below choices.  Choices:  1. [facility] (Does accept [facility] and will review. Might have a bed on Friday or Saturday pending therapy input.) (No beds available at this time)  2. [facility] (Does accept [facility] and will review) (left another message for admissions) (Does accept [facility] but is not in network so patient would need to pay $245 per day co-pay)  3:45 PM:  Update provided to patient's son Billy by telephone. He states that they will not be able to afford the out of pocket expenses for [facility]. Latest [facility] list provided to him by email at [email or web address]. Briefly discussed possibility of out of pocket expenses for long term care if transitional care  feels she cannot participate in therapy. Goal would be to go to TCU once able to participate.      [facility] transport at discharge (patient is Partial WBAT for transfers only. Not likely to be safe to transport in a private vehicle at this time).     Primary family contact is patient's son Billy [phone] (he asks staff to call him for any questions. He will be the point of contact and his sister Yeimi will be the one visiting patient in the hospital).      Mona Frazier RN

## 2022-02-18 NOTE — PLAN OF CARE
Problem: Plan of Care - These are the overarching goals to be used throughout the patient stay.    Goal: Optimal Comfort and Wellbeing  Outcome: Ongoing, Progressing   Goal Outcome Evaluation:        Patient resting comfortably while still in bed.

## 2022-02-18 NOTE — PROGRESS NOTES
[facility]      OUTPATIENT PHYSICAL THERAPY EVALUATION  PLAN OF TREATMENT FOR OUTPATIENT REHABILITATION  (COMPLETE FOR INITIAL CLAIMS ONLY)  Patient's Last Name, First Name, M.I.  YOB: 1935  Rox Zavala                        Provider's Name  [facility] Medical Record No.  [id number]                               Onset Date:  02/16/22   Start of Care Date:  (P) 02/18/22      Type:     _X_PT   ___OT   ___SLP Medical Diagnosis:  (P) mult pelvic and spinal fractures                        PT Diagnosis:  decreased functional mobility   Visits from SOC:  1   _________________________________________________________________________________  Plan of Treatment/Functional Goals    Planned Interventions: bed mobility training, transfer training, strengthening, ROM (range of motion), balance training     Goals: See Physical Therapy Goals on Care Plan in [facility] electronic health record.    Therapy Frequency: Daily  Predicted Duration of Therapy Intervention: 02/22/22  _________________________________________________________________________________    I CERTIFY THE NEED FOR THESE SERVICES FURNISHED UNDER        THIS PLAN OF TREATMENT AND WHILE UNDER MY CARE     (Physician co-signature of this document indicates review and certification of the therapy plan).                Certification date from: (P) 02/18/22, Certification date to: (P) 02/25/22    Referring Physician: LALIT Dennis            Initial Assessment        See Physical Therapy evaluation dated (P) 02/18/22 in Epic electronic health record.

## 2022-02-18 NOTE — PLAN OF CARE
Problem: Pain Acute  Goal: Acceptable Pain Control and Functional Ability  Intervention: Prevent or Manage Pain  Recent Flowsheet Documentation  Taken 2/18/2022 1700 by Jaclyn Norton, RN  Medication Review/Management: medications reviewed   Goal Outcome Evaluation:  Pt is denying pain at rest and with slow movement to apply Lidocaine.             Outcome Evaluation: Needs TCU, prefers 1) [facility] or 2) [facility] but [facility] for transfers only: PT/OT to [facility] Primary family contact is son Billy at [phone]. Plan [facility] transport.

## 2022-02-18 NOTE — UTILIZATION REVIEW
Concurrent stay review; Secondary Review Determination     Under the authority of the Utilization Management Committee, the utilization review process indicated a secondary review on Rox D Lucas.  The review outcome is based on review of the medical records, discussions with staff, and applying clinical experience noted on the date of the review.        (x) Observation Status Appropriate - Concurrent stay review    RATIONALE FOR DETERMINATION   86 yr old female presented 2/16/22.  Underlying hx CAD, CHF, bioprosthetic aortic valve, HTN, RA presented after fall with worsening leg pain.  Found to have vertebral fractures in addition to known pelvic fractures.  Fall 2/15/22 and able to walk.  [facility].  Pelvic fractures noted.  Now worsening pain and inability to walk following day and presented to ER.  Identified T4 and T7 endplate fractures.  Neurosurgery has seen.  Cleared for transfer to TCU.  Incidentally had elevated K on arrival resolved after IVF.  Other medical issues stable.  Does have mild hypoxia with oxygen needs but CXR with no infiltrates or other abnormalities---suspect atelectasis and IS being performed.  Medically stable awaiting TCU.    Patient is clinically improving and there is no clear indication to change patient's status to inpatient. The severity of illness, intensity of service provided, expected LOS and risk for adverse outcome make the care appropriate for observation.    The information on this document is developed by the utilization review team in order for the business office to ensure compliance.  This only denotes the appropriateness of proper admission status and does not reflect the quality of care rendered.         The definitions of Inpatient Status and Observation Status used in making the determination above are those provided in the CMS Coverage Manual, Chapter 1 and Chapter 6, section 70.4.      Sincerely,   Zenia Newsome MD  Utilization  Review  Physician Advisor  [facility]

## 2022-02-18 NOTE — PROGRESS NOTES
02/18/22 1115   Quick Adds   Type of Visit Initial PT Evaluation   Living Environment   People in Home alone   Current Living Arrangements apartment;independent living facility   Home Accessibility no concerns;wheelchair accessible   Living Environment Comments one level, no stairs   Self-Care   Usual Activity Tolerance good   Current Activity Tolerance fair   Regular Exercise Yes   Equipment Currently Used at Home shower chair;walker, rolling;grab bar, toilet;grab bar, tub/shower   Fall history within last six months yes   Activity/Exercise/Self-Care Comment usually indep with adl's, mobility, shower, son assists with meds   General Information   Onset of Illness/Injury or Date of Surgery 02/16/22   Referring Physician LALIT Dennis   Patient/Family Therapy Goals Statement (PT) be able to move   Pertinent History of Current Problem (include personal factors and/or comorbidities that impact the POC) fall with resulting pelvic fx's: bilat ala, sup/infrapubic rami, S1, T4 and T7   Existing Precautions/Restrictions fall   Weight-Bearing Status - LLE nonweight-bearing  (except for transfers, may bear wt L>R)   Weight-Bearing Status - RLE nonweight-bearing   General Observations pt very motivated to participate, very motivated to keep moving   Cognition   Affect/Mental Status (Cognition) WFL   Orientation Status (Cognition) oriented x 4   Pain Assessment   Patient Currently in Pain Yes, see Vital Sign flowsheet   Range of Motion (ROM)   ROM Comment LLE WNL, RLE WFL when motion is AA   Strength (Manual Muscle Testing)   Strength Comments LLE WFL, R limited by pain   Transfers   Transfers sit-stand transfer   Sit-Stand Transfer   Sit-Stand Clint (Transfers) contact guard;2 person assist   Assistive Device (Sit-Stand Transfers) walker, front-wheeled   Comment, (Sit-Stand Transfer) cues for hand placement, R foot a bit out front, pt with good strength and bale to come to a stand with FWW   Gait/Stairs (Locomotion)    Comment, (Gait/Stairs) standing/transfers only at this time per orders   Clinical Impression   Criteria for Skilled Therapeutic Intervention Yes, treatment indicated   PT Diagnosis (PT) decreased functional mobility   Influenced by the following impairments NWB status, pain   Functional limitations due to impairments transfers and gt   Clinical Presentation (PT Evaluation Complexity) Evolving/Changing   Clinical Presentation Rationale presents as diagnosed   Clinical Decision Making (Complexity) moderate complexity   Planned Therapy Interventions (PT) bed mobility training;transfer training;strengthening;ROM (range of motion);balance training   Anticipated Equipment Needs at Discharge (PT) walker, rolling   Risk & Benefits of therapy have been explained care plan/treatment goals reviewed;patient   PT Discharge Planning   PT Discharge Recommendation (DC Rec) Transitional Care Facility   PT Rationale for DC Rec pt to be NWB both LE's for 4 weeks   PT Brief overview of current status pt tolerated PT well, very indep and active at baseline   Total Evaluation Time   Total Evaluation Time (Minutes) 10   Physical Therapy Goals   PT Frequency Daily   PT Predicated Duration/Target Date for Goal Attainment 02/22/22   PT Goals Bed Mobility;Transfers;PT Goal 1   PT: Bed Mobility Minimal assist;Rolling;Supine to/from sit;Within precautions   PT: Transfers Minimal assist;Sit to/from stand;Bed to/from chair;Assistive device;Within precautions   PT: Goal 1 Pt will participate with LE ROM and strengthening ex to improve functional mobility

## 2022-02-18 NOTE — TELEPHONE ENCOUNTER
PATIENT NAME:  Rox Zavala  YOB: 1935  MRN: [id number]  SURGEON: Dr. Johnson  DATE of CONSULT: 02/17/2022  Consult for thoracic fractures    FOLLOW-UP PLAN:    Hospital Follow Up Visit: 2 weeks  Provider: DENISE    DIAGNOSTICS: XR  DISPOSITION:  TCU    ADDITIONAL INSTRUCTIONS FOR MEDICAL STAFF:    Annemarie Sun RN, CNRN

## 2022-02-18 NOTE — PLAN OF CARE
"PRIMARY DIAGNOSIS: \"GENERIC\" NURSING  OUTPATIENT/OBSERVATION GOALS TO BE MET BEFORE DISCHARGE:  ADLs back to baseline: No    Activity and level of assistance: Up with maximum assistance. Consider SW and/or PT evaluation.     Pain status: Improved-controlled with oral pain medications.    Return to near baseline physical activity: Yes     Discharge Planner Nurse   Safe discharge environment identified: Yes  Barriers to discharge: Yes       Entered by: Vicki Scott 02/18/2022 3:35 PM  Awaiting placement for further rehab at this time.     Please review provider order for any additional goals.   Nurse to notify provider when observation goals have been met and patient is ready for discharge.Goal Outcome Evaluation:               Outcome Evaluation: Needs TCU, prefers 1) [facility] or [facility]) [facility] but [facility] for transfers only: PT/OT to eval ([facility]). Primary family contact is park Cervantes at [phone]. Plan [facility] transport.          "

## 2022-02-18 NOTE — PROGRESS NOTES
"Orthopedic Progress Note      Assessment:    Bilateral sacral ala fractures and transverse fracture across S2  On 2/15    Plan:   - Continue PT/OT  - Weightbearing status: NWB other than ok to weight bear with transfers x4 weeks   - Anticoagulation: will defer to medicine  - Discharge planning: will need TCU. Okay to discharge when discharge plan in place. Ortho will sign off, please consult with any questions.   *Follow up in 2-3 weeks with Dr. Trace English at [facility].       Subjective:  Pain: minimal at rest, pain in mid back and right hip/groin with weight bearing   Neuro:  Patient denies new onset numbness or paresthesias  Patient reports frustration/discouragement because of her injury and that she just got over a different injury. She states the left hip does not have pain and is able to put weight on this to  Transfer, but the right hip/groin is very painful with movement and weight bearing transfers. She is understanding of weight bearing restrictions/directions. No complaints, no further questions.     Objective:  /52   Pulse 64   Temp 98.2  F (36.8  C) (Oral)   Resp 16   Ht 1.626 m (5' 4\")   Wt 58.3 kg (128 lb 9.6 oz)   SpO2 96%   BMI 22.07 kg/m    The patient is A&Ox3. Appears comfortable sitting in chair.   Sensation is intact BLEs.   Dorsiflexion and plantar flexion is intact. Active and passive log roll bilateral legs okay and no pain reproduced.   Dorsalis pedis pulse intact.  Calves are soft and non-tender.       Pertinent Labs   Lab Results: personally reviewed.   Lab Results   Component Value Date    INR 1.25 (H) 02/16/2022    INR 1.25 (H) 10/10/2021    INR 1.19 (H) 06/06/2021     Lab Results   Component Value Date    WBC 8.5 02/17/2022    HGB 11.0 (L) 02/17/2022    HCT 33.9 (L) 02/17/2022     (H) 02/17/2022     (L) 02/17/2022     Lab Results   Component Value Date     (L) 02/17/2022    CO2 26 02/17/2022         Report completed by:  Janet Mina, " ISAI, ISAI  Date: 2/18/2022  Time: 3:03 PM

## 2022-02-18 NOTE — PROGRESS NOTES
Occupational Therapy      02/18/22 1116   Quick Adds   Type of Visit Initial Occupational Therapy Evaluation   Living Environment   People in Home alone   Current Living Arrangements apartment;independent living facility   Home Accessibility no concerns;wheelchair accessible   Transportation Anticipated family or friend will provide   Living Environment Comments one level no ALBERTO, W/I Shower w/ SC/GB   Self-Care   Usual Activity Tolerance good   Current Activity Tolerance fair   Regular Exercise Yes   Activity/Exercise Type other (see comments)  (exercise class 2-3x week at [facility] )   Exercise Amount/Frequency 3-5 times/wk   Equipment Currently Used at Home shower chair;walker, rolling;grab bar, toilet;grab bar, tub/shower   Fall history within last six months yes   Number of times patient has fallen within last six months 2   Activity/Exercise/Self-Care Comment Pt reports being Ind. w/ all self cares usually   Instrumental Activities of Daily Living (IADL)   IADL Comments Son assists w/ medications/groceries. Pt does usually eat at facility    General Information   Onset of Illness/Injury or Date of Surgery 02/16/22   Referring Physician Veronica Beck MD    Patient/Family Therapy Goal Statement (OT) to return to [facility] once strong enough   Additional Occupational Profile Info/Pertinent History of Current Problem Pt presenting w/ sacral ala fx & transverse fx across S2, mild displaced R. superior and inferior pubic ramus fx from fall on 2/15- Ortho wanting pt to be NWB w/ BLE except for trnfs no surgical interventions at this time. Pt also has T4/T7 superior enplate fx no surgical intervention/no brace. Pmhx CHF, CAD, HTN, mild dementia   Existing Precautions/Restrictions weight bearing;fall;spinal  (NWB BLE except for trnfs)   Left Lower Extremity (Weight-bearing Status) non weight-bearing (NWB)   Right Lower Extremity (Weight-bearing Status) non weight-bearing (NWB)   Cognitive Status Examination   Orientation Status  person;time   Range of Motion Comprehensive   General Range of Motion no range of motion deficits identified   Transfers   Transfers sit-stand transfer   Sit-Stand Transfer   Sit-Stand [place] (Transfers) contact guard   Assistive Device (Sit-Stand Transfers) walker, standard   Balance   Balance Assessment standing balance: static;standing balance: dynamic   Clinical Impression   Criteria for Skilled Therapeutic Interventions Met (OT) Yes, treatment indicated   OT Diagnosis Decreased ADL skills    OT Problem List-Impairments impacting ADL problems related to;activity tolerance impaired;balance;cognition;mobility;strength   Assessment of Occupational Performance 3-5 Performance Deficits   Identified Performance Deficits LB dressing w/ AE, Pt is NWB w/ BLE except for trnf, bed mob   Planned Therapy Interventions (OT) ADL retraining;IADL retraining;balance training;bed mobility training;cognition;strengthening   Clinical Decision Making Complexity (OT) moderate complexity   Anticipated Equipment Needs Upon Discharge (OT) shower chair   Risk & Benefits of therapy have been explained evaluation/treatment results reviewed;patient   OT Discharge Planning   OT Discharge Recommendation (DC Rec) Transitional Care Facility;home with assist   OT Rationale for DC Rec Assistx1 trnf/mobility/self care tasks, pt currently is NWB w/ BLE except trnfs. Pt lives in [facility] w/ support from family.   Therapy Certification   Start of Care Date 02/18/22   Certification date from 02/18/22   Certification date to 02/25/22   Medical Diagnosis pelvic/vert fx- stable    Total Evaluation Time (Minutes)   Total Evaluation Time (Minutes) 5   OT Goals   Therapy Frequency (OT) Daily   OT Predicated Duration/Target Date for Goal Attainment 02/16/22   OT Goals Hygiene/Grooming;Lower Body Dressing;Transfers;Toilet Transfer/Toileting;Cognition   OT: Hygiene/Grooming modified independent;from wheelchair   OT: Lower Body Dressing Minimal assist;within  precautions;using adaptive equipment   OT: Transfer Supervision/stand-by assist;with assistive device;within precautions   OT: Toilet Transfer/Toileting Supervision/stand-by assist;within precautions;using adaptive equipment   OT: Cognitive Patient/caregiver will verbalize understanding of cognitive assessment results/recommendations as needed for safe discharge planning

## 2022-02-18 NOTE — PROGRESS NOTES
[facility]      OUTPATIENT OCCUPATIONAL THERAPY  EVALUATION  PLAN OF TREATMENT FOR OUTPATIENT REHABILITATION  (COMPLETE FOR INITIAL CLAIMS ONLY)  Patient's Last Name, First Name, M.I.  YOB: 1935  LucasRox  LALIT                          Provider's Name  [facility] Medical Record No.  [id number]                               Onset Date:  02/16/22   Start of Care Date:  (P) 02/18/22     Type:     ___PT   _X_OT   ___SLP Medical Diagnosis:  (P) pelvic/vert fx- stable                         OT Diagnosis:  Decreased ADL skills    Visits from SOC:  1   _________________________________________________________________________________  Plan of Treatment/Functional Goals    Planned Interventions: ADL retraining, IADL retraining, balance training, bed mobility training, cognition, strengthening   Goals: See Occupational Therapy Goals on Care Plan in [facility] electronic health record.    Therapy Frequency:    Predicted Duration of Therapy Intervention:    _________________________________________________________________________________    I CERTIFY THE NEED FOR THESE SERVICES FURNISHED UNDER        THIS PLAN OF TREATMENT AND WHILE UNDER MY CARE .             Physician Signature               Date    X_____________________________________________________                      Certification date from: (P) 02/18/22, Certification date to: (P) 02/25/22    Referring Physician: Veronica Beck MD             Initial Assessment        See Occupational Therapy evaluation dated (P) 02/18/22 in Epic electronic health record.

## 2022-02-18 NOTE — PROGRESS NOTES
Neurosurgery Note:    Thoracic x-rays reviewed, fractures stable. Neurosurgery will see patient in two weeks with repeat x-rays, sooner if needed. We will sign off. Please call with questions.     Lillie Blackwell CNP  [facility] Neurosurgery  O: [phone]  P: [phone]

## 2022-02-18 NOTE — PROGRESS NOTES
Hospitalist Progress Note  ADMIT DATE: 2/16/2022     FACILITY: [facility]    PCP: Misbah Barone, [phone]    Assessment/Plan    Rox COHN Lucas is a 86 year old female admitted on 2/16/2022. She has history of CHF, coronary artery disease, bioprosthetic aortic valve, hypertension, mild dementia, rheumatoid arthritis, osteoporosis presented for evaluation of worsening leg pain after a fall found to have vertebral fractures in addition to known pelvic fractures. Hospital Day: 2      #Bilateral sacral ala fractures and transverse fracture across S2   #Mildly displaced right superior and inferior pubic ramus fractures  -She fell on 2/15 (mechanical trip & fall), [facility] afterward [facility] but was initially able to walk and sent home with pain meds, now returns with worsening pain and unable to walk  -orthopedic surgery advise non weight bearing except for transfers, no surgical intervention, pain control and outpatient f/u in ortho clinic in 4 weeks  -She will benefit from rehabilitation and strengthening of leg and arm muscles, potentially learn self-propelled wheelchair while waiting for return of ability to weight-bear  -PT and OT to assess     #Acute T4/T7 superior endplate fractures:  -pain control  -neurosurgery saw and after discussion with patient, patient has elected no brace.  Planning x-rays now and in 2 weeks, and if worsening, the issue of brace will be revisited. Ok to be up ad kristine per Neurosurg (though remains NWB per Ortho as above)     #Acute hyperkalemia:  -5.6 on arrival. Better now after IVF and low K diet and holding lisinopril  -continue low K diet  -improved     #Vascular dementia:  Mild.  Stable. Monitor for delirium  -home aricept     #HTN:   -home toprol xl, Imdur  -holding lisinopril given high K  -hydralazine prn     #CAD: no anginal complaints.  Continue home statin, Imdur, metoprolol     #S/P Bioprosthetic AVR:  TTE 10/2020 notes valve is grossly well  seated. Mean gradient of 12 mmHg with peak velocity of 2.3 m/s. No observed prosthetic valve insufficiency.     #Left thigh/groin ?soft tissue infection: CT showing possible soft tissue infection in her left proximal posteromedial thigh. From what I can see, this is in a dependent region very near her groin (Series 4, image 127 on pelvis bone CT). Asymptomatic. Would monitor with routine skin checks by nursing.     #Acute hypoxic resp fail  Needing 1-2L O2 today  CXR unremarkable  ?Atelectasis  IS and trend     #Hypothyroidism, home Synthroid  #Rheumatoid arthritis, okay to resume home methotrexate           Diet: Regular Diet Adult Other - please comment    DVT Prophylaxis: Moderate risk. SCDs   Rogers Catheter: Not present  Central Lines: None  Code Status: No CPR- Do NOT Intubate          Disposition Plan     Disposition: TCU when bed found (if pt can tolerate therapy)  Discharge barriers: placement  Medically ready to discharge today: Yes  Estimated discharge date: 02/18/2022 or when placement ready    Subjective  No pain in bed, but painful on weight bearing    Objective    Vital signs in last 24 hours  Temp:  [97.6  F (36.4  C)-98.7  F (37.1  C)] 97.8  F (36.6  C)  Pulse:  [51-66] 66  Resp:  [16-22] 22  BP: ()/(50-82) 155/70  SpO2:  [85 %-98 %] 93 % [unfilled] O2 Device: Nasal cannula    Weight:   [unfilled] Weight change: 3.901 kg (8 lb 9.6 oz)    Intake/Output last 3 shifts  I/O last 3 completed shifts:  In: 840 [P.O.:840]  Out: 300 [Urine:300]  Body mass index is 22.07 kg/m .    Physical Exam    Physical Exam  General appearance: not in acute distress  HEENT: PERRL, EOMI  Lungs: Clear breath sounds in bilateral lung fields  Cardiovascular: Regular rate and rhythm, normal S1-S2  Abdomen: Soft, non tender, no distension, normal bowel sound  Musculoskeletal: No joint swelling  Skin: No rash and no edema  Neurology: AAO ×3.  Cranial nerves II - XII normal. General weakness.      Pertinent Labs   Lab  Results: none      Medications  Scheduled Meds:    acetaminophen  975 mg Oral TID     atorvastatin  40 mg Oral Daily     calcium carbonate-vitamin D  1 tablet Oral Daily     donepezil  10 mg Oral At Bedtime     ferrous sulfate  325 mg Oral Daily with breakfast     folic acid  1,000 mcg Oral Daily     isosorbide mononitrate  30 mg Oral Daily     levothyroxine  100 mcg Oral Daily     lidocaine   Topical 4x Daily     methotrexate  7.5 mg Oral Weekly     metoprolol succinate ER  25 mg Oral Daily     omeprazole  40 mg Oral Daily     polyethylene glycol  17 g Oral Daily     sodium chloride (PF)  3 mL Intracatheter Q8H     Vitamin D3  50 mcg Oral Daily     Continuous Infusions:  PRN Meds:.hydrALAZINE, lidocaine 4%, lidocaine (buffered or not buffered), melatonin, ondansetron **OR** ondansetron, sodium chloride (PF), traMADol      Advanced Care Planning:  Discharge planning discussed with patient         [facility] Medicine Service  Veronica Beck

## 2022-02-18 NOTE — PLAN OF CARE
Patient denies pain, so no prn medication was given, but patient received scheduled Lidocaine ointment to her mid back and received scheduled tylenol at bedtime. PureWick in use. Urine appeared concentrated, patient was encouraged to drink more fluid.  Problem: Pain Acute  Goal: Acceptable Pain Control and Functional Ability  Outcome: Ongoing, Progressing     Problem: Gas Exchange Impaired  Goal: Optimal Gas Exchange  Outcome: Ongoing, Progressing  Intervention: Optimize Oxygenation and Ventilation  Recent Flowsheet Documentation  Taken 2/17/2022 2000 by Yani Schreiber RN  Head of Bed (HOB) Positioning: HOB at 20-30 degrees  Taken 2/17/2022 1600 by Yani Schreiber, RN  Head of Bed (HOB) Positioning: HOB at 20-30 degrees     Problem: Activity Intolerance  Goal: Enhanced Capacity and Energy  Outcome: Ongoing, Progressing   Goal Outcome Evaluation:

## 2022-02-19 NOTE — PROGRESS NOTES
Hospitalist Progress Note  ADMIT DATE: 2/16/2022     FACILITY: [facility]    PCP: Misbah Barone, [phone]    Assessment/Plan    Rox COHN Lucas is a 86 year old female admitted on 2/16/2022. She has history of CHF, coronary artery disease, bioprosthetic aortic valve, hypertension, mild dementia, rheumatoid arthritis, osteoporosis presented for evaluation of worsening leg pain after a fall found to have vertebral fractures in addition to known pelvic fractures.     #Bilateral sacral ala fractures and transverse fracture across S2   #Mildly displaced right superior and inferior pubic ramus fractures  -She fell on 2/15 (mechanical trip & fall), [facility] afterward [facility] but was initially able to walk and sent home with pain meds, now returns with worsening pain and unable to walk  -orthopedic surgery advise non weight bearing except for transfers, no surgical intervention, pain control and outpatient f/u in ortho clinic in 4 weeks  -She will benefit from rehabilitation and strengthening of leg and arm muscles, potentially learn self-propelled wheelchair while waiting for return of ability to weight-bear  -PT and OT to assess     #Acute T4/T7 superior endplate fractures:  -pain control  -neurosurgery saw and after discussion with patient, patient has elected no brace.  Planning x-rays now and in 2 weeks, and if worsening, the issue of brace will be revisited. Ok to be up ad kristine per Neurosurg (though remains NWB per Ortho as above)     #Acute hyperkalemia:  -5.6 on arrival. Better now after IVF and low K diet and holding lisinopril  -continue low K diet  -improved     #Vascular dementia:  Mild.  Stable. Monitor for delirium  -home aricept     #HTN:   -home toprol xl, Imdur  -holding lisinopril given high K  -hydralazine prn     #CAD: no anginal complaints.  Continue home statin, Imdur, metoprolol     #S/P Bioprosthetic AVR:  TTE 10/2020 notes valve is grossly well seated. Mean  "gradient of 12 mmHg with peak velocity of 2.3 m/s. No observed prosthetic valve insufficiency.     #Left thigh/groin ?soft tissue infection: CT showing possible soft tissue infection in her left proximal posteromedial thigh. From what I can see, this is in a dependent region very near her groin (Series 4, image 127 on pelvis bone CT). Asymptomatic. Would monitor with routine skin checks by nursing.     #Acute hypoxic resp fail  Needing 1-2L O2 today  CXR unremarkable  ?Atelectasis  IS and trend     #Hypothyroidism, home Synthroid  #Rheumatoid arthritis, okay to resume home methotrexate     Mild thrombocytopenia   -monitor, no signs of bleeding  -Start on Lovenox for DVT prophylaxis        Diet: Regular Diet Adult Other - please comment    DVT Prophylaxis: subcutaneous Lovenox  Rogers Catheter: Not present  Central Lines: None  Code Status: No CPR- Do NOT Intubate          Disposition Plan     Disposition: TCU when bed found (if pt can tolerate therapy)  Discharge barriers: placement  Medically ready to discharge today: Yes  Estimated discharge date: when placement ready      Subjective  Mild back pain due to \"stay in bed too long\". No hip pain if not getting up.     Objective    Vital signs in last 24 hours  Temp:  [97.6  F (36.4  C)-98.6  F (37  C)] 98.1  F (36.7  C)  Pulse:  [61-68] 62  Resp:  [16-18] 17  BP: (104-176)/(52-95) 143/75  SpO2:  [90 %-96 %] 96 % [unfilled] O2 Device: Nasal cannula    Weight:   [unfilled] Weight change:     Intake/Output last 3 shifts  I/O last 3 completed shifts:  In: 1280 [P.O.:1280]  Out: 1375 [Urine:1375]  Body mass index is 22.07 kg/m .    Physical Exam    Physical Exam  General appearance: not in acute distress  HEENT: PERRL, EOMI  Lungs: Clear breath sounds in bilateral lung fields  Cardiovascular: Regular rate and rhythm, normal S1-S2  Abdomen: Soft, non tender, no distension, normal bowel sound  Musculoskeletal: No joint swelling  Skin: No rash and no edema  Neurology: no " focal deficits.      Pertinent Labs   Lab Results: personally reviewed.     Results for BAOMEÑO (MRN [id number]) as of 2/19/2022 10:18   Ref. Range 2/19/2022 09:21   Creatinine Latest Ref Range: 0.60 - 1.10 mg/dL 0.85   GFR Estimate Latest Ref Range: >60 mL/min/1.73m2 67     Medications  Scheduled Meds:    acetaminophen  975 mg Oral TID     atorvastatin  40 mg Oral Daily     calcium carbonate-vitamin D  1 tablet Oral Daily     donepezil  10 mg Oral At Bedtime     enoxaparin ANTICOAGULANT  40 mg Subcutaneous Q24H     ferrous sulfate  325 mg Oral Daily with breakfast     folic acid  1,000 mcg Oral Daily     isosorbide mononitrate  30 mg Oral Daily     levothyroxine  100 mcg Oral Daily     lidocaine   Topical 4x Daily     methotrexate  7.5 mg Oral Weekly     metoprolol succinate ER  25 mg Oral Daily     omeprazole  40 mg Oral Daily     polyethylene glycol  17 g Oral Daily     sodium chloride (PF)  3 mL Intracatheter Q8H     Vitamin D3  50 mcg Oral Daily     Continuous Infusions:  PRN Meds:.hydrALAZINE, lidocaine 4%, lidocaine (buffered or not buffered), melatonin, ondansetron **OR** ondansetron, sodium chloride (PF), traMADol    Advanced Care Planning:  Discharge planning discussed with patient         [facility] Medicine Service  Veronica Beck

## 2022-02-19 NOTE — PROGRESS NOTES
Care Management Follow Up    Length of Stay (days): 0    Expected Discharge Date:  Pending authorization for transitional care and an available bed. Inability to participate in therapy is a large barrier to transitional care so may need to look for a long term care bed until she is mor mobile.     Concerns to be Addressed:   Care post fall with pelvic and vertebral fractures. Transitional care recommended, limited ability to participate in therapy and would need [facility] authorization.   Patient plan of care discussed at interdisciplinary rounds: Yes    Anticipated Discharge Disposition:  Transitional care (TCU) recommended at discharge.      Anticipated Discharge Services:  Continued therapy and skilled nursing.   Anticipated Discharge DME:  Per therapy (if indicated).    Patient/family educated on Medicare website which has current facility and service quality ratings:  Yes  Education Provided on the Discharge Plan:  Per team  Patient/Family in Agreement with the Plan:  Yes    Referrals Placed by CM/SW:  See below  Private pay costs discussed: Observation services/MOON reviewed in ED.      Additional Information:  Patient lives at [facility] in an Independent Living apartment. She uses her walker for mobility and has dentures and glasses. Her son states that she does not use oxygen at home.  Family helps with shopping and transportation but she is otherwise independent with activities of daily living.   She will likely need a TCU at discharge. Referrals sent to both of below choices.  Choices:  1. [facility] (Does accept [facility] and will review) (No beds available at this time)  2. [facility] (Does accept [facility] and will review) (left another message for admissions) (Does accept [facility] but is not in network so patient would need to pay $245 per day co-pay)  On 2/18, an update was provided to patient's son Billy by telephone. He stated that they will not be able to afford the  out of pocket expenses for [facility]. Latest [facility] list provided to him by email at [email or web address]. Briefly discussed possibility of out of pocket expenses for long term care if transitional care feels she cannot participate in therapy. Goal would be to go to TCU once able to participate.       Health transport at discharge (patient is Partial WBAT for transfers only. Not likely to be safe to transport in a private vehicle at this time).     Primary family contact is patient's son Billy [phone] (he asks staff to call him for any questions. He will be the point of contact and his sister Yeimi will be the one visiting patient in the hospital).      Mona Frazier RN

## 2022-02-19 NOTE — PLAN OF CARE
PRIMARY DIAGNOSIS: ACUTE PAIN  OUTPATIENT/OBSERVATION GOALS TO BE MET BEFORE DISCHARGE:  1. Pain Status: Improved-controlled with oral pain medications.    2. Return to near baseline physical activity: No    3. Cleared for discharge by consultants (if involved): Yes    Discharge Planner Nurse   Safe discharge environment identified: No will need TCU at discharge  Barriers to discharge: Yes weight bearing status       Entered by: Shelby Bruno 02/19/2022 8:03 AM     Please review provider order for any additional goals.   Nurse to notify provider when observation goals have been met and patient is ready for discharge.Goal Outcome Evaluation:               Outcome Evaluation: Needs TCU, prefers 1) [facility] or 2) [facility] but P[facility] for transfers only: PT/OT to [facility] ([facility]). Primary family contact is son Billy at [phone]. Plan [facility] transport.

## 2022-02-19 NOTE — PLAN OF CARE
Pt continues to have pain with movement, reporting it is in the upper back, described as a dull ache that is now constant.  The right groin is also painful with some movement.  Pt continues non weight bearing and using purewick to manage urine.  She reports low appetite but had a bm today.    Problem: Plan of Care - These are the overarching goals to be used throughout the patient stay.    Goal: Absence of Hospital-Acquired Illness or Injury  Intervention: Identify and Manage Fall Risk  Recent Flowsheet Documentation  Taken 2/19/2022 1700 by Jaclyn Norton RN  Safety Promotion/Fall Prevention:   activity supervised   bed alarm on  Intervention: Prevent and Manage VTE (Venous Thromboembolism) Risk  Recent Flowsheet Documentation  Taken 2/19/2022 1700 by Jaclyn Norton RN  VTE Prevention/Management: SCDs (sequential compression devices) off  Goal: Optimal Comfort and Wellbeing  Intervention: Monitor Pain and Promote Comfort  Recent Flowsheet Documentation  Taken 2/19/2022 1727 by Jaclyn Norton RN  Pain Management Interventions: medication (see MAR)     Problem: Pain Acute  Goal: Acceptable Pain Control and Functional Ability  Intervention: Develop Pain Management Plan  Recent Flowsheet Documentation  Taken 2/19/2022 1727 by Jaclyn Norton RN  Pain Management Interventions: medication (see MAR)  Intervention: Prevent or Manage Pain  Recent Flowsheet Documentation  Taken 2/19/2022 1700 by Jaclyn Norton RN  Medication Review/Management: medications reviewed   Goal Outcome Evaluation:               Outcome Evaluation: Needs TCU, prefers 1) [facility] or 2) [facility] but [facility] for transfers only: PT/OT to [facility] ([facility]). Primary family contact is son Billy at [phone]. Plan [facility] transport.

## 2022-02-19 NOTE — PLAN OF CARE
Pt was able to transfer with assist of 1 to commode to void.  She had O2 on but prefers it out and denies SOB.  Pain in back  and right groin with movement.  Pure wick on at HS to avoid having to transfer to commode which is painful.    Problem: Plan of Care - These are the overarching goals to be used throughout the patient stay.    Goal: Absence of Hospital-Acquired Illness or Injury  Intervention: Identify and Manage Fall Risk  Recent Flowsheet Documentation  Taken 2/18/2022 2100 by Jaclyn Norton RN  Safety Promotion/Fall Prevention:   activity supervised   assistive device/personal items within reach   bed alarm on  Taken 2/18/2022 1700 by Jaclyn Norton RN  Safety Promotion/Fall Prevention: activity supervised  Intervention: Prevent and Manage VTE (Venous Thromboembolism) Risk  Recent Flowsheet Documentation  Taken 2/18/2022 2100 by Jaclyn Norton RN  Activity Management: bedrest with commode     Problem: Pain Acute  Goal: Acceptable Pain Control and Functional Ability  Intervention: Prevent or Manage Pain  Recent Flowsheet Documentation  Taken 2/18/2022 2100 by Jaclyn Norton RN  Medication Review/Management: medications reviewed  Taken 2/18/2022 1700 by Jaclyn Norton RN  Medication Review/Management: medications reviewed     Problem: Activity Intolerance  Goal: Enhanced Capacity and Energy  Intervention: Optimize Activity Tolerance  Recent Flowsheet Documentation  Taken 2/18/2022 2100 by Jaclyn Norton RN  Activity Management: bedrest with commode   Goal Outcome Evaluation:               Outcome Evaluation: Needs TCU, prefers 1) [facility] or 2) [place] but PWB for transfers only: PT/OT to eval ([facility]). Primary family contact is son Billy at [phone]. Plan [facility] transport.

## 2022-02-19 NOTE — PLAN OF CARE
"PRIMARY DIAGNOSIS: \"GENERIC\" NURSING  OUTPATIENT/OBSERVATION GOALS TO BE MET BEFORE DISCHARGE:  1. ADLs back to baseline: No    2. Activity and level of assistance: Up with maximum assistance. Consider SW and/or PT evaluation.     3. Pain status: Improved-controlled with oral pain medications.    4. Return to near baseline physical activity: No     Discharge Planner Nurse   Safe discharge environment identified: Yes  Barriers to discharge: Yes       Entered by: Vicki Scott 02/19/2022 10:20 AM   Patient up to chair and commode with 1 assist-tolerates well. Awaiting discontinue to TCU.  Please review provider order for any additional goals.   Nurse to notify provider when observation goals have been met and patient is ready for discharge.This patient is on an MAO inhibitor medication or on Safinamide (XADAGO),  and should be on a tyramine controlled diet.  Click accept to place the combination diet order with the tyramine controlled diet pre-selected.  You may select additional diet criteria within the combination diet order.   Goal Outcome Evaluation:               Outcome Evaluation: Needs TCU, prefers 1) [facility] or 2) [place] but PWB for transfers only: PT/OT to [facility] ([facility]). Primary family contact is son Billy at [phone]. Plan [facility] transport.          "

## 2022-02-19 NOTE — PLAN OF CARE
PRIMARY DIAGNOSIS: ACUTE PAIN  OUTPATIENT/OBSERVATION GOALS TO BE MET BEFORE DISCHARGE:  1. Pain Status: Improved-controlled with oral pain medications.    2. Return to near baseline physical activity: No, Remains NWB except with transfers. Pain increased with weight bearing    3. Cleared for discharge by consultants (if involved): Yes, ortho signed off.    Discharge Planner Nurse   Safe discharge environment identified: Yes Requiring TCU  Barriers to discharge: Yes TCU placement.       Entered by: Shelby Bruno 02/19/2022 3:47 AM    Pt reports minimal pain while at rest and in bed.  Denies pain this shift.  Requesting cares clustered to promote sleep.     Please review provider order for any additional goals.   Nurse to notify provider when observation goals have been met and patient is ready for discharge.Goal Outcome Evaluation:               Outcome Evaluation: Needs TCU, prefers 1) [facility] or 2) [facility] but [facility] for transfers only: PT/OT to [facility] ([facility]). Primary family contact is son Billy at [phone]. Plan [facility] transport.

## 2022-02-19 NOTE — PLAN OF CARE
"PRIMARY DIAGNOSIS: \"GENERIC\" NURSING  OUTPATIENT/OBSERVATION GOALS TO BE MET BEFORE DISCHARGE:  1. ADLs back to baseline: No    2. Activity and level of assistance: Up with maximum assistance. Consider SW and/or PT evaluation.     3. Pain status: Improved-controlled with oral pain medications.    4. Return to near baseline physical activity: No Remains on bed to chair or commode activity-with 1 assist. Awaiting TCU bed.     Discharge Planner Nurse   Safe discharge environment identified: Yes  Barriers to discharge: Yes       Entered by: Vicki Scott 02/19/2022 2:46 PM     Please review provider order for any additional goals.   Nurse to notify provider when observation goals have been met and patient is ready for discharge.Goal Outcome Evaluation:               Outcome Evaluation: Needs TCU, prefers 1) [facility] or 2) [place] but PWB for transfers only: PT/OT to eval ([facility]). Primary family contact is son Billy at [phone]. Plan [facility] transport.          "

## 2022-02-20 NOTE — PLAN OF CARE
Goal Outcome Evaluation:  CXR showing atelectasis-patient encouraged to use IS-is able to do so with reminder. Remains fairly comfortable with scheduled tylenol and lidocaine cream-refused stronger pain medication. A little frustrated with waiting for TCU spot.               Outcome Evaluation: Needs TCU, prefers 1) [facility] or 2) [place] but PWB for transfers only: PT/OT to [facility] ([facility]). Primary family contact is son Billy at [phone]. Plan [facility] transport.

## 2022-02-20 NOTE — PROGRESS NOTES
Hospitalist Progress Note  ADMIT DATE: 2/16/2022     FACILITY: [facility]    PCP: Misbah Barone, [phone]    Assessment/Plan    Rox COHN Lucas is a 86 year old female admitted on 2/16/2022. She has history of CHF, coronary artery disease, bioprosthetic aortic valve, hypertension, mild dementia, rheumatoid arthritis, osteoporosis presented for evaluation of worsening leg pain after a fall found to have vertebral fractures in addition to known pelvic fractures.     #Bilateral sacral ala fractures and transverse fracture across S2   #Mildly displaced right superior and inferior pubic ramus fractures  -She fell on 2/15 (mechanical trip & fall), [facility] but was initially able to walk and sent home with pain meds, now returns with worsening pain and unable to walk  -orthopedic surgery advise non weight bearing except for transfers, no surgical intervention, pain control and outpatient f/u in ortho clinic in 4 weeks  -She will benefit from rehabilitation and strengthening of leg and arm muscles, potentially learn self-propelled wheelchair while waiting for return of ability to weight-bear  -PT and OT to assess     #Acute T4/T7 superior endplate fractures:  -pain control  -neurosurgery saw and after discussion with patient, patient has elected no brace.  Planning x-rays now and in 2 weeks, and if worsening, the issue of brace will be revisited. Ok to be up ad kristine per Neurosurg (though remains NWB per Ortho as above)     #Acute hyperkalemia:  -5.6 on arrival. Better now after IVF and low K diet and holding lisinopril  -continue low K diet  -improved     #Vascular dementia:  Mild.  Stable. Monitor for delirium  -home aricept     #HTN:   -home toprol xl, Imdur  -holding lisinopril given high K  -hydralazine prn     #CAD: no anginal complaints.  Continue home statin, Imdur, metoprolol     #S/P Bioprosthetic AVR:  TTE 10/2020 notes valve is grossly well seated. Mean  gradient of 12 mmHg with peak velocity of 2.3 m/s. No observed prosthetic valve insufficiency.     #Left thigh/groin ?soft tissue infection: CT showing possible soft tissue infection in her left proximal posteromedial thigh. From what I can see, this is in a dependent region very near her groin (Series 4, image 127 on pelvis bone CT). Asymptomatic. Would monitor with routine skin checks by nursing.     #Acute hypoxic resp fail  Needing 1-2L O2 today  CXR unremarkable  ?Atelectasis  IS and trend     #Hypothyroidism, home Synthroid  #Rheumatoid arthritis, okay to resume home methotrexate     Mild thrombocytopenia   -monitor, no signs of bleeding  -Start on Lovenox for DVT prophylaxis        Diet: Regular Diet Adult Other - please comment    DVT Prophylaxis: subcutaneous Lovenox  Rogers Catheter: Not present  Central Lines: None  Code Status: No CPR- Do NOT Intubate          Disposition Plan     Disposition: TCU when bed found (if pt can tolerate therapy)  Discharge barriers: placement  Medically ready to discharge today: Yes  Estimated discharge date: when placement ready    Subjective  Sitting in chair, feels better, but still not able to bear weight on leg, no cp/sob, no f/c, no n/v    Objective    Vital signs in last 24 hours  Temp:  [96.7  F (35.9  C)-98  F (36.7  C)] 97.7  F (36.5  C)  Pulse:  [63-73] 65  Resp:  [16-18] 18  BP: (137-191)/(67-88) 178/85  SpO2:  [82 %-97 %] 95 % [unfilled] O2 Device: Nasal cannula    Weight:   [unfilled] Weight change:     Intake/Output last 3 shifts  I/O last 3 completed shifts:  In: 710 [P.O.:700; I.V.:10]  Out: 1450 [Urine:1450]  Body mass index is 22.07 kg/m .    Physical Exam    Physical Exam  General appearance: not in acute distress  HEENT: PERRL, EOMI  Lungs: Clear breath sounds in bilateral lung fields  Cardiovascular: Regular rate and rhythm, normal S1-S2  Abdomen: Soft, non tender, no distension, normal bowel sound  Musculoskeletal: No joint swelling  Skin: No rash and  no edema  Neurology: grossly intact      Pertinent Labs   Lab Results: personally reviewed.       Medications  Scheduled Meds:    acetaminophen  975 mg Oral TID     atorvastatin  40 mg Oral Daily     calcium carbonate-vitamin D  1 tablet Oral Daily     donepezil  10 mg Oral At Bedtime     enoxaparin ANTICOAGULANT  40 mg Subcutaneous Q24H     ferrous sulfate  325 mg Oral Daily with breakfast     folic acid  1,000 mcg Oral Daily     isosorbide mononitrate  30 mg Oral Daily     levothyroxine  100 mcg Oral Daily     lidocaine   Topical 4x Daily     methotrexate  7.5 mg Oral Weekly     metoprolol succinate ER  25 mg Oral Daily     omeprazole  40 mg Oral Daily     polyethylene glycol  17 g Oral Daily     sodium chloride (PF)  3 mL Intracatheter Q8H     Vitamin D3  50 mcg Oral Daily     Continuous Infusions:  PRN Meds:.hydrALAZINE, lidocaine 4%, lidocaine (buffered or not buffered), melatonin, ondansetron **OR** ondansetron, sodium chloride (PF), traMADol    Advanced Care Planning:  Discharge planning discussed with patient         [facility] Medicine Service  Veronica Beck

## 2022-02-20 NOTE — PLAN OF CARE
Pt reports pain is gone after HS Tylenol order and Lidocaine ointment to back.  SCDs on for VTE prevention.  Using Purewick to avoid weight bearing to transfer to commode.    Problem: Plan of Care - These are the overarching goals to be used throughout the patient stay.    Goal: Absence of Hospital-Acquired Illness or Injury  Intervention: Identify and Manage Fall Risk  Recent Flowsheet Documentation  Taken 2/19/2022 2147 by Jaclyn Norton RN  Safety Promotion/Fall Prevention:   activity supervised   bed alarm on  Taken 2/19/2022 1700 by Jaclyn Norton RN  Safety Promotion/Fall Prevention:   activity supervised   bed alarm on  Intervention: Prevent and Manage VTE (Venous Thromboembolism) Risk  Recent Flowsheet Documentation  Taken 2/19/2022 2147 by Jaclyn Norton RN  VTE Prevention/Management: SCDs (sequential compression devices) off  Taken 2/19/2022 1700 by Jaclyn Norton RN  VTE Prevention/Management: SCDs (sequential compression devices) off  Goal: Optimal Comfort and Wellbeing  Intervention: Monitor Pain and Promote Comfort  Recent Flowsheet Documentation  Taken 2/19/2022 1727 by Jaclyn Norton RN  Pain Management Interventions: medication (see MAR)   Goal Outcome Evaluation:               Outcome Evaluation: Needs TCU, prefers 1) [facility] or 2) [facility] but [facility] for transfers only: [facility] to [facility] ([facility] Primary family contact is son Billy at [phone]. Plan [facility] transport.

## 2022-02-20 NOTE — PLAN OF CARE
PRIMARY DIAGNOSIS: ACUTE PAIN  OUTPATIENT/OBSERVATION GOALS TO BE MET BEFORE DISCHARGE:  1. Pain Status: Improved-controlled with oral pain medications.    2. Return to near baseline physical activity: No NWB except with transfers    3. Cleared for discharge by consultants (if involved): Yes needs TCU bed    Discharge Planner Nurse   Safe discharge environment identified: Yes TCU  Barriers to discharge: Yes weight bearing status       Entered by: Shelby Bruno 02/20/2022 8:32 AM     Please review provider order for any additional goals.   Nurse to notify provider when observation goals have been met and patient is ready for discharge.Goal Outcome Evaluation:               Outcome Evaluation: Needs TCU, prefers 1) [facility] or 2) [place] but PWB for transfers only: PT/OT to eval ([facility]). Primary family contact is son Billy at [phone]. Plan [facility] transport.

## 2022-02-20 NOTE — PROGRESS NOTES
Care Management Follow Up    Length of Stay (days): 0    Expected Discharge Date:  Pending authorization for transitional care and an available bed. Inability to participate in therapy is a large barrier to transitional care so may need to look for a long term care bed until she is mor mobile.     Concerns to be Addressed:   Care post fall with pelvic and vertebral fractures. Transitional care recommended, limited ability to participate in therapy and would need [facility] authorization.   Patient plan of care discussed at interdisciplinary rounds: Yes    Anticipated Discharge Disposition:  Transitional care (TCU) recommended at discharge.      Anticipated Discharge Services:  Continued therapy and skilled nursing.   Anticipated Discharge DME:  Per therapy (if indicated).    Patient/family educated on Medicare website which has current facility and service quality ratings:  Yes  Education Provided on the Discharge Plan:  Per team  Patient/Family in Agreement with the Plan:  Yes    Referrals Placed by CM/KRISTINA:  See below  Private pay costs discussed: Observation services/MOON reviewed in ED.      Additional Information:  Patient lives at [facility] in an Independent Living apartment. She uses her walker for mobility and has dentures and glasses. Her son states that she does not use oxygen at home.  Family helps with shopping and transportation but she is otherwise independent with activities of daily living.  2:23 PM:  Awaiting call back from family with [facility] facility preferences.   She will likely need a TCU at discharge. Referrals sent to both of below choices.  Choices:  1. [facility] (Does accept [facility] and will review) (No beds available at this time)  2. [facility] (Does accept [facility] and will review) (left another message for admissions) (Does accept [facility] but is not in network so patient would need to pay $245 per day co-pay)  On 2/18, an update was provided to patient's son  Billy by telephone. He stated that they will not be able to afford the out of pocket expenses for [facility]. Latest [facility] list provided to him by email at [email or web address]. Briefly discussed possibility of out of pocket expenses for long term care if transitional care feels she cannot participate in therapy. Goal would be to go to TCU once able to participate.      [facility] transport at discharge (patient is Partial WBAT for transfers only. Not likely to be safe to transport in a private vehicle at this time).     Primary family contact is patient's son Billy [phone] (he asks staff to call him for any questions. He will be the point of contact and his sister Yeimi will be the one visiting patient in the hospital).      Mona Frazier RN

## 2022-02-20 NOTE — PLAN OF CARE
PRIMARY DIAGNOSIS: ACUTE PAIN  OUTPATIENT/OBSERVATION GOALS TO BE MET BEFORE DISCHARGE:  1. Pain Status: Improved-controlled with oral pain medications.    2. Return to near baseline physical activity: No NWB, working with PT/OT    3. Cleared for discharge by consultants (if involved): Yes waiting tcu bed    Discharge Planner Nurse   Safe discharge environment identified: Yes  Barriers to discharge: Yes weight bearing status       Entered by: Shelby Bruno 02/20/2022 8:33 AM     Please review provider order for any additional goals.   Nurse to notify provider when observation goals have been met and patient is ready for discharge.Goal Outcome Evaluation:               Outcome Evaluation: Needs TCU, prefers 1) [facility] or 2) [place] but PWB for transfers only: PT/OT to [facility] ([facility]). Primary family contact is son Billy at [phone]. Plan [facility] transport.

## 2022-02-20 NOTE — PLAN OF CARE
Pt denies pain at rest.  She was encouraged to use IS more frequently.  Using O2 now 1 LPM and denies SOB.  Lungs are clear, diminished bilat.  Pt is wearing bilateral SCDs.  Problem: Plan of Care - These are the overarching goals to be used throughout the patient stay.    Goal: Absence of Hospital-Acquired Illness or Injury  Intervention: Identify and Manage Fall Risk  Recent Flowsheet Documentation  Taken 2/20/2022 1700 by Jaclyn Norton RN  Safety Promotion/Fall Prevention:   activity supervised   bed alarm on  Intervention: Prevent and Manage VTE (Venous Thromboembolism) Risk  Recent Flowsheet Documentation  Taken 2/20/2022 1700 by Jaclyn Norton RN  VTE Prevention/Management: SCDs (sequential compression devices) on     Problem: Pain Acute  Goal: Acceptable Pain Control and Functional Ability  Intervention: Prevent or Manage Pain  Recent Flowsheet Documentation  Taken 2/20/2022 1700 by Jaclyn Norton, RN  Medication Review/Management: medications reviewed   Goal Outcome Evaluation:               Outcome Evaluation: Needs TCU, prefers 1) [facility] or 2) [facility] but [facility] for transfers only: PT[facility]OT to [facility] ([facility]). Primary family contact is son Billy at [phone]. Plan [facility] transport.

## 2022-02-20 NOTE — PLAN OF CARE
"PRIMARY DIAGNOSIS: \"GENERIC\" NURSING  OUTPATIENT/OBSERVATION GOALS TO BE MET BEFORE DISCHARGE:  1. ADLs back to baseline: No    2. Activity and level of assistance: Up with maximum assistance. Consider SW and/or PT evaluation.     3. Pain status: Improved-controlled with oral pain medications.    4. Return to near baseline physical activity: No     Discharge Planner Nurse   Safe discharge environment identified: Yes  Barriers to discharge: Yes       Entered by: Vicki Scott 02/20/2022 12:01 PM   Hospitalist notified of continued need for 1-2L of O2 in order to keep sats at 92%-d-dimer bnpdrawn and chest x-ray done -will follow.  Please review provider order for any additional goals.   Nurse to notify provider when observation goals have been met and patient is ready for discharge.This patient is on an MAO inhibitor medication or on Safinamide (XADAGO),  and should be on a tyramine controlled diet.  Click accept to place the combination diet order with the tyramine controlled diet pre-selected.  You may select additional diet criteria within the combination diet order.   Goal Outcome Evaluation:               Outcome Evaluation: Needs TCU, prefers 1) [facility] or 2) [place] but P[facility] for transfers only: PT/OT to [facility] ([facility]). Primary family contact is son Billy at [phone]. Plan [facility] transport.          "

## 2022-02-21 NOTE — PROGRESS NOTES
Hospitalist Progress Note  ADMIT DATE: 2/16/2022     FACILITY: [facility]    PCP: Misbah Barone, [phone]    Assessment/Plan    Rox COHN Lucas is a 86 year old female admitted on 2/16/2022. She has history of CHF, coronary artery disease, bioprosthetic aortic valve, hypertension, mild dementia, rheumatoid arthritis, osteoporosis presented for evaluation of worsening leg pain after a fall found to have vertebral fractures in addition to known pelvic fractures.    #Hypoxia  -worsening sob, with nc o2  -elevated d-dimer and BNP  -check chest ct PE run to r/o PE  -if no PE/pneumonia; might consider Lasix     #Bilateral sacral ala fractures and transverse fracture across S2   #Mildly displaced right superior and inferior pubic ramus fractures  -She fell on 2/15 (mechanical trip & fall), [facility] afterward [facility] but was initially able to walk and sent home with pain meds, now returns with worsening pain and unable to walk  -orthopedic surgery advise non weight bearing except for transfers, no surgical intervention, pain control and outpatient f/u in ortho clinic in 4 weeks  -She will benefit from rehabilitation and strengthening of leg and arm muscles, potentially learn self-propelled wheelchair while waiting for return of ability to weight-bear  -PT and OT to assess     #Acute T4/T7 superior endplate fractures:  -pain control  -neurosurgery saw and after discussion with patient, patient has elected no brace.  Planning x-rays now and in 2 weeks, and if worsening, the issue of brace will be revisited. Ok to be up ad kristine per Neurosurg (though remains NWB per Ortho as above)     #Acute hyperkalemia:  -5.6 on arrival. Better now after IVF and low K diet and holding lisinopril  -continue low K diet  -improved     #Vascular dementia:  Mild.  Stable. Monitor for delirium  -home aricept     #HTN:   -home toprol xl, Imdur  -restart low dose Lisinopril due to worsening bp  -hydralazine  prn     #CAD: no anginal complaints.  Continue home statin, Imdur, metoprolol     #S/P Bioprosthetic AVR:  TTE 10/2020 notes valve is grossly well seated. Mean gradient of 12 mmHg with peak velocity of 2.3 m/s. No observed prosthetic valve insufficiency.     #Left thigh/groin ?soft tissue infection: CT showing possible soft tissue infection in her left proximal posteromedial thigh. From what I can see, this is in a dependent region very near her groin (Series 4, image 127 on pelvis bone CT). Asymptomatic. Would monitor with routine skin checks by nursing.     #Acute hypoxic resp fail  Needing 1-2L O2 today  CXR unremarkable  ?Atelectasis  IS and trend     #Hypothyroidism, home Synthroid  #Rheumatoid arthritis, okay to resume home methotrexate     Mild thrombocytopenia   -monitor, no signs of bleeding  -Start on Lovenox for DVT prophylaxis        Diet: Regular Diet Adult Other - please comment    DVT Prophylaxis: subcutaneous Lovenox  Rogers Catheter: Not present  Central Lines: None  Code Status: No CPR- Do NOT Intubate          Disposition Plan     Disposition: TCU when bed found (if pt can tolerate therapy)  Discharge barriers: placement; hypoxia  Medically ready to discharge today: no  Estimated discharge date: when placement ready    Subjective  Still pain in back; mild sob, no cough, no cp, no f/c    Objective    Vital signs in last 24 hours  Temp:  [97.5  F (36.4  C)-98  F (36.7  C)] 97.9  F (36.6  C)  Pulse:  [57-67] 67  Resp:  [18-20] 18  BP: (140-189)/() 188/86  SpO2:  [94 %-96 %] 94 % [unfilled] O2 Device: Nasal cannula    Weight:   [unfilled] Weight change:     Intake/Output last 3 shifts  I/O last 3 completed shifts:  In: 1000 [P.O.:1000]  Out: 1150 [Urine:1150]  Body mass index is 22.07 kg/m .    Physical Exam    Physical Exam  General appearance: mild resp distress  HEENT: PERRL, EOMI  Lungs: Clear breath sounds in bilateral lung fields  Cardiovascular: Regular rate and rhythm, normal  S1-S2  Abdomen: Soft, non tender, no distension, normal bowel sound  Musculoskeletal: No joint swelling  Skin: No rash and no edema  Neurology: AAO ×3.  Cranial nerves II - XII normal.  General weakness.       Pertinent Labs   Lab Results: personally reviewed.     Results for MEÑO GORE (MRN [id number]) as of 2/21/2022 08:16   Ref. Range 2/19/2022 09:21 2/20/2022 11:14   Creatinine Latest Ref Range: 0.60 - 1.10 mg/dL 0.85    GFR Estimate Latest Ref Range: >60 mL/min/1.73m2 67    BNP Latest Ref Range: 0 - 167 pg/mL  494 (H)     Medications  Scheduled Meds:    acetaminophen  975 mg Oral TID     atorvastatin  40 mg Oral Daily     calcium carbonate-vitamin D  1 tablet Oral Daily     donepezil  10 mg Oral At Bedtime     enoxaparin ANTICOAGULANT  40 mg Subcutaneous Q24H     ferrous sulfate  325 mg Oral Daily with breakfast     folic acid  1,000 mcg Oral Daily     furosemide  20 mg Intravenous Once     isosorbide mononitrate  30 mg Oral Daily     levothyroxine  100 mcg Oral Daily     lidocaine   Topical 4x Daily     lisinopril  2.5 mg Oral Daily     methotrexate  7.5 mg Oral Weekly     metoprolol succinate ER  25 mg Oral Daily     omeprazole  40 mg Oral Daily     polyethylene glycol  17 g Oral Daily     sodium chloride (PF)  3 mL Intracatheter Q8H     Vitamin D3  50 mcg Oral Daily     Continuous Infusions:  PRN Meds:.hydrALAZINE, lidocaine 4%, lidocaine (buffered or not buffered), melatonin, ondansetron **OR** ondansetron, sodium chloride (PF), traMADol    Pertinent Radiology   Radiology Results personally reviewed  EXAM: XR CHEST PORTABLE 1 VIEW  LOCATION: [facility]  DATE/TIME: 02/20/2022, 11:34 AM     INDICATION: Hypoxia.  COMPARISON: Chest x-ray on 02/16/2022.                                                                      IMPRESSION: Single AP view of the chest was obtained. Postsurgical changes of cardiac surgery with median sternotomy wires and surgical clips. Stable  cardiomediastinal silhouette. Obliteration of the lateral costophrenic angles, likely due to small   effusions and associated basilar atelectasis/consolidation. No significant pneumothorax.    Chest ct pending      Advanced Care Planning:  Discharge planning discussed with patient         [facility] Medicine Service  Veronica Beck

## 2022-02-21 NOTE — PLAN OF CARE
Pt continues with back pain that is much improved with scheduled Tylenol and Lidocaine.  Refusing to keep the SCDs on at this time due to not being able to sleep with them on.      Problem: Plan of Care - These are the overarching goals to be used throughout the patient stay.    Goal: Absence of Hospital-Acquired Illness or Injury  Intervention: Identify and Manage Fall Risk  Recent Flowsheet Documentation  Taken 2/20/2022 1700 by Jaclyn Norton RN  Safety Promotion/Fall Prevention:   activity supervised   bed alarm on  Intervention: Prevent and Manage VTE (Venous Thromboembolism) Risk  Recent Flowsheet Documentation  Taken 2/20/2022 1700 by Jaclyn Norton RN  VTE Prevention/Management: SCDs (sequential compression devices) on     Problem: Pain Acute  Goal: Acceptable Pain Control and Functional Ability  Intervention: Prevent or Manage Pain  Recent Flowsheet Documentation  Taken 2/20/2022 1700 by Jaclyn Norton RN  Medication Review/Management: medications reviewed   Goal Outcome Evaluation:               Outcome Evaluation: Needs TCU, prefers 1) [facility] or 2) [place] but PWB for transfers only: PT/OT to [facility] ([facility] Primary family contact is park Cervantes at [phone]. Plan [facility] transport.

## 2022-02-21 NOTE — PLAN OF CARE
Goal Outcome Evaluation:  Patient down this am for PE run-negative for PE-probable CHF-lasix 20mg IV given this afternoon-patient starting to have good urine output via purewick. BP 180s in am 140s in afternoon-started lisinipril 2.5 mg this am in addition to metoprolol,               Outcome Evaluation: Needs TCU, prefers 1) [facility] or 2) [facility] but [facility] for transfers only: PT/OT to [facility] ([facility]). Primary family contact is son Billy at [phone]. Plan [facility] transport.

## 2022-02-21 NOTE — PLAN OF CARE
PRIMARY DIAGNOSIS: ACUTE PAIN  OUTPATIENT/OBSERVATION GOALS TO BE MET BEFORE DISCHARGE:  1. Pain Status: Improved-controlled with oral pain medications.    2. Return to near baseline physical activity: No working with PT, NWB RLE    3. Cleared for discharge by consultants (if involved): Yes TCU recommended    Discharge Planner Nurse   Safe discharge environment identified: Yes TCU  Barriers to discharge: Yes Weight bearing status and increased oxygen need       Entered by: Shelby Bruno 02/21/2022 5:05 AM     Please review provider order for any additional goals.   Nurse to notify provider when observation goals have been met and patient is ready for discharge.Goal Outcome Evaluation:               Outcome Evaluation: Needs TCU, prefers 1) [facility] or [facility]) [facility] but [facility] for transfers only: [facility] ([facility] Primary family contact is son Billy at [phone]. Plan [facility].

## 2022-02-21 NOTE — PLAN OF CARE
PRIMARY DIAGNOSIS: ACUTE PAIN  OUTPATIENT/OBSERVATION GOALS TO BE MET BEFORE DISCHARGE:  1. Pain Status: Improved-controlled with oral pain medications.    2. Return to near baseline physical activity: No    3. Cleared for discharge by consultants (if involved): Yes    Discharge Planner Nurse   Safe discharge environment identified: Yes TCU  Barriers to discharge: Yes TCU placement, weight bearing and O2 needs       Entered by: Shelby Bruno 02/21/2022 5:35 AM     Please review provider order for any additional goals.   Nurse to notify provider when observation goals have been met and patient is ready for discharge.Goal Outcome Evaluation:               Outcome Evaluation: Needs TCU, prefers 1) [facility] or 2) [place] but PWB for transfers only: PT/OT to [facility] ([facility] Primary family contact is son Billy at [phone]. Plan [facility] transport.

## 2022-02-21 NOTE — PLAN OF CARE
Problem: Pain Acute  Goal: Acceptable Pain Control and Functional Ability  Outcome: Ongoing, Progressing   Rates pain a 5 on back but denies need for pain medication. Applied Lidocream to back.  Problem: Gas Exchange Impaired  Goal: Optimal Gas Exchange  Outcome: Ongoing, Progressing   Goal Outcome Evaluation:      BNP elevated. IV lasix given on previous shift. Pt producing large amount of urine.Oxygen need 1 litern/c.         Outcome Evaluation: TCU

## 2022-02-21 NOTE — PROGRESS NOTES
Care Management Follow Up    Length of Stay (days): 0    Expected Discharge Date: 02/22/2022     Concerns to be Addressed: discharge planning       Patient plan of care discussed at interdisciplinary rounds: Yes    Anticipated Discharge Disposition: Transitional Care     Anticipated Discharge Services: Other (see comment) (theapy services )    Anticipated Discharge DME: None    Patient/family educated on Medicare website which has current facility and service quality ratings: yes    Education Provided on the Discharge Plan:  Yes    Patient/Family in Agreement with the Plan: yes    Referrals Placed by CM/SW: Post Acute Facilities    Private pay costs discussed: Not applicable    Additional Information: KRISTINA following up on TCU referrals.  KRISTINA spoke with Yeimi at [facility] -reviewing - declined - no skilled need - need active therapy/rehab- due to NWB, would be more care home care - needs active rehab, not just set up.  Pt declined brace for now.  KRISTINA spoke with Emeterio at [facility] - they are out of network with [facility] - cost approximately $280 per day if wanted to come despite this.  Follow up needed with son Billy for more TCU choices.     KRISTINA spoke with Belia from therapy regarding pt therapy needs and when pt would next be seen by PT.  KRISTINA informed that PT will see pt tomorrow.  Pt is non weight-bearing except for transfers from bed to chair at this time.  [facility] Transport.  Needs PAS.      CALVIN Gross, BEATRIZ 02/21/22 4:25 PM

## 2022-02-22 NOTE — PLAN OF CARE
Problem: Plan of Care - These are the overarching goals to be used throughout the patient stay.    Goal: Optimal Comfort and Wellbeing  Intervention: Monitor Pain and Promote Comfort  Recent Flowsheet Documentation  Taken 2/22/2022 1133 by Anjali Brown RN  Pain Management Interventions: medication (see MAR)  Taken 2/22/2022 0900 by Anjali Brown RN  Pain Management Interventions: medication (see MAR)   Pt being medicated with tylenol and lidocane cream with good relief.  Problem: Gas Exchange Impaired  Goal: Optimal Gas Exchange  Intervention: Optimize Oxygenation and Ventilation  Recent Flowsheet Documentation  Taken 2/22/2022 1133 by Anjali Brown RN  Head of Bed (HOB) Positioning: HOB at 20-30 degrees  Taken 2/22/2022 0900 by Anjali Brown RN  Head of Bed (HOB) Positioning: HOB at 20-30 degrees   Goal Outcome Evaluation:  Oxygen need 4 liters sating pt at 95%. Lung sound diminished.             Outcome Evaluation: TCU

## 2022-02-22 NOTE — PROGRESS NOTES
Care Management Follow Up    Length of Stay (days): 0    Expected Discharge Date: 02/22/2022     Concerns to be Addressed: discharge planning     Patient plan of care discussed at interdisciplinary rounds: Yes    Anticipated Discharge Disposition: Transitional Care     Anticipated Discharge Services: Other (see comment) (theapy services )  Anticipated Discharge DME: None    Patient/family educated on Medicare website which has current facility and service quality ratings: yes  Education Provided on the Discharge Plan:    Patient/Family in Agreement with the Plan: yes    Referrals Placed by CM/SW: Post Acute Facilities  Private pay costs discussed: Not applicable    Additional Information:  KRISTINA reviewed notes. KRISTINA placed call to pts son, Billy, to discuss discharge planning. SW updated him on status of referrals. He reports understanding and is agreeable to sending additional referrals. He reports plan to discuss further with his brother who is reviewing facility options at this time. He does provide permission to send referrals to [facility] and [facility]. Billy will also try to call [facility] to check on in network facilities for the pt.     KRISTINA sent referrals to [facility] and [facility].    [facility]    Addendum: [facility] will review again tomorrow but wondering if pt can privately pay if not able to get skilled coverage for the whole time she is non weight bearing. [facility] can look at bed availability in the morning. KRISTINA placed call to pts son Billy to update him on information from [facility]. He requests to know how long they anticipate she may have coverage. Discussed possible private pay costs of $300/day when coverage ends. Billy also requests additional referrals [facility] as he was able to find list of facilities that are in network for pts insurance.     KRISTINA left  follow up for [facility] and sent referrals to [facility] and [facility].  3:27 PM

## 2022-02-22 NOTE — PLAN OF CARE
"PRIMARY DIAGNOSIS: \"GENERIC\" NURSING  OUTPATIENT/OBSERVATION GOALS TO BE MET BEFORE DISCHARGE:  ADLs back to baseline: No    Activity and level of assistance: Up with maximum assistance. Consider SW and/or PT evaluation.     Pain status: Improved-controlled with oral pain medications.    Return to near baseline physical activity: no       Discharge Planner Nurse   Safe discharge environment identified: Yes  Barriers to discharge: Yes       Entered by: Anjali Brown 02/21/2022 6:37 PM     Please review provider order for any additional goals.   Nurse to notify provider when observation goals have been met and patient is ready for discharge.Goal Outcome Evaluation:               Outcome Evaluation: TCU          "

## 2022-02-22 NOTE — PROGRESS NOTES
RRT called for decreased LOC. Set on 4 LPM, SpO2 95. Alert and talking.2/22/2022  .Essence Montaño RT

## 2022-02-22 NOTE — PLAN OF CARE
"PRIMARY DIAGNOSIS: \"GENERIC\" NURSING  OUTPATIENT/OBSERVATION GOALS TO BE MET BEFORE DISCHARGE:  ADLs back to baseline: No    Activity and level of assistance: Up with maximum assistance. Consider SW and/or PT evaluation.     Pain status: Improved-controlled with oral pain medications.    Return to near baseline physical activity:no       Discharge Planner Nurse   Safe discharge environment identified: Yes  Barriers to discharge: Yes       Entered by: Anjali Brown 02/21/2022 10:54 PM     Please review provider order for any additional goals.   Nurse to notify provider when observation goals have been met and patient is ready for discharge.Goal Outcome Evaluation:               Outcome Evaluation: TCU          "

## 2022-02-22 NOTE — PROGRESS NOTES
Called patient's son Billy and updated about the syncope episode this morning.   Addressed his conerns. Explained that TCU is not available yet.

## 2022-02-22 NOTE — PLAN OF CARE
Problem: Plan of Care - These are the overarching goals to be used throughout the patient stay.    Goal: Optimal Comfort and Wellbeing  Outcome: Ongoing, Progressing     Problem: Gas Exchange Impaired  Goal: Optimal Gas Exchange  Outcome: Ongoing, Progressing  Intervention: Optimize Oxygenation and Ventilation  Recent Flowsheet Documentation  Taken 2/22/2022 0505 by Shelby Bruno RN  Head of Bed (HOB) Positioning: HOB at 20-30 degrees  Taken 2/22/2022 0345 by Shelby Bruno RN  Head of Bed (HOB) Positioning: HOB at 20 degrees  Taken 2/22/2022 0140 by Shelby Bruno RN  Head of Bed (HOB) Positioning: HOB at 20-30 degrees     Pt slept between cares.  Continues on iv lasix and received a dose overnight.  Purewick was in place though did not work initially.  Pt was incontinent of a large amount of urine requiring full bed change.  Purewick replaced and total output after that was 800cc.  Pt denies pain this shift.  BP improved overnight and sats improved though continues on 1 liter nasal cannula.     Goal Outcome Evaluation:               Outcome Evaluation: TCU

## 2022-02-22 NOTE — PROGRESS NOTES
Hospitalist Progress Note  ADMIT DATE: 2/16/2022     FACILITY: [facility]    PCP: Misbah Barone, [phone]    2/22 am rapid response was called due to syncope, hypotension shortly after PT.  Quickly improved after lying down and IV fluid.       Assessment/Plan    Roxcampos Zavala is a 86 year old female admitted on 2/16/2022. She has history of CHF, coronary artery disease, bioprosthetic aortic valve, hypertension, mild dementia, rheumatoid arthritis, osteoporosis presented for evaluation of worsening leg pain after a fall found to have vertebral fractures in addition to known pelvic fractures.    #Syncope  #Hypotension  -most likely due to IV lasix and medications for HTN  -iv fluid  -discontinue Lasix   -Echocardiogram 2/21: EF wnl  -add parameters to medications for HTN     #Hypoxia  -not on o2 at home  -worsening sob, with nc o2 since coming in hospital  -elevated d-dimer and BNP  -check chest ct PE run to r/o PE -no PE, + for fluid overload; most likely due to IV hydration since admission  -Lasix 20 mg iv bid -now discontinue due to syncope/hypotension  -IS and trend     #Bilateral sacral ala fractures and transverse fracture across S2   #Mildly displaced right superior and inferior pubic ramus fractures  -She fell on 2/15 (mechanical trip & fall), seen afterward at [facility] but was initially able to walk and sent home with pain meds, now returns with worsening pain and unable to walk  -orthopedic surgery advise non weight bearing except for transfers, no surgical intervention, pain control and outpatient f/u in ortho clinic in 4 weeks  -She will benefit from rehabilitation and strengthening of leg and arm muscles, potentially learn self-propelled wheelchair while waiting for return of ability to weight-bear  -PT and OT to assess     #Acute T4/T7 superior endplate fractures:  -pain control  -neurosurgery saw and after discussion with patient, patient has elected no brace.   Planning x-rays now and in 2 weeks, and if worsening, the issue of brace will be revisited. Ok to be up ad kristine per Neurosurg (though remains NWB per Ortho as above)     #Acute hyperkalemia:  -5.6 on arrival. Better now after IVF and low K diet and holding lisinopril  -continue low K diet  -improved     #Vascular dementia:  Mild.  Stable. Monitor for delirium  -home aricept     #HTN:   -home toprol xl, Imdur  -restart low dose Lisinopril due to worsening HTN  -hydralazine prn     #CAD: no anginal complaints.  Continue home statin, Imdur, metoprolol     #S/P Bioprosthetic AVR:  TTE 10/2020 notes valve is grossly well seated. Mean gradient of 12 mmHg with peak velocity of 2.3 m/s. No observed prosthetic valve insufficiency.     #Left thigh/groin ?soft tissue infection: CT showing possible soft tissue infection in her left proximal posteromedial thigh. From what I can see, this is in a dependent region very near her groin (Series 4, image 127 on pelvis bone CT). Asymptomatic. Would monitor with routine skin checks by nursing.      #Hypothyroidism, home Synthroid  #Rheumatoid arthritis, okay to resume home methotrexate     Mild thrombocytopenia   -monitor, no signs of bleeding  -Start on Lovenox for DVT prophylaxis        Diet: Regular Diet Adult Other - please comment    DVT Prophylaxis: subcutaneous Lovenox  Rogers Catheter: Not present  Central Lines: None  Code Status: No CPR- Do NOT Intubate          Disposition Plan     Disposition: TCU when bed found (if pt can tolerate therapy)  Discharge barriers: placement; hypoxia; syncope/hypotension  Medically ready to discharge today: no  Estimated discharge date: when placement ready    Subjective  Patient did urinate a good amount since being on iv Lasix; feels better with breathing; but developed syncope and hypotension shortly after PT; now better with lying down and ns 250 ml bolus. No cp, no f/c, no n/v    Objective    Vital signs in last 24 hours  Temp:  [97.7  F  (36.5  C)-98.1  F (36.7  C)] 98.1  F (36.7  C)  Pulse:  [50-67] 67  Resp:  [17-20] 20  BP: (133-166)/(63-80) 143/80  SpO2:  [93 %-95 %] 93 % [unfilled] O2 Device: Nasal cannula    Weight:   [unfilled] Weight change:     Intake/Output last 3 shifts  I/O last 3 completed shifts:  In: 880 [P.O.:880]  Out: 3850 [Urine:3850]  Body mass index is 22.31 kg/m .    Physical Exam    Physical Exam  General appearance: not in acute distress  HEENT: PERRL, EOMI  Lungs: Clear breath sounds in bilateral lung fields  Cardiovascular: Regular rate and rhythm, normal S1-S2  Abdomen: Soft, non tender, no distension, normal bowel sound  Musculoskeletal: No joint swelling; pelvic pain, especially right groin area  Skin: No rash and no edema  Neurology: AAO ×3.  Cranial nerves II - XII normal. General weakess      Pertinent Labs   Lab Results: personally reviewed.   Results for BAOMEÑO LEWIS (MRN [id number]) as of 2/22/2022 08:16   Ref. Range 2/22/2022 06:06   Creatinine Latest Ref Range: 0.60 - 1.10 mg/dL 0.83   GFR Estimate Latest Ref Range: >60 mL/min/1.73m2 69   Platelet Count Latest Ref Range: 150 - 450 10e3/uL 176   Results for BAO MEÑO D (MRN [id number]) as of 2/22/2022 08:16   Ref. Range 2/21/2022 13:14   Troponin I Latest Ref Range: 0.00 - 0.29 ng/mL 0.02       Medications  Scheduled Meds:    acetaminophen  975 mg Oral TID     atorvastatin  40 mg Oral Daily     calcium carbonate-vitamin D  1 tablet Oral Daily     donepezil  10 mg Oral At Bedtime     enoxaparin ANTICOAGULANT  40 mg Subcutaneous Q24H     ferrous sulfate  325 mg Oral Daily with breakfast     folic acid  1,000 mcg Oral Daily     furosemide  20 mg Intravenous Q12H     isosorbide mononitrate  30 mg Oral Daily     levothyroxine  100 mcg Oral Daily     lidocaine   Topical 4x Daily     lisinopril  2.5 mg Oral Daily     methotrexate  7.5 mg Oral Weekly     metoprolol succinate ER  25 mg Oral Daily     omeprazole  40 mg Oral Daily     polyethylene glycol  17 g  Oral Daily     sodium chloride (PF)  3 mL Intracatheter Q8H     Vitamin D3  50 mcg Oral Daily     Continuous Infusions:  PRN Meds:.hydrALAZINE, lidocaine 4%, lidocaine (buffered or not buffered), melatonin, ondansetron **OR** ondansetron, sodium chloride (PF), traMADol    Pertinent Radiology   Radiology Results personally reviewed  EXAM: CT CHEST PULMONARY EMBOLISM W CONTRAST  LOCATION: [facility]  DATE/TIME: 2/21/2022 11:26 AM     INDICATION: PE suspected, low/intermediate prob, elevated D-dimer and BNP. Persistent hypoxia.  COMPARISON: 06/06/2021 CT                                                                  IMPRESSION:  1.  No evidence pulmonary embolism.  2.  Lung findings consistent with CHF.  3.  Trace pleural effusions.  4.  Emphysema.    EKG Results: personally reviewed    Advanced Care Planning:  Discharge planning discussed with patient         [facility]  Veronica Beck

## 2022-02-22 NOTE — PLAN OF CARE
"PRIMARY DIAGNOSIS: \"GENERIC\" NURSING  OUTPATIENT/OBSERVATION GOALS TO BE MET BEFORE DISCHARGE:  ADLs back to baseline: No    Activity and level of assistance: Up with maximum assistance. Consider SW and/or PT evaluation.     Pain status: Improved-controlled with oral pain medications.    Return to near baseline physical activity: no       Discharge Planner Nurse   Safe discharge environment identified: Yes  Barriers to discharge: Yes       Entered by: Anjali Brown 02/22/2022 11:31 AM     Please review provider order for any additional goals.   Nurse to notify provider when observation goals have been met and patient is ready for discharge.Goal Outcome Evaluation:               Outcome Evaluation: TCU          "

## 2022-02-22 NOTE — PROGRESS NOTES
PT called me to room to evaluate pt. When arrived pt in chair and not responding. Blood pressure reading 48/34. Rapid response called. Pt moved back into bed. Within  moments of lying pt down she became respondent and talking. Manual blood pressure taken See flow sheet. IV fluid flush started per orders. EKG complete.

## 2022-02-22 NOTE — PLAN OF CARE
Problem: Plan of Care - These are the overarching goals to be used throughout the patient stay.    Goal: Optimal Comfort and Wellbeing  Intervention: Monitor Pain and Promote Comfort  Recent Flowsheet Documentation  Taken 2/21/2022 2100 by Anjali Brown, RN  Pain Management Interventions: (cream) medication (see MAR)   Pt's pain being managed on lido cream and tylenol. Pt up in chair with 1 assist.   Problem: Gas Exchange Impaired  Goal: Optimal Gas Exchange  Intervention: Optimize Oxygenation and Ventilation  Recent Flowsheet Documentation  Taken 2/21/2022 2100 by Anjali Brown, RN  Head of Bed (HOB) Positioning: HOB at 30-45 degrees   Goal Outcome Evaluation:      Pt on 1 liter oxygen. O2 sat drops to mid 80's on R/A. Pt uses I/S to 1000 with encouragement.         Outcome Evaluation: TCU

## 2022-02-23 NOTE — PROGRESS NOTES
"PRIMARY DIAGNOSIS: \"GENERIC\" NURSING  OUTPATIENT/OBSERVATION GOALS TO BE MET BEFORE DISCHARGE:  1. ADLs back to baseline: No    2. Activity and level of assistance: Up with standby assistance.    3. Pain status: Pain free.    4. Return to near baseline physical activity: Yes     Discharge Planner Nurse   Safe discharge environment identified: Yes  Barriers to discharge:Yes       Entered by: Pia Ritchie  02/23/2022 0600     Please review provider order for any additional goals.   Nurse to notify provider when observation goals have been met and patient is ready for discharge.  "

## 2022-02-23 NOTE — PLAN OF CARE
"  Problem: Plan of Care - These are the overarching goals to be used throughout the patient stay.    Goal: Optimal Comfort and Wellbeing  Intervention: Monitor Pain and Promote Comfort  Recent Flowsheet Documentation  Taken 2/22/2022 2100 by Anjali Brown, RN  Pain Management Interventions: medication (see MAR)   Pt pain well managed with scheduled tylenol and lidocaine cream  Problem: Gas Exchange Impaired  Goal: Optimal Gas Exchange  Intervention: Optimize Oxygenation and Ventilation  Recent Flowsheet Documentation  Taken 2/22/2022 2100 by Anjali Brown, RN  Head of Bed (HOB) Positioning: HOB at 20-30 degrees   Goal Outcome Evaluation:        Pt desat's to 80\"s on R/A. Pt on oxygen 1 liter. Lung sounds diminished.       Outcome Evaluation: TCU          "

## 2022-02-23 NOTE — CONSULTS
Integrative Therapy Consult    Healing PresenceYes  Essential Oils: Inhalation (EO/Topical oil), Topical (EO/Topical Oil)     Support Healing process blend - HC, Lavender Massage Oil - HC       Healing Music:       Breathwork:       Guided Imagery:       Acupressure:       Oshibori:       Energy Therapy:       Healing Touch:       Reiki:       Qi Gong:     Massage: Foot      Targeted Massage:    Sleep Promotion:       Other Therapy:       Intervention Reason: Pain     Pre and Post Session Scores: Patient Desires Treatment: yes           Pre-session Pain: 6 Post-session Pain: 4               Delivery:         Referrals:      Roxanne Schmid

## 2022-02-23 NOTE — PLAN OF CARE
"PRIMARY DIAGNOSIS: \"GENERIC\" NURSING  OUTPATIENT/OBSERVATION GOALS TO BE MET BEFORE DISCHARGE:  ADLs back to baseline: No    Activity and level of assistance: Up with standby assistance.    Pain status: Improved-controlled with oral pain medications.    Return to near baseline physical activity:            Discharge Planner Nurse   Safe discharge environment identified: Yes  Barriers to discharge: Yes       Entered by: Anjali Brown 02/22/2022 9:26 PM     Please review provider order for any additional goals.   Nurse to notify provider when observation goals have been met and patient is ready for discharge.Goal Outcome Evaluation:               Outcome Evaluation: TCU          "

## 2022-02-23 NOTE — PLAN OF CARE
"PRIMARY DIAGNOSIS: \"GENERIC\" NURSING  OUTPATIENT/OBSERVATION GOALS TO BE MET BEFORE DISCHARGE:  ADLs back to baseline: No    Activity and level of assistance: Up with maximum assistance. Consider SW and/or PT evaluation.     Pain status: Improved-controlled with oral pain medications.    Return to near baseline physical activity: No     Discharge Planner Nurse   Safe discharge environment identified: Yes  Barriers to discharge: Yes       Entered by: Anjali Brown 02/23/2022 12:06 PM     Please review provider order for any additional goals.   Nurse to notify provider when observation goals have been met and patient is ready for discharge.Goal Outcome Evaluation:               Outcome Evaluation: TCU          "

## 2022-02-23 NOTE — UTILIZATION REVIEW
Concurrent stay review; Secondary Review Determination         Under the authority of the Utilization Management Committee, the utilization review process indicated a secondary review on the above patient.  The review outcome is based on review of the medical records, discussions with staff, and applying clinical experience noted on the date of the review.          (x) Observation Status Appropriate - Concurrent stay review    RATIONALE FOR DETERMINATION     Ms. Zavala is a 85 yo female with a PMH of  CAD, CHF, HTN, dementia and osteoporosis who presents to the ED after mechanical fall.  Imaging revealed vertebral fractures in addition to known pelvic fractures.  Neurosurgery consulted; no surgery indicated and she has been cleared for discharge.    Two days ago she was noted to be mildly hypoxic and required supplemental oxygen. Repeat imaging revealed mild pulmonary edema.  She was given two doses of IV lasix; 2/22 had a brief episode of hypotension and syncope when up out of bed with therapy.  IV lasix was held; she was given two NS 250cc boluses with resolution of her symptoms.      Currently, she is not hypotensive and no longer hypoxic on RA.    She is remaining in the hospital for safe discharge planning.      Patient is clinically improving and there is no clear indication to change patient's status to inpatient. The severity of illness, intensity of service provided, expected LOS and risk for adverse outcome make the care appropriate for observation.      The information on this document is developed by the utilization review team in order for the business office to ensure compliance.  This only denotes the appropriateness of proper admission status and does not reflect the quality of care rendered.         The definitions of Inpatient Status and Observation Status used in making the determination above are those provided in the CMS Coverage Manual, Chapter 1 and Chapter 6, section 70.4.      Sincerely,      Christy Brown, DO  Utilization Review  Physician Advisor

## 2022-02-23 NOTE — PROGRESS NOTES
[facility] Hospitalist Progress Note  [facility]  Summary:    86F with HFpEF, coronary artery disease, bioprosthetic aortic valve, hypertension, mild dementia, rheumatoid arthritis, osteoporosis presented after a fall and found to T4&T7 compression fractures in addition to known pubic ramus fractures found outpatient.   Diuresed for CHF and mild hypoxia and subsequently had hypotension and a syncopal episode.     Assessment/Plan    #Bilateral sacral ala fractures and transverse fracture across S2   #Mildly displaced right superior and inferior pubic ramus fractures  -She fell on 2/15 (mechanical trip & fall), [facility] afterward [facility] but was initially able to walk and sent home with pain meds, now returns with worsening pain and unable to walk  -orthopedic surgery advise non weight bearing except for transfers, no surgical intervention, pain control and outpatient f/u in ortho clinic in 4 weeks  -She will benefit from rehabilitation and strengthening of leg and arm muscles, potentially learn self-propelled wheelchair while waiting for return of ability to weight-bear  -PT and OT to assess  -rec'd lovenox 40qday for 4 weeks or xarelto 10mg qday given prolonged immobility.    #Hypotension and Syncope  -thought due to diuresis and anti-HTNive     #Hypoxia -resolved with diuresis.  Likely due to atelectasis and mild pulm edema seen on CT.    #Acute T4/T7 superior endplate fractures:  -pain control  -neurosurgery saw and after discussion with patient, patient has elected no brace.  Planning x-rays now and in 2 weeks, and if worsening, the issue of brace will be revisited. Ok to be up ad kristine per Neurosurg (though remains NWB per Ortho as above)     #Acute hyperkalemia:  -5.6 on arrival. Better now after IVF and low K diet and holding lisinopril  -continue low K diet  -improved     #Vascular dementia:  Mild.  Stable. Monitor for delirium  -home aricept     #HTN:   -home toprol xl, Imdur  -restart low dose  Lisinopril due to worsening HTN  -hydralazine prn     #CAD: no anginal complaints.  Continue home statin, Imdur, metoprolol     #S/P Bioprosthetic AVR:  TTE 10/2020 notes valve is grossly well seated. Mean gradient of 12 mmHg with peak velocity of 2.3 m/s. No observed prosthetic valve insufficiency.     #Left thigh/groin ?soft tissue infection: CT showing possible soft tissue infection in her left proximal posteromedial thigh.   -02/23 exam with RN - no corresponding finding clinically.        #Hypothyroidism, home Synthroid  #Rheumatoid arthritis, okay to resume home methotrexate     Mild thrombocytopenia   -monitor, no signs of bleeding  -Start on Lovenox for DVT prophylaxis    Clinically Significant Risk Factors Present on Admission                         Checklist:  Code Status: No CPR- Do NOT Intubate    Diet: Regular Diet Adult Other - please comment    Rogers Catheter: Not present  Central Lines: None  DVT px:  Enoxaparin (Lovenox) SQ        Expected Discharge: 02/23/2022     Anticipated discharge location: other (comment) (TCU)    Delays:     Insurance Authorization needed  Placement - TCU         Expected discharge: awaiting placement.     Overnight Events/Subjective/Notable results:    No major events overnight.  Pain controlled.  No hx of bleeding issues.    4 point ROS otherwise negative    Objective    Vital signs in last 24 hours  Temp:  [97.4  F (36.3  C)-98.5  F (36.9  C)] 97.8  F (36.6  C)  Pulse:  [63-69] 67  Resp:  [16-20] 16  BP: ()/() 94/60  SpO2:  [93 %-98 %] 97 % O2 Device: None (Room air)    Weight:   130 lbs 0 oz    Intake/Output last 3 shifts  I/O last 3 completed shifts:  In: 300 [P.O.:300]  Out: 500 [Urine:500]  Body mass index is 22.31 kg/m .    Physical Exam  General:  Alert, cooperative, no distress,  Appears stated age  Neurologic:  oriented, facialsymmetry preserved, fluent speech.   Psych: calm, mood and affect appropriate to situation  HEENT:  Anicteric, MMM,  unremarkable dentition  CV: RRR no MRG, normal S1 and S2, no edema  Lungs: CTAB.  Easyrespirations  Abd: soft, NT, normoactive BS  Skin: no rashes noted on exposed skin. Color and turgor normal  Central Lines and Tubes: None (no bowman, CVC, feeding tubes)      I have reviewed all labs, medications, imaging studies in the last 24 hours.  Pertinent findings&changes discussed above.    Data       Xavi Beverly MD  Internal Medicine Hospitalist  1:04 PM

## 2022-02-23 NOTE — PLAN OF CARE
"PRIMARY DIAGNOSIS: \"GENERIC\" NURSING  OUTPATIENT/OBSERVATION GOALS TO BE MET BEFORE DISCHARGE:  ADLs back to baseline: No    Activity and level of assistance: Up with standby assistance.    Pain status: Pain free.    Return to near baseline physical activity: Yes     Discharge Planner Nurse   Safe discharge environment identified: Yes  Barriers to discharge:Yes       Entered by: Pia Ritchie  02/23/2022 0130       Please review provider order for any additional goals.   Nurse to notify provider when observation goals have been met and patient is ready for discharge.  "

## 2022-02-24 NOTE — PLAN OF CARE
"Pt reports pain of 4 on upper/middle back and RLE, managed with scheduled Tylenol. A/O and cooperative with cares. Clear lung sounds. Pt was up to bedside commode. NWB on RLE. Little oral intake. Little urine output. Pt was encouraged to drink more fluids. Room air, O2 sats 89%. On 1L, O2 sats 94%. Otherwise, VSS. Will continue to monitor.   PRIMARY DIAGNOSIS: \"GENERIC\" NURSING  OUTPATIENT/OBSERVATION GOALS TO BE MET BEFORE DISCHARGE:  ADLs back to baseline: No    Activity and level of assistance: Up with maximum assistance. Consider SW and/or PT evaluation. SBA x1 with walker and gait belt. NWB on RLE.     Pain status: Improved-controlled with oral pain medications.    Return to near baseline physical activity: No     Discharge Planner Nurse   Safe discharge environment identified: Yes  Barriers to discharge: No       Entered by: Sara Hernández 02/23/2022 9:51 PM     Please review provider order for any additional goals.   Nurse to notify provider when observation goals have been met and patient is ready for discharge.        "

## 2022-02-24 NOTE — DISCHARGE SUMMARY
[facility] MEDICINE  DISCHARGE SUMMARY     Primary Care Physician: Misbah Barone  Admission Date: 2/16/2022   Discharge Provider: Xavi Beverly MD Discharge Date: 2/24/2022   Diet:   Active Diet and Nourishment Order   Procedures     Regular Diet Adult Other - please comment     Advance Diet as Tolerated       Code Status: No CPR- Do NOT Intubate   Activity: DCACTIVITY: Activity as tolerated        Condition at Discharge: Stable     REASON FOR PRESENTATION(See Admission Note for Details)     fall    PRINCIPAL & ACTIVE DISCHARGE DIAGNOSES     Active Problems:    S/P AVR    MCI (mild cognitive impairment)    Senile osteoporosis    Fracture of multiple pubic rami (H)    Other closed fracture of fourth thoracic vertebra, initial encounter (H)    Closed displaced fracture of pelvis, unspecified part of pelvis, initial encounter (H)    Other closed fracture of seventh thoracic vertebra, initial encounter (H)      PENDING LABS     Unresulted Labs Ordered in the Past 30 Days of this Admission     No orders found from 1/17/2022 to 2/17/2022.            PROCEDURES ( this hospitalization only)          RECOMMENDATIONS TO OUTPATIENT PROVIDER FOR F/U VISIT     Follow-up Appointments     Follow Up Care      Please follow-up with Dr. Trace English / Zaira Guardado PA-C in   2-3 weeks at [facility]. Call our scheduling line at [phone]   to make an appointment if you do not already have one scheduled.         Follow Up and recommended labs and tests      -Follow up with Nursing home physician.  -Follow up in 2-3 weeks with Dr. Trace English at [facility].   -Follow up with neurosurgery in 2 weeks  -follow-up with cardiology, already scheduled for next month               DISPOSITION     Skilled Nursing Facility    SUMMARY OF HOSPITAL COURSE:         86F with HFpEF, coronary artery disease, bioprosthetic aortic valve, hypertension, mild dementia, rheumatoid arthritis,  osteoporosis presented after a fall and found to T4&T7 compression fractures in addition to known pubic ramus fractures found outpatient.   Diuresed for CHF and mild hypoxia and subsequently had hypotension and a syncopal episode.  Rest of course uncomplicated and Ms. Lucas was discharged to TCU for ongoing care.      #Bilateral sacral ala fractures and transverse fracture across S2   #Mildly displaced right superior and inferior pubic ramus fractures  -She fell on 2/15 (mechanical trip & fall), [facility] afterward at [facility] but was initially able to walk and sent home with pain meds, now returns with worsening pain and unable to walk  -orthopedic surgery advise non weight bearing except for transfers, no surgical intervention, pain control and outpatient f/u in ortho clini  -pain controlled with tylenol, lidocaine ointment, and limited use of tramadol  -due to prolonged immobility started on xarelto 10mg qday for DVT px.  Stop when mobility improved     #Hypotension and Syncope - thought due to diuresis and anti-HTNive     #Hypoxia -resolved with diuresis.  Likely due to atelectasis and mild pulm edema seen on CT.  Continue 0-2LPM as needed to maintain sats > 90%.  Incentive spirometry.    #Acute T4/T7 superior endplate fractures:  -pain control  -neurosurgery saw and after discussion with patient, patient has elected no brace.  Planning x-rays now and in 2 weeks, and if worsening, the issue of brace will be revisited. Ok to be up ad kristine per Neurosurg (though remains NWB per Ortho as above)     #Acute hyperkalemia:  -5.6 on arrival. Better now after IVF and low K diet and reduced dose lisinopril     #Vascular dementia:  Mild.  Stable. Monitor for delirium  -home aricept     #HTN:   -home toprol xl, Imdur  -restart low dose Lisinopril due to worsening HTN  -hydralazine prn     #CAD: no anginal complaints.  Continue home statin, Imdur, metoprolol  #newly recognized basal inferior WMA on ECHO - trop normal and no  pain.  On statin and xarelto.  Has f/u with cards.     #S/P Bioprosthetic AVR:  TTE 10/2020 notes valve is grossly well seated. Mean gradient of 12 mmHg with peak velocity of 2.3 m/s. No observed prosthetic valve insufficiency.     #Left thigh/groin ?soft tissue infection: CT showing possible soft tissue infection in her left proximal posteromedial thigh.   -02/23 exam with RN - no corresponding finding clinically.        #Hypothyroidism, home Synthroid  #Rheumatoid arthritis, methotrexate, folic acid  #Mild thrombocytopenia - resolved        Clinically Significant Risk Factors Present on Admission      Checklist:  Code Status: No CPR- Do NOT Intubate    Diet: Regular Diet Adult Other - please comment    Rogers Catheter: Not present  Central Lines: None  DVT px:  Enoxaparin (Lovenox) SQ         Expected Discharge: 02/23/2022     Anticipated discharge location: other (comment) (TCU)    Delays:     Insurance Authorization needed  Placement - TCU                                    Discharge Medications with Med changes:     Current Discharge Medication List      START taking these medications    Details   acetaminophen (TYLENOL) 325 MG tablet Take 3 tablets (975 mg) by mouth 3 times daily as needed for mild pain or fever    Associated Diagnoses: Closed fracture of multiple pubic rami, unspecified laterality, initial encounter (H)      lidocaine (XYLOCAINE) 5 % external ointment Apply topically 4 times daily as needed for moderate pain Apply to affected area in low back and pelvis    Associated Diagnoses: Closed fracture of multiple pubic rami, unspecified laterality, initial encounter (H)      rivaroxaban ANTICOAGULANT (XARELTO ANTICOAGULANT) 10 MG TABS tablet Take 1 tablet (10 mg) by mouth daily (with dinner) for 28 days  Qty: 28 tablet, Refills: 0    Associated Diagnoses: Closed fracture of multiple pubic rami, unspecified laterality, initial encounter (H)         CONTINUE these medications which have CHANGED     Details   lisinopril (ZESTRIL) 2.5 MG tablet Take 1 tablet (2.5 mg) by mouth daily htn    Associated Diagnoses: S/P AVR      traMADol (ULTRAM) 50 MG tablet Take 0.5 tablets (25 mg) by mouth every 4 hours as needed for severe pain q4-6hprn  Qty: 5 tablet, Refills: 0    Associated Diagnoses: Closed fracture of multiple pubic rami, unspecified laterality, initial encounter (H)         CONTINUE these medications which have NOT CHANGED    Details   atorvastatin (LIPITOR) 40 MG tablet Take 1 tablet (40 mg) by mouth daily For lipids  Qty: 90 tablet, Refills: 0    Associated Diagnoses: Mixed hyperlipidemia      calcium carbonate-vitamin D (OYSTER SHELL CALCIUM/D) 500-200 MG-UNIT tablet Take 1 tablet by mouth daily    Associated Diagnoses: Psoriatic arthritis (H)      cholecalciferol 50 MCG (2000 UT) tablet Take 1 tablet (50 mcg) by mouth daily  Qty: 100 tablet, Refills: 1    Associated Diagnoses: Stage 3b chronic kidney disease (H)      donepezil (ARICEPT) 10 MG tablet Take 1 tablet (10 mg) by mouth At Bedtime dementia  Qty: 90 tablet, Refills: 3    Associated Diagnoses: Mild vascular neurocognitive disorder (H)      ferrous gluconate (FERGON) 324 (38 Fe) MG tablet Take 1 tablet (324 mg) by mouth daily (with breakfast)  Qty: 90 tablet, Refills: 0    Associated Diagnoses: Anemia due to blood loss, acute      folic acid (FOLVITE) 1 MG tablet Take 1 tablet (1,000 mcg) by mouth daily General health  Qty: 90 tablet, Refills: 0    Associated Diagnoses: Psoriatic arthritis (H)      isosorbide mononitrate (IMDUR) 30 MG 24 hr tablet Take 1 tablet (30 mg) by mouth daily    Associated Diagnoses: S/P AVR      methotrexate sodium 2.5 MG TABS Take 3 tablets (7.5 mg) by mouth once a week Fridays  Qty: 36 tablet, Refills: 0    Associated Diagnoses: Psoriatic arthritis (H)      metoprolol succinate ER (TOPROL-XL) 25 MG 24 hr tablet Take 1 tablet (25 mg) by mouth daily htn    Associated Diagnoses: S/P AVR      Multiple Vitamins-Minerals  (PRESERVISION AREDS 2 PO) Take 1 tablet by mouth 2 times daily      nitroGLYcerin (NITROSTAT) 0.4 MG sublingual tablet Place 0.4 mg under the tongue every 5 minutes as needed for chest pain For chest pain place 1 tablet under the tongue every 5 minutes for 3 doses. If symptoms persist 5 minutes after 1st dose call 911.      omeprazole (PRILOSEC) 40 MG DR capsule Take 1 capsule (40 mg) by mouth daily  Qty: 60 capsule, Refills: 3    Associated Diagnoses: Gastric ulcer due to Helicobacter pylori, acute      polyethylene glycol (MIRALAX) 17 GM/Dose powder Take 17 g by mouth daily To prevent constiaption    Associated Diagnoses: Slow transit constipation      SYNTHROID 100 MCG tablet TAKE 1 TABLET EVERY DAY AT 6:00 A.M.  Qty: 90 tablet, Refills: 3    Associated Diagnoses: Hypothyroidism, unspecified type      vitamin C (ASCORBIC ACID) 500 MG tablet Take 1 tablet (500 mg) by mouth daily    Associated Diagnoses: Psoriatic arthritis (H)         STOP taking these medications       HYDROcodone-acetaminophen (NORCO) 5-325 MG tablet Comments:   Reason for Stopping:                     Rationale for medication changes:      Reduced dose lisinopril for hyperkalemia and labile BP  xarelto for DVT px        Consults       NEUROSURGERY IP CONSULT  SOCIAL WORK IP CONSULT  OCCUPATIONAL THERAPY ADULT IP CONSULT  PHYSICAL THERAPY ADULT IP CONSULT  PHYSICAL THERAPY ADULT IP CONSULT  OCCUPATIONAL THERAPY ADULT IP CONSULT  PHYSICAL THERAPY ADULT IP CONSULT  OCCUPATIONAL THERAPY ADULT IP CONSULT    Immunizations given this encounter     Most Recent Immunizations   Administered Date(s) Administered     COVID-19,PF,Moderna 02/09/2021     DT (PEDS <7y) 07/12/2005     FLU 6-35 months 09/16/2010     Flu, Unspecified 10/21/2008     HepA-Adult 01/26/2018     Influenza (H1N1) 01/18/2010     Influenza (High Dose) 3 valent vaccine 09/24/2019     Influenza (IIV3) PF 10/09/2014     Influenza Vaccine, 6+MO IM (QUADRIVALENT W/PRESERVATIVES) 10/09/2014      Influenza, Quad, High Dose, Pf, 65yr+ (Fluzone HD) 10/14/2021     Pneumo Conj 13-V (2010&after) 07/30/2015     Pneumococcal 23 valent 11/11/1997     Td (Adult), Adsorbed 07/12/2005     Td,adult,historic,unspecified 07/12/2005     Tdap (Adacel,Boostrix) 07/29/2016     Zoster vaccine recombinant adjuvanted (SHINGRIX) 08/21/2018     Zoster vaccine, live 09/17/2010           Anticoagulation Information      Low dose xarelto for DVT px      SIGNIFICANT IMAGING FINDINGS     Results for orders placed or performed during the hospital encounter of 02/16/22   Lumbar spine CT w/o contrast    Impression    IMPRESSION:  1.  No evidence of acute displaced fracture.  2.  No evidence of traumatic subluxation.  3.  Multilevel degenerative change.  4.  CT of the pelvis is reported separately.   CT Thoracic Spine w/o Contrast    Impression    IMPRESSION:  1.  Mild T4 superior endplate fracture, favored to be acute.  2.  Mild T7 superior endplate fracture, favored to be acute.  3.  Moderate T10 vertebral body height loss. This is age-indeterminate but favored to represent chronic fracture deformity. Clinical correlation recommended.  4.  No evidence of traumatic subluxation.  5.  Degenerative changes.  6.  Osteopenia.     CT Pelvis Bone wo Contrast    Impression    IMPRESSION:  1.  Bilateral sacral ala fractures and transverse fracture across S2.  There are also mildly displaced right superior and inferior pubic  ramus fractures.  2.  Degenerative changes in the visualized lumbar spine.  3.  Right total hip arthroplasty with a subacute healing  periprosthetic fracture of the greater trochanter.  4.  Moderate to severe osteoarthrosis in the left hip.  5.  Gluteal muscle atrophy bilaterally.  6.  Open wound in the posteromedial aspect of the upper left thigh  with a small amount of underlying soft tissue gas and perhaps a small  fluid collection concerning for abscess. Clinical correlation needed.  There is no evidence of  osteomyelitis.    KELTON CONKLIN MD         SYSTEM ID:  [id number]   Chest XR,  PA & LAT    Impression    IMPRESSION:   Sternotomy. AVR. CABG. Coronary artery stenting. Heart size upper normal. Lungs may be mildly hyperinflated. A few strands of fibrosis or linear atelectasis in the lower lungs unchanged. No focal pulmonary consolidation. No pleural effusion. Calcified   tortuous thoracic aorta. Bones are demineralized. Mild age indeterminate compression T6 and T9 vertebral bodies.   XR Thoracic Spine 3 Views    Impression    IMPRESSION: Median sternotomy wires. Osteopenia. Suggestion for 12 rib-bearing thoracic vertebral segments. Minimal dextroconvex thoracolumbar scoliosis.    The previously described minimal superior endplate deformity at the T4 level is not well visualized, given technique and degree of osteopenia. There is stable minimal superior endplate deformity of T7, with loss of vertebral body height by approximately   25-35%. Stable appearance of a moderately severe T10 compression deformity involving the superior and inferior endplates.    No new or progressive fracture. Slightly accentuated thoracic kyphosis with otherwise anatomic sagittal alignment. Cervical DDD changes most pronounced at C4-C5. Prosthetic aortic valve noted.    XR Chest Port 1 View    Impression    IMPRESSION: Single AP view of the chest was obtained. Postsurgical changes of cardiac surgery with median sternotomy wires and surgical clips. Stable cardiomediastinal silhouette. Obliteration of the lateral costophrenic angles, likely due to small   effusions and associated basilar atelectasis/consolidation. No significant pneumothorax.     CT Chest Pulmonary Embolism w Contrast    Impression    IMPRESSION:  1.  No evidence pulmonary embolism.  2.  Lung findings consistent with CHF.  3.  Trace pleural effusions.  4.  Emphysema.   Echocardiogram Complete   Result Value Ref Range    LVEF  60-65%      ECHO:  Interpretation Summary     The  left ventricle is normal in size with normal left ventricular wall  thickness.  Left ventricular function is normal.The ejection fraction is 60-65%.  The basal inferolateral wall is akinetic.  The right ventricle is mild to moderately dilated.  Age-related valve disease is identified without hemodynamically significant  stenosis or regurgitation.  ______________________________________________________________________________        SIGNIFICANT LABORATORY FINDINGS         Discharge Orders        Reason for your hospital stay    Fall - multiple pelvic fractures, thoracic spine compression fractures     Follow Up Care    Please follow-up with Dr. Trace English / Zaira Guardado PA-C in 2-3 weeks at [facility]. Call our scheduling line at [phone] to make an appointment if you do not already have one scheduled.     General info for SNF    Length of Stay Estimate: Short Term Care: Estimated # of Days 31-90  Condition at Discharge: Stable  Level of care:skilled   Rehabilitation Potential: Good  Admission H&P remains valid and up-to-date: Yes  Recent Chemotherapy: N/A  Use Nursing Home Standing Orders: Yes     Mantoux instructions    Give two-step Mantoux (PPD) Per Facility Policy Yes     Follow Up and recommended labs and tests    -Follow up with Nursing home physician.  -Follow up in 2-3 weeks with Dr. Trace English at [facility].   -Follow up with neurosurgery in 2 weeks  -follow-up with cardiology     Activity - Up with nursing assistance     Weight bearing status    Weightbearing status: NWB LE other than ok to weight bear with transfers x4 weeks     Additional Discharge Instructions    Incentive spirometry q1hour while awake     No CPR- Do NOT Intubate     Physical Therapy Adult Consult    Evaluate and treat as clinically indicated.    Reason: Status Post Hip Surgery     Occupational Therapy Adult Consult    Evaluate and treat as clinically indicated.    Reason: Status Post Hip Surgery      Physical Therapy Adult Consult    Evaluate and treat as clinically indicated.    Reason:  Strengthening     Occupational Therapy Adult Consult    Evaluate and treat as clinically indicated.    Reason:  ADLs     Fall precautions     Oxygen Adult/Peds     Advance Diet as Tolerated    Follow this diet upon discharge: Orders Placed This Encounter      Regular Diet Adult Other - please comment         Examination   Physical Exam   Temp:  [96.9  F (36.1  C)-98.1  F (36.7  C)] 96.9  F (36.1  C)  Pulse:  [64-69] 68  Resp:  [16-24] 24  BP: ()/(52-66) 150/66  SpO2:  [89 %-97 %] 95 %  Wt Readings from Last 1 Encounters:   02/24/22 58.1 kg (128 lb 1 oz)     Feeling well.  No complaints.    Please see EMR for more detailed significant labs, imaging, consultant notes etc.    IXavi MD, personally saw the patient today and spent greater than 30 minutes discharging this patient.    Xavi Beverly MD  [facility]    CC:Misbah Barone

## 2022-02-24 NOTE — PROGRESS NOTES
Care Management Follow Up    Length of Stay (days): 0    Expected Discharge Date: 02/24/2022     Concerns to be Addressed: discharge planning       Patient plan of care discussed at interdisciplinary rounds: Yes    Anticipated Discharge Disposition: Transitional Care     Anticipated Discharge Services: Other (see comment) ([facility] services )    Anticipated Discharge DME: None    Patient/family educated on Medicare website which has current facility and service quality ratings: yes    Education Provided on the Discharge Plan:  Yes    Patient/Family in Agreement with the Plan: yes    Referrals Placed by CM/SW: Post Acute Facilities    Private pay costs discussed: transportation costs    Additional Information: Per doctor, pt medically stable and can discharge when TCU bed found.   [facility] is already reviewing pt for admission.  She will call SW back when she receives answer from them.  KRISTINA spoke with Colton at [facility].  She needs to know if pt can sit up in a chair at all and for an extended period of time.  If so, can pt do therapy exercises sitting down.  Colton thinks pt is skillable if this is the case.  KRISTINA will speak with PT and get back to Colton.  KRISTINA spoke with PT Sanaz, who confirmed that pt was sitting up in chair for extended periods of time, doing exercises in chair, and doing some pivot transfers.  KRISTINA updated Colton.  [facility] accepted pt for admission pending receipt of [facility] insurance authorization and new negative Covid test.  Plan for pt admission tomorrow.  She also needs pt's primary contact e-mail address or mailing address.  KRISTINA updated Dr Beverly and requested that he put in order for new Covid test for pt.     KRISTINA received message back from Shanique at [facility] asking SW to call back (this was received after pt already accepted at other facility).  KRISTINA let Sukhi from [facility] (regarding referral to [facility]) know that pt accepted at another facility.     KRISTINA met with pt  and updated her regarding accepted at [facility] TCU for admission tomorrow pending receipt of [facility] insurance authorization and new negative Covid test.  Pt shared that this was the third time she had fractured the same leg.  This time and last time no surgery was done.  Pt in agreement with going to [facility] tomorrow.  She stated that likely she would need medical transportation due to her weightbearing restrictions.  Her daughter works.  She is not sure if her son Billy could get her into his truck.  She is okay with KRISTINA calling Billy to discuss further.      KRISTINA spoke with pt's son Billy and updated him regarding pt accepted at [facility] for admission tomorrow.  Discussed transportation options.  Billy informed KRISTINA that pt has an account with [facility] and would like KRISTINA to try to arrange a ride for pt through them.  If they are unable to accomodate, he is fine with SW arranging for [facility].     K[facility]STINA spoke with Linda at [facility] and set up ride for pt tomorrow at 3 PM.  They will use wheelchair.  They will  pt in the lobby.  PAS done.      CALVIN Gross, BEATRIZ 02/23/22 6:59 PM

## 2022-02-24 NOTE — PLAN OF CARE
Physical Therapy Discharge Summary    Reason for therapy discharge:    Discharged to transitional care facility.    Progress towards therapy goal(s). See goals on Care Plan in [facility] electronic health record for goal details.  Goals partially met due to discharge.    Therapy recommendation(s):    Continued therapy is recommended.  Rationale/Recommendations:  to improve functional strength and mobility with WB restrictions.

## 2022-02-24 NOTE — PLAN OF CARE
PRIMARY DIAGNOSIS: Pubic fracture  OUTPATIENT/OBSERVATION GOALS TO BE MET BEFORE DISCHARGE:  1. Stable vital signs Yes  2. Tolerating diet:Yes  3. Pain controlled with oral pain medications:  Yes  4. Positive bowel sounds:  Yes  5. Voiding without difficulty:  Yes  6. Able to ambulate:  No  7. Provider specific discharge goals met:  Yes    Discharge Planner Nurse   Safe discharge environment identified: Yes  Barriers to discharge: No       Entered by: Kala Rizvi 02/24/2022 11:01 AM   Discharging today to TCU  Please review provider order for any additional goals.   Nurse to notify provider when observation goals have been met and patient is ready for discharge.Goal Outcome Evaluation:      Outcome Evaluation: TCU

## 2022-02-24 NOTE — PLAN OF CARE
PRIMARY DIAGNOSIS: pubic fractures  OUTPATIENT/OBSERVATION GOALS TO BE MET BEFORE DISCHARGE:  1. Stable vital signs Yes  2. Tolerating diet:Yes  3. Pain controlled with oral pain medications:  Yes  4. Positive bowel sounds:  Yes  5. Voiding without difficulty:  Yes  6. Able to ambulate:  No  7. Provider specific discharge goals met:  Yes    Discharge Planner Nurse   Safe discharge environment identified: Yes  Barriers to discharge: No          Entered by: Doris Betancur 02/24/2022 5:34 AM     Please review provider order for any additional goals.   Nurse to notify provider when observation goals have been met and patient is ready for discharge.Goal Outcome Evaluation:    Pt partial weight bearing on LLE and nonweightbearing on RLE. Pt requires TCU at discharge, plan for later today.                 Outcome Evaluation: TCU

## 2022-02-24 NOTE — PLAN OF CARE
"PRIMARY DIAGNOSIS: \"GENERIC\" NURSING  OUTPATIENT/OBSERVATION GOALS TO BE MET BEFORE DISCHARGE:  1. ADLs back to baseline: No    2. Activity and level of assistance: Up with maximum assistance. Consider SW and/or PT evaluation. SBA x1 with walker and gait belt. NWB on RLE.    3. Pain status: Improved-controlled with oral pain medications.    4. Return to near baseline physical activity: No     Discharge Planner Nurse   Safe discharge environment identified: Yes  Barriers to discharge: No       Entered by: Sara Hernández 02/23/2022 6:19 PM     Please review provider order for any additional goals.   Nurse to notify provider when observation goals have been met and patient is ready for discharge.    Sara Hernández RN on 2/23/2022 at 6:20 PM        "

## 2022-02-24 NOTE — PLAN OF CARE
PRIMARY DIAGNOSIS: pubic fractures    OUTPATIENT/OBSERVATION GOALS TO BE MET BEFORE DISCHARGE:  1. Stable vital signs Yes  2. Tolerating diet:Yes  3. Pain controlled with oral pain medications:  Yes  4. Positive bowel sounds:  Yes  5. Voiding without difficulty:  Yes  6. Able to ambulate:  No  7. Provider specific discharge goals met:  Yes    Discharge Planner Nurse   Safe discharge environment identified: Yes  Barriers to discharge: no         Entered by: Doris Betancur 02/24/2022 2:21 AM     Please review provider order for any additional goals.   Nurse to notify provider when observation goals have been met and patient is ready for discharge.Goal Outcome Evaluation:      Pt partial weight bearing on LLE and nonweightbearing on RLE. Pt requires TCU at discharge, plan for later today.          Outcome Evaluation: TCU

## 2022-02-24 NOTE — PLAN OF CARE
PRIMARY DIAGNOSIS: Pubic fracture   OUTPATIENT/OBSERVATION GOALS TO BE MET BEFORE DISCHARGE:  1. Stable vital signs Yes  2. Tolerating diet:Yes  3. Pain controlled with oral pain medications:  Yes  4. Positive bowel sounds:  Yes  5. Voiding without difficulty:  Yes  6. Able to ambulate:  No  7. Provider specific discharge goals met:  Yes    Discharge Planner Nurse   Safe discharge environment identified: Yes  Barriers to discharge: No       Entered by: Kala Rizvi 02/24/2022 3:50 PM    Ride time at 1500. All belongings packed up and sent with patient.   Please review provider order for any additional goals.   Nurse to notify provider when observation goals have been met and patient is ready for discharge.Goal Outcome Evaluation:               Outcome Evaluation: TCU

## 2022-02-24 NOTE — PLAN OF CARE
PRIMARY DIAGNOSIS: Pubic fracture   OUTPATIENT/OBSERVATION GOALS TO BE MET BEFORE DISCHARGE:  1. Stable vital signs Yes  2. Tolerating diet:Yes  3. Pain controlled with oral pain medications:  Yes  4. Positive bowel sounds:  Yes  5. Voiding without difficulty:  Yes  6. Able to ambulate:  No  7. Provider specific discharge goals met:  Yes    Discharge Planner Nurse   Safe discharge environment identified: Yes  Barriers to discharge: No       Entered by: Kala Rizvi 02/24/2022 12:45 PM   Ride time today at 1500   Please review provider order for any additional goals.   Nurse to notify provider when observation goals have been met and patient is ready for discharge.Goal Outcome Evaluation:               Outcome Evaluation: TCU

## 2022-02-25 NOTE — PLAN OF CARE
Occupational Therapy Discharge Summary    Reason for therapy discharge:    Discharged to transitional care facility.    Progress towards therapy goal(s). See goals on Care Plan in [facility] electronic health record for goal details.  Goals partially met.  Barriers to achieving goals:   discharge from facility.    Therapy recommendation(s):    Continued therapy is recommended.  Rationale/Recommendations:  To ensure safety w/ ADL skills/trnf safety w/in WB restrictions.

## 2022-02-25 NOTE — PROGRESS NOTES
Care Management Discharge Note    Discharge Date: 02/24/2022       Discharge Disposition: Transitional Care    [facility]    Discharge DME: None    Discharge Transportation: other (see comments) ([facility] [employer] )    Private pay costs discussed: transportation costs    PAS Confirmation Code:  ([id number])    Patient/family educated on Medicare website which has current facility and service quality ratings: yes    Education Provided on the Discharge Plan:  Yes    Persons Notified of Discharge Plans: patient, pt's son Billy     Patient/Family in Agreement with the Plan: yes    Handoff Referral Completed: yes    Additional Information:  KRISTINA spoke with Colton at [facility].  She reported that she had received [facility] authorization for pt.  Pt had Covid test yesterday and result negative.  She still needs primary 's e-mail address.  She stated that ride time of 3 PM is fine, and that SW need not move up time.      KRISTINA spoke with pt's son Billy and updated him regarding pt discharging to [facility] today at 3 PM, with ride provided by [facility] [employer] per his request.  KRISTINA also requested Billy's e-mail address to give to U and Billy provided it to KRISTINA.  Billy in agreement with plan.     KRISTINA called Colton back at facility and provided her with Billy's e-mail address as needed for pt admission.      Doctor, charge, pt's nurse, HUC, and pt also updated.  PAS done.      CALVIN Gross, BEATRIZ 02/25/22 6:14 PM

## 2022-02-28 NOTE — PROGRESS NOTES
"Clinic Care Coordination Contact  Care Coordination Transition Communication         Clinical Data: Patient was hospitalized at  from 2/16 to 2/24 with diagnosis of fall,S/P AVR    MCI (mild cognitive impairment)    Senile osteoporosis    Fracture of multiple pubic rami (H)    Other closed fracture of fourth thoracic vertebra, initial encounter (H)    Closed displaced fracture of pelvis, unspecified part of pelvis, initial encounter (H)    Other closed fracture of seventh thoracic vertebra, initial encounter (H)   .     Transition to Facility:              Facility Name: [facility]              Contact name and phone number/fax: [phone]    Plan: RN/SW Care Coordinator will await notification from facility staff informing RN/SW Care Coordinator of patient's discharge plans/needs. RN/SW Care Coordinator will review chart and outreach to facility staff every 4 weeks and as needed.     Rox lives at [facility] in [facility].     She uses her walker for mobility. She has dentures and glasses.     Family helps with \"shopping and transportation\". She is otherwise independent with ADLs.    "

## 2022-03-01 NOTE — LETTER
"    3/1/2022        RE: Rox Zavala  [address]  [place] [facility] [address]         HEALTH GERIATRIC SERVICES  Chief Complaint   Patient presents with     Hospital F/U     [facility] Medical Record Number:  [id number]  Place of Service where encounter took place:  [facility]) [[id number]]  Code Status:  No CPR- Do NOT Intubate     HISTORY:      HPI:  Rox Zavala  is 87 year old (1935) undergoing physical and occupational therapy. She is with past medical history coronary artery disease, bioprosthetic aortic valve, hypertension, mild dementia, rheumatoid arthritis, osteoporosis presented after a fall and found to T4&T7 compression fractures in addition to known pubic ramus fractures    Today she is seen in her room to review vital signs, labs, pain management and to establish care.  She denied chest pain  cough congestion constipation or diarrhea, she rated her pain 7 out of 10 but does get relief with current pain medication.  She reports a little shortness of breath but \"not much\".  Per records Ortho has elected no brace.  She did report some dizziness this morning but was currently gone when writer met with patient.  Labs were checked and hemoglobin 10.3 BMP within normal limits except sodium low at 133.    ALLERGIES:Patient has no known allergies.    PAST MEDICAL HISTORY:   Past Medical History:   Diagnosis Date     Acute diastolic CHF (congestive heart failure) (H) 12/23/2014     Acute renal failure (H)      Anemia      Aortic valve stenosis, severe      Arrhythmia      Arthritis      Coronary artery disease 11/17/2014     Coronary artery disease      Dieulafoy lesion of stomach      Disease of thyroid gland      Essential hypertension 12/8/2015     Gastroesophageal reflux disease      GI bleed 6/1/2021     Hammer toe      Heart murmur      History of blood transfusion      Hyperlipidemia      Hypertension      Hypothyroidism     Created by Conversion Replacement " "Utility updated for latest IMO load      Irregular heart beat      Macular disorder     \"wrinkle\"     MCI (mild cognitive impairment) 6/16/2019     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     Pressure injury of buttock, stage 1, unspecified laterality 6/16/2019     Rheumatoid arthritis (H)      S/P AVR 12/15/2014     Sacral insufficiency fracture, initial encounter 5/6/2019     Senile osteoporosis 9/23/2019       PAST SURGICAL HISTORY:   has a past surgical history that includes TOTAL HIP ARTHROPLASTY (Right); REMOVAL GALLBLADDER; LIGATE FALLOPIAN TUBE; Cardiac surgery; aortic valve replacement; Eye surgery (Bilateral); Angioplasty / Stenting Femoral; Pr Esophagogastroduodenoscopy Transoral Diagnostic (N/A, 6/1/2021); Pr Esophagogastroduodenoscopy Transoral Diagnostic (N/A, 6/6/2021); Pr Esophagogastroduodenoscopy Transoral Diagnostic (N/A, 6/7/2021); and Esophagoscopy, gastroscopy, duodenoscopy (EGD), combined (N/A, 11/16/2021).    FAMILY HISTORY: family history includes Asthma in her mother.    SOCIAL HISTORY:  reports that she quit smoking about 27 years ago. Her smoking use included cigarettes. She has a 30.00 pack-year smoking history. She has never used smokeless tobacco. She reports current alcohol use of about 1.0 standard drink of alcohol per week. She reports that she does not use drugs.    ROS:  Constitutional: Negative for activity change, appetite change, fatigue and fever.   HENT: Negative for congestion.    Respiratory: Negative for cough, shortness of breath and wheezing.    Cardiovascular: Negative for chest pain and leg swelling.   Gastrointestinal: Negative for abdominal distention, abdominal pain, constipation, diarrhea and nausea.   Genitourinary: Negative for dysuria.   Musculoskeletal: Negative for arthralgia.  Positive for back pain.   Skin: Negative for color change and wound.   Neurological: Positive for dizziness.   Psychiatric/Behavioral: Negative for agitation, behavioral " "problems and confusion.     Physical Exam:  Constitutional:       Appearance: Patient is well-developed.   HENT:      Head: Normocephalic.   Eyes:      Conjunctiva/sclera: Conjunctivae normal.   Neck:      Musculoskeletal: Normal range of motion.   Cardiovascular:      Rate and Rhythm: Normal rate and regular rhythm.      Heart sounds: Normal heart sounds. No murmur.   Pulmonary:      Effort: No respiratory distress.      Breath sounds: Normal breath sounds. No wheezing or rales.   Abdominal:      General: Bowel sounds are normal. There is no distension.      Palpations: Abdomen is soft.      Tenderness: There is no abdominal tenderness.   Musculoskeletal:       Normal range of motion.     Skin:General:        Skin is warm.   Neurological:         Mental Status: Patient is alert and oriented to person, place, and time.   Psychiatric:         Behavior: Behavior normal.     Vitals:BP (!) 142/85   Pulse 79   Temp 98.9  F (37.2  C)   Resp 18   Ht 1.626 m (5' 4\")   Wt 57.6 kg (127 lb)   SpO2 99%   BMI 21.80 kg/m   and Body mass index is 21.8 kg/m .    Lab/Diagnostic data:   Recent Results (from the past 240 hour(s))   Basic metabolic panel    Collection Time: 03/02/22  5:43 AM   Result Value Ref Range    Sodium 133 (L) 136 - 145 mmol/L    Potassium 4.9 3.5 - 5.0 mmol/L    Chloride 97 (L) 98 - 107 mmol/L    Carbon Dioxide (CO2) 27 22 - 31 mmol/L    Anion Gap 9 5 - 18 mmol/L    Urea Nitrogen 17 8 - 28 mg/dL    Creatinine 0.93 0.60 - 1.10 mg/dL    Calcium 8.8 8.5 - 10.5 mg/dL    Glucose 88 70 - 125 mg/dL    GFR Estimate 60 (L) >60 mL/min/1.73m2   CBC with platelets    Collection Time: 03/02/22  5:43 AM   Result Value Ref Range    WBC Count 6.7 4.0 - 11.0 10e3/uL    RBC Count 3.19 (L) 3.80 - 5.20 10e6/uL    Hemoglobin 10.3 (L) 11.7 - 15.7 g/dL    Hematocrit 31.2 (L) 35.0 - 47.0 %    MCV 98 78 - 100 fL    MCH 32.3 26.5 - 33.0 pg    MCHC 33.0 31.5 - 36.5 g/dL    RDW 15.3 (H) 10.0 - 15.0 %    Platelet Count 191 150 - 450 " 10e3/uL   COVID-19 Virus (Coronavirus) by PCR Nasopharyngeal    Collection Time: 03/02/22 11:30 AM    Specimen: Nasopharyngeal; Swab   Result Value Ref Range    SARS CoV2 PCR Positive (A) Negative, Testing sent to reference lab. Results will be returned via unsolicited result       MEDICATIONS:     Review of your medicines          Accurate as of March 1, 2022 11:59 PM. If you have any questions, ask your nurse or doctor.            CONTINUE these medicines which have NOT CHANGED      Dose / Directions   acetaminophen 325 MG tablet  Commonly known as: TYLENOL  Used for: Closed fracture of multiple pubic rami, unspecified laterality, initial encounter (H)      Dose: 975 mg  Take 3 tablets (975 mg) by mouth 3 times daily as needed for mild pain or fever  Refills: 0     atorvastatin 40 MG tablet  Commonly known as: LIPITOR  Used for: Mixed hyperlipidemia      Dose: 40 mg  Take 1 tablet (40 mg) by mouth daily For lipids  Quantity: 90 tablet  Refills: 0     calcium carbonate-vitamin D 500-200 MG-UNIT tablet  Commonly known as: OS-CARLA with D  Used for: Psoriatic arthritis (H)      Dose: 1 tablet  Take 1 tablet by mouth daily  Refills: 0     cholecalciferol 50 MCG (2000 UT) tablet  Used for: Stage 3b chronic kidney disease (H)      Dose: 2,000 Units  Take 1 tablet (50 mcg) by mouth daily  Quantity: 100 tablet  Refills: 1     donepezil 10 MG tablet  Commonly known as: ARICEPT  Used for: Mild vascular neurocognitive disorder (H)      Dose: 10 mg  Take 1 tablet (10 mg) by mouth At Bedtime dementia  Quantity: 90 tablet  Refills: 3     ferrous gluconate 324 (38 Fe) MG tablet  Commonly known as: FERGON  Used for: Anemia due to blood loss, acute      Dose: 324 mg  Take 1 tablet (324 mg) by mouth daily (with breakfast)  Quantity: 90 tablet  Refills: 0     folic acid 1 MG tablet  Commonly known as: FOLVITE  Used for: Psoriatic arthritis (H)      Dose: 1,000 mcg  Take 1 tablet (1,000 mcg) by mouth daily General health  Quantity: 90  tablet  Refills: 0     isosorbide mononitrate 30 MG 24 hr tablet  Commonly known as: IMDUR  Used for: S/P AVR      Dose: 30 mg  Take 1 tablet (30 mg) by mouth daily  Refills: 0     lidocaine 5 % external ointment  Commonly known as: XYLOCAINE  Used for: Closed fracture of multiple pubic rami, unspecified laterality, initial encounter (H)      Apply topically 4 times daily as needed for moderate pain Apply to affected area in low back and pelvis  Refills: 0     lisinopril 2.5 MG tablet  Commonly known as: ZESTRIL  Used for: S/P AVR      Dose: 2.5 mg  Take 1 tablet (2.5 mg) by mouth daily htn  Refills: 0     methotrexate sodium 2.5 MG Tabs  Used for: Psoriatic arthritis (H)      Dose: 3 tablet  Take 3 tablets (7.5 mg) by mouth once a week Fridays  Quantity: 36 tablet  Refills: 0     metoprolol succinate ER 25 MG 24 hr tablet  Commonly known as: TOPROL-XL  Used for: S/P AVR      Dose: 25 mg  Take 1 tablet (25 mg) by mouth daily htn  Refills: 0     nitroGLYcerin 0.4 MG sublingual tablet  Commonly known as: NITROSTAT      Dose: 0.4 mg  Place 0.4 mg under the tongue every 5 minutes as needed for chest pain For chest pain place 1 tablet under the tongue every 5 minutes for 3 doses. If symptoms persist 5 minutes after 1st dose call 911.  Refills: 0     omeprazole 40 MG DR capsule  Commonly known as: priLOSEC  Used for: Gastric ulcer due to Helicobacter pylori, acute      Dose: 40 mg  Take 1 capsule (40 mg) by mouth daily  Quantity: 60 capsule  Refills: 3     polyethylene glycol 17 GM/Dose powder  Commonly known as: MIRALAX  Used for: Slow transit constipation      Dose: 17 g  Take 17 g by mouth daily To prevent constiaption  Refills: 0     PRESERVISION AREDS 2 PO      Dose: 1 tablet  Take 1 tablet by mouth 2 times daily  Refills: 0     rivaroxaban ANTICOAGULANT 10 MG Tabs tablet  Commonly known as: XARELTO ANTICOAGULANT  Used for: Closed fracture of multiple pubic rami, unspecified laterality, initial encounter (H)       Dose: 10 mg  Take 1 tablet (10 mg) by mouth daily (with dinner) for 28 days  Quantity: 28 tablet  Refills: 0     Synthroid 100 MCG tablet  Used for: Hypothyroidism, unspecified type  Generic drug: levothyroxine      TAKE 1 TABLET EVERY DAY AT 6:00 A.M.  Quantity: 90 tablet  Refills: 3     traMADol 50 MG tablet  Commonly known as: ULTRAM  Used for: Closed fracture of multiple pubic rami, unspecified laterality, initial encounter (H)      Dose: 25 mg  Take 0.5 tablets (25 mg) by mouth every 4 hours as needed for severe pain  Quantity: 30 tablet  Refills: 0     vitamin C 500 MG tablet  Commonly known as: ASCORBIC ACID  Used for: Psoriatic arthritis (H)      Dose: 500 mg  Take 1 tablet (500 mg) by mouth daily  Refills: 0            ASSESSMENT/PLAN  Encounter Diagnoses   Name Primary?     Closed fracture of multiple pubic rami, unspecified laterality, initial encounter (H) Yes     Pain management      Physical deconditioning        Rami fracture PT OT     pain management continue as needed Tylenol and tramadol     physical deconditioning continue therapy     hypothyroidism continue Synthroid TSH on 11/1/2021 was 1.28    Anticoagulation therapy on Xarelto per previous records okay to DC when increase mobility     Hypertension, lidocaine ointment continue metoprolol succinate, lisinopril, isosorbide     GERD on omeprazole     HDL on atorvastatin    Psoriatic arthritis on methotrexate    Electronically signed by: Melodie Elmore CNP        Sincerely,        Melodie Elmore CNP

## 2022-03-02 NOTE — PROGRESS NOTES
"[facility] GERIATRIC SERVICES  Chief Complaint   Patient presents with     [facility] F/U     [place] Medical Record Number:  [id number]  Place of Service where encounter took place:  [facility] [[id number]]  Code Status:  No CPR- Do NOT Intubate     HISTORY:      HPI:  Rox COHN Lucas  is 87 year old (1935) undergoing physical and occupational therapy. She is with past medical history coronary artery disease, bioprosthetic aortic valve, hypertension, mild dementia, rheumatoid arthritis, osteoporosis presented after a fall and found to T4&T7 compression fractures in addition to known pubic ramus fractures    Today she is seen in her room to review vital signs, labs, pain management and to establish care.  She denied chest pain  cough congestion constipation or diarrhea, she rated her pain 7 out of 10 but does get relief with current pain medication.  She reports a little shortness of breath but \"not much\".  Per records Ortho has elected no brace.  She did report some dizziness this morning but was currently gone when writer met with patient.  Labs were checked and hemoglobin 10.3 BMP within normal limits except sodium low at 133.    ALLERGIES:Patient has no known allergies.    PAST MEDICAL HISTORY:   Past Medical History:   Diagnosis Date     Acute diastolic CHF (congestive heart failure) (H) 12/23/2014     Acute renal failure (H)      Anemia      Aortic valve stenosis, severe      Arrhythmia      Arthritis      Coronary artery disease 11/17/2014     Coronary artery disease      Dieulafoy lesion of stomach      Disease of thyroid gland      Essential hypertension 12/8/2015     Gastroesophageal reflux disease      GI bleed 6/1/2021     Hammer toe      Heart murmur      History of blood transfusion      Hyperlipidemia      Hypertension      Hypothyroidism     Created by [employer] updated for latest IMO load      Irregular heart beat      Macular disorder     \"wrinkle\" "     MCI (mild cognitive impairment) 6/16/2019     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     Pressure injury of buttock, stage 1, unspecified laterality 6/16/2019     Rheumatoid arthritis (H)      S/P AVR 12/15/2014     Sacral insufficiency fracture, initial encounter 5/6/2019     Senile osteoporosis 9/23/2019       PAST SURGICAL HISTORY:   has a past surgical history that includes TOTAL HIP ARTHROPLASTY (Right); REMOVAL GALLBLADDER; LIGATE FALLOPIAN TUBE; Cardiac surgery; aortic valve replacement; Eye surgery (Bilateral); Angioplasty / Stenting Femoral; Pr Esophagogastroduodenoscopy Transoral Diagnostic (N/A, 6/1/2021); Pr Esophagogastroduodenoscopy Transoral Diagnostic (N/A, 6/6/2021); Pr Esophagogastroduodenoscopy Transoral Diagnostic (N/A, 6/7/2021); and Esophagoscopy, gastroscopy, duodenoscopy (EGD), combined (N/A, 11/16/2021).    FAMILY HISTORY: family history includes Asthma in her mother.    SOCIAL HISTORY:  reports that she quit smoking about 27 years ago. Her smoking use included cigarettes. She has a 30.00 pack-year smoking history. She has never used smokeless tobacco. She reports current alcohol use of about 1.0 standard drink of alcohol per week. She reports that she does not use drugs.    ROS:  Constitutional: Negative for activity change, appetite change, fatigue and fever.   HENT: Negative for congestion.    Respiratory: Negative for cough, shortness of breath and wheezing.    Cardiovascular: Negative for chest pain and leg swelling.   Gastrointestinal: Negative for abdominal distention, abdominal pain, constipation, diarrhea and nausea.   Genitourinary: Negative for dysuria.   Musculoskeletal: Negative for arthralgia.  Positive for back pain.   Skin: Negative for color change and wound.   Neurological: Positive for dizziness.   Psychiatric/Behavioral: Negative for agitation, behavioral problems and confusion.     Physical Exam:  Constitutional:       Appearance: Patient is  "well-developed.   HENT:      Head: Normocephalic.   Eyes:      Conjunctiva/sclera: Conjunctivae normal.   Neck:      Musculoskeletal: Normal range of motion.   Cardiovascular:      Rate and Rhythm: Normal rate and regular rhythm.      Heart sounds: Normal heart sounds. No murmur.   Pulmonary:      Effort: No respiratory distress.      Breath sounds: Normal breath sounds. No wheezing or rales.   Abdominal:      General: Bowel sounds are normal. There is no distension.      Palpations: Abdomen is soft.      Tenderness: There is no abdominal tenderness.   Musculoskeletal:       Normal range of motion.     Skin:General:        Skin is warm.   Neurological:         Mental Status: Patient is alert and oriented to person, place, and time.   Psychiatric:         Behavior: Behavior normal.     Vitals:BP (!) 142/85   Pulse 79   Temp 98.9  F (37.2  C)   Resp 18   Ht 1.626 m (5' 4\")   Wt 57.6 kg (127 lb)   SpO2 99%   BMI 21.80 kg/m   and Body mass index is 21.8 kg/m .    Lab/Diagnostic data:   Recent Results (from the past 240 hour(s))   Basic metabolic panel    Collection Time: 03/02/22  5:43 AM   Result Value Ref Range    Sodium 133 (L) 136 - 145 mmol/L    Potassium 4.9 3.5 - 5.0 mmol/L    Chloride 97 (L) 98 - 107 mmol/L    Carbon Dioxide (CO2) 27 22 - 31 mmol/L    Anion Gap 9 5 - 18 mmol/L    Urea Nitrogen 17 8 - 28 mg/dL    Creatinine 0.93 0.60 - 1.10 mg/dL    Calcium 8.8 8.5 - 10.5 mg/dL    Glucose 88 70 - 125 mg/dL    GFR Estimate 60 (L) >60 mL/min/1.73m2   CBC with platelets    Collection Time: 03/02/22  5:43 AM   Result Value Ref Range    WBC Count 6.7 4.0 - 11.0 10e3/uL    RBC Count 3.19 (L) 3.80 - 5.20 10e6/uL    Hemoglobin 10.3 (L) 11.7 - 15.7 g/dL    Hematocrit 31.2 (L) 35.0 - 47.0 %    MCV 98 78 - 100 fL    MCH 32.3 26.5 - 33.0 pg    MCHC 33.0 31.5 - 36.5 g/dL    RDW 15.3 (H) 10.0 - 15.0 %    Platelet Count 191 150 - 450 10e3/uL   COVID-19 Virus (Coronavirus) by PCR Nasopharyngeal    Collection Time: " 03/02/22 11:30 AM    Specimen: Nasopharyngeal; Swab   Result Value Ref Range    SARS CoV2 PCR Positive (A) Negative, Testing sent to reference lab. Results will be returned via unsolicited result       MEDICATIONS:     Review of your medicines          Accurate as of March 1, 2022 11:59 PM. If you have any questions, ask your nurse or doctor.            CONTINUE these medicines which have NOT CHANGED      Dose / Directions   acetaminophen 325 MG tablet  Commonly known as: TYLENOL  Used for: Closed fracture of multiple pubic rami, unspecified laterality, initial encounter (H)      Dose: 975 mg  Take 3 tablets (975 mg) by mouth 3 times daily as needed for mild pain or fever  Refills: 0     atorvastatin 40 MG tablet  Commonly known as: LIPITOR  Used for: Mixed hyperlipidemia      Dose: 40 mg  Take 1 tablet (40 mg) by mouth daily For lipids  Quantity: 90 tablet  Refills: 0     calcium carbonate-vitamin D 500-200 MG-UNIT tablet  Commonly known as: OS-CARLA with D  Used for: Psoriatic arthritis (H)      Dose: 1 tablet  Take 1 tablet by mouth daily  Refills: 0     cholecalciferol 50 MCG (2000 UT) tablet  Used for: Stage 3b chronic kidney disease (H)      Dose: 2,000 Units  Take 1 tablet (50 mcg) by mouth daily  Quantity: 100 tablet  Refills: 1     donepezil 10 MG tablet  Commonly known as: ARICEPT  Used for: Mild vascular neurocognitive disorder (H)      Dose: 10 mg  Take 1 tablet (10 mg) by mouth At Bedtime dementia  Quantity: 90 tablet  Refills: 3     ferrous gluconate 324 (38 Fe) MG tablet  Commonly known as: FERGON  Used for: Anemia due to blood loss, acute      Dose: 324 mg  Take 1 tablet (324 mg) by mouth daily (with breakfast)  Quantity: 90 tablet  Refills: 0     folic acid 1 MG tablet  Commonly known as: FOLVITE  Used for: Psoriatic arthritis (H)      Dose: 1,000 mcg  Take 1 tablet (1,000 mcg) by mouth daily General health  Quantity: 90 tablet  Refills: 0     isosorbide mononitrate 30 MG 24 hr tablet  Commonly known  as: IMDUR  Used for: S/P AVR      Dose: 30 mg  Take 1 tablet (30 mg) by mouth daily  Refills: 0     lidocaine 5 % external ointment  Commonly known as: XYLOCAINE  Used for: Closed fracture of multiple pubic rami, unspecified laterality, initial encounter (H)      Apply topically 4 times daily as needed for moderate pain Apply to affected area in low back and pelvis  Refills: 0     lisinopril 2.5 MG tablet  Commonly known as: ZESTRIL  Used for: S/P AVR      Dose: 2.5 mg  Take 1 tablet (2.5 mg) by mouth daily htn  Refills: 0     methotrexate sodium 2.5 MG Tabs  Used for: Psoriatic arthritis (H)      Dose: 3 tablet  Take 3 tablets (7.5 mg) by mouth once a week Fridays  Quantity: 36 tablet  Refills: 0     metoprolol succinate ER 25 MG 24 hr tablet  Commonly known as: TOPROL-XL  Used for: S/P AVR      Dose: 25 mg  Take 1 tablet (25 mg) by mouth daily htn  Refills: 0     nitroGLYcerin 0.4 MG sublingual tablet  Commonly known as: NITROSTAT      Dose: 0.4 mg  Place 0.4 mg under the tongue every 5 minutes as needed for chest pain For chest pain place 1 tablet under the tongue every 5 minutes for 3 doses. If symptoms persist 5 minutes after 1st dose call 911.  Refills: 0     omeprazole 40 MG DR capsule  Commonly known as: priLOSEC  Used for: Gastric ulcer due to Helicobacter pylori, acute      Dose: 40 mg  Take 1 capsule (40 mg) by mouth daily  Quantity: 60 capsule  Refills: 3     polyethylene glycol 17 GM/Dose powder  Commonly known as: MIRALAX  Used for: Slow transit constipation      Dose: 17 g  Take 17 g by mouth daily To prevent constiaption  Refills: 0     PRESERVISION AREDS 2 PO      Dose: 1 tablet  Take 1 tablet by mouth 2 times daily  Refills: 0     rivaroxaban ANTICOAGULANT 10 MG Tabs tablet  Commonly known as: XARELTO ANTICOAGULANT  Used for: Closed fracture of multiple pubic rami, unspecified laterality, initial encounter (H)      Dose: 10 mg  Take 1 tablet (10 mg) by mouth daily (with dinner) for 28  days  Quantity: 28 tablet  Refills: 0     Synthroid 100 MCG tablet  Used for: Hypothyroidism, unspecified type  Generic drug: levothyroxine      TAKE 1 TABLET EVERY DAY AT 6:00 A.M.  Quantity: 90 tablet  Refills: 3     traMADol 50 MG tablet  Commonly known as: ULTRAM  Used for: Closed fracture of multiple pubic rami, unspecified laterality, initial encounter (H)      Dose: 25 mg  Take 0.5 tablets (25 mg) by mouth every 4 hours as needed for severe pain  Quantity: 30 tablet  Refills: 0     vitamin C 500 MG tablet  Commonly known as: ASCORBIC ACID  Used for: Psoriatic arthritis (H)      Dose: 500 mg  Take 1 tablet (500 mg) by mouth daily  Refills: 0            ASSESSMENT/PLAN  Encounter Diagnoses   Name Primary?     Closed fracture of multiple pubic rami, unspecified laterality, initial encounter (H) Yes     Pain management      Physical deconditioning        Rami fracture PT OT     pain management continue as needed Tylenol and tramadol     physical deconditioning continue therapy     hypothyroidism continue Synthroid TSH on 11/1/2021 was 1.28    Anticoagulation therapy on Xarelto per previous records okay to DC when increase mobility     Hypertension, lidocaine ointment continue metoprolol succinate, lisinopril, isosorbide     GERD on omeprazole     HDL on atorvastatin    Psoriatic arthritis on methotrexate    Electronically signed by: Melodie Elmore CNP

## 2022-03-02 NOTE — TELEPHONE ENCOUNTER
[facility] Geriatrics Lab Note     Provider: Natty Urena MD  Facility: [facility] Facility Type:  TCU    No Known Allergies    Labs Reviewed by provider: BMP, CBC.    Verbal Order/Direction given by Provider: No new orders.     Provider giving Order:  BLAINE Byrnes    Verbal Order given to: Nizta Campbell RN

## 2022-03-04 NOTE — PATIENT INSTRUCTIONS
XR followed with telephone visit with additional plans to follow  Brace vs observation   MRI two weeks if XR stable and pain persists   Consider kyphoplasty   Avoid bending, twisting, lifting

## 2022-03-04 NOTE — PROGRESS NOTES
Neurosurgery Progress Note  3/4/2022    A/P: Rox Zavala is an 87 year old who has a telephone visit today to follow up on acute frature from 2/17/2022 - T4 superior endplate fracture mild , T7 mild superior endplate slight buckling posterior vertebral body cortexfractures acute per CT with chronic T10 fracture. Rox opted for no brace with  fractures mild, spine kyphotic, limited activity planned for 4 weeks due to ortho fractures). Followed by Dr Johnson. Discussed with Dr Patton today. XR today read as stable T7 and unable to view T4. T7 seems slightly more collapsed to my view and I cannot make out T4.     Rox feels her back pain (level of the breast area on her spine) is bothersome. It has not improved or worsened since hospital stay. Feels the worst when she is seated but also sometimes when she lays down. States her hip feels better. She is taking prn tramadol and tylenol. She is recovering at [facility].     If we are considering brace which it sounds like Rox is open to, I would prefer a new XR to evaluate T4 so we know what style brace she needs. T4 would require  brace.     Rox is in COVID precautions with positive test 3/2/2022. [facility] will obtain XR as they are able and we will follow up via telephone visit again.     Discussed with Dr Patton option of kyphoplasty - will wait another two weeks. If pain persistant, get MRI in two weeks and discuss options.     19 minutes appt via telephone with [facility] staff, patient's son   No exam performed today as visit was telehealth due to COVID    Recommend avoiding bending, twisting, heavy lifting greater than 5-10 pounds.     HPI: 86 year old female presents  2/17/2022 after trip and fall at her new assisted living facility. She had just gotten done with coffee with some friends when she tripped over a chair and landed on her face. She developed right leg pain, groin pain, low back/pelvis pain and mid back pain. She has  tenderness over the mid and upper thoracic spine - very mild. On imaging, she has mild appearing T4 fracture and mild appearing T7 fracture with mild buckling of the posterior cortex, no spinal canal stenosis. On exam, she has pain limiting right leg ROM, but able to DF/PF/ wiggle toes, sensation intact. No numbness or tingling. Ortho is following for her pelvis fractures, right greater trochanter fractures, bilateral sacral ala fractures and transverse fracture across S2.

## 2022-03-04 NOTE — LETTER
3/4/2022         RE: Rox Zavala  [address]  [place] [id number]        Dear Colleague,    Thank you for referring your patient, Rox Zavala, to the [facility] NEUROSURGERY CLINIC [facility]. Please see a copy of my visit note below.    Neurosurgery Progress Note  3/4/2022    A/P: Rox Zavala is an 87 year old who has a telephone visit today to follow up on acute frature from 2/17/2022 - T4 superior endplate fracture mild , T7 mild superior endplate slight buckling posterior vertebral body cortexfractures acute per CT with chronic T10 fracture. Rox opted for no brace with  fractures mild, spine kyphotic, limited activity planned for 4 weeks due to ortho fractures). Followed by Dr Johnson. Discussed with Dr Patton today. XR today read as stable T7 and unable to view T4. T7 seems slightly more collapsed to my view and I cannot make out T4.     Rox feels her back pain (level of the breast area on her spine) is bothersome. It has not improved or worsened since hospital stay. Feels the worst when she is seated but also sometimes when she lays down. States her hip feels better. She is taking prn tramadol and tylenol. She is recovering at [facility].     If we are considering brace which it sounds like Rox is open to, I would prefer a new XR to evaluate T4 so we know what style brace she needs. T4 would require  brace.     Rox is in COVID precautions with positive test 3/2/2022. [facility] will obtain XR as they are able and we will follow up via telephone visit again.     Discussed with Dr Patton option of kyphoplasty - will wait another two weeks. If pain persistant, get MRI in two weeks and discuss options.     19 minutes appt via telephone with [facility] staff, patient's son   No exam performed today as visit was telehealth due to COVID    Recommend avoiding bending, twisting, heavy lifting greater than 5-10 pounds.     HPI: 86 year old female  presents  2/17/2022 after trip and fall at her new assisted living facility. She had just gotten done with coffee with some friends when she tripped over a chair and landed on her face. She developed right leg pain, groin pain, low back/pelvis pain and mid back pain. She has tenderness over the mid and upper thoracic spine - very mild. On imaging, she has mild appearing T4 fracture and mild appearing T7 fracture with mild buckling of the posterior cortex, no spinal canal stenosis. On exam, she has pain limiting right leg ROM, but able to DF/PF/ wiggle toes, sensation intact. No numbness or tingling. Ortho is following for her pelvis fractures, right greater trochanter fractures, bilateral sacral ala fractures and transverse fracture across S2.             Again, thank you for allowing me to participate in the care of your patient.        Sincerely,        NISA Thompson CNP

## 2022-03-07 NOTE — PROGRESS NOTES
Rox is a 87 year old who is being evaluated via a billable video visit.      How would you like to obtain your AVS? MyChart  If the video visit is dropped, the invitation should be resent by: Text to cell phone: [phone]  Will anyone else be joining your video visit? No         Video-Visit Details    Type of service:  Video Visit    Start: 03/07/2022 11:54 am  Stop: 03/07/2022 12:02 pm    Originating Location (pt. Location): Home    Distant Location (provider location):  [facility]     Platform used for Video Visit: [employer]    This document was created using a software with less than 100% fidelity, at times resulting in unintended, even erroneous syntax and grammar.  The reader is advised to keep this under consideration while reviewing, interpreting this note.           ASSESSMENT AND PLAN:    Diagnoses and all orders for this visit:  Psoriatic arthritis (H)  Other psoriasis  High risk medication use  Generalized osteoarthrosis of hand      Follow up in 3 months      HISTORY OF PRESENTING ILLNESS:  Rox COHN Lucas 87 year old is evaluated here via video/audio link. She is here for follow-up.  She has psoriatic arthritis, psoriasis, osteoarthritis, on methotrexate, folic acid, has done well with this, intermittent pain at the base of the thumb which she had corticosteroid injection with good benefit.  Her visit today was facilitated by her son.  She had a fall.  She resulted in fracture.  This is in right pelvic bone.  She is undergoing rehab.  There is a concern that she might have a fracture in the spine x-rays were taken and those are being looked at.  She has noted that her psoriatic arthritis appears to be doing well.  She did have labs drawn recently which are within acceptable range for MTX toxicity.  She is going to stay the methotrexate course as now with folic acid.  We will meet here in 3 months with labs prior.  Recently she returned positive for Covid without  "symptoms.             ROS enquiry held for fever, ocular symptoms, rash, headache,  GI issues.  Today we also discussed the issues related to the current pandemic, the pros and cons of the current treatment plan, the CDC guidelines such as social distancing washing the hands covering the cough.  ALLERGIES:Patient has no known allergies.    PAST MEDICAL/ACTIVE PROBLEMS/MEDICATION/SOCIAL DATA  Past Medical History:   Diagnosis Date     Acute diastolic CHF (congestive heart failure) (H) 12/23/2014     Acute renal failure (H)      Anemia      Aortic valve stenosis, severe      Arrhythmia      Arthritis      Coronary artery disease 11/17/2014     Coronary artery disease      Dieulafoy lesion of stomach      Disease of thyroid gland      Essential hypertension 12/8/2015     Gastroesophageal reflux disease      GI bleed 6/1/2021     Hammer toe      Heart murmur      History of blood transfusion      Hyperlipidemia      Hypertension      Hypothyroidism     Created by [employer] updated for latest IMO load      Irregular heart beat      Macular disorder     \"wrinkle\"     MCI (mild cognitive impairment) 6/16/2019     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     Pressure injury of buttock, stage 1, unspecified laterality 6/16/2019     Rheumatoid arthritis (H)      S/P AVR 12/15/2014     Sacral insufficiency fracture, initial encounter 5/6/2019     Senile osteoporosis 9/23/2019     History   Smoking Status     Former Smoker     Packs/day: 1.00     Years: 30.00     Types: Cigarettes     Quit date: 1/1/1995   Smokeless Tobacco     Never Used     Patient Active Problem List   Diagnosis     Hammer Toe     Hemorrhoids     Psoriasis     Generalized Osteoarthritis Of The Hand     Osteoarthritis Of The Knee     Vitamin D Deficiency     Palpitations     Hypothyroidism     Neck Pain     Upper Back Pain (Between Shoulder Blades)     Osteopenia     Coronary artery disease     S/P AVR     Constipation     " Acute diastolic heart failure (H)     Essential hypertension     Hyperlipidemia     Coronary artery disease involving coronary bypass graft of native heart with other forms of angina pectoris (H)     Psoriatic arthritis (H)     High risk medication use     Sacral insufficiency fracture, initial encounter     Hypertension     Back pain     Pressure injury of buttock, stage 1, unspecified laterality     MCI (mild cognitive impairment)     Frequent PVCs     Senile osteoporosis     Lactic acidemia     Melena     Dieulafoy lesion of stomach     Gastric ulcer due to Helicobacter pylori, acute     Stage 3 chronic kidney disease, unspecified whether stage 3a or 3b CKD (H)     Mild vascular neurocognitive disorder (H)     Thrombocytopenia (H)     Closed nondisplaced fracture of greater trochanter of right femur, initial encounter (H)     Acute gastric ulcer with hemorrhage     Fracture of multiple pubic rami (H)     Other closed fracture of fourth thoracic vertebra, initial encounter (H)     Closed displaced fracture of pelvis, unspecified part of pelvis, initial encounter (H)     Other closed fracture of seventh thoracic vertebra, initial encounter (H)     Current Outpatient Medications   Medication Sig Dispense Refill     acetaminophen (TYLENOL) 325 MG tablet Take 3 tablets (975 mg) by mouth 3 times daily as needed for mild pain or fever       atorvastatin (LIPITOR) 40 MG tablet Take 1 tablet (40 mg) by mouth daily For lipids 90 tablet 0     calcium carbonate-vitamin D (OYSTER SHELL CALCIUM/D) 500-200 MG-UNIT tablet Take 1 tablet by mouth daily       cholecalciferol 50 MCG (2000 UT) tablet Take 1 tablet (50 mcg) by mouth daily 100 tablet 1     donepezil (ARICEPT) 10 MG tablet Take 1 tablet (10 mg) by mouth At Bedtime dementia 90 tablet 3     ferrous gluconate (FERGON) 324 (38 Fe) MG tablet Take 1 tablet (324 mg) by mouth daily (with breakfast) 90 tablet 0     isosorbide mononitrate (IMDUR) 30 MG 24 hr tablet Take 1 tablet  (30 mg) by mouth daily       lidocaine (XYLOCAINE) 5 % external ointment Apply topically 4 times daily as needed for moderate pain Apply to affected area in low back and pelvis       lisinopril (ZESTRIL) 2.5 MG tablet Take 1 tablet (2.5 mg) by mouth daily htn       methotrexate sodium 2.5 MG TABS Take 3 tablets (7.5 mg) by mouth once a week Fridays 36 tablet 0     metoprolol succinate ER (TOPROL-XL) 25 MG 24 hr tablet Take 1 tablet (25 mg) by mouth daily htn       Multiple Vitamins-Minerals (PRESERVISION AREDS 2 PO) Take 1 tablet by mouth 2 times daily       nitroGLYcerin (NITROSTAT) 0.4 MG sublingual tablet Place 0.4 mg under the tongue every 5 minutes as needed for chest pain For chest pain place 1 tablet under the tongue every 5 minutes for 3 doses. If symptoms persist 5 minutes after 1st dose call 911.       omeprazole (PRILOSEC) 40 MG DR capsule Take 1 capsule (40 mg) by mouth daily 60 capsule 3     polyethylene glycol (MIRALAX) 17 GM/Dose powder Take 17 g by mouth daily To prevent constiaption       rivaroxaban ANTICOAGULANT (XARELTO ANTICOAGULANT) 10 MG TABS tablet Take 1 tablet (10 mg) by mouth daily (with dinner) for 28 days 28 tablet 0     SYNTHROID 100 MCG tablet TAKE 1 TABLET EVERY DAY AT 6:00 A.M. 90 tablet 3     traMADol (ULTRAM) 50 MG tablet Take 0.5 tablets (25 mg) by mouth every 4 hours as needed for severe pain 30 tablet 0     vitamin C (ASCORBIC ACID) 500 MG tablet Take 1 tablet (500 mg) by mouth daily           EXAMINATION:    Using the audio and video link as best as possible the constitutional, neck, neurologic, psych, skin, both upper extremities areas/organ system were evaluated during this assessment.  Some of the important findings: Alert, oriented, speech fluent.   Able to fully flex the digits, into fists bilaterally, wrist and elbow range of motion appear normal, abduction of the shoulder is normal.  She has marked squaring of the first CMC's.  There is no dactylitis of digits.  She is  accompanied by her son.      LAB / IMAGING DATA:  ALT   Date Value Ref Range Status   02/17/2022 13 0 - 45 U/L Final   02/16/2022 16 0 - 45 U/L Final   12/03/2021 10 0 - 45 U/L Final     Albumin   Date Value Ref Range Status   02/17/2022 3.0 (L) 3.5 - 5.0 g/dL Final   02/16/2022 3.6 3.5 - 5.0 g/dL Final   12/03/2021 3.4 (L) 3.5 - 5.0 g/dL Final       WBC Count   Date Value Ref Range Status   03/02/2022 6.7 4.0 - 11.0 10e3/uL Final   02/24/2022 7.4 4.0 - 11.0 10e3/uL Final     Hemoglobin   Date Value Ref Range Status   03/02/2022 10.3 (L) 11.7 - 15.7 g/dL Final   02/24/2022 10.9 (L) 11.7 - 15.7 g/dL Final   02/17/2022 11.0 (L) 11.7 - 15.7 g/dL Final     Platelet Count   Date Value Ref Range Status   03/02/2022 191 150 - 450 10e3/uL Final   02/24/2022 169 150 - 450 10e3/uL Final   02/22/2022 176 150 - 450 10e3/uL Final       No results found for: ARSLAN

## 2022-03-15 NOTE — LETTER
3/15/2022        RE: Rox Zavala  [address]  [facility] [address]          [facility] G[facility]    [facility] Medical Record Number:  [id number]  Place of Service where encounter took place: [facility] ([facility]) [[id number]]   CODE STATUS:   DNR / DNI    Chief Complaint/Reason for Visit:  Chief Complaint   Patient presents with     Hospital F/U     Admit note to TCU for pelvic fractures and vertebral fractures.      Face to Face for Home Care       HPI:    Rox Zavala is a 87 year old female with hx of CHF/CAD, HTN, HTN, hypothyroidism, admitted to the hospital on 2/16/2022 with pelvic and thoracic fractures as noted below.     86F with HFpEF, coronary artery disease, bioprosthetic aortic valve, hypertension, mild dementia, rheumatoid arthritis, osteoporosis presented after a fall and found to T4&T7 compression fractures in addition to known pubic ramus fractures found outpatient.   Diuresed for CHF and mild hypoxia and subsequently had hypotension and a syncopal episode.  Rest of course uncomplicated and Ms. Zavala was discharged to TCU for ongoing care.     #Bilateral sacral ala fractures and transverse fracture across S2   #Mildly displaced right superior and inferior pubic ramus fractures  -She fell on 2/15 (mechanical trip & fall), seen afterward at [facility] but was initially able to walk and sent home with pain meds, now returns with worsening pain and unable to walk  -orthopedic surgery advise non weight bearing except for transfers, no surgical intervention, pain control and outpatient f/u in ortho clini  -pain controlled with tylenol, lidocaine ointment, and limited use of tramadol  -due to prolonged immobility started on xarelto 10mg qday for DVT px.  Stop when mobility improved     #Hypotension and Syncope - thought due to diuresis and anti-HTNive     #Hypoxia -resolved with diuresis.  Likely due to atelectasis and mild pulm edema seen on CT.   Continue 0-2LPM as needed to maintain sats > 90%.  Incentive spirometry.     #Acute T4/T7 superior endplate fractures:  -pain control  -neurosurgery saw and after discussion with patient, patient has elected no brace.  Planning x-rays now and in 2 weeks, and if worsening, the issue of brace will be revisited. Ok to be up ad kristine per Neurosurg (though remains NWB per Ortho as above)     #Acute hyperkalemia:  -5.6 on arrival. Better now after IVF and low K diet and reduced dose lisinopril     #Vascular dementia:  Mild.  Stable. Monitor for delirium  -home aricept     #HTN:   -home toprol xl, Imdur  -restart low dose Lisinopril due to worsening HTN  -hydralazine prn     #CAD: no anginal complaints.  Continue home statin, Imdur, metoprolol  #newly recognized basal inferior WMA on ECHO - trop normal and no pain.  On statin and xarelto.  Has f/u with cards.     #S/P Bioprosthetic AVR:  TTE 10/2020 notes valve is grossly well seated. Mean gradient of 12 mmHg with peak velocity of 2.3 m/s. No observed prosthetic valve insufficiency.     #Left thigh/groin ?soft tissue infection: CT showing possible soft tissue infection in her left proximal posteromedial thigh.   -02/23 exam with RN - no corresponding finding clinically.        #Hypothyroidism, home Synthroid  #Rheumatoid arthritis, methotrexate, folic acid  #Mild thrombocytopenia - resolved    Overall stabilized and discharged to TCU on 2/24/2022 for PT, OT, nursing cares, medical management and monitoring.       Today:  She has been working with therapy, reports better able to ambulate, less pain. She does not have a back brace, was not ordered per neurosurgery though she will follow up in office for reassessment. She saw ortho on 3/11/2022 for pelvic fractures, initially cane to TCU with orders for weight bear with transfers only on LEs and now allowed WBAT after that visit. Follow up with ortho for pelvic fractures is prn. Has pain in right pelvis, improving. Tolerable  discomfort in her back. No shortness of breath, chest pain, palpitations or dizziness. No fever, cough or congestion. It is noted she tested positive for COVID-19 on 3/2/2022, asymptomatic, routine screen in TCU. Her appetite is pretty good. No abdominal pain, nausea, vomiting, diarrhea or constipation. No new hearing or vision concerns. She lives alone at [facility].    She is planning to discharge home from TCU on Thursday 3/17/2022, insurance picked the day, and she will need home care services as noted below.      DISCHARGE PLAN/FACE TO FACE:  I certify that services are/were furnished while this patient was under the care of a physician and that a physician or an allowed non-physician practitioner (NPP), had a face-to-face encounter that meets the physician face-to-face encounter requirements. The encounter was in whole, or in part, related to the primary reason for home health. The patient is confined to his/her home and needs intermittent skilled nursing, physical therapy, speech-language pathology, or the continued need for occupational therapy. A plan of care has been established by a physician and is periodically reviewed by a physician.  Date of Face-to-Face Encounter: 3/15/2022.     I certify that, based on my findings, the following services are medically necessary home health services: PT, OT, HHA, RN.    My clinical findings support the need for the above skilled services because: Needs additional therapy as she returns home for gait, stamina and strengthening. Aide to assist with cares and RN for clinical assessments, medication management.    This patient is homebound because: Too strenuous to leave the home.    The patient is, or has been, under my care and I have initiated the establishment of the plan of care. This patient will be followed by a physician who will periodically review the plan of care.      PAST MEDICAL HISTORY:  Past Medical History:   Diagnosis Date     Acute  "diastolic CHF (congestive heart failure) (H) 12/23/2014     Acute renal failure (H)      Anemia      Aortic valve stenosis, severe      Arrhythmia      Arthritis      Coronary artery disease 11/17/2014     Coronary artery disease      Dieulafoy lesion of stomach      Disease of thyroid gland      Essential hypertension 12/8/2015     Gastroesophageal reflux disease      GI bleed 6/1/2021     Hammer toe      Heart murmur      History of blood transfusion      Hyperlipidemia      Hypertension      Hypothyroidism     Created by [employer]version [employer]placement Utility updated for latest IMO load      Irregular heart beat      Macular disorder     \"wrinkle\"     MCI (mild cognitive impairment) 6/16/2019     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     Pressure injury of buttock, stage 1, unspecified laterality 6/16/2019     Rheumatoid arthritis (H)      S/P AVR 12/15/2014     Sacral insufficiency fracture, initial encounter 5/6/2019     Senile osteoporosis 9/23/2019       PAST SURGICAL HISTORY:  Past Surgical History:   Procedure Laterality Date     ANGIOPLASTY / STENTING FEMORAL       AORTIC VALVE REPLACEMENT       CARDIAC SURGERY      CABG     ESOPHAGOSCOPY, GASTROSCOPY, DUODENOSCOPY (EGD), COMBINED N/A 11/16/2021    Procedure: ESOPHAGOGASTRODUODENOSCOPY;  Surgeon: Enirco Galan MD;  Location: [facility] OR     EYE SURGERY Bilateral     cataracts     HC REMOVAL GALLBLADDER      Description: Cholecystectomy;  Recorded: 03/20/2008;     AR ESOPHAGOGASTRODUODENOSCOPY TRANSORAL DIAGNOSTIC N/A 6/1/2021    Procedure: ESOPHAGOGASTRODUODENOSCOPY (EGD) with biopsies;  Surgeon: Julio Lopez MD;  Location: [facility];  Service: Gastroenterology     AR ESOPHAGOGASTRODUODENOSCOPY TRANSORAL DIAGNOSTIC N/A 6/6/2021    Procedure: ESOPHAGOGASTRODUODENOSCOPY (EGD) with bicap cauterization;  Surgeon: Julio Lopez MD;  Location: [facility];  Service: Gastroenterology     AR ESOPHAGOGASTRODUODENOSCOPY TRANSORAL " "DIAGNOSTIC N/A 2021    Procedure: ESOPHAGOGASTRODUODENOSCOPY (EGD) with hemoclip;  Surgeon: Sam Brock MD;  Location: [facility];  Service: Gastroenterology     [facility] LIGATE FALLOPIAN TUBE      Description: Tubal Ligation;  Recorded: 2008;     [facility] TOTAL HIP ARTHROPLASTY Right     Description: Total Hip Replacement;  Recorded: 2008; right       FAMILY HISTORY:  Family History   Problem Relation Age of Onset     Asthma Mother      Coronary Artery Disease No family hx of      Breast Cancer No family hx of        SOCIAL HISTORY:  Social History     Socioeconomic History     Marital status:      Spouse name: Not on file     Number of children: Not on file     Years of education: Not on file     Highest education level: Not on file   Occupational History     Not on file   Tobacco Use     Smoking status: Former Smoker     Packs/day: 1.00     Years: 30.00     Pack years: 30.00     Types: Cigarettes     Quit date: 1995     Years since quittin.2     Smokeless tobacco: Never Used   Substance and Sexual Activity     Alcohol use: Yes     Alcohol/week: 1.0 standard drink     Drug use: No     Sexual activity: Not on file   Other Topics Concern     Not on file   Social History Narrative    , patient notes she and her  were  for 60 years and he passed away 12 years ago following an illness and complications. Patient notes they had a happy marriage.  Children: Patient notes she had 4 children, one daughter passed away fo llowing a MVA when daughter was 57 years old. Leisa states she is close to her two boys, daughter, grandchildren, and great-grandchildren and finds her family supportive.     Lives in a town home independently.. Now in senior living.    Lives at \" [facility]\", independent living.    Misbah Barone MD  2020           Social Determinants of Health     Financial Resource Strain: Not on file   Food Insecurity: Not on file   Transportation Needs: Not on file "   Physical Activity: Not on file   Stress: Not on file   Social Connections: Not on file   Intimate Partner Violence: Not on file   Housing Stability: Not on file       MEDICATIONS:  Current Outpatient Medications   Medication Sig Dispense Refill     acetaminophen (TYLENOL) 325 MG tablet Take 3 tablets (975 mg) by mouth 3 times daily as needed for mild pain or fever       atorvastatin (LIPITOR) 40 MG tablet Take 1 tablet (40 mg) by mouth daily For lipids 90 tablet 0     calcium carbonate-vitamin D (OYSTER SHELL CALCIUM/D) 500-200 MG-UNIT tablet Take 1 tablet by mouth 2 times daily 180 tablet 0     cholecalciferol 50 MCG (2000 UT) tablet Take 1 tablet (50 mcg) by mouth daily 90 tablet 0     donepezil (ARICEPT) 10 MG tablet Take 1 tablet (10 mg) by mouth At Bedtime dementia 90 tablet 3     ferrous gluconate (FERGON) 324 (38 Fe) MG tablet Take 1 tablet (324 mg) by mouth daily (with breakfast) 90 tablet 0     folic acid (FOLVITE) 1 MG tablet Take 1 mg by mouth daily       lisinopril 2.5 mg tablet Take one tablet daily       isosorbide mononitrate (IMDUR) 30 MG 24 hr tablet Take 1 tablet (30 mg) by mouth daily       loperamide (IMODIUM A-D) 2 MG tablet Take 1 tablet (2 mg) by mouth 4 times daily as needed for diarrhea 60 tablet 0     methotrexate sodium 2.5 MG TABS Take 3 tablets (7.5 mg) by mouth once a week Fridays 36 tablet 0     metoprolol succinate ER (TOPROL-XL) 25 MG 24 hr tablet Take 1 tablet (25 mg) by mouth daily htn       Multiple Vitamins-Minerals (PRESERVISION AREDS 2 PO) Take 1 tablet by mouth 2 times daily       nitroGLYcerin (NITROSTAT) 0.4 MG sublingual tablet Place 0.4 mg under the tongue every 5 minutes as needed for chest pain For chest pain place 1 tablet under the tongue every 5 minutes for 3 doses. If symptoms persist 5 minutes after 1st dose call 911.       omeprazole (PRILOSEC) 40 MG DR capsule Take 1 capsule (40 mg) by mouth daily 60 capsule 3     polyethylene glycol (MIRALAX) 17 GM/Dose powder  "Take 17 g by mouth daily To prevent constiaption       SYNTHROID 100 MCG tablet TAKE 1 TABLET EVERY DAY AT 6:00 A.M. 90 tablet 3     traMADol (ULTRAM) 50 MG tablet Take 0.5 tablets (25 mg) by mouth 2 times daily as needed for severe pain 30 tablet 0     vitamin C (ASCORBIC ACID) 500 MG tablet Take 1 tablet (500 mg) by mouth daily 90 tablet 0        ALLERGIES:   No Known Allergies      REVIEW OF SYSTEMS:  Pertinent items as noted in HPI.      PHYSICAL EXAM:  General: Patient is alert female, no distress.   Vitals: /64   Pulse 87   Temp 98.4  F (36.9  C)   Resp 18   Ht 1.626 m (5' 4\")   Wt 55.7 kg (122 lb 12.8 oz)   SpO2 98%   BMI 21.08 kg/m    HEENT: Head is NCAT. Eyes show no injection or icterus. Nares negative. Oropharynx well hydrated.  Neck: Supple. No tenderness or adenopathy. No JVD.  Lungs: Clear bilaterally. No wheezes.  Cardiovascular: Regular rate and rhythm, normal S1. S2.  Back: No spinal or CVA tenderness.  Abdomen: Soft, no tenderness on exam. Bowel sounds present. No guarding rebound or rigidity.  : Deferred.  Extremities: Minimal LE edema is noted.  Musculoskeletal: Degen changes.   Skin: No rashes.  Psych: Mood appears good.      LABS/DIAGNOSTIC DATA:  Component      Latest Ref Rng & Units 2/16/2022 2/17/2022 2/22/2022 2/24/2022                WBC      4.0 - 11.0 10e3/uL 8.7 8.5  7.4   RBC Count      3.80 - 5.20 10e6/uL 3.78 (L) 3.35 (L)  3.38 (L)   Hemoglobin      11.7 - 15.7 g/dL 12.3 11.0 (L)  10.9 (L)   Hematocrit      35.0 - 47.0 % 37.9 33.9 (L)  33.7 (L)   MCV      78 - 100 fL 100 101 (H)  100   MCH      26.5 - 33.0 pg 32.5 32.8  32.2   MCHC      31.5 - 36.5 g/dL 32.5 32.4  32.3   RDW      10.0 - 15.0 % 15.1 (H) 15.2 (H)  14.9   Platelet Count      150 - 450 10e3/uL 131 (L) 114 (L) 176 169     Component      Latest Ref Rng & Units 3/2/2022             WBC      4.0 - 11.0 10e3/uL 6.7   RBC Count      3.80 - 5.20 10e6/uL 3.19 (L)   Hemoglobin      11.7 - 15.7 g/dL 10.3 (L) "   Hematocrit      35.0 - 47.0 % 31.2 (L)   MCV      78 - 100 fL 98   MCH      26.5 - 33.0 pg 32.3   MCHC      31.5 - 36.5 g/dL 33.0   RDW      10.0 - 15.0 % 15.3 (H)   Platelet Count      150 - 450 10e3/uL 191     Component      Latest Ref Rng & Units 2/16/2022 2/16/2022 2/17/2022 2/19/2022           1:01 PM  8:14 PM     Sodium      136 - 145 mmol/L 130 (L)  133 (L)    Potassium      3.5 - 5.0 mmol/L 5.6 (H) 4.0 4.6    Chloride      98 - 107 mmol/L 98  101    Carbon Dioxide      22 - 31 mmol/L 22  26    Anion Gap      5 - 18 mmol/L 10  6    Glucose      70 - 125 mg/dL 104  95    Urea Nitrogen      8 - 28 mg/dL 15  20    Creatinine      0.60 - 1.10 mg/dL 1.11 (H)  1.03 0.85   GFR Estimate      >60 mL/min/1.73m2 49 (L)  53 (L) 67   Calcium      8.5 - 10.5 mg/dL 9.1  7.9 (L)      Component      Latest Ref Rng & Units 2/22/2022 3/2/2022              Sodium      136 - 145 mmol/L  133 (L)   Potassium      3.5 - 5.0 mmol/L  4.9   Chloride      98 - 107 mmol/L  97 (L)   Carbon Dioxide      22 - 31 mmol/L  27   Anion Gap      5 - 18 mmol/L  9   Glucose      70 - 125 mg/dL  88   Urea Nitrogen      8 - 28 mg/dL  17   Creatinine      0.60 - 1.10 mg/dL 0.83 0.93   GFR Estimate      >60 mL/min/1.73m2 69 60 (L)   Calcium      8.5 - 10.5 mg/dL  8.8         ASSESSMENT/PLAN:  1. Pelvic fractures. Initial WB for transfers only, saw ortho 3/11/2022, now WBAT. Pain in right pelvis primarily though improving. Continuing in therapies. Hospital ordered xarelto for 28 days for DVT prevention due to mobility concerns.   2. Thoracic compression fractures. T4, T7 and T10 per notes. Saw neurosurgery, no brace. Needs follow up in office.   3. HTN. On lisinopril and metoprolol. Bps monitored in TCU.  4. CHF. Hx CAD. On metoprolol and isosorbide, no diuretics currently. Monitor closely in TCU. Follow up as needed with cardiology.  5. Hypothyroidism. On replacement.   6. Anemia. Continue PTA iron supplement.   7. Psoriatic arthritis. She is on  methotrexate.  8. HLD. On atorvastatin.  9. MCI. She is on Donepezil.  10. COVID-19 positive. Asymptomatic, tested positive on TCU screen 3/2/2022.  11. Disposition. She is discharging home on Thursday 3/17/2022, date picked by insurance.   12. Code status is DNR/DNI.        Electronically signed by: Natty Urena MD               Sincerely,        Natty Urena MD

## 2022-03-22 NOTE — TELEPHONE ENCOUNTER
Diarrhea has slowed down very much, still has some cramping in her abdomen when she has to have a stool. Has been eating grapes. Advised to try to stick with BRAT diet as much as possible. No nausea or vomiting.  Rosa Perry RN  [facility]    Reason for Disposition    Patient wants to be seen    Additional Information    Negative: Shock suspected (e.g., cold/pale/clammy skin, too weak to stand, low BP, rapid pulse)    Negative: Difficult to awaken or acting confused (e.g., disoriented, slurred speech)    Negative: Sounds like a life-threatening emergency to the triager    Negative: Vomiting also present and worse than the diarrhea    Negative: Blood in stool and without diarrhea    Negative: SEVERE abdominal pain (e.g., excruciating) and present > 1 hour    Negative: SEVERE abdominal pain and age > 60    Negative: Bloody, black, or tarry bowel movements (Exception: chronic-unchanged black-grey bowel movements and is taking iron pills or Pepto-bismol)    Negative: SEVERE diarrhea (e.g., 7 or more times / day more than normal) and age > 60 years    Negative: Constant abdominal pain lasting > 2 hours    Negative: Drinking very little and has signs of dehydration (e.g., no urine > 12 hours, very dry mouth, very lightheaded)    Negative: Patient sounds very sick or weak to the triager    Negative: SEVERE diarrhea (e.g., 7 or more times / day more than normal) and present > 24 hours (1 day)    Negative: MODERATE diarrhea (e.g., 4-6 times / day more than normal) and present > 48 hours (2 days)    Negative: MODERATE diarrhea (e.g., 4-6 times / day more than normal) and age > 70 years    Negative: Abdominal pain  (Exception: pain clears completely with each passage of diarrhea stool)    Negative: Fever > 101 F (38.3 C)    Negative: Blood in the stool    Negative: Mucus or pus in stool has been present > 2 days and diarrhea is more than mild    Negative: Weak immune system (e.g., HIV positive, cancer chemo,  "splenectomy, organ transplant, chronic steroids)    Negative: Travel to a foreign country in past month    Negative: Recent antibiotic therapy (i.e., within last 2 months) and diarrhea present > 3 days since antibiotic was stopped    Negative: Recent hospitalization and diarrhea present > 3 days    Negative: Tube feedings (e.g., nasogastric, g-tube, j-tube)    Negative: MILD diarrhea (e.g., 1-3 or more stools than normal in past 24 hours) diarrhea without known cause and present > 7 days    Answer Assessment - Initial Assessment Questions  1. DIARRHEA SEVERITY: \"How bad is the diarrhea?\" \"How many extra stools have you had in the past 24 hours than normal?\"     - NO DIARRHEA (SCALE 0)    - MILD (SCALE 1-3): Few loose or mushy BMs; increase of 1-3 stools over normal daily number of stools; mild increase in ostomy output.    -  MODERATE (SCALE 4-7): Increase of 4-6 stools daily over normal; moderate increase in ostomy output.  * SEVERE (SCALE 8-10; OR 'WORST POSSIBLE'): Increase of 7 or more stools daily over normal; moderate increase in ostomy output; incontinence.      mild  2. ONSET: \"When did the diarrhea begin?\"       Sunday  3. BM CONSISTENCY: \"How loose or watery is the diarrhea?\"       loose  4. VOMITING: \"Are you also vomiting?\" If so, ask: \"How many times in the past 24 hours?\"       no  5. ABDOMINAL PAIN: \"Are you having any abdominal pain?\" If yes: \"What does it feel like?\" (e.g., crampy, dull, intermittent, constant)       cramping  6. ABDOMINAL PAIN SEVERITY: If present, ask: \"How bad is the pain?\"  (e.g., Scale 1-10; mild, moderate, or severe)    - MILD (1-3): doesn't interfere with normal activities, abdomen soft and not tender to touch     - MODERATE (4-7): interferes with normal activities or awakens from sleep, tender to touch     - SEVERE (8-10): excruciating pain, doubled over, unable to do any normal activities        Off and on only with stools  7. ORAL INTAKE: If vomiting, \"Have you been able " "to drink liquids?\" \"How much fluids have you had in the past 24 hours?\"      normal  8. HYDRATION: \"Any signs of dehydration?\" (e.g., dry mouth [not just dry lips], too weak to stand, dizziness, new weight loss) \"When did you last urinate?\"      normal  9. EXPOSURE: \"Have you traveled to a foreign country recently?\" \"Have you been exposed to anyone with diarrhea?\" \"Could you have eaten any food that was spoiled?\"      no  10. ANTIBIOTIC USE: \"Are you taking antibiotics now or have you taken antibiotics in the past 2 months?\"        no  11. OTHER SYMPTOMS: \"Do you have any other symptoms?\" (e.g., fever, blood in stool)        no  12. PREGNANCY: \"Is there any chance you are pregnant?\" \"When was your last menstrual period?\"        no    Protocols used: DIARRHEA-A-OH      "

## 2022-03-24 NOTE — PATIENT INSTRUCTIONS
Per your discharge summary you are supposed to be on lisinopril 2.5 mg not the 20 mg.    I am sending a prescription for lisinopril 2.5 mg to [facility].    For the hospital discharge summary the Xarelto was just used for a month to reduce risk of clot after having fractures.  You do not need to continue that.    Refilled tramadol 50 mg 1 tab up to twice a day only if needed for severe pain.    We will help set a wellness appointment with Dr. QUEZADA and as of additional time to also help with additional hospital follow-up although the hospital follow-up was mostly done today.    The health maintenance labs can be done at the wellness appointment.    Today I am ordering labs to include comprehensive metabolic profile which has electrolytes, complete blood count, thyroid, vitamin D and stool cultures for bacteria and C. Difficile.    Once the stool cultures are collected you could use some Imodium to help with the symptoms.    If the diarrhea is intermittent and we do not feel it is infectious could add back on Metamucil daily in case this is overflow diarrhea meaning constipation causing diarrhea.    Ortho follow-up completed.    I agree with not needing to follow-up with a neurosurgeon if no bracing or further treatment needed for the fractures.    Cardiology planned for March 30.    If signs of dehydration like diarrhea 8-10 times a day, please go to ER.    Go to ER if bloody diarrhea or vomiting blood or severe abdominal pain.    If the mild abdominal pain we are talking about today gets worse but not severe enough to go to the emergency room let me know and I will order a CAT scan.    Prescribed Imodium 2 mg up to 4 times a day as needed for diarrhea.

## 2022-03-24 NOTE — PROGRESS NOTES
Assessment & Plan       ICD-10-CM    1. Other fatigue  R53.83 TSH with free T4 reflex     TSH with free T4 reflex   2. Screen for colon cancer  Z12.11    3. Screening for hyperlipidemia  Z13.220    4. Stage 3b chronic kidney disease (H)  N18.32 cholecalciferol 50 MCG (2000 UT) tablet   5. Psoriatic arthritis (H)  L40.50 calcium carbonate-vitamin D (OYSTER SHELL CALCIUM/D) 500-200 MG-UNIT tablet     vitamin C (ASCORBIC ACID) 500 MG tablet   6. S/P AVR  Z95.2 lisinopril (ZESTRIL) 2.5 MG tablet   7. Closed fracture of multiple pubic rami, unspecified laterality, initial encounter (H)  S32.599A traMADol (ULTRAM) 50 MG tablet   8. Diarrhea, unspecified type  R19.7 Enteric Bacteria and Virus Panel by PRANAY Stool     Clostridium difficile Toxin B PCR     loperamide (IMODIUM A-D) 2 MG tablet     CBC with platelets and differential     Comprehensive metabolic panel (BMP + Alb, Alk Phos, ALT, AST, Total. Bili, TP)     Enteric Bacteria and Virus Panel by PRANAY Stool     Clostridium difficile Toxin B PCR     CBC with platelets and differential     Comprehensive metabolic panel (BMP + Alb, Alk Phos, ALT, AST, Total. Bili, TP)   9. Vitamin D deficiency  E55.9 Vitamin D Deficiency     Vitamin D Deficiency     Per your discharge summary you are supposed to be on lisinopril 2.5 mg not the 20 mg.    I am sending a prescription for lisinopril 2.5 mg to [facility].    For the hospital discharge summary the Xarelto was just used for a month to reduce risk of clot after having fractures.  You do not need to continue that.    Refilled tramadol 50 mg 1 tab up to twice a day only if needed for severe pain.    We will help set a wellness appointment with Dr. QUEZADA and as of additional time to also help with additional hospital follow-up although the hospital follow-up was mostly done today.    The health maintenance labs can be done at the wellness appointment.    Today I am ordering labs to include comprehensive metabolic  profile which has electrolytes, complete blood count, thyroid, vitamin D and stool cultures for bacteria and C. Difficile.    Once the stool cultures are collected you could use some Imodium to help with the symptoms.    If the diarrhea is intermittent and we do not feel it is infectious could add back on Metamucil daily in case this is overflow diarrhea meaning constipation causing diarrhea.    Ortho follow-up completed.    I agree with not needing to follow-up with a neurosurgeon if no bracing or further treatment needed for the fractures.    Cardiology planned for March 30.    If signs of dehydration like diarrhea 8-10 times a day, please go to ER.    Go to ER if bloody diarrhea or vomiting blood or severe abdominal pain.    If the mild abdominal pain we are talking about today gets worse but not severe enough to go to the emergency room let me know and I will order a CAT scan.    Prescribed Imodium 2 mg up to 4 times a day as needed for diarrhea.    Reviewed discharge summary and did the medication reconciliation.  40 minutes spent on the date of the encounter doing chart review, history and exam, documentation and further activities per the note           No follow-ups on file.    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  She exercises with enough effort to increase her heart rate 3 or less days per week.   She is taking medications regularly.     Point was initially set for diarrhea but she presents today actually for hospital follow-up.  She was hospitalized from February 16 discharge February 24 and then went to a  transitional care facility and was discharged 6 days ago.  Patient passed out at home and sustained T4 and T7 compression fractures.  Also pelvic rami fractures.  She is healing well from those without a lot of pain.  No braces needed.  They did follow-up with Ortho and will only follow-up with neurosurgery if needed.  They would like a refill of the tramadol just use if needed.  They did place her on Xarelto for 1 month because of the falls and increased risk of clotting from that but that has finished.  Patient is feeling quite tired since her hospitalization.  She denies depression.  Son mentioned she did have a history of a gastric ulcer and GI bleeding possibly 5 to 6 months ago.  Not having any vomiting of blood or blood in her stool or black tarry stool.    Hypotension   In the hospital it look like they had decreased her lisinopril down to 2.5 but she still been taking 20 mg daily.    Syncope: They thought the fall was due to possibly low blood pressure, hypovolemia from the Lasix and low potassium.     Diarrhea: This has been going on for 4 to 5 days.  No blood in the stool.  No fevers or chills.  She does have occasional upper abdominal pain.  Colic cramping.  She would rate her overall discomfort as a 5 out of 10.  She is having 2-3 loose stools a day.  They will have sudden onset and she will have some incontinence of stool.  Yesterday felt a little bit better but today after eating a banana did have incontinence of stool.    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    History of constipation: She had been taking Metamucil daily and MiraLAX daily but not taking any of those at this time.          Review of Systems         Objective    /54 (BP Location: Left arm, Patient Position: Sitting, Cuff Size: Adult Regular)   Pulse 66   Temp 97.5  F (36.4  C) (Oral)   Resp 16   Wt 54.6 kg (120 lb 6 oz)   SpO2 97%   BMI 20.66 kg/m    Body mass index is 20.66  kg/m .  Physical Exam   GENERAL: healthy, alert and no distress  RESP: lungs clear to auscultation - no rales, rhonchi or wheezes  CV: regular rate and rhythm, normal S1 S2, no S3 or S4, no murmur, click or rub, no peripheral edema and peripheral pulses strong  ABDOMEN: soft, some epigastric pain to deep palpation, no rebound or guarding, no masses palpated

## 2022-03-28 NOTE — TELEPHONE ENCOUNTER
----- Message from Sue Padilla MD sent at 3/27/2022  3:43 PM CDT -----  Please call family member. Negative stool cultures.  Hemoglobin which is red blood count is a little low.  It looks like an iron supplement is on your med list so please take that daily.  At the next follow-up with your primary provider please have them recheck a complete blood count.  Blood sodium / salt just a little low but it is stable.  Potassium just a little high but stable.  Kidney function is increased meaning the kidneys are showing some kidney disease or irritation.  Recommend good hydration and recheck this with a lab appointment in 1 to 2 weeks.  If it is still abnormal would be a good idea to see a kidney doctor.  The rest of the labs are normal.

## 2022-03-28 NOTE — PROGRESS NOTES
"Clinic Care Coordination Contact    Care Coordination Transition Communication           Clinical Data: Patient was hospitalized at  from 2/16 to 2/24 with diagnosis of fall,S/P AVR    MCI (mild cognitive impairment)    Senile osteoporosis    Fracture of multiple pubic rami (H)    Other closed fracture of fourth thoracic vertebra, initial encounter (H)    Closed displaced fracture of pelvis, unspecified part of pelvis, initial encounter (H)    Other closed fracture of seventh thoracic vertebra, initial encounter (H)   .      Transition to Facility:              Facility Name: [facility]              Contact name and phone number/fax: [phone]      Rox lives at [facility].     She uses her walker for mobility. She has dentures and glasses.     Family helps with \"shopping and transportation\". She is otherwise independent with ADLs.    Plan:  Patient is now discharged to home.  CCC RN will place a new order for CCC.  CHW to outreach to offer services.     "

## 2022-03-29 NOTE — LETTER
[facility] CARE COORDINATION  [address]  [facility] [address]    March 30, 2022    Rox COHN Lucas  [address] [place] [address]      Dear Rox/Billy,    I am a clinic community health worker who works with Misbah Barone MD at [facility]. I have been trying to reach you recently to introduce Clinic Care Coordination and to see if there was anything I could assist you with.  Below is a description of clinic care coordination and how I can further assist you.      The clinic care coordination team is made up of a registered nurse,  and community health worker who understand the health care system. The goal of clinic care coordination is to help you manage your health and improve access to the health care system in the most efficient manner. The team can assist you in meeting your health care goals by providing education, coordinating services, strengthening the communication among your providers and supporting you with any resource needs.    Please feel free to contact me at [phone] with any questions or concerns. We are focused on providing you with the highest-quality healthcare experience possible and that all starts with you.     Sincerely,     Jonna Lundberg  Community Health Worker  [facility] Care Coordination   Office: [phone]

## 2022-03-29 NOTE — PROGRESS NOTES
Clinic Care Coordination Contact  [facility]    Clinical Data: Care Coordinator Outreach  Outreach attempted x 1.  Left message on patients son Billy mail with call back information and requested return call.  Plan: Care Coordinator will try to reach patient again in 1-2 business days.    TCU Discharge     Jonna Lundberg  Community Health Worker  [facility] Care Coordination   Office: [phone]

## 2022-03-30 NOTE — PROGRESS NOTES
Assessment/Plan:   1.  Three-vessel coronary artery disease, status post the LIMA to D1, s/p CHEN to mid LCx and RCA 7/2016: The patient complains of a mild dyspnea on exertion.  Her recent echocardiogram was reported normal left ventricular systolic function, LVEF was 60 to 65%, a kinetic in basal inferior lateral wall, no significant valvular disease.  Her physical examination indicated a few crackles in right base.  She has occasional leg edema.  Obviously, she could have diastolic dysfunction.  Her echo showed mildly elevated left ventricular filling pressure.  Prescribed Lasix 20 mg as needed for worsening shortness of breath and leg edema.  Continue aspirin, Imdur, Lipitor and metoprolol XL.     2.  Frequent PVCs, bigeminy pattern: Her ECG showed frequent PVCs.  The patient had no palpitations.  The patient has no PVC induced cardiomyopathy.  Continue metoprolol succinate 25 mg daily.     3.  Severe aortic valve stenosis, status post the bioprosthetic aortic valve replacement: Echocardiogram was reported normal functioning of bioprosthetic aortic valve.     4.  Essential hypertension: Her blood pressure is at low side.  Lisinopril 2.5 mg daily is discontinued today..  Continue metoprolol XL 25 mg daily.      5.  Hyperlipidemia: Continue pravastatin 40 mg nightly.  LDL is well controlled.    Thank you for the opportunity to be involved in the care of Rox Zavala. If you have any questions, please feel free to contact me.  I will see the patient again in 6 months and as needed.    Much or all of the text in this note was generated through the use of Dragon Dictate voice-to-text software. Errors in spelling or words which seem out of context are unintentional. Sound alike errors, in particular, may have escaped editing.       History of Present Illness:   It is my pleasure to see Rox Brownisen at the [facility] clinic for routine cardiology follow up.  Rox Zavala is a  87 year old female with a medical history of severe aortic valve stenosis status post bioprosthetic aortic valve replacement on Dec15, 2014, one-vessel bypass surgery LIMA to D1, 3 vessel coronary artery disease s/p CHEN to mid LCx and mid RCA on 7-5-2016, essential hypertension, hyperlipidemia, hypothyroidism, frequent PVCs.    The patient states that she has dyspnea on exertion which could be slightly worse than before.  She has occasional leg edema.  She denies chest pain.  She has occasional fluttering heartbeats.  She has lightheadedness when she stands up quickly.  She has no syncopal episode.  She has no orthopnea, PND.  Her blood pressure at the low side.    Past Medical History:     Patient Active Problem List   Diagnosis     Hammer Toe     Hemorrhoids     Psoriasis     Generalized Osteoarthritis Of The Hand     Osteoarthritis Of The Knee     Vitamin D Deficiency     Palpitations     Hypothyroidism     Neck Pain     Upper Back Pain (Between Shoulder Blades)     Osteopenia     Coronary artery disease     S/P AVR     Constipation     Acute diastolic heart failure (H)     Essential hypertension     Hyperlipidemia     Coronary artery disease involving coronary bypass graft of native heart with other forms of angina pectoris (H)     Psoriatic arthritis (H)     High risk medication use     Sacral insufficiency fracture, initial encounter     Hypertension     Back pain     Pressure injury of buttock, stage 1, unspecified laterality     MCI (mild cognitive impairment)     Frequent PVCs     Senile osteoporosis     Lactic acidemia     Melena     Dieulafoy lesion of stomach     Gastric ulcer due to Helicobacter pylori, acute     Stage 3 chronic kidney disease, unspecified whether stage 3a or 3b CKD (H)     Mild vascular neurocognitive disorder (H)     Thrombocytopenia (H)     Closed nondisplaced fracture of greater trochanter of right femur, initial encounter (H)     Acute gastric ulcer with hemorrhage     Fracture of  multiple pubic rami (H)     Other closed fracture of fourth thoracic vertebra, initial encounter (H)     Closed displaced fracture of pelvis, unspecified part of pelvis, initial encounter (H)     Other closed fracture of seventh thoracic vertebra, initial encounter (H)       Past Surgical History:     Past Surgical History:   Procedure Laterality Date     ANGIOPLASTY / STENTING FEMORAL       AORTIC VALVE REPLACEMENT       CARDIAC SURGERY      CABG     ESOPHAGOSCOPY, GASTROSCOPY, DUODENOSCOPY (EGD), COMBINED N/A 11/16/2021    Procedure: ESOPHAGOGASTRODUODENOSCOPY;  Surgeon: Enrico Galan MD;  Location: [facility] Main OR     EYE SURGERY Bilateral     cataracts     HC REMOVAL GALLBLADDER      Description: Cholecystectomy;  Recorded: 03/20/2008;     MO ESOPHAGOGASTRODUODENOSCOPY TRANSORAL DIAGNOSTIC N/A 6/1/2021    Procedure: ESOPHAGOGASTRODUODENOSCOPY (EGD) with biopsies;  Surgeon: Julio Lopez MD;  Location: [facility];  Service: Gastroenterology     MO ESOPHAGOGASTRODUODENOSCOPY TRANSORAL DIAGNOSTIC N/A 6/6/2021    Procedure: ESOPHAGOGASTRODUODENOSCOPY (EGD) with bicap cauterization;  Surgeon: Julio Lopez MD;  Location: [facility];  Service: Gastroenterology     MO ESOPHAGOGASTRODUODENOSCOPY TRANSORAL DIAGNOSTIC N/A 6/7/2021    Procedure: ESOPHAGOGASTRODUODENOSCOPY (EGD) with hemoclip;  Surgeon: Sam Brock MD;  Location: [facility];  Service: Gastroenterology     [facility] LIGATE FALLOPIAN TUBE      Description: Tubal Ligation;  Recorded: 03/20/2008;     Z TOTAL HIP ARTHROPLASTY Right     Description: Total Hip Replacement;  Recorded: 03/20/2008; right       Family History:     Family History   Problem Relation Age of Onset     Asthma Mother      Coronary Artery Disease No family hx of      Breast Cancer No family hx of         Social History:    reports that she quit smoking about 27 years ago. Her smoking use included cigarettes. She has a 30.00 pack-year smoking history. She has never used  smokeless tobacco. She reports current alcohol use of about 1.0 standard drink of alcohol per week. She reports that she does not use drugs.    Review of Systems:   12 systems are reviewed negative except for in HPI.    Meds:     Current Outpatient Medications:      acetaminophen (TYLENOL) 325 MG tablet, Take 3 tablets (975 mg) by mouth 3 times daily as needed for mild pain or fever, Disp: , Rfl:      atorvastatin (LIPITOR) 40 MG tablet, Take 1 tablet (40 mg) by mouth daily For lipids, Disp: 90 tablet, Rfl: 0     calcium carbonate-vitamin D (OYSTER SHELL CALCIUM/D) 500-200 MG-UNIT tablet, Take 1 tablet by mouth 2 times daily, Disp: 180 tablet, Rfl: 3     cholecalciferol 50 MCG (2000 UT) tablet, Take 1 tablet (50 mcg) by mouth daily, Disp: 90 tablet, Rfl: 3     donepezil (ARICEPT) 10 MG tablet, Take 1 tablet (10 mg) by mouth At Bedtime dementia, Disp: 90 tablet, Rfl: 3     ferrous gluconate (FERGON) 324 (38 Fe) MG tablet, Take 1 tablet (324 mg) by mouth daily (with breakfast), Disp: 90 tablet, Rfl: 0     folic acid (FOLVITE) 1 MG tablet, Take 1 mg by mouth daily, Disp: , Rfl:      furosemide (LASIX) 20 MG tablet, Take one tablet If you have owrsening shortness of breath and leg edema, Disp: 30 tablet, Rfl: 1     isosorbide mononitrate (IMDUR) 30 MG 24 hr tablet, Take 1 tablet (30 mg) by mouth daily, Disp: , Rfl:      loperamide (IMODIUM A-D) 2 MG tablet, Take 1 tablet (2 mg) by mouth 4 times daily as needed for diarrhea, Disp: 60 tablet, Rfl: 1     methotrexate sodium 2.5 MG TABS, Take 3 tablets (7.5 mg) by mouth once a week Fridays, Disp: 36 tablet, Rfl: 0     metoprolol succinate ER (TOPROL-XL) 25 MG 24 hr tablet, Take 1 tablet (25 mg) by mouth daily htn, Disp: , Rfl:      Multiple Vitamins-Minerals (PRESERVISION AREDS 2 PO), Take 1 tablet by mouth 2 times daily, Disp: , Rfl:      nitroGLYcerin (NITROSTAT) 0.4 MG sublingual tablet, Place 0.4 mg under the tongue every 5 minutes as needed for chest pain For chest  pain place 1 tablet under the tongue every 5 minutes for 3 doses. If symptoms persist 5 minutes after 1st dose call 911., Disp: , Rfl:      omeprazole (PRILOSEC) 40 MG DR capsule, Take 1 capsule (40 mg) by mouth daily, Disp: 60 capsule, Rfl: 3     polyethylene glycol (MIRALAX) 17 GM/Dose powder, Take 17 g by mouth daily To prevent constiaption, Disp: , Rfl:      SYNTHROID 100 MCG tablet, TAKE 1 TABLET EVERY DAY AT 6:00 A.M., Disp: 90 tablet, Rfl: 3     traMADol (ULTRAM) 50 MG tablet, Take 0.5 tablets (25 mg) by mouth 2 times daily as needed for severe pain, Disp: 30 tablet, Rfl: 0     vitamin C (ASCORBIC ACID) 500 MG tablet, Take 1 tablet (500 mg) by mouth daily, Disp: 90 tablet, Rfl: 3    Allergies:   Patient has no known allergies.      Objective:      Physical Exam  55.3 kg (122 lb)     Body mass index is 20.94 kg/m .  BP 98/46 (BP Location: Right arm, Patient Position: Sitting, Cuff Size: Adult Regular)   Pulse 72   Resp 16   Wt 55.3 kg (122 lb)   BMI 20.94 kg/m      General Appearance:   Awake, Alert, No acute distress.   HEENT:  Pupil equal and reactive to light. No scleral icterus; the mucous membranes were moist.   Neck: No cervical bruits. No JVD. No thyromegaly.     Chest: The spine was straight. The chest was symmetric.   Lungs:   A few crackles in bright base. No wheezing.   Cardiovascular:   Regular rhythm and rate, normal first and second heart sounds with II/VI systolic murmurs at apex. No rubs or gallops.    Abdomen:  Soft. No tenderness. Non-distended. Bowels sounds are present   Extremities: Equal tibial pulses. Trace leg edema.   Skin: No rashes or ulcers. Warm, Dry.   Musculoskeletal: No tenderness. No deformity.   Neurologic: Mood and affect are appropriate. No focal deficits.         EKG: Personally reviewed  Sinus rhythm with 1st degree A-V block with frequent , and consecutive Premature ventricular complexes   Left axis deviation   Right bundle branch block   Abnormal ECG   When compared  with ECG of 21-FEB-2022 13:16,   more PVCs and salvos    Cardiac Imaging Studies  ECHO on 2-:  The left ventricle is normal in size with normal left ventricular wall  thickness.  Left ventricular function is normal.The ejection fraction is 60-65%.  The basal inferolateral wall is akinetic.  The right ventricle is mild to moderately dilated.  Age-related valve disease is identified without hemodynamically significant  stenosis or regurgitation.    Lab Review   Lab Results   Component Value Date     03/24/2022    CO2 18 03/24/2022    BUN 34 03/24/2022     Lab Results   Component Value Date    WBC 6.5 03/24/2022    HGB 10.1 03/24/2022    HCT 30.3 03/24/2022    MCV 99 03/24/2022     03/24/2022     Lab Results   Component Value Date    CHOL 139 08/21/2018    CHOL 135 08/04/2017    CHOL 141 12/06/2016     Lab Results   Component Value Date    HDL 79 08/21/2018    HDL 65 08/04/2017    HDL 75 12/06/2016     No components found for: LDLCALC  Lab Results   Component Value Date    TRIG 70 08/21/2018    TRIG 62 08/04/2017    TRIG 61 12/06/2016     No results found for: CHOLHDL  Lab Results   Component Value Date    TROPONINI 0.02 02/21/2022     Lab Results   Component Value Date     02/22/2022     Lab Results   Component Value Date    TSH 3.14 03/24/2022

## 2022-03-30 NOTE — PATIENT INSTRUCTIONS
Rox Zavala,    It is my pleasure to see you today at the [facility].    My recommendations for this visit are:    1.  Discontinue lisinopril 2.5 mg daily due to low blood pressure.  2.  Continue other medications  3.  Walk with a walker is a good exercise, encouraged you.  4.  Lasix 20 mg for as needed if you have worsening shortness of breath and leg edema  5.  See you a again in 6 months      Shilpa Wilburn MD, PhD

## 2022-03-30 NOTE — LETTER
3/30/2022    Misbah Barone MD  [address]  [facility] [address]    RE: Rox Zavala       Dear Colleague,     I had the pleasure of seeing Rox Zavala in the [facility].            Assessment/Plan:   1.  Three-vessel coronary artery disease, status post the LIMA to D1, s/p CHEN to mid LCx and RCA 7/2016: The patient complains of a mild dyspnea on exertion.  Her recent echocardiogram was reported normal left ventricular systolic function, LVEF was 60 to 65%, a kinetic in basal inferior lateral wall, no significant valvular disease.  Her physical examination indicated a few crackles in right base.  She has occasional leg edema.  Obviously, she could have diastolic dysfunction.  Her echo showed mildly elevated left ventricular filling pressure.  Prescribed Lasix 20 mg as needed for worsening shortness of breath and leg edema.  Continue aspirin, Imdur, Lipitor and metoprolol XL.     2.  Frequent PVCs, bigeminy pattern: Her ECG showed frequent PVCs.  The patient had no palpitations.  The patient has no PVC induced cardiomyopathy.  Continue metoprolol succinate 25 mg daily.     3.  Severe aortic valve stenosis, status post the bioprosthetic aortic valve replacement: Echocardiogram was reported normal functioning of bioprosthetic aortic valve.     4.  Essential hypertension: Her blood pressure is at low side.  Lisinopril 2.5 mg daily is discontinued today..  Continue metoprolol XL 25 mg daily.      5.  Hyperlipidemia: Continue pravastatin 40 mg nightly.  LDL is well controlled.    Thank you for the opportunity to be involved in the care of Rox Zavala. If you have any questions, please feel free to contact me.  I will see the patient again in 6 months and as needed.    Much or all of the text in this note was generated through the use of Dragon Dictate voice-to-text software. Errors in spelling or words which seem out of context are unintentional. Sound alike errors, in  particular, may have escaped editing.       History of Present Illness:   It is my pleasure to see Rox Zavala at the [facility] for routine cardiology follow up.  Rox Zavala is a 87 year old female with a medical history of severe aortic valve stenosis status post bioprosthetic aortic valve replacement on Dec15, 2014, one-vessel bypass surgery LIMA to D1, 3 vessel coronary artery disease s/p CHEN to mid LCx and mid RCA on 7-5-2016, essential hypertension, hyperlipidemia, hypothyroidism, frequent PVCs.    The patient states that she has dyspnea on exertion which could be slightly worse than before.  She has occasional leg edema.  She denies chest pain.  She has occasional fluttering heartbeats.  She has lightheadedness when she stands up quickly.  She has no syncopal episode.  She has no orthopnea, PND.  Her blood pressure at the low side.    Past Medical History:     Patient Active Problem List   Diagnosis     Hammer Toe     Hemorrhoids     Psoriasis     Generalized Osteoarthritis Of The Hand     Osteoarthritis Of The Knee     Vitamin D Deficiency     Palpitations     Hypothyroidism     Neck Pain     Upper Back Pain (Between Shoulder Blades)     Osteopenia     Coronary artery disease     S/P AVR     Constipation     Acute diastolic heart failure (H)     Essential hypertension     Hyperlipidemia     Coronary artery disease involving coronary bypass graft of native heart with other forms of angina pectoris (H)     Psoriatic arthritis (H)     High risk medication use     Sacral insufficiency fracture, initial encounter     Hypertension     Back pain     Pressure injury of buttock, stage 1, unspecified laterality     MCI (mild cognitive impairment)     Frequent PVCs     Senile osteoporosis     Lactic acidemia     Melena     Dieulafoy lesion of stomach     Gastric ulcer due to Helicobacter pylori, acute     Stage 3 chronic kidney disease, unspecified whether stage 3a or 3b CKD (H)      Mild vascular neurocognitive disorder (H)     Thrombocytopenia (H)     Closed nondisplaced fracture of greater trochanter of right femur, initial encounter (H)     Acute gastric ulcer with hemorrhage     Fracture of multiple pubic rami (H)     Other closed fracture of fourth thoracic vertebra, initial encounter (H)     Closed displaced fracture of pelvis, unspecified part of pelvis, initial encounter (H)     Other closed fracture of seventh thoracic vertebra, initial encounter (H)       Past Surgical History:     Past Surgical History:   Procedure Laterality Date     ANGIOPLASTY / STENTING FEMORAL       AORTIC VALVE REPLACEMENT       CARDIAC SURGERY      CABG     ESOPHAGOSCOPY, GASTROSCOPY, DUODENOSCOPY (EGD), COMBINED N/A 11/16/2021    Procedure: ESOPHAGOGASTRODUODENOSCOPY;  Surgeon: Enrico Galan MD;  Location: [facility] Main OR     EYE SURGERY Bilateral     cataracts     HC REMOVAL GALLBLADDER      Description: Cholecystectomy;  Recorded: 03/20/2008;     KS ESOPHAGOGASTRODUODENOSCOPY TRANSORAL DIAGNOSTIC N/A 6/1/2021    Procedure: ESOPHAGOGASTRODUODENOSCOPY (EGD) with biopsies;  Surgeon: Julio Lopez MD;  Location: [facility];  Service: Gastroenterology     KS ESOPHAGOGASTRODUODENOSCOPY TRANSORAL DIAGNOSTIC N/A 6/6/2021    Procedure: ESOPHAGOGASTRODUODENOSCOPY (EGD) with bicap cauterization;  Surgeon: Julio Lopez MD;  Location: [facility];  Service: Gastroenterology     KS ESOPHAGOGASTRODUODENOSCOPY TRANSORAL DIAGNOSTIC N/A 6/7/2021    Procedure: ESOPHAGOGASTRODUODENOSCOPY (EGD) with hemoclip;  Surgeon: Sam Brock MD;  Location: [facility];  Service: Gastroenterology     [facility] LIGATE FALLOPIAN TUBE      Description: Tubal Ligation;  Recorded: 03/20/2008;     ZZ TOTAL HIP ARTHROPLASTY Right     Description: Total Hip Replacement;  Recorded: 03/20/2008; right       Family History:     Family History   Problem Relation Age of Onset     Asthma Mother      Coronary Artery Disease No  family hx of      Breast Cancer No family hx of         Social History:    reports that she quit smoking about 27 years ago. Her smoking use included cigarettes. She has a 30.00 pack-year smoking history. She has never used smokeless tobacco. She reports current alcohol use of about 1.0 standard drink of alcohol per week. She reports that she does not use drugs.    Review of Systems:   12 systems are reviewed negative except for in HPI.    Meds:     Current Outpatient Medications:      acetaminophen (TYLENOL) 325 MG tablet, Take 3 tablets (975 mg) by mouth 3 times daily as needed for mild pain or fever, Disp: , Rfl:      atorvastatin (LIPITOR) 40 MG tablet, Take 1 tablet (40 mg) by mouth daily For lipids, Disp: 90 tablet, Rfl: 0     calcium carbonate-vitamin D (OYSTER SHELL CALCIUM/D) 500-200 MG-UNIT tablet, Take 1 tablet by mouth 2 times daily, Disp: 180 tablet, Rfl: 3     cholecalciferol 50 MCG (2000 UT) tablet, Take 1 tablet (50 mcg) by mouth daily, Disp: 90 tablet, Rfl: 3     donepezil (ARICEPT) 10 MG tablet, Take 1 tablet (10 mg) by mouth At Bedtime dementia, Disp: 90 tablet, Rfl: 3     ferrous gluconate (FERGON) 324 (38 Fe) MG tablet, Take 1 tablet (324 mg) by mouth daily (with breakfast), Disp: 90 tablet, Rfl: 0     folic acid (FOLVITE) 1 MG tablet, Take 1 mg by mouth daily, Disp: , Rfl:      furosemide (LASIX) 20 MG tablet, Take one tablet If you have owrsening shortness of breath and leg edema, Disp: 30 tablet, Rfl: 1     isosorbide mononitrate (IMDUR) 30 MG 24 hr tablet, Take 1 tablet (30 mg) by mouth daily, Disp: , Rfl:      loperamide (IMODIUM A-D) 2 MG tablet, Take 1 tablet (2 mg) by mouth 4 times daily as needed for diarrhea, Disp: 60 tablet, Rfl: 1     methotrexate sodium 2.5 MG TABS, Take 3 tablets (7.5 mg) by mouth once a week Fridays, Disp: 36 tablet, Rfl: 0     metoprolol succinate ER (TOPROL-XL) 25 MG 24 hr tablet, Take 1 tablet (25 mg) by mouth daily htn, Disp: , Rfl:      Multiple  Vitamins-Minerals (PRESERVISION AREDS 2 PO), Take 1 tablet by mouth 2 times daily, Disp: , Rfl:      nitroGLYcerin (NITROSTAT) 0.4 MG sublingual tablet, Place 0.4 mg under the tongue every 5 minutes as needed for chest pain For chest pain place 1 tablet under the tongue every 5 minutes for 3 doses. If symptoms persist 5 minutes after 1st dose call 911., Disp: , Rfl:      omeprazole (PRILOSEC) 40 MG DR capsule, Take 1 capsule (40 mg) by mouth daily, Disp: 60 capsule, Rfl: 3     polyethylene glycol (MIRALAX) 17 GM/Dose powder, Take 17 g by mouth daily To prevent constiaption, Disp: , Rfl:      SYNTHROID 100 MCG tablet, TAKE 1 TABLET EVERY DAY AT 6:00 A.M., Disp: 90 tablet, Rfl: 3     traMADol (ULTRAM) 50 MG tablet, Take 0.5 tablets (25 mg) by mouth 2 times daily as needed for severe pain, Disp: 30 tablet, Rfl: 0     vitamin C (ASCORBIC ACID) 500 MG tablet, Take 1 tablet (500 mg) by mouth daily, Disp: 90 tablet, Rfl: 3    Allergies:   Patient has no known allergies.      Objective:      Physical Exam  55.3 kg (122 lb)     Body mass index is 20.94 kg/m .  BP 98/46 (BP Location: Right arm, Patient Position: Sitting, Cuff Size: Adult Regular)   Pulse 72   Resp 16   Wt 55.3 kg (122 lb)   BMI 20.94 kg/m      General Appearance:   Awake, Alert, No acute distress.   HEENT:  Pupil equal and reactive to light. No scleral icterus; the mucous membranes were moist.   Neck: No cervical bruits. No JVD. No thyromegaly.     Chest: The spine was straight. The chest was symmetric.   Lungs:   A few crackles in bright base. No wheezing.   Cardiovascular:   Regular rhythm and rate, normal first and second heart sounds with II/VI systolic murmurs at apex. No rubs or gallops.    Abdomen:  Soft. No tenderness. Non-distended. Bowels sounds are present   Extremities: Equal tibial pulses. Trace leg edema.   Skin: No rashes or ulcers. Warm, Dry.   Musculoskeletal: No tenderness. No deformity.   Neurologic: Mood and affect are appropriate. No  focal deficits.         EKG: Personally reviewed  Sinus rhythm with 1st degree A-V block with frequent , and consecutive Premature ventricular complexes   Left axis deviation   Right bundle branch block   Abnormal ECG   When compared with ECG of 21-FEB-2022 13:16,   more PVCs and salvos    Cardiac Imaging Studies  ECHO on 2-:  The left ventricle is normal in size with normal left ventricular wall  thickness.  Left ventricular function is normal.The ejection fraction is 60-65%.  The basal inferolateral wall is akinetic.  The right ventricle is mild to moderately dilated.  Age-related valve disease is identified without hemodynamically significant  stenosis or regurgitation.    Lab Review   Lab Results   Component Value Date     03/24/2022    CO2 18 03/24/2022    BUN 34 03/24/2022     Lab Results   Component Value Date    WBC 6.5 03/24/2022    HGB 10.1 03/24/2022    HCT 30.3 03/24/2022    MCV 99 03/24/2022     03/24/2022     Lab Results   Component Value Date    CHOL 139 08/21/2018    CHOL 135 08/04/2017    CHOL 141 12/06/2016     Lab Results   Component Value Date    HDL 79 08/21/2018    HDL 65 08/04/2017    HDL 75 12/06/2016     No components found for: LDLCALC  Lab Results   Component Value Date    TRIG 70 08/21/2018    TRIG 62 08/04/2017    TRIG 61 12/06/2016     No results found for: CHOLHDL  Lab Results   Component Value Date    TROPONINI 0.02 02/21/2022     Lab Results   Component Value Date     02/22/2022     Lab Results   Component Value Date    TSH 3.14 03/24/2022                 Thank you for allowing me to participate in the care of your patient.      Sincerely,     Shilpa Wilburn MD     [facility]  cc:   No referring provider defined for this encounter.

## 2022-04-01 NOTE — TELEPHONE ENCOUNTER
"Routing refill request to provider for review/approval because:  Early refill request     Last Written Prescription Date:  2/15/22  Last Fill Quantity: 90,  # refills: 0   Last office visit provider: 3/24/22      Requested Prescriptions   Pending Prescriptions Disp Refills     ferrous gluconate (FERGON) 324 (38 Fe) MG tablet 90 tablet 0     Sig: Take 1 tablet (324 mg) by mouth daily (with breakfast)       Iron Supplements Passed - 3/30/2022  7:06 PM        Passed - Patient is 12 years of age or older        Passed - Recent (12 mo) or future (30 days) visit within the authorizing provider's specialty     Patient has had an office visit with the authorizing provider or a provider within the authorizing providers department within the previous 12 mos or has a future within next 30 days. See \"Patient Info\" tab in inbasket, or \"Choose Columns\" in Meds & Orders section of the refill encounter.              Passed - Hgb OR Hct on record within the past 12 mos.     Patient need only have had a HGB or HCT on file in the past 12 mos. That result does not need to be normal.    Recent Labs   Lab Test 03/24/22  1140 03/02/22  0543 02/24/22  0818   HGB 10.1* 10.3* 10.9*     Recent Labs   Lab Test 03/24/22  1140 03/02/22  0543 02/24/22  0818   HCT 30.3* 31.2* 33.7*       Please verify a HGB or HCT has been checked SINCE THE LAST DOSE CHANGE.            Passed - Medication is active on med list             Sherie Whitaker RN 04/01/22 1:53 PM  "

## 2022-04-11 NOTE — PROGRESS NOTES
[facility] GERIATRICS    [place] Medical Record Number:  [id number]  Place of Service where encounter took place: [facility] ([facility]) [[id number]]   CODE STATUS:   DNR / DNI    Chief Complaint/Reason for Visit:  Chief Complaint   Patient presents with     Hospital F/U     Admit note to TCU for pelvic fractures and vertebral fractures.      Face to Face for Home Care       HPI:    Rox Zavala is a 87 year old female with hx of CHF/CAD, HTN, HTN, hypothyroidism, admitted to the hospital on 2/16/2022 with pelvic and thoracic fractures as noted below.     86F with HFpEF, coronary artery disease, bioprosthetic aortic valve, hypertension, mild dementia, rheumatoid arthritis, osteoporosis presented after a fall and found to T4&T7 compression fractures in addition to known pubic ramus fractures found outpatient.   Diuresed for CHF and mild hypoxia and subsequently had hypotension and a syncopal episode.  Rest of course uncomplicated and Ms. Zavala was discharged to TCU for ongoing care.     #Bilateral sacral ala fractures and transverse fracture across S2   #Mildly displaced right superior and inferior pubic ramus fractures  -She fell on 2/15 (mechanical trip & fall), seen afterward at [facility] but was initially able to walk and sent home with pain meds, now returns with worsening pain and unable to walk  -orthopedic surgery advise non weight bearing except for transfers, no surgical intervention, pain control and outpatient f/u in ortho clini  -pain controlled with tylenol, lidocaine ointment, and limited use of tramadol  -due to prolonged immobility started on xarelto 10mg qday for DVT px.  Stop when mobility improved     #Hypotension and Syncope - thought due to diuresis and anti-HTNive     #Hypoxia -resolved with diuresis.  Likely due to atelectasis and mild pulm edema seen on CT.  Continue 0-2LPM as needed to maintain sats > 90%.  Incentive spirometry.     #Acute T4/T7 superior  endplate fractures:  -pain control  -neurosurgery saw and after discussion with patient, patient has elected no brace.  Planning x-rays now and in 2 weeks, and if worsening, the issue of brace will be revisited. Ok to be up ad kristine per Neurosurg (though remains NWB per Ortho as above)     #Acute hyperkalemia:  -5.6 on arrival. Better now after IVF and low K diet and reduced dose lisinopril     #Vascular dementia:  Mild.  Stable. Monitor for delirium  -home aricept     #HTN:   -home toprol xl, Imdur  -restart low dose Lisinopril due to worsening HTN  -hydralazine prn     #CAD: no anginal complaints.  Continue home statin, Imdur, metoprolol  #newly recognized basal inferior WMA on ECHO - trop normal and no pain.  On statin and xarelto.  Has f/u with cards.     #S/P Bioprosthetic AVR:  TTE 10/2020 notes valve is grossly well seated. Mean gradient of 12 mmHg with peak velocity of 2.3 m/s. No observed prosthetic valve insufficiency.     #Left thigh/groin ?soft tissue infection: CT showing possible soft tissue infection in her left proximal posteromedial thigh.   -02/23 exam with RN - no corresponding finding clinically.        #Hypothyroidism, home Synthroid  #Rheumatoid arthritis, methotrexate, folic acid  #Mild thrombocytopenia - resolved    Overall stabilized and discharged to TCU on 2/24/2022 for PT, OT, nursing cares, medical management and monitoring.       Today:  She has been working with therapy, reports better able to ambulate, less pain. She does not have a back brace, was not ordered per neurosurgery though she will follow up in office for reassessment. She saw ortho on 3/11/2022 for pelvic fractures, initially cane to TCU with orders for weight bear with transfers only on LEs and now allowed WBAT after that visit. Follow up with ortho for pelvic fractures is prn. Has pain in right pelvis, improving. Tolerable discomfort in her back. No shortness of breath, chest pain, palpitations or dizziness. No fever,  cough or congestion. It is noted she tested positive for COVID-19 on 3/2/2022, asymptomatic, routine screen in TCU. Her appetite is pretty good. No abdominal pain, nausea, vomiting, diarrhea or constipation. No new hearing or vision concerns. She lives alone at [facility], independent.    She is planning to discharge home from TCU on Thursday 3/17/2022, insurance picked the day, and she will need home care services as noted below.      DISCHARGE PLAN/FACE TO FACE:  I certify that services are/were furnished while this patient was under the care of a physician and that a physician or an allowed non-physician practitioner (NPP), had a face-to-face encounter that meets the physician face-to-face encounter requirements. The encounter was in whole, or in part, related to the primary reason for home health. The patient is confined to his/her home and needs intermittent skilled nursing, physical therapy, speech-language pathology, or the continued need for occupational therapy. A plan of care has been established by a physician and is periodically reviewed by a physician.  Date of Face-to-Face Encounter: 3/15/2022.     I certify that, based on my findings, the following services are medically necessary home health services: PT, OT, HHA, RN.    My clinical findings support the need for the above skilled services because: Needs additional therapy as she returns home for gait, stamina and strengthening. Aide to assist with cares and RN for clinical assessments, medication management.    This patient is homebound because: Too strenuous to leave the home.    The patient is, or has been, under my care and I have initiated the establishment of the plan of care. This patient will be followed by a physician who will periodically review the plan of care.      PAST MEDICAL HISTORY:  Past Medical History:   Diagnosis Date     Acute diastolic CHF (congestive heart failure) (H) 12/23/2014     Acute renal failure (H)      Anemia       "Aortic valve stenosis, severe      Arrhythmia      Arthritis      Coronary artery disease 11/17/2014     Coronary artery disease      Dieulafoy lesion of stomach      Disease of thyroid gland      Essential hypertension 12/8/2015     Gastroesophageal reflux disease      GI bleed 6/1/2021     Hammer toe      Heart murmur      History of blood transfusion      Hyperlipidemia      Hypertension      Hypothyroidism     Created by [employer]version [employer] updated for latest IMO load      Irregular heart beat      Macular disorder     \"wrinkle\"     MCI (mild cognitive impairment) 6/16/2019     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     Pressure injury of buttock, stage 1, unspecified laterality 6/16/2019     Rheumatoid arthritis (H)      S/P AVR 12/15/2014     Sacral insufficiency fracture, initial encounter 5/6/2019     Senile osteoporosis 9/23/2019       PAST SURGICAL HISTORY:  Past Surgical History:   Procedure Laterality Date     ANGIOPLASTY / STENTING FEMORAL       AORTIC VALVE REPLACEMENT       CARDIAC SURGERY      CABG     ESOPHAGOSCOPY, GASTROSCOPY, DUODENOSCOPY (EGD), COMBINED N/A 11/16/2021    Procedure: ESOPHAGOGASTRODUODENOSCOPY;  Surgeon: Enrico Galan MD;  Location: [facility] Main OR     EYE SURGERY Bilateral     cataracts     HC REMOVAL GALLBLADDER      Description: Cholecystectomy;  Recorded: 03/20/2008;     DE ESOPHAGOGASTRODUODENOSCOPY TRANSORAL DIAGNOSTIC N/A 6/1/2021    Procedure: ESOPHAGOGASTRODUODENOSCOPY (EGD) with biopsies;  Surgeon: Julio Lopez MD;  Location: [facility];  Service: Gastroenterology     DE ESOPHAGOGASTRODUODENOSCOPY TRANSORAL DIAGNOSTIC N/A 6/6/2021    Procedure: ESOPHAGOGASTRODUODENOSCOPY (EGD) with bicap cauterization;  Surgeon: Julio Lopez MD;  Location: [facility];  Service: Gastroenterology     DE ESOPHAGOGASTRODUODENOSCOPY TRANSORAL DIAGNOSTIC N/A 6/7/2021    Procedure: ESOPHAGOGASTRODUODENOSCOPY (EGD) with hemoclip;  Surgeon: Sam Brock " "MD MAULIK;  Location: [facility];  Service: Gastroenterology     [facility] LIGATE FALLOPIAN TUBE      Description: Tubal Ligation;  Recorded: 2008;     [facility] TOTAL HIP ARTHROPLASTY Right     Description: Total Hip Replacement;  Recorded: 2008; right       FAMILY HISTORY:  Family History   Problem Relation Age of Onset     Asthma Mother      Coronary Artery Disease No family hx of      Breast Cancer No family hx of        SOCIAL HISTORY:  Social History     Socioeconomic History     Marital status:      Spouse name: Not on file     Number of children: Not on file     Years of education: Not on file     Highest education level: Not on file   Occupational History     Not on file   Tobacco Use     Smoking status: Former Smoker     Packs/day: 1.00     Years: 30.00     Pack years: 30.00     Types: Cigarettes     Quit date: 1995     Years since quittin.2     Smokeless tobacco: Never Used   Substance and Sexual Activity     Alcohol use: Yes     Alcohol/week: 1.0 standard drink     Drug use: No     Sexual activity: Not on file   Other Topics Concern     Not on file   Social History Narrative    , patient notes she and her  were  for 60 years and he passed away 12 years ago following an illness and complications. Patient notes they had a happy marriage.  Children: Patient notes she had 4 children, one daughter passed away fo llowing a MVA when daughter was 57 years old. Leisa states she is close to her two boys, daughter, grandchildren, and great-grandchildren and finds her family supportive.     Lives in a town home independently.. Now in senior living.    Lives at \" [facility]\", independent living.    Misbah Barone MD  2020           Social Determinants of Health     Financial Resource Strain: Not on file   Food Insecurity: Not on file   Transportation Needs: Not on file   Physical Activity: Not on file   Stress: Not on file   Social Connections: Not on file   Intimate Partner " Violence: Not on file   Housing Stability: Not on file       MEDICATIONS:  Current Outpatient Medications   Medication Sig Dispense Refill     acetaminophen (TYLENOL) 325 MG tablet Take 3 tablets (975 mg) by mouth 3 times daily as needed for mild pain or fever       atorvastatin (LIPITOR) 40 MG tablet Take 1 tablet (40 mg) by mouth daily For lipids 90 tablet 0     calcium carbonate-vitamin D (OYSTER SHELL CALCIUM/D) 500-200 MG-UNIT tablet Take 1 tablet by mouth 2 times daily 180 tablet 0     cholecalciferol 50 MCG (2000 UT) tablet Take 1 tablet (50 mcg) by mouth daily 90 tablet 0     donepezil (ARICEPT) 10 MG tablet Take 1 tablet (10 mg) by mouth At Bedtime dementia 90 tablet 3     ferrous gluconate (FERGON) 324 (38 Fe) MG tablet Take 1 tablet (324 mg) by mouth daily (with breakfast) 90 tablet 0     folic acid (FOLVITE) 1 MG tablet Take 1 mg by mouth daily       lisinopril 2.5 mg tablet Take one tablet daily       isosorbide mononitrate (IMDUR) 30 MG 24 hr tablet Take 1 tablet (30 mg) by mouth daily       loperamide (IMODIUM A-D) 2 MG tablet Take 1 tablet (2 mg) by mouth 4 times daily as needed for diarrhea 60 tablet 0     methotrexate sodium 2.5 MG TABS Take 3 tablets (7.5 mg) by mouth once a week Fridays 36 tablet 0     metoprolol succinate ER (TOPROL-XL) 25 MG 24 hr tablet Take 1 tablet (25 mg) by mouth daily htn       Multiple Vitamins-Minerals (PRESERVISION AREDS 2 PO) Take 1 tablet by mouth 2 times daily       nitroGLYcerin (NITROSTAT) 0.4 MG sublingual tablet Place 0.4 mg under the tongue every 5 minutes as needed for chest pain For chest pain place 1 tablet under the tongue every 5 minutes for 3 doses. If symptoms persist 5 minutes after 1st dose call 911.       omeprazole (PRILOSEC) 40 MG DR capsule Take 1 capsule (40 mg) by mouth daily 60 capsule 3     polyethylene glycol (MIRALAX) 17 GM/Dose powder Take 17 g by mouth daily To prevent constiaption       SYNTHROID 100 MCG tablet TAKE 1 TABLET EVERY DAY AT  "6:00 A.M. 90 tablet 3     traMADol (ULTRAM) 50 MG tablet Take 0.5 tablets (25 mg) by mouth 2 times daily as needed for severe pain 30 tablet 0     vitamin C (ASCORBIC ACID) 500 MG tablet Take 1 tablet (500 mg) by mouth daily 90 tablet 0        ALLERGIES:   No Known Allergies      REVIEW OF SYSTEMS:  Pertinent items as noted in HPI.      PHYSICAL EXAM:  General: Patient is alert female, no distress.   Vitals: /64   Pulse 87   Temp 98.4  F (36.9  C)   Resp 18   Ht 1.626 m (5' 4\")   Wt 55.7 kg (122 lb 12.8 oz)   SpO2 98%   BMI 21.08 kg/m    HEENT: Head is NCAT. Eyes show no injection or icterus. Nares negative. Oropharynx well hydrated.  Neck: Supple. No tenderness or adenopathy. No JVD.  Lungs: Clear bilaterally. No wheezes.  Cardiovascular: Regular rate and rhythm, normal S1. S2.  Back: No spinal or CVA tenderness.  Abdomen: Soft, no tenderness on exam. Bowel sounds present. No guarding rebound or rigidity.  : Deferred.  Extremities: Minimal LE edema is noted.  Musculoskeletal: Degen changes.   Skin: No rashes.  Psych: Mood appears good.      LABS/DIAGNOSTIC DATA:  Component      Latest Ref Rng & Units 2/16/2022 2/17/2022 2/22/2022 2/24/2022                WBC      4.0 - 11.0 10e3/uL 8.7 8.5  7.4   RBC Count      3.80 - 5.20 10e6/uL 3.78 (L) 3.35 (L)  3.38 (L)   Hemoglobin      11.7 - 15.7 g/dL 12.3 11.0 (L)  10.9 (L)   Hematocrit      35.0 - 47.0 % 37.9 33.9 (L)  33.7 (L)   MCV      78 - 100 fL 100 101 (H)  100   MCH      26.5 - 33.0 pg 32.5 32.8  32.2   MCHC      31.5 - 36.5 g/dL 32.5 32.4  32.3   RDW      10.0 - 15.0 % 15.1 (H) 15.2 (H)  14.9   Platelet Count      150 - 450 10e3/uL 131 (L) 114 (L) 176 169     Component      Latest Ref Rng & Units 3/2/2022             WBC      4.0 - 11.0 10e3/uL 6.7   RBC Count      3.80 - 5.20 10e6/uL 3.19 (L)   Hemoglobin      11.7 - 15.7 g/dL 10.3 (L)   Hematocrit      35.0 - 47.0 % 31.2 (L)   MCV      78 - 100 fL 98   MCH      26.5 - 33.0 pg 32.3   MCHC      31.5 " - 36.5 g/dL 33.0   RDW      10.0 - 15.0 % 15.3 (H)   Platelet Count      150 - 450 10e3/uL 191     Component      Latest Ref Rng & Units 2/16/2022 2/16/2022 2/17/2022 2/19/2022           1:01 PM  8:14 PM     Sodium      136 - 145 mmol/L 130 (L)  133 (L)    Potassium      3.5 - 5.0 mmol/L 5.6 (H) 4.0 4.6    Chloride      98 - 107 mmol/L 98  101    Carbon Dioxide      22 - 31 mmol/L 22  26    Anion Gap      5 - 18 mmol/L 10  6    Glucose      70 - 125 mg/dL 104  95    Urea Nitrogen      8 - 28 mg/dL 15  20    Creatinine      0.60 - 1.10 mg/dL 1.11 (H)  1.03 0.85   GFR Estimate      >60 mL/min/1.73m2 49 (L)  53 (L) 67   Calcium      8.5 - 10.5 mg/dL 9.1  7.9 (L)      Component      Latest Ref Rng & Units 2/22/2022 3/2/2022              Sodium      136 - 145 mmol/L  133 (L)   Potassium      3.5 - 5.0 mmol/L  4.9   Chloride      98 - 107 mmol/L  97 (L)   Carbon Dioxide      22 - 31 mmol/L  27   Anion Gap      5 - 18 mmol/L  9   Glucose      70 - 125 mg/dL  88   Urea Nitrogen      8 - 28 mg/dL  17   Creatinine      0.60 - 1.10 mg/dL 0.83 0.93   GFR Estimate      >60 mL/min/1.73m2 69 60 (L)   Calcium      8.5 - 10.5 mg/dL  8.8         ASSESSMENT/PLAN:  1. Pelvic fractures. Initial WB for transfers only, saw ortho 3/11/2022, now WBAT. Pain in right pelvis primarily though improving. Continuing in therapies. Hospital ordered xarelto for 28 days for DVT prevention due to mobility concerns.   2. Thoracic compression fractures. T4, T7 and T10 per notes. Saw neurosurgery, no brace. Needs follow up in office.   3. HTN. On lisinopril and metoprolol. Bps monitored in TCU.  4. CHF. Hx CAD. On metoprolol and isosorbide, no diuretics currently. Monitor closely in TCU. Follow up as needed with cardiology.  5. Hypothyroidism. On replacement.   6. Anemia. Continue PTA iron supplement.   7. Psoriatic arthritis. She is on methotrexate.  8. HLD. On atorvastatin.  9. MCI. She is on Donepezil.  10. COVID-19 positive. Asymptomatic, tested  positive on TCU screen 3/2/2022.  11. Disposition. She is discharging home on Thursday 3/17/2022, date picked by insurance.   12. Code status is DNR/DNI.        Electronically signed by: Natty Urena MD

## 2022-04-11 NOTE — TELEPHONE ENCOUNTER
"Routing refill request to provider for review/approval because:  Early refill requested.    Last Written Prescription Date:  4/1/22  Last Fill Quantity: 90,  # refills: 0   Last office visit provider:  3/24/22     Requested Prescriptions   Pending Prescriptions Disp Refills     ferrous gluconate (FERGON) 324 (38 Fe) MG tablet 90 tablet 0     Sig: Take 1 tablet (324 mg) by mouth daily (with breakfast)       Iron Supplements Passed - 4/8/2022 11:57 AM        Passed - Patient is 12 years of age or older        Passed - Recent (12 mo) or future (30 days) visit within the authorizing provider's specialty     Patient has had an office visit with the authorizing provider or a provider within the authorizing providers department within the previous 12 mos or has a future within next 30 days. See \"Patient Info\" tab in inbasket, or \"Choose Columns\" in Meds & Orders section of the refill encounter.              Passed - Hgb OR Hct on record within the past 12 mos.     Patient need only have had a HGB or HCT on file in the past 12 mos. That result does not need to be normal.    Recent Labs   Lab Test 03/24/22  1140 03/02/22  0543 02/24/22  0818   HGB 10.1* 10.3* 10.9*     Recent Labs   Lab Test 03/24/22  1140 03/02/22  0543 02/24/22  0818   HCT 30.3* 31.2* 33.7*       Please verify a HGB or HCT has been checked SINCE THE LAST DOSE CHANGE.            Passed - Medication is active on med list             Enrico Morales RN 04/11/22 10:35 AM  "

## 2022-05-06 NOTE — TELEPHONE ENCOUNTER
Requesting refill to go to [facility] mail order pharmacy.    Pending Prescriptions:                       Disp   Refills    atorvastatin (LIPITOR) 40 MG tablet       90 tab*3            Sig: Take 1 tablet (40 mg) by mouth daily For lipids

## 2022-05-06 NOTE — PROGRESS NOTES
Progress Notes by Shilpa Wilburn MD at 9/11/2018  2:30 PM     Author: Shilpa Wilburn MD Service: -- Author Type: Physician    Filed: 9/11/2018  2:56 PM Encounter Date: 9/11/2018 Status: Signed    : Shilpa Wilburn MD (Physician)           Click to link to [facility] NOTE       Assessment/Plan:   An 83 years old woman presents to this clinic for routine cardiology follow-up.    1.  Three-vessel coronary artery disease, status post the LIMA to D1, s/p CHEN to mid LCx and RCA 7/2016: The patient has no chest pain.  Her mild dyspnea on exertion has been stable.      Continue aspirin, Lipitor and amlodipine.      2.  Severe aortic valve stenosis, status post the bioprosthetic aortic valve replacement: She had an echocardiogram on February 1, 2018, no change compared to previous one, no evidence of aortic valve stenosis or regurgitation.     3.  Essential hypertension: Her blood pressure has been controlled well with 5 mg of amlodipine daily.      4.  Hyperlipidemia: Continue pravastatin 40 mg nightly.  LDL was controlled well.    Thank you for the opportunity to be involved in the care of Rox Zavala. If you have any questions, please feel free to contact me.  I will see the patient again in 6 months.     History of Present Illness:   It is my pleasure to see Rox Zavala at the [facility] clinic for routine cardiology Follow-up.  Rox Zavala is an 83 y.o. female with a medical history of severe aortic valve stenosis status post bioprosthetic aortic valve replacement on Dec15, 2014, one-vessel bypass surgery LIMA to D1, 3 vessel coronary artery disease s/p CHEN to mid LCx and mid RCA on 7-5-2016, essential hypertension, hyperlipidemia, hypothyroidism.    The patient states that she has been doing fine over last 6 months.  She still has chronic mild dyspnea on exertion which has been stable.  She has no chest pain.  She had occasional irregular heartbeats,  occasional lightheadedness.  She denies any orthopnea or PND.  She has occasional leg swelling and rarely use Lasix.  Her blood pressure and heart rate is in normal range.  She does exercise regularly.    Past Medical History:     Patient Active Problem List   Diagnosis   ? Hammer Toe   ? Hemorrhoids   ? Psoriasis   ? Generalized Osteoarthritis Of The Hand   ? Osteoarthritis Of The Knee   ? Vitamin D Deficiency   ? Palpitations   ? Hypothyroidism   ? Neck Pain   ? Upper Back Pain (Between Shoulder Blades)   ? Osteopenia   ? Coronary artery disease   ? S/P AVR   ? Constipation   ? Acute diastolic CHF (congestive heart failure) (H)   ? Essential hypertension   ? Hyperlipidemia   ? Coronary artery disease involving coronary bypass graft of native heart with other forms of angina pectoris (H)   ? Psoriatic arthritis (H)   ? High risk medication use       Past Surgical History:     Past Surgical History:   Procedure Laterality Date   ? ANGIOPLASTY / STENTING FEMORAL     ? AORTIC VALVE REPLACEMENT     ? CARDIAC SURGERY      CABG   ? EYE SURGERY Bilateral     cataracts   ? ME LIGATE FALLOPIAN TUBE      Description: Tubal Ligation;  Recorded: 03/20/2008;   ? ME REMOVAL GALLBLADDER      Description: Cholecystectomy;  Recorded: 03/20/2008;   ? ME TOTAL HIP ARTHROPLASTY Right     Description: Total Hip Replacement;  Recorded: 03/20/2008; right       Family History:     Family History   Problem Relation Age of Onset   ? Asthma Mother    ? Coronary artery disease Neg Hx         Social History:    reports that she quit smoking about 23 years ago. Her smoking use included Cigarettes. She has a 30.00 pack-year smoking history. She has never used smokeless tobacco. She reports that she drinks alcohol. She reports that she does not use illicit drugs.    Review of Systems:   General: WNL  Eyes: WNL  Ears/Nose/Throat: WNL  Lungs: WNL  Heart: Shortness of Breath with activity, Irregular Heartbeat, Leg Swelling  Stomach: WNL  Bladder:  "WNL  Muscle/Joints: WNL  Skin: WNL  Nervous System: WNL  Mental Health: WNL     Blood: Easy Bruising    Meds:     Current Outpatient Prescriptions:   ?  amLODIPine (NORVASC) 5 MG tablet, TAKE ONE TABLET BY MOUTH EVERY DAY FOR REYNAUDS, Disp: 90 tablet, Rfl: 3  ?  aspirin 81 MG EC tablet, Take 81 mg by mouth daily., Disp: , Rfl:   ?  atorvastatin (LIPITOR) 40 MG tablet, Take 1 tablet (40 mg total) by mouth daily., Disp: 90 tablet, Rfl: 2  ?  CALCIUM CARBONATE (CALCIUM 500 ORAL), Take by mouth., Disp: , Rfl:   ?  cholecalciferol, vitamin D3, 1,000 unit tablet, Take 2,000 Units by mouth daily., Disp: , Rfl:   ?  folic acid (FOLVITE) 1 MG tablet, Take 1 tablet (1,000 mcg total) by mouth daily., Disp: 90 tablet, Rfl: 3  ?  furosemide (LASIX) 40 MG tablet, Take 1 tablet (40 mg total) by mouth as needed (If gained weight more than 1 pound a day or more leg edema)., Disp: 30 tablet, Rfl: 1  ?  levothyroxine (SYNTHROID, LEVOTHROID) 100 MCG tablet, TAKE ONE TABLET BY MOUTH EVERY DAY AT 6:00 A.M., Disp: 90 tablet, Rfl: 2  ?  MV,CALCIUM,MIN/IRON/FOLIC/VITK (MULTI FOR HER ORAL), Take by mouth., Disp: , Rfl:   ?  nitroglycerin (NITROSTAT) 0.4 MG SL tablet, Place 1 tablet (0.4 mg total) under the tongue every 5 (five) minutes as needed for chest pain., Disp: 25 tablet, Rfl: 0  ?  VIT C/E/ZN/COPPR/LUTEIN/ZEAXAN (PRESERVISION AREDS 2 ORAL), Take 1 tablet by mouth 2 (two) times a day., Disp: , Rfl:   ?  meclizine (ANTIVERT) 12.5 mg tablet, Take 1 tablet (12.5 mg total) by mouth 3 (three) times a day as needed for dizziness., Disp: 30 tablet, Rfl: 0  ?  methotrexate 2.5 MG tablet, Take 4 tablets (10 mg total) by mouth once a week., Disp: 48 tablet, Rfl: 0    Allergies:   Review of patient's allergies indicates no known allergies.      Objective:      Physical Exam  130 lb (59 kg)  5' 3\" (1.6 m)  Body mass index is 23.03 kg/(m^2).  /62 (Patient Site: Left Arm, Patient Position: Sitting, Cuff Size: Adult Large)  Pulse 64  Resp " "16  Ht 5' 3\" (1.6 m)  Wt 130 lb (59 kg)  LMP 08/17/1974  BMI 23.03 kg/m2    General Appearance:   Awake, Alert, No acute distress.   HEENT:  Pupil equal and reactive to light. No scleral icterus; the mucous membranes were moist.   Neck: No cervical bruits. No JVD. No thyromegaly.     Chest: The spine was straight. The chest was symmetric.   Lungs:   Respirations unlabored; Lungs are clear to auscultation. No crackles. No wheezing.   Cardiovascular:   Regular rhythm and rate, normal first and second heart sounds with II/VI systolic murmurs at RUSB. No rubs or gallops.    Abdomen:  Soft. No tenderness. Non-distended. Bowels sounds are present   Extremities: Equal tibial pulses. No leg edema.   Skin: No rashes or ulcers. Warm, Dry.   Musculoskeletal: No tenderness. No deformity.   Neurologic: Mood and affect are appropriate. No focal deficits.         EKG: Personally reviewed  Normal sinus rhythm with frequent PVCs    Cardiac Imaging Studies  Coronary angiography with PCI on 7-5-2016:  - Moderate proximal and distal left main disease  - LAD has only mild disease. There is severe disease in the  first diagonal and there is a patent LIMA feeding it distally.  - The proximal circumflex had severe calcific disease and was  stented with a Promus CHEN with good results.  - There is diffuse mild proximal RCA disease and severe  disease in the mid segment which was stented with a Promus CHEN  with good results. There is mild distal RCA system disease.    ECHO on 12/23/2014:  Summary  Mild concentric left ventricular hypertrophy.  Left ventricular ejection fraction is visually estimated to be 55%.  Normal function of the bioprosthetic valve in aortic position.  Trace aortic regurgitation is noted.  There is a small pericardial effusion.  Large pleural effusion is noted.      ECHO on 2-1-2018:  1. Normal left ventricular size and systolic performance with a visually estimated ejection fraction of 55%.   2. The aortic valve is " not well visualized, but suspected to be comprised of three cusps.  There is mild to moderate calcification of the aortic valve.  Spectral Doppler does not reveal any significant stenosis, however.  3. Normal right ventricular size and systolic performance.   4. There is mild left atrial enlargement.   5. There is an echo dense region in the anterior aspect of the left atrium which appears somewhat calcified.  This, however, does not appear to be new and is present on the prior examination dated 23 December 2014.     When compared to the prior real-time echocardiogram dated 23 December 2014, the small pericardial effusion noted on the prior examination is no longer appreciated.  Otherwise, there has been no major change    Lab Review   Lab Results   Component Value Date     01/26/2018    K 4.4 01/26/2018     01/26/2018    CO2 28 01/26/2018    BUN 11 01/26/2018    CREATININE 0.88 06/06/2018    CALCIUM 9.7 01/26/2018     Lab Results   Component Value Date    WBC 8.9 06/06/2018    WBC 6.4 09/29/2015    HGB 13.0 06/06/2018    HCT 38.7 06/06/2018    MCV 98 06/06/2018     06/06/2018     Lab Results   Component Value Date    CHOL 139 08/21/2018    CHOL 135 08/04/2017    CHOL 141 12/06/2016     Lab Results   Component Value Date    HDL 79 08/21/2018    HDL 65 08/04/2017    HDL 75 12/06/2016     Lab Results   Component Value Date    LDLCALC 46 08/21/2018    LDLCALC 58 08/04/2017    LDLCALC 54 12/06/2016     Lab Results   Component Value Date    TRIG 70 08/21/2018    TRIG 62 08/04/2017    TRIG 61 12/06/2016     No components found for: CHOLHDL  Lab Results   Component Value Date    TROPONINI 0.04 12/24/2014     Lab Results   Component Value Date     (H) 12/23/2014     Lab Results   Component Value Date    TSH 1.11 01/26/2018                   Stable

## 2022-05-12 NOTE — TELEPHONE ENCOUNTER
For review    Component      Latest Ref Rng & Units 5/4/2022   Sodium      136 - 145 mmol/L 141   Potassium      3.5 - 5.0 mmol/L 4.1   Chloride      98 - 107 mmol/L 98   Carbon Dioxide      22 - 31 mmol/L 30   Anion Gap      5 - 18 mmol/L 13   Urea Nitrogen      8 - 28 mg/dL 22   Creatinine      0.60 - 1.10 mg/dL 1.19 (H)   Calcium      8.5 - 10.5 mg/dL 9.3   Glucose      70 - 125 mg/dL 105   GFR Estimate      >60 mL/min/1.73m2 45 (L)

## 2022-05-14 NOTE — TELEPHONE ENCOUNTER
I am assuming patient wants to discuss her results?    Please inform patient that creatinine ( a test indicating kidney function) is borderline elevated. 1-2  time testing is not sufficient. Follow up testing when well hydrated is needed.    We will keep a check on this.    It is also affected by medications.    Misbah Barone MD

## 2022-06-01 PROBLEM — R00.0 TACHYCARDIA: Status: ACTIVE | Noted: 2022-01-01

## 2022-06-01 PROBLEM — I50.9 ACUTE CONGESTIVE HEART FAILURE, UNSPECIFIED HEART FAILURE TYPE (H): Status: ACTIVE | Noted: 2022-01-01

## 2022-06-01 NOTE — ED NOTES
During EKG in waiting room, RN observed increased WOB, grunting, use of abdominal muscles, unable to speak in complete sentences. Sats recheck 90% on RA. Placed on 2L NC and roomed in main ED.

## 2022-06-01 NOTE — ED NOTES
Assumed care of the patient. A&O x 4. Pain-free. Appears dyspneic, moreso when speaking. On 2LNC O2. Tachycardic on the monitor. Lung sounds diminished bilaterally. Cap refill 3 seconds and pulses present to all extremities. Edema noted to BLE. Awaiting lab and x-ray results.

## 2022-06-01 NOTE — ED TRIAGE NOTES
Patient brought to ED by her son for evaluation of bilateral chest pain and SOB. Started Aprx 3 months ago but getting worse over the last 3 weeks. Rates CP: 4/10. Pain and SOB are worse with exertion.      Triage Assessment     Row Name 06/01/22 [id number]       Triage Assessment (Adult)    Airway WDL WDL       Respiratory WDL    Respiratory WDL X  SOB       Skin Circulation/Temperature WDL    Skin Circulation/Temperature WDL WDL       Cardiac WDL    Cardiac WDL X  tachycardia & chest pain       Peripheral/Neurovascular WDL    Peripheral Neurovascular WDL X  sofi LE; +2       Cognitive/Neuro/Behavioral WDL    Cognitive/Neuro/Behavioral WDL WDL

## 2022-06-01 NOTE — ED NOTES
"ED Provider In Triage Note  [facility]  Encounter Date: Jun 1, 2022    Chief Complaint   Patient presents with     Chest Pain     Shortness of Breath       Brief HPI:   Rox Zavala is a 87 year old female  here for evaluation of chest pain.     Brief Physical Exam:  BP (!) 146/105   Pulse (!) 125   Temp 97.3  F (36.3  C) (Oral)   Resp 22   Ht 1.626 m (5' 4\")   Wt 54.9 kg (121 lb)   SpO2 94%   BMI 20.77 kg/m    General: Non-toxic appearing  HEENT: Atraumatic  Resp: No respiratory distress, heart rate tachycardic  Abdomen: Non-peritoneal  Neuro: Alert, oriented, answers questions appropriately  Psych: Behavior appropriate  *    Plan Initiated in Triage:  Orders Placed This Encounter     XR Chest 2 Views     [place] Draw     Extra Red Top Tube     Extra Green Top (Lithium Heparin) Tube     Extra Purple Top Tube     Comprehensive metabolic panel     Lipase     B-Type Natriuretic Peptide ( [place] Only)     Symptomatic; Unknown Influenza A/B & SARS-CoV2 (COVID-19) Virus PCR Multiplex       PIT Dispo:   She is tachycardic  EKG shows right bundle branch block.  Triage level 2' to be roomed ASAP        Faheem Street MD on 6/1/2022 at 5:27 PM    Patient was evaluated by the Physician in Triage due to a limitation of available rooms in the Emergency Department. A plan of care was discussed based on the information obtained on the initial evaluation and patient was consuled to return back to the Emergency Department lobby after this initial evalutaiton until results were obtained or a room became available in the Emergency Department. Patient was counseled not to leave prior to receiving the results of their workup.     Faheem Street MD  [facility] EMERGENCY DEPARTMENT  [address] [place] [id number]  [phone]     Faheem Street MD  06/01/22 1728       Faheem Street MD  06/01/22 1736    "

## 2022-06-01 NOTE — ED PROVIDER NOTES
EMERGENCY DEPARTMENT ENCOUnter      NAME: Rox COHN Lucas  AGE: 87 year old female  YOB: 1935  MRN: [id number]  EVALUATION DATE & TIME: 2022  5:35 PM    PCP: Misbah Barone    ED PROVIDER: Jass Elmore DO      Chief Complaint   Patient presents with     Chest Pain     Shortness of Breath         FINAL IMPRESSION:  1. Tachycardia    2. Acute congestive heart failure, unspecified heart failure type (H)          ED COURSE & MEDICAL DECISION MAKIN:47 PM Initial encounter and examination of the patient.  5:48 PM Reviewed EKG.  8:38 PM Spoke with Dr. Dennis, hospitalist.      The patient presented emerged from today with complaints of shortness of breath especially with exertion.  She also notes some mild chest discomfort.  She has a history of coronary artery disease.  She was found to be tachycardic with heart rates consistent between 122-124.  Concern for possible a flutter although there were no obvious flutter waves on EKG.  She was initially given 1 dose of adenosine which briefly slowed her heart rate.  It then accelerated back to the low 120s.  After 1 dose of diltiazem, her heart rate came down into the 70s and now shows atrial fibrillation.  Her laboratory testing today has been relatively unremarkable with a minimally elevated BNP.  Plan will be to keep the patient in the hospital for further treatment.  She is comfortable with this plan.    The patient is critically ill and has required 30 minutes of critical care time exclusive of procedures. This includes time spent interviewing the patient, ordering tests and medications, monitoring vital signs, reviewing results, patient updates, discussing the case with family and consultants, and admission.      At the conclusion of the encounter I discussed the results of all of the tests and the disposition. The questions were answered. The patient or family acknowledged understanding and was agreeable with the care plan.  "        =================================================================    HPI        Rox COHN Lucas is a 87 year old female with a pertinent history of CAD, acute diastolic heart failure, hypertension, MCI, CKD stage 3, thrombocytopenia, acute gastric ulcer, and hyperlipidemia who presents to this ED via private car for evaluation of chest pain and shortness of breath.    Per nurse, the patient is \"Struggling to breathe,\" and is on 2 liters oxygen. She is not normally on oxygen at home.    Per patient, for the past 3-4 months, the patient reports increased shortness of breath with associated lower right chest pain exacerbated with deep breaths. She states that she was in the hospital a couple months ago, where she was treated with Lasix. The shortness of breath has been progressively worse, with a high heart rate, and a high blood pressure (that has recently gone back to normal). The patient reports heart conditions, with valves, stints, and bypasses placed. She has never had pain like this before. In addition, she reports bilateral ankle swelling that is also new. Prior to the past few months, she was able to go down the stairs in her apartment and use the exercise room, but now reports that she is toñito if she walks to the elevator. Her son reports history of irregular heart beat, for which she is on a medication. At home, she is not on oxygen, and her son checks her levels, usually at 95-99%. No other complaints at this time.    The patient reports being on 40 mg furosemide, rather than the 20 mg recorded in the chart.    REVIEW OF SYSTEMS     Constitutional:  Denies fever or chills  HENT:  Denies sore throat   Respiratory:  Denies cough. Positive for shortness of breath with associated chest pain.  Cardiovascular:  Positive for increased heart rate, chest pain, and typical high blood pressure  GI:  Denies abdominal pain, nausea, or vomiting  Musculoskeletal:  Denies any new extremity pain   Skin:  Denies " "rash   Neurologic:  Denies headache, focal weakness or sensory changes    All other systems reviewed and are negative      PAST MEDICAL HISTORY:  Past Medical History:   Diagnosis Date     Acute diastolic CHF (congestive heart failure) (H) 12/23/2014     Acute renal failure (H)      Anemia      Aortic valve stenosis, severe      Arrhythmia      Arthritis      Coronary artery disease 11/17/2014     Coronary artery disease      Dieulafoy lesion of stomach      Disease of thyroid gland      Essential hypertension 12/8/2015     Gastroesophageal reflux disease      GI bleed 6/1/2021     Hammer toe      Heart murmur      History of blood transfusion      Hyperlipidemia      Hypertension      Hypothyroidism     Created by [employer] updated for latest IMO load      Irregular heart beat      Macular disorder     \"wrinkle\"     MCI (mild cognitive impairment) 6/16/2019     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     Pressure injury of buttock, stage 1, unspecified laterality 6/16/2019     Rheumatoid arthritis (H)      S/P AVR 12/15/2014     Sacral insufficiency fracture, initial encounter 5/6/2019     Senile osteoporosis 9/23/2019       PAST SURGICAL HISTORY:  Past Surgical History:   Procedure Laterality Date     ANGIOPLASTY / STENTING FEMORAL       AORTIC VALVE REPLACEMENT       CARDIAC SURGERY      CABG     ESOPHAGOSCOPY, GASTROSCOPY, DUODENOSCOPY (EGD), COMBINED N/A 11/16/2021    Procedure: ESOPHAGOGASTRODUODENOSCOPY;  Surgeon: Enrico Galan MD;  Location: [facility] Main OR     EYE SURGERY Bilateral     cataracts     HC REMOVAL GALLBLADDER      Description: Cholecystectomy;  Recorded: 03/20/2008;     MI ESOPHAGOGASTRODUODENOSCOPY TRANSORAL DIAGNOSTIC N/A 6/1/2021    Procedure: ESOPHAGOGASTRODUODENOSCOPY (EGD) with biopsies;  Surgeon: Julio Lopez MD;  Location: [facility];  Service: Gastroenterology     MI ESOPHAGOGASTRODUODENOSCOPY TRANSORAL DIAGNOSTIC N/A 6/6/2021    " Procedure: ESOPHAGOGASTRODUODENOSCOPY (EGD) with bicap cauterization;  Surgeon: Julio Lopez MD;  Location: [facility];  Service: Gastroenterology     IN ESOPHAGOGASTRODUODENOSCOPY TRANSORAL DIAGNOSTIC N/A 6/7/2021    Procedure: ESOPHAGOGASTRODUODENOSCOPY (EGD) with hemoclip;  Surgeon: Sam Brock MD;  Location: [facility];  Service: Gastroenterology     [facility] LIGATE FALLOPIAN TUBE      Description: Tubal Ligation;  Recorded: 03/20/2008;     [facility] TOTAL HIP ARTHROPLASTY Right     Description: Total Hip Replacement;  Recorded: 03/20/2008; right           CURRENT MEDICATIONS:    acetaminophen (TYLENOL) 325 MG tablet  atorvastatin (LIPITOR) 40 MG tablet  calcium carbonate-vitamin D (OYSTER SHELL CALCIUM/D) 500-200 MG-UNIT tablet  cholecalciferol 50 MCG (2000 UT) tablet  donepezil (ARICEPT) 10 MG tablet  ferrous gluconate (FERGON) 324 (38 Fe) MG tablet  folic acid (FOLVITE) 1 MG tablet  furosemide (LASIX) 40 MG tablet  isosorbide mononitrate (IMDUR) 30 MG 24 hr tablet  loperamide (IMODIUM A-D) 2 MG tablet  methotrexate sodium 2.5 MG TABS  metoprolol succinate ER (TOPROL XL) 50 MG 24 hr tablet  Multiple Vitamins-Minerals (PRESERVISION AREDS 2 PO)  nitroGLYcerin (NITROSTAT) 0.4 MG sublingual tablet  omeprazole (PRILOSEC) 40 MG DR capsule  polyethylene glycol (MIRALAX) 17 GM/Dose powder  SYNTHROID 100 MCG tablet  traMADol (ULTRAM) 50 MG tablet  vitamin C (ASCORBIC ACID) 500 MG tablet        ALLERGIES:  No Known Allergies    FAMILY HISTORY:  Family History   Problem Relation Age of Onset     Asthma Mother      Coronary Artery Disease No family hx of      Breast Cancer No family hx of        SOCIAL HISTORY:   Social History     Socioeconomic History     Marital status:      Spouse name: None     Number of children: None     Years of education: None     Highest education level: None   Tobacco Use     Smoking status: Former Smoker     Packs/day: 1.00     Years: 30.00     Pack years: 30.00     Types: Cigarettes  "    Quit date: 1995     Years since quittin.4     Smokeless tobacco: Never Used   Substance and Sexual Activity     Alcohol use: Yes     Alcohol/week: 1.0 standard drink     Drug use: No   Social History Narrative    , patient notes she and her  were  for 60 years and he passed away 12 years ago following an illness and complications. Patient notes they had a happy marriage.  Children: Patient notes she had 4 children, one daughter passed away fo llowing a MVA when daughter was 57 years old. Leisa states she is close to her two boys, daughter, grandchildren, and great-grandchildren and finds her family supportive.     Lives in a town home independently.. Now in senior living.    Lives at \" [facility]\", independent living.    Misbah Barone MD  2020             VITALS:  Patient Vitals for the past 24 hrs:   BP Temp Temp src Pulse Resp SpO2 Height Weight   22 (!) 135/100 -- -- (!) 122 30 100 % -- --   22 (!) 129/91 -- -- (!) 122 29 98 % -- --   22 193 122/85 -- -- 120 22 99 % -- --   22 -- -- -- (!) 121 20 99 % -- --   22 -- -- -- (!) 121 (!) 31 99 % -- --   22 133/86 -- -- (!) 121 26 99 % -- --   22 -- -- -- (!) 122 27 99 % -- --   22 -- -- -- (!) 122 24 99 % -- --   22 190 115/81 -- -- (!) 121 20 100 % -- --   22 185 -- -- -- (!) 122 23 100 % -- --   22 184 130/89 -- -- (!) 123 24 99 % -- --   22 1815 (!) 146/98 -- -- (!) 125 20 99 % -- --   22 1700 (!) 146/105 97.3  F (36.3  C) Oral (!) 125 22 94 % 1.626 m (5' 4\") 54.9 kg (121 lb)       PHYSICAL EXAM    Constitutional:  Well developed, Well nourished,  HENT:  Normocephalic, Atraumatic, Bilateral external ears normal, Oropharynx moist, Nose normal.   Neck:  Normal range of motion, No meningismus, No stridor.   Eyes:  EOMI, Conjunctiva normal, No discharge.   Respiratory:  Normal breath sounds, No respiratory " distress, No wheezing, No chest tenderness. On NC oxygen.  Cardiovascular:  Tachycardic, Normal rhythm, No murmurs  GI:  Soft, No tenderness, No guarding, No CVA tenderness.   Musculoskeletal:  Neurovascularly intact distally, No tenderness, No cyanosis, Good range of motion in all major joints. No tenderness to palpation or major deformities noted. Mild pitting edema to bilateral lower extremities.  Integument:  Warm, Dry, No erythema, No rash.   Neurologic:  Alert & oriented x 3, Normal motor function, Normal sensory function, No focal deficits noted.   Psychiatric:  Affect normal, Judgment normal, Mood normal.      LAB:  All pertinent labs reviewed and interpreted.  Results for orders placed or performed during the hospital encounter of 06/01/22                                                        Comprehensive metabolic panel   Result Value Ref Range    Sodium 133 (L) 136 - 145 mmol/L    Potassium 4.8 3.5 - 5.0 mmol/L    Chloride 96 (L) 98 - 107 mmol/L    Carbon Dioxide (CO2) 29 22 - 31 mmol/L    Anion Gap 8 5 - 18 mmol/L    Urea Nitrogen 15 8 - 28 mg/dL    Creatinine 0.95 0.60 - 1.10 mg/dL    Calcium 9.3 8.5 - 10.5 mg/dL    Glucose 100 70 - 125 mg/dL    Alkaline Phosphatase 88 45 - 120 U/L    AST 32 0 - 40 U/L    ALT 18 0 - 45 U/L    Protein Total 7.1 6.0 - 8.0 g/dL    Albumin 3.6 3.5 - 5.0 g/dL    Bilirubin Total 1.0 0.0 - 1.0 mg/dL    GFR Estimate 58 (L) >60 mL/min/1.73m2   Result Value Ref Range    Lipase 29 0 - 52 U/L   B-Type Natriuretic Peptide ( East Only)   Result Value Ref Range     (H) 0 - 167 pg/mL   Symptomatic; Unknown Influenza A/B & SARS-CoV2 (COVID-19) Virus PCR Multiplex Nasopharyngeal    Specimen: Nasopharyngeal; Swab   Result Value Ref Range    Influenza A PCR Negative Negative    Influenza B PCR Negative Negative    SARS CoV2 PCR Negative Negative   Result Value Ref Range    Troponin I 0.03 0.00 - 0.29 ng/mL   Lactic acid whole blood   Result Value Ref Range    Lactic Acid 1.0 0.7  - 2.0 mmol/L   CBC with platelets and differential   Result Value Ref Range    WBC Count 7.0 4.0 - 11.0 10e3/uL    RBC Count 3.80 3.80 - 5.20 10e6/uL    Hemoglobin 12.4 11.7 - 15.7 g/dL    Hematocrit 38.2 35.0 - 47.0 %     (H) 78 - 100 fL    MCH 32.6 26.5 - 33.0 pg    MCHC 32.5 31.5 - 36.5 g/dL    RDW 14.4 10.0 - 15.0 %    Platelet Count 137 (L) 150 - 450 10e3/uL    % Neutrophils 66 %    % Lymphocytes 21 %    % Monocytes 10 %    % Eosinophils 1 %    % Basophils 1 %    % Immature Granulocytes 1 %    NRBCs per 100 WBC 0 <1 /100    Absolute Neutrophils 4.7 1.6 - 8.3 10e3/uL    Absolute Lymphocytes 1.4 0.8 - 5.3 10e3/uL    Absolute Monocytes 0.7 0.0 - 1.3 10e3/uL    Absolute Eosinophils 0.1 0.0 - 0.7 10e3/uL    Absolute Basophils 0.1 0.0 - 0.2 10e3/uL    Absolute Immature Granulocytes 0.0 <=0.4 10e3/uL    Absolute NRBCs 0.0 10e3/uL       RADIOLOGY:  I have independently reviewed and interpreted the above imaging, pending the final radiology read.  XR Chest 2 Views   Final Result   IMPRESSION: Small right pleural effusion versus pleural thickening. Mild hyperaeration. Mild pulmonary vascular congestion. Moderately enlarged cardiac silhouette with tortuous atherosclerotic aorta. Multiple median sternotomy wires and prosthetic aortic    valve.          EKG:    EKG #1 at 5:24 PM: Tachycardia at 124 bpm.  No obvious P waves.  No signs of acute ischemia.   ms,  ms.  EKG #2 at 8:40 PM: Atrial fibrillation at 76 bpm.  No signs of acute ischemia.   ms,  ms.    I have independently reviewed and interpreted this EKG          I, Clara Carcamo, am serving as a scribe to document services personally performed by Dr. Elmore based on my observation and the provider's statements to me. I, Jass Elmore, DO attest that Clara Carcamo is acting in a scribe capacity, has observed my performance of the services and has documented them in accordance with my direction.    Jass Elmore, DO  Emergency  [facility] EMERGENCY DEPARTMENT  [facility] [facility] [address]  [phone]  Dept: [phone]       Jass Elmore MD  06/01/22 2051

## 2022-06-02 PROBLEM — E87.1 HYPONATREMIA: Status: ACTIVE | Noted: 2022-01-01

## 2022-06-02 PROBLEM — I48.92 ATRIAL FLUTTER WITH RAPID VENTRICULAR RESPONSE (H): Status: ACTIVE | Noted: 2022-01-01

## 2022-06-02 NOTE — ED NOTES
"Patient in atrial fib with heart rate 60's-70's after diltiazem. Denies dizziness. States she feels \"different.\" Provider notified.  "

## 2022-06-02 NOTE — PROGRESS NOTES
Heart Failure Care Map  GOALS TO BE MET BEFORE DISCHARGE:    1. Decrease congestion and/or edema with diuretic therapy to achieve near optimal volume status.     Dyspnea improved: No, further care required to meet this goal. Please explain Pt still gets dyspnic on exertion. On 2 litters of oxygen via NC satting between 91-94 %   Edema improved: No, further care required to meet this goal. Please explain + 1 bilateral edema noted on the lower extremities . Pt is also on 40 mg of lasix daily. Strict intake and out should  be maintained.         Net I/O and Weights since admission:   05/03 0700 - 06/02 0659  In: 500   Out: -   Net: 500     Vitals:    06/01/22 1700 06/01/22 2205   Weight: 54.9 kg (121 lb) 57.1 kg (125 lb 14.4 oz)       2.  O2 sats > 90% on room air, or at prior home O2 therapy level.      Able to wean O2 this shift to keep sats above 90%?: No, further care required to meet this goal. Please explain Attempted to wean pt off oxygen during shitf but pt desat with  decrease in oxygen.. Will continue to  weaning process when pt is awake and up on the chair to see if there will be a changes.     Does patient use Home O2? No          Current oxygenation status:   SpO2: 95 %     O2 Device: Nasal cannula, Oxygen Delivery: 2 LPM    3.  Tolerates ambulation and mobility near baseline.     Ambulation: No, further care required to meet this goal. Please explain Pt is a stanby to assist of one at night with a walker and a safety belt to the bathroom. Pt has not been able to walk in the hallway since arrival.    Times patient ambulated with staff this shift: 2 to the rest room with mild shortness of breath in the process.     Please review the Heart Failure Care Map for additional HF goal outcomes.    Esthela Quezada RN  6/2/2022

## 2022-06-02 NOTE — H&P
[facility]    History and Physical - Hospitalist Service       Date of Admission:  2022  Rox LALIT Lucas,  1935, MRN [id number]  PCP: Misbah Barone    Assessment & Plan      Rox COHN Lucas is a 87 year old female admitted on 2022. She has history of CHF, coronary artery disease, bioprosthetic aortic valve, hypertension, mild dementia, rheumatoid arthritis, osteoporosis and presents for evaluation of dyspnea on exertion and shortness of breath found to be in rapid atrial flutter    #Rapid atrial flutter  Patient presented with dyspnea on exertion found to be tachycardic with heart rate in the 120s.  Adenosine given in the ED with brief conversion to sinus however then reverted back to 120s.  EKG showing what appear to be flutter waves.  She does not have a previous diagnosis of A. fib.  Initiate diltiazem drip for rate control.  Cardiology consultation for tomorrow  TSH recently checked and okay    #Dyspnea without hypoxia  #Some degree of acute diastolic heart failure  Patient with small pleural effusion and pulmonary infiltrates, in the setting of recent A. fib with RVR and dyspnea on exertion, suspect she has some flash pulmonary edema related to tachycardia.  She is currently wearing O2 nasal cannula but has not had any documented hypoxia here.  Repeat echo, most recent echo done in February showed EF 60 to 65%.  Wean O2 as tolerated  Continue home Lasix    #Hyponatremia  As above suspect she is a little bit volume expanded right now related to flash pulmonary edema  Recheck in the morning    #HTN  Continue home Lasix, Imdur, metoprolol with hold parameters    #Vascular dementia  Supportive care  Home Aricept    #CAD  Continue home Imdur, metoprolol, atorvastatin    #Status post bioprosthetic aortic valve replacement  #Hypothyroidism, recent TSH checked and okay.  Continue home Synthroid  #Rheumatoid arthritis, continue home methotrexate  #GERD, home PPI        Diet: Regular Diet Adult    DVT Prophylaxis: Moderate risk. ambulation, short stay    Rogers Catheter: Not present  Central Lines: None  Code Status: No CPR- Do NOT Intubate. Verified with patient    Clinically Significant Risk Factors Present on Admission                      Disposition Plan   Expected Discharge: 06/03/2022     Anticipated discharge location: home vs TCU     The patient's care was discussed with the Patient.    Chiquis Dennis MD  [facility]  Text page via [employer] Paging/Directory      ______________________________________________________________________    Chief Complaint   Rox D Lucas is a 87 year old female who presents for dyspnea on exertion    History of Present Illness   Patient is little bit sleepy when I visit with her so is not able to provide a very detailed history.  She does tell me that she came in because she has been having dyspnea on exertion but today it was much much worse and she felt like she could not even walk.  Her son drove her into the ER.  She has been eating and drinking okay.  She denies any trouble with bowel or bladder. No other complaints.    Review of Systems    The 5 point Review of Systems is negative other than noted in the HPI or here.     Past Medical History    I have reviewed this patient's medical history and updated it with pertinent information if needed.   Past Medical History:   Diagnosis Date     Acute diastolic CHF (congestive heart failure) (H) 12/23/2014     Acute renal failure (H)      Anemia      Aortic valve stenosis, severe      Arrhythmia      Arthritis      Coronary artery disease 11/17/2014     Coronary artery disease      Dieulafoy lesion of stomach      Disease of thyroid gland      Essential hypertension 12/8/2015     Gastroesophageal reflux disease      GI bleed 6/1/2021     Hammer toe      Heart murmur      History of blood transfusion      Hyperlipidemia      Hypertension      Hypothyroidism     Created by  "Conversion Replacement Utility updated for latest IMO load      Irregular heart beat      Macular disorder     \"wrinkle\"     MCI (mild cognitive impairment) 6/16/2019     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  Past Surgical History:   Procedure Laterality Date     ANGIOPLASTY / STENTING FEMORAL       AORTIC VALVE REPLACEMENT       CARDIAC SURGERY      CABG     ESOPHAGOSCOPY, GASTROSCOPY, DUODENOSCOPY (EGD), COMBINED N/A 11/16/2021    Procedure: ESOPHAGOGASTRODUODENOSCOPY;  Surgeon: Enrico Galan MD;  Location: [facility] Main OR     EYE SURGERY Bilateral     cataracts     HC REMOVAL GALLBLADDER      Description: Cholecystectomy;  Recorded: 03/20/2008;     SD ESOPHAGOGASTRODUODENOSCOPY TRANSORAL DIAGNOSTIC N/A 6/1/2021    Procedure: ESOPHAGOGASTRODUODENOSCOPY (EGD) with biopsies;  Surgeon: Julio Lopez MD;  Location: [facility] GI;  Service: Gastroenterology     SD ESOPHAGOGASTRODUODENOSCOPY TRANSORAL DIAGNOSTIC N/A 6/6/2021    Procedure: ESOPHAGOGASTRODUODENOSCOPY (EGD) with bicap cauterization;  Surgeon: Julio Lopez MD;  Location: [facility] GI;  Service: Gastroenterology     SD ESOPHAGOGASTRODUODENOSCOPY TRANSORAL DIAGNOSTIC N/A 6/7/2021    Procedure: ESOPHAGOGASTRODUODENOSCOPY (EGD) with hemoclip;  Surgeon: Sam Brock MD;  Location: [facility] GI;  Service: Gastroenterology     [facility] LIGATE FALLOPIAN TUBE      Description: Tubal Ligation;  Recorded: 03/20/2008;     ZZC TOTAL HIP ARTHROPLASTY Right     Description: Total Hip Replacement;  Recorded: 03/20/2008; right       Social History   I have reviewed this patient's social history and " updated it with pertinent information if needed.  Social History     Tobacco Use     Smoking status: Former Smoker     Packs/day: 1.00     Years: 30.00     Pack years: 30.00     Types: Cigarettes     Quit date: 1995     Years since quittin.4     Smokeless tobacco: Never Used   Substance Use Topics     Alcohol use: Yes     Alcohol/week: 1.0 standard drink     Drug use: No       Family History   I have reviewed this patient's family history and updated it with pertinent information if needed.  Family History   Problem Relation Age of Onset     Asthma Mother      Coronary Artery Disease No family hx of      Breast Cancer No family hx of        Prior to Admission Medications   Prior to Admission Medications   Prescriptions Last Dose Informant Patient Reported? Taking?   Multiple Vitamins-Minerals (PRESERVISION AREDS 2 PO) 2022 at am  Yes Yes   Sig: Take 1 tablet by mouth 2 times daily   SYNTHROID 100 MCG tablet 2022 at 0600  No Yes   Sig: TAKE 1 TABLET EVERY DAY AT 6:00 A.M.   acetaminophen (TYLENOL) 325 MG tablet   No Yes   Sig: Take 3 tablets (975 mg) by mouth 3 times daily as needed for mild pain or fever   atorvastatin (LIPITOR) 40 MG tablet 2022 at am  No Yes   Sig: Take 1 tablet (40 mg) by mouth daily For lipids   calcium carbonate-vitamin D (OYSTER SHELL CALCIUM/D) 500-200 MG-UNIT tablet 2022 at am  No Yes   Sig: Take 1 tablet by mouth 2 times daily   cholecalciferol 50 MCG (2000 UT) tablet 2022 at am  No Yes   Sig: Take 1 tablet (50 mcg) by mouth daily   donepezil (ARICEPT) 10 MG tablet 2022 at pm  No Yes   Sig: Take 1 tablet (10 mg) by mouth At Bedtime dementia   ferrous gluconate (FERGON) 324 (38 Fe) MG tablet 2022 at am  No Yes   Sig: Take 1 tablet (324 mg) by mouth daily (with breakfast)   folic acid (FOLVITE) 1 MG tablet 2022 at am  Yes Yes   Sig: Take 1 mg by mouth daily   furosemide (LASIX) 40 MG tablet 2022 at am  Yes Yes   Sig: Take 40 mg by mouth daily    isosorbide mononitrate (IMDUR) 30 MG 24 hr tablet 6/1/2022 at am  No Yes   Sig: Take 1 tablet (30 mg) by mouth daily   loperamide (IMODIUM A-D) 2 MG tablet   No Yes   Sig: Take 1 tablet (2 mg) by mouth 4 times daily as needed for diarrhea   methotrexate sodium 2.5 MG TABS 5/27/2022  No Yes   Sig: Take 3 tablets (7.5 mg) by mouth once a week Fridays   metoprolol succinate ER (TOPROL XL) 50 MG 24 hr tablet 6/1/2022 at am  Yes Yes   Sig: Take 50 mg by mouth daily   nitroGLYcerin (NITROSTAT) 0.4 MG sublingual tablet   Yes Yes   Sig: Place 0.4 mg under the tongue every 5 minutes as needed for chest pain For chest pain place 1 tablet under the tongue every 5 minutes for 3 doses. If symptoms persist 5 minutes after 1st dose call 911.   omeprazole (PRILOSEC) 40 MG DR capsule 5/31/2022 at pm  No Yes   Sig: Take 1 capsule (40 mg) by mouth daily   polyethylene glycol (MIRALAX) 17 GM/Dose powder   No Yes   Sig: Take 17 g by mouth daily To prevent constiaption   traMADol (ULTRAM) 50 MG tablet   No Yes   Sig: Take 0.5 tablets (25 mg) by mouth 2 times daily as needed for severe pain   vitamin C (ASCORBIC ACID) 500 MG tablet 5/31/2022 at pm  No Yes   Sig: Take 1 tablet (500 mg) by mouth daily      Facility-Administered Medications: None     Allergies   No Known Allergies    Physical Exam   Vital Signs: Temp: 97.5  F (36.4  C) Temp src: Oral BP: 119/83 Pulse: 120   Resp: 25 SpO2: 95 % O2 Device: Nasal cannula Oxygen Delivery: 2 LPM  Weight: 125 lbs 14.4 oz    General: in no apparent distress, non-toxic and alert female lying in hospital bed oriented x3  HEENT: Head normocephalic atraumatic, oral mucosa moist. Sclerae anicteric  CV: Regular rhythm, tachycardic, no murmurs  Resp: No wheezes, no rales or rhonchi, no focal consolidations  GI: Belly soft, nondistended, nontender, bowel sounds present  Skin: No rashes or lesions  Extremities: Trace ankle edema bilaterally  Psych: Normal affect, mood euthymic  Neuro: CNII-XII grossly  intact, moving all 4 extremities    Data   Data reviewed today: I reviewed all medications, new labs and imaging results over the last 24 hours.  EKG personally reviewed shows: A. fib, rate 76 bpm.  Otherwise no significant change from previous    Recent Results (from the past 12 hour(s))   Extra Red Top Tube    Collection Time: 06/01/22  5:16 PM   Result Value Ref Range    Hold Specimen JIC    Extra Green Top (Lithium Heparin) Tube    Collection Time: 06/01/22  5:16 PM   Result Value Ref Range    Hold Specimen JIC    Extra Purple Top Tube    Collection Time: 06/01/22  5:16 PM   Result Value Ref Range    Hold Specimen JIC    Comprehensive metabolic panel    Collection Time: 06/01/22  5:16 PM   Result Value Ref Range    Sodium 133 (L) 136 - 145 mmol/L    Potassium 4.8 3.5 - 5.0 mmol/L    Chloride 96 (L) 98 - 107 mmol/L    Carbon Dioxide (CO2) 29 22 - 31 mmol/L    Anion Gap 8 5 - 18 mmol/L    Urea Nitrogen 15 8 - 28 mg/dL    Creatinine 0.95 0.60 - 1.10 mg/dL    Calcium 9.3 8.5 - 10.5 mg/dL    Glucose 100 70 - 125 mg/dL    Alkaline Phosphatase 88 45 - 120 U/L    AST 32 0 - 40 U/L    ALT 18 0 - 45 U/L    Protein Total 7.1 6.0 - 8.0 g/dL    Albumin 3.6 3.5 - 5.0 g/dL    Bilirubin Total 1.0 0.0 - 1.0 mg/dL    GFR Estimate 58 (L) >60 mL/min/1.73m2   Lipase    Collection Time: 06/01/22  5:16 PM   Result Value Ref Range    Lipase 29 0 - 52 U/L   B-Type Natriuretic Peptide ([facility] Only)    Collection Time: 06/01/22  5:16 PM   Result Value Ref Range     (H) 0 - 167 pg/mL   Troponin I    Collection Time: 06/01/22  5:16 PM   Result Value Ref Range    Troponin I 0.03 0.00 - 0.29 ng/mL   CBC with platelets and differential    Collection Time: 06/01/22  5:16 PM   Result Value Ref Range    WBC Count 7.0 4.0 - 11.0 10e3/uL    RBC Count 3.80 3.80 - 5.20 10e6/uL    Hemoglobin 12.4 11.7 - 15.7 g/dL    Hematocrit 38.2 35.0 - 47.0 %     (H) 78 - 100 fL    MCH 32.6 26.5 - 33.0 pg    MCHC 32.5 31.5 - 36.5 g/dL    RDW 14.4  10.0 - 15.0 %    Platelet Count 137 (L) 150 - 450 10e3/uL    % Neutrophils 66 %    % Lymphocytes 21 %    % Monocytes 10 %    % Eosinophils 1 %    % Basophils 1 %    % Immature Granulocytes 1 %    NRBCs per 100 WBC 0 <1 /100    Absolute Neutrophils 4.7 1.6 - 8.3 10e3/uL    Absolute Lymphocytes 1.4 0.8 - 5.3 10e3/uL    Absolute Monocytes 0.7 0.0 - 1.3 10e3/uL    Absolute Eosinophils 0.1 0.0 - 0.7 10e3/uL    Absolute Basophils 0.1 0.0 - 0.2 10e3/uL    Absolute Immature Granulocytes 0.0 <=0.4 10e3/uL    Absolute NRBCs 0.0 10e3/uL   Symptomatic; Unknown Influenza A/B & SARS-CoV2 (COVID-19) Virus PCR Multiplex Nasopharyngeal    Collection Time: 06/01/22  5:54 PM    Specimen: Nasopharyngeal; Swab   Result Value Ref Range    Influenza A PCR Negative Negative    Influenza B PCR Negative Negative    SARS CoV2 PCR Negative Negative   Lactic acid whole blood    Collection Time: 06/01/22  6:25 PM   Result Value Ref Range    Lactic Acid 1.0 0.7 - 2.0 mmol/L

## 2022-06-02 NOTE — UTILIZATION REVIEW
Admission Status; Secondary Review Determination   Under the authority of the Utilization Management Committee, the utilization review process indicated a secondary review on Rox D Lucas. The review outcome is based on review of the medical records, discussions with staff, and applying clinical experience noted on the date of the review.   (x) Inpatient Status Appropriate - This patient's medical care is consistent with medical management for inpatient care and reasonable inpatient medical practice.     RATIONALE FOR DETERMINATION   87 yr old female with hx CHF, CAD, bioprosthetic aortic valve, HTN and RA who presented with dyspnea and found to be in rapid Aflutter.  Adenosine given then converted briefly but back in Afib with RVR.  IV diltiazem infusion started.  Initially Observation overnight.  Seen by cardiology today with concern for volume overload/CHF exacerbation related to Afib with RVR with significantly elevated BNP and pulmonary infiltrates on CXR and started on IV diuresis.  Not stable for discharge and crossing second night.    At the time of admission with the information available to the attending physician more than 2 nights Hospital complex care was anticipated, based on patient risk of adverse outcome if treated as outpatient and complex care required. Inpatient admission is appropriate based on the Medicare guidelines.   The information on this document is developed by the utilization review team in order for the business office to ensure compliance. This only denotes the appropriateness of proper admission status and does not reflect the quality of care rendered.   The definitions of Inpatient Status and Observation Status used in making the determination above are those provided in the CMS Coverage Manual, Chapter 1 and Chapter 6, section 70.4.   Sincerely,   Zenia Newsome MD  Utilization Review  Physician Advisor  [facility]

## 2022-06-02 NOTE — PLAN OF CARE
Goal Outcome Evaluation:      Heart Failure Care Map  GOALS TO BE MET BEFORE DISCHARGE:    1. Decrease congestion and/or edema with diuretic therapy to achieve near optimal volume status.     Dyspnea improved: No, further care required to meet this goal. Please explain Pt needs increased O2 with activity.   Edema improved: No, further care required to meet this goal. Please explain BLE have +1 edema.         Net I/O and Weights since admission:   05/03 1500 - 06/02 1459  In: 500   Out: 750 [Urine:750]  Net: -250     Vitals:    06/01/22 1700 06/01/22 2205 06/02/22 0422   Weight: 54.9 kg (121 lb) 57.1 kg (125 lb 14.4 oz) 56.8 kg (125 lb 3.2 oz)       2.  O2 sats > 90% on room air, or at prior home O2 therapy level.      Able to wean O2 this shift to keep sats above 90%?: No, further care required to meet this goal. Please explain pt is on 2L at rest, 3L with activity.   Does patient use Home O2? No          Current oxygenation status:   SpO2: 94 %     O2 Device: Nasal cannula, Oxygen Delivery: 2 LPM    3.  Tolerates ambulation and mobility near baseline.     Ambulation: No, further care required to meet this goal. Please explain Pt has sob with activity   Times patient ambulated with staff this shift: 5    Please review the Heart Failure Care Map for additional HF goal outcomes.    Boby Gray RN  6/2/2022

## 2022-06-02 NOTE — CONSULTS
Thank you,   , for asking the [facility] team to see Ms. Rox Zavala for evaluation of .      Assessment/Recommendations   Assessment/Plan:  1.  Newly diagnosed atrial fibrillation with RVR: The patient has no medical history of for atrial fibrillation or atrial flutter.  She developed shortness of breath, fatigue over last 2 to 3 weeks which could be caused by atrial fibrillation with RVR.  We discussed the further evaluation and management.  Her ventricular rate is well controlled at this time.  Discontinue IV infusion of diltiazem, started diltiazem  mg daily.  Continue metoprolol succinate 50 mg daily.  Echocardiogram limited is requested for evaluation of LV function.  Her [id number] score is high total 6.  The patient has no high risk of falls or bleeding.  She is a candidate for chronic anticoagulation.  Discussed the options of anticoagulation.  After discussion, Eliquis is started for primary prevention of stroke.  Eliquis 2.5 mg twice a day is initiated based on more than 80 years old, less than 60 kg of weight.    2.  Acute on chronic congestive heart failure with preserved ejection fraction: The patient had mild shortness of breath and occasional leg edema in the past day.  Her shortness of breath has been worse.  She developed bilateral leg edema.  Significantly elevated BNP, pulmonary congestion on chest x-ray.  Start IV Lasix 40 mg twice a day.  Monitor her weight, creatinine, electrolytes and symptoms.    3.  Coronary artery disease, status post the LIMA to D1, s/p CHEN to mid LCx and RCA 7/2016: No chest pain.  Troponin is normal.  Based on limited echo findings, and then decide if she needs ischemic evaluation.  Continue aspirin, Imdur, Lipitor and metoprolol XL.     4.  Frequent PVCs: Her ECG and telemetry monitor did not show frequent PVCs at this time.  Continue metoprolol succinate.    5.  Severe aortic valve stenosis, status post the bioprosthetic aortic  valve replacement: Echocardiogram was reported normal functioning of bioprosthetic aortic valve.     6.  Essential hypertension: Her blood pressure is at low side.  Lisinopril 2.5 mg daily is discontinued today..  Continue metoprolol XL 25 mg daily.     7.  Hyperlipidemia: Continue pravastatin 40 mg nightly.  LDL is well controlled.       History of Present Illness/Subjective    It is my pleasure to see Rox Zavala at [facility] for evaluation of shortness of breath and atrial fibrillation. Rox Zavala is a 87 year old female with a medical history of severe aortic valve stenosis status post bioprosthetic aortic valve replacement on Dec15, 2014, one-vessel bypass surgery LIMA to D1, 3 vessel coronary artery disease s/p CHEN to mid LCx and mid RCA on 7-5-2016, essential hypertension, hyperlipidemia, hypothyroidism, frequent PVCs.    The patient states that she developed shortness of breath, fatigue and palpitations over last 2 to 3 weeks, gradually getting worse of her symptoms.  She was brought to the emergency room due to severe shortness of breath.  She denies chest pain, dizziness, orthopnea.  She developed mild bilateral leg edema.  She is not sure if she gains weight.  She has no fever chills or cough.  She has no diarrhea or dysuria.    In the ER, the patient was fine to have atrial fibrillation/flutter with rapid ventricular response.  She responded to IV adenosine for short time.  After that EKG shows atrial fibrillation.  Diltiazem infusion was started.  Her ventricular rate is well controlled with diltiazem 10 mg/h.  Her heart rate is at 60 bpm.  She states that her shortness of breath is not improved yet.    Her troponin is normal.  However, her BNP is significantly elevated.  Chest x-ray indicated pulmonary congestion.       Physical Examination Review of Systems   /53 (BP Location: Left arm)   Pulse 63   Temp 97.4  F (36.3  C) (Oral)   Resp 16   Ht  "1.626 m (5' 4\")   Wt 56.8 kg (125 lb 3.2 oz)   SpO2 94%   BMI 21.49 kg/m    Body mass index is 21.49 kg/m .  Wt Readings from Last 3 Encounters:   06/02/22 56.8 kg (125 lb 3.2 oz)   03/30/22 55.3 kg (122 lb)   03/24/22 54.6 kg (120 lb 6 oz)       Intake/Output Summary (Last 24 hours) at 6/2/2022 0831  Last data filed at 6/2/2022 0829  Gross per 24 hour   Intake 500 ml   Output 550 ml   Net -50 ml       General Appearance:   Awake, Alert, No acute distress.   HEENT:  No scleral icterus; the mucous membranes were moist.   Neck: No cervical bruits. No JVD. No thyromegaly.    Chest: The spine was straight. The chest was symmetric.   Lungs:   Diminished breathing sounds, basilar crackles.  No wheezing.   Cardiovascular:   Irregular rhythm and rate, normal first and second heart sounds with II/VI systolic murmurs at RUSB. No rubs or gallops.    Abdomen:  Soft. No tenderness. Bowels sounds are present   Extremities: Equal tibial pulses. Mild bilateral leg edema.   Skin: No rashes. Warm, Dry.   Musculoskeletal: No tenderness.   Neurologic: Oriented x 3. Mood and affect are appropriate.     A 12 point comprehensive review of systems was negative except as noted.        Medical History  Surgical History Family History Social History   Past Medical History:   Diagnosis Date     Acute diastolic CHF (congestive heart failure) (H) 12/23/2014     Acute renal failure (H)      Anemia      Aortic valve stenosis, severe      Arrhythmia      Arthritis      Coronary artery disease 11/17/2014     Coronary artery disease      Dieulafoy lesion of stomach      Disease of thyroid gland      Essential hypertension 12/8/2015     Gastroesophageal reflux disease      GI bleed 6/1/2021     Hammer toe      Heart murmur      History of blood transfusion      Hyperlipidemia      Hypertension      Hypothyroidism     Created by Conversion Replacement Utility updated for latest IMO load      Irregular heart beat      Macular disorder     \"wrinkle\" "     MCI (mild cognitive impairment) 6/16/2019     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     Pressure injury of buttock, stage 1, unspecified laterality 6/16/2019     Rheumatoid arthritis (H)      S/P AVR 12/15/2014     Sacral insufficiency fracture, initial encounter 5/6/2019     Senile osteoporosis 9/23/2019    Past Surgical History:   Procedure Laterality Date     ANGIOPLASTY / STENTING FEMORAL       AORTIC VALVE REPLACEMENT       CARDIAC SURGERY      CABG     ESOPHAGOSCOPY, GASTROSCOPY, DUODENOSCOPY (EGD), COMBINED N/A 11/16/2021    Procedure: ESOPHAGOGASTRODUODENOSCOPY;  Surgeon: Enrico Galan MD;  Location: [facility] Main OR     EYE SURGERY Bilateral     cataracts     HC REMOVAL GALLBLADDER      Description: Cholecystectomy;  Recorded: 03/20/2008;     NM ESOPHAGOGASTRODUODENOSCOPY TRANSORAL DIAGNOSTIC N/A 6/1/2021    Procedure: ESOPHAGOGASTRODUODENOSCOPY (EGD) with biopsies;  Surgeon: Julio Lopez MD;  Location: [facility];  Service: Gastroenterology     NM ESOPHAGOGASTRODUODENOSCOPY TRANSORAL DIAGNOSTIC N/A 6/6/2021    Procedure: ESOPHAGOGASTRODUODENOSCOPY (EGD) with bicap cauterization;  Surgeon: Julio Lopez MD;  Location: [facility];  Service: Gastroenterology     NM ESOPHAGOGASTRODUODENOSCOPY TRANSORAL DIAGNOSTIC N/A 6/7/2021    Procedure: ESOPHAGOGASTRODUODENOSCOPY (EGD) with hemoclip;  Surgeon: Sam Brock MD;  Location: [facility];  Service: Gastroenterology     [facility] LIGATE FALLOPIAN TUBE      Description: Tubal Ligation;  Recorded: 03/20/2008;     ZZC TOTAL HIP ARTHROPLASTY Right     Description: Total Hip Replacement;  Recorded: 03/20/2008; right    Family History   Problem Relation Age of Onset     Asthma Mother      Coronary Artery Disease No family hx of      Breast Cancer No family hx of     Social History     Socioeconomic History     Marital status:      Spouse name: Not on file     Number of children: Not on file     Years of education: Not on file  "    Highest education level: Not on file   Occupational History     Not on file   Tobacco Use     Smoking status: Former Smoker     Packs/day: 1.00     Years: 30.00     Pack years: 30.00     Types: Cigarettes     Quit date: 1995     Years since quittin.4     Smokeless tobacco: Never Used   Substance and Sexual Activity     Alcohol use: Yes     Alcohol/week: 1.0 standard drink     Drug use: No     Sexual activity: Not on file   Other Topics Concern     Not on file   Social History Narrative    , patient notes she and her  were  for 60 years and he passed away 12 years ago following an illness and complications. Patient notes they had a happy marriage.  Children: Patient notes she had 4 children, one daughter passed away fo llowing a MVA when daughter was 57 years old. Leisa states she is close to her two boys, daughter, grandchildren, and great-grandchildren and finds her family supportive.     Lives in a town home independently.. Now in senior living.    Lives at \" [facility]\", independent living.    Misbah Barone MD  2020           Social Determinants of Health     Financial Resource Strain: Not on file   Food Insecurity: Not on file   Transportation Needs: Not on file   Physical Activity: Not on file   Stress: Not on file   Social Connections: Not on file   Intimate Partner Violence: Not on file   Housing Stability: Not on file          Medications  Allergies   Scheduled Meds:    apixaban ANTICOAGULANT  2.5 mg Oral BID     atorvastatin  40 mg Oral Daily     diltiazem ER COATED BEADS  180 mg Oral Daily     donepezil  10 mg Oral At Bedtime     furosemide  40 mg Intravenous Q12H     isosorbide mononitrate  30 mg Oral Daily     levothyroxine  100 mcg Oral QAM AC     [START ON 6/3/2022] methotrexate sodium  3 tablet Oral Weekly     metoprolol succinate ER  50 mg Oral Daily     pantoprazole  40 mg Oral QAM AC     polyethylene glycol  17 g Oral Daily     Continuous Infusions:  PRN " Meds:.acetaminophen, bisacodyl, docusate sodium, loperamide, melatonin, melatonin, naloxone **OR** naloxone **OR** naloxone **OR** naloxone, ondansetron **OR** ondansetron, ondansetron **OR** ondansetron, traMADol No Known Allergies      Lab Results    Chemistry/lipid CBC Cardiac Enzymes/BNP/TSH/INR   Lab Results   Component Value Date    CHOL 139 08/21/2018    HDL 79 08/21/2018    TRIG 70 08/21/2018    BUN 15 06/01/2022     (L) 06/01/2022    CO2 29 06/01/2022    Lab Results   Component Value Date    WBC 7.0 06/01/2022    HGB 12.4 06/01/2022    HCT 38.2 06/01/2022     (H) 06/01/2022     (L) 06/01/2022    Lab Results   Component Value Date    TROPONINI 0.03 06/01/2022     (H) 06/01/2022    TSH 4.26 04/15/2022    INR 1.25 (H) 02/16/2022

## 2022-06-02 NOTE — PHARMACY-ADMISSION MEDICATION HISTORY
Pharmacy Note - Admission Medication History    Pertinent Provider Information: none     ______________________________________________________________________    Prior To Admission (PTA) med list completed and updated in EMR.       PTA Med List   Medication Sig Last Dose     acetaminophen (TYLENOL) 325 MG tablet Take 3 tablets (975 mg) by mouth 3 times daily as needed for mild pain or fever      atorvastatin (LIPITOR) 40 MG tablet Take 1 tablet (40 mg) by mouth daily For lipids 6/1/2022 at am     calcium carbonate-vitamin D (OYSTER SHELL CALCIUM/D) 500-200 MG-UNIT tablet Take 1 tablet by mouth 2 times daily 6/1/2022 at am     cholecalciferol 50 MCG (2000 UT) tablet Take 1 tablet (50 mcg) by mouth daily 6/1/2022 at am     donepezil (ARICEPT) 10 MG tablet Take 1 tablet (10 mg) by mouth At Bedtime dementia 5/31/2022 at pm     ferrous gluconate (FERGON) 324 (38 Fe) MG tablet Take 1 tablet (324 mg) by mouth daily (with breakfast) 6/1/2022 at am     folic acid (FOLVITE) 1 MG tablet Take 1 mg by mouth daily 6/1/2022 at am     furosemide (LASIX) 40 MG tablet Take 40 mg by mouth daily 6/1/2022 at am     isosorbide mononitrate (IMDUR) 30 MG 24 hr tablet Take 1 tablet (30 mg) by mouth daily 6/1/2022 at am     loperamide (IMODIUM A-D) 2 MG tablet Take 1 tablet (2 mg) by mouth 4 times daily as needed for diarrhea      methotrexate sodium 2.5 MG TABS Take 3 tablets (7.5 mg) by mouth once a week Fridays 5/27/2022     metoprolol succinate ER (TOPROL XL) 50 MG 24 hr tablet Take 50 mg by mouth daily 6/1/2022 at am     Multiple Vitamins-Minerals (PRESERVISION AREDS 2 PO) Take 1 tablet by mouth 2 times daily 6/1/2022 at am     nitroGLYcerin (NITROSTAT) 0.4 MG sublingual tablet Place 0.4 mg under the tongue every 5 minutes as needed for chest pain For chest pain place 1 tablet under the tongue every 5 minutes for 3 doses. If symptoms persist 5 minutes after 1st dose call 911.      omeprazole (PRILOSEC) 40 MG DR capsule Take 1 capsule  (40 mg) by mouth daily 5/31/2022 at pm     polyethylene glycol (MIRALAX) 17 GM/Dose powder Take 17 g by mouth daily To prevent constiaption      SYNTHROID 100 MCG tablet TAKE 1 TABLET EVERY DAY AT 6:00 A.M. 6/1/2022 at 0600     traMADol (ULTRAM) 50 MG tablet Take 0.5 tablets (25 mg) by mouth 2 times daily as needed for severe pain      vitamin C (ASCORBIC ACID) 500 MG tablet Take 1 tablet (500 mg) by mouth daily 5/31/2022 at pm       Information source(s): Family member and CareEverywhere/SureScripts  Method of interview communication: in-person    Summary of Changes to PTA Med List  New: none  Discontinued: none  Changed: metoprolol, lasix    Patient was asked about OTC/herbal products specifically.  PTA med list reflects this.    In the past week, patient estimated taking medication this percent of the time:  greater than 90%.    Allergies were reviewed, assessed, and updated with the patient.      Patient does not use any multi-dose medications prior to admission.    The information provided in this note is only as accurate as the sources available at the time of the update(s).    Thank you for the opportunity to participate in the care of this patient.    Tammy Caba [facility]  6/1/2022 8:18 PM

## 2022-06-02 NOTE — PROGRESS NOTES
[facility]    Medicine Progress Note - Hospitalist Service    Date of Admission:  6/1/2022    Assessment & Plan               Rox COHN Lucas is a 87 year old female admitted on 6/1/2022. She has history of CHF, coronary artery disease, bioprosthetic aortic valve, hypertension, mild dementia, rheumatoid arthritis, osteoporosis and presents for evaluation of dyspnea on exertion and shortness of breath found to be in rapid atrial flutter    #Rapid atrial flutter - [id number] VASC Score is 6.  Patient presented with dyspnea on exertion found to be tachycardic with heart rate in the 120s.  Adenosine given in the ED with brief conversion to sinus however then reverted back to 120s.  EKG showing what appear to be flutter waves.  She does not have a previous diagnosis of A. fib.  Initiated diltiazem drip for rate control now converted to Dilt ER by mouth.  Continue metoprolol succinite  Eliquis started   TSH recently checked and okay    #Dyspnea without hypoxia  #Some degree of acute diastolic heart failure  Patient with small pleural effusion and pulmonary infiltrates, in the setting of recent A. fib with RVR and dyspnea on exertion, suspect she has some flash pulmonary edema related to tachycardia.   - IV lasix started  - follow in and outs    #Hyponatremia  As above suspect she is a little bit volume expanded right now related to flash pulmonary edema  Recheck in the morning.    #HTN  Continue home Lasix, Imdur, metoprolol with hold parameters    #Vascular dementia  Supportive care  Home Aricept    #CAD  Continue home Imdur, metoprolol, atorvastatin    #Status post bioprosthetic aortic valve replacement  #Hypothyroidism, recent TSH checked and okay.  Continue home Synthroid  #Rheumatoid arthritis, continue home methotrexate  #GERD, home PPI         Diet: Regular Diet Adult    DVT Prophylaxis: DOAC  Rogers Catheter: Not present  Central Lines: None  Cardiac Monitoring: ACTIVE order. Indication:  Tachyarrhythmias, acute (48 hours)  Code Status: No CPR- Do NOT Intubate      Disposition Plan   Expected Discharge: 06/03/2022     Anticipated discharge location:  Awaiting care coordination huddle  Delays: resolution of CHF     The patient's care was discussed with the Bedside Nurse and Patient.    Amrit Frausto MD  Hospitalist Service  [facility]  Securely message with the Vocera Web Console (learn more here)  Text page via [employer] Paging/Directory         Clinically Significant Risk Factors Present on Admission                      ______________________________________________________________________    Interval History   Patient feels about the same.  Short of breath and weak.    Data reviewed today: I reviewed all medications, new labs and imaging results over the last 24 hours. I personally reviewed     Physical Exam   Vital Signs: Temp: 97.6  F (36.4  C) Temp src: Oral BP: 120/66 Pulse: 61   Resp: 18 SpO2: 95 % O2 Device: Nasal cannula Oxygen Delivery: 2 LPM  Weight: 125 lbs 3.2 oz  Patient is alert and oriented x3, no apparent distress    Heart is rapid and irregular    Bibasilar crackles    1+ lower extremity edema.    Face symmetrical moves upper lower extremities equally.    Data   Recent Labs   Lab 06/01/22  1716   WBC 7.0   HGB 12.4   *   *   *   POTASSIUM 4.8   CHLORIDE 96*   CO2 29   BUN 15   CR 0.95   ANIONGAP 8   CARLA 9.3      ALBUMIN 3.6   PROTTOTAL 7.1   BILITOTAL 1.0   ALKPHOS 88   ALT 18   AST 32   LIPASE 29     Medications       apixaban ANTICOAGULANT  2.5 mg Oral BID     atorvastatin  40 mg Oral Daily     diltiazem ER COATED BEADS  180 mg Oral Daily     donepezil  10 mg Oral At Bedtime     furosemide  40 mg Intravenous Q12H     isosorbide mononitrate  30 mg Oral Daily     levothyroxine  100 mcg Oral QAM AC     [START ON 6/3/2022] methotrexate sodium  3 tablet Oral Weekly     metoprolol succinate ER  50 mg Oral Daily     pantoprazole  40  mg Oral QAM AC     polyethylene glycol  17 g Oral Daily

## 2022-06-02 NOTE — CONSULTS
"Care Management Initial Consult    General Information  Assessment completed with: Stewart, Billy son via phone  Type of CM/SW Visit: Initial Assessment    Primary Care Provider verified and updated as needed: Yes   Readmission within the last 30 days: no previous admission in last 30 days      Reason for Consult: discharge planning  Advance Care Planning: Advance Care Planning Reviewed: present on chart          Communication Assessment  Patient's communication style: spoken language (English or Bilingual)                        Living Environment:   People in home: alone     Current living Arrangements: independent living facility, apartment ([facility])      Able to return to prior arrangements: other (see comments) (unknown at this time)       Family/Social Support:  Care provided by: self  Provides care for: no one     Children          Description of Support System: Involved, Supportive    Support Assessment: Adequate family and caregiver support, Adequate social supports, Patient communicates needs well met    Current Resources:   Patient receiving home care services: No     Community Resources: Housekeeping/Chore Agency (\"[facility] provides housekeeping help and 3 meals a day\")  Equipment currently used at home: walker, rolling, wheelchair, manual  Supplies currently used at home: Other (\"dentures; BP cuff, Pulse monitor, NO oxygen or oximeter at home\")    Employment/Financial:  Employment Status: retired        Financial Concerns:     Referral to Financial Worker: No       Lifestyle & Psychosocial Needs:  Social Determinants of Health     Tobacco Use: Medium Risk     Smoking Tobacco Use: Former Smoker     Smokeless Tobacco Use: Never Used   Alcohol Use: Not on file   Financial Resource Strain: Not on file   Food Insecurity: Not on file   Transportation Needs: Not on file   Physical Activity: Not on file   Stress: Not on file   Social Connections: Not on file   Intimate " "Partner Violence: Not on file   Depression: Not at risk     PHQ-2 Score: 0   Housing Stability: Not on file       Functional Status:  Prior to admission patient needed assistance:   Dependent ADLs:: Ambulation-walker, Wheelchair-independent  Dependent IADLs:: Shopping, Transportation, Cleaning, Cooking, Laundry, Meal Preparation, Medication Management  Assesssment of Functional Status: Not at baseline with ADL Functioning, Not at  functional baseline, Not at baseline with mobility    Mental Health Status:          Chemical Dependency Status:                Values/Beliefs:  Spiritual, Cultural Beliefs, Tenriism Practices, Values that affect care:                 Additional Information:  Rox lives at [facility] apartments alone. The only services that are provided are: \"housekeeping help every 2 weeks and 3 meals a day\".    She is independent with ADLs and son helps with medication set up, and transportation.    Unknown discharge needs at this time, but likely can return back to current living situation with maybe Home Care help.    Son to transport at discharge.    Fanta Burdick RN      "

## 2022-06-02 NOTE — ED NOTES
Provider at bedside when adenosine administered. Crash cart available outside the room. Patient tolerated well. Became bradycardic and irregular briefly following adenosine which returned to her previous tachycardic rate. Remains on 2LNC O2.

## 2022-06-02 NOTE — PROGRESS NOTES
Occupational Therapy      06/02/22 0930   Quick Adds   Type of Visit Initial Occupational Therapy Evaluation   Living Environment   People in Home alone   Current Living Arrangements other (see comments)  (Independent senior living)   Home Accessibility no concerns   Transportation Anticipated family or friend will provide   Living Environment Comments W/I shower w/ SC/GB   Self-Care   Equipment Currently Used at Home walker, rolling;wheelchair, manual   Fall history within last six months no   Activity/Exercise/Self-Care Comment Ind. w/ all self cares   Instrumental Activities of Daily Living (IADL)   IADL Comments Pt gets all meals from facility, pt son checks out all medications after pt has CAMERON   General Information   Onset of Illness/Injury or Date of Surgery 06/01/22   Referring Physician Cristian Frausto MD   Patient/Family Therapy Goal Statement (OT) none stated   Additional Occupational Profile Info/Pertinent History of Current Problem Presenting w/ dyspnea & shortness of breath found to have Atrial fib w/ RVR, PMHx CHF, CAD, biprosthetic aortic valve, HTN, mild dementia, RA, osteoporosis   Existing Precautions/Restrictions oxygen therapy device and L/min  (Pt currently on 2L of O2)   Range of Motion Comprehensive   General Range of Motion no range of motion deficits identified   Bed Mobility   Bed Mobility supine-sit;sit-supine   Supine-Sit Gilmer (Bed Mobility) supervision;verbal cues   Sit-Supine Gilmer (Bed Mobility) minimum assist (75% patient effort);verbal cues;supervision   Assistive Device (Bed Mobility) bed rails   Transfers   Transfers bed-chair transfer;sit-stand transfer;toilet transfer   Transfer Skill: Bed to Chair/Chair to Bed   Bed-Chair Gilmer (Transfers) contact guard;verbal cues;supervision   Assistive Device (Bed-Chair Transfers) rolling walker   Sit-Stand Transfer   Sit-Stand Gilmer (Transfers) contact guard;supervision;verbal cues   Assistive Device  (Sit-Stand Transfers) walker, front-wheeled   Toilet Transfer   Type (Toilet Transfer) stand-sit;sit-stand   Sharkey Level (Toilet Transfer) contact guard;supervision;verbal cues   Assistive Device (Toilet Transfer) walker, front-wheeled   Clinical Impression   Criteria for Skilled Therapeutic Interventions Met (OT) Yes, treatment indicated   OT Diagnosis Decreased ADL independence/safety   OT Problem List-Impairments impacting ADL problems related to;activity tolerance impaired;balance;strength;mobility   Assessment of Occupational Performance 1-3 Performance Deficits   Identified Performance Deficits trnf/mobility, activity tolerance/endurance, safety, balance   Planned Therapy Interventions (OT) ADL retraining;balance training;cognition;strengthening;transfer training   Clinical Decision Making Complexity (OT) low complexity   Risk & Benefits of therapy have been explained evaluation/treatment results reviewed;patient   OT Discharge Planning   OT Discharge Recommendation (DC Rec) home with assist;Transitional Care Facility   OT Rationale for DC Rec Pt lives in Independent senior living facility w/ assist cleaning 2x month, and is provided all meals, son assists w/ med. management- pt currently on 2L of O2 and is not normally on O2 at home- pending progress w/ mobility/endurance/self care INd. pt may benefit from TCU to improve overall safety/ind.   Therapy Certification   Start of Care Date 06/02/22   Certification date from 06/02/22   Certification date to 06/09/22   Medical Diagnosis Atrial fib w/ RVR   Total Evaluation Time (Minutes)   Total Evaluation Time (Minutes) 5   OT Goals   Therapy Frequency (OT) Daily   OT Predicted Duration/Target Date for Goal Attainment 06/09/22   OT Goals Lower Body Dressing;Bed Mobility;Toilet Transfer/Toileting;Transfers;Cognition;Aerobic Activity   OT: Lower Body Dressing Modified independent;including set-up/clothing retrieval   OT: Bed Mobility Modified  independent;supine to/from sitting   OT: Transfer Modified independent;with assistive device   OT: Toilet Transfer/Toileting Modified independent;using adaptive equipment   OT: Cognitive Patient/caregiver will verbalize understanding of cognitive assessment results/recommendations as needed for safe discharge planning   OT: Perform aerobic activity with stable cardiovascular response continuous activity;10 minutes

## 2022-06-02 NOTE — PROGRESS NOTES
06/02/22 1345   Quick Adds   Type of Visit Initial PT Evaluation   Living Environment   People in Home alone   Current Living Arrangements independent living facility   Living Environment Comments see OT eval,   Self-Care   Usual Activity Tolerance moderate   Current Activity Tolerance fair   Equipment Currently Used at Home other (see comments)  (4WW ,pt has W/C)   Fall history within last six months yes   Number of times patient has fallen within last six months 2   Activity/Exercise/Self-Care Comment pt independent with ADL and mobility,gets assist with ckleaning ,meals.Son helps with meds and transportation   General Information   Onset of Illness/Injury or Date of Surgery 06/01/22   Referring Physician Amrit San   Patient/Family Therapy Goals Statement (PT) return home vs TCU   Pertinent History of Current Problem (include personal factors and/or comorbidities that impact the POC) a-fib with RVA,SOB   Existing Precautions/Restrictions fall;oxygen therapy device and L/min  (2 l)   Weight-Bearing Status - LLE full weight-bearing   Weight-Bearing Status - RLE full weight-bearing   Cognition   Affect/Mental Status (Cognition) WFL   Orientation Status (Cognition) oriented x 4   Cognitive Status Comments coopearive   Pain Assessment   Patient Currently in Pain No   Range of Motion (ROM)   Range of Motion ROM is WFL   Strength (Manual Muscle Testing)   Strength (Manual Muscle Testing) strength is WFL   Bed Mobility   Supine-Sit Laurens (Bed Mobility) modified independence   Assistive Device (Bed Mobility) bed rails   Sit-Stand Transfer   Sit-Stand [place] (Transfers) supervision;verbal cues   Assistive Device (Sit-Stand Transfers) walker, front-wheeled   Comment, (Sit-Stand Transfer) cues for safety   Stand-Sit Transfer   Stand-Sit [place] (Transfers) supervision;verbal cues   Assistive Device (Stand-Sit Transfers) walker, front-wheeled   Comment, (Stand-Sit Transfer) cues for safety    Gait/Stairs (Locomotion)   Motley Level (Gait) contact guard   Assistive Device (Gait) walker, front-wheeled   Distance in Feet (Required for LE Total Joints) 15 feet   Pattern (Gait) step-through   Deviations/Abnormal Patterns (Gait) base of support, narrow;estefani decreased;gait speed decreased   Maintains Weight-bearing Status (Gait) able to maintain   Clinical Impression   Criteria for Skilled Therapeutic Intervention Yes, treatment indicated   PT Diagnosis (PT) impaired functional mobility and activity tolerance   Influenced by the following impairments SOB,O2 supplement   Functional limitations due to impairments bed mobility,activity tolerance,gait   Clinical Presentation (PT Evaluation Complexity) Stable/Uncomplicated   Clinical Presentation Rationale pt presnts as med diagnosed   Clinical Decision Making (Complexity) moderate complexity   Planned Therapy Interventions (PT) bed mobility training;gait training;strengthening;transfer training;progressive activity/exercise   Anticipated Equipment Needs at Discharge (PT) other (see comments)  (4WW)   Risk & Benefits of therapy have been explained evaluation/treatment results reviewed;patient   PT Discharge Planning   PT Discharge Recommendation (DC Rec) home with assist;home with home care physical therapy   PT Rationale for DC Rec patient should be able to return home with previous services and assist from family   PT Brief overview of current status Patient is a 88 yo female admitted with a-fib with RVR and hypoxia.Patient has hx of falls with sacral and pelvic fx in 02/22 and positive for  COVID 03/22.Was at TCU for rehab and discharged home with HHA and home PT.   Total Evaluation Time   Total Evaluation Time (Minutes) 15   Physical Therapy Goals   PT Frequency Daily   PT Predicted Duration/Target Date for Goal Attainment 06/10/22   PT Goals Bed Mobility;Transfers;Gait   PT: Bed Mobility Independent;Supine to/from sit   PT: Transfers Modified  independent;Sit to/from stand;Bed to/from chair;Assistive device   PT: Gait Modified independent;Assistive device;100 feet

## 2022-06-02 NOTE — PROGRESS NOTES
Care Management Follow Up    Length of Stay (days): 0    Expected Discharge Date: 06/03/2022    Concerns to be Addressed:   Cardizem drip.   Patient plan of care discussed at interdisciplinary rounds: Yes  Follow up from rounds/notes:   Stop Cardizem IV. Add Lasix IV every 12 hours.     Anticipated Discharge Disposition:  Home.      Anticipated Discharge Services:  Per therapy, patient should be able to return home with previous services and assist from family.  Open to [facility].   Anticipated Discharge DME:  Per therapy (if indicated).    Patient/family educated on Medicare website which has current facility and service quality ratings:  NA  Education Provided on the Discharge Plan:  Per team  Patient/Family in Agreement with the Plan:  Yes    Referrals Placed by CM/SW:  None  Private pay costs discussed: Not applicable (Kinney reviewed in ED)    Additional Information:  Patient lives at [facility] in their [facility] apartments alone. The only services that are provided are housekeeping help every 2 weeks and 3 meals a day. She is independent with activities of daily living and son helps with medication set up and transportation.  Her goal is to return back to current living but will watch for therapy recommendations.   11:16 AM:  Received a call from La with [facility] ([phone]) who notes that patient's primary care provider is Nora Robledo. Per La, patient's family is very involved. She has also had [facility] in the past. Spoke with [facility] who stated that they did not open to patient as [facility] provides therapy on site. Left a message for [facility]  ([phone]) to clarify.  11:53 AM:  Per La with [facility] stating that patient been open to [facility] which is on-site at [facility].   11:57 AM:  Patient is open to [facility] who can accept upon  "return. Their direct number is [phone], fax number [phone].   3:08 PM:  Per PT notes, \"patient should be able to return home with previous services and assist from family\".     Mona Frazier RN      "

## 2022-06-02 NOTE — PROGRESS NOTES
Pt started on diltiazem drip at 0128 am at 5 mg/hr vitals at this time were stable, HR in the one teens and one twenties Increased to 10 mg/ hr at 0213 and to 15 at 0253 while considering BP and HR. Pt's HR did drop down below 60 and the dose was decreased to 10 mg/hr based on the protocol. At 0725. Reported off the RN Chapo to continue monitoring pt. See strips in the chart.

## 2022-06-03 NOTE — PROGRESS NOTES
[facility]    Medicine Progress Note - Hospitalist Service    Date of Admission:  6/1/2022    A/P    Rox Zavala is a 87 year old female admitted on 6/1/2022. She has history of CHF, coronary artery disease, bioprosthetic aortic valve, hypertension, mild dementia, rheumatoid arthritis, osteoporosis and presents for evaluation of dyspnea on exertion and shortness of breath found to be in rapid atrial flutter which was new and also causing acute diastolic HF     1.Afib with RVR-NEW   -Patient presented with dyspnea on exertion found to be tachycardic with heart rate in the 120s.    -Adenosine given in the ED with brief conversion to sinus however then reverted back to 120s.    -Initiated diltiazem drip for rate control now converted to Dilt ER by mouth.  -Continue metoprolol succinite  -Eliquis started--NEW this admit , CHADS2 was 6--I would agree with DOAC with her amount of dilated atria  -TSH-ok     2.Dyspnea with acute hypoxic resp failure  -Some degree of acute diastolic heart failure  - small pleural effusion and pulmonary infiltrates, in the setting of recent A. fib with RVR and dyspnea on exertion, suspect she has some flash pulmonary edema related to tachycardia.   - IV lasix still  -I/s and O's     3.Hyponatremia  -was volume up  -now on lasix  -Today  improved     4.HTN  -Continue iv Lasix, Imdur, metoprolol with hold parameters    5.Vascular dementia  -Supportive care  -Home Aricept     6.CAD  -Continue home Imdur, metoprolol, atorvastatin     7.Status post bioprosthetic aortic valve replacement  -echo done here this admit     1. Normal left ventricular size and systolic performance with a visually  estimated ejection fraction of 55%.  2. There is mildly abnormal septal motion c/w right ventricular overload;  there is diastolic flattening of the septum with a more normal configuration  at end systole.  3. There is a bio-prosthetic aortic valve (documented 21 mm  Lalitha-Blackwell  Magna bovine pericardial tissue valve).  Â  Normal aortic valve prosthesis metrics with a mean systolic gradient of 6  mmHg and a peak anterograde velocity of 1.7 m/sec.  Â  No aortic insufficiency is detected.  4. There is mild, to perhaps mild-moderate, tricuspid insufficiency.  5. There is moderate right ventricular enlargement with moderately reduced  right ventricular systolic performance.  6. There is severe left atrial enlargement. There is mild to moderate right  atrial enlargement.  7. Right ventricular systolic pressure relative to right atrial pressure is  mildly increased. The pulmonary artery pressure is estimated to be 40-45 mmHg  plus right atrial pressure (the IVC is moderately dilated).    8.Hypothyroidism, recent TSH checked and okay.  Continue home Synthroid    9.Rheumatoid arthritis, continue home methotrexate    10.GERD, home PPI          Diet: Regular Diet Adult    DVT Prophylaxis: DOAC  Rogers Catheter: Not present  Central Lines: None  Cardiac Monitoring: ACTIVE order. Indication: Tachyarrhythmias, acute (48 hours)  Code Status: No CPR- Do NOT Intubate      Disposition Plan   Expected Discharge:days, on iv lasix       The patient's care was discussed with the Care Coordinator/ and Patient. I called son Billy to update at 1249. He asked me to call other son Kwesi at 1306--he notes she had gib a year ago--but ok with DOAC now    Poonam Roca MD  Hospitalist Service  [facility]  Securely message with the Vocera Web Console (learn more here)  Text page via [facility] Paging/Directory         Clinically Significant Risk Factors Present on Admission                 ______________________________________________________________________    Interval History   She still has sob with minimal exertion  No cp  Still feels weak    Data reviewed today: I reviewed all medications, new labs and imaging results over the last 24 hours. I personally  reviewed no images or EKG's today.    Physical Exam   Vital Signs: Temp: 97.6  F (36.4  C) Temp src: Oral BP: 110/57 Pulse: 68   Resp: 24 SpO2: 95 % O2 Device: Nasal cannula Oxygen Delivery: 1 LPM  Weight: 125 lbs 3.2 oz  Constitutional: awake, fatigued, alert, cooperative and no apparent distress  Respiratory: no increased work of breathing, good air exchange, no retractions and diminished breath sounds right base and left base  Cardiovascular: Normal apical impulse, regular rate and rhythm, normal S1 and S2, no S3 or S4, and no murmur noted  GI: normal bowel sounds, soft, non-distended and non-tender  Skin: no bruising or bleeding  Musculoskeletal: no lower extremity pitting edema present  Neurologic: Mental Status Exam:  Level of Alertness:   awake  Neuropsychiatric: General: normal, calm and normal eye contact    Data   Recent Labs   Lab 06/03/22  0440 06/01/22  1716   WBC  --  7.0   HGB  --  12.4   MCV  --  101*   PLT  --  137*    133*   POTASSIUM 4.6 4.8   CHLORIDE 100 96*   CO2 30 29   BUN 19 15   CR 1.01 0.95   ANIONGAP 7 8   CARLA 8.8 9.3   GLC 92 100   ALBUMIN  --  3.6   PROTTOTAL  --  7.1   BILITOTAL  --  1.0   ALKPHOS  --  88   ALT  --  18   AST  --  32   LIPASE  --  29     No results found for this or any previous visit (from the past 24 hour(s)).

## 2022-06-03 NOTE — PLAN OF CARE
Problem: Dysrhythmia  Goal: Normalized Cardiac Rhythm  Intervention: Monitor and Manage Cardiac Rhythm Effect  Recent Flowsheet Documentation  Taken 6/3/2022 0430 by Estrella Reeder RN  VTE Prevention/Management:   SCDs (sequential compression devices) on   compression stockings on     Pt slept most of night. Purewick in place. Pt unable to be weaned off O2 overnight. Robbie stocking placed. Tele on.

## 2022-06-03 NOTE — PROGRESS NOTES
Assessment/Plan:  1.  Newly diagnosed atrial fibrillation with RVR: The patient's ventricular rate is better controlled.    Continue diltiazem  mg daily.  Continue metoprolol succinate 50 mg daily.  Her [id number]-CHE score is high total 6.    Continue Eliquis 2.5 mg twice a day.  Her echocardiogram was reported normal LV size, systolic function, LVEF of 55%, diastolic dysfunction, estimated pulmonary artery systolic pressure 50 mmHg.     2.  Acute on chronic congestive heart failure with preserved ejection fraction, mildly elevated secondary pulmonary hypertension:  The patient feels better.  She had 1.7 L of negative balance yesterday.    Her echocardiogram was reported elevated pulmonary artery systolic pressure 50 mmHg.  Continue IV Lasix 40 mg twice a day.  Electrolytes and renal function are stable this morning.     3.  Coronary artery disease, status post the LIMA to D1, s/p CHEN to mid LCx and RCA 7/2016: No chest pain.  Troponin is normal.    No indication for ischemic evaluation at this time.  Continue aspirin, Imdur, Lipitor and metoprolol XL.     4.  Frequent PVCs: Her ECG and telemetry monitor did not show frequent PVCs at this time.  Continue metoprolol succinate.     5.  Severe aortic valve stenosis, status post the bioprosthetic aortic valve replacement: Echocardiogram was reported normal functioning of bioprosthetic aortic valve.     6.  Essential hypertension: Her blood pressure improved.  Continue metoprolol XL 25 mg daily.      7.  Hyperlipidemia: Continue pravastatin 40 mg nightly.  LDL is well controlled.       Subjective:  Interval History:  Has no complaints of chest pain.  Mildly improved shortness of breath. No palpitations.    Current Medications:  Scheduled Meds:    apixaban ANTICOAGULANT  2.5 mg Oral BID     atorvastatin  40 mg Oral Daily     diltiazem ER COATED BEADS  180 mg Oral Daily     donepezil  10 mg Oral At Bedtime     furosemide  40 mg Intravenous Q12H     isosorbide  mononitrate  30 mg Oral Daily     levothyroxine  100 mcg Oral QAM AC     methotrexate sodium  3 tablet Oral Weekly     metoprolol succinate ER  50 mg Oral Daily     pantoprazole  40 mg Oral QAM AC     polyethylene glycol  17 g Oral Daily     Continuous Infusions:  PRN Meds:.acetaminophen, bisacodyl, docusate sodium, loperamide, melatonin, melatonin, naloxone **OR** naloxone **OR** naloxone **OR** naloxone, ondansetron **OR** ondansetron, ondansetron **OR** ondansetron, traMADol    Objective:   Vital signs in last 24 hours:  Temp:  [97.3  F (36.3  C)-98  F (36.7  C)] 97.5  F (36.4  C)  Pulse:  [60-78] 63  Resp:  [16-20] 16  BP: ()/(54-66) 127/66  SpO2:  [92 %-100 %] 92 %  Weight:   [unfilled]     Physical Exam:  General Appearance:   Awake, Alert, No acute distress.   HEENT:  No scleral icterus; the mucous membranes were moist.   Neck: No cervical bruits. No jugular venous distention   Lungs:   Lungs are clear to auscultation. No crackles. No wheezing.   Cardiovascular:   RRR, normal first and second heart sounds with II/VI systolic murmurs at RUSB. No rubs or gallops.     Abdomen:  Soft. No tenderness. Bowels sounds are present   Extremities: No leg edema. Equal posterior tibial pulsese.   Skin: Warm, Dry. No rashes.   Neurologic: Mood and affect are appropriate. No focal deficits.         Cardiographics:   Report: personally reviewed. .      Tele monitoring -  Atrial fib with better controlled rate    Echocardiogram 6-2-2022:  1. Normal left ventricular size and systolic performance with a visually  estimated ejection fraction of 55%.  2. There is mildly abnormal septal motion c/w right ventricular overload;  there is diastolic flattening of the septum with a more normal configuration  at end systole.  3. There is a bio-prosthetic aortic valve (documented 21 mm Lalitha-Blackwell  Magna bovine pericardial tissue valve).  Â  Normal aortic valve prosthesis metrics with a mean systolic gradient of 6  mmHg and a  peak anterograde velocity of 1.7 m/sec.  Â  No aortic insufficiency is detected.  4. There is mild, to perhaps mild-moderate, tricuspid insufficiency.  5. There is moderate right ventricular enlargement with moderately reduced  right ventricular systolic performance.  6. There is severe left atrial enlargement. There is mild to moderate right  atrial enlargement.  7. Right ventricular systolic pressure relative to right atrial pressure is  mildly increased. The pulmonary artery pressure is estimated to be 40-45 mmHg  plus right atrial pressure (the IVC is moderately dilated).  When compared to the prior real-time echocardiogram dated 21 February 2022,  there has been a mild increase in the pulmonary artery pressure estimate. The  findings are otherwise felt to be fairly similar on both examinations.      Lab Results:   personally reviewed.     Lab Results   Component Value Date     06/03/2022    CO2 30 06/03/2022    BUN 19 06/03/2022     Lab Results   Component Value Date    TROPONINI 0.03 06/01/2022     Lab Results   Component Value Date    WBC 7.0 06/01/2022    HGB 12.4 06/01/2022    HCT 38.2 06/01/2022     06/01/2022     06/01/2022     Lab Results   Component Value Date    CHOL 139 08/21/2018    TRIG 70 08/21/2018    HDL 79 08/21/2018    LDL 36 09/24/2020

## 2022-06-03 NOTE — PLAN OF CARE
Problem: Dysrhythmia  Goal: Normalized Cardiac Rhythm  Outcome: Ongoing, Progressing     Problem: Risk for Delirium  Goal: Optimal Coping  Outcome: Ongoing, Progressing   Goal Outcome Evaluation:        Pt reports having pain on coccyx Pt assessed and found blanchable redness.  Writer applied mepilex over site and encouraged pt to shift from side to side while in bed and eat meals in chair.  Pillow were utilized. Pt otherwise denies pain.   Apap was given and pt was sleeping on re assessment.     Pt is a fib with hr in the 60's    Pt continues to report sob with activity.  Writer was able to wean pt from 2 L NC to 1.5 L NC while at rest.    PT has purewick in place for frequent urination.  Pt is on Lasix IV bid.     Pt was transferred from chair to bed.  Pt reported feeling dizzy, weak.  BP lying in bed was 86/50 with hr in 60's  MD was updated.   MD ordered orthostatics and told to update Cardiology

## 2022-06-03 NOTE — PROGRESS NOTES
Care Management Follow Up    Length of Stay (days): 1    Expected Discharge Date: 06/04/2022    Concerns to be Addressed:   Lasix IV every 12 hours. Supplemental oxygen (2 liters).   Patient plan of care discussed at interdisciplinary rounds: Yes    Anticipated Discharge Disposition:  Home.      Anticipated Discharge Services:  Plan home with [facility].   Anticipated Discharge DME:  Per therapy (if indicated).    Patient/family educated on Medicare website which has current facility and service quality ratings:  NA  Education Provided on the Discharge Plan:  Per team  Patient/Family in Agreement with the Plan:  Yes    Referrals Placed by CM/SW:  None  Private pay costs discussed: Not applicable (Kinney reviewed in ED)    Additional Information:  Patient lives at [facility] in their [facility] apartments alone. The only services that are provided are housekeeping help every 2 weeks and 3 meals a day. She is independent with activities of daily living and son helps with medication set up and transportation. Family is very involved. Patient is open to [facility] who can accept upon return. Their direct number is [phone], fax number [phone]. Therapy agrees with a home discharge.   Her  with [facility] is [facility] ([phone]). Patient's primary care provider is Nora Robledo.     Mona Frazier RN    Addendum  MD update:    Pt's son Kwesi ([phone]) is main contact to discuss patient's care/discharge planning needs.  Home care is recommended and Kwesi agreeable to referrals being sent.    Carlos Buchanan, [facility]    Addendum 2:    Patient is open to [facility] who will resume home care. Was being seen for home PT and OT. Update provided to [facility] and son Kwesi. He states that his brother, Billy, will provide transportation at discharge.     Mona Frazier RN

## 2022-06-04 NOTE — PLAN OF CARE
Goal Outcome Evaluation:    Pt denies pain, alert and oriented. Very pleasant lady resting comfortably in bed.  A-fib HR 60-90's  Purewick in place, pt receiving IV lasix every 12 hours.

## 2022-06-04 NOTE — PROGRESS NOTES
Assessment/Plan:  1.  Newly diagnosed atrial fibrillation with RVR: The patient's ventricular rate was fluctuated yesterday.  But after taking morning medications, her heart rate is well controlled.  Continue diltiazem  mg daily.  Continue metoprolol succinate 50 mg daily.  Her [id number] score is high total 6.    Continue Eliquis 2.5 mg twice a day.  Her echocardiogram was reported normal LV size, systolic function, LVEF of 55%, diastolic dysfunction, estimated pulmonary artery systolic pressure 50 mmHg.  She developed a mild nosebleeding this morning, close to monitor it.    2.  Acute on chronic congestive heart failure with preserved ejection fraction, mildly elevated secondary pulmonary hypertension:  Her echocardiogram was reported elevated pulmonary artery systolic pressure 50 mmHg.  Her shortness of breath is mildly improved.  Leg edema is resolved.  She still complains of shortness of breath with minimal activities.  Discontinued IV Lasix 40 mg twice a day due to slightly creatinine elevation 1.27 which is similar to her baseline.  Start oral Lasix 40 mg twice a day.     3.  Coronary artery disease, status post the LIMA to D1, s/p CHEN to mid LCx and RCA 7/2016: No chest pain.  Troponin is normal.    No indication for ischemic evaluation at this time.  Continue aspirin, Imdur, Lipitor and metoprolol XL.     4.  Frequent PVCs: Her ECG and telemetry monitor did not show frequent PVCs at this time.  Continue metoprolol succinate.     5.  Severe aortic valve stenosis, status post the bioprosthetic aortic valve replacement: Echocardiogram was reported normal functioning of bioprosthetic aortic valve.     6.  Essential hypertension: Her blood pressure is in normal range.  Continue metoprolol XL 25 mg daily.      7.  Hyperlipidemia: Continue pravastatin 40 mg nightly.  LDL is well controlled.     Continue physical therapy.    Subjective:  Interval History:  Has no complaints of chest pain.  Mildly  improved shortness of breath. No palpitations.    Current Medications:  Scheduled Meds:    apixaban ANTICOAGULANT  2.5 mg Oral BID     atorvastatin  40 mg Oral Daily     diltiazem ER COATED BEADS  180 mg Oral Daily     donepezil  10 mg Oral At Bedtime     [Held by provider] furosemide  40 mg Intravenous Q12H     isosorbide mononitrate  30 mg Oral Daily     levothyroxine  100 mcg Oral QAM AC     methotrexate sodium  3 tablet Oral Weekly     metoprolol succinate ER  50 mg Oral Daily     pantoprazole  40 mg Oral QAM AC     polyethylene glycol  17 g Oral Daily     Continuous Infusions:  PRN Meds:.acetaminophen, bisacodyl, docusate sodium, loperamide, melatonin, melatonin, naloxone **OR** naloxone **OR** naloxone **OR** naloxone, ondansetron **OR** ondansetron, ondansetron **OR** ondansetron, traMADol    Objective:   Vital signs in last 24 hours:  Temp:  [97.6  F (36.4  C)-98.2  F (36.8  C)] 98.2  F (36.8  C)  Pulse:  [] 89  Resp:  [18-24] 20  BP: ()/(50-81) 119/81  SpO2:  [88 %-97 %] 88 %  Weight:   [unfilled]     Physical Exam:  General Appearance:   Awake, Alert, No acute distress.   HEENT:  No scleral icterus; the mucous membranes were moist.   Neck: No cervical bruits. No jugular venous distention   Lungs:   Lungs are clear to auscultation. No crackles. No wheezing.   Cardiovascular:   RRR, normal first and second heart sounds with II/VI systolic murmurs at RUSB. No rubs or gallops.     Abdomen:  Soft. No tenderness. Bowels sounds are present   Extremities: No leg edema. Equal posterior tibial pulsese.   Skin: Warm, Dry. No rashes.   Neurologic: Mood and affect are appropriate. No focal deficits.         Cardiographics:   Report: personally reviewed. .      Tele monitoring -  Atrial fib with better controlled rate    Echocardiogram 6-2-2022:  1. Normal left ventricular size and systolic performance with a visually  estimated ejection fraction of 55%.  2. There is mildly abnormal septal motion c/w right  ventricular overload;  there is diastolic flattening of the septum with a more normal configuration  at end systole.  3. There is a bio-prosthetic aortic valve (documented 21 mm Lalitha-Blackwell  Magna bovine pericardial tissue valve).  Â  Normal aortic valve prosthesis metrics with a mean systolic gradient of 6  mmHg and a peak anterograde velocity of 1.7 m/sec.  Â  No aortic insufficiency is detected.  4. There is mild, to perhaps mild-moderate, tricuspid insufficiency.  5. There is moderate right ventricular enlargement with moderately reduced  right ventricular systolic performance.  6. There is severe left atrial enlargement. There is mild to moderate right  atrial enlargement.  7. Right ventricular systolic pressure relative to right atrial pressure is  mildly increased. The pulmonary artery pressure is estimated to be 40-45 mmHg  plus right atrial pressure (the IVC is moderately dilated).  When compared to the prior real-time echocardiogram dated 21 February 2022,  there has been a mild increase in the pulmonary artery pressure estimate. The  findings are otherwise felt to be fairly similar on both examinations.      Lab Results:   personally reviewed.     Lab Results   Component Value Date     06/03/2022    CO2 30 06/03/2022    BUN 19 06/03/2022     Lab Results   Component Value Date    TROPONINI 0.03 06/01/2022     Lab Results   Component Value Date    WBC 7.0 06/01/2022    HGB 12.4 06/01/2022    HCT 38.2 06/01/2022     06/01/2022     06/01/2022     Lab Results   Component Value Date    CHOL 139 08/21/2018    TRIG 70 08/21/2018    HDL 79 08/21/2018    LDL 36 09/24/2020

## 2022-06-04 NOTE — PLAN OF CARE
Problem: Dysrhythmia  Goal: Normalized Cardiac Rhythm  Outcome: Ongoing, Progressing  A-fib RVR, 's at rest. Improved after PO Dilt and Toprol this am, now A-fib/flutter, HR between 60-80's. IV Lasix switched to PO today. Had mild nose bleed this am, now resolved after humidifier added to O2 nc. Remains on 1L nc, hypoxic in 80's on RA. Continue to titrate as able. LS diminished. She reports breathing is better today, but still STINSON.  Cr 1.27   K 4.0  Mg 1.7

## 2022-06-04 NOTE — PROGRESS NOTES
[facility]    Medicine Progress Note - Hospitalist Service    Date of Admission:  6/1/2022    Assessment & Plan             Roxcampos Zavala is a 87 year old female admitted on 6/1/2022. She has history of CHF, coronary artery disease, bioprosthetic aortic valve, hypertension, mild dementia, rheumatoid arthritis, osteoporosis and presents for evaluation of dyspnea on exertion and shortness of breath found to be in rapid atrial flutter which was new and also causing acute diastolic HF     1.Afib with RVR-NEW   -Patient presented with dyspnea on exertion found to be tachycardic with heart rate in the 120s.    -Adenosine given in the ED with brief conversion to sinus however then reverted back to 120s.    -Initiated diltiazem drip for rate control now converted to Dilt ER by mouth.  -Continue metoprolol succinite  -Eliquis started--NEW this admit , CHADS2 was 6--I would agree with DOAC with her amount of dilated atria--I talked to sons about risks/benefits DOAC  -TSH-ok  -as of 6/4/22 am had some RVR this am then improved with po meds     2.Dyspnea with acute hypoxic resp failure  -Some degree of acute diastolic heart failure  - small pleural effusion and pulmonary infiltrates, in the setting of recent A. fib with RVR and dyspnea on exertion, suspect she has some flash pulmonary edema related to tachycardia.   - IV lasix -->to po now ans has some Ricki  -I/s and O's     3.Hyponatremia--resolved  -was volume up  -now on lasix--now po  -Today  improved     4.HTN  -Continue  Lasix--switch po, Imdur, metoprolol with hold parameters    5.Vascular dementia  -Supportive care  -Home Aricept     6.CAD  -Continue home Imdur, metoprolol, atorvastatin     7.Status post bioprosthetic aortic valve replacement  -echo done here this admit     1. Normal left ventricular size and systolic performance with a visually  estimated ejection fraction of 55%.  2. There is mildly abnormal septal motion c/w right  ventricular overload;  there is diastolic flattening of the septum with a more normal configuration  at end systole.  3. There is a bio-prosthetic aortic valve (documented 21 mm Lalitha-Blackwell  Magna bovine pericardial tissue valve).  Â  Normal aortic valve prosthesis metrics with a mean systolic gradient of 6  mmHg and a peak anterograde velocity of 1.7 m/sec.  Â  No aortic insufficiency is detected.  4. There is mild, to perhaps mild-moderate, tricuspid insufficiency.  5. There is moderate right ventricular enlargement with moderately reduced  right ventricular systolic performance.  6. There is severe left atrial enlargement. There is mild to moderate right  atrial enlargement.  7. Right ventricular systolic pressure relative to right atrial pressure is  mildly increased. The pulmonary artery pressure is estimated to be 40-45 mmHg  plus right atrial pressure (the IVC is moderately dilated).     8.Hypothyroidism, recent TSH checked and okay.  Continue home Synthroid     9.Rheumatoid arthritis, continue home methotrexate     10.GERD, home PPI    11.JOSIAH  -mild, was on iv lasix, now po  -trend creat    12.Hypomag  -replace per protocol           Diet: Regular Diet Adult    DVT Prophylaxis: DOAC  Rogers Catheter: Not present  Central Lines: None  Cardiac Monitoring: ACTIVE order. Indication: Acute decompensated heart failure (48 hours)  Code Status: No CPR- Do NOT Intubate      Disposition Plan   Expected Discharge: 06/06/2022     Anticipated discharge location:  Awaiting care coordination huddle  Delays:   days        The patient's care was discussed with the Care Coordinator/ and Patient. I called son Kwesi at [phone]    Poonam Roca MD  Hospitalist Service  [facility]  Securely message with the Vocera Web Console (learn more here)  Text page via [facility] Paging/Directory         ______________________________________________________________________    Interval History   She  feels better this am  When I saw her 's and she had no idea  Denied cp or sob at rest  Not dizzy this am    Data reviewed today: I reviewed all medications, new labs and imaging results over the last 24 hours. I personally reviewed no images or EKG's today.    Physical Exam   Vital Signs: Temp: 97.9  F (36.6  C) Temp src: Oral BP: 110/59 Pulse: 68   Resp: 20 SpO2: 91 % O2 Device: Nasal cannula Oxygen Delivery: 1 LPM  Weight: 126 lbs 1.6 oz  Constitutional: awake, fatigued, alert, cooperative and no apparent distress  Eyes: sclera clear  Respiratory: no increased work of breathing, good air exchange, no retractions and clear to auscultation  Cardiovascular: irregularly irregular rhythm  GI: normal bowel sounds, soft, non-distended and non-tender  Skin: no bruising or bleeding  Musculoskeletal: no lower extremity pitting edema present  Neurologic: Mental Status Exam:  Level of Alertness:   awake  Neuropsychiatric: General: normal, calm and normal eye contact    Data   Recent Labs   Lab 06/04/22  0456 06/03/22  0440 06/01/22  1716   WBC  --   --  7.0   HGB  --   --  12.4   MCV  --   --  101*   PLT  --   --  137*    137 133*   POTASSIUM 4.0 4.6 4.8   CHLORIDE 100 100 96*   CO2 32* 30 29   BUN 24 19 15   CR 1.27* 1.01 0.95   ANIONGAP 9 7 8   CARLA 8.6 8.8 9.3   GLC 95 92 100   ALBUMIN  --   --  3.6   PROTTOTAL  --   --  7.1   BILITOTAL  --   --  1.0   ALKPHOS  --   --  88   ALT  --   --  18   AST  --   --  32   LIPASE  --   --  29     No results found for this or any previous visit (from the past 24 hour(s)).

## 2022-06-05 NOTE — PROGRESS NOTES
Assessment/Plan:  1.  Newly diagnosed atrial fibrillation with RVR: The patient's ventricular rate was controlled.  Continue diltiazem  mg daily.  Continue metoprolol succinate 50 mg daily.  Her [id number] score is high total 6.    Continue Eliquis 2.5 mg twice a day.  Her echocardiogram was reported normal LV size, systolic function, LVEF of 55%, diastolic dysfunction, estimated pulmonary artery systolic pressure 50 mmHg.    2.  Acute on chronic congestive heart failure with preserved ejection fraction, mildly elevated secondary pulmonary hypertension:  Her echocardiogram was reported elevated pulmonary artery systolic pressure 50 mmHg.  Her shortness of breath is improved.  Leg edema is resolved.  She still complains of shortness of breath with minimal activities.  She also has crackles in both bases.  Creatinine is improved today.  Give additional IV Lasix 40 mg x 1, continue oral Lasix 40 mg twice a day.Continue physical therapy.    3.  Coronary artery disease, status post the LIMA to D1, s/p CHEN to mid LCx and RCA 7/2016: No chest pain.  Troponin is normal.    No indication for ischemic evaluation at this time.  Continue aspirin, Imdur, Lipitor and metoprolol XL.    4.  Frequent PVCs: Her ECG and telemetry monitor did not show frequent PVCs at this time.  Continue metoprolol succinate.    5.  Severe aortic valve stenosis, status post the bioprosthetic aortic valve replacement: Echocardiogram was reported normal functioning of bioprosthetic aortic valve.    6.  Essential hypertension: Her blood pressure is in normal range.  Continue metoprolol XL 25 mg daily.    Hyperlipidemia: Continue pravastatin 40 mg nightly.  LDL is well controlled.     Recommend TCU when she is ready discharged.    Subjective:  Interval History:  Has no complaints of chest pain.  Mildly improved shortness of breath. No palpitations.    Current Medications:  Scheduled Meds:    apixaban ANTICOAGULANT  2.5 mg Oral BID      atorvastatin  40 mg Oral Daily     diltiazem ER COATED BEADS  180 mg Oral Daily     donepezil  10 mg Oral At Bedtime     furosemide  40 mg Oral BID     isosorbide mononitrate  30 mg Oral Daily     levothyroxine  100 mcg Oral QAM AC     magnesium oxide  400 mg Oral BID     methotrexate sodium  3 tablet Oral Weekly     metoprolol succinate ER  50 mg Oral Daily     pantoprazole  40 mg Oral QAM AC     polyethylene glycol  17 g Oral Daily     Continuous Infusions:  PRN Meds:.acetaminophen, bisacodyl, docusate sodium, loperamide, melatonin, melatonin, naloxone **OR** naloxone **OR** naloxone **OR** naloxone, ondansetron **OR** ondansetron, ondansetron **OR** ondansetron, traMADol    Objective:   Vital signs in last 24 hours:  Temp:  [97.5  F (36.4  C)-98.7  F (37.1  C)] 98.1  F (36.7  C)  Pulse:  [67-94] 94  Resp:  [18-20] 20  BP: (104-138)/(55-78) 137/68  SpO2:  [88 %-98 %] 93 %  Weight:   [unfilled]     Physical Exam:  General Appearance:   Awake, Alert, No acute distress.   HEENT:  No scleral icterus; the mucous membranes were moist.   Neck: No cervical bruits. No jugular venous distention   Lungs:   Bibasilar crackles, improved. No wheezing.   Cardiovascular:   RRR, normal first and second heart sounds with II/VI systolic murmurs at RUSB. No rubs or gallops.     Abdomen:  Soft. No tenderness. Bowels sounds are present   Extremities: No leg edema. Equal posterior tibial pulsese.   Skin: Warm, Dry. No rashes.   Neurologic: Mood and affect are appropriate. No focal deficits.         Cardiographics:   Report: personally reviewed. .      Tele monitoring -  Atrial fib with controlled rate    Echocardiogram 6-2-2022:  1. Normal left ventricular size and systolic performance with a visually  estimated ejection fraction of 55%.  2. There is mildly abnormal septal motion c/w right ventricular overload;  there is diastolic flattening of the septum with a more normal configuration  at end systole.  3. There is a bio-prosthetic  aortic valve (documented 21 mm Lalitha-Blackwell  Magna bovine pericardial tissue valve).  Â  Normal aortic valve prosthesis metrics with a mean systolic gradient of 6  mmHg and a peak anterograde velocity of 1.7 m/sec.  Â  No aortic insufficiency is detected.  4. There is mild, to perhaps mild-moderate, tricuspid insufficiency.  5. There is moderate right ventricular enlargement with moderately reduced  right ventricular systolic performance.  6. There is severe left atrial enlargement. There is mild to moderate right  atrial enlargement.  7. Right ventricular systolic pressure relative to right atrial pressure is  mildly increased. The pulmonary artery pressure is estimated to be 40-45 mmHg  plus right atrial pressure (the IVC is moderately dilated).  When compared to the prior real-time echocardiogram dated 21 February 2022,  there has been a mild increase in the pulmonary artery pressure estimate. The  findings are otherwise felt to be fairly similar on both examinations.      Lab Results:   personally reviewed.     Lab Results   Component Value Date     06/03/2022    CO2 30 06/03/2022    BUN 19 06/03/2022     Lab Results   Component Value Date    TROPONINI 0.03 06/01/2022     Lab Results   Component Value Date    WBC 7.0 06/01/2022    HGB 12.4 06/01/2022    HCT 38.2 06/01/2022     06/01/2022     06/01/2022     Lab Results   Component Value Date    CHOL 139 08/21/2018    TRIG 70 08/21/2018    HDL 79 08/21/2018    LDL 36 09/24/2020

## 2022-06-05 NOTE — PLAN OF CARE
Heart Failure Care Map  GOALS TO BE MET BEFORE DISCHARGE:    1. Decrease congestion and/or edema with diuretic therapy to achieve near optimal volume status.     Dyspnea improved: Dyspnea on exertion, unchanged.   Edema improved: Resolved.        Net I/O and Weights since admission:   05/06 1500 - 06/05 1459  In: 3500 [P.O.:3000]  Out: 6300 [Urine:6300]  Net: -2800     Vitals:    06/01/22 1700 06/01/22 2205 06/02/22 0422 06/04/22 0322   Weight: 54.9 kg (121 lb) 57.1 kg (125 lb 14.4 oz) 56.8 kg (125 lb 3.2 oz) 57.2 kg (126 lb 1.6 oz)    06/05/22 0404   Weight: 54.9 kg (121 lb)       2.  O2 sats > 90% on room air, or at prior home O2 therapy level.      Able to wean O2 this shift to keep sats above 90%? Humidified oxygen at 2L.   Does patient use Home O2? No per patient, although has history of dementia.         Current oxygenation status:   SpO2: 90 %     O2 Device: Nasal cannula, Oxygen Delivery: 2 LPM    3.  Tolerates ambulation and mobility near baseline.     Ambulation: Walked with PT.   Times patient ambulated with staff this shift: 2    Please review the Heart Failure Care Map for additional HF goal outcomes.    Herminia Hernandez RN  6/5/2022

## 2022-06-05 NOTE — PLAN OF CARE
Problem: Sleep Disordered Breathing (Heart Failure)  Goal: Effective Breathing Pattern During Sleep  Outcome: Ongoing, Not Progressing   Goal Outcome Evaluation:        Patient continues on 2 L/min nasal cannula, unable to wean further over night.        Heart Failure Care Map  GOALS TO BE MET BEFORE DISCHARGE:    1. Decrease congestion and/or edema with diuretic therapy to achieve near optimal volume status.     Dyspnea improved: Yes, satisfactory for discharge.   Edema improved: Patient has trace amount of edema in feet.        Net I/O and Weights since admission:   05/06 0700 - 06/05 0659  In: 3140 [P.O.:2640]  Out: 6100 [Urine:6100]  Net: -2960     Vitals:    06/01/22 1700 06/01/22 2205 06/02/22 0422 06/04/22 0322   Weight: 54.9 kg (121 lb) 57.1 kg (125 lb 14.4 oz) 56.8 kg (125 lb 3.2 oz) 57.2 kg (126 lb 1.6 oz)    06/05/22 0404   Weight: 54.9 kg (121 lb)       2.  O2 sats > 90% on room air, or at prior home O2 therapy level.      Able to wean O2 this shift to keep sats above 90%?: No, further care required to meet this goal. Please explain patient continues to be on 2 L/min nasal cannula   Does patient use Home O2? No          Current oxygenation status:   SpO2: 92 %     O2 Device: Nasal cannula, Oxygen Delivery: 2 LPM    3.  Tolerates ambulation and mobility near baseline.     Ambulation: No, further care required to meet this goal. Please explain patient did not ambulate with staff over night.  When patient stool on standing scale, appeared weaker than previous day.   Times patient ambulated with staff this shift: 0    Please review the Heart Failure Care Map for additional HF goal outcomes.    Belia Brown RN  6/5/2022      Patient denied pain over night, and slept between cares.  Continues to have supplemental oxygen at 2 L/min nasal cannula.  Colace given for constipation.  External catheter in place to measure output.  Patient took sips of water with medication, otherwise she declined to drink more  water.  Will continue to monitor.

## 2022-06-05 NOTE — PROGRESS NOTES
[facility]    Medicine Progress Note - Hospitalist Service    Date of Admission:  6/1/2022    Assessment & Plan            Roxcampos Zavala is a 87 year old female admitted on 6/1/2022. She has history of CHF, coronary artery disease, bioprosthetic aortic valve, hypertension, mild dementia, rheumatoid arthritis, osteoporosis and presents for evaluation of dyspnea on exertion and shortness of breath found to be in rapid atrial flutter which was new and also causing acute diastolic HF     1.Afib with RVR-NEW this admit  -Patient presented with dyspnea on exertion found to be tachycardic   -Adenosine given in the ED with brief conversion to sinus however then reverted back to 120s.    -Initiated diltiazem drip for rate control now converted to Dilt ER by mouth.  -Continue metoprolol succinite  -Eliquis started--NEW this admit , CHADS2 was 6--I would agree with DOAC with her amount of dilated atria--I talked to sons about risks/benefits DOAC  -TSH-ok  -as of 6/4/22 am had some RVR this am then improved with po meds  -on 6/5/22 HR improved and bp more stable     2.Dyspnea with acute hypoxic resp failure  -Some degree of acute diastolic heart failure  - small pleural effusion and pulmonary infiltrates, in the setting of recent A. fib with RVR and dyspnea on exertion, suspect she has some flash pulmonary edema related to tachycardia.   - IV lasix -->to po now ans has some Ricki  -I/s and O's  -wean O2     3.Hyponatremia--resolved  -was volume up  -now on lasix--now po  -Today  improved     4.HTN  -Continue  Lasix--switch po, Imdur, metoprolol with hold parameters    5.Vascular dementia  -Supportive care  -Home Aricept     6.CAD  -Continue home Imdur, metoprolol, atorvastatin     7.Status post bioprosthetic aortic valve replacement  -echo done here this admit     1. Normal left ventricular size and systolic performance with a visually  estimated ejection fraction of 55%.  2. There is mildly  abnormal septal motion c/w right ventricular overload;  there is diastolic flattening of the septum with a more normal configuration  at end systole.  3. There is a bio-prosthetic aortic valve (documented 21 mm Lalitha-Blackwell  Magna bovine pericardial tissue valve).  Â  Normal aortic valve prosthesis metrics with a mean systolic gradient of 6  mmHg and a peak anterograde velocity of 1.7 m/sec.  Â  No aortic insufficiency is detected.  4. There is mild, to perhaps mild-moderate, tricuspid insufficiency.  5. There is moderate right ventricular enlargement with moderately reduced  right ventricular systolic performance.  6. There is severe left atrial enlargement. There is mild to moderate right  atrial enlargement.  7. Right ventricular systolic pressure relative to right atrial pressure is  mildly increased. The pulmonary artery pressure is estimated to be 40-45 mmHg  plus right atrial pressure (the IVC is moderately dilated).     8.Hypothyroidism, recent TSH checked and okay.  Continue home Synthroid     9.Rheumatoid arthritis, continue home methotrexate     10.GERD, home PPI     11.JOSIAH  -mild, was on iv lasix, now po  -trend creat today improved to 0.96, peak elevation was 1.27     12.Hypomag  -replaced per protocol, today good              Diet: Regular Diet Adult    DVT Prophylaxis: DOAC  Rogers Catheter: Not present  Central Lines: None  Cardiac Monitoring: ACTIVE order. Indication: Acute decompensated heart failure (48 hours)  Code Status: No CPR- Do NOT Intubate      Disposition Plan   Expected Discharge: 06/06/2022     Anticipated discharge location:  Awaiting care coordination huddle  Delays:   1-2 days I suspect will need TCU        The patient's care was discussed with the Care Coordinator/ and Patient. Called [facility] [place] to update at 1701--left message    Poonam Roca MD  Hospitalist Service  [facility]  Securely message with the Vocera Web Console (learn more  here)  Text page via [facility] Paging/Directory           ______________________________________________________________________    Interval History   She feels better  No cp  No sob at rest  However, admits fatigue  Still on O2    Data reviewed today: I reviewed all medications, new labs and imaging results over the last 24 hours. I personally reviewed no images or EKG's today.    Physical Exam   Vital Signs: Temp: 97.6  F (36.4  C) Temp src: Oral BP: 129/59 Pulse: 77   Resp: 20 SpO2: 90 % O2 Device: Nasal cannula Oxygen Delivery: 2 LPM  Weight: 121 lbs 0 oz  Constitutional: awake, fatigued, alert, cooperative and no apparent distress  Respiratory: no increased work of breathing, good air exchange, no retractions and diminished breath sounds right base and left base  Cardiovascular: irregularly irregular rhythm  GI: normal bowel sounds, soft and non-distended  Skin: no bruising or bleeding  Musculoskeletal: no lower extremity pitting edema present  Neurologic: Mental Status Exam:  Level of Alertness:   awake  Neuropsychiatric: General: normal, calm and normal eye contact    Data   Recent Labs   Lab 06/05/22  0500 06/04/22  0456 06/03/22  0440 06/01/22  1716   WBC  --   --   --  7.0   HGB  --   --   --  12.4   MCV  --   --   --  101*   PLT  --   --   --  137*    141 137 133*   POTASSIUM 3.9 4.0 4.6 4.8   CHLORIDE 98 100 100 96*   CO2 35* 32* 30 29   BUN 21 24 19 15   CR 0.96 1.27* 1.01 0.95   ANIONGAP 8 9 7 8   CARLA 8.4* 8.6 8.8 9.3   GLC 97 95 92 100   ALBUMIN  --   --   --  3.6   PROTTOTAL  --   --   --  7.1   BILITOTAL  --   --   --  1.0   ALKPHOS  --   --   --  88   ALT  --   --   --  18   AST  --   --   --  32   LIPASE  --   --   --  29     No results found for this or any previous visit (from the past 24 hour(s)).

## 2022-06-05 NOTE — PLAN OF CARE
Problem: Dysrhythmia  Goal: Normalized Cardiac Rhythm  6/4/2022 2243 by Alisia Nicholson RN  Outcome: Ongoing, Progressing  6/4/2022 2242 by Alisia Nihcolson RN  Outcome: Ongoing, Progressing     Problem: Adjustment to Illness (Heart Failure)  Goal: Optimal Coping  Outcome: Ongoing, Progressing     Problem: Fluid Imbalance (Heart Failure)  Goal: Fluid Balance  Outcome: Ongoing, Progressing     Problem: Functional Ability Impaired (Heart Failure)  Goal: Optimal Functional Ability  Outcome: Ongoing, Progressing  Intervention: Optimize Functional Ability  Recent Flowsheet Documentation  Taken 6/4/2022 2145 by Alisia Nicholson RN  Activity Management: activity adjusted per tolerance  Taken 6/4/2022 1600 by Alisia Nicholson RN  Activity Management: up in chair   Goal Outcome Evaluation:      Heart Failure Care Map  GOALS TO BE MET BEFORE DISCHARGE:    1. Decrease congestion and/or edema with diuretic therapy to achieve near optimal volume status.     Dyspnea improved: No, pt still has SOB with activity   Edema improved: Yes, satisfactory for discharge.        Net I/O and Weights since admission:   05/05 2300 - 06/04 2259  In: 3140 [P.O.:2640]  Out: 5300 [Urine:5300]  Net: -2160     Vitals:    06/01/22 1700 06/01/22 2205 06/02/22 0422 06/04/22 0322   Weight: 54.9 kg (121 lb) 57.1 kg (125 lb 14.4 oz) 56.8 kg (125 lb 3.2 oz) 57.2 kg (126 lb 1.6 oz)       2.  O2 sats > 90% on room air, or at prior home O2 therapy level.      Able to wean O2 this shift to keep sats above 90%?: No, further care required to meet this goal. Please explain SpO2 dropped to 87 to 89% on O2 at 1 LPM per nasal cannula , increased to 2 LPM   Does patient use Home O2? No          Current oxygenation status:   SpO2: 95 %     O2 Device: Nasal cannula, Oxygen Delivery: 2 LPM    3.  Tolerates ambulation and mobility near baseline.     Ambulation: No, further care required to meet this goal. Please explain Pt feels  tired   Times patient ambulated with staff this shift: 0    Pt is alert and oriented x 4, denies pain. Mg is 1.7, replaced  MG 2 G IV, recheck in AM. Removed compression stocking at HS.Will continue to monitor.      Please review the Heart Failure Care Map for additional HF goal outcomes.    Alisia Nicholson, ARIE  6/4/2022

## 2022-06-05 NOTE — PROGRESS NOTES
"Care Management Follow Up    Length of Stay (days): 3    Expected Discharge Date: 06/06/2022 or 6/7/2022    Concerns to be Addressed:   Monitoring labs (creatinine trending up, holding lasix), A fib with RVR (treatment seems to be dropping BP). Unable to wean off oxygen.    Patient plan of care discussed at interdisciplinary rounds: Yes    Anticipated Discharge Disposition:  Transitional care (TCU), vs home with home care, is recommended for continued therapy and skilled nursing.     Anticipated Discharge Services:  Continued therapy and skilled nursing.   Anticipated Discharge DME:  Per therapy (if indicated).    Patient/family educated on Medicare website which has current facility and service quality ratings:  NA  Education Provided on the Discharge Plan:  Per team  Patient/Family in Agreement with the Plan:  Yes    Referrals Placed by CM/SW:  None  Private pay costs discussed: Not applicable (Kinney reviewed in ED)    Additional Information:  Patient lives at [facility] in their [facility] apartments alone. The only services that are provided are housekeeping help every 2 weeks and 3 meals a day. She is independent with activities of daily living and son helps with medication set up and transportation. Family is very involved. Patient is open to [facility] for home PT and OT can accept upon return. If nursing is added, they will make a referral to their sister agency. Their direct number is [phone], fax number [phone]. Therapy agrees with a home discharge. An update was provided to [facility] and son Kwesi on 6/3/2022. Kwesi stated that his brother, Billy, will provide transportation at discharge.   Her  with [facility] is [place] ([phone]). Patient's primary care provider is Nora Robledo.   12:13 PM;  TCU is recommended. Met with patient who agreed, stating, \"Well, I know I can't go home like this!\" She is feeling too weak and notes " that she lives alone. She defers to her son Kwesi to pick a facility. Writer spoke with Kwesi who is requesting a referral to [facility]. Patient has [facility]. Initial referral made to admissions by telephone as our fax has been down today but an electronic referral was also made to their in-basket.     Mona Frazier RN

## 2022-06-06 NOTE — PROGRESS NOTES
Care Management Follow Up    Length of Stay (days): 4    Expected Discharge Date: 06/07/2022     Concerns to be Addressed:     Discharge planning  Patient plan of care discussed at interdisciplinary rounds: Yes    Anticipated Discharge Disposition:  Transitional care     Anticipated Discharge Services:  Nursing PT and OT  Anticipated Discharge DME:  Walker, wheelchair    Patient/family educated on Medicare website which has current facility and service quality ratings:    Education Provided on the Discharge Plan:  yes  Patient/Family in Agreement with the Plan:  yes    Referrals Placed by CM/KRISTINA:    Private pay costs discussed: Not applicable    Additional Information:  CM Chart reviewed. SW spoke to patient's son Kwesi [phone] to provide update on discharge planning. Patient has [facility] and will need additional TCU choices that accept [facility]. Provided son with list. Referrals sent to [facility] (message left) and [facility].  [facility] currently declining due to high acuity, son is aware.     Patient is from [facility].           BEATRIZ Watters

## 2022-06-06 NOTE — PLAN OF CARE
Problem: Dysrhythmia  Goal: Normalized Cardiac Rhythm  Outcome: Ongoing, Progressing     Problem: Adjustment to Illness (Heart Failure)  Goal: Optimal Coping  Outcome: Ongoing, Progressing     Problem: Cardiac Output Decreased (Heart Failure)  Goal: Optimal Cardiac Output  Outcome: Ongoing, Progressing     Problem: Functional Ability Impaired (Heart Failure)  Goal: Optimal Functional Ability  Intervention: Optimize Functional Ability  Recent Flowsheet Documentation  Taken 6/5/2022 2130 by Alisia Nicholson RN  Activity Management: activity adjusted per tolerance  Taken 6/5/2022 1645 by Alisia Nicholson RN  Activity Management: activity adjusted per tolerance   Goal Outcome Evaluation:      Heart Failure Care Map  GOALS TO BE MET BEFORE DISCHARGE:    1. Decrease congestion and/or edema with diuretic therapy to achieve near optimal volume status.     Dyspnea improved: No, further care required to meet this goal. Please explain Has SOB with activity   Edema improved: Yes, satisfactory for discharge.        Net I/O and Weights since admission:   05/06 2300 - 06/05 2259  In: 4180 [P.O.:3680]  Out: 6600 [Urine:6600]  Net: -2420     Vitals:    06/01/22 1700 06/01/22 2205 06/02/22 0422 06/04/22 0322   Weight: 54.9 kg (121 lb) 57.1 kg (125 lb 14.4 oz) 56.8 kg (125 lb 3.2 oz) 57.2 kg (126 lb 1.6 oz)    06/05/22 0404   Weight: 54.9 kg (121 lb)       2.  O2 sats > 90% on room air, or at prior home O2 therapy level.      Able to wean O2 this shift to keep sats above 90%?: No, further care required to meet this goal. Please explain on O2 at 2 LPM per nasal cannula   Does patient use Home O2? No          Current oxygenation status:   SpO2: 92 %     O2 Device: Nasal cannula, Oxygen Delivery: 2 LPM    3.  Tolerates ambulation and mobility near baseline.     Ambulation: Yes, satisfactory for discharge.   Times patient ambulated with staff this shift: 1    Pt was sleeping soundly during the night.Slight nose bleeding  around 0530 H.Lung sounds diminished, on O2 at 2LPM per nasal cannula, SpO2 ranges 90 to 96 %.    Please review the Heart Failure Care Map for additional HF goal outcomes.    Alisia Nicholson RN  6/5/2022

## 2022-06-06 NOTE — PLAN OF CARE
"SHIFT 0700 - 1530    Goal Outcome Evaluation:    Plan of Care Reviewed With: patient     Assessment:    Neuro:  A&Ox4, no deficits noted.  Bilateral hearing loss - hearing aids in placed.  Cardiac:  Aflutter/Afib controlled rate.  Denies CP.  Respiratory:  On 2L of O2 via NC.  R LS w/ wheezes, high-pitched.  L LS clear, diminished.  GI/:  External catheter in placed.  No breakdown of skin or redness.  BM 6/5.  Diet/appetite: Tolerating PO intake with fair appetite.  Activity:  Assist x1 via walker & gait belt.  Up to chair.  Pain:  Denied pain.  Skin:  Redness blanchable on sacrum/coccyx.  Bruising on upper arm/forearms.  No new deficits.  LDA's:  L PIV SL.    /61 (BP Location: Left arm)   Pulse 71   Temp 97.6  F (36.4  C) (Oral)   Resp 20   Ht 1.626 m (5' 4\")   Wt 55.6 kg (122 lb 9.6 oz)   SpO2 94%   BMI 21.04 kg/m      Heart Failure Care Map  GOALS TO BE MET BEFORE DISCHARGE:    1. Decrease congestion and/or edema with diuretic therapy to achieve near optimal volume status.     Dyspnea improved: No, further care required to meet this goal. Please explain   Pt w/ dyspnea on exertion.     Edema improved: Yes, improving.        Net I/O and Weights since admission:   05/07 1500 - 06/06 1459  In: 4540 [P.O.:4040]  Out: 7400 [Urine:7400]  Net: -2860     Vitals:    06/01/22 1700 06/01/22 2205 06/02/22 0422 06/04/22 0322   Weight: 54.9 kg (121 lb) 57.1 kg (125 lb 14.4 oz) 56.8 kg (125 lb 3.2 oz) 57.2 kg (126 lb 1.6 oz)    06/05/22 0404 06/06/22 0355   Weight: 54.9 kg (121 lb) 55.6 kg (122 lb 9.6 oz)       2.  O2 sats > 90% on room air, or at prior home O2 therapy level.      Able to wean O2 this shift to keep sats above 90%?: No, further care required to meet this goal. Please explain   Pt requiring 2L of O2 to remain >90% sats.   Does patient use Home O2? No          Current oxygenation status:   SpO2: 94 %     O2 Device: Nasal cannula, Oxygen Delivery: 2 LPM    3.  Tolerates ambulation and mobility near " baseline.     Ambulation: No, further care required to meet this goal. Please explain   Pt up w/ PT.   Times patient ambulated with staff this shift: 0; up on chair.    Please review the Heart Failure Care Map for additional HF goal outcomes.    Manan Finch RN  6/6/2022

## 2022-06-06 NOTE — PROGRESS NOTES
HEART CARE NOTE          Assessment/Recommendations     1. HFpEF c/b ADHF  Assessment / Plan  Near euvolemia on physical exam; denies HF symptoms; no changes to regimen at this time  BP adequately controlled on current regimen; no changes at this time    2. Atrial fibrillation with RVR  Assessment / Plan  Rate controlled; [id number] score is high total 6  Continue apixaban and metoprolol  Will will need follow-up with afib clinic    3. CAD - stable  Assessment / Plan  Denies chest pain or anginal equivalents     4. Valvular heart disease  Assessment / Plan  Severe aortic stenosis; s/p bioprosthestic AVR; adequate function on most recent echo    5. HLP  Assessment / Plan  Continue pravastatin    History of Present Illness/Subjective    Ms. Rox Zavala is a 87 year old female with a PMHx significant for CHF, coronary artery disease, bioprosthetic aortic valve, hypertension, mild dementia, rheumatoid arthritis, osteoporosis and presents for evaluation of dyspnea on exertion and shortness of breath found to be in rapid atrial flutter    Today, Mrs. Zavala denies any acute cardiac events or complaints; Management plan as detailed above    ECG: Personally reviewed. atrial fibrillation, RBBB, occasional PVC noted, unifocal.    ECHO (personnaly Reviewed):     1. Normal left ventricular size and systolic performance with a visually  estimated ejection fraction of 55%.  2. There is mildly abnormal septal motion c/w right ventricular overload;  there is diastolic flattening of the septum with a more normal configuration  at end systole.  3. There is a bio-prosthetic aortic valve (documented 21 mm Lalitha-Blackwell  Magna bovine pericardial tissue valve).  Â  Normal aortic valve prosthesis metrics with a mean systolic gradient of 6  mmHg and a peak anterograde velocity of 1.7 m/sec.  Â  No aortic insufficiency is detected.  4. There is mild, to perhaps mild-moderate, tricuspid insufficiency.  5. There is moderate  "right ventricular enlargement with moderately reduced  right ventricular systolic performance.  6. There is severe left atrial enlargement. There is mild to moderate right  atrial enlargement.  7. Right ventricular systolic pressure relative to right atrial pressure is  mildly increased. The pulmonary artery pressure is estimated to be 40-45 mmHg  plus right atrial pressure (the IVC is moderately dilated).     When compared to the prior real-time echocardiogram dated 21 February 2022,  there has been a mild increase in the pulmonary artery pressure estimate. The  findings are otherwise felt to be fairly similar on both examinations.        Physical Examination Review of Systems   BP (!) 143/74 (BP Location: Right arm)   Pulse 83   Temp 97.6  F (36.4  C) (Oral)   Resp 20   Ht 1.626 m (5' 4\")   Wt 55.6 kg (122 lb 9.6 oz)   SpO2 93%   BMI 21.04 kg/m    Body mass index is 21.04 kg/m .  Wt Readings from Last 3 Encounters:   06/06/22 55.6 kg (122 lb 9.6 oz)   03/30/22 55.3 kg (122 lb)   03/24/22 54.6 kg (120 lb 6 oz)     General Appearance:   no distress, normal body habitus   ENT/Mouth: membranes moist, no oral lesions or bleeding gums.      EYES:  no scleral icterus, normal conjunctivae   Neck: no carotid bruits or thyromegaly   Chest/Lungs:   lungs are clear to auscultation, no rales or wheezing, equal chest wall expansion    Cardiovascular:   Irregular. Normal first and second heart sounds with no murmurs, rubs, or gallops; the carotid, radial and posterior tibial pulses are intact, no JVD or LE edema bilaterally    Abdomen:  no organomegaly, masses, bruits, or tenderness; bowel sounds are present   Extremities: no cyanosis or clubbing   Skin: no xanthelasma, warm.    Neurologic: alert and calm      Psychiatric: alert and calm     A complete 10 systems ROS was reviewed  And is negative except what is listed in the HPI.          Medical History  Surgical History Family History Social History   Past Medical " "History:   Diagnosis Date     Acute diastolic CHF (congestive heart failure) (H) 12/23/2014     Acute renal failure (H)      Anemia      Aortic valve stenosis, severe      Arrhythmia      Arthritis      Coronary artery disease 11/17/2014     Coronary artery disease      Dieulafoy lesion of stomach      Disease of thyroid gland      Essential hypertension 12/8/2015     Gastroesophageal reflux disease      GI bleed 6/1/2021     Hammer toe      Heart murmur      History of blood transfusion      Hyperlipidemia      Hypertension      Hypothyroidism     Created by [employer]version [employer] updated for latest IMO load      Irregular heart beat      Macular disorder     \"wrinkle\"     MCI (mild cognitive impairment) 6/16/2019     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     Pressure injury of buttock, stage 1, unspecified laterality 6/16/2019     Rheumatoid arthritis (H)      S/P AVR 12/15/2014     Sacral insufficiency fracture, initial encounter 5/6/2019     Senile osteoporosis 9/23/2019    Past Surgical History:   Procedure Laterality Date     ANGIOPLASTY / STENTING FEMORAL       AORTIC VALVE REPLACEMENT       CARDIAC SURGERY      CABG     ESOPHAGOSCOPY, GASTROSCOPY, DUODENOSCOPY (EGD), COMBINED N/A 11/16/2021    Procedure: ESOPHAGOGASTRODUODENOSCOPY;  Surgeon: Enrico Galan MD;  Location: [facility] Main OR     EYE SURGERY Bilateral     cataracts     HC REMOVAL GALLBLADDER      Description: Cholecystectomy;  Recorded: 03/20/2008;     IN ESOPHAGOGASTRODUODENOSCOPY TRANSORAL DIAGNOSTIC N/A 6/1/2021    Procedure: ESOPHAGOGASTRODUODENOSCOPY (EGD) with biopsies;  Surgeon: Julio Lopez MD;  Location: [facility];  Service: Gastroenterology     IN ESOPHAGOGASTRODUODENOSCOPY TRANSORAL DIAGNOSTIC N/A 6/6/2021    Procedure: ESOPHAGOGASTRODUODENOSCOPY (EGD) with bicap cauterization;  Surgeon: Julio Lopez MD;  Location: [facility];  Service: Gastroenterology     IN ESOPHAGOGASTRODUODENOSCOPY " "TRANSORAL DIAGNOSTIC N/A 2021    Procedure: ESOPHAGOGASTRODUODENOSCOPY (EGD) with hemoclip;  Surgeon: Sam Brock MD;  Location: [facility];  Service: Gastroenterology     [facility] LIGATE FALLOPIAN TUBE      Description: Tubal Ligation;  Recorded: 2008;     [facility] TOTAL HIP ARTHROPLASTY Right     Description: Total Hip Replacement;  Recorded: 2008; right    no family history of premature coronary artery disease Social History     Socioeconomic History     Marital status:      Spouse name: Not on file     Number of children: Not on file     Years of education: Not on file     Highest education level: Not on file   Occupational History     Not on file   Tobacco Use     Smoking status: Former Smoker     Packs/day: 1.00     Years: 30.00     Pack years: 30.00     Types: Cigarettes     Quit date: 1995     Years since quittin.4     Smokeless tobacco: Never Used   Substance and Sexual Activity     Alcohol use: Yes     Alcohol/week: 1.0 standard drink     Drug use: No     Sexual activity: Not on file   Other Topics Concern     Not on file   Social History Narrative    , patient notes she and her  were  for 60 years and he passed away 12 years ago following an illness and complications. Patient notes they had a happy marriage.  Children: Patient notes she had 4 children, one daughter passed away fo llowing a MVA when daughter was 57 years old. Leisa states she is close to her two boys, daughter, grandchildren, and great-grandchildren and finds her family supportive.     Lives in a town home independently.. Now in senior living.    Lives at \" [facility]\", independent living.    Misbah Barone MD  2020           Social Determinants of Health     Financial Resource Strain: Not on file   Food Insecurity: Not on file   Transportation Needs: Not on file   Physical Activity: Not on file   Stress: Not on file   Social Connections: Not on file   Intimate Partner Violence: Not on " file   Housing Stability: Not on file           Lab Results    Chemistry/lipid CBC Cardiac Enzymes/BNP/TSH/INR   Lab Results   Component Value Date    CHOL 139 08/21/2018    HDL 79 08/21/2018    TRIG 70 08/21/2018    BUN 20 06/06/2022     06/06/2022    CO2 37 (H) 06/06/2022    Lab Results   Component Value Date    WBC 7.0 06/01/2022    HGB 12.4 06/01/2022    HCT 38.2 06/01/2022     (H) 06/01/2022     (L) 06/01/2022    Lab Results   Component Value Date    TROPONINI 0.03 06/01/2022     (H) 06/01/2022    TSH 4.26 04/15/2022    INR 1.25 (H) 02/16/2022     Lab Results   Component Value Date    TROPONINI 0.03 06/01/2022          Weight:    Wt Readings from Last 3 Encounters:   06/06/22 55.6 kg (122 lb 9.6 oz)   03/30/22 55.3 kg (122 lb)   03/24/22 54.6 kg (120 lb 6 oz)       Allergies  No Known Allergies      Surgical History  Past Surgical History:   Procedure Laterality Date     ANGIOPLASTY / STENTING FEMORAL       AORTIC VALVE REPLACEMENT       CARDIAC SURGERY      CABG     ESOPHAGOSCOPY, GASTROSCOPY, DUODENOSCOPY (EGD), COMBINED N/A 11/16/2021    Procedure: ESOPHAGOGASTRODUODENOSCOPY;  Surgeon: Enrico Galan MD;  Location: [facility] Main OR     EYE SURGERY Bilateral     cataracts     [facility] REMOVAL GALLBLADDER      Description: Cholecystectomy;  Recorded: 03/20/2008;     [facility] ESOPHAGOGASTRODUODENOSCOPY TRANSORAL DIAGNOSTIC N/A 6/1/2021    Procedure: ESOPHAGOGASTRODUODENOSCOPY (EGD) with biopsies;  Surgeon: Julio Lopez MD;  Location: [facility];  Service: Gastroenterology     [facility] ESOPHAGOGASTRODUODENOSCOPY TRANSORAL DIAGNOSTIC N/A 6/6/2021    Procedure: ESOPHAGOGASTRODUODENOSCOPY (EGD) with bicap cauterization;  Surgeon: Julio Lopez MD;  Location: [facility];  Service: Gastroenterology     [facility] ESOPHAGOGASTRODUODENOSCOPY TRANSORAL DIAGNOSTIC N/A 6/7/2021    Procedure: ESOPHAGOGASTRODUODENOSCOPY (EGD) with hemoclip;  Surgeon: Sam Brock MD;  Location: [facility];  Service:  Gastroenterology     [facility] LIGATE FALLOPIAN TUBE      Description: Tubal Ligation;  Recorded: 03/20/2008;     [facility] TOTAL HIP ARTHROPLASTY Right     Description: Total Hip Replacement;  Recorded: 03/20/2008; right       Social History  Tobacco:   History   Smoking Status     Former Smoker     Packs/day: 1.00     Years: 30.00     Types: Cigarettes     Quit date: 1/1/1995   Smokeless Tobacco     Never Used    Alcohol:   Social History    Substance and Sexual Activity      Alcohol use: Yes        Alcohol/week: 1.0 standard drink   Illicit Drugs:   History   Drug Use No       Family History  Family History   Problem Relation Age of Onset     Asthma Mother      Coronary Artery Disease No family hx of      Breast Cancer No family hx of           Kirk Slaughter MD on 6/6/2022      cc: Nora Robledo

## 2022-06-06 NOTE — PROGRESS NOTES
[facility]    Medicine Progress Note - Hospitalist Service    Date of Admission:  6/1/2022    Assessment & Plan          Roxcampos Zavala is a 87 year old female admitted on 6/1/2022. She has history of CHF, coronary artery disease, bioprosthetic aortic valve, hypertension, mild dementia, rheumatoid arthritis, osteoporosis and presents for evaluation of dyspnea on exertion and shortness of breath found to be in rapid atrial flutter which was new and also causing acute diastolic HF     1.Afib with RVR-NEW this admit  -Patient presented with dyspnea on exertion found to be tachycardic   -Adenosine given in the ED with brief conversion to sinus however then reverted back to 120s.    -Initiated diltiazem drip for rate control now converted to Dilt ER by mouth.  -Continue metoprolol succinite  -Eliquis started--NEW this admit , CHADS2 was 6--I would agree with DOAC with her amount of dilated atria--I talked to sons about risks/benefits DOAC  -TSH-ok  -as of 6/4/22 am had some RVR in  am then improved with po meds  -on 6/5/22 HR improved and bp more stable     2.Dyspnea with acute hypoxic resp failure  -Some degree of acute diastolic heart failure  - small pleural effusion and pulmonary infiltrates, in the setting of recent A. fib with RVR and dyspnea on exertion, suspect she has some flash pulmonary edema related to tachycardia.   - IV lasix -->to po now and had some JOSIAH  -I/s and O's  -wean O2--still on it     3.Hyponatremia--resolved  -was volume up  -now on lasix--now po  -Today  improved     4.HTN  -Continue  Lasix--switch po, Imdur, metoprolol with hold parameters    5.Vascular dementia  -Supportive care  -Home Aricept     6.CAD  -Continue home Imdur, metoprolol, atorvastatin     7.Status post bioprosthetic aortic valve replacement  -echo done here this admit     1. Normal left ventricular size and systolic performance with a visually  estimated ejection fraction of 55%.  2. There is  mildly abnormal septal motion c/w right ventricular overload;  there is diastolic flattening of the septum with a more normal configuration  at end systole.  3. There is a bio-prosthetic aortic valve (documented 21 mm Lalitha-Blackwell  Magna bovine pericardial tissue valve).  Â  Normal aortic valve prosthesis metrics with a mean systolic gradient of 6  mmHg and a peak anterograde velocity of 1.7 m/sec.  Â  No aortic insufficiency is detected.  4. There is mild, to perhaps mild-moderate, tricuspid insufficiency.  5. There is moderate right ventricular enlargement with moderately reduced  right ventricular systolic performance.  6. There is severe left atrial enlargement. There is mild to moderate right  atrial enlargement.  7. Right ventricular systolic pressure relative to right atrial pressure is  mildly increased. The pulmonary artery pressure is estimated to be 40-45 mmHg  plus right atrial pressure (the IVC is moderately dilated).     8.Hypothyroidism, recent TSH checked and okay.  Continue home Synthroid     9.Rheumatoid arthritis, continue home methotrexate     10.GERD, home PPI     11.JOSIAH  -mild, was on iv lasix, now po  -trend creat today improved to 0.88, peak elevation was 1.27  -per cards lasix now 40 mg bid po     12.Hypomag  -replaced per protocol    13.Fatigue, deconditioning  -pt/ot and would benefit from TCU                 Diet: Regular Diet Adult    DVT Prophylaxis: DOAC  Rogers Catheter: Not present  Central Lines: None  Cardiac Monitoring: ACTIVE order. Indication: Acute decompensated heart failure (48 hours)  Code Status: No CPR- Do NOT Intubate      Disposition Plan   Expected Discharge: 06/07/2022     Anticipated discharge location:  Awaiting care coordination huddle  Delays:   TCU        The patient's care was discussed with the Care Coordinator/ and Patient. I called son Kwesi at [phone]--no answer, then called Billy to update at 1536--I did reach him    Poonam Roca,  MD  Hospitalist Service  [facility]  Securely message with the [employer] Web Console (learn more here)  Text page via [employer] Paging/Directory         Clinically Significant Risk Factors Present on Admission                    ______________________________________________________________________    Interval History   She feels ok  No sob  No cp  Still fatigue  Eating ok  Understands she needs TCU    Data reviewed today: I reviewed all medications, new labs and imaging results over the last 24 hours. I personally reviewed no images or EKG's today.    Physical Exam   Vital Signs: Temp: 97.6  F (36.4  C) Temp src: Oral BP: (!) 143/74 Pulse: 83   Resp: 20 SpO2: 93 % O2 Device: Nasal cannula Oxygen Delivery: 2 LPM  Weight: 122 lbs 9.6 oz  Constitutional: awake, fatigued, alert, cooperative and no apparent distress  Respiratory: No increased work of breathing, good air exchange, clear to auscultation bilaterally, no crackles or wheezing  Cardiovascular: irregularly irregular rhythm  GI: normal bowel sounds, soft and non-distended  Skin: no bruising or bleeding  Musculoskeletal: no lower extremity pitting edema present  Neurologic: Mental Status Exam:  Level of Alertness:   awake  Neuropsychiatric: General: normal, calm and normal eye contact    Data   Recent Labs   Lab 06/06/22  0459 06/05/22  0500 06/04/22  0456 06/03/22  0440 06/01/22  1716   WBC  --   --   --   --  7.0   HGB  --   --   --   --  12.4   MCV  --   --   --   --  101*   PLT  --   --   --   --  137*    141 141   < > 133*   POTASSIUM 3.9 3.9 4.0   < > 4.8   CHLORIDE 95* 98 100   < > 96*   CO2 37* 35* 32*   < > 29   BUN 20 21 24   < > 15   CR 0.88 0.96 1.27*   < > 0.95   ANIONGAP 7 8 9   < > 8   CARLA 8.6 8.4* 8.6   < > 9.3   GLC 93 97 95   < > 100   ALBUMIN  --   --   --   --  3.6   PROTTOTAL  --   --   --   --  7.1   BILITOTAL  --   --   --   --  1.0   ALKPHOS  --   --   --   --  88   ALT  --   --   --   --  18   AST  --   --   --    --  32   LIPASE  --   --   --   --  29    < > = values in this interval not displayed.     No results found for this or any previous visit (from the past 24 hour(s)).

## 2022-06-07 NOTE — PROGRESS NOTES
[facility]    Medicine Progress Note - Hospitalist Service    Date of Admission:  6/1/2022    Assessment & Plan          Roxcampos Zavala is a 87 year old female admitted on 6/1/2022. She has history of CHF, coronary artery disease, bioprosthetic aortic valve, hypertension, mild dementia, rheumatoid arthritis, osteoporosis and presents for evaluation of dyspnea on exertion and shortness of breath found to be in rapid atrial flutter which was new and also causing acute diastolic HF     1.Afib with RVR-NEW this admit  -Patient presented with dyspnea on exertion found to be tachycardic   -Adenosine given in the ED with brief conversion to sinus however then reverted back to 120s.    -Initiated diltiazem drip for rate control now converted to Dilt ER by mouth.  -Continue metoprolol succinite  -Eliquis started--NEW this admit , CHADS2 was 6--I would agree with DOAC with her amount of dilated atria--I talked to sons about risks/benefits DOAC  -TSH-ok  -as of 6/4/22 am had some RVR in  am then improved with po meds  -on 6/5/22 HR improved and bp more stable  -now looking for TCU     2.Dyspnea with acute hypoxic resp failure  -Some degree of acute diastolic heart failure  - small pleural effusion and pulmonary infiltrates, in the setting of recent A. fib with RVR and dyspnea on exertion, suspect she has some flash pulmonary edema related to tachycardia.   - IV lasix -->to lasix 40 mg po bid per cards  -I/s and O's  -wean O2--still on it     3.Hyponatremia--resolved  -was volume up  -now on lasix--now po  -Today  improved     4.HTN  -Continue Lasix--switch po, Imdur, metoprolol with hold parameters    5.Vascular dementia  -Supportive care  -Home Aricept  -was living alone, with family support     6.CAD  -Continue home Imdur, metoprolol, atorvastatin     7.Status post bioprosthetic aortic valve replacement  -echo done here this admit     1. Normal left ventricular size and systolic performance  with a visually  estimated ejection fraction of 55%.  2. There is mildly abnormal septal motion c/w right ventricular overload;  there is diastolic flattening of the septum with a more normal configuration  at end systole.  3. There is a bio-prosthetic aortic valve (documented 21 mm Lalitha-Blackwell  Magna bovine pericardial tissue valve).  Â  Normal aortic valve prosthesis metrics with a mean systolic gradient of 6  mmHg and a peak anterograde velocity of 1.7 m/sec.  Â  No aortic insufficiency is detected.  4. There is mild, to perhaps mild-moderate, tricuspid insufficiency.  5. There is moderate right ventricular enlargement with moderately reduced  right ventricular systolic performance.  6. There is severe left atrial enlargement. There is mild to moderate right  atrial enlargement.  7. Right ventricular systolic pressure relative to right atrial pressure is  mildly increased. The pulmonary artery pressure is estimated to be 40-45 mmHg  plus right atrial pressure (the IVC is moderately dilated).     8.Hypothyroidism, recent TSH checked and okay.  Continue home Synthroid     9.Rheumatoid arthritis, continue home methotrexate     10.GERD, home PPI     11.JOSIAH--onset after diurese   -mild, was on iv lasix, now po  -trend creat today improved to 0.91, peak elevation was 1.27  -per cards lasix now 40 mg po bid     12.Hypomag  -replaced per protocol     13.Fatigue, deconditioning  -pt/ot and would benefit from TCU  -will add on wbc, no fevers           Diet: Regular Diet Adult    DVT Prophylaxis: DOAC  Rogers Catheter: Not present  Central Lines: None  Cardiac Monitoring: ACTIVE order. Indication: Tachyarrhythmias, acute (48 hours)  Code Status: No CPR- Do NOT Intubate      Disposition Plan   Expected Discharge: 06/08/2022     Anticipated discharge location:  Awaiting care coordination huddle  Delays:   tomorrow , tcu        The patient's care was discussed with the Care Coordinator/ and Patient. I called  son Kwesi to update at 1701    Poonam Roca MD  Hospitalist Service  [facility]  Securely message with the [employer] Web Console (learn more here)  Text page via [employer] Paging/Directory         Clinically Significant Risk Factors Present on Admission                    ______________________________________________________________________    Interval History   She feels tired today  She denied palpitations or cp  Eating ok  Some constipation    Data reviewed today: I reviewed all medications, new labs and imaging results over the last 24 hours. I personally reviewed no images or EKG's today.    Physical Exam   Vital Signs: Temp: 97.7  F (36.5  C) Temp src: Oral BP: 119/70 Pulse: 77   Resp: 20 SpO2: 95 % O2 Device: Nasal cannula Oxygen Delivery: 2 LPM  Weight: 118 lbs 4.8 oz  Constitutional: awake, fatigued, alert and cooperative  Respiratory: no increased work of breathing, good air exchange, no retractions and diminished breath sounds right base and left base  Cardiovascular: irregularly irregular rhythm  GI: normal bowel sounds, soft and non-distended  Skin: no bruising or bleeding  Musculoskeletal: no lower extremity pitting edema present  Neurologic: Mental Status Exam:  Level of Alertness:   awake  Neuropsychiatric: General: normal, calm and normal eye contact    Data   Recent Labs   Lab 06/07/22  0450 06/06/22  0459 06/05/22  0500 06/03/22  0440 06/01/22  1716   WBC  --   --   --   --  7.0   HGB  --   --   --   --  12.4   MCV  --   --   --   --  101*   PLT  --   --   --   --  137*    139 141   < > 133*   POTASSIUM 4.0 3.9 3.9   < > 4.8   CHLORIDE 91* 95* 98   < > 96*   CO2 42* 37* 35*   < > 29   BUN 20 20 21   < > 15   CR 0.91 0.88 0.96   < > 0.95   ANIONGAP 7 7 8   < > 8   CARLA 9.1 8.6 8.4*   < > 9.3   GLC 99 93 97   < > 100   ALBUMIN  --   --   --   --  3.6   PROTTOTAL  --   --   --   --  7.1   BILITOTAL  --   --   --   --  1.0   ALKPHOS  --   --   --   --  88   ALT  --   --    --   --  18   AST  --   --   --   --  32   LIPASE  --   --   --   --  29    < > = values in this interval not displayed.     No results found for this or any previous visit (from the past 24 hour(s)).

## 2022-06-07 NOTE — PLAN OF CARE
Goal Outcome Evaluation:  Problem: Plan of Care - These are the overarching goals to be used throughout the patient stay.    Goal: Plan of Care Review/Shift Note  Description: The Plan of Care Review/Shift note should be completed every shift.  The Outcome Evaluation is a brief statement about your assessment that the patient is improving, declining, or no change.  This information will be displayed automatically on your shift note.  Outcome: Ongoing, Progressing      Problem: Plan of Care - These are the overarching goals to be used throughout the patient stay.    Goal: Optimal Comfort and Wellbeing  Outcome: Ongoing, Progressing    Pt alert and oriented but forgetful. Bed alarm is on  Purewick in place with 850 ml output overnight.  A-fib HR 70-80's

## 2022-06-07 NOTE — PROGRESS NOTES
HEART CARE NOTE          Assessment/Recommendations     1. HFpEF c/b ADHF  Assessment / Plan  Near euvolemia on physical exam; denies HF symptoms; no changes to regimen at this time  BP adequately controlled on current regimen; no changes at this time     2. Atrial fibrillation with RVR  Assessment / Plan  Rate controlled; [id number] score is high total 6  Continue apixaban and metoprolol  Will will need follow-up with afib clinic     3. CAD - stable  Assessment / Plan  Denies chest pain or anginal equivalents      4. Valvular heart disease  Assessment / Plan  Severe aortic stenosis; s/p bioprosthestic AVR; adequate function on most recent echo     5. HLP  Assessment / Plan  Continue pravastatin    Cardiology team will sign-off for now. Please do not hesitate to consult us again if new questions or concerns arise. Follow-up appointment will be arranged by CORE/HF clinic.       History of Present Illness/Subjective      Ms. Rox Zavala is a 87 year old female with a PMHx significant for CHF, coronary artery disease, bioprosthetic aortic valve, hypertension, mild dementia, rheumatoid arthritis, osteoporosis and presents for evaluation of dyspnea on exertion and shortness of breath found to be in rapid atrial flutter     Today, Mrs. Zavala denies any acute cardiac events or complaints; Management plan as detailed above     ECG: Personally reviewed. atrial fibrillation, RBBB, occasional PVC noted, unifocal.     ECHO (personnaly Reviewed):     1. Normal left ventricular size and systolic performance with a visually  estimated ejection fraction of 55%.  2. There is mildly abnormal septal motion c/w right ventricular overload;  there is diastolic flattening of the septum with a more normal configuration  at end systole.  3. There is a bio-prosthetic aortic valve (documented 21 mm Lalitha-Blackwell  Magna bovine pericardial tissue valve).  Â  Normal aortic valve prosthesis metrics with a mean systolic gradient of  "6  mmHg and a peak anterograde velocity of 1.7 m/sec.  Â  No aortic insufficiency is detected.  4. There is mild, to perhaps mild-moderate, tricuspid insufficiency.  5. There is moderate right ventricular enlargement with moderately reduced  right ventricular systolic performance.  6. There is severe left atrial enlargement. There is mild to moderate right  atrial enlargement.  7. Right ventricular systolic pressure relative to right atrial pressure is  mildly increased. The pulmonary artery pressure is estimated to be 40-45 mmHg  plus right atrial pressure (the IVC is moderately dilated).     When compared to the prior real-time echocardiogram dated 21 February 2022,  there has been a mild increase in the pulmonary artery pressure estimate. The  findings are otherwise felt to be fairly similar on both examinations.          Physical Examination Review of Systems   /69 (BP Location: Right arm)   Pulse 78   Temp 97.7  F (36.5  C) (Oral)   Resp 18   Ht 1.626 m (5' 4\")   Wt 55.6 kg (122 lb 9.6 oz)   SpO2 95%   BMI 21.04 kg/m    Body mass index is 21.04 kg/m .  Wt Readings from Last 3 Encounters:   06/06/22 55.6 kg (122 lb 9.6 oz)   03/30/22 55.3 kg (122 lb)   03/24/22 54.6 kg (120 lb 6 oz)     General Appearance:   no distress, normal body habitus   ENT/Mouth: membranes moist, no oral lesions or bleeding gums.      EYES:  no scleral icterus, normal conjunctivae   Neck: no carotid bruits or thyromegaly   Chest/Lungs:   lungs are clear to auscultation, no rales or wheezing, equal chest wall expansion    Cardiovascular:   Regular. Normal first and second heart sounds with no murmurs, rubs, or gallops; the carotid, radial and posterior tibial pulses are intact, no JVD or LE edema bilaterally    Abdomen:  no organomegaly, masses, bruits, or tenderness; bowel sounds are present   Extremities: no cyanosis or clubbing   Skin: no xanthelasma, warm.    Neurologic: alert and oriented x3, calm     Psychiatric: alert " "and oriented x3, calm     A complete 10 systems ROS was reviewed  And is negative except what is listed in the HPI.          Medical History  Surgical History Family History Social History   Past Medical History:   Diagnosis Date     Acute diastolic CHF (congestive heart failure) (H) 12/23/2014     Acute renal failure (H)      Anemia      Aortic valve stenosis, severe      Arrhythmia      Arthritis      Coronary artery disease 11/17/2014     Coronary artery disease      Dieulafoy lesion of stomach      Disease of thyroid gland      Essential hypertension 12/8/2015     Gastroesophageal reflux disease      GI bleed 6/1/2021     Hammer toe      Heart murmur      History of blood transfusion      Hyperlipidemia      Hypertension      Hypothyroidism     Created by [employer] updated for latest IMO load      Irregular heart beat      Macular disorder     \"wrinkle\"     MCI (mild cognitive impairment) 6/16/2019     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     Pressure injury of buttock, stage 1, unspecified laterality 6/16/2019     Rheumatoid arthritis (H)      S/P AVR 12/15/2014     Sacral insufficiency fracture, initial encounter 5/6/2019     Senile osteoporosis 9/23/2019    Past Surgical History:   Procedure Laterality Date     ANGIOPLASTY / STENTING FEMORAL       AORTIC VALVE REPLACEMENT       CARDIAC SURGERY      CABG     ESOPHAGOSCOPY, GASTROSCOPY, DUODENOSCOPY (EGD), COMBINED N/A 11/16/2021    Procedure: ESOPHAGOGASTRODUODENOSCOPY;  Surgeon: Enrico Galan MD;  Location: [facility] OR     EYE SURGERY Bilateral     cataracts     HC REMOVAL GALLBLADDER      Description: Cholecystectomy;  Recorded: 03/20/2008;     IL ESOPHAGOGASTRODUODENOSCOPY TRANSORAL DIAGNOSTIC N/A 6/1/2021    Procedure: ESOPHAGOGASTRODUODENOSCOPY (EGD) with biopsies;  Surgeon: Julio Lopez MD;  Location: [facility];  Service: Gastroenterology     IL ESOPHAGOGASTRODUODENOSCOPY TRANSORAL DIAGNOSTIC N/A " "2021    Procedure: ESOPHAGOGASTRODUODENOSCOPY (EGD) with bicap cauterization;  Surgeon: Julio Lopez MD;  Location: [facility];  Service: Gastroenterology     UT ESOPHAGOGASTRODUODENOSCOPY TRANSORAL DIAGNOSTIC N/A 2021    Procedure: ESOPHAGOGASTRODUODENOSCOPY (EGD) with hemoclip;  Surgeon: Sam Brock MD;  Location: [facility];  Service: Gastroenterology     [facility] LIGATE FALLOPIAN TUBE      Description: Tubal Ligation;  Recorded: 2008;     ZZC TOTAL HIP ARTHROPLASTY Right     Description: Total Hip Replacement;  Recorded: 2008; right    no family history of premature coronary artery disease Social History     Socioeconomic History     Marital status:      Spouse name: Not on file     Number of children: Not on file     Years of education: Not on file     Highest education level: Not on file   Occupational History     Not on file   Tobacco Use     Smoking status: Former Smoker     Packs/day: 1.00     Years: 30.00     Pack years: 30.00     Types: Cigarettes     Quit date: 1995     Years since quittin.4     Smokeless tobacco: Never Used   Substance and Sexual Activity     Alcohol use: Yes     Alcohol/week: 1.0 standard drink     Drug use: No     Sexual activity: Not on file   Other Topics Concern     Not on file   Social History Narrative    , patient notes she and her  were  for 60 years and he passed away 12 years ago following an illness and complications. Patient notes they had a happy marriage.  Children: Patient notes she had 4 children, one daughter passed away fo llowing a MVA when daughter was 57 years old. Leisa states she is close to her two boys, daughter, grandchildren, and great-grandchildren and finds her family supportive.     Lives in a town home independently.. Now in senior living.    Lives at \" [facility]\", independent living.    Misbah Barone MD  2020           Social Determinants of Health     Financial Resource Strain: Not " on file   Food Insecurity: Not on file   Transportation Needs: Not on file   Physical Activity: Not on file   Stress: Not on file   Social Connections: Not on file   Intimate Partner Violence: Not on file   Housing Stability: Not on file           Lab Results    Chemistry/lipid CBC Cardiac Enzymes/BNP/TSH/INR   Lab Results   Component Value Date    CHOL 139 08/21/2018    HDL 79 08/21/2018    TRIG 70 08/21/2018    BUN 20 06/06/2022     06/06/2022    CO2 37 (H) 06/06/2022    Lab Results   Component Value Date    WBC 7.0 06/01/2022    HGB 12.4 06/01/2022    HCT 38.2 06/01/2022     (H) 06/01/2022     (L) 06/01/2022    Lab Results   Component Value Date    TROPONINI 0.03 06/01/2022     (H) 06/01/2022    TSH 4.26 04/15/2022    INR 1.25 (H) 02/16/2022     Lab Results   Component Value Date    TROPONINI 0.03 06/01/2022          Weight:    Wt Readings from Last 3 Encounters:   06/06/22 55.6 kg (122 lb 9.6 oz)   03/30/22 55.3 kg (122 lb)   03/24/22 54.6 kg (120 lb 6 oz)       Allergies  No Known Allergies      Surgical History  Past Surgical History:   Procedure Laterality Date     ANGIOPLASTY / STENTING FEMORAL       AORTIC VALVE REPLACEMENT       CARDIAC SURGERY      CABG     ESOPHAGOSCOPY, GASTROSCOPY, DUODENOSCOPY (EGD), COMBINED N/A 11/16/2021    Procedure: ESOPHAGOGASTRODUODENOSCOPY;  Surgeon: Enrico Galan MD;  Location: [facility] OR     EYE SURGERY Bilateral     cataracts     HC REMOVAL GALLBLADDER      Description: Cholecystectomy;  Recorded: 03/20/2008;     MD ESOPHAGOGASTRODUODENOSCOPY TRANSORAL DIAGNOSTIC N/A 6/1/2021    Procedure: ESOPHAGOGASTRODUODENOSCOPY (EGD) with biopsies;  Surgeon: Julio Lopez MD;  Location: [facility];  Service: Gastroenterology     MD ESOPHAGOGASTRODUODENOSCOPY TRANSORAL DIAGNOSTIC N/A 6/6/2021    Procedure: ESOPHAGOGASTRODUODENOSCOPY (EGD) with bicap cauterization;  Surgeon: Julio Lopez MD;  Location: [facility];  Service:  Gastroenterology     CA ESOPHAGOGASTRODUODENOSCOPY TRANSORAL DIAGNOSTIC N/A 6/7/2021    Procedure: ESOPHAGOGASTRODUODENOSCOPY (EGD) with hemoclip;  Surgeon: Sam Brock MD;  Location: [facility];  Service: Gastroenterology     [facility] LIGATE FALLOPIAN TUBE      Description: Tubal Ligation;  Recorded: 03/20/2008;     ZZC TOTAL HIP ARTHROPLASTY Right     Description: Total Hip Replacement;  Recorded: 03/20/2008; right       Social History  Tobacco:   History   Smoking Status     Former Smoker     Packs/day: 1.00     Years: 30.00     Types: Cigarettes     Quit date: 1/1/1995   Smokeless Tobacco     Never Used    Alcohol:   Social History    Substance and Sexual Activity      Alcohol use: Yes        Alcohol/week: 1.0 standard drink   Illicit Drugs:   History   Drug Use No       Family History  Family History   Problem Relation Age of Onset     Asthma Mother      Coronary Artery Disease No family hx of      Breast Cancer No family hx of           Kirk Slaughter MD on 6/7/2022      cc: Nora Robledo

## 2022-06-07 NOTE — PLAN OF CARE
Problem: Functional Ability Impaired (Heart Failure)  Goal: Optimal Functional Ability  Intervention: Optimize Functional Ability  Recent Flowsheet Documentation  Taken 6/7/2022 0800 by Alis Diaz, RN  Activity Management:   ambulated to bathroom   up in chair   Continues to c/o weakness and dyspnea on exertion. Sats down to 80% on RA with activity. Sats mid 90s on 2L via NC. Up to chair for most of the day. Appetite fair. VSS.

## 2022-06-07 NOTE — PLAN OF CARE
Problem: Dysrhythmia (Heart Failure)  Goal: Stable Heart Rate and Rhythm  Outcome: Ongoing, Not Progressing     Problem: Plan of Care - These are the overarching goals to be used throughout the patient stay.    Goal: Optimal Comfort and Wellbeing  Outcome: Ongoing, Progressing     Problem: Risk for Delirium  Goal: Optimal Coping  Outcome: Ongoing, Progressing   Goal Outcome Evaluation:      Pt heart rhythm was afib with bundle branch block. Pt is disoriented to time. Pt is denies pain. Pt is tolerating PO lasix and Eliquis well. No abnormal bleeding noted. Pt reported discomfort from site on upper forearm from lab draws. Pt is utilizing external catheter for urinary collection. 1,000 mL output collected from purewick container this PM shift. No other concerns noted. Magnesium level to be rechecked in the AM.  Amrit Perez RN  6/6/2022  11:25 PM                   No

## 2022-06-08 PROBLEM — N17.9 AKI (ACUTE KIDNEY INJURY) (H): Status: ACTIVE | Noted: 2021-06-01

## 2022-06-08 NOTE — PLAN OF CARE
Problem: Dysrhythmia  Goal: Normalized Cardiac Rhythm  Outcome: Ongoing, Progressing   HR 70-80s. Continues to c/o dyspnea on exertion. Up in chair throughout the day. Ambulated in joseph x1 with therapy. Daughter updated via telephone this afternoon.

## 2022-06-08 NOTE — PROGRESS NOTES
Care Management Follow Up    Length of Stay (days): 6    Expected Discharge Date: 06/08/2022     Concerns to be Addressed:     Discharge planning and placement  Patient plan of care discussed at interdisciplinary rounds: Yes    Anticipated Discharge Disposition:  Anticipate transitional care     Anticipated Discharge Services:  Nursing PT and OT  Anticipated Discharge DME:  jonathan    Patient/family educated on Medicare website which has current facility and service quality ratings:  yes  Education Provided on the Discharge Plan:  yes  Patient/Family in Agreement with the Plan:  yes    Referrals Placed by CM/KRISTINA:  Post acute care facilities  Private pay costs discussed: Not applicable    Additional Information:  HECTOR chart reviewed, discussed updates with MD.   KRISTINA spoke to Shanique in admissions at [facility] who has accepted pt for bed, Shanique reports [facility] authorization request submitted this morning.   Awaiting auth.  CM will update medical team and family on discharge planning.      BEATRIZ Watters

## 2022-06-08 NOTE — PROGRESS NOTES
[facility]    Medicine Progress Note - Hospitalist Service    Date of Admission:  6/1/2022    Assessment & Plan          87 year old female admitted on 6/1/2022. She has history of CHF, coronary artery disease, bioprosthetic aortic valve, hypertension, mild dementia, rheumatoid arthritis, osteoporosis and presents for evaluation of dyspnea on exertion and shortness of breath found to be in rapid atrial flutter which was new and also causing acute diastolic HF     1.Afib with RVR-NEW this admit  -Patient presented with dyspnea on exertion found to be tachycardic   -Adenosine given in the ED with brief conversion to sinus however then reverted back to 120s.    -Initiated diltiazem drip for rate control now converted to Dilt ER by mouth.  -Continue metoprolol succinite  -Eliquis started--NEW this admit , CHADS2 was 6--I would agree with DOAC with her amount of dilated atria--I talked to sons about risks/benefits DOAC  -TSH-ok  -as of 6/4/22 am had some RVR in  am then improved with po meds  -on 6/5/22 HR improved and bp more stable  -now looking for TCU     2.Dyspnea with acute hypoxic resp failure  -Some degree of acute diastolic heart failure  - small pleural effusion and pulmonary infiltrates, in the setting of recent A. fib with RVR and dyspnea on exertion, suspect she has some flash pulmonary edema related to tachycardia.   - IV lasix -->to lasix 40 mg po bid per cards  -I/s and O's  -wean O2--still on it     3.Hyponatremia--resolved  -was volume up  -now on lasix--now po  -Today  improved     4.HTN  -Continue Lasix--switch po, Imdur, metoprolol with hold parameters    5.Vascular dementia  -Supportive care  -Home Aricept  -was living alone, with family support     6.CAD  -Continue home Imdur, metoprolol, atorvastatin     7.Status post bioprosthetic aortic valve replacement  -echo done here this admit     1. Normal left ventricular size and systolic performance with a  visually  estimated ejection fraction of 55%.  2. There is mildly abnormal septal motion c/w right ventricular overload;  there is diastolic flattening of the septum with a more normal configuration  at end systole.  3. There is a bio-prosthetic aortic valve (documented 21 mm Lalitha-Blackwell  Magna bovine pericardial tissue valve).  Â  Normal aortic valve prosthesis metrics with a mean systolic gradient of 6  mmHg and a peak anterograde velocity of 1.7 m/sec.  Â  No aortic insufficiency is detected.  4. There is mild, to perhaps mild-moderate, tricuspid insufficiency.  5. There is moderate right ventricular enlargement with moderately reduced  right ventricular systolic performance.  6. There is severe left atrial enlargement. There is mild to moderate right  atrial enlargement.  7. Right ventricular systolic pressure relative to right atrial pressure is  mildly increased. The pulmonary artery pressure is estimated to be 40-45 mmHg  plus right atrial pressure (the IVC is moderately dilated).     8.Hypothyroidism, recent TSH checked and okay.  Continue home Synthroid     9.Rheumatoid arthritis, continue home methotrexate     10.GERD, home PPI     11.JOSIAH--onset after diurese   -mild, was on iv lasix, now po  -trend creat today improved to 0.91, peak elevation was 1.27  -per cards lasix now 40 mg po bid     12.Hypomag  -replaced per protocol     13.Fatigue, deconditioning  -pt/ot and would benefit from TCU  -will add on wbc, no fevers           Diet: Regular Diet Adult  Room Service    DVT Prophylaxis: DOAC  Rogers Catheter: Not present  Central Lines: None  Cardiac Monitoring: ACTIVE order. Indication: Tachyarrhythmias, acute (48 hours)  Code Status: No CPR- Do NOT Intubate      Disposition Plan   Expected Discharge: 06/09/2022     Anticipated discharge location:  Awaiting care coordination huddle  Delays:   tomorrow , TCU when bed available.     Boo Paiz MD  Hospitalist Service  [facility]  [facility]  Securely message with the Vocera Web Console (learn more here)  Text page via Streamfile Paging/Directory     ______________________________________________________________________    Interval History   Feels OK, still weak, agreeable to TCU.     Physical Exam   Vital Signs: Temp: 97.8  F (36.6  C) Temp src: Oral BP: 137/61 Pulse: 72   Resp: 16 SpO2: 93 % O2 Device: Nasal cannula Oxygen Delivery: 2 LPM  Weight: 116 lbs 11.2 oz  Constitutional: awake, fatigued, alert and cooperative  Respiratory: clear  Cardiovascular: irregularly irregular rhythm  GI: normal bowel sounds, soft and non-distended  Musculoskeletal: no lower extremity pitting edema present  Neurologic: Alert, Ox3, generalized weakness     Data   Recent Labs   Lab 06/08/22  0421 06/07/22  1824 06/07/22  0450 06/06/22  0459   WBC  --  7.2  --   --      --  140 139   POTASSIUM 3.9  --  4.0 3.9   CHLORIDE 90*  --  91* 95*   CO2 43*  --  42* 37*   BUN 19  --  20 20   CR 0.85  --  0.91 0.88   ANIONGAP 7  --  7 7   CARLA 9.0  --  9.1 8.6   GLC 99  --  99 93     No results found for this or any previous visit (from the past 24 hour(s)).

## 2022-06-08 NOTE — PLAN OF CARE
Goal Outcome Evaluation:    Pt denies pain, pt slept mostly the night.  Remains on 2 lnc sating low 90's.   Purewick in place with 850 ml output overnight.  A-fib w/BBB HR 70-80's.

## 2022-06-09 PROBLEM — Z79.899 HIGH RISK MEDICATION USE: Status: RESOLVED | Noted: 2017-07-11 | Resolved: 2022-01-01

## 2022-06-09 PROBLEM — E87.20 LACTIC ACIDEMIA: Status: RESOLVED | Noted: 2021-06-01 | Resolved: 2022-01-01

## 2022-06-09 PROBLEM — I10 HYPERTENSION: Status: RESOLVED | Noted: 2019-05-07 | Resolved: 2022-01-01

## 2022-06-09 PROBLEM — S32.9XXA CLOSED DISPLACED FRACTURE OF PELVIS, UNSPECIFIED PART OF PELVIS, INITIAL ENCOUNTER (H): Status: RESOLVED | Noted: 2022-01-01 | Resolved: 2022-01-01

## 2022-06-09 PROBLEM — S32.599A FRACTURE OF MULTIPLE PUBIC RAMI (H): Status: RESOLVED | Noted: 2022-01-01 | Resolved: 2022-01-01

## 2022-06-09 PROBLEM — F03.90 DEMENTIA (H): Status: ACTIVE | Noted: 2022-01-01

## 2022-06-09 PROBLEM — I50.9 ACUTE CONGESTIVE HEART FAILURE, UNSPECIFIED HEART FAILURE TYPE (H): Status: RESOLVED | Noted: 2022-01-01 | Resolved: 2022-01-01

## 2022-06-09 PROBLEM — S72.114A CLOSED NONDISPLACED FRACTURE OF GREATER TROCHANTER OF RIGHT FEMUR, INITIAL ENCOUNTER (H): Status: RESOLVED | Noted: 2021-10-10 | Resolved: 2022-01-01

## 2022-06-09 PROBLEM — S22.068A: Status: RESOLVED | Noted: 2022-01-01 | Resolved: 2022-01-01

## 2022-06-09 PROBLEM — F03.90 DEMENTIA (H): Status: RESOLVED | Noted: 2022-01-01 | Resolved: 2022-01-01

## 2022-06-09 PROBLEM — S22.048A: Status: RESOLVED | Noted: 2022-01-01 | Resolved: 2022-01-01

## 2022-06-09 PROBLEM — R00.0 TACHYCARDIA: Status: RESOLVED | Noted: 2022-01-01 | Resolved: 2022-01-01

## 2022-06-09 PROBLEM — K92.1 MELENA: Status: RESOLVED | Noted: 2021-06-01 | Resolved: 2022-01-01

## 2022-06-09 NOTE — PLAN OF CARE
Problem: Functional Ability Impaired (Heart Failure)  Goal: Optimal Functional Ability  Outcome: Ongoing, Progressing  Intervention: Optimize Functional Ability  Recent Flowsheet Documentation  Taken 6/9/2022 1200 by Alis Diaz, RN  Activity Management: ambulated to bathroom  Taken 6/9/2022 0800 by Alis Diaz, RN  Activity Management: up in chair   Continues to c/o dyspnea on exertion. Unable to wean off O2. Showered this AM. Up in chair for most of day.

## 2022-06-09 NOTE — PROGRESS NOTES
"CLINICAL NUTRITION SERVICES - ASSESSMENT NOTE     Nutrition Prescription    RECOMMENDATIONS FOR MDs/PROVIDERS TO ORDER:  Consider supplementing vitamin B12 as pt on methotrexate.     Malnutrition Status:    Moderate in chronic illness    Recommendations already ordered by Registered Dietitian (RD):  Ensure Enlive daily    Future/Additional Recommendations:  Supplement acceptance.     REASON FOR ASSESSMENT  Rox Zavala is a/an 87 year old female assessed by the dietitian for LOS    Pt admitted with Afib, dypsnea.  Hx of dementia, AVR, RA    NUTRITION HISTORY  Pt reports eating fairly well and her wt has been stable.  Staff assisting pt with ordering meals.    CURRENT NUTRITION ORDERS  Diet: Regular  Intake/Tolerance: usually eats % at meals.    LABS  Labs reviewed    MEDICATIONS  Lasix, lipitor, methotrexate  Medications reviewed    ANTHROPOMETRICS  Height: 162.6 cm (5' 4\")  Most Recent Weight: 55.7 kg (122 lb 14.4 oz)    BMI: Normal BMI  Weight History:   Wt Readings from Last 8 Encounters:   06/09/22 55.7 kg (122 lb 14.4 oz)   03/30/22 55.3 kg (122 lb)   03/24/22 54.6 kg (120 lb 6 oz)   03/15/22 55.7 kg (122 lb 12.8 oz)   03/01/22 57.6 kg (127 lb)   02/24/22 58.1 kg (128 lb 1 oz)   11/09/21 56.1 kg (123 lb 9.6 oz)   11/04/21 55.7 kg (122 lb 12.8 oz)     Dosing Weight: 55.7 kg    ASSESSED NUTRITION NEEDS  Estimated Energy Needs: 3240-8835 kcals/day (25 - 30 kcals/kg)  Justification: Maintenance  Estimated Protein Needs: 56+ grams protein/day (1+)  Justification: Maintenance  Estimated Fluid Needs: 1320-1158 mL/day (1 mL/kcal)   Justification: Maintenance    PHYSICAL FINDINGS  See malnutrition section below.  Per chart       MALNUTRITION:  % Weight Loss:  None noted  % Intake:  Decreased intake does not meet criteria for malnutrition   Subcutaneous Fat Loss:  Orbital region moderate to severe depletion and Upper arm region moderate depletion  Muscle Loss:  Clavicle bone region moderate depletion, Acromion " bone region moderate depletion and Dorsal hand region mild to moderate depletion  Fluid Retention:  None noted    Malnutrition Diagnosis: Moderate malnutrition  In Context of:  Chronic illness or disease    NUTRITION DIAGNOSIS  Malnutrition related to chronic illness as evidenced by muscle and fat loss      INTERVENTIONS  Implementation  Nutrition Education: we discussed protein foods to include with meals for strength and healing.   Consider supplementing vitamin B12 as pt on methotrexate.    Medical food supplement therapy     Goals  Maintain wt  Patient to consume % of nutritionally adequate meals three times per day, or the equivalent with supplements/snacks.     Monitoring/Evaluation  Progress toward goals will be monitored and evaluated per protocol.

## 2022-06-09 NOTE — PROGRESS NOTES
[facility]    Medicine Progress Note - Hospitalist Service    Date of Admission:  6/1/2022    Assessment & Plan     87 year old female admitted on 6/1/2022. She has history of CHF, coronary artery disease, bioprosthetic aortic valve, hypertension, mild dementia, rheumatoid arthritis, osteoporosis and presents for evaluation of dyspnea on exertion and shortness of breath found to be in rapid atrial flutter which was new and also causing acute diastolic HF     1.Afib with RVR-NEW this admit  -Patient presented with dyspnea on exertion found to be tachycardic   -Adenosine given in the ED with brief conversion to sinus however then reverted back to 120s.    -Initiated diltiazem drip for rate control now converted to Dilt ER by mouth.  -Continue metoprolol succinite  -Eliquis started--NEW this admit , CHADS2 was 6--I would agree with DOAC with her amount of dilated atria--I talked to sons about risks/benefits DOAC  -TSH-ok  -as of 6/4/22 am had some RVR in  am then improved with po meds  -on 6/5/22 HR improved and bp more stable  -now looking for TCU     2.Dyspnea with acute hypoxic resp failure  -Some degree of acute diastolic heart failure  - small pleural effusion and pulmonary infiltrates, in the setting of recent A. fib with RVR and dyspnea on exertion, suspect she has some flash pulmonary edema related to tachycardia.   - IV lasix -->to lasix 40 mg po bid per cards  -I/s and O's  -wean O2--still on it     3.Hyponatremia--resolved  -was volume up  -now on lasix--now po  -Today  improved     4.HTN  -Continue Lasix--switch po, Imdur, metoprolol with hold parameters    5.Vascular dementia  -Supportive care  -Home Aricept  -was living alone, with family support     6.CAD  -Continue home Imdur, metoprolol, atorvastatin     7.Status post bioprosthetic aortic valve replacement  -echo done here this admit     1. Normal left ventricular size and systolic performance with a visually  estimated  ejection fraction of 55%.  2. There is mildly abnormal septal motion c/w right ventricular overload;  there is diastolic flattening of the septum with a more normal configuration  at end systole.  3. There is a bio-prosthetic aortic valve (documented 21 mm Lalitha-Blackwell  Magna bovine pericardial tissue valve).  Â  Normal aortic valve prosthesis metrics with a mean systolic gradient of 6  mmHg and a peak anterograde velocity of 1.7 m/sec.  Â  No aortic insufficiency is detected.  4. There is mild, to perhaps mild-moderate, tricuspid insufficiency.  5. There is moderate right ventricular enlargement with moderately reduced  right ventricular systolic performance.  6. There is severe left atrial enlargement. There is mild to moderate right  atrial enlargement.  7. Right ventricular systolic pressure relative to right atrial pressure is  mildly increased. The pulmonary artery pressure is estimated to be 40-45 mmHg  plus right atrial pressure (the IVC is moderately dilated).     8.Hypothyroidism, recent TSH checked and okay.  Continue home Synthroid     9.Rheumatoid arthritis, continue home methotrexate     10.GERD, home PPI     11.JOSIAH--onset after diurese   -mild, was on iv lasix, now po  -trend creat today improved to 0.91, peak elevation was 1.27  -per cards lasix now 40 mg po bid     12.Hypomag  -replaced per protocol     13.Fatigue, deconditioning  -pt/ot and would benefit from TCU  -will add on wbc, no fevers           Diet: Regular Diet Adult  Room Service  Snacks/Supplements Adult: Ensure Enlive; With Meals    DVT Prophylaxis: DOAC  Rogers Catheter: Not present  Central Lines: None  Cardiac Monitoring: ACTIVE order. Indication: Tachyarrhythmias, acute (48 hours)  Code Status: No CPR- Do NOT Intubate      Disposition Plan   Expected Discharge: 06/10/2022     Anticipated discharge location:  Awaiting care coordination huddle  Delays:   tomorrow , TCU when bed available.     Boo Paiz,  MD  Hospitalist Service  [facility]  Securely message with the [employer] Web Console (learn more here)  Text page via [employer] Paging/Directory     ______________________________________________________________________    Interval History   Feels OK, still weak, agreeable to TCU.     Physical Exam   Vital Signs: Temp: 97.5  F (36.4  C) Temp src: Oral BP: 115/68 Pulse: 68   Resp: 20 SpO2: 99 % O2 Device: Nasal cannula Oxygen Delivery: 2 LPM  Weight: 122 lbs 14.4 oz  Constitutional: awake, fatigued, alert and cooperative  Respiratory: clear  Cardiovascular: irregularly irregular rhythm  GI: normal bowel sounds, soft and non-distended  Musculoskeletal: no lower extremity pitting edema present  Neurologic: Alert, Ox2.5, generalized weakness     Data   Recent Labs   Lab 06/08/22  0421 06/07/22  1824 06/07/22  0450 06/06/22  0459   WBC  --  7.2  --   --      --  140 139   POTASSIUM 3.9  --  4.0 3.9   CHLORIDE 90*  --  91* 95*   CO2 43*  --  42* 37*   BUN 19  --  20 20   CR 0.85  --  0.91 0.88   ANIONGAP 7  --  7 7   CARLA 9.0  --  9.1 8.6   GLC 99  --  99 93     No results found for this or any previous visit (from the past 24 hour(s)).

## 2022-06-09 NOTE — PLAN OF CARE
Heart Failure Care Map  GOALS TO BE MET BEFORE DISCHARGE:    1. Decrease congestion and/or edema with diuretic therapy to achieve near optimal volume status.     Dyspnea improved: No, further care required to meet this goal. Please explain SOB with activity.   Edema improved: No, further care required to meet this goal. Please explain Trace edema present.        Net I/O and Weights since admission:   05/10 0700 - 06/09 0659  In: 5560 [P.O.:5060]  Out: 68141 [Urine:62247]  Net: -5890     Vitals:    06/01/22 1700 06/01/22 2205 06/02/22 0422 06/04/22 0322   Weight: 54.9 kg (121 lb) 57.1 kg (125 lb 14.4 oz) 56.8 kg (125 lb 3.2 oz) 57.2 kg (126 lb 1.6 oz)    06/05/22 0404 06/06/22 0355 06/07/22 0630 06/08/22 0435   Weight: 54.9 kg (121 lb) 55.6 kg (122 lb 9.6 oz) 53.7 kg (118 lb 4.8 oz) 52.9 kg (116 lb 11.2 oz)       2.  O2 sats > 90% on room air, or at prior home O2 therapy level.      Able to wean O2 this shift to keep sats above 90%?: No, further care required to meet this goal. Please explain Currently on 2L O2 via nasal cannula   Does patient use Home O2? No          Current oxygenation status:   SpO2: 92 %     O2 Device: Nasal cannula, Oxygen Delivery: 2 LPM    3.  Tolerates ambulation and mobility near baseline.     Ambulation: No, further care required to meet this goal. Please explain Assist x 1 with a  gait belt and walker.   Times patient ambulated with staff this shift: 0    Please review the Heart Failure Care Map for additional HF goal outcomes.    Marisela Goodwin RN  6/9/2022

## 2022-06-09 NOTE — PLAN OF CARE
Goal Outcome Evaluation:    Plan of Care Reviewed With: patient     Overall Patient Progress: improving         Vitals:    06/08/22 0742 06/08/22 1108 06/08/22 1529 06/08/22 1905   BP: 134/69 120/56 116/59 137/61   BP Location: Left arm Right arm Right arm Left arm   Patient Position:       Pulse: 77 70 70 72   Resp: 20 16 16 16   Temp: 97.8  F (36.6  C) 97.4  F (36.3  C) 98.2  F (36.8  C) 97.8  F (36.6  C)   TempSrc: Oral Oral Oral Oral   SpO2: 95% 99% 95% 93%   Weight:       Height:        Denies pain. A-1 with denise Rodgers. On 2liters oxygen. TCU at discharge. HR 70's A-fib rate controlled, Sharona Roy RN

## 2022-06-10 NOTE — PLAN OF CARE
Heart Failure Care Map  GOALS TO BE MET BEFORE DISCHARGE:    1. Decrease congestion and/or edema with diuretic therapy to achieve near optimal volume status.     Dyspnea improved: Yes, satisfactory for discharge.   Edema improved: Yes, satisfactory for discharge.        Net I/O and Weights since admission:   05/10 2300 - 06/09 2259  In: 6280 [P.O.:5780]  Out: 47042 [Urine:97087]  Net: -5570     Vitals:    06/01/22 1700 06/01/22 2205 06/02/22 0422 06/04/22 0322   Weight: 54.9 kg (121 lb) 57.1 kg (125 lb 14.4 oz) 56.8 kg (125 lb 3.2 oz) 57.2 kg (126 lb 1.6 oz)    06/05/22 0404 06/06/22 0355 06/07/22 0630 06/08/22 0435   Weight: 54.9 kg (121 lb) 55.6 kg (122 lb 9.6 oz) 53.7 kg (118 lb 4.8 oz) 52.9 kg (116 lb 11.2 oz)    06/09/22 0700   Weight: 55.7 kg (122 lb 14.4 oz)       2.  O2 sats > 90% on room air, or at prior home O2 therapy level.      Able to wean O2 this shift to keep sats above 90%?: No, further care required to meet this goal. Please explain Wasn't able to get her to stay above 90% without the use of 2L O2.   Does patient use Home O2? No          Current oxygenation status:   SpO2: 96 %     O2 Device: Nasal cannula, Oxygen Delivery: 2 LPM    3.  Tolerates ambulation and mobility near baseline.     Ambulation: Yes, satisfactory for discharge.   Times patient ambulated with staff this shift: 1    The pt had no complaints of pain this shift. Up in the chair till after dinner- repositioned and walked to the bathroom with A1 with the walker and gait belt. Coccyx is still blanchable. A mepilex applied is still applied.  A-fib with BBB.         Please review the Heart Failure Care Map for additional HF goal outcomes.    Vicki Hoff RN  6/9/2022

## 2022-06-10 NOTE — PLAN OF CARE
Patient denied pain and was able to sleep for majority of the night. Continues to need 2L oxygen; attempted to wean to 1.5L and was unable to; continued to drop and hang at 87%; turned back to 2L. Patient did not want to get out of bed this morning for her weight as she was too tired. Potential for discharge today pending placement to TCU and oxygen use.     Vitals:    06/10/22 0041 06/10/22 0259 06/10/22 0300 06/10/22 0644   BP:       BP Location:       Pulse:       Resp:       Temp:       TempSrc:       SpO2: 92% (!) 87% 90%    Weight:    55 kg (121 lb 3 oz)   Height:          Heart Failure Care Map  GOALS TO BE MET BEFORE DISCHARGE:    1. Decrease congestion and/or edema with diuretic therapy to achieve near optimal volume status.     Dyspnea improved: Denies; however oxygen weaning not progressing   Edema improved: Trace only        Net I/O and Weights since admission:   05/11 0700 - 06/10 0659  In: 6280 [P.O.:5780]  Out: 54118 [Urine:89124]  Net: -6420     Vitals:    06/01/22 1700 06/01/22 2205 06/02/22 0422 06/04/22 0322   Weight: 54.9 kg (121 lb) 57.1 kg (125 lb 14.4 oz) 56.8 kg (125 lb 3.2 oz) 57.2 kg (126 lb 1.6 oz)    06/05/22 0404 06/06/22 0355 06/07/22 0630 06/08/22 0435   Weight: 54.9 kg (121 lb) 55.6 kg (122 lb 9.6 oz) 53.7 kg (118 lb 4.8 oz) 52.9 kg (116 lb 11.2 oz)    06/09/22 0700 06/10/22 0644   Weight: 55.7 kg (122 lb 14.4 oz) 55 kg (121 lb 3 oz)       2.  O2 sats > 90% on room air, or at prior home O2 therapy level.      Able to wean O2 this shift to keep sats above 90%?: No   Does patient use Home O2? No         Current oxygenation status:   SpO2: 90 %     O2 Device: Nasal cannula, Oxygen Delivery: 2 LPM    3.  Tolerates ambulation and mobility near baseline.     Ambulation: None; stayed in bed   Times patient ambulated with staff this shift: None    Please review the Heart Failure Care Map for additional HF goal outcomes.    Shelby Chaudhry RN  6/10/2022      Problem: Respiratory Compromise  (Heart Failure)  Goal: Effective Oxygenation and Ventilation  Outcome: Ongoing, Not Progressing     Problem: Sleep Disordered Breathing (Heart Failure)  Goal: Effective Breathing Pattern During Sleep  Outcome: Ongoing, Not Progressing     Problem: Plan of Care - These are the overarching goals to be used throughout the patient stay.    Goal: Optimal Comfort and Wellbeing  Outcome: Ongoing, Progressing     Problem: Risk for Delirium  Goal: Improved Sleep  Outcome: Ongoing, Progressing   Goal Outcome Evaluation:

## 2022-06-10 NOTE — PROGRESS NOTES
KRISTINA received call from [facility] updating that Peer to Peer was needed by 11:30 a.m. for [facility].  KRISTINA provided phone number of [phone], option 5.  Will need Pt's name, member ID, and .    KRISTINA sent MD page via [facility] with update on need for Peer to Peer for auth for TCU.      ALLA Lange

## 2022-06-10 NOTE — PLAN OF CARE
"  Problem: Plan of Care - These are the overarching goals to be used throughout the patient stay.    Goal: Absence of Hospital-Acquired Illness or Injury  Outcome: Ongoing, Progressing  Intervention: Identify and Manage Fall Risk  Recent Flowsheet Documentation  Taken 6/10/2022 0825 by Mona Benitez, RN  Safety Promotion/Fall Prevention:    bed alarm on    chair alarm on  Intervention: Prevent and Manage VTE (Venous Thromboembolism) Risk  Recent Flowsheet Documentation  Taken 6/10/2022 1000 by Mona Benitez, RN  Activity Management:    ambulated to bathroom    up in chair  Goal: Optimal Comfort and Wellbeing  Outcome: Ongoing, Progressing  Goal: Readiness for Transition of Care  Outcome: Ongoing, Progressing     Problem: Dysrhythmia  Goal: Normalized Cardiac Rhythm  Outcome: Ongoing, Progressing     Problem: Dysrhythmia (Heart Failure)  Goal: Stable Heart Rate and Rhythm  Outcome: Ongoing, Progressing     Problem: Fluid Imbalance (Heart Failure)  Goal: Fluid Balance  Outcome: Ongoing, Progressing     Problem: Respiratory Compromise (Heart Failure)  Goal: Effective Oxygenation and Ventilation  Outcome: Ongoing, Progressing   Goal Outcome Evaluation:     Pt a/o to self, situation, date. Forgetful re location, but aware of situation pretty much \"I'm in one place, waiting to go to another\". Pleasant, cooperative. Using call light appropriately. Up in chair most of shift, w feet up. Ambulated to bathroom w/ 1 assist w/ walker, gait belt. Pt had 2 loose stools this am--see I/o. Miralax held. Tele tracing a-fib w/ bbb. Pt reports has no pain. Ate approx 40% of breakfast. Dozing in chair most of afternoon. o2 was decreased in am to 1L per n/c. Sat decreased in afternoon--02 increased back to 2L. Reminded pt to use call light  for needs. Continue to monitor pt.                 "

## 2022-06-11 NOTE — PROGRESS NOTES
Heart Failure Care Map  GOALS TO BE MET BEFORE DISCHARGE:    1. Decrease congestion and/or edema with diuretic therapy to achieve near optimal volume status.     Dyspnea improved: Yes, satisfactory for discharge.   Edema improved: Yes, satisfactory for discharge.        Net I/O and Weights since admission:   05/12 0700 - 06/11 0659  In: 6960 [P.O.:6460]  Out: 28710 [Urine:09662]  Net: -6940     Vitals:    06/01/22 1700 06/01/22 2205 06/02/22 0422 06/04/22 0322   Weight: 54.9 kg (121 lb) 57.1 kg (125 lb 14.4 oz) 56.8 kg (125 lb 3.2 oz) 57.2 kg (126 lb 1.6 oz)    06/05/22 0404 06/06/22 0355 06/07/22 0630 06/08/22 0435   Weight: 54.9 kg (121 lb) 55.6 kg (122 lb 9.6 oz) 53.7 kg (118 lb 4.8 oz) 52.9 kg (116 lb 11.2 oz)    06/09/22 0700 06/10/22 0644 06/11/22 0203   Weight: 55.7 kg (122 lb 14.4 oz) 55 kg (121 lb 3 oz) 54.9 kg (121 lb 1.6 oz)       2.  O2 sats > 90% on room air, or at prior home O2 therapy level.      Able to wean O2 this shift to keep sats above 90%?: No, further care required to meet this goal. Please explain patient's 02 needs increased during sleep from 2 liter's to 3 liters per nasal cannula   Does patient use Home O2? No          Current oxygenation status:   SpO2: 95 %     O2 Device: Nasal cannula, Oxygen Delivery: 4 LPM    3.  Tolerates ambulation and mobility near baseline.     Ambulation: Yes, satisfactory for discharge.   Times patient ambulated with staff this shift: none this shift.    Please review the Heart Failure Care Map for additional HF goal outcomes.  Patient denied pain,rhythm=a-fib, HR=60-70's.  Denice Apple RN  6/11/2022

## 2022-06-11 NOTE — PLAN OF CARE
Goal Outcome Evaluation:  Heart Failure Care Map  GOALS TO BE MET BEFORE DISCHARGE:    1. Decrease congestion and/or edema with diuretic therapy to achieve near optimal volume status.     Dyspnea improved: No, further care required to meet this goal. Please explain Pt is on 2L Nasal Cannula   Edema improved: Yes, satisfactory for discharge.        Net I/O and Weights since admission:   05/11 2300 - 06/10 2259  In: 6960 [P.O.:6460]  Out: 58525 [Urine:52085]  Net: -6290     Vitals:    06/01/22 1700 06/01/22 2205 06/02/22 0422 06/04/22 0322   Weight: 54.9 kg (121 lb) 57.1 kg (125 lb 14.4 oz) 56.8 kg (125 lb 3.2 oz) 57.2 kg (126 lb 1.6 oz)    06/05/22 0404 06/06/22 0355 06/07/22 0630 06/08/22 0435   Weight: 54.9 kg (121 lb) 55.6 kg (122 lb 9.6 oz) 53.7 kg (118 lb 4.8 oz) 52.9 kg (116 lb 11.2 oz)    06/09/22 0700 06/10/22 0644   Weight: 55.7 kg (122 lb 14.4 oz) 55 kg (121 lb 3 oz)       2.  O2 sats > 90% on room air, or at prior home O2 therapy level.      Able to wean O2 this shift to keep sats above 90%?: No, further care required to meet this goal. Please explain 2L nasal canula   Does patient use Home O2? No          Current oxygenation status:   SpO2: 93 %     O2 Device: Nasal cannula with humidification, Oxygen Delivery: 2 LPM    3.  Tolerates ambulation and mobility near baseline.     Ambulation: Yes, satisfactory for discharge.   Times patient ambulated with staff this shift: 2    Please review the Heart Failure Care Map for additional HF goal outcomes.    Pt denies pain.  Pt a fib with hr in the 70's.      Boby Gray RN  6/10/2022

## 2022-06-11 NOTE — PROGRESS NOTES
[facility]    Medicine Progress Note - Hospitalist Service    Date of Admission:  6/1/2022    Assessment & Plan     87 year old female admitted on 6/1/2022. She has history of CHF, coronary artery disease, bioprosthetic aortic valve, hypertension, mild dementia, rheumatoid arthritis, osteoporosis and presents for evaluation of dyspnea on exertion and shortness of breath found to be in rapid atrial flutter which was new and also causing acute diastolic HF     1.Aflut with RVR-NEW this admit  -Eliquis started--NEW this admit , CHADS2 was 6--I would agree with DOAC with her amount of dilated atria--I talked to sons about risks/benefits DOAC  -TSH-ok  -- stable on Metoprolol and Diltiazem      2.Dyspnea with acute hypoxic resp failure  -Some degree of acute diastolic heart failure  - small pleural effusion and pulmonary infiltrates, in the setting of recent A. fib with RVR and dyspnea on exertion, suspect she has some flash pulmonary edema related to tachycardia.   - IV lasix -->to lasix 40 mg po bid   -wean O2--still on it     3.Hyponatremia--resolved  -was volume up  -now on lasix--now po  -Today  improved     4.HTN  -Continue Lasix--switch po, Imdur, metoprolol with hold parameters    5.Vascular dementia  -Supportive care  -Home Aricept  -was living alone, with family support     6.CAD  -Continue home Imdur, metoprolol, atorvastatin     7.Status post bioprosthetic aortic valve replacement  -echo done here this admit     1. Normal left ventricular size and systolic performance with a visually  estimated ejection fraction of 55%.  2. There is mildly abnormal septal motion c/w right ventricular overload;  there is diastolic flattening of the septum with a more normal configuration  at end systole.  3. There is a bio-prosthetic aortic valve (documented 21 mm Lalitha-Blackwell  Magna bovine pericardial tissue valve).  Â  Normal aortic valve prosthesis metrics with a mean systolic gradient  of 6  mmHg and a peak anterograde velocity of 1.7 m/sec.  Â  No aortic insufficiency is detected.  4. There is mild, to perhaps mild-moderate, tricuspid insufficiency.  5. There is moderate right ventricular enlargement with moderately reduced  right ventricular systolic performance.  6. There is severe left atrial enlargement. There is mild to moderate right  atrial enlargement.  7. Right ventricular systolic pressure relative to right atrial pressure is  mildly increased. The pulmonary artery pressure is estimated to be 40-45 mmHg  plus right atrial pressure (the IVC is moderately dilated).     8.Hypothyroidism, recent TSH checked and okay.  Continue home Synthroid     9.Rheumatoid arthritis, continue home methotrexate     10.GERD, home PPI     11.JOSIAH--onset after diurese   -mild, was on iv lasix, now po  -trend creat today improved to 0.91, peak elevation was 1.27  -per cards lasix now 40 mg po bid     12.Hypomag  -replaced per protocol     13.Fatigue, deconditioning  -pt/ot and would benefit from TCU  -will add on wbc, no fevers           Diet: Regular Diet Adult  Room Service  Snacks/Supplements Adult: Ensure Enlive; With Meals    DVT Prophylaxis: DOAC  Rogers Catheter: Not present  Central Lines: None  Cardiac Monitoring: None  Code Status: No CPR- Do NOT Intubate      Disposition Plan  expect back to [facility] with additional services and Oxygen.  [facility] has declined to pain coverage for TCU -- and failed to provide Peer to Peer appeal by creating a series of unnecessary delays with their tangled network of phone trees which is quite effective deferring access to an actual medical provider.  Spoke with son, Kwesi, he is aware of [facility] decision, will arrange services at her present assisted living if able and discharge when set up.     Boo Paiz MD   Hospitalist Service  [facility]  Securely message with the [employer] Web Console (learn more here)  Text page via  [facility] Paging/Directory     ______________________________________________________________________    Interval History   Feels OK, still weak, no new complaints.     Physical Exam   Vital Signs: Temp: 97.8  F (36.6  C) Temp src: Oral BP: (!) 141/65 Pulse: 72   Resp: 16 SpO2: 100 % O2 Device: Nasal cannula Oxygen Delivery: 1 LPM  Weight: 121 lbs 1.6 oz  Constitutional: awake, fatigued, alert and cooperative  Respiratory: clear  Cardiovascular: irregularly irregular rhythm  GI: normal bowel sounds, soft and non-distended  Musculoskeletal: no lower extremity pitting edema present  Neurologic: Alert, Ox2, generalized weakness     Data   Recent Labs   Lab 06/11/22  0451 06/08/22  0421 06/07/22  1824 06/07/22  0450   WBC 6.6  --  7.2  --    HGB 11.8  --   --   --    *  --   --   --    *  --   --   --    * 140  --  140   POTASSIUM 4.1 3.9  --  4.0   CHLORIDE 85* 90*  --  91*   CO2 43* 43*  --  42*   BUN 25 19  --  20   CR 1.00 0.85  --  0.91   ANIONGAP 7 7  --  7   CARLA 9.3 9.0  --  9.1   GLC 94 99  --  99     No results found for this or any previous visit (from the past 24 hour(s)).

## 2022-06-11 NOTE — PLAN OF CARE
Problem: Plan of Care - These are the overarching goals to be used throughout the patient stay.    Goal: Absence of Hospital-Acquired Illness or Injury  Intervention: Identify and Manage Fall Risk    Problem: Risk for Delirium  Goal: Optimal Coping  Outcome: Ongoing, Progressing     Problem: Respiratory Compromise (Heart Failure)  Goal: Effective Oxygenation and Ventilation  Outcome: Ongoing, Progressing     Goal Outcome Evaluation:    AA/Ox3, forgetful. A Fib with PVC's. Heart rate 60-70's. Shortness of breath with activity. Up in the chair, ambulated to the bathroom and in hallway.  02 titrated to keep SP02 >90%, kept at 1.5 LPM. BP acceptable. On PO Lasix. Incontinent of urine. Had small BM.

## 2022-06-11 NOTE — PROGRESS NOTES
[facility]    Medicine Progress Note - Hospitalist Service    Date of Admission:  6/1/2022    Assessment & Plan     87 year old female admitted on 6/1/2022. She has history of CHF, coronary artery disease, bioprosthetic aortic valve, hypertension, mild dementia, rheumatoid arthritis, osteoporosis and presents for evaluation of dyspnea on exertion and shortness of breath found to be in rapid atrial flutter which was new and also causing acute diastolic HF     1.Aflut with RVR-NEW this admit  -Eliquis started--NEW this admit , CHADS2 was 6--I would agree with DOAC with her amount of dilated atria--I talked to sons about risks/benefits DOAC  -TSH-ok  -- stable on Metoprolol and Diltiazem      2.Dyspnea with acute hypoxic resp failure  -Some degree of acute diastolic heart failure  - small pleural effusion and pulmonary infiltrates, in the setting of recent A. fib with RVR and dyspnea on exertion, suspect she has some flash pulmonary edema related to tachycardia.   - IV lasix -->to lasix 40 mg po bid   -wean O2--still on it     3.Hyponatremia--resolved  -was volume up  -now on lasix--now po  -Today  improved     4.HTN  -Continue Lasix--switch po, Imdur, metoprolol with hold parameters    5.Vascular dementia  -Supportive care  -Home Aricept  -was living alone, with family support     6.CAD  -Continue home Imdur, metoprolol, atorvastatin     7.Status post bioprosthetic aortic valve replacement  -echo done here this admit     1. Normal left ventricular size and systolic performance with a visually  estimated ejection fraction of 55%.  2. There is mildly abnormal septal motion c/w right ventricular overload;  there is diastolic flattening of the septum with a more normal configuration  at end systole.  3. There is a bio-prosthetic aortic valve (documented 21 mm Lalitha-Blackwell  Magna bovine pericardial tissue valve).  Â  Normal aortic valve prosthesis metrics with a mean systolic gradient  "of 6  mmHg and a peak anterograde velocity of 1.7 m/sec.  Â  No aortic insufficiency is detected.  4. There is mild, to perhaps mild-moderate, tricuspid insufficiency.  5. There is moderate right ventricular enlargement with moderately reduced  right ventricular systolic performance.  6. There is severe left atrial enlargement. There is mild to moderate right  atrial enlargement.  7. Right ventricular systolic pressure relative to right atrial pressure is  mildly increased. The pulmonary artery pressure is estimated to be 40-45 mmHg  plus right atrial pressure (the IVC is moderately dilated).     8.Hypothyroidism, recent TSH checked and okay.  Continue home Synthroid     9.Rheumatoid arthritis, continue home methotrexate     10.GERD, home PPI     11.JOSAIH--onset after diurese   -mild, was on iv lasix, now po  -trend creat today improved to 0.91, peak elevation was 1.27  -per cards lasix now 40 mg po bid     12.Hypomag  -replaced per protocol     13.Fatigue, deconditioning  -pt/ot and would benefit from TCU  -will add on wbc, no fevers           Diet: Regular Diet Adult  Room Service  Snacks/Supplements Adult: Ensure Enlive; With Meals    DVT Prophylaxis: DOAC  Rogers Catheter: Not present  Central Lines: None  Cardiac Monitoring: None  Code Status: No CPR- Do NOT Intubate      Disposition Plan  Stable for TCU, accepted at [facility] but denied by [facility] -- called to do Peer to Peer, spend over 90 mins trying to speak to a medical person (could not even get a nurse), and on the 5th call was connected to \"The right administrative person\" who said -- I would love to help you but its now 2 O'clock and we have no Physician reviewers available after 10:30 today.  Weren't you aware of that\".   Asked her politely what she would do if she were me.  She said \"I would talk to your  and have her summit and appeal \".  I then reminded her that this was the appeal and I needed to speek to a \"Peer\" and not someone " "who's job is to delay care.  The person was extremely unhelpful, but ended with \"I'll give you a special direct line you can call on Monday\" and she gave me the number I first called 90 minutes ago.    Discharge to [facility] If or when approved by her regrettably unhelpful health insurance ([facility]) -- and if someone from the company reviews this note I encourage them to call me directly at   Cell Phone:  [phone] to explain how they can justify their Peer review process, but I would be extremely surprised if I did get a call as that would involve effort on their part.     Family update on the TCU connie, and she is at Assisted Living and can \"stay there till she dies with private pay services\", but they were hoping [facility] would help as it was independently recommended by our Therapy dept -- will continue to work with family    Boo Paiz MD  Hospitalist Service  [facility]  Securely message with the Vocera Web Console (learn more here)  Text page via [employer] Paging/Directory     ______________________________________________________________________    Interval History   Feels OK, still weak, agreeable to TCU.     Physical Exam   Vital Signs: Temp: 97.3  F (36.3  C) Temp src: Oral BP: 125/60 Pulse: 75   Resp: 20 SpO2: 93 % O2 Device: Nasal cannula with humidification Oxygen Delivery: 2 LPM  Weight: 121 lbs 3.04 oz  Constitutional: awake, fatigued, alert and cooperative  Respiratory: clear  Cardiovascular: irregularly irregular rhythm  GI: normal bowel sounds, soft and non-distended  Musculoskeletal: no lower extremity pitting edema present  Neurologic: Alert, Ox2, generalized weakness     Data   Recent Labs   Lab 06/08/22  0421 06/07/22  1824 06/07/22  0450 06/06/22  0459   WBC  --  7.2  --   --      --  140 139   POTASSIUM 3.9  --  4.0 3.9   CHLORIDE 90*  --  91* 95*   CO2 43*  --  42* 37*   BUN 19  --  20 20   CR 0.85  --  0.91 0.88   ANIONGAP 7  --  7 7   CARLA 9.0  " --  9.1 8.6   GLC 99  --  99 93     No results found for this or any previous visit (from the past 24 hour(s)).

## 2022-06-12 NOTE — PLAN OF CARE
Problem: Plan of Care - These are the overarching goals to be used throughout the patient stay.    Goal: Absence of Hospital-Acquired Illness or Injury  Intervention: Identify and Manage Fall Risk  Recent Flowsheet Documentation  Taken 6/12/2022 0819 by Amrit Perez, RN  Safety Promotion/Fall Prevention: activity supervised     Problem: Plan of Care - These are the overarching goals to be used throughout the patient stay.    Goal: Optimal Comfort and Wellbeing  Outcome: Ongoing, Progressing     Problem: Fluid Imbalance (Heart Failure)  Goal: Fluid Balance  Outcome: Ongoing, Progressing   Goal Outcome Evaluation:      Pt tolerated participating in therapy. Pt is utilizing purewick for urinary elimination. Pt heart rhythm was afib with bigeminy. Pt remains on 2 Lpm O2 via nasal canula. Pt remains forgetful with new information. No pain noted. Pt cleansed once for incontinence when therapy did not reattach external female catheter.   Amrit Perez RN  6/12/2022  3:15 PM

## 2022-06-12 NOTE — PROGRESS NOTES
[facility]    Medicine Progress Note - Hospitalist Service    Date of Admission:  6/1/2022    Assessment & Plan     87 year old female admitted on 6/1/2022. She has history of CHF, coronary artery disease, bioprosthetic aortic valve, hypertension, mild dementia, rheumatoid arthritis, osteoporosis and presents for evaluation of dyspnea on exertion and shortness of breath found to be in rapid atrial flutter which was new and also causing acute diastolic HF     1.Aflut with RVR-NEW this admit  -Eliquis started--NEW this admit , CHADS2 was 6--I would agree with DOAC with her amount of dilated atria--I talked to sons about risks/benefits DOAC  -TSH-ok  -- stable on Metoprolol and Diltiazem      2.Dyspnea with acute hypoxic resp failure  -Some degree of acute diastolic heart failure  - small pleural effusion and pulmonary infiltrates, in the setting of recent A. fib with RVR and dyspnea on exertion, suspect she has some flash pulmonary edema related to tachycardia.   - IV lasix -->to lasix 40 mg po bid   -wean O2--still on it     3.Hyponatremia--resolved  -was volume up  -now on lasix--now po  -Today  improved     4.HTN  -Continue Lasix--switch po, Imdur, metoprolol with hold parameters    5.Vascular dementia  -Supportive care  -Home Aricept  -was living alone, with family support     6.CAD  -Continue home Imdur, metoprolol, atorvastatin     7.Status post bioprosthetic aortic valve replacement  -echo done here this admit     1. Normal left ventricular size and systolic performance with a visually  estimated ejection fraction of 55%.  2. There is mildly abnormal septal motion c/w right ventricular overload;  there is diastolic flattening of the septum with a more normal configuration  at end systole.  3. There is a bio-prosthetic aortic valve (documented 21 mm Lalitha-Blackwell  Magna bovine pericardial tissue valve).  Â  Normal aortic valve prosthesis metrics with a mean systolic gradient  of 6  mmHg and a peak anterograde velocity of 1.7 m/sec.  Â  No aortic insufficiency is detected.  4. There is mild, to perhaps mild-moderate, tricuspid insufficiency.  5. There is moderate right ventricular enlargement with moderately reduced  right ventricular systolic performance.  6. There is severe left atrial enlargement. There is mild to moderate right  atrial enlargement.  7. Right ventricular systolic pressure relative to right atrial pressure is  mildly increased. The pulmonary artery pressure is estimated to be 40-45 mmHg  plus right atrial pressure (the IVC is moderately dilated).     8.Hypothyroidism, recent TSH checked and okay.  Continue home Synthroid     9.Rheumatoid arthritis, continue home methotrexate     10.GERD, home PPI     11.JOSIAH--onset after diurese   -mild, was on iv lasix, now po  -trend creat today improved to 0.91, peak elevation was 1.27  -per cards lasix now 40 mg po bid     12.Hypomag  -replaced per protocol     13.Fatigue, deconditioning  -pt/ot and would benefit from TCU  -will add on wbc, no fevers           Diet: Regular Diet Adult  Room Service  Snacks/Supplements Adult: Ensure Enlive; With Meals    DVT Prophylaxis: DOAC  Rogers Catheter: Not present  Central Lines: None  Cardiac Monitoring: None  Code Status: No CPR- Do NOT Intubate      Disposition Plan  expect back to [facility] with additional services and Oxygen.  [facility] has declined to pain coverage for TCU -- and failed to provide Peer to Peer appeal by creating a series of unnecessary delays with their tangled network of phone trees which is quite effective deferring access to an actual medical provider.  Spoke with son, Kwesi, he is aware of [facility] decision, will arrange services at her present assisted living if able and discharge when set up.     Boo Paiz MD   Hospitalist Service  [facility]  Securely message with the Vocera Web Console (learn more here)  Text page via  [facility] Paging/Directory     ______________________________________________________________________    Interval History   Feels OK, still weak, no new complaints.     Physical Exam   Vital Signs: Temp: 98.3  F (36.8  C) Temp src: Oral BP: 125/60 Pulse: 68   Resp: 18 SpO2: 95 % O2 Device: Nasal cannula Oxygen Delivery: 1.5 LPM  Weight: 122 lbs 8 oz  Constitutional: awake, fatigued, alert and cooperative  Respiratory: clear  Cardiovascular: irregularly irregular rhythm  GI: normal bowel sounds, soft and non-distended  Musculoskeletal: no lower extremity pitting edema present  Neurologic: Alert, Ox2, generalized weakness     Data   Recent Labs   Lab 06/11/22  0451 06/08/22  0421 06/07/22  1824 06/07/22  0450   WBC 6.6  --  7.2  --    HGB 11.8  --   --   --    *  --   --   --    *  --   --   --    * 140  --  140   POTASSIUM 4.1 3.9  --  4.0   CHLORIDE 85* 90*  --  91*   CO2 43* 43*  --  42*   BUN 25 19  --  20   CR 1.00 0.85  --  0.91   ANIONGAP 7 7  --  7   CARLA 9.3 9.0  --  9.1   GLC 94 99  --  99     No results found for this or any previous visit (from the past 24 hour(s)).

## 2022-06-12 NOTE — PROGRESS NOTES
Heart Failure Care Map  GOALS TO BE MET BEFORE DISCHARGE:    1. Decrease congestion and/or edema with diuretic therapy to achieve near optimal volume status.     Dyspnea improved: No, further care required to meet this goal. Please explain Shortness of breath with activity.   Edema improved: Yes, satisfactory for discharge.        Net I/O and Weights since admission:   05/12 2300 - 06/11 2259  In: 8057 [P.O.:7557]  Out: 06101 [Urine:82565]  Net: -7043     Vitals:    06/01/22 1700 06/01/22 2205 06/02/22 0422 06/04/22 0322   Weight: 54.9 kg (121 lb) 57.1 kg (125 lb 14.4 oz) 56.8 kg (125 lb 3.2 oz) 57.2 kg (126 lb 1.6 oz)    06/05/22 0404 06/06/22 0355 06/07/22 0630 06/08/22 0435   Weight: 54.9 kg (121 lb) 55.6 kg (122 lb 9.6 oz) 53.7 kg (118 lb 4.8 oz) 52.9 kg (116 lb 11.2 oz)    06/09/22 0700 06/10/22 0644 06/11/22 0203   Weight: 55.7 kg (122 lb 14.4 oz) 55 kg (121 lb 3 oz) 54.9 kg (121 lb 1.6 oz)       2.  O2 sats > 90% on room air, or at prior home O2 therapy level.      Able to wean O2 this shift to keep sats above 90%?: No, further care required to meet this goal. Please explain 02 titrated to keep SP02 >90%. Kept at 1.5LPM.   Does patient use Home O2? No          Current oxygenation status:   SpO2: 91 %     O2 Device: Nasal cannula, Oxygen Delivery: 1.5 LPM    3.  Tolerates ambulation and mobility near baseline.     Ambulation: Yes, satisfactory for discharge.   Times patient ambulated with staff this shift: 2    Please review the Heart Failure Care Map for additional HF goal outcomes.    Mayela Johnston RN  6/11/2022

## 2022-06-12 NOTE — PROGRESS NOTES
Heart Failure Care Map  GOALS TO BE MET BEFORE DISCHARGE:    1. Decrease congestion and/or edema with diuretic therapy to achieve near optimal volume status.     Dyspnea improved: No, further care required to meet this goal. Please explain patient's 02 sat's drop to 80's at times during sleep   Edema improved: Yes, satisfactory for discharge.        Net I/O and Weights since admission:   05/13 0700 - 06/12 0659  In: 8057 [P.O.:7557]  Out: 13165 [Urine:34926]  Net: -7043     Vitals:    06/01/22 1700 06/01/22 2205 06/02/22 0422 06/04/22 0322   Weight: 54.9 kg (121 lb) 57.1 kg (125 lb 14.4 oz) 56.8 kg (125 lb 3.2 oz) 57.2 kg (126 lb 1.6 oz)    06/05/22 0404 06/06/22 0355 06/07/22 0630 06/08/22 0435   Weight: 54.9 kg (121 lb) 55.6 kg (122 lb 9.6 oz) 53.7 kg (118 lb 4.8 oz) 52.9 kg (116 lb 11.2 oz)    06/09/22 0700 06/10/22 0644 06/11/22 0203   Weight: 55.7 kg (122 lb 14.4 oz) 55 kg (121 lb 3 oz) 54.9 kg (121 lb 1.6 oz)       2.  O2 sats > 90% on room air, or at prior home O2 therapy level.      Able to wean O2 this shift to keep sats above 90%?: No, further care required to meet this goal. Please explain patient requires oxygen at night.   Does patient use Home O2? No          Current oxygenation status:   SpO2: 91 %     O2 Device: Nasal cannula, Oxygen Delivery: 1.5 LPM    3.  Tolerates ambulation and mobility near baseline.     Ambulation: Yes, satisfactory for discharge.   Times patient ambulated with staff this shift: 0    Please review the Heart Failure Care Map for additional HF goal outcomes.  Patient denies pain at this time. Rhythm=a-fib with PVC's with hr=67.faint crackles posterior lobes.  Denice Apple RN  6/12/2022

## 2022-06-13 NOTE — PROGRESS NOTES
[facility]    Medicine Progress Note - Hospitalist Service    Date of Admission:  6/1/2022    Assessment & Plan     87 year old female admitted on 6/1/2022. She has history of CHF, coronary artery disease, bioprosthetic aortic valve, hypertension, mild dementia, rheumatoid arthritis, osteoporosis and presents for evaluation of dyspnea on exertion and shortness of breath found to be in rapid atrial flutter which was new and also causing acute diastolic HF     1.Aflut with RVR-NEW this admit  -Eliquis started--NEW this admit , CHADS2 was 6--I would agree with DOAC with her amount of dilated atria--I talked to sons about risks/benefits DOAC  -TSH-ok  -- stable on Metoprolol and Diltiazem      2.Dyspnea with acute hypoxic resp failure  -Some degree of acute diastolic heart failure  - small pleural effusion and pulmonary infiltrates, in the setting of recent A. fib with RVR and dyspnea on exertion, suspect she has some flash pulmonary edema related to tachycardia.   - IV lasix -->to lasix 40 mg po bid   -wean O2--still on it     3.Hyponatremia--resolved  -was volume up  -now on lasix 40 mg PO bid, and stable     4.HTN  -Continue Lasix--switch po, Imdur, metoprolol with hold parameters    5.Vascular dementia  -Supportive care  -Home Aricept  -was living alone, with family support -- but now needs additional services      6.CAD  -Continue home Imdur, metoprolol, atorvastatin     7.Status post bioprosthetic aortic valve replacement  -echo done here this admit     1. Normal left ventricular size and systolic performance with a visually  estimated ejection fraction of 55%.  2. There is mildly abnormal septal motion c/w right ventricular overload;  there is diastolic flattening of the septum with a more normal configuration  at end systole.  3. There is a bio-prosthetic aortic valve (documented 21 mm Lalitha-Blackwell  Magna bovine pericardial tissue valve).  Â  Normal aortic valve prosthesis metrics  with a mean systolic gradient of 6  mmHg and a peak anterograde velocity of 1.7 m/sec.  Â  No aortic insufficiency is detected.  4. There is mild, to perhaps mild-moderate, tricuspid insufficiency.  5. There is moderate right ventricular enlargement with moderately reduced  right ventricular systolic performance.  6. There is severe left atrial enlargement. There is mild to moderate right  atrial enlargement.  7. Right ventricular systolic pressure relative to right atrial pressure is  mildly increased. The pulmonary artery pressure is estimated to be 40-45 mmHg  plus right atrial pressure (the IVC is moderately dilated).     8.Hypothyroidism, recent TSH checked and okay.  Continue home Synthroid     9.Rheumatoid arthritis, continue home methotrexate     10.GERD, home PPI     11.JOSIAH--onset after diurese   -mild, was on iv lasix, now po  -trend creat today improved to 0.91, peak elevation was 1.27  -per cards lasix now 40 mg po bid     12.Hypomag  -replaced per protocol     13.Fatigue, deconditioning  -pt/ot and would benefit from TCU  -will add on wbc, no fevers           Diet: Regular Diet Adult  Room Service  Snacks/Supplements Adult: Ensure Enlive; With Meals    DVT Prophylaxis: DOAC  Rogers Catheter: Not present  Central Lines: None  Cardiac Monitoring: None  Code Status: No CPR- Do NOT Intubate      Disposition Plan  expect back to [facility] with additional services and Oxygen.  [facility] has declined to pain coverage for TCU -- and failed to provide Peer to Peer appeal by creating a series of unnecessary delays with their tangled network of phone trees which is quite effective deferring access to an actual medical provider.  Spoke with son, Kwesi, he is aware of [facility] decision, will arrange services at her present assisted living if able and discharge when set up.     Boo Paiz MD   Hospitalist Service  [facility]  Securely message with the Vocera Web Console  (learn more here)  Text page via [facility] Paging/Directory     ______________________________________________________________________    Interval History   Feels OK, still weak, no new complaints.     Physical Exam   Vital Signs: Temp: 97.7  F (36.5  C) Temp src: Oral BP: 115/59 Pulse: 76   Resp: 18 SpO2: 97 % O2 Device: Nasal cannula with humidification Oxygen Delivery: 2 LPM  Weight: 122 lbs 9.6 oz  Constitutional: awake, fatigued, alert and cooperative  Respiratory: clear  Cardiovascular: irregularly irregular rhythm  GI: normal bowel sounds, soft and non-distended  Musculoskeletal: no lower extremity pitting edema present  Neurologic: Alert, Ox2, generalized weakness     Data   Recent Labs   Lab 06/11/22  0451 06/08/22  0421 06/07/22  1824 06/07/22  0450   WBC 6.6  --  7.2  --    HGB 11.8  --   --   --    *  --   --   --    *  --   --   --    * 140  --  140   POTASSIUM 4.1 3.9  --  4.0   CHLORIDE 85* 90*  --  91*   CO2 43* 43*  --  42*   BUN 25 19  --  20   CR 1.00 0.85  --  0.91   ANIONGAP 7 7  --  7   CARLA 9.3 9.0  --  9.1   GLC 94 99  --  99     No results found for this or any previous visit (from the past 24 hour(s)).

## 2022-06-13 NOTE — PLAN OF CARE
Heart Failure Care Map  GOALS TO BE MET BEFORE DISCHARGE:    1. Decrease congestion and/or edema with diuretic therapy to achieve near optimal volume status.     Dyspnea improved: No, further care required to meet this goal. Please explain Still on oxygen, oxygen saturation dropped to 85 when took off oxygen.    Edema improved: Yes, satisfactory for discharge.        Net I/O and Weights since admission:   05/14 1500 - 06/13 1459  In: 9094 [P.O.:8594]  Out: 14028 [Urine:23858]  Net: -7406     Vitals:    06/01/22 1700 06/01/22 2205 06/02/22 0422 06/04/22 0322   Weight: 54.9 kg (121 lb) 57.1 kg (125 lb 14.4 oz) 56.8 kg (125 lb 3.2 oz) 57.2 kg (126 lb 1.6 oz)    06/05/22 0404 06/06/22 0355 06/07/22 0630 06/08/22 0435   Weight: 54.9 kg (121 lb) 55.6 kg (122 lb 9.6 oz) 53.7 kg (118 lb 4.8 oz) 52.9 kg (116 lb 11.2 oz)    06/09/22 0700 06/10/22 0644 06/11/22 0203 06/12/22 0311   Weight: 55.7 kg (122 lb 14.4 oz) 55 kg (121 lb 3 oz) 54.9 kg (121 lb 1.6 oz) 55.6 kg (122 lb 8 oz)    06/13/22 0302   Weight: 55.6 kg (122 lb 9.6 oz)       2.  O2 sats > 90% on room air, or at prior home O2 therapy level.      Able to wean O2 this shift to keep sats above 90%?: No, further care required to meet this goal. Please explain not able to wean off of oxygen.   Does patient use Home O2? No          Current oxygenation status:   SpO2: 92 %     O2 Device: Nasal cannula, Oxygen Delivery: 2 LPM    3.  Tolerates ambulation and mobility near baseline.`     Ambulation: Yes, satisfactory for discharge.   Times patient ambulated with staff this shift: 1    Please review the Heart Failure Care Map for additional HF goal outcomes.    Sue Hyde RN  6/13/2022    Problem: Plan of Care - These are the overarching goals to be used throughout the patient stay.    Goal: Plan of Care Review/Shift Note  Description: The Plan of Care Review/Shift note should be completed every shift.  The Outcome Evaluation is a brief statement about your  assessment that the patient is improving, declining, or no change.  This information will be displayed automatically on your shift note.  Outcome: Ongoing, Progressing   Goal Outcome Evaluation:      Worked with therapy today, telemetry discontinued.  Lasix given as scheduled, up in chair and assist of one with walker ambulated to the bathroom frequently, having stools.  Oxygen home eval done, needs oxygen.    Held metoprolol due to lower bp. Eating good amounts for meals, set up only. Will continue to monitor.

## 2022-06-13 NOTE — PROGRESS NOTES
Heart Failure Care Map  GOALS TO BE MET BEFORE DISCHARGE:    1. Decrease congestion and/or edema with diuretic therapy to achieve near optimal volume status.     Dyspnea improved: No, further care required to meet this goal. Please explain Short of breath with exertion   Edema improved: Yes, satisfactory for discharge.        Net I/O and Weights since admission:   05/13 2300 - 06/12 2259  In: 8654 [P.O.:8154]  Out: 24403 [Urine:80863]  Net: -7046     Vitals:    06/01/22 1700 06/01/22 2205 06/02/22 0422 06/04/22 0322   Weight: 54.9 kg (121 lb) 57.1 kg (125 lb 14.4 oz) 56.8 kg (125 lb 3.2 oz) 57.2 kg (126 lb 1.6 oz)    06/05/22 0404 06/06/22 0355 06/07/22 0630 06/08/22 0435   Weight: 54.9 kg (121 lb) 55.6 kg (122 lb 9.6 oz) 53.7 kg (118 lb 4.8 oz) 52.9 kg (116 lb 11.2 oz)    06/09/22 0700 06/10/22 0644 06/11/22 0203 06/12/22 0311   Weight: 55.7 kg (122 lb 14.4 oz) 55 kg (121 lb 3 oz) 54.9 kg (121 lb 1.6 oz) 55.6 kg (122 lb 8 oz)       2.  O2 sats > 90% on room air, or at prior home O2 therapy level.      Able to wean O2 this shift to keep sats above 90%?: No, further care required to meet this goal. Please explain 02 via NC titrated 1.5 - 2 LPM to keep Sp02 >90%.    Does patient use Home O2? No          Current oxygenation status:   SpO2: 94 %     O2 Device: Nasal cannula, Oxygen Delivery: 1.5 LPM    3.  Tolerates ambulation and mobility near baseline.     Ambulation: No, further care required to meet this goal. Please explain Ambultion cut short d/t t feeling SOB   Times patient ambulated with staff this shift: 1    Please review the Heart Failure Care Map for additional HF goal outcomes.    Mayela Johnston RN  6/12/2022

## 2022-06-13 NOTE — PLAN OF CARE
Problem: Plan of Care - These are the overarching goals to be used throughout the patient stay.    Goal: Absence of Hospital-Acquired Illness or Injury  Intervention: Identify and Manage Fall Risk  Intervention: Prevent Skin Injury     Problem: Risk for Delirium  Goal: Optimal Coping  Outcome: Ongoing, Progressing     Problem: Dysrhythmia  Goal: Normalized Cardiac Rhythm  Outcome: Ongoing, Progressing     Problem: Functional Ability Impaired (Heart Failure)  Goal: Optimal Functional Ability  Outcome: Ongoing, Progressing  Intervention: Optimize Functional Ability    Goal Outcome Evaluation:    Atrial Fib with frequent PVC's, Bigeminy. Heart rate 60-70's. A/Ox3, pleasant, forgetful. Activity encouraged, up in the chair and ambulated in hallway. Unable to wean 02, titrated between 1.5L to 2L to keep Sp02>90%. On PO Lasix. Urinating.

## 2022-06-13 NOTE — PROGRESS NOTES
"Care Management Follow Up    Length of Stay (days): 11    Expected Discharge Date: 06/13/2022     Concerns to be Addressed:   Placement    Patient plan of care discussed at interdisciplinary rounds: Yes    Anticipated Discharge Disposition:  Back to \"[facility]\" with more services, TCU (private pay) or LTC.     Anticipated Discharge Services:  TBD  Anticipated Discharge DME:  TBD    Patient/family educated on Medicare website which has current facility and service quality ratings:    Education Provided on the Discharge Plan:    Patient/Family in Agreement with the Plan:      Referrals Placed by CM/SW:    Private pay costs discussed: Not applicable    Additional Information:  HECTOR reached out to pt son Kwesi to see the plan for discharge. Kwesi said he is aware that TCU is not going to be covered by insurance and he is looking into getting more services at [facility]. HECTOR also reached out to [facility] and spoke to Rubia [phone] and she said there is a wait list for the Assented living. HECTOR asked Rubia to reach out to the son so that he knows about the wait list.    3:27 PM  CM left message for Rubia at [facility] to set up an assessment to establish if they can take her on in their assisted living. No answer LVM.      Janett Garvey RN        "

## 2022-06-14 NOTE — PLAN OF CARE
Problem: Plan of Care - These are the overarching goals to be used throughout the patient stay.    Goal: Plan of Care Review/Shift Note  Description: The Plan of Care Review/Shift note should be completed every shift.  The Outcome Evaluation is a brief statement about your assessment that the patient is improving, declining, or no change.  This information will be displayed automatically on your shift note.  Outcome: Ongoing, Progressing  Flowsheets (Taken 6/14/2022 9984)  Plan of Care Reviewed With: patient  Overall Patient Progress: improving   Goal Outcome Evaluation:    Plan of Care Reviewed With: patient     Overall Patient Progress: improving    Pt is alert and oriented x4 and able to make needs known.  Pt is up with assist of 1.  She was able to ambulate in halls x2 with SBA with walker and gait belt.  Pt was weaned to 1L from 3L this morning.  Will attempt to wean off of O2.  Tolerating diet, purewick in place.  Pt is up in chair visiting with daughter at this time.

## 2022-06-14 NOTE — PLAN OF CARE
"  Problem: Plan of Care - These are the overarching goals to be used throughout the patient stay.    Goal: Plan of Care Review/Shift Note  Description: The Plan of Care Review/Shift note should be completed every shift.  The Outcome Evaluation is a brief statement about your assessment that the patient is improving, declining, or no change.  This information will be displayed automatically on your shift note.  Outcome: Ongoing, Progressing  Goal: Patient-Specific Goal (Individualized)  Description: You can add care plan individualizations to a care plan. Examples of Individualization might be:  \"Parent requests to be called daily at 9am for status\", \"I have a hard time hearing out of my right ear\", or \"Do not touch me to wake me up as it startles me\".  Outcome: Ongoing, Progressing  Goal: Absence of Hospital-Acquired Illness or Injury  Outcome: Ongoing, Progressing  Intervention: Identify and Manage Fall Risk  Recent Flowsheet Documentation  Taken 6/14/2022 0000 by Scott Braga, RN  Safety Promotion/Fall Prevention:    activity supervised    bed alarm on    safety round/check completed    room organization consistent    room near nurse's station    room door open    nonskid shoes/slippers when out of bed    mobility aid in reach    lighting adjusted    increase visualization of patient    increased rounding and observation    clutter free environment maintained    fall prevention program maintained  Taken 6/13/2022 2100 by Scott Braga, RN  Safety Promotion/Fall Prevention:    activity supervised    bed alarm on    safety round/check completed    room organization consistent    room near nurse's station    room door open    nonskid shoes/slippers when out of bed    mobility aid in reach    lighting adjusted    increase visualization of patient    increased rounding and observation    clutter free environment maintained    fall prevention program maintained  Intervention: Prevent Skin " Injury  Recent Flowsheet Documentation  Taken 6/14/2022 0000 by Scott Braga RN  Body Position:    right    turned    heels elevated    legs elevated    head repositioned, left  Taken 6/13/2022 2100 by Scott Braga RN  Body Position:    right    turned    heels elevated    legs elevated    head repositioned, left  Taken 6/13/2022 2000 by Scott Braga RN  Body Position:    right    turned    heels elevated    legs elevated    log-rolled    neutral head position  Intervention: Prevent and Manage VTE (Venous Thromboembolism) Risk  Recent Flowsheet Documentation  Taken 6/14/2022 0000 by Scott Braga RN  Activity Management:    activity adjusted per tolerance    activity encouraged    ambulated to bathroom  Taken 6/13/2022 2100 by Scott Braga RN  Activity Management:    activity adjusted per tolerance    activity encouraged    ambulated to bathroom  Taken 6/13/2022 2000 by Scott Braga RN  Activity Management: ambulated to bathroom  Goal: Optimal Comfort and Wellbeing  Outcome: Ongoing, Progressing  Goal: Readiness for Transition of Care  Outcome: Ongoing, Progressing   Goal Outcome Evaluation:    Heart Failure Care Map  GOALS TO BE MET BEFORE DISCHARGE:    1. Decrease congestion and/or edema with diuretic therapy to achieve near optimal volume status.     Dyspnea improved: Yes, satisfactory for discharge.   Edema improved: Yes, satisfactory for discharge.        Net I/O and Weights since admission:   05/15 0700 - 06/14 0659  In: 9654 [P.O.:9154]  Out: 92436 [Urine:66284]  Net: -7496     Vitals:    06/01/22 1700 06/01/22 2205 06/02/22 0422 06/04/22 0322   Weight: 54.9 kg (121 lb) 57.1 kg (125 lb 14.4 oz) 56.8 kg (125 lb 3.2 oz) 57.2 kg (126 lb 1.6 oz)    06/05/22 0404 06/06/22 0355 06/07/22 0630 06/08/22 0435   Weight: 54.9 kg (121 lb) 55.6 kg (122 lb 9.6 oz) 53.7 kg (118 lb 4.8 oz) 52.9 kg (116 lb 11.2 oz)    06/09/22 0700 06/10/22 0644 06/11/22 0203  06/12/22 0311   Weight: 55.7 kg (122 lb 14.4 oz) 55 kg (121 lb 3 oz) 54.9 kg (121 lb 1.6 oz) 55.6 kg (122 lb 8 oz)    06/13/22 0302   Weight: 55.6 kg (122 lb 9.6 oz)       2.  O2 sats > 90% on room air, or at prior home O2 therapy level.      Able to wean O2 this shift to keep sats above 90%?: Yes, satisfactory for discharge.   Does patient use Home O2? No          Current oxygenation status:   SpO2: 100 %     O2 Device: Nasal cannula, Oxygen Delivery: 2 LPM    3.  Tolerates ambulation and mobility near baseline.     Ambulation: No, further care required to meet this goal. Please explain Needs X2 assist to get out of bed.   Times patient ambulated with staff this shift: 1    Please review the Heart Failure Care Map for additional HF goal outcomes.    Scott Braga RN  6/14/2022

## 2022-06-14 NOTE — PROGRESS NOTES
Care Management Follow Up    Length of Stay (days): 12    Expected Discharge Date: 06/14/2022     Concerns to be Addressed:     placement  Patient plan of care discussed at interdisciplinary rounds: Yes    Anticipated Discharge Disposition:  Back to [facility] with more services, TCU (private pay as [facility] denied), or LTC     Anticipated Discharge Services:  TBD  Anticipated Discharge DME:  TBD    Patient/family educated on Medicare website which has current facility and service quality ratings:  yes  Education Provided on the Discharge Plan:  yes  Patient/Family in Agreement with the Plan:  yes    Referrals Placed by CM/SW:    Private pay costs discussed: Not applicable    Additional Information:  CM spoke to Maulik ([phone]) at the [facility] and she will be coming tomorrow at 0830 to do an in person assessment of the pt current needs and decide if they can manage her needs at their AL. Pt comes from the Independent living at [facility]. Transport to be determined.    Contacts  Maria [phone]  [facility] [phone]  Maulik - [facility] [phone]          Janett Garvey RN

## 2022-06-14 NOTE — PROGRESS NOTES
[facility]    Medicine Progress Note - Hospitalist Service    Date of Admission:  6/1/2022    Assessment & Plan     87 year old female admitted on 6/1/2022. She has history of CHF, coronary artery disease, bioprosthetic aortic valve, hypertension, mild dementia, rheumatoid arthritis, osteoporosis and presents for evaluation of dyspnea on exertion and shortness of breath found to be in rapid atrial flutter which was new and also causing acute diastolic HF     1.Aflut with RVR-NEW this admit  -Eliquis started--NEW this admit , CHADS2 was 6--I would agree with DOAC with her amount of dilated atria--I talked to sons about risks/benefits DOAC  -TSH-ok  -- stable on Metoprolol and Diltiazem      2.Dyspnea with acute hypoxic resp failure  -Some degree of acute diastolic heart failure  - small pleural effusion and pulmonary infiltrates, in the setting of recent A. fib with RVR and dyspnea on exertion, suspect she has some flash pulmonary edema related to tachycardia.   - IV lasix -->to lasix 40 mg po bid   -wean O2--still on it     3.Hyponatremia--resolved  -was volume up  -now on lasix 40 mg PO bid, and stable     4.HTN  -Continue Lasix--switch po, Imdur, metoprolol with hold parameters    5.Vascular dementia  -Supportive care  -Home Aricept  -was living alone, with family support -- but now needs additional services      6.CAD  -Continue home Imdur, metoprolol, atorvastatin     7.Status post bioprosthetic aortic valve replacement  -echo done here this admit     1. Normal left ventricular size and systolic performance with a visually  estimated ejection fraction of 55%.  2. There is mildly abnormal septal motion c/w right ventricular overload;  there is diastolic flattening of the septum with a more normal configuration  at end systole.  3. There is a bio-prosthetic aortic valve (documented 21 mm Lalitha-Blackwell  Magna bovine pericardial tissue valve).  Â  Normal aortic valve prosthesis metrics  "with a mean systolic gradient of 6  mmHg and a peak anterograde velocity of 1.7 m/sec.  Â  No aortic insufficiency is detected.  4. There is mild, to perhaps mild-moderate, tricuspid insufficiency.  5. There is moderate right ventricular enlargement with moderately reduced  right ventricular systolic performance.  6. There is severe left atrial enlargement. There is mild to moderate right  atrial enlargement.  7. Right ventricular systolic pressure relative to right atrial pressure is  mildly increased. The pulmonary artery pressure is estimated to be 40-45 mmHg  plus right atrial pressure (the IVC is moderately dilated).     8.Hypothyroidism, recent TSH checked and okay.  Continue home Synthroid     9.Rheumatoid arthritis, continue home methotrexate     10.GERD, home PPI     11.JOSIAH--onset after diurese   -mild, was on iv lasix, now po  -trend creat today improved to 0.91, peak elevation was 1.27  -per cards lasix now 40 mg po bid     12.Hypomag  -replaced per protocol     13.Fatigue, deconditioning  -pt/ot and would benefit from TCU  -will add on wbc, no fevers    14. Moderate Protein-Calorie Malnutrition       Diet: Regular Diet Adult  Room Service  Snacks/Supplements Adult: Ensure Enlive; With Meals    DVT Prophylaxis: DOAC  Rogers Catheter: Not present  Central Lines: None  Cardiac Monitoring: None  Code Status: No CPR- Do NOT Intubate      Disposition Plan  expect back to [facility] with additional services and Oxygen \"when they accept her\".  They are coming tomorrow to reassess.      Boo Paiz MD   Hospitalist Service  [facility]  Securely message with the Vocera Web Console (learn more here)  Text page via [employer] Paging/Directory     ______________________________________________________________________    Interval History   Feels OK, still weak, no new complaints.     Physical Exam   Vital Signs: Temp: 97.9  F (36.6  C) Temp src: Oral BP: (!) 145/70 Pulse: " 77   Resp: 20 SpO2: 97 % O2 Device: Nasal cannula Oxygen Delivery: 1 LPM  Weight: 115 lbs 0 oz  Constitutional: awake, fatigued, alert and cooperative  Respiratory: clear  Cardiovascular: irregularly irregular rhythm  GI: normal bowel sounds, soft and non-distended  Musculoskeletal: no lower extremity pitting edema present  Neurologic: Alert, Ox2, generalized weakness     Data   Recent Labs   Lab 06/11/22  0451 06/08/22  0421 06/07/22  1824   WBC 6.6  --  7.2   HGB 11.8  --   --    *  --   --    *  --   --    * 140  --    POTASSIUM 4.1 3.9  --    CHLORIDE 85* 90*  --    CO2 43* 43*  --    BUN 25 19  --    CR 1.00 0.85  --    ANIONGAP 7 7  --    CARLA 9.3 9.0  --    GLC 94 99  --      No results found for this or any previous visit (from the past 24 hour(s)).

## 2022-06-15 NOTE — PROGRESS NOTES
Daily Progress Note    Assessment/Plan:  87 year old female admitted on 6/1/2022. She has history of CHF, coronary artery disease, bioprosthetic aortic valve, hypertension, mild dementia, rheumatoid arthritis, osteoporosis and presents for evaluation of dyspnea on exertion and shortness of breath found to be in rapid atrial flutter which was new and also causing acute diastolic HF     1.Aflut with RVR-NEW this admit  -Eliquis started--NEW this admit , CHADS2 was 6--I would agree with DOAC with her amount of dilated atria--previous rounder talked to sons about risks/benefits DOAC  -TSH-ok  -- stable on Metoprolol and Diltiazem      2.Dyspnea with acute hypoxic resp failure, still requiring 1 L, continue to try and wean  -Some degree of acute diastolic heart failure  - small pleural effusion and pulmonary infiltrates, in the setting of recent A. fib with RVR and dyspnea on exertion, suspect she has some flash pulmonary edema related to tachycardia.   - IV lasix -->to lasix 40 mg po bid        3.Hyponatremia--resolved  -was volume up  -now on lasix 40 mg PO bid, and stable     4.HTN  -Continue Lasix--switch po, Imdur, metoprolol with hold parameters    5.Vascular dementia  -Supportive care  -Home Aricept  -was living alone, with family support -- but now needs additional services      6.CAD-no evidence of exacerbation  -Continue home Imdur, metoprolol, atorvastatin     7.Status post bioprosthetic aortic valve replacement  -echo done here this admit     1. Normal left ventricular size and systolic performance with a visually  estimated ejection fraction of 55%.  2. There is mildly abnormal septal motion c/w right ventricular overload;  there is diastolic flattening of the septum with a more normal configuration  at end systole.  3. There is a bio-prosthetic aortic valve (documented 21 mm Lalitha-Blackwell  Magna bovine pericardial tissue valve).  Â  Normal aortic valve prosthesis metrics with a mean systolic gradient  "of 6  mmHg and a peak anterograde velocity of 1.7 m/sec.  Â  No aortic insufficiency is detected.  4. There is mild, to perhaps mild-moderate, tricuspid insufficiency.  5. There is moderate right ventricular enlargement with moderately reduced  right ventricular systolic performance.  6. There is severe left atrial enlargement. There is mild to moderate right  atrial enlargement.  7. Right ventricular systolic pressure relative to right atrial pressure is  mildly increased. The pulmonary artery pressure is estimated to be 40-45 mmHg  plus right atrial pressure (the IVC is moderately dilated).     8.Hypothyroidism, recent TSH checked and okay.  Continue home Synthroid     9.Rheumatoid arthritis, continue home methotrexate        10.JOSIAH--onset after diurese   -mild, was on iv lasix, now po  -Now resolved  -per cards lasix now 40 mg po bid        11.Fatigue, deconditioning  -pt/ot and would benefit from TCU-insurance has denied so her independent living facility will assess her to see if they can care for her in [place]                Snacks/Supplements Adult: Ensure Enlive; With Meals            Disposition Plan    expect back to [facility] with additional services and Oxygen \"when they accept her\".  They are coming today to assess    Code status:No CPR- Do NOT Intubate        Barriers to Discharge: Placement    Disposition: Anticipate discharge tomorrow if appropriate placement found    Subjective:  Ally is new to me today, chart reviewed and discussed with staff.  She remains on 1 L of O2.  She denies any dyspnea.  She further denies any chest pain or abdominal pain and states that her appetite has been good.        Current Medications Reviewed via EHR List    Objective:  Vital signs in last 24 hours:  [unfilled]  .prog  Weight:   @THISENCWEIGHTS(1)@  Weight change: 0.091 kg (3.2 oz)  Body mass index is 19.77 kg/m .    Intake/Output last 3 shifts:  I/O last 3 completed shifts:  In: 240 " [P.O.:240]  Out: 650 [Urine:650]  Intake/Output this shift:  No intake/output data recorded.    Physical Exam:  General: No apparent distress  CV: Regular rate and rhythm, no peripheral edema  Lungs: Decreased breath sounds bilaterally but clear  Abdomen: Soft, nontender        Imaging:  Personally Reviewed.  XR Chest 2 Views    Result Date: 2022  EXAM: XR CHEST 2 VW LOCATION: [facility] DATE/TIME: 2022 6:05 PM INDICATION: sob COMPARISON: 2022     IMPRESSION: Small right pleural effusion versus pleural thickening. Mild hyperaeration. Mild pulmonary vascular congestion. Moderately enlarged cardiac silhouette with tortuous atherosclerotic aorta. Multiple median sternotomy wires and prosthetic aortic  valve.    Echocardiogram Limited    Result Date: 2022  [id number]^DOWNS^[place] [address]  Name: MEÑO GORE MRN: [id number] : 1935 Study Date: 2022 07:17 AM Age: 87 yrs Gender: Female Patient Location: [facility] Reason For Study: CHF, Atrial Fibrillation Ordering Physician: MAYLIN BORREGO Performed By: BRENNA  BSA: 1.6 m2 Height: 64 in Weight: 125 lb ______________________________________________________________________________ Procedure Definity (NDC #57743-027) given intravenously. Limited Echo Adult. ______________________________________________________________________________ Interpretation Summary  1. Normal left ventricular size and systolic performance with a visually estimated ejection fraction of 55%. 2. There is mildly abnormal septal motion c/w right ventricular overload; there is diastolic flattening of the septum with a more normal configuration at end systole. 3. There is a bio-prosthetic aortic valve (documented 21 mm Lalitha-Blackwell Magna bovine pericardial tissue valve). Â  Normal aortic valve prosthesis metrics with a mean systolic gradient of 6 mmHg and a peak anterograde  velocity of 1.7 m/sec. Â  No aortic insufficiency is detected. 4. There is mild, to perhaps mild-moderate, tricuspid insufficiency. 5. There is moderate right ventricular enlargement with moderately reduced right ventricular systolic performance. 6. There is severe left atrial enlargement. There is mild to moderate right atrial enlargement. 7. Right ventricular systolic pressure relative to right atrial pressure is mildly increased. The pulmonary artery pressure is estimated to be 40-45 mmHg plus right atrial pressure (the IVC is moderately dilated).  When compared to the prior real-time echocardiogram dated 21 February 2022, there has been a mild increase in the pulmonary artery pressure estimate. The findings are otherwise felt to be fairly similar on both examinations. ______________________________________________________________________________ Left ventricle: Normal left ventricular size and systolic performance with a visually estimated ejection fraction of 55%. There is mildly abnormal septal motion c/w right ventricular overload; there is diastolic flattening of the septum with a more normal configuration at end systole. Left ventricular wall thickness is normal.  Assessment of LV Diastolic Function: Patient appears to be in atrial fibrillation which limits assessment of diastolic filling.  Right ventricle: There is moderate right ventricular enlargement with moderately reduced right ventricular systolic performance. Right ventricular wall thickness appears mildly increased.  Left atrium: There is severe left atrial enlargement.  Right atrium: There is mild to moderate right atrial enlargement.  IVC: The IVC is moderately dilated.  Aortic valve: There is a bio-prosthetic aortic valve (documented 21 mm Lalitha-Blackwell Magna bovine pericardial tissue valve). Normal aortic valve prosthesis metrics with a mean systolic gradient of 6 mmHg and a peak anterograde velocity of 1.7 m/sec. No aortic insufficiency  is detected.  Mitral valve: There is mild mitral annular calcification. There is mild mitral insufficiency.  Tricuspid valve: The tricuspid valve is grossly morphologically normal. There is mild, to perhaps mild-moderate, tricuspid insufficiency.  Pulmonic valve: The pulmonic valve is grossly morphologically normal. There is mild pulmonic insufficiency.  Thoracic aorta: The aortic root and proximal ascending aorta are of normal dimension.  Pericardium: There is no significant pericardial effusion. ______________________________________________________________________________ ______________________________________________________________________________ MMode/2D Measurements & Calculations IVSd: 0.64 cm LVIDd: 4.5 cm LVIDs: 2.9 cm LVPWd: 0.99 cm FS: 35.9 % LV mass(C)d: 116.9 grams LV mass(C)dI: 73.0 grams/m2 LA dimension: 3.9 cm asc Aorta Diam: 3.0 cm LVOT diam: 2.0 cm LVOT area: 3.0 cm2  LA Volume Indexed (AL/bp): 64.4 ml/m2 RWT: 0.44  Doppler Measurements & Calculations MV max P.4 mmHg MV mean P.6 mmHg MV V2 VTI: 29.1 cm MVA(VTI): 1.8 cm2 Ao V2 max: 165.5 cm/sec Ao max P.0 mmHg Ao V2 mean: 117.6 cm/sec Ao mean P.2 mmHg Ao V2 VTI: 46.8 cm FATEMEH(I,D): 1.1 cm2 FATEMEH(V,D): 1.3 cm2 LV V1 max P.0 mmHg LV V1 max: 70.2 cm/sec LV V1 VTI: 17.2 cm SV(LVOT): 51.5 ml SI(LVOT): 32.2 ml/m2 TR max rodrick: 327.9 cm/sec TR max P.0 mmHg AV Rodrick Ratio (DI): 0.42 FATEMEH Index (cm2/m2): 0.69  ______________________________________________________________________________ Report approved by: Joshua Urena 2022 02:20 PM         Lab Results:  Personally Reviewed.   Fingerstick Blood Glucose: @WGGMTEK20RPW(POCGLUFGR:10)@    Last Hbg A1C: No results found for: HGBA1C   Lab Results   Component Value Date    INR 1.25 (H) 2022    INR 1.25 (H) 10/10/2021    INR 1.19 (H) 2021     No results found for this or any previous visit (from the past 24 hour(s)).        Advanced Care Planning    Time > 35 minutes  with greater than 50% of time spent in counseling and coordination of care.    Robert English MD  Date: 6/15/2022  Time: 3:25 PM  [facility]  Family Medicine

## 2022-06-15 NOTE — PLAN OF CARE
"  Problem: Plan of Care - These are the overarching goals to be used throughout the patient stay.    Goal: Plan of Care Review/Shift Note  Description: The Plan of Care Review/Shift note should be completed every shift.  The Outcome Evaluation is a brief statement about your assessment that the patient is improving, declining, or no change.  This information will be displayed automatically on your shift note.  Outcome: Ongoing, Progressing  Goal: Patient-Specific Goal (Individualized)  Description: You can add care plan individualizations to a care plan. Examples of Individualization might be:  \"Parent requests to be called daily at 9am for status\", \"I have a hard time hearing out of my right ear\", or \"Do not touch me to wake me up as it startles me\".  Outcome: Ongoing, Progressing  Goal: Absence of Hospital-Acquired Illness or Injury  Outcome: Ongoing, Progressing  Intervention: Identify and Manage Fall Risk  Recent Flowsheet Documentation  Taken 6/15/2022 0000 by Scott Braga, RN  Safety Promotion/Fall Prevention:   activity supervised   bed alarm on   safety round/check completed   room organization consistent   room near nurse's station   room door open   nonskid shoes/slippers when out of bed   mobility aid in reach   lighting adjusted   increase visualization of patient   increased rounding and observation   fall prevention program maintained   clutter free environment maintained  Taken 6/14/2022 2000 by Scott Braga, RN  Safety Promotion/Fall Prevention:   activity supervised   bed alarm on   safety round/check completed   room organization consistent   room near nurse's station   room door open   nonskid shoes/slippers when out of bed   mobility aid in reach   lighting adjusted   increase visualization of patient   increased rounding and observation   fall prevention program maintained   clutter free environment maintained  Intervention: Prevent Skin Injury  Recent Flowsheet " Documentation  Taken 6/15/2022 0000 by Scott Braga RN  Body Position: position changed independently  Taken 6/14/2022 2000 by Scott Braga RN  Body Position: position changed independently  Intervention: Prevent and Manage VTE (Venous Thromboembolism) Risk  Recent Flowsheet Documentation  Taken 6/15/2022 0000 by Scott Braga, RN  Activity Management: activity adjusted per tolerance  Taken 6/14/2022 2000 by Scott Brgaa RN  Activity Management: activity adjusted per tolerance  Goal: Optimal Comfort and Wellbeing  Outcome: Ongoing, Progressing  Goal: Readiness for Transition of Care  Outcome: Ongoing, Progressing   Goal Outcome Evaluation:  Heart Failure Care Map  GOALS TO BE MET BEFORE DISCHARGE:    1. Decrease congestion and/or edema with diuretic therapy to achieve near optimal volume status.     Dyspnea improved: Yes, satisfactory for discharge.   Edema improved: Yes, satisfactory for discharge.        Net I/O and Weights since admission:   05/16 0700 - 06/15 0659  In: 9984 [P.O.:9484]  Out: 64886 [Urine:28037]  Net: -7816     Vitals:    06/01/22 1700 06/01/22 2205 06/02/22 0422 06/04/22 0322   Weight: 54.9 kg (121 lb) 57.1 kg (125 lb 14.4 oz) 56.8 kg (125 lb 3.2 oz) 57.2 kg (126 lb 1.6 oz)    06/05/22 0404 06/06/22 0355 06/07/22 0630 06/08/22 0435   Weight: 54.9 kg (121 lb) 55.6 kg (122 lb 9.6 oz) 53.7 kg (118 lb 4.8 oz) 52.9 kg (116 lb 11.2 oz)    06/09/22 0700 06/10/22 0644 06/11/22 0203 06/12/22 0311   Weight: 55.7 kg (122 lb 14.4 oz) 55 kg (121 lb 3 oz) 54.9 kg (121 lb 1.6 oz) 55.6 kg (122 lb 8 oz)    06/13/22 0302 06/14/22 0530   Weight: 55.6 kg (122 lb 9.6 oz) 52.2 kg (115 lb)       2.  O2 sats > 90% on room air, or at prior home O2 therapy level.      Able to wean O2 this shift to keep sats above 90%?: Yes, satisfactory for discharge.   Does patient use Home O2? No          Current oxygenation status:   SpO2: 92 %     O2 Device: Nasal cannula, Oxygen Delivery: 1  LPM    3.  Tolerates ambulation and mobility near baseline.     Ambulation: Yes, satisfactory for discharge.   Times patient ambulated with staff this shift: 0.  Resp: Trending mid 90's at RA.  No S&S of respiratory distress observed.  LS: Diminished in bases, bilat.  Good UOP (see I/O).  Denies any pain/discomfort.  Awaiting placement to Assisted Living, pending conference with family and [facility], today.     Please review the Heart Failure Care Map for additional HF goal outcomes.    Scott Braga, ARIE  6/15/2022

## 2022-06-15 NOTE — PLAN OF CARE
Problem: Plan of Care - These are the overarching goals to be used throughout the patient stay.    Goal: Plan of Care Review/Shift Note  Description: The Plan of Care Review/Shift note should be completed every shift.  The Outcome Evaluation is a brief statement about your assessment that the patient is improving, declining, or no change.  This information will be displayed automatically on your shift note.  Outcome: Ongoing, Progressing  Flowsheets (Taken 6/15/2022 6274)  Plan of Care Reviewed With: patient  Overall Patient Progress: improving   Goal Outcome Evaluation:    Plan of Care Reviewed With: patient     Overall Patient Progress: improving     Pt has been alert and oriented but forgetful again today.  She is up with assist of 1 and walker/gait belt.  She is able to ambulate in halls x2 today.  Purewick in place.  She has been tired today and tells me she is just sick and tired of being sick and tired.  Tolerating diet with no complaints of nausea or vomiting.  Will continue to monitor.      Addendum:  Pt was transferred to room 420.  Report called and given to Sameera.  All pts belongings were transferred with her along with her own wheelchair that was placed in the bathroom.  Called daughter Yeimi to inform her of the room change and had to leave a voicemail.

## 2022-06-15 NOTE — PROGRESS NOTES
CLINICAL NUTRITION SERVICES - REASSESSMENT NOTE     Nutrition Prescription    RECOMMENDATIONS FOR MDs/PROVIDERS TO ORDER:      Malnutrition Status:    Previously noted moderate- no change  Recommendations already ordered by Registered Dietitian (RD):      Continue oral nutrition supplements     Continue Nutrition Rx   with select menus honoring food preferences and maximizing choices    Future/Additional Recommendations:       EVALUATION OF PROGRESS TOWARD GOALS/NEW FINDINGS   Progress towards goals will be monitored and evaluated per protocol and Practice Guidelines      Diet order: Orders Placed This Encounter      Regular Diet Adult     Supplements: CONTINUOUS, Starting on Thu 22 at 1230, Until Specified  Select Supplement: Ensure Enlive  Frequency: With Meals  Vanilla with supper. OK to sub [employer]  Per flow sheet review, % intake for documented meals x 7, 50% x 3, 25% x 1, 0 x 1  Supplement intake per flow sheet review ~ 237 ml last noted 22  Meal selections per nutrition system  past 3 days averaging 2200 kcal, 90 g protein/d     likely Meeting  estimated needs (based on % intake noted)     Last BM per nursin22 D .     Skn: intact     Edy nutrition score: 3; total score: 18    I/O last 3 completed shifts:    In: 240 [P.O.:240]    Out: 650 [Urine:650]    Labs:    Electrolytes  Potassium (mmol/L)   Date Value   2022 4.1   2022 3.9   2022 4.0        Blood Glucose  Glucose (mg/dL)   Date Value   2022 94   2022 99   2022 99   2022 93   2022 97     Hemoglobin A1C (%)   Date Value   2014 5.1        Inflammatory Markers  WBC Count (10e3/uL)   Date Value   2022 6.6   2022 7.2   2022 7.0     Albumin (g/dL)   Date Value   2022 3.6   04/15/2022 3.1 (L)   2022 3.3 (L)        Magnesium (mg/dL)   Date Value   2022 2.2   2022 2.1   2022 2.0     Sodium (mmol/L)   Date Value   2022 135 (L)  "  06/08/2022 140   06/07/2022 140        Renal  Urea Nitrogen (mg/dL)   Date Value   06/11/2022 25   06/08/2022 19   06/07/2022 20     Creatinine (mg/dL)   Date Value   06/11/2022 1.00   06/08/2022 0.85   06/07/2022 0.91         Additional  Triglycerides (mg/dL)   Date Value   08/21/2018 70   08/04/2017 62   12/06/2016 61     Ketones Urine (no units)   Date Value   07/02/2020 Negative        Meds:    apixaban ANTICOAGULANT  2.5 mg Oral BID     atorvastatin  40 mg Oral Daily     diltiazem ER COATED BEADS  180 mg Oral Daily     donepezil  10 mg Oral At Bedtime     furosemide  40 mg Oral BID     isosorbide mononitrate  30 mg Oral Daily     levothyroxine  100 mcg Oral QAM AC     methotrexate sodium  3 tablet Oral Weekly     metoprolol succinate ER  50 mg Oral Daily     pantoprazole  40 mg Oral QAM AC     polyethylene glycol  17 g Oral Daily         acetaminophen, bisacodyl, docusate sodium, loperamide, melatonin, naloxone **OR** naloxone **OR** naloxone **OR** naloxone, ondansetron **OR** ondansetron, traMADol          ANTHROPOMETRICS  Height: 5' 4\"  Weight: 52 kg   Body mass index is 19.77 kg/m .  06/15/22 0651 52.3 kg (115 lb 3.2 oz) --    06/14/22 0530 52.2 kg (115 lb) --    06/13/22 0302 55.6 kg (122 lb 9.6 oz) --    06/12/22 0311 55.6 kg (122 lb 8 oz) --    06/11/22 0203 54.9 kg (121 lb 1.6 oz) --    06/10/22 0644 55 kg (121 lb 3 oz) --    06/09/22 0700 55.7 kg (122 lb 14.4 oz) --        Weight Status:  Normal BMI  Weight changes since last review : 6% in ~ 1 week  Also  In the setting of diuresis       Dosing Weight: 55.7 kg     ASSESSED NUTRITION NEEDS  Estimated Energy Needs: 8509-0958 kcals/day (25 - 30 kcals/kg)  Justification: Maintenance  Estimated Protein Needs: 56+ grams protein/day (1+)  Justification: Maintenance  Estimated Fluid Needs: 2464-5968 mL/day (1 mL/kcal)   Justification: Maintenance    NEW FINDINGS     Previous Goals   Maintain wt- not met but in the setting of diuresis  Patient to " consume % of nutritionally adequate meals three times per day, or the equivalent with supplements/snacks  Evaluation:  Intake overall Met    Previous Nutrition Diagnosis  Malnutrition related to chronic illness as evidenced by muscle and fat loss       Evaluation: No change

## 2022-06-15 NOTE — PROGRESS NOTES
Maulik from the [facility] was here earlier today to do an assessment and she left a message saying pt would need an increase in services and potentially memory care, which is what we were anticipating however she did not say if they can accept the pt or any sort of plan.  I tried to reach out several times but was unsuccessful so I just left a message to call back to [phone].  I also reached out to the family to see if they have heard any news on LTC and the options for additional services with the [facility] in the [place] and they had not heard from anyone either.  We discussed the option of the pt going home to her independent living apartment with increased services, and a private pay PCA. I also spoke to Samantha at [facility] who was also waiting to hear back from Maulik and she will also be a resource for the family to look into additional services for the pt.  The family is very motivated to get pt out of the hospital they just want to hear what the [facility] has to say before making any decisions.  CM to follow up with son Kwesi in the morning.    Kwesi - son [phone]  Ashley Singh [phone]  Maulik - the [facility] [phone]

## 2022-06-16 NOTE — PROGRESS NOTES
Care Management Follow Up    Length of Stay (days): 14    Expected Discharge Date: 6/17/22     Concerns to be Addressed:  placement     Patient plan of care discussed at interdisciplinary rounds: Yes    Anticipated Discharge Disposition:  Back to [facility] with more services, TCU (private pay as [facility] denied), or LTC     Anticipated Discharge Services:  Home care set up by family  Anticipated Discharge DME:  Pt uses a rolling walker    Patient/family educated on Medicare website which has current facility and service quality ratings:    Education Provided on the Discharge Plan:  yes  Patient/Family in Agreement with the Plan:  yes    Referrals Placed by CM/SW:    Private pay costs discussed: Not applicable    Additional Information:  KRISTINA YOUNG placed call to [facility] ([phone]) and requested to speak with Maulik.  Melody at [facility] informed KRISTINA that Maulik was not in.  KRISTINA inquired if Melody could assist in updating KRISTINA on status of assessment as no info was provided as to whether Pt is able to return or any type of plan.  Melody stated she did not have that information, but would have her supervisor Rubia reach out when she comes in.  KRISTINA provided call back number.    SW/HECTOR following for progression and discharge planning.    Addendum:  1:36 PM  KRISTINA received voice mail from Pt's son Kwesi requesting call back to discuss discharge plan.    KRISTINA also had message from MD with update that Pt will discharge tomorrow (6/17/22) back to home w/home care.    KRISTINA placed call to Pt's son Kwesi ([phone]) to discuss discharge plan.  Kwesi states that his daughter Kandi is driving in from [place] to take Pt back to [facility], stay with her over the weekend, and Pt has an appointment with a home care agency on Monday.  Kwesi reports Pt does not have home O2 and will need this.  KRISTINA said she will update MD re: home O2 and this can be delivered to Pt.  KRISTINA inquired about transportation needs; Kwesi reports that his daughter  Kandi will transport and would like to plan for early afternoon as she is driving in from out of state.  KRISTINA said she would update MD.  Kwesi would like a call as soon as possible if plan is to change.    KRISTINA updated MD and asked for O2 to be ordered for Pt.      KRISTINA following for discharge planning needs.      Carlos Buchanan, [facility] CenterSW

## 2022-06-16 NOTE — PLAN OF CARE
Physical Therapy Discharge Summary    Reason for therapy discharge:    Discharged to remains in hospital,awaiting LTC palcement  All goals and outcomes met, no further needs identified.    Progress towards therapy goal(s). See goals on Care Plan in [facility] electronic health record for goal details.  Goals met    Therapy recommendation(s):    recommend amb with nursing staff while remains in hospital

## 2022-06-16 NOTE — PLAN OF CARE
Problem: Respiratory Compromise (Heart Failure)  Goal: Effective Oxygenation and Ventilation  Outcome: Ongoing, Progressing  Patient denies shortness of breath at rest,  mild dyspnea with exertion.  SpO2 at 87% on room air, replaced O2 1.5L via nasal cannula. Breath sounds diminished bilaterally.  Educated on use of IS. Chest x-ray ordered.    Problem: Functional Ability Impaired (Heart Failure)  Goal: Optimal Functional Ability  Outcome: Ongoing, Progressing  Ambulating in halls, motivated to increase activity.  PT/OT following.    Denies pain, appears comfortable.    Sameera Ballesteros RN

## 2022-06-16 NOTE — PROGRESS NOTES
Daily Progress Note    Assessment/Plan:  87 year old female admitted on 6/1/2022. She has history of CHF, coronary artery disease, bioprosthetic aortic valve, hypertension, mild dementia, rheumatoid arthritis, osteoporosis and presents for evaluation of dyspnea on exertion and shortness of breath found to be in rapid atrial flutter which was new and also causing acute diastolic HF.  Anticipate discharge tomorrow with family as they have set up home care.     1.Aflut with RVR-NEW this admit  -Eliquis started--NEW this admit , CHADS2 was 6--I would agree with DOAC with her amount of dilated atria--previous rounder talked to sons about risks/benefits DOAC  -TSH-ok  -- stable on Metoprolol and Diltiazem      2.Dyspnea with acute hypoxic resp failure, still requiring 1 L, continue to try and wean  -Some degree of acute diastolic heart failure  - small pleural effusion and pulmonary infiltrates, in the setting of recent A. fib with RVR and dyspnea on exertion, suspect she has some flash pulmonary edema related to tachycardia.   - IV lasix -->to lasix 40 mg po bid   -- We will repeat chest x-ray today to see if we need to get more fluid off to try and get her off of O2.  Otherwise she may need to discharge on home O2.  Will order home O2 eval for tomorrow.        3.Hyponatremia--resolved  -was volume up  -now on lasix 40 mg PO bid, and stable     4.HTN  -Continue Lasix--, Imdur, metoprolol with hold parameters    5.Vascular dementia  -Supportive care  -Home Aricept  -was living alone, with family support -- but now needs additional services      6.CAD-no evidence of exacerbation  -Continue home Imdur, metoprolol, atorvastatin     7.Status post bioprosthetic aortic valve replacement  -echo done here this admit     1. Normal left ventricular size and systolic performance with a visually  estimated ejection fraction of 55%.  2. There is mildly abnormal septal motion c/w right ventricular overload;  there is diastolic  flattening of the septum with a more normal configuration  at end systole.  3. There is a bio-prosthetic aortic valve (documented 21 mm Lalitha-Blackwell  Magna bovine pericardial tissue valve).  Â  Normal aortic valve prosthesis metrics with a mean systolic gradient of 6  mmHg and a peak anterograde velocity of 1.7 m/sec.  Â  No aortic insufficiency is detected.  4. There is mild, to perhaps mild-moderate, tricuspid insufficiency.  5. There is moderate right ventricular enlargement with moderately reduced  right ventricular systolic performance.  6. There is severe left atrial enlargement. There is mild to moderate right  atrial enlargement.  7. Right ventricular systolic pressure relative to right atrial pressure is  mildly increased. The pulmonary artery pressure is estimated to be 40-45 mmHg  plus right atrial pressure (the IVC is moderately dilated).     8.Hypothyroidism, recent TSH checked and okay.  Continue home Synthroid     9.Rheumatoid arthritis, continue home methotrexate        10.JOSIAH--onset after diurese   -mild, was on iv lasix, now po  -Resolved but creatinine up a little bit today.  Will recheck tomorrow and possibly adjust Lasix based on chest x-ray findings.  -per cards lasix now 40 mg po bid, is on 40 mg daily at home.        11.Fatigue, deconditioning  -pt/ot and would benefit from TCU-insurance has denied.                  Snacks/Supplements Adult: Ensure Enlive; With Meals       Code status:No CPR- Do NOT Intubate        Barriers to Discharge: JOSIAH, hypoxia    Disposition: Anticipate discharge tomorrow.  She typically lives in independent living at the [facility] but at this point now requires more services.  Someone from the [facility] came out yesterday to assess her needs and whether they can accommodate them there.  Care management has been trying to reach this person but they are not working today.  I spoke with her son Kwesi who states that he is now already set up some home care to start on  Monday and his daughter will care for her over the weekend.  Anticipate she will be stable for discharge tomorrow.    Subjective:  Ally has no new complaints.  She does not like wearing the oxygen and I assured her we will try and wean this.  She denies any shortness of breath or chest pain.  I called and spoke with her son Kwesi and he had no further questions.        Current Medications Reviewed via EHR List    Objective:  Vital signs in last 24 hours:  [unfilled]  .prog  Weight:   @THISENCWEIGHTS(1)@  Weight change:   Body mass index is 19.77 kg/m .    Intake/Output last 3 shifts:  I/O last 3 completed shifts:  In: 480 [P.O.:480]  Out: -   Intake/Output this shift:  I/O this shift:  In: 360 [P.O.:360]  Out: -     Physical Exam:  General: No apparent distress  CV: Regular rate and rhythm, no peripheral edema  Lungs: Decreased breath sounds bilaterally but clear  Abdomen: Soft, nontender        Imaging:  Personally Reviewed.  XR Chest 2 Views    Result Date: 2022  EXAM: XR CHEST 2 VW LOCATION: [facility] DATE/TIME: 2022 6:05 PM INDICATION: sob COMPARISON: 2022     IMPRESSION: Small right pleural effusion versus pleural thickening. Mild hyperaeration. Mild pulmonary vascular congestion. Moderately enlarged cardiac silhouette with tortuous atherosclerotic aorta. Multiple median sternotomy wires and prosthetic aortic  valve.    Echocardiogram Limited    Result Date: 2022  [id number]^[address]^[place] [address]  Name: MEÑO GORE MRN: [id number] : 1935 Study Date: 2022 07:17 AM Age: 87 yrs Gender: Female Patient Location: [facility] Reason For Study: CHF, Atrial Fibrillation Ordering Physician: MAYLIN BORREGO Performed By: BRENNA  BSA: 1.6 m2 Height: 64 in Weight: 125 lb ______________________________________________________________________________ Procedure Definity ([facility] #66447-789) given  intravenously. Limited Echo Adult. ______________________________________________________________________________ Interpretation Summary  1. Normal left ventricular size and systolic performance with a visually estimated ejection fraction of 55%. 2. There is mildly abnormal septal motion c/w right ventricular overload; there is diastolic flattening of the septum with a more normal configuration at end systole. 3. There is a bio-prosthetic aortic valve (documented 21 mm Lalitha-Blackwell Magna bovine pericardial tissue valve). Â  Normal aortic valve prosthesis metrics with a mean systolic gradient of 6 mmHg and a peak anterograde velocity of 1.7 m/sec. Â  No aortic insufficiency is detected. 4. There is mild, to perhaps mild-moderate, tricuspid insufficiency. 5. There is moderate right ventricular enlargement with moderately reduced right ventricular systolic performance. 6. There is severe left atrial enlargement. There is mild to moderate right atrial enlargement. 7. Right ventricular systolic pressure relative to right atrial pressure is mildly increased. The pulmonary artery pressure is estimated to be 40-45 mmHg plus right atrial pressure (the IVC is moderately dilated).  When compared to the prior real-time echocardiogram dated 21 February 2022, there has been a mild increase in the pulmonary artery pressure estimate. The findings are otherwise felt to be fairly similar on both examinations. ______________________________________________________________________________ Left ventricle: Normal left ventricular size and systolic performance with a visually estimated ejection fraction of 55%. There is mildly abnormal septal motion c/w right ventricular overload; there is diastolic flattening of the septum with a more normal configuration at end systole. Left ventricular wall thickness is normal.  Assessment of LV Diastolic Function: Patient appears to be in atrial fibrillation which limits assessment of diastolic  filling.  Right ventricle: There is moderate right ventricular enlargement with moderately reduced right ventricular systolic performance. Right ventricular wall thickness appears mildly increased.  Left atrium: There is severe left atrial enlargement.  Right atrium: There is mild to moderate right atrial enlargement.  IVC: The IVC is moderately dilated.  Aortic valve: There is a bio-prosthetic aortic valve (documented 21 mm Lalitha-Blackwell Magna bovine pericardial tissue valve). Normal aortic valve prosthesis metrics with a mean systolic gradient of 6 mmHg and a peak anterograde velocity of 1.7 m/sec. No aortic insufficiency is detected.  Mitral valve: There is mild mitral annular calcification. There is mild mitral insufficiency.  Tricuspid valve: The tricuspid valve is grossly morphologically normal. There is mild, to perhaps mild-moderate, tricuspid insufficiency.  Pulmonic valve: The pulmonic valve is grossly morphologically normal. There is mild pulmonic insufficiency.  Thoracic aorta: The aortic root and proximal ascending aorta are of normal dimension.  Pericardium: There is no significant pericardial effusion. ______________________________________________________________________________ ______________________________________________________________________________ MMode/2D Measurements & Calculations IVSd: 0.64 cm LVIDd: 4.5 cm LVIDs: 2.9 cm LVPWd: 0.99 cm FS: 35.9 % LV mass(C)d: 116.9 grams LV mass(C)dI: 73.0 grams/m2 LA dimension: 3.9 cm asc Aorta Diam: 3.0 cm LVOT diam: 2.0 cm LVOT area: 3.0 cm2  LA Volume Indexed (AL/bp): 64.4 ml/m2 RWT: 0.44  Doppler Measurements & Calculations MV max P.4 mmHg MV mean P.6 mmHg MV V2 VTI: 29.1 cm MVA(VTI): 1.8 cm2 Ao V2 max: 165.5 cm/sec Ao max P.0 mmHg Ao V2 mean: 117.6 cm/sec Ao mean P.2 mmHg Ao V2 VTI: 46.8 cm FATEMEH(I,D): 1.1 cm2 FATEMEH(V,D): 1.3 cm2 LV V1 max P.0 mmHg LV V1 max: 70.2 cm/sec LV V1 VTI: 17.2 cm SV(LVOT): 51.5 ml SI(LVOT): 32.2  ml/m2 TR max rodrick: 327.9 cm/sec TR max P.0 mmHg AV Rodrick Ratio (DI): 0.42 FATEMEH Index (cm2/m2): 0.69  ______________________________________________________________________________ Report approved by: Joshua Urena 2022 02:20 PM         Lab Results:  Personally Reviewed.   Fingerstick Blood Glucose: @WUOEKMO35JHX(POCGLUFGR:10)@    Last Hbg A1C: No results found for: HGBA1C   Lab Results   Component Value Date    INR 1.25 (H) 2022    INR 1.25 (H) 10/10/2021    INR 1.19 (H) 2021     Recent Results (from the past 24 hour(s))   Basic metabolic panel    Collection Time: 22  6:32 AM   Result Value Ref Range    Sodium 135 (L) 136 - 145 mmol/L    Potassium 3.8 3.5 - 5.0 mmol/L    Chloride 90 (L) 98 - 107 mmol/L    Carbon Dioxide (CO2) 36 (H) 22 - 31 mmol/L    Anion Gap 9 5 - 18 mmol/L    Urea Nitrogen 37 (H) 8 - 28 mg/dL    Creatinine 1.14 (H) 0.60 - 1.10 mg/dL    Calcium 9.1 8.5 - 10.5 mg/dL    Glucose 103 70 - 125 mg/dL    GFR Estimate 47 (L) >60 mL/min/1.73m2           Advanced Care Planning    Time > 35 minutes with greater than 50% of time spent in counseling and coordination of care.    Robert English MD  Date: 2022  Time: 1:53 PM  [facility]   No

## 2022-06-16 NOTE — PLAN OF CARE
Problem: Respiratory Compromise (Heart Failure)  Goal: Effective Oxygenation and Ventilation  Outcome: Ongoing, Progressing   Goal Outcome Evaluation:      Pt is comfortable this shift, no SOB or discomfort noted. On O2 1L per nasal cannula. Encouraged repositioning.

## 2022-06-17 NOTE — PROGRESS NOTES
Oxygen Documentation:   I certify that this patient, Rox Zavala has been under my care (or a nurse practitioner or physican's assistant working with me). This is the face-to-face encounter for oxygen medical necessity.      Rox Zavala is now in a chronic stable state and continues to require supplemental oxygen. Patient has continued oxygen desaturation due to Chronic Heart Failure I50.    Alternative treatment(s) tried or considered and deemed clinically infective for treatment of Chronic Heart Failure I50 include inhalers and pulmonary toileting.  If portability is ordered, is the patient mobile within the home? yes    **Patients who qualify for home O2 coverage under the CMS guidelines require ABG tests or O2 sat readings obtained closest to, but no earlier than 2 days prior to the discharge, as evidence of the need for home oxygen therapy. Testing must be performed while patient is in the chronic stable state. See notes for O2 sats.**

## 2022-06-17 NOTE — DISCHARGE SUMMARY
[facility] MEDICINE  DISCHARGE SUMMARY     Primary Care Physician: Nora Robledo  Admission Date: 6/1/2022   Discharge Provider: Nnamdi Roger MD Discharge Date: 6/17/2022   Diet:   Active Diet and Nourishment Order   Procedures     Room Service     Snacks/Supplements Adult: Ensure Enlive; With Meals     Regular Diet Adult     Diet       Code Status: No CPR- Do NOT Intubate   Activity: DCACTIVITY: Activity as tolerated        Condition at Discharge: Fair     REASON FOR PRESENTATION(See Admission Note for Details)   SOB    PRINCIPAL & ACTIVE DISCHARGE DIAGNOSES     Principal Problem:    Atrial flutter with rapid ventricular response (H)  Active Problems:    Hypothyroidism    Acute diastolic heart failure (H)    Essential hypertension    MCI (mild cognitive impairment)    JOSIAH (acute kidney injury) (H)    Rheumatoid arthritis -- on Methotrexate    Hyponatremia      PENDING LABS     Unresulted Labs Ordered in the Past 30 Days of this Admission     No orders found from 5/2/2022 to 6/2/2022.            PROCEDURES ( this hospitalization only)          RECOMMENDATIONS TO OUTPATIENT PROVIDER FOR F/U VISIT     Follow-up Appointments     Follow-up and recommended labs and tests       Follow up with primary care provider, Nora Robledo, within 7 days for   hospital follow- up.  The following labs/tests are recommended: bmp.                 DISPOSITION     Home with home care    SUMMARY OF HOSPITAL COURSE:    87 year old female admitted on 6/1/2022. She has history of CHF, coronary artery disease, bioprosthetic aortic valve, hypertension, mild dementia, rheumatoid arthritis, osteoporosis and presents for evaluation of dyspnea on exertion and shortness of breath found to be in rapid atrial flutter which was new and also causing acute diastolic HF.    Aflut with RVR-NEW this admit  -Eliquis started--NEW this admit , CHADS2 was 6  -TSH-ok  -- stable on Metoprolol and Diltiazem     Dyspnea with acute hypoxic resp failure, still requiring 1 L, patient qualified for home oxygen.  -Some degree of acute diastolic heart failure  - small pleural effusion and pulmonary infiltrates, in the setting of recent A. fib with RVR and dyspnea on exertion, suspect she has some flash pulmonary edema related to tachycardia.   - IV lasix -->to lasix 40 mg po bid   -Hyponatremia--resolved  -was volume up  -now on lasix 40 mg PO bid, and stable   HTN  -Continue Lasix--, Imdur, metoprolol with hold parameters  Vascular dementia  -Supportive care  -Home Aricept  -was living alone, with family support -- but now needs additional services    CAD-no evidence of exacerbation  -Continue home Imdur, metoprolol, atorvastatin  Status post bioprosthetic aortic valve replacement  -echo done here this admit   Hypothyroidism, recent TSH checked and okay.  Continue home Synthroid   Rheumatoid arthritis, continue home methotrexate   JOSIAH--onset after diurese   -mild, was on iv lasix, now po  -Resolved but creatinine up a little bit today.  Will recheck tomorrow and possibly adjust Lasix based on chest x-ray findings.  -per cards lasix now 40 mg po bid, is on 40 mg daily at home.   11.Fatigue, deconditioning  -pt/ot and would benefit from TCU-insurance has denied.      Patient is clinically and hemodynamically stable enough to be discharged home with home care today.  Medication reconciliation has been done.  Patient will follow with her primary care provider, cardiology          Discharge Medications with Med changes:     Current Discharge Medication List      START taking these medications    Details   apixaban ANTICOAGULANT (ELIQUIS) 2.5 MG tablet Take 1 tablet (2.5 mg) by mouth 2 times daily  Qty: 60 tablet, Refills: 1    Associated Diagnoses: Atrial flutter with rapid ventricular response (H)      diltiazem ER COATED BEADS (CARDIZEM CD/CARTIA XT) 180 MG 24 hr capsule Take 1 capsule (180 mg) by mouth daily  Qty: 30 capsule,  Refills: 3    Associated Diagnoses: Atrial flutter with rapid ventricular response (H)         CONTINUE these medications which have CHANGED    Details   furosemide (LASIX) 40 MG tablet Take 0.5 tablets (20 mg) by mouth daily for 14 days  Qty: 7 tablet, Refills: 0    Associated Diagnoses: Acute diastolic heart failure (H)      traMADol (ULTRAM) 50 MG tablet Take 0.5 tablets (25 mg) by mouth 2 times daily as needed for severe pain  Qty: 10 tablet, Refills: 0    Associated Diagnoses: Closed fracture of multiple pubic rami, unspecified laterality, initial encounter (H)         CONTINUE these medications which have NOT CHANGED    Details   acetaminophen (TYLENOL) 325 MG tablet Take 3 tablets (975 mg) by mouth 3 times daily as needed for mild pain or fever    Associated Diagnoses: Closed fracture of multiple pubic rami, unspecified laterality, initial encounter (H)      atorvastatin (LIPITOR) 40 MG tablet Take 1 tablet (40 mg) by mouth daily For lipids  Qty: 90 tablet, Refills: 3    Associated Diagnoses: Mixed hyperlipidemia      calcium carbonate-vitamin D (OYSTER SHELL CALCIUM/D) 500-200 MG-UNIT tablet Take 1 tablet by mouth 2 times daily  Qty: 180 tablet, Refills: 0    Associated Diagnoses: Psoriatic arthritis (H)      cholecalciferol 50 MCG (2000 UT) tablet Take 1 tablet (50 mcg) by mouth daily  Qty: 90 tablet, Refills: 0    Associated Diagnoses: Stage 3b chronic kidney disease (H)      donepezil (ARICEPT) 10 MG tablet Take 1 tablet (10 mg) by mouth At Bedtime dementia  Qty: 90 tablet, Refills: 3    Associated Diagnoses: Mild vascular neurocognitive disorder (H)      ferrous gluconate (FERGON) 324 (38 Fe) MG tablet Take 1 tablet (324 mg) by mouth daily (with breakfast)  Qty: 90 tablet, Refills: 0    Associated Diagnoses: Anemia due to blood loss, acute      folic acid (FOLVITE) 1 MG tablet Take 1 mg by mouth daily      isosorbide mononitrate (IMDUR) 30 MG 24 hr tablet Take 1 tablet (30 mg) by mouth daily  Qty: 90  tablet, Refills: 1    Associated Diagnoses: S/P AVR      loperamide (IMODIUM A-D) 2 MG tablet Take 1 tablet (2 mg) by mouth 4 times daily as needed for diarrhea  Qty: 60 tablet, Refills: 0    Associated Diagnoses: Diarrhea, unspecified type      methotrexate sodium 2.5 MG TABS Take 3 tablets (7.5 mg) by mouth once a week Fridays  Qty: 36 tablet, Refills: 0    Associated Diagnoses: Psoriatic arthritis (H)      metoprolol succinate ER (TOPROL XL) 50 MG 24 hr tablet Take 50 mg by mouth daily      Multiple Vitamins-Minerals (PRESERVISION AREDS 2 PO) Take 1 tablet by mouth 2 times daily      nitroGLYcerin (NITROSTAT) 0.4 MG sublingual tablet Place 0.4 mg under the tongue every 5 minutes as needed for chest pain For chest pain place 1 tablet under the tongue every 5 minutes for 3 doses. If symptoms persist 5 minutes after 1st dose call 911.      omeprazole (PRILOSEC) 40 MG DR capsule Take 1 capsule (40 mg) by mouth daily  Qty: 60 capsule, Refills: 3    Associated Diagnoses: Gastric ulcer due to Helicobacter pylori, acute      polyethylene glycol (MIRALAX) 17 GM/Dose powder Take 17 g by mouth daily To prevent constiaption    Associated Diagnoses: Slow transit constipation      SYNTHROID 100 MCG tablet TAKE 1 TABLET EVERY DAY AT 6:00 A.M.  Qty: 90 tablet, Refills: 3    Associated Diagnoses: Hypothyroidism, unspecified type      vitamin C (ASCORBIC ACID) 500 MG tablet Take 1 tablet (500 mg) by mouth daily  Qty: 90 tablet, Refills: 0    Associated Diagnoses: Psoriatic arthritis (H)                   Rationale for medication changes:              Consults       CARE MANAGEMENT / SOCIAL WORK IP CONSULT  CARDIOLOGY IP CONSULT  OCCUPATIONAL THERAPY ADULT IP CONSULT  PHYSICAL THERAPY ADULT IP CONSULT  CARE MANAGEMENT / SOCIAL WORK IP CONSULT  CARE MANAGEMENT / SOCIAL WORK IP CONSULT    Immunizations given this encounter     Most Recent Immunizations   Administered Date(s) Administered     COVID-19,PF,Moderna 12/10/2021     DT  (PEDS <7y) 07/12/2005     FLU 6-35 months 09/16/2010     Flu, Unspecified 10/21/2008     HepA-Adult 01/26/2018     Influenza (H1N1) 01/18/2010     Influenza (High Dose) 3 valent vaccine 09/24/2019     Influenza (IIV3) PF 10/09/2014     Influenza Vaccine, 6+MO IM (QUADRIVALENT W/PRESERVATIVES) 10/09/2014     Influenza, Quad, High Dose, Pf, 65yr+ (Fluzone HD) 10/14/2021     Pneumo Conj 13-V (2010&after) 07/30/2015     Pneumococcal 23 valent 11/11/1997     Td (Adult), Adsorbed 07/12/2005     Td,adult,historic,unspecified 07/12/2005     Tdap (Adacel,Boostrix) 07/29/2016     Zoster vaccine recombinant adjuvanted (SHINGRIX) 08/21/2018     Zoster vaccine, live 09/17/2010           Anticoagulation Information      Recent INR results: No results for input(s): INR in the last 168 hours.  Warfarin doses (if applicable) or name of other anticoagulant:       SIGNIFICANT IMAGING FINDINGS     Results for orders placed or performed during the hospital encounter of 06/01/22   XR Chest 2 Views    Impression    IMPRESSION: Small right pleural effusion versus pleural thickening. Mild hyperaeration. Mild pulmonary vascular congestion. Moderately enlarged cardiac silhouette with tortuous atherosclerotic aorta. Multiple median sternotomy wires and prosthetic aortic   valve.   XR Chest Port 1 View    Impression    IMPRESSION:     Lungs less well expanded with partial obscuration of the lower heart borders. The cardiac silhouette is less prominent. Vascular pedicle width is normal. Previous median sternotomy and aortic valve replacement. Unchanged calcification in the descending   thoracic aorta.    Persistent blunting of the right lateral costophrenic sulcus due to small ongoing pleural fluid/thickening and related atelectasis. A few peripheral thickened septa in the bases consistent with a small component of interstitial lung edema, not increased.   Flattening of diaphragmatic curvature and lucent apices consistent with underlying  emphysema. No new superimposed alveolar or interstitial opacities.    Sternal wires are intact and aligned.       SIGNIFICANT LABORATORY FINDINGS     Most Recent 3 CBC's:Recent Labs   Lab Test 06/11/22  0451 06/07/22  1824 06/01/22  1716 04/15/22  0741   WBC 6.6 7.2 7.0 7.9   HGB 11.8  --  12.4 10.8*   *  --  101* 103*   *  --  137* 162     Most Recent 3 BMP's:Recent Labs   Lab Test 06/17/22  0613 06/16/22  0632 06/11/22  0451    135* 135*   POTASSIUM 4.1 3.8 4.1   CHLORIDE 92* 90* 85*   CO2 42* 36* 43*   BUN 37* 37* 25   CR 1.11* 1.14* 1.00   ANIONGAP 7 9 7   CARLA 9.1 9.1 9.3   GLC 95 103 94     Most Recent 2 LFT's:Recent Labs   Lab Test 06/01/22  1716 04/15/22  0741   AST 32 24   ALT 18 14   ALKPHOS 88 92   BILITOTAL 1.0 0.7     Most Recent 3 INR's:Recent Labs   Lab Test 02/16/22  1301 10/10/21  1707 06/06/21  1241   INR 1.25* 1.25* 1.19*     Most Recent INR's and Anticoagulation Dosing History:  Anticoagulation Dose History     Recent Dosing and Labs Latest Ref Rng & Units 6/1/2021 6/2/2021 6/3/2021 6/6/2021 10/10/2021 2/16/2022    INR 0.90 - 1.15 1.97(H) 1.50(H) 1.34(H) 1.19(H) 1.25(H) 1.25(H)        Most Recent 3 Creatinines:Recent Labs   Lab Test 06/17/22  0613 06/16/22  0632 06/11/22  0451   CR 1.11* 1.14* 1.00     Most Recent 3 Hemoglobins:Recent Labs   Lab Test 06/11/22  0451 06/01/22  1716 04/15/22  0741   HGB 11.8 12.4 10.8*           Discharge Orders        Home Care Referral      Reason for your hospital stay    Shortness of breath     Follow-up and recommended labs and tests     Follow up with primary care provider, Nora Robledo, within 7 days for hospital follow- up.  The following labs/tests are recommended: bmp.     Activity    Your activity upon discharge: activity as tolerated     Oxygen Adult/Peds    Oxygen Documentation:   I certify that this patient, Rox Zavala has been under my care (or a nurse practitioner or physican's assistant working with me). This is the  face-to-face encounter for oxygen medical necessity.      Rox COHN Lucas is now in a chronic stable state and continues to require supplemental oxygen. Patient has continued oxygen desaturation due to Chronic Heart Failure I50.    Alternative treatment(s) tried or considered and deemed clinically infective for treatment of Chronic Heart Failure I50 include inhalers and pulmonary toileting.  If portability is ordered, is the patient mobile within the home? yes    **Patients who qualify for home O2 coverage under the CMS guidelines require ABG tests or O2 sat readings obtained closest to, but no earlier than 2 days prior to the discharge, as evidence of the need for home oxygen therapy. Testing must be performed while patient is in the chronic stable state. See notes for O2 sats.**     Diet    Follow this diet upon discharge: Orders Placed This Encounter      Room Service      Snacks/Supplements Adult: Ensure Enlive; With Meals      Regular Diet Adult       Examination   Physical Exam   Temp:  [97.6  F (36.4  C)-97.8  F (36.6  C)] 97.8  F (36.6  C)  Pulse:  [74-99] 74  Resp:  [17-18] 18  BP: ()/(59-69) 95/59  SpO2:  [80 %-96 %] 96 %  Wt Readings from Last 1 Encounters:   06/15/22 52.3 kg (115 lb 3.2 oz)     General: No apparent distress  CV: Regular rate and rhythm, no peripheral edema  Lungs: Decreased breath sounds bilaterally but clear  Abdomen: Soft, nontender      Please see EMR for more detailed significant labs, imaging, consultant notes etc.    INnamdi MD, personally saw the patient today and spent greater than 30 minutes discharging this patient.    Nnamdi Roger MD  [facility]    CC:Meena April

## 2022-06-17 NOTE — PROVIDER NOTIFICATION
Received a call from lab with critical CO2 42.  Notified house officer and no further assessment advised at this time.

## 2022-06-17 NOTE — PROGRESS NOTES
Home oxygen tank delivered to hospital. Discharge instructions reviewed with patient and her granddaughter who is a nurse and will be staying with her throughout the weekend. Oxygen order is for 2 liters continuous via NC. [facility] which [facility]daughter will be taking her to. Received scripts x4 to be picked up at personal pharmacy. Discussed checking BP prior to blood pressure medications at home. Pt will be initiating home care services with nursing and PT/OT eval. Pt discharged at approximately 1625 via personal vehicle accompanied by her granddaughter. Escorted to vehicle in personal w/c and all belongings in tow.

## 2022-06-17 NOTE — PROGRESS NOTES
Home O2 eval: pt on RA for about 10mins at rest, sats were 88%. Pt began to ambulate to hallway, after about 5 steps pts O2 sats decreased to 79% on RA. Applied O2 2L to get sats to 90-91%.  Pt assisted back to room into recliner. Decreased O2 to 1L with sats 90-93%

## 2022-06-17 NOTE — PLAN OF CARE
Pt reported mild SOB with activity, LS clear/diminished, sats in the mid 90's on 1L O2 via n/c. Desats on RA with activity down to upper 70's/ low 80's.   Problem: Plan of Care - These are the overarching goals to be used throughout the patient stay.    Goal: Optimal Comfort and Wellbeing  Outcome: Ongoing, Progressing   Goal Outcome Evaluation:        Denied pain, reported mild sob with activity. Up in chair most of the day  Problem: Plan of Care - These are the overarching goals to be used throughout the patient stay.    Goal: Readiness for Transition of Care  Outcome: Ongoing, Progressing      Discharging this afternoon  Problem: Risk for Delirium  Goal: Improved Behavioral Control  Outcome: Ongoing, Progressing     Calm, cooperative and participating in cares  Problem: Risk for Delirium  Goal: Improved Attention and Thought Clarity  Outcome: Ongoing, Progressing      A&Ox4 but forgetful. Able to make needs known, using call light fir staff assist as needed  Problem: Plan of Care - These are the overarching goals to be used throughout the patient stay.    Goal: Absence of Hospital-Acquired Illness or Injury  Intervention: Identify and Manage Fall Risk  Recent Flowsheet Documentation  Taken 6/17/2022 0817 by Stacy Fiore RN  Safety Promotion/Fall Prevention:   activity supervised   assistive device/personal items within reach   nonskid shoes/slippers when out of bed   patient and family education  Intervention: Prevent Skin Injury  Recent Flowsheet Documentation  Taken 6/17/2022 0817 by Stacy Fiore, RN  Body Position: supine, legs elevated  Intervention: Prevent and Manage VTE (Venous Thromboembolism) Risk  Recent Flowsheet Documentation  Taken 6/17/2022 1035 by Stacy Fiore, RN  Activity Management: up in chair  Taken 6/17/2022 0817 by Stacy Fiore, RN  Activity Management:   activity adjusted per tolerance   activity encouraged     Problem: Functional Ability Impaired (Heart Failure)  Goal: Optimal  Functional Ability  Intervention: Optimize Functional Ability  Recent Flowsheet Documentation  Taken 6/17/2022 1035 by Stacy Fiore, RN  Activity Management: up in chair  Taken 6/17/2022 0817 by Stacy Fiore, RN  Activity Management:   activity adjusted per tolerance   activity encouraged

## 2022-06-17 NOTE — PLAN OF CARE
Problem: Plan of Care - These are the overarching goals to be used throughout the patient stay.    Goal: Optimal Comfort and Wellbeing  Outcome: Ongoing, Progressing     Uneventful shift. Home O2 evaluation and then possible discharge tomorrow. A&O to name, place, and partially to situation. VSS.

## 2022-06-17 NOTE — PROGRESS NOTES
Care Management Discharge Note    Discharge Date: 06/17/2022       Discharge Disposition:  Home ([facility])    Discharge Services:  Home PT, OT, skilled nursing    Discharge DME:  Pt uses a walker and wheelchair at home    Discharge Transportation:  Pt's granddaughter Kandi to transport    Private pay costs discussed: private room/amenity fees    PAS Confirmation Code:    Patient/family educated on Medicare website which has current facility and service quality ratings:      Education Provided on the Discharge Plan:  Yes  Persons Notified of Discharge Plans: Yes  Patient/Family in Agreement with the Plan:  Yes    Handoff Referral Completed: Yes    Additional Information:  Pt discharging today back to [facility] with her granddaughter Kandi transporting per family arranged plan.     Kandi will stay with Pt over weekend.  Pt's son Kwesi has arranged home care and first appointment is Monday.  Kwesi did not provide information regarding home care agency when CM spoke with him on previous day.    Kwesi stated that Pt will have support and care, as well as family to intervene as/when needed.    KRISTINA updated MD and requested home PT, OT, and skilled nursing be included in discharge orders.    No additional discharge needs.    Addendum:  2:30 PM  KRISTINA received call from ARIE Singh case manager with [facility].  Discussed Pt's discharge and follow up care.  KRISTINA provided info related to home O2 and gave phone number for [facility].  Samantha requested referral for home care services be sent despite family indicating they are setting this up on their own; requested referral to [facility].  Samantha reports that one agency family identified does not offer some of the nursing needs Pt will need.  KRISTINA sent referral.  Samantha will follow up with Kwesi going forward as Pt is discharging.    ALLA Lange

## 2022-06-18 NOTE — PLAN OF CARE
Occupational Therapy Discharge Summary    Reason for therapy discharge:    Discharged to home with home therapy.    Progress towards therapy goal(s). See goals on Care Plan in [facility] electronic health record for goal details.  Goals partially met.  Barriers to achieving goals:   discharge from facility.    Therapy recommendation(s):    Continued therapy is recommended.  Rationale/Recommendations:  Pt will benefit from further OT services at home to increase safety and ind. with ADL. .

## 2022-06-22 PROBLEM — I50.32 CHRONIC HEART FAILURE WITH PRESERVED EJECTION FRACTION (H): Status: ACTIVE | Noted: 2022-01-01

## 2022-06-22 NOTE — LETTER
6/22/2022 April MD Meena  [facility] [address]  [facility] [address]    RE: Rox Zavala       Dear Colleague,     I had the pleasure of seeing Rox Zavala in the [facility].        Assessment/Recommendations   Assessment:    1.  Heart failure with preserved ejection fraction, right-sided heart failure, NYHA class II: Compensated.  She does have fatigue, dyspnea on exertion and trace edema.  Her weight has been stable since discharge.  Patient's son was not sure on Lasix dosing and she has been taking 20 mg daily.  Discussed checking labs today and then figuring out correct dose of Lasix as she is euvolemic today.  We discussed her echocardiogram results, monitoring symptoms, monitoring daily weights and following a low-sodium diet.  She does live in a senior apartment where her meals are provided.  2.  New atrial flutter with rapid ventricular response: Rate controlled today.  She continues Eliquis for anticoagulation.  She was also started on diltiazem for rate control.  She continues Toprol 50 mg daily.  3.  Chronic kidney disease stage III: BMP pending    Plan:  1.  BMP pending.  Based on lab results may adjust Lasix  2.  Continue current medications  3.  Continue monitoring weights and urged low-sodium diet  4.  Recommend therapy    Rox Zavala will follow up with Dr. Wilburn in 6 to 8 weeks and in the heart failure clinic in 4 months or sooner if needed.     History of Present Illness/Subjective    Ms. Rox Zavala is a 87 year old female seen at [facility] heart failure clinic today for continued follow-up.  Her son Billy accompanies her today.  She follows up for heart failure with preserved ejection fraction.  She was hospitalized June 1 to June 17, 2022 with shortness of breath.  She was found to have new atrial flutter with RVR.  She was started on Eliquis for anticoagulation and diltiazem for rate control.  She was also given IV  Lasix and transitioned to oral Lasix.  She also required oxygen which was continued at discharge.  She had an echocardiogram while hospitalized which showed an ejection fraction of 55% with RV moderately enlarged.  She has a past medical history significant for atrial flutter, CAD, hypertension, hyperlipidemia, status post bioprosthetic aortic valve, chronic kidney disease stage III, hypothyroidism.    Today, she complains of fatigue, mild dyspnea exertion and trace edema.  She denies lightheadedness, orthopnea, PND, palpitations, chest pain and abdominal fullness/bloating.      She is monitoring home weights which are stable between 116-118 pounds.  She lives in a senior apartment where her meals are provided.  Her son states she had chicken nuggets and fries last night.  She does not eat a lot and actually ate that meal for both lunch and dinner.    ECHOCARDIOGRAM: 6/2/2022  Interpretation Summary     1. Normal left ventricular size and systolic performance with a visually  estimated ejection fraction of 55%.  2. There is mildly abnormal septal motion c/w right ventricular overload;  there is diastolic flattening of the septum with a more normal configuration  at end systole.  3. There is a bio-prosthetic aortic valve (documented 21 mm Lalitha-Blackwell  Magna bovine pericardial tissue valve).  Â  Normal aortic valve prosthesis metrics with a mean systolic gradient of 6  mmHg and a peak anterograde velocity of 1.7 m/sec.  Â  No aortic insufficiency is detected.  4. There is mild, to perhaps mild-moderate, tricuspid insufficiency.  5. There is moderate right ventricular enlargement with moderately reduced  right ventricular systolic performance.  6. There is severe left atrial enlargement. There is mild to moderate right  atrial enlargement.  7. Right ventricular systolic pressure relative to right atrial pressure is  mildly increased. The pulmonary artery pressure is estimated to be 40-45 mmHg  plus right atrial  pressure (the IVC is moderately dilated).     When compared to the prior real-time echocardiogram dated 21 February 2022,  there has been a mild increase in the pulmonary artery pressure estimate. The  findings are otherwise felt to be fairly similar on both examinations.  ______________________________________________________________________________       Physical Examination Review of Systems   BP 90/52 (BP Location: Left arm, Patient Position: Sitting, Cuff Size: Adult Small)   Pulse 72   Resp 16   Wt 52.6 kg (116 lb)   BMI 19.91 kg/m    Body mass index is 19.91 kg/m .  Wt Readings from Last 3 Encounters:   06/22/22 52.6 kg (116 lb)   06/15/22 52.3 kg (115 lb 3.2 oz)   03/30/22 55.3 kg (122 lb)       General Appearance:   no acute distress   ENT/Mouth: Wearing mask   EYES:  no scleral icterus, normal conjunctivae   Neck: no thyromegaly   Chest/Lungs:   lungs are clear to auscultation, no rales or wheezing, equal chest wall expansion    Cardiovascular:   Regular. Normal first and second heart sounds with no murmurs, rubs, or gallops; Jugular venous pressure normal, trace edema bilaterally    Abdomen:  no organomegaly, masses, bruits, or tenderness; bowel sounds are present   Extremities: no cyanosis or clubbing   Skin: no xanthelasma, warm.    Neurologic: normal  bilateral, no tremors     Psychiatric: alert and oriented x3    Enc Vitals  BP: 90/52  Pulse: 72  Resp: 16  Weight: 52.6 kg (116 lb) (With Shoes)  Height:  (Not Taken)                                         Medical History  Surgical History Family History Social History   Past Medical History:   Diagnosis Date     Acute diastolic CHF (congestive heart failure) (H) 12/23/2014     Acute renal failure (H)      Anemia      Aortic valve stenosis, severe      Arrhythmia      Arthritis      Atrial flutter (H)      Coronary artery disease 11/17/2014     Dieulafoy lesion of stomach      Disease of thyroid gland      Essential hypertension 12/08/2015      "Gastroesophageal reflux disease      GI bleed 06/01/2021     Hammer toe      Heart murmur      History of blood transfusion      Hyperlipidemia      Hypertension      Hypothyroidism     Created by Conversion Replacement Utility updated for latest IMO load      Irregular heart beat      Macular disorder     \"wrinkle\"     MCI (mild cognitive impairment) 06/16/2019     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 Main OR     EYE SURGERY Bilateral     cataracts     HC REMOVAL GALLBLADDER      Description: Cholecystectomy;  Recorded: 03/20/2008;     UT ESOPHAGOGASTRODUODENOSCOPY TRANSORAL DIAGNOSTIC N/A 6/1/2021    Procedure: ESOPHAGOGASTRODUODENOSCOPY (EGD) with biopsies;  Surgeon: Julio Lopez MD;  Location: [facility] GI;  Service: Gastroenterology     UT ESOPHAGOGASTRODUODENOSCOPY TRANSORAL DIAGNOSTIC N/A 6/6/2021    Procedure: ESOPHAGOGASTRODUODENOSCOPY (EGD) with bicap cauterization;  Surgeon: Julio Lopez MD;  Location: [facility] GI;  Service: Gastroenterology     UT ESOPHAGOGASTRODUODENOSCOPY TRANSORAL DIAGNOSTIC N/A 6/7/2021    Procedure: ESOPHAGOGASTRODUODENOSCOPY (EGD) with hemoclip;  Surgeon: Sam Brock MD;  Location: [facility] GI;  Service: Gastroenterology     [facility] LIGATE FALLOPIAN TUBE      Description: Tubal Ligation;  Recorded: 03/20/2008;     ZZC TOTAL HIP ARTHROPLASTY Right     Description: Total Hip Replacement;  Recorded: 03/20/2008; right " "   Family History   Problem Relation Age of Onset     Asthma Mother      Coronary Artery Disease No family hx of      Breast Cancer No family hx of     Social History     Socioeconomic History     Marital status:      Spouse name: Not on file     Number of children: Not on file     Years of education: Not on file     Highest education level: Not on file   Occupational History     Not on file   Tobacco Use     Smoking status: Former Smoker     Packs/day: 1.00     Years: 30.00     Pack years: 30.00     Types: Cigarettes     Quit date: 1995     Years since quittin.4     Smokeless tobacco: Never Used   Substance and Sexual Activity     Alcohol use: Yes     Alcohol/week: 1.0 standard drink     Drug use: No     Sexual activity: Not on file   Other Topics Concern     Not on file   Social History Narrative    , patient notes she and her  were  for 60 years and he passed away 12 years ago following an illness and complications. Patient notes they had a happy marriage.  Children: Patient notes she had 4 children, one daughter passed away fo llowing a MVA when daughter was 57 years old. Leisa states she is close to her two boys, daughter, grandchildren, and great-grandchildren and finds her family supportive.     Lives in a town home independently.. Now in senior living.    Lives at \" [facility]\", independent living.    Misbah Barone MD  2020           Social Determinants of Health     Financial Resource Strain: Not on file   Food Insecurity: Not on file   Transportation Needs: Not on file   Physical Activity: Not on file   Stress: Not on file   Social Connections: Not on file   Intimate Partner Violence: Not on file   Housing Stability: Not on file          Medications  Allergies   Current Outpatient Medications   Medication Sig Dispense Refill     acetaminophen (TYLENOL) 325 MG tablet Take 3 tablets (975 mg) by mouth 3 times daily as needed for mild pain or fever       apixaban " ANTICOAGULANT (ELIQUIS) 2.5 MG tablet Take 1 tablet (2.5 mg) by mouth 2 times daily 60 tablet 1     atorvastatin (LIPITOR) 40 MG tablet Take 1 tablet (40 mg) by mouth daily For lipids 90 tablet 3     calcium carbonate-vitamin D (OYSTER SHELL CALCIUM/D) 500-200 MG-UNIT tablet Take 1 tablet by mouth 2 times daily 180 tablet 0     cholecalciferol 50 MCG (2000 UT) tablet Take 1 tablet (50 mcg) by mouth daily 90 tablet 0     diltiazem ER COATED BEADS (CARDIZEM CD/CARTIA XT) 180 MG 24 hr capsule Take 1 capsule (180 mg) by mouth daily 30 capsule 3     donepezil (ARICEPT) 10 MG tablet Take 1 tablet (10 mg) by mouth At Bedtime dementia 90 tablet 3     ferrous gluconate (FERGON) 324 (38 Fe) MG tablet Take 1 tablet (324 mg) by mouth daily (with breakfast) 90 tablet 0     folic acid (FOLVITE) 1 MG tablet Take 1 mg by mouth daily       furosemide (LASIX) 40 MG tablet Take 0.5 tablets (20 mg) by mouth daily for 14 days 7 tablet 0     isosorbide mononitrate (IMDUR) 30 MG 24 hr tablet Take 1 tablet (30 mg) by mouth daily 90 tablet 1     loperamide (IMODIUM A-D) 2 MG tablet Take 1 tablet (2 mg) by mouth 4 times daily as needed for diarrhea 60 tablet 0     methotrexate sodium 2.5 MG TABS Take 3 tablets (7.5 mg) by mouth once a week Fridays 36 tablet 0     metoprolol succinate ER (TOPROL XL) 50 MG 24 hr tablet Take 50 mg by mouth daily       Multiple Vitamins-Minerals (PRESERVISION AREDS 2 PO) Take 1 tablet by mouth 2 times daily       nitroGLYcerin (NITROSTAT) 0.4 MG sublingual tablet Place 0.4 mg under the tongue every 5 minutes as needed for chest pain For chest pain place 1 tablet under the tongue every 5 minutes for 3 doses. If symptoms persist 5 minutes after 1st dose call 911.       omeprazole (PRILOSEC) 40 MG DR capsule Take 1 capsule (40 mg) by mouth daily 60 capsule 3     polyethylene glycol (MIRALAX) 17 GM/Dose powder Take 17 g by mouth daily To prevent constiaption       SYNTHROID 100 MCG tablet TAKE 1 TABLET EVERY DAY AT  6:00 A.M. 90 tablet 3     traMADol (ULTRAM) 50 MG tablet Take 0.5 tablets (25 mg) by mouth 2 times daily as needed for severe pain 10 tablet 0     vitamin C (ASCORBIC ACID) 500 MG tablet Take 1 tablet (500 mg) by mouth daily 90 tablet 0    No Known Allergies      Lab Results    Chemistry/lipid CBC Cardiac Enzymes/BNP/TSH/INR   Lab Results   Component Value Date    CHOL 139 08/21/2018    HDL 79 08/21/2018    TRIG 70 08/21/2018    BUN 37 (H) 06/17/2022     06/17/2022    CO2 42 (HH) 06/17/2022    Lab Results   Component Value Date    WBC 6.6 06/11/2022    HGB 11.8 06/11/2022    HCT 37.1 06/11/2022     (H) 06/11/2022     (L) 06/11/2022    Lab Results   Component Value Date    TROPONINI 0.03 06/01/2022     (H) 06/01/2022    TSH 4.26 04/15/2022    INR 1.25 (H) 02/16/2022             This note has been dictated using voice recognition software. Any grammatical, typographical, or context distortions are unintentional and inherent to the software    30 minutes spent on the date of encounter doing chart review, review of outside records, review of test results, interpretation with above tests, patient visit, documentation and discussion with family.                  Thank you for allowing me to participate in the care of your patient.      Sincerely,     NISA Dutta [facility]  cc:   Kirk Slaughter MD  [address] [place] [id number]

## 2022-06-22 NOTE — PATIENT INSTRUCTIONS
Rox Zavala,    It was a pleasure to see you today at [facility].     My recommendations after this visit include:  - Please follow up with Dr Wilburn in 6-8 weeks   - Please follow up with Nguyen Leung in 4 months  - I have checked labs and will contact you with results on mychart  - Continue current medications    Nguyen Leung CNP      What is the [facility]?     The [facility] is a heart failure specialty clinic within Heart Care.  You will work with your cardiologist, nurse practitioner, and nurses to carefully adjust medications and learn how to live with heart failure.  The Heart Failure Program will help you:    Better understand your chronic heart condition  Feel better and avoid hospital stays    Monitoring for Symptoms      Call the Heart Failure Phone Line ([phone]) if you have any of these symptoms:   Increased shortness of breath/shortness of breath at rest  Waking up at night with difficulty breathing  Unable to lie down for sleep due to symptoms or needing to sit upright for sleep  Weight gain of 2 pounds a day for 2 days in a row OR 5 pounds in 1 week  Increased swelling in your ankles or legs  Dizziness or lightheadedness    Medications     Take your medications as prescribed  Bring all your medications in their original bottles to every appointment  Avoid non-steroidal anti-inflammatory medications (Advil, Aleve, Ibuprofen, Naprosyn, Naproxen, Celebrex)  Do not stop taking your medications or begin taking over-the-counter or herbal medications without first talking to your doctor or nurse practitioner    Diet and Lifestyle     Limit sodium/salt to 2000 mg daily   Read food labels for sodium content  Do not add salt when cooking or add salt at the table  Weigh yourself every day and record in your daily weight log   Call if you gain 2 pounds a day for 2 days in a row OR 5 pounds in 1 week  Bring daily  weight log to every appointment  Stay active, pace yourself, listen to your body, and rest when tired  Elevate your legs if they are swollen. Ask about using compression/support stockings  Stop smoking  Lose weight if you are overweight  Avoid drinking alcohol or limit amount  Stay updated on your immunizations including flu and pneumonia vaccines

## 2022-06-22 NOTE — PROGRESS NOTES
Assessment/Recommendations   Assessment:    1.  Heart failure with preserved ejection fraction, right-sided heart failure, NYHA class II: Compensated.  She does have fatigue, dyspnea on exertion and trace edema.  Her weight has been stable since discharge.  Patient's son was not sure on Lasix dosing and she has been taking 20 mg daily.  Discussed checking labs today and then figuring out correct dose of Lasix as she is euvolemic today.  We discussed her echocardiogram results, monitoring symptoms, monitoring daily weights and following a low-sodium diet.  She does live in a senior apartment where her meals are provided.  2.  New atrial flutter with rapid ventricular response: Rate controlled today.  She continues Eliquis for anticoagulation.  She was also started on diltiazem for rate control.  She continues Toprol 50 mg daily.  3.  Chronic kidney disease stage III: BMP pending    Plan:  1.  BMP pending.  Based on lab results may adjust Lasix  2.  Continue current medications  3.  Continue monitoring weights and urged low-sodium diet  4.  Recommend therapy    Rox Zavala will follow up with Dr. Wilburn in 6 to 8 weeks and in the heart failure clinic in 4 months or sooner if needed.     History of Present Illness/Subjective    Ms. Rox Zavala is a 87 year old female seen at [facility] heart failure clinic today for continued follow-up.  Her son Billy accompanies her today.  She follows up for heart failure with preserved ejection fraction.  She was hospitalized June 1 to June 17, 2022 with shortness of breath.  She was found to have new atrial flutter with RVR.  She was started on Eliquis for anticoagulation and diltiazem for rate control.  She was also given IV Lasix and transitioned to oral Lasix.  She also required oxygen which was continued at discharge.  She had an echocardiogram while hospitalized which showed an ejection fraction of 55% with RV moderately enlarged.  She has a past medical  history significant for atrial flutter, CAD, hypertension, hyperlipidemia, status post bioprosthetic aortic valve, chronic kidney disease stage III, hypothyroidism.    Today, she complains of fatigue, mild dyspnea exertion and trace edema.  She denies lightheadedness, orthopnea, PND, palpitations, chest pain and abdominal fullness/bloating.      She is monitoring home weights which are stable between 116-118 pounds.  She lives in a senior apartment where her meals are provided.  Her son states she had chicken nuggets and fries last night.  She does not eat a lot and actually ate that meal for both lunch and dinner.    ECHOCARDIOGRAM: 6/2/2022  Interpretation Summary     1. Normal left ventricular size and systolic performance with a visually  estimated ejection fraction of 55%.  2. There is mildly abnormal septal motion c/w right ventricular overload;  there is diastolic flattening of the septum with a more normal configuration  at end systole.  3. There is a bio-prosthetic aortic valve (documented 21 mm Lalitha-Blackwell  Magna bovine pericardial tissue valve).  Â  Normal aortic valve prosthesis metrics with a mean systolic gradient of 6  mmHg and a peak anterograde velocity of 1.7 m/sec.  Â  No aortic insufficiency is detected.  4. There is mild, to perhaps mild-moderate, tricuspid insufficiency.  5. There is moderate right ventricular enlargement with moderately reduced  right ventricular systolic performance.  6. There is severe left atrial enlargement. There is mild to moderate right  atrial enlargement.  7. Right ventricular systolic pressure relative to right atrial pressure is  mildly increased. The pulmonary artery pressure is estimated to be 40-45 mmHg  plus right atrial pressure (the IVC is moderately dilated).     When compared to the prior real-time echocardiogram dated 21 February 2022,  there has been a mild increase in the pulmonary artery pressure estimate. The  findings are otherwise felt to be  fairly similar on both examinations.  ______________________________________________________________________________       Physical Examination Review of Systems   BP 90/52 (BP Location: Left arm, Patient Position: Sitting, Cuff Size: Adult Small)   Pulse 72   Resp 16   Wt 52.6 kg (116 lb)   BMI 19.91 kg/m    Body mass index is 19.91 kg/m .  Wt Readings from Last 3 Encounters:   06/22/22 52.6 kg (116 lb)   06/15/22 52.3 kg (115 lb 3.2 oz)   03/30/22 55.3 kg (122 lb)       General Appearance:   no acute distress   ENT/Mouth: Wearing mask   EYES:  no scleral icterus, normal conjunctivae   Neck: no thyromegaly   Chest/Lungs:   lungs are clear to auscultation, no rales or wheezing, equal chest wall expansion    Cardiovascular:   Regular. Normal first and second heart sounds with no murmurs, rubs, or gallops; Jugular venous pressure normal, trace edema bilaterally    Abdomen:  no organomegaly, masses, bruits, or tenderness; bowel sounds are present   Extremities: no cyanosis or clubbing   Skin: no xanthelasma, warm.    Neurologic: normal  bilateral, no tremors     Psychiatric: alert and oriented x3    Enc Vitals  BP: 90/52  Pulse: 72  Resp: 16  Weight: 52.6 kg (116 lb) (With Shoes)  Height:  (Not Taken)                                         Medical History  Surgical History Family History Social History   Past Medical History:   Diagnosis Date     Acute diastolic CHF (congestive heart failure) (H) 12/23/2014     Acute renal failure (H)      Anemia      Aortic valve stenosis, severe      Arrhythmia      Arthritis      Atrial flutter (H)      Coronary artery disease 11/17/2014     Dieulafoy lesion of stomach      Disease of thyroid gland      Essential hypertension 12/08/2015     Gastroesophageal reflux disease      GI bleed 06/01/2021     Hammer toe      Heart murmur      History of blood transfusion      Hyperlipidemia      Hypertension      Hypothyroidism     Created by Conversion Replacement Utility updated  "for latest IMO load      Irregular heart beat      Macular disorder     \"wrinkle\"     MCI (mild cognitive impairment) 06/16/2019     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     Pressure injury of buttock, stage 1, unspecified laterality 06/16/2019     Rheumatoid arthritis (H)      S/P AVR 12/15/2014     Sacral insufficiency fracture, initial encounter 05/06/2019     Senile osteoporosis 09/23/2019    Past Surgical History:   Procedure Laterality Date     ANGIOPLASTY / STENTING FEMORAL       AORTIC VALVE REPLACEMENT       CARDIAC SURGERY      CABG     ESOPHAGOSCOPY, GASTROSCOPY, DUODENOSCOPY (EGD), COMBINED N/A 11/16/2021    Procedure: ESOPHAGOGASTRODUODENOSCOPY;  Surgeon: Enrico Galan MD;  Location: [facility] Main OR     EYE SURGERY Bilateral     cataracts     HC REMOVAL GALLBLADDER      Description: Cholecystectomy;  Recorded: 03/20/2008;     GA ESOPHAGOGASTRODUODENOSCOPY TRANSORAL DIAGNOSTIC N/A 6/1/2021    Procedure: ESOPHAGOGASTRODUODENOSCOPY (EGD) with biopsies;  Surgeon: Julio Lopez MD;  Location: [facility];  Service: Gastroenterology     GA ESOPHAGOGASTRODUODENOSCOPY TRANSORAL DIAGNOSTIC N/A 6/6/2021    Procedure: ESOPHAGOGASTRODUODENOSCOPY (EGD) with bicap cauterization;  Surgeon: Julio Lopez MD;  Location: [facility];  Service: Gastroenterology     GA ESOPHAGOGASTRODUODENOSCOPY TRANSORAL DIAGNOSTIC N/A 6/7/2021    Procedure: ESOPHAGOGASTRODUODENOSCOPY (EGD) with hemoclip;  Surgeon: Sam Brock MD;  Location: [facility];  Service: Gastroenterology     [facility] LIGATE FALLOPIAN TUBE      Description: Tubal Ligation;  Recorded: 03/20/2008;     ZZC TOTAL HIP ARTHROPLASTY Right     Description: Total Hip Replacement;  Recorded: 03/20/2008; right    Family History   Problem Relation Age of Onset     Asthma Mother      Coronary Artery Disease No family hx of      Breast Cancer No family hx of     Social History     Socioeconomic History     Marital status:      Spouse " "name: Not on file     Number of children: Not on file     Years of education: Not on file     Highest education level: Not on file   Occupational History     Not on file   Tobacco Use     Smoking status: Former Smoker     Packs/day: 1.00     Years: 30.00     Pack years: 30.00     Types: Cigarettes     Quit date: 1995     Years since quittin.4     Smokeless tobacco: Never Used   Substance and Sexual Activity     Alcohol use: Yes     Alcohol/week: 1.0 standard drink     Drug use: No     Sexual activity: Not on file   Other Topics Concern     Not on file   Social History Narrative    , patient notes she and her  were  for 60 years and he passed away 12 years ago following an illness and complications. Patient notes they had a happy marriage.  Children: Patient notes she had 4 children, one daughter passed away fo llowing a MVA when daughter was 57 years old. Leisa states she is close to her two boys, daughter, grandchildren, and great-grandchildren and finds her family supportive.     Lives in a town home independently.. Now in senior living.    Lives at \" [facility]\", independent living.    Misbah Barone MD  2020           Social Determinants of Health     Financial Resource Strain: Not on file   Food Insecurity: Not on file   Transportation Needs: Not on file   Physical Activity: Not on file   Stress: Not on file   Social Connections: Not on file   Intimate Partner Violence: Not on file   Housing Stability: Not on file          Medications  Allergies   Current Outpatient Medications   Medication Sig Dispense Refill     acetaminophen (TYLENOL) 325 MG tablet Take 3 tablets (975 mg) by mouth 3 times daily as needed for mild pain or fever       apixaban ANTICOAGULANT (ELIQUIS) 2.5 MG tablet Take 1 tablet (2.5 mg) by mouth 2 times daily 60 tablet 1     atorvastatin (LIPITOR) 40 MG tablet Take 1 tablet (40 mg) by mouth daily For lipids 90 tablet 3     calcium carbonate-vitamin D (OYSTER " SHELL CALCIUM/D) 500-200 MG-UNIT tablet Take 1 tablet by mouth 2 times daily 180 tablet 0     cholecalciferol 50 MCG (2000 UT) tablet Take 1 tablet (50 mcg) by mouth daily 90 tablet 0     diltiazem ER COATED BEADS (CARDIZEM CD/CARTIA XT) 180 MG 24 hr capsule Take 1 capsule (180 mg) by mouth daily 30 capsule 3     donepezil (ARICEPT) 10 MG tablet Take 1 tablet (10 mg) by mouth At Bedtime dementia 90 tablet 3     ferrous gluconate (FERGON) 324 (38 Fe) MG tablet Take 1 tablet (324 mg) by mouth daily (with breakfast) 90 tablet 0     folic acid (FOLVITE) 1 MG tablet Take 1 mg by mouth daily       furosemide (LASIX) 40 MG tablet Take 0.5 tablets (20 mg) by mouth daily for 14 days 7 tablet 0     isosorbide mononitrate (IMDUR) 30 MG 24 hr tablet Take 1 tablet (30 mg) by mouth daily 90 tablet 1     loperamide (IMODIUM A-D) 2 MG tablet Take 1 tablet (2 mg) by mouth 4 times daily as needed for diarrhea 60 tablet 0     methotrexate sodium 2.5 MG TABS Take 3 tablets (7.5 mg) by mouth once a week Fridays 36 tablet 0     metoprolol succinate ER (TOPROL XL) 50 MG 24 hr tablet Take 50 mg by mouth daily       Multiple Vitamins-Minerals (PRESERVISION AREDS 2 PO) Take 1 tablet by mouth 2 times daily       nitroGLYcerin (NITROSTAT) 0.4 MG sublingual tablet Place 0.4 mg under the tongue every 5 minutes as needed for chest pain For chest pain place 1 tablet under the tongue every 5 minutes for 3 doses. If symptoms persist 5 minutes after 1st dose call 911.       omeprazole (PRILOSEC) 40 MG DR capsule Take 1 capsule (40 mg) by mouth daily 60 capsule 3     polyethylene glycol (MIRALAX) 17 GM/Dose powder Take 17 g by mouth daily To prevent constiaption       SYNTHROID 100 MCG tablet TAKE 1 TABLET EVERY DAY AT 6:00 A.M. 90 tablet 3     traMADol (ULTRAM) 50 MG tablet Take 0.5 tablets (25 mg) by mouth 2 times daily as needed for severe pain 10 tablet 0     vitamin C (ASCORBIC ACID) 500 MG tablet Take 1 tablet (500 mg) by mouth daily 90 tablet 0     No Known Allergies      Lab Results    Chemistry/lipid CBC Cardiac Enzymes/BNP/TSH/INR   Lab Results   Component Value Date    CHOL 139 08/21/2018    HDL 79 08/21/2018    TRIG 70 08/21/2018    BUN 37 (H) 06/17/2022     06/17/2022    CO2 42 (HH) 06/17/2022    Lab Results   Component Value Date    WBC 6.6 06/11/2022    HGB 11.8 06/11/2022    HCT 37.1 06/11/2022     (H) 06/11/2022     (L) 06/11/2022    Lab Results   Component Value Date    TROPONINI 0.03 06/01/2022     (H) 06/01/2022    TSH 4.26 04/15/2022    INR 1.25 (H) 02/16/2022             This note has been dictated using voice recognition software. Any grammatical, typographical, or context distortions are unintentional and inherent to the software    30 minutes spent on the date of encounter doing chart review, review of outside records, review of test results, interpretation with above tests, patient visit, documentation and discussion with family.

## 2022-07-13 NOTE — TELEPHONE ENCOUNTER
"Routing refill request to provider for review/approval because:  request needs review    Last Written Prescription Date:  2/3/22  Last Fill Quantity: 60,  # refills: 3   Last office visit provider:  3/24/22     Requested Prescriptions   Pending Prescriptions Disp Refills     omeprazole (PRILOSEC) 40 MG DR capsule 60 capsule 3     Sig: Take 1 capsule (40 mg) by mouth daily       PPI Protocol Failed - 7/12/2022 12:47 PM        Failed - No diagnosis of osteoporosis on record        Passed - Not on Clopidogrel (unless Pantoprazole ordered)        Passed - Recent (12 mo) or future (30 days) visit within the authorizing provider's specialty     Patient has had an office visit with the authorizing provider or a provider within the authorizing providers department within the previous 12 mos or has a future within next 30 days. See \"Patient Info\" tab in inbasket, or \"Choose Columns\" in Meds & Orders section of the refill encounter.              Passed - Medication is active on med list        Passed - Patient is age 18 or older        Passed - No active pregnacy on record        Passed - No positive pregnancy test in past 12 months             Yasemin Black, RN 07/13/22 11:04 AM  "

## 2022-08-16 PROBLEM — I48.0 PAROXYSMAL ATRIAL FIBRILLATION (H): Status: ACTIVE | Noted: 2022-01-01

## 2022-08-16 NOTE — PROGRESS NOTES
Assessment/Plan:   1.  Chronic diastolic congestive heart failure: The patient complains of shortness of breath on exertion.  She had echocardiogram in June 2022 which was a reported normal LV systolic function, normal functioning of bioprosthetic aortic valve, elevated pulmonary artery systolic pressure.  Most likely she does not take her diuretics faithfully.  We discussed the importance of her diuresis.  Increase Lasix 40 mg daily.  CBC and BMP today.    2.  Coronary artery disease, status post the LIMA to D1, s/p CHEN to mid LCx and RCA 7/2016: The patient complains of dyspnea on exertion.  Her recent echocardiogram was reported normal left ventricular systolic function, LVEF was 60 to 65%, akinetic in basal inferior lateral wall, no significant valvular disease.  Continue aspirin, Imdur, Lipitor and metoprolol XL.     3.  Paroxysmal atrial fibrillation.  Her heart rate is well controlled.  Continue metoprolol succinate 50 mg daily and diltiazem  mg daily.  Continue anticoagulation Eliquis 2.5 mg twice a day.     4.  Severe aortic valve stenosis, status post the bioprosthetic aortic valve replacement: Echocardiogram was reported normal functioning of bioprosthetic aortic valve.     5.  Essential hypertension: Her blood pressure is at low side.  Lisinopril 2.5 mg daily is discontinued today..  Continue metoprolol XL 50 mg daily.      6.  Dyslipidemia: Her pravastatin is discontinued.  LDL is well controlled.    The patient is scheduled to follow-up with heart failure clinic in 2 weeks.    Total 59 minutes were spent in this visit for face to face visit, physical exam, review of current labs/imaging studies, plan for ongoing treatment with >50% spent on counseling and coordination of care as documented in the above mentioned note.      Thank you for the opportunity to be involved in the care of Rox Zavala. If you have any questions, please feel free to contact me.  I will see the patient  again in 3 months and as needed.    Much or all of the text in this note was generated through the use of Dragon Dictate voice-to-text software. Errors in spelling or words which seem out of context are unintentional. Sound alike errors, in particular, may have escaped editing.       History of Present Illness:   It is my pleasure to see Rox Zavala at the [facility] clinic for routine cardiology follow up.  Rox Zavala is a 87 year old female with a medical history of severe aortic valve stenosis status post bioprosthetic aortic valve replacement on Dec15, 2014, one-vessel bypass surgery LIMA to D1, 3 vessel coronary artery disease s/p CHEN to mid LCx and mid RCA on 7-5-2016, essential hypertension, hyperlipidemia, hypothyroidism, frequent PVCs.    The patient was admitted to hospital in the middle of June due to worsening shortness of breath and atrial fibrillation with RVR.  Since hospital discharge, she complains of shortness of breath on exertion.  She states that she has shortness of breath with minimal activities.  She denies any chest pain, palpitations.  She has no dizziness, orthopnea.  She has a mild bilateral leg edema.  Her weight has stable.  The patient states that she is depressed.  Her blood pressure and heart rate are in normal range today.    Past Medical History:     Patient Active Problem List   Diagnosis     Hammer Toe     Hemorrhoids     Psoriasis     Generalized Osteoarthritis Of The Hand     Osteoarthritis Of The Knee     Vitamin D Deficiency     Hypothyroidism     Neck Pain     Upper Back Pain (Between Shoulder Blades)     Osteopenia     Coronary artery disease     S/P AVR     Constipation     Acute diastolic heart failure (H)     Essential hypertension     Hyperlipidemia     Coronary artery disease involving coronary bypass graft of native heart with other forms of angina pectoris (H)     Psoriatic arthritis (H)     Sacral insufficiency fracture, initial  encounter     Back pain     Pressure injury of buttock, stage 1, unspecified laterality     MCI (mild cognitive impairment)     Frequent PVCs     Senile osteoporosis     JOSIAH (acute kidney injury) (H)     Dieulafoy lesion of stomach     Gastric ulcer due to Helicobacter pylori, acute     Stage 3 chronic kidney disease, unspecified whether stage 3a or 3b CKD (H)     Mild vascular neurocognitive disorder (H)     Thrombocytopenia (H)     Rheumatoid arthritis -- on Methotrexate     Acute gastric ulcer with hemorrhage     Atrial flutter with rapid ventricular response (H)     Hyponatremia     Chronic heart failure with preserved ejection fraction (H)       Past Surgical History:     Past Surgical History:   Procedure Laterality Date     ANGIOPLASTY / STENTING FEMORAL       AORTIC VALVE REPLACEMENT       CARDIAC SURGERY      CABG     ESOPHAGOSCOPY, GASTROSCOPY, DUODENOSCOPY (EGD), COMBINED N/A 11/16/2021    Procedure: ESOPHAGOGASTRODUODENOSCOPY;  Surgeon: Enrico Galan MD;  Location: [facility] Main OR     EYE SURGERY Bilateral     cataracts     HC REMOVAL GALLBLADDER      Description: Cholecystectomy;  Recorded: 03/20/2008;     GA ESOPHAGOGASTRODUODENOSCOPY TRANSORAL DIAGNOSTIC N/A 6/1/2021    Procedure: ESOPHAGOGASTRODUODENOSCOPY (EGD) with biopsies;  Surgeon: Julio Lopez MD;  Location: [facility];  Service: Gastroenterology     GA ESOPHAGOGASTRODUODENOSCOPY TRANSORAL DIAGNOSTIC N/A 6/6/2021    Procedure: ESOPHAGOGASTRODUODENOSCOPY (EGD) with bicap cauterization;  Surgeon: Julio Lopez MD;  Location: [facility];  Service: Gastroenterology     GA ESOPHAGOGASTRODUODENOSCOPY TRANSORAL DIAGNOSTIC N/A 6/7/2021    Procedure: ESOPHAGOGASTRODUODENOSCOPY (EGD) with hemoclip;  Surgeon: Sam Brock MD;  Location: [facility];  Service: Gastroenterology     [facility] LIGATE FALLOPIAN TUBE      Description: Tubal Ligation;  Recorded: 03/20/2008;     [facility] TOTAL HIP ARTHROPLASTY Right     Description: Total Hip  Replacement;  Recorded: 03/20/2008; right       Family History:     Family History   Problem Relation Age of Onset     Asthma Mother      Coronary Artery Disease No family hx of      Breast Cancer No family hx of         Social History:    reports that she quit smoking about 27 years ago. Her smoking use included cigarettes. She has a 30.00 pack-year smoking history. She has never used smokeless tobacco. She reports current alcohol use of about 1.0 standard drink of alcohol per week. She reports that she does not use drugs.    Review of Systems:   12 systems are reviewed negative except for in HPI.    Meds:     Current Outpatient Medications:      apixaban ANTICOAGULANT (ELIQUIS) 2.5 MG tablet, Take 1 tablet (2.5 mg) by mouth 2 times daily, Disp: 60 tablet, Rfl: 1     atorvastatin (LIPITOR) 40 MG tablet, Take 1 tablet (40 mg) by mouth daily For lipids, Disp: 90 tablet, Rfl: 3     cholecalciferol 50 MCG (2000 UT) tablet, cholecalciferol (vitamin D3) 50 mcg (2,000 unit) tablet, Disp: , Rfl:      cholecalciferol 50 MCG (2000 UT) tablet, Take 1 tablet (50 mcg) by mouth daily, Disp: 90 tablet, Rfl: 0     diltiazem ER (DILT-XR) 120 MG 24 hr capsule, diltiazem ER (XR/XT) 120 mg capsule,extended release 24 hr, controlled, Disp: , Rfl:      diltiazem ER COATED BEADS (CARDIZEM CD/CARTIA XT) 180 MG 24 hr capsule, Take 1 capsule (180 mg) by mouth daily, Disp: 30 capsule, Rfl: 3     donepezil (ARICEPT) 10 MG tablet, Take 1 tablet (10 mg) by mouth At Bedtime dementia, Disp: 90 tablet, Rfl: 3     ferrous gluconate (FERGON) 324 (38 Fe) MG tablet, Take 1 tablet (324 mg) by mouth daily (with breakfast), Disp: 90 tablet, Rfl: 0     ferrous sulfate (FEROSUL) 325 (65 Fe) MG tablet, ferrous sulfate 325 mg (65 mg iron) tablet, Disp: , Rfl:      furosemide (LASIX) 20 MG tablet, Take 1 tablet (20 mg) by mouth 2 times daily, Disp: 180 tablet, Rfl: 3     isosorbide mononitrate (IMDUR) 30 MG 24 hr tablet, Take 1 tablet (30 mg) by mouth daily,  Disp: 90 tablet, Rfl: 1     levothyroxine (TIROSINT) 100 MCG capsule, levothyroxine 100 mcg capsule, Disp: , Rfl:      lisinopril (ZESTRIL) 20 MG tablet, lisinopril 20 mg tablet, Disp: , Rfl:      loperamide (IMODIUM A-D) 2 MG tablet, Take 1 tablet (2 mg) by mouth 4 times daily as needed for diarrhea, Disp: 60 tablet, Rfl: 0     methotrexate sodium 2.5 MG TABS, Take 3 tablets (7.5 mg) by mouth once a week Fridays, Disp: 36 tablet, Rfl: 0     methotrexate sodium, PF, 50 MG/2 ML oral solution (inj used orally), methotrexate oral, Disp: , Rfl:      metoprolol succinate ER (TOPROL XL) 50 MG 24 hr tablet, Take 50 mg by mouth daily, Disp: , Rfl:      Multiple Vitamins-Minerals (PRESERVISION AREDS 2 PO), Take 1 tablet by mouth 2 times daily, Disp: , Rfl:      nitroGLYcerin (NITROSTAT) 0.4 MG sublingual tablet, Place 0.4 mg under the tongue every 5 minutes as needed for chest pain For chest pain place 1 tablet under the tongue every 5 minutes for 3 doses. If symptoms persist 5 minutes after 1st dose call 911., Disp: , Rfl:      omeprazole (PRILOSEC) 40 MG DR capsule, Take 1 capsule (40 mg) by mouth daily, Disp: 60 capsule, Rfl: 3     pantoprazole (PROTONIX) 40 MG EC tablet, pantoprazole 40 mg tablet,delayed release, Disp: , Rfl:      polyethylene glycol (MIRALAX) 17 GM/Dose powder, Take 17 g by mouth daily To prevent constiaption, Disp: , Rfl:      SYNTHROID 100 MCG tablet, TAKE 1 TABLET EVERY DAY AT 6:00 A.M., Disp: 90 tablet, Rfl: 3     traMADol (ULTRAM) 50 MG tablet, Take 0.5 tablets (25 mg) by mouth 2 times daily as needed for severe pain, Disp: 10 tablet, Rfl: 0     vitamin C (ASCORBIC ACID) 500 MG tablet, Take 1 tablet (500 mg) by mouth daily, Disp: 90 tablet, Rfl: 0     acetaminophen (TYLENOL) 325 MG tablet, Take 3 tablets (975 mg) by mouth 3 times daily as needed for mild pain or fever (Patient not taking: Reported on 8/16/2022), Disp: , Rfl:      calcium carbonate-vitamin D (OYSTER SHELL CALCIUM/D) 500-200 MG-UNIT  "tablet, Take 1 tablet by mouth 2 times daily, Disp: 180 tablet, Rfl: 0     folic acid (FOLVITE) 1 MG tablet, Take 1 mg by mouth daily, Disp: , Rfl:     Allergies:   Patient has no known allergies.      Objective:      Physical Exam  53.1 kg (117 lb)  1.626 m (5' 4\")  Body mass index is 20.08 kg/m .  /61 (BP Location: Left arm, Patient Position: Sitting, Cuff Size: Adult Small)   Pulse 72   Resp 16   Ht 1.626 m (5' 4\")   Wt 53.1 kg (117 lb)   BMI 20.08 kg/m      General Appearance:   Awake, Alert, No acute distress.   HEENT:  Pupil equal and reactive to light. No scleral icterus; the mucous membranes were moist.   Neck: No cervical bruits. No JVD. No thyromegaly.     Chest: The spine was straight. The chest was symmetric.   Lungs:   No crackles. No wheezing.   Cardiovascular:   Regular rhythm and rate, normal first and second heart sounds with II/VI systolic murmurs at apex. No rubs or gallops.    Abdomen:  Soft. No tenderness. Non-distended. Bowels sounds are present   Extremities: Equal tibial pulses. Mild bilateral leg edema.   Skin: No rashes or ulcers. Warm, Dry.   Musculoskeletal: No tenderness. No deformity.   Neurologic: Mood and affect are appropriate. No focal deficits.         EKG: Personally reviewed  Sinus tachycardia with occasional Premature ventricular complexes and Fusion complexes   Left axis deviation   Right bundle branch block   Abnormal ECG   When compared with ECG of 22-FEB-2022 09:54,   Fusion complexes are now Present   Vent. rate has increased BY  53 BPM     Cardiac Imaging Studies  ECHO on 6-2-2022:  1. Normal left ventricular size and systolic performance with a visually  estimated ejection fraction of 55%.  2. There is mildly abnormal septal motion c/w right ventricular overload;  there is diastolic flattening of the septum with a more normal configuration  at end systole.  3. There is a bio-prosthetic aortic valve (documented 21 mm Lalitha-Blackwell  Magna bovine pericardial " tissue valve).  Â  Normal aortic valve prosthesis metrics with a mean systolic gradient of 6  mmHg and a peak anterograde velocity of 1.7 m/sec.  Â  No aortic insufficiency is detected.  4. There is mild, to perhaps mild-moderate, tricuspid insufficiency.  5. There is moderate right ventricular enlargement with moderately reduced  right ventricular systolic performance.  6. There is severe left atrial enlargement. There is mild to moderate right  atrial enlargement.  7. Right ventricular systolic pressure relative to right atrial pressure is  mildly increased. The pulmonary artery pressure is estimated to be 40-45 mmHg  plus right atrial pressure (the IVC is moderately dilated).    Lab Review   Lab Results   Component Value Date     03/24/2022    CO2 18 03/24/2022    BUN 34 03/24/2022     Lab Results   Component Value Date    WBC 6.5 03/24/2022    HGB 10.1 03/24/2022    HCT 30.3 03/24/2022    MCV 99 03/24/2022     03/24/2022     Lab Results   Component Value Date    CHOL 139 08/21/2018    CHOL 135 08/04/2017    CHOL 141 12/06/2016     Lab Results   Component Value Date    HDL 79 08/21/2018    HDL 65 08/04/2017    HDL 75 12/06/2016     No components found for: LDLCALC  Lab Results   Component Value Date    TRIG 70 08/21/2018    TRIG 62 08/04/2017    TRIG 61 12/06/2016     No results found for: CHOLHDL  Lab Results   Component Value Date    TROPONINI 0.02 02/21/2022     Lab Results   Component Value Date     02/22/2022     Lab Results   Component Value Date    TSH 3.14 03/24/2022

## 2022-08-16 NOTE — LETTER
8/16/2022 April MD Meena  [facility] [address]  [facility] [address]    RE: Rox Zavala       Dear Colleague,     I had the pleasure of seeing Rox Zavala in the [facility].            Assessment/Plan:   1.  Chronic diastolic congestive heart failure: The patient complains of shortness of breath on exertion.  She had echocardiogram in June 2022 which was a reported normal LV systolic function, normal functioning of bioprosthetic aortic valve, elevated pulmonary artery systolic pressure.  Most likely she does not take her diuretics faithfully.  We discussed the importance of her diuresis.  Increase Lasix from 20 mg daily to 20 mg twice a day.  CBC and BMP today.    2.  Coronary artery disease, status post the LIMA to D1, s/p CHEN to mid LCx and RCA 7/2016: The patient complains of dyspnea on exertion.  Her recent echocardiogram was reported normal left ventricular systolic function, LVEF was 60 to 65%, akinetic in basal inferior lateral wall, no significant valvular disease.  Continue aspirin, Imdur, Lipitor and metoprolol XL.     3.  Paroxysmal atrial fibrillation.  Her heart rate is well controlled.  Continue metoprolol succinate 50 mg daily and diltiazem  mg daily.  Continue anticoagulation Eliquis 2.5 mg twice a day.     4.  Severe aortic valve stenosis, status post the bioprosthetic aortic valve replacement: Echocardiogram was reported normal functioning of bioprosthetic aortic valve.     5.  Essential hypertension: Her blood pressure is at low side.  Lisinopril 2.5 mg daily is discontinued today..  Continue metoprolol XL 25 mg daily.      6.  Dyslipidemia: Continue pravastatin 40 mg nightly.  LDL is well controlled.    The patient is scheduled to follow-up with heart failure clinic in 2 weeks.    Total 59 minutes were spent in this visit for face to face visit, physical exam, review of current labs/imaging studies, plan for ongoing treatment with  >50% spent on counseling and coordination of care as documented in the above mentioned note.      Thank you for the opportunity to be involved in the care of Rox Zavala. If you have any questions, please feel free to contact me.  I will see the patient again in 3 months and as needed.    Much or all of the text in this note was generated through the use of Dragon Dictate voice-to-text software. Errors in spelling or words which seem out of context are unintentional. Sound alike errors, in particular, may have escaped editing.       History of Present Illness:   It is my pleasure to see Rox Zavala at the [facility] clinic for routine cardiology follow up.  Rox Zavala is a 87 year old female with a medical history of severe aortic valve stenosis status post bioprosthetic aortic valve replacement on Dec15, 2014, one-vessel bypass surgery LIMA to D1, 3 vessel coronary artery disease s/p CHEN to mid LCx and mid RCA on 7-5-2016, essential hypertension, hyperlipidemia, hypothyroidism, frequent PVCs.    The patient was admitted to hospital in the middle of June due to worsening shortness of breath and atrial fibrillation with RVR.  Since hospital discharge, she complains of shortness of breath on exertion.  She states that she has shortness of breath with minimal activities.  She denies any chest pain, palpitations.  She has no dizziness, orthopnea.  She has a mild bilateral leg edema.  Her weight has stable.  The patient states that she is depressed.  Her blood pressure and heart rate are in normal range today.    Past Medical History:     Patient Active Problem List   Diagnosis     Hammer Toe     Hemorrhoids     Psoriasis     Generalized Osteoarthritis Of The Hand     Osteoarthritis Of The Knee     Vitamin D Deficiency     Hypothyroidism     Neck Pain     Upper Back Pain (Between Shoulder Blades)     Osteopenia     Coronary artery disease     S/P AVR     Constipation     Acute  diastolic heart failure (H)     Essential hypertension     Hyperlipidemia     Coronary artery disease involving coronary bypass graft of native heart with other forms of angina pectoris (H)     Psoriatic arthritis (H)     Sacral insufficiency fracture, initial encounter     Back pain     Pressure injury of buttock, stage 1, unspecified laterality     MCI (mild cognitive impairment)     Frequent PVCs     Senile osteoporosis     JOSIAH (acute kidney injury) (H)     Dieulafoy lesion of stomach     Gastric ulcer due to Helicobacter pylori, acute     Stage 3 chronic kidney disease, unspecified whether stage 3a or 3b CKD (H)     Mild vascular neurocognitive disorder (H)     Thrombocytopenia (H)     Rheumatoid arthritis -- on Methotrexate     Acute gastric ulcer with hemorrhage     Atrial flutter with rapid ventricular response (H)     Hyponatremia     Chronic heart failure with preserved ejection fraction (H)       Past Surgical History:     Past Surgical History:   Procedure Laterality Date     ANGIOPLASTY / STENTING FEMORAL       AORTIC VALVE REPLACEMENT       CARDIAC SURGERY      CABG     ESOPHAGOSCOPY, GASTROSCOPY, DUODENOSCOPY (EGD), COMBINED N/A 11/16/2021    Procedure: ESOPHAGOGASTRODUODENOSCOPY;  Surgeon: Enrico Galan MD;  Location: [facility] Main OR     EYE SURGERY Bilateral     cataracts     HC REMOVAL GALLBLADDER      Description: Cholecystectomy;  Recorded: 03/20/2008;     [place] ESOPHAGOGASTRODUODENOSCOPY TRANSORAL DIAGNOSTIC N/A 6/1/2021    Procedure: ESOPHAGOGASTRODUODENOSCOPY (EGD) with biopsies;  Surgeon: Julio Lopez MD;  Location: [facility];  Service: Gastroenterology     [place] ESOPHAGOGASTRODUODENOSCOPY TRANSORAL DIAGNOSTIC N/A 6/6/2021    Procedure: ESOPHAGOGASTRODUODENOSCOPY (EGD) with bicap cauterization;  Surgeon: Julio Lopez MD;  Location: [facility];  Service: Gastroenterology     [place] ESOPHAGOGASTRODUODENOSCOPY TRANSORAL DIAGNOSTIC N/A 6/7/2021    Procedure: ESOPHAGOGASTRODUODENOSCOPY  (EGD) with hemoclip;  Surgeon: Sam Brock MD;  Location: [facility];  Service: Gastroenterology     [facility] LIGATE FALLOPIAN TUBE      Description: Tubal Ligation;  Recorded: 03/20/2008;     [facility] TOTAL HIP ARTHROPLASTY Right     Description: Total Hip Replacement;  Recorded: 03/20/2008; right       Family History:     Family History   Problem Relation Age of Onset     Asthma Mother      Coronary Artery Disease No family hx of      Breast Cancer No family hx of         Social History:    reports that she quit smoking about 27 years ago. Her smoking use included cigarettes. She has a 30.00 pack-year smoking history. She has never used smokeless tobacco. She reports current alcohol use of about 1.0 standard drink of alcohol per week. She reports that she does not use drugs.    Review of Systems:   12 systems are reviewed negative except for in HPI.    Meds:     Current Outpatient Medications:      apixaban ANTICOAGULANT (ELIQUIS) 2.5 MG tablet, Take 1 tablet (2.5 mg) by mouth 2 times daily, Disp: 60 tablet, Rfl: 1     atorvastatin (LIPITOR) 40 MG tablet, Take 1 tablet (40 mg) by mouth daily For lipids, Disp: 90 tablet, Rfl: 3     cholecalciferol 50 MCG (2000 UT) tablet, cholecalciferol (vitamin D3) 50 mcg (2,000 unit) tablet, Disp: , Rfl:      cholecalciferol 50 MCG (2000 UT) tablet, Take 1 tablet (50 mcg) by mouth daily, Disp: 90 tablet, Rfl: 0     diltiazem ER (DILT-XR) 120 MG 24 hr capsule, diltiazem ER (XR/XT) 120 mg capsule,extended release 24 hr, controlled, Disp: , Rfl:      diltiazem ER COATED BEADS (CARDIZEM CD/CARTIA XT) 180 MG 24 hr capsule, Take 1 capsule (180 mg) by mouth daily, Disp: 30 capsule, Rfl: 3     donepezil (ARICEPT) 10 MG tablet, Take 1 tablet (10 mg) by mouth At Bedtime dementia, Disp: 90 tablet, Rfl: 3     ferrous gluconate (FERGON) 324 (38 Fe) MG tablet, Take 1 tablet (324 mg) by mouth daily (with breakfast), Disp: 90 tablet, Rfl: 0     ferrous sulfate (FEROSUL) 325 (65 Fe) MG tablet,  ferrous sulfate 325 mg (65 mg iron) tablet, Disp: , Rfl:      furosemide (LASIX) 20 MG tablet, Take 1 tablet (20 mg) by mouth 2 times daily, Disp: 180 tablet, Rfl: 3     isosorbide mononitrate (IMDUR) 30 MG 24 hr tablet, Take 1 tablet (30 mg) by mouth daily, Disp: 90 tablet, Rfl: 1     levothyroxine (TIROSINT) 100 MCG capsule, levothyroxine 100 mcg capsule, Disp: , Rfl:      lisinopril (ZESTRIL) 20 MG tablet, lisinopril 20 mg tablet, Disp: , Rfl:      loperamide (IMODIUM A-D) 2 MG tablet, Take 1 tablet (2 mg) by mouth 4 times daily as needed for diarrhea, Disp: 60 tablet, Rfl: 0     methotrexate sodium 2.5 MG TABS, Take 3 tablets (7.5 mg) by mouth once a week Fridays, Disp: 36 tablet, Rfl: 0     methotrexate sodium, PF, 50 MG/2 ML oral solution (inj used orally), methotrexate oral, Disp: , Rfl:      metoprolol succinate ER (TOPROL XL) 50 MG 24 hr tablet, Take 50 mg by mouth daily, Disp: , Rfl:      Multiple Vitamins-Minerals (PRESERVISION AREDS 2 PO), Take 1 tablet by mouth 2 times daily, Disp: , Rfl:      nitroGLYcerin (NITROSTAT) 0.4 MG sublingual tablet, Place 0.4 mg under the tongue every 5 minutes as needed for chest pain For chest pain place 1 tablet under the tongue every 5 minutes for 3 doses. If symptoms persist 5 minutes after 1st dose call 911., Disp: , Rfl:      omeprazole (PRILOSEC) 40 MG DR capsule, Take 1 capsule (40 mg) by mouth daily, Disp: 60 capsule, Rfl: 3     pantoprazole (PROTONIX) 40 MG EC tablet, pantoprazole 40 mg tablet,delayed release, Disp: , Rfl:      polyethylene glycol (MIRALAX) 17 GM/Dose powder, Take 17 g by mouth daily To prevent constiaption, Disp: , Rfl:      SYNTHROID 100 MCG tablet, TAKE 1 TABLET EVERY DAY AT 6:00 A.M., Disp: 90 tablet, Rfl: 3     traMADol (ULTRAM) 50 MG tablet, Take 0.5 tablets (25 mg) by mouth 2 times daily as needed for severe pain, Disp: 10 tablet, Rfl: 0     vitamin C (ASCORBIC ACID) 500 MG tablet, Take 1 tablet (500 mg) by mouth daily, Disp: 90 tablet,  "Rfl: 0     acetaminophen (TYLENOL) 325 MG tablet, Take 3 tablets (975 mg) by mouth 3 times daily as needed for mild pain or fever (Patient not taking: Reported on 8/16/2022), Disp: , Rfl:      calcium carbonate-vitamin D (OYSTER SHELL CALCIUM/D) 500-200 MG-UNIT tablet, Take 1 tablet by mouth 2 times daily, Disp: 180 tablet, Rfl: 0     folic acid (FOLVITE) 1 MG tablet, Take 1 mg by mouth daily, Disp: , Rfl:     Allergies:   Patient has no known allergies.      Objective:      Physical Exam  53.1 kg (117 lb)  1.626 m (5' 4\")  Body mass index is 20.08 kg/m .  /61 (BP Location: Left arm, Patient Position: Sitting, Cuff Size: Adult Small)   Pulse 72   Resp 16   Ht 1.626 m (5' 4\")   Wt 53.1 kg (117 lb)   BMI 20.08 kg/m      General Appearance:   Awake, Alert, No acute distress.   HEENT:  Pupil equal and reactive to light. No scleral icterus; the mucous membranes were moist.   Neck: No cervical bruits. No JVD. No thyromegaly.     Chest: The spine was straight. The chest was symmetric.   Lungs:   No crackles. No wheezing.   Cardiovascular:   Regular rhythm and rate, normal first and second heart sounds with II/VI systolic murmurs at apex. No rubs or gallops.    Abdomen:  Soft. No tenderness. Non-distended. Bowels sounds are present   Extremities: Equal tibial pulses. Mild bilateral leg edema.   Skin: No rashes or ulcers. Warm, Dry.   Musculoskeletal: No tenderness. No deformity.   Neurologic: Mood and affect are appropriate. No focal deficits.         EKG: Personally reviewed  Sinus tachycardia with occasional Premature ventricular complexes and Fusion complexes   Left axis deviation   Right bundle branch block   Abnormal ECG   When compared with ECG of 22-FEB-2022 09:54,   Fusion complexes are now Present   Vent. rate has increased BY  53 BPM     Cardiac Imaging Studies  ECHO on 6-2-2022:  1. Normal left ventricular size and systolic performance with a visually  estimated ejection fraction of 55%.  2. There is " mildly abnormal septal motion c/w right ventricular overload;  there is diastolic flattening of the septum with a more normal configuration  at end systole.  3. There is a bio-prosthetic aortic valve (documented 21 mm Lalitha-Blackwell  Magna bovine pericardial tissue valve).  Â  Normal aortic valve prosthesis metrics with a mean systolic gradient of 6  mmHg and a peak anterograde velocity of 1.7 m/sec.  Â  No aortic insufficiency is detected.  4. There is mild, to perhaps mild-moderate, tricuspid insufficiency.  5. There is moderate right ventricular enlargement with moderately reduced  right ventricular systolic performance.  6. There is severe left atrial enlargement. There is mild to moderate right  atrial enlargement.  7. Right ventricular systolic pressure relative to right atrial pressure is  mildly increased. The pulmonary artery pressure is estimated to be 40-45 mmHg  plus right atrial pressure (the IVC is moderately dilated).    Lab Review   Lab Results   Component Value Date     03/24/2022    CO2 18 03/24/2022    BUN 34 03/24/2022     Lab Results   Component Value Date    WBC 6.5 03/24/2022    HGB 10.1 03/24/2022    HCT 30.3 03/24/2022    MCV 99 03/24/2022     03/24/2022     Lab Results   Component Value Date    CHOL 139 08/21/2018    CHOL 135 08/04/2017    CHOL 141 12/06/2016     Lab Results   Component Value Date    HDL 79 08/21/2018    HDL 65 08/04/2017    HDL 75 12/06/2016     No components found for: LDLCALC  Lab Results   Component Value Date    TRIG 70 08/21/2018    TRIG 62 08/04/2017    TRIG 61 12/06/2016     No results found for: CHOLHDL  Lab Results   Component Value Date    TROPONINI 0.02 02/21/2022     Lab Results   Component Value Date     02/22/2022     Lab Results   Component Value Date    TSH 3.14 03/24/2022                 Thank you for allowing me to participate in the care of your patient.      Sincerely,     Shilpa Wilburn MD     [facility]  [facility]  cc:   Nguyen Leung, NISA CNP  [address] [facility] [address], [address]  [place] [address]

## 2022-08-16 NOTE — PATIENT INSTRUCTIONS
Rox Zavala,    It is my pleasure to see you today at the [facility].    My recommendations for this visit are:    Increase furosemide from 20 mg daily to twice a day  Continue other medications  Routine blood tests CBC and BMP today  Schedule you to follow up with heart failure clinic in 2 weeks  I will see you again in 3 months      Shilpa Wilburn MD, PhD

## 2022-08-31 NOTE — TELEPHONE ENCOUNTER
[facility] Call Center    Phone Message    May a detailed message be left on voicemail: yes     Reason for Call: Other: Patient has not increased her Lasix to 40mg. Is the 9/2 appointment still needed if the increased had not been increased?     Action Taken: Other: routed to cardiology    Travel Screening: Not Applicable

## 2022-08-31 NOTE — TELEPHONE ENCOUNTER
Yeimi was contacted to advise that the appointment was important to keep especially given the Lasix dosing was not increased. This is so that the provider is able to assess her in person, and determine what the plan of care should be going forward.  Vanessa SIBLEY RN BSN, CHFN, PCCN-K

## 2022-09-02 NOTE — Clinical Note
Will you please fax over orders to have a BMP in one week to her facililty [facility] in [place]. Thank you

## 2022-09-02 NOTE — LETTER
9/2/2022 April MD Meena  [facility] [address]  [place] [address]    RE: Rox Zavala       Dear Colleague,     I had the pleasure of seeing Rox Zavala in the [facility].        Assessment/Recommendations   Assessment:    1.  Heart failure with preserved ejection fraction, right-sided heart failure, NYHA class III: Compensated.  She continues to have fatigue, dyspnea on exertion and trace pedal edema.  Her weight has decreased a few pounds since her last clinic visit.  Dr. Wilburn increased her Lasix to 40 mg daily but she has only been taking this for a few days.  She lives in assisted living where her meals are provided.  2.  Paroxysmal atrial fibrillation: Rate controlled.  She continues Eliquis for anticoagulation and Toprol and diltiazem.  3.  Chronic kidney disease stage III: she will have a BMP next week.    Plan:  1.  Continue current medications     2.  BMP next week  3.  Continue monitoring weights and low-salt diet  4.  Continue wearing compression socks    Rox Zavala will follow up with Dr. Wilburn November 9 and in the heart failure clinic in February or sooner if needed.     History of Present Illness/Subjective    Ms. Rox Zavala is a 87 year old female seen at [facility] heart failure clinic today for continued follow-up.  Her daughter accompanies her today.  He follows up for heart failure with preserved ejection fraction, right-sided heart failure.  She had an echocardiogram in June 2022 which showed ejection fraction of 55% with RV moderately enlarged. She has a past medical history significant for atrial flutter, CAD, hypertension, hyperlipidemia, status post bioprosthetic aortic valve, chronic kidney disease stage III, hypothyroidism.    During the last clinic visit, Dr. Wilburn increased her Lasix to 40 mg daily.  She states she has only increased it for few days.  Her clinic weight is down 2 pounds.  She has not noticed any  "improvement with her dyspnea on exertion or lower extremity edema.  She denies lightheadedness, shortness of breath, orthopnea, PND, palpitations, chest pain and abdominal fullness/bloating.       Physical Examination Review of Systems   BP 92/52 (BP Location: Right arm, Patient Position: Sitting, Cuff Size: Adult Small)   Pulse 100   Resp 16   Ht 1.626 m (5' 4\")   Wt 52.2 kg (115 lb)   BMI 19.74 kg/m    Body mass index is 19.74 kg/m .  Wt Readings from Last 3 Encounters:   09/02/22 52.2 kg (115 lb)   08/16/22 53.1 kg (117 lb)   06/22/22 52.6 kg (116 lb)       General Appearance:   no acute distress   ENT/Mouth: Wearing mask   EYES:  no scleral icterus, normal conjunctivae   Neck: no thyromegaly   Chest/Lungs:   lungs are clear to auscultation, no rales or wheezing, equal chest wall expansion    Cardiovascular:    Irregularly irregular. Normal first and second heart sounds with no murmurs, rubs, or gallops; Jugular venous pressure normal, trace pedal edema bilaterally, wearing compression socks   Abdomen:  no organomegaly, masses, bruits, or tenderness; bowel sounds are present   Extremities: no cyanosis or clubbing   Skin: no xanthelasma, warm.    Neurologic: normal  bilateral, no tremors     Psychiatric: alert and oriented x3    Enc Vitals  BP: 92/52  Pulse: 100  Resp: 16  Weight: 52.2 kg (115 lb)  Height: 162.6 cm (5' 4\")                                         Medical History  Surgical History Family History Social History   Past Medical History:   Diagnosis Date     Acute diastolic CHF (congestive heart failure) (H) 12/23/2014     Acute renal failure (H)      Anemia      Aortic valve stenosis, severe      Arrhythmia      Arthritis      Atrial flutter (H)      Coronary artery disease 11/17/2014     Dieulafoy lesion of stomach      Disease of thyroid gland      Essential hypertension 12/08/2015     Gastroesophageal reflux disease      GI bleed 06/01/2021     Hammer toe      Heart murmur      History of " "blood transfusion      Hyperlipidemia      Hypertension      Hypothyroidism     Created by [employer]version [employer] updated for latest IMO load      Irregular heart beat      Macular disorder     \"wrinkle\"     MCI (mild cognitive impairment) 06/16/2019     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     Pressure injury of buttock, stage 1, unspecified laterality 06/16/2019     Rheumatoid arthritis (H)      S/P AVR 12/15/2014     Sacral insufficiency fracture, initial encounter 05/06/2019     Senile osteoporosis 09/23/2019    Past Surgical History:   Procedure Laterality Date     ANGIOPLASTY / STENTING FEMORAL       AORTIC VALVE REPLACEMENT       CARDIAC SURGERY      CABG     ESOPHAGOSCOPY, GASTROSCOPY, DUODENOSCOPY (EGD), COMBINED N/A 11/16/2021    Procedure: ESOPHAGOGASTRODUODENOSCOPY;  Surgeon: Enrico Galan MD;  Location: [facility] Main OR     EYE SURGERY Bilateral     cataracts     HC REMOVAL GALLBLADDER      Description: Cholecystectomy;  Recorded: 03/20/2008;     DC ESOPHAGOGASTRODUODENOSCOPY TRANSORAL DIAGNOSTIC N/A 6/1/2021    Procedure: ESOPHAGOGASTRODUODENOSCOPY (EGD) with biopsies;  Surgeon: Julio Lopez MD;  Location: [facility];  Service: Gastroenterology     DC ESOPHAGOGASTRODUODENOSCOPY TRANSORAL DIAGNOSTIC N/A 6/6/2021    Procedure: ESOPHAGOGASTRODUODENOSCOPY (EGD) with bicap cauterization;  Surgeon: Julio Lopez MD;  Location: [facility];  Service: Gastroenterology     DC ESOPHAGOGASTRODUODENOSCOPY TRANSORAL DIAGNOSTIC N/A 6/7/2021    Procedure: ESOPHAGOGASTRODUODENOSCOPY (EGD) with hemoclip;  Surgeon: Sam Brock MD;  Location: [facility];  Service: Gastroenterology     [facility] LIGATE FALLOPIAN TUBE      Description: Tubal Ligation;  Recorded: 03/20/2008;     ZZC TOTAL HIP ARTHROPLASTY Right     Description: Total Hip Replacement;  Recorded: 03/20/2008; right    Family History   Problem Relation Age of Onset     Asthma Mother      Coronary Artery Disease No " "family hx of      Breast Cancer No family hx of     Social History     Socioeconomic History     Marital status:      Spouse name: Not on file     Number of children: Not on file     Years of education: Not on file     Highest education level: Not on file   Occupational History     Not on file   Tobacco Use     Smoking status: Former Smoker     Packs/day: 1.00     Years: 30.00     Pack years: 30.00     Types: Cigarettes     Quit date: 1995     Years since quittin.6     Smokeless tobacco: Never Used   Substance and Sexual Activity     Alcohol use: Yes     Alcohol/week: 1.0 standard drink     Drug use: No     Sexual activity: Not on file   Other Topics Concern     Not on file   Social History Narrative    , patient notes she and her  were  for 60 years and he passed away 12 years ago following an illness and complications. Patient notes they had a happy marriage.  Children: Patient notes she had 4 children, one daughter passed away fo llowing a MVA when daughter was 57 years old. Leisa states she is close to her two boys, daughter, grandchildren, and great-grandchildren and finds her family supportive.     Lives in a town home independently.. Now in senior living.    Lives at \" [facility] independent living.    Misbah Barone MD  2020           Social Determinants of Health     Financial Resource Strain: Not on file   Food Insecurity: Not on file   Transportation Needs: Not on file   Physical Activity: Not on file   Stress: Not on file   Social Connections: Not on file   Intimate Partner Violence: Not on file   Housing Stability: Not on file          Medications  Allergies   Current Outpatient Medications   Medication Sig Dispense Refill     apixaban ANTICOAGULANT (ELIQUIS) 2.5 MG tablet Take 1 tablet (2.5 mg) by mouth 2 times daily 60 tablet 1     atorvastatin (LIPITOR) 40 MG tablet Take 1 tablet (40 mg) by mouth daily For lipids 90 tablet 3     calcium carbonate-vitamin D " (OS-CARLA WITH D) 500-200 MG-UNIT tablet Calcium 500 + D 500 mg-5 mcg (200 unit) tablet       cholecalciferol 50 MCG (2000 UT) tablet Take 1 tablet (50 mcg) by mouth daily 90 tablet 0     diltiazem ER COATED BEADS (CARDIZEM CD/CARTIA XT) 180 MG 24 hr capsule Take 1 capsule (180 mg) by mouth daily 30 capsule 3     donepezil (ARICEPT) 10 MG tablet Take 1 tablet (10 mg) by mouth At Bedtime dementia 90 tablet 3     furosemide (LASIX) 40 MG tablet TAKE 1 TAB BY MOUTH ONCE DAILY       isosorbide mononitrate (IMDUR) 30 MG 24 hr tablet Take 1 tablet (30 mg) by mouth daily 90 tablet 1     lisinopril (ZESTRIL) 20 MG tablet lisinopril 20 mg tablet       loperamide (IMODIUM A-D) 2 MG tablet Take 1 tablet (2 mg) by mouth 4 times daily as needed for diarrhea 60 tablet 0     methotrexate sodium 2.5 MG TABS Take 3 tablets (7.5 mg) by mouth once a week Fridays 36 tablet 0     methotrexate sodium, PF, 50 MG/2 ML oral solution (inj used orally) methotrexate oral       metoprolol succinate ER (TOPROL XL) 50 MG 24 hr tablet Take 50 mg by mouth daily       Multiple Vitamins-Minerals (PRESERVISION AREDS 2 PO) Take 1 tablet by mouth 2 times daily       nitroGLYcerin (NITROSTAT) 0.4 MG sublingual tablet Place 0.4 mg under the tongue every 5 minutes as needed for chest pain For chest pain place 1 tablet under the tongue every 5 minutes for 3 doses. If symptoms persist 5 minutes after 1st dose call 911.       omeprazole (PRILOSEC) 40 MG DR capsule Take 1 capsule (40 mg) by mouth daily 60 capsule 3     pantoprazole (PROTONIX) 40 MG EC tablet pantoprazole 40 mg tablet,delayed release       polyethylene glycol (MIRALAX) 17 GM/Dose powder Miralax 17 gram oral powder packet       polyethylene glycol (MIRALAX) 17 GM/Dose powder Take 17 g by mouth daily To prevent constiaption       SENEXON-S 8.6-50 MG tablet TAKE 1 TAB BY MOUTH TWICE A DAY FOR CONSTIPATION       SYNTHROID 100 MCG tablet TAKE 1 TABLET EVERY DAY AT 6:00 A.M. 90 tablet 3     traMADol  (ULTRAM) 50 MG tablet Take 0.5 tablets (25 mg) by mouth 2 times daily as needed for severe pain 10 tablet 0     vitamin C (ASCORBIC ACID) 500 MG tablet Take 1 tablet (500 mg) by mouth daily 90 tablet 0    No Known Allergies      Lab Results    Chemistry/lipid CBC Cardiac Enzymes/BNP/TSH/INR   Lab Results   Component Value Date    CHOL 139 08/21/2018    HDL 79 08/21/2018    TRIG 70 08/21/2018    BUN 20 08/16/2022     08/16/2022    CO2 34 (H) 08/16/2022    Lab Results   Component Value Date    WBC 6.5 08/16/2022    HGB 11.5 (L) 08/16/2022    HCT 34.9 (L) 08/16/2022     (H) 08/16/2022     08/16/2022    Lab Results   Component Value Date    TROPONINI 0.03 06/01/2022     (H) 06/01/2022    TSH 4.26 04/15/2022    INR 1.25 (H) 02/16/2022             This note has been dictated using voice recognition software. Any grammatical, typographical, or context distortions are unintentional and inherent to the software    30 minutes spent on the date of encounter doing chart review, review of outside records, review of test results, interpretation with above tests, patient visit, documentation and discussion with family.                Orders faxed.     Ailyn SIBLEY RN  BSN        ----- Message -----  From: Nguyen Leung APRN CNP  Sent: 9/2/2022  10:52 AM CDT  To: [facility]    Will you please fax over orders to have a BMP in one week to her facililty [facility] in [place]. Thank you    Thank you for allowing me to participate in the care of your patient.      Sincerely,     NISA Dutta CNP     [facility]  cc:   NISA Dutta CNP  [address], [address]  [place] [address]

## 2022-09-02 NOTE — PROGRESS NOTES
Orders faxed.     Ailyn SIBLEY RN  BSN        ----- Message -----  From: Nguyen Leung APRN CNP  Sent: 9/2/2022  10:52 AM CDT  To: [facility]    Will you please fax over orders to have a BMP in one week to her facililty [facility] in [place]. Thank you

## 2022-09-02 NOTE — PATIENT INSTRUCTIONS
Rox Zavala,    It was a pleasure to see you today at [facility].     My recommendations after this visit include:  - Please follow up with Dr Wilburn November 9   - Please follow up with Nguyen Leung in 5 months  - Continue current medications  - Get blood work (BMP) in a week at your facility    Nguyen Leung, CNP

## 2022-09-07 NOTE — TELEPHONE ENCOUNTER
[facility] Call Center    Phone Message    May a detailed message be left on voicemail: yes     Reason for Call: Other: [facility] called looking to speak with a member of the patients care team in regards to the medication list. Would like to confirm what the patient should be taking. Please call the facility back to further discuss, or fax information over. Thank you.     Fax #: [phone]    Action Taken: Message routed to:  Other: Cardiology    Travel Screening: Not Applicable

## 2022-11-09 NOTE — PATIENT INSTRUCTIONS
Rox Zavala,    It is my pleasure to see you today at the [facility].    My recommendations for this visit are:    Increase furosemide from 40 mg daily to 40 mg twice a day for 3 to 5 days and then back to 40 mg daily  Continue other medications  Routine labs cbc, BMP and lipid profile, BNP  Refer you to Lung doctor since you have 40 years of smoking history   Will see you again in 4 months    Shilpa Wilburn MD, PhD

## 2022-11-09 NOTE — PROGRESS NOTES
Assessment/Plan:   1.  Chronic diastolic congestive heart failure: The patient complains of shortness of breath on exertion.  She had echocardiogram in June 2022 which was a reported normal LV systolic function, normal functioning of bioprosthetic aortic valve, elevated pulmonary artery systolic pressure.  She complains of worsening shortness of breath on exertion.  However, she has no leg edema, no obvious crackles in lungs.  Her weight has been stable.    We discussed further evaluation and management.    Her shortness of breath on exertion could be related to congestive heart failure, CAD, but also could be caused by lung disease.  She has 40 years of her smoking history.  She could have a COPD.  I have referred the patient to pulmonary clinic for further evaluation and management.    Increase Lasix from 40 mg daily to 40 mg twice a day for 3 to 5 days and then back to 40 mg daily.    Routine labs CBC and BMP, BNP, ALT and lipid profile today.    2.  Coronary artery disease, status post the LIMA to D1, s/p CHEN to mid LCx and RCA 7/2016: The patient complains of dyspnea on exertion.  Her recent echocardiogram was reported normal left ventricular systolic function, LVEF was 60 to 65%, akinetic in basal inferior lateral wall, no significant valvular disease.  Continue aspirin, Imdur, Lipitor and metoprolol XL.     3.  Paroxysmal atrial fibrillation.  Her heart rate is well controlled.  Continue metoprolol succinate 50 mg daily and diltiazem  mg daily.  Continue anticoagulation Eliquis 2.5 mg twice a day.     4.  Severe aortic valve stenosis, status post the bioprosthetic aortic valve replacement: Echocardiogram was reported normal functioning of bioprosthetic aortic valve.     5.  Essential hypertension: Her blood pressure is at low side.  Lisinopril 2.5 mg daily is discontinued today..  Continue metoprolol XL 50 mg daily.      6.  Dyslipidemia: Her pravastatin is discontinued.  Lipid profile  today.    Total 41 minutes were spent in this visit for face to face visit, physical exam, review of current labs/imaging studies, plan for ongoing treatment with >50% spent on counseling and coordination of care as documented in the above mentioned note.      Thank you for the opportunity to be involved in the care of Rox Zavala. If you have any questions, please feel free to contact me.  I will see the patient again in 3 months and as needed.    Much or all of the text in this note was generated through the use of Dragon Dictate voice-to-text software. Errors in spelling or words which seem out of context are unintentional. Sound alike errors, in particular, may have escaped editing.       History of Present Illness:   It is my pleasure to see Rox Zavala at the [facility] for routine cardiology follow up.  Rox Zavala is a 87 year old female with a medical history of severe aortic valve stenosis status post bioprosthetic aortic valve replacement on Dec15, 2014, one-vessel bypass surgery LIMA to D1, 3 vessel coronary artery disease s/p CHEN to mid LCx and mid RCA on 7-5-2016, essential hypertension, hyperlipidemia, hypothyroidism, frequent PVCs.    The patient states that she has not doing well because of shortness of breath on exertion.  She did not have shortness of breath if she is at rest.  She has shortness of breath with minimal activities.  Her weight has been stable.  She has no obvious leg edema.  She has palpitations when she exerted herself.  She has no dizziness, orthopnea.  Her blood pressure and heart rate are well controlled.    Past Medical History:     Patient Active Problem List   Diagnosis     Hammer Toe     Hemorrhoids     Psoriasis     Generalized Osteoarthritis Of The Hand     Osteoarthritis Of The Knee     Vitamin D Deficiency     Hypothyroidism     Neck Pain     Upper Back Pain (Between Shoulder Blades)     Osteopenia     Coronary artery disease      S/P AVR     Constipation     Acute diastolic heart failure (H)     Essential hypertension     Hyperlipidemia     Coronary artery disease involving coronary bypass graft of native heart with other forms of angina pectoris (H)     Psoriatic arthritis (H)     Sacral insufficiency fracture, initial encounter     Back pain     Pressure injury of buttock, stage 1, unspecified laterality     MCI (mild cognitive impairment)     Frequent PVCs     Senile osteoporosis     JOSIAH (acute kidney injury) (H)     Dieulafoy lesion of stomach     Gastric ulcer due to Helicobacter pylori, acute     Stage 3 chronic kidney disease, unspecified whether stage 3a or 3b CKD (H)     Mild vascular neurocognitive disorder     Thrombocytopenia (H)     Rheumatoid arthritis -- on Methotrexate     Acute gastric ulcer with hemorrhage     Atrial flutter with rapid ventricular response (H)     Hyponatremia     Chronic heart failure with preserved ejection fraction (H)     Paroxysmal atrial fibrillation (H)       Past Surgical History:     Past Surgical History:   Procedure Laterality Date     ANGIOPLASTY / STENTING FEMORAL       AORTIC VALVE REPLACEMENT       CARDIAC SURGERY      CABG     ESOPHAGOSCOPY, GASTROSCOPY, DUODENOSCOPY (EGD), COMBINED N/A 11/16/2021    Procedure: ESOPHAGOGASTRODUODENOSCOPY;  Surgeon: Enrico Galan MD;  Location: [facility] Main OR     EYE SURGERY Bilateral     cataracts     HC REMOVAL GALLBLADDER      Description: Cholecystectomy;  Recorded: 03/20/2008;     [place] ESOPHAGOGASTRODUODENOSCOPY TRANSORAL DIAGNOSTIC N/A 6/1/2021    Procedure: ESOPHAGOGASTRODUODENOSCOPY (EGD) with biopsies;  Surgeon: Julio Lopez MD;  Location: [facility];  Service: Gastroenterology     [place] ESOPHAGOGASTRODUODENOSCOPY TRANSORAL DIAGNOSTIC N/A 6/6/2021    Procedure: ESOPHAGOGASTRODUODENOSCOPY (EGD) with bicap cauterization;  Surgeon: Julio Lopez MD;  Location: [facility];  Service: Gastroenterology     [place] ESOPHAGOGASTRODUODENOSCOPY  TRANSORAL DIAGNOSTIC N/A 6/7/2021    Procedure: ESOPHAGOGASTRODUODENOSCOPY (EGD) with hemoclip;  Surgeon: Sam Brock MD;  Location: [facility];  Service: Gastroenterology     [facility] LIGATE FALLOPIAN TUBE      Description: Tubal Ligation;  Recorded: 03/20/2008;     [facility] TOTAL HIP ARTHROPLASTY Right     Description: Total Hip Replacement;  Recorded: 03/20/2008; right       Family History:     Family History   Problem Relation Age of Onset     Asthma Mother      Coronary Artery Disease No family hx of      Breast Cancer No family hx of         Social History:    reports that she quit smoking about 27 years ago. Her smoking use included cigarettes. She has a 30.00 pack-year smoking history. She has never used smokeless tobacco. She reports current alcohol use of about 1.0 standard drink per week. She reports that she does not use drugs.    Review of Systems:   12 systems are reviewed negative except for in HPI.    Meds:     Current Outpatient Medications:      apixaban ANTICOAGULANT (ELIQUIS) 2.5 MG tablet, Take 1 tablet (2.5 mg) by mouth 2 times daily, Disp: 60 tablet, Rfl: 1     atorvastatin (LIPITOR) 40 MG tablet, Take 1 tablet (40 mg) by mouth daily For lipids, Disp: 90 tablet, Rfl: 3     calcium carbonate-vitamin D (OS-CARLA WITH D) 500-200 MG-UNIT tablet, Calcium 500 + D 500 mg-5 mcg (200 unit) tablet, Disp: , Rfl:      cholecalciferol 50 MCG (2000 UT) tablet, Take 1 tablet (50 mcg) by mouth daily, Disp: 90 tablet, Rfl: 0     diltiazem ER COATED BEADS (CARDIZEM CD/CARTIA XT) 180 MG 24 hr capsule, Take 1 capsule (180 mg) by mouth daily, Disp: 30 capsule, Rfl: 3     donepezil (ARICEPT) 10 MG tablet, Take 1 tablet (10 mg) by mouth At Bedtime dementia, Disp: 90 tablet, Rfl: 3     furosemide (LASIX) 40 MG tablet, TAKE 1 TAB BY MOUTH ONCE DAILY, Disp: , Rfl:      isosorbide mononitrate (IMDUR) 30 MG 24 hr tablet, Take 1 tablet (30 mg) by mouth daily, Disp: 90 tablet, Rfl: 1     lisinopril (ZESTRIL) 20 MG tablet,  "lisinopril 20 mg tablet, Disp: , Rfl:      loperamide (IMODIUM A-D) 2 MG tablet, Take 1 tablet (2 mg) by mouth 4 times daily as needed for diarrhea, Disp: 60 tablet, Rfl: 0     methotrexate sodium 2.5 MG TABS, Take 3 tablets (7.5 mg) by mouth once a week Fridays, Disp: 36 tablet, Rfl: 0     methotrexate sodium, PF, 50 MG/2 ML oral solution (inj used orally), methotrexate oral, Disp: , Rfl:      metoprolol succinate ER (TOPROL XL) 50 MG 24 hr tablet, Take 50 mg by mouth daily, Disp: , Rfl:      Multiple Vitamins-Minerals (PRESERVISION AREDS 2 PO), Take 1 tablet by mouth 2 times daily, Disp: , Rfl:      nitroGLYcerin (NITROSTAT) 0.4 MG sublingual tablet, Place 0.4 mg under the tongue every 5 minutes as needed for chest pain For chest pain place 1 tablet under the tongue every 5 minutes for 3 doses. If symptoms persist 5 minutes after 1st dose call 911., Disp: , Rfl:      omeprazole (PRILOSEC) 40 MG DR capsule, Take 1 capsule (40 mg) by mouth daily, Disp: 60 capsule, Rfl: 3     pantoprazole (PROTONIX) 40 MG EC tablet, pantoprazole 40 mg tablet,delayed release, Disp: , Rfl:      polyethylene glycol (MIRALAX) 17 GM/Dose powder, Take 17 g by mouth daily To prevent constiaption, Disp: , Rfl:      SENEXON-S 8.6-50 MG tablet, TAKE 1 TAB BY MOUTH TWICE A DAY FOR CONSTIPATION, Disp: , Rfl:      SYNTHROID 100 MCG tablet, TAKE 1 TABLET EVERY DAY AT 6:00 A.M., Disp: 90 tablet, Rfl: 3     traMADol (ULTRAM) 50 MG tablet, Take 0.5 tablets (25 mg) by mouth 2 times daily as needed for severe pain, Disp: 10 tablet, Rfl: 0     vitamin C (ASCORBIC ACID) 500 MG tablet, Take 1 tablet (500 mg) by mouth daily, Disp: 90 tablet, Rfl: 0    Allergies:   Patient has no known allergies.      Objective:      Physical Exam  52.2 kg (115 lb)  1.626 m (5' 4\")  Body mass index is 19.74 kg/m .  /60 (BP Location: Right arm, Patient Position: Sitting, Cuff Size: Adult Regular)   Pulse 51   Resp 16   Ht 1.626 m (5' 4\")   Wt 52.2 kg (115 lb)   " BMI 19.74 kg/m      General Appearance:   Awake, Alert, No acute distress.   HEENT:  Pupil equal and reactive to light. No scleral icterus; the mucous membranes were moist.   Neck: No cervical bruits. No JVD. No thyromegaly.     Chest: The spine was straight. The chest was symmetric.   Lungs:   No crackles. No wheezing.   Cardiovascular:   Regular rhythm and rate, normal first and second heart sounds with II/VI systolic murmurs at apex. No rubs or gallops.    Abdomen:  Soft. No tenderness. Non-distended. Bowels sounds are present   Extremities: Equal tibial pulses. Trace leg edema.   Skin: No rashes or ulcers. Warm, Dry.   Musculoskeletal: No tenderness. No deformity.   Neurologic: Mood and affect are appropriate. No focal deficits.         EKG: Personally reviewed  Sinus tachycardia with occasional Premature ventricular complexes and Fusion complexes   Left axis deviation   Right bundle branch block   Abnormal ECG   When compared with ECG of 22-FEB-2022 09:54,   Fusion complexes are now Present   Vent. rate has increased BY  53 BPM     Cardiac Imaging Studies  ECHO on 6-2-2022:  1. Normal left ventricular size and systolic performance with a visually  estimated ejection fraction of 55%.  2. There is mildly abnormal septal motion c/w right ventricular overload;  there is diastolic flattening of the septum with a more normal configuration  at end systole.  3. There is a bio-prosthetic aortic valve (documented 21 mm Lalitha-Blackwell  Magna bovine pericardial tissue valve).  Â  Normal aortic valve prosthesis metrics with a mean systolic gradient of 6  mmHg and a peak anterograde velocity of 1.7 m/sec.  Â  No aortic insufficiency is detected.  4. There is mild, to perhaps mild-moderate, tricuspid insufficiency.  5. There is moderate right ventricular enlargement with moderately reduced  right ventricular systolic performance.  6. There is severe left atrial enlargement. There is mild to moderate right  atrial  enlargement.  7. Right ventricular systolic pressure relative to right atrial pressure is  mildly increased. The pulmonary artery pressure is estimated to be 40-45 mmHg  plus right atrial pressure (the IVC is moderately dilated).    Lab Review   Lab Results   Component Value Date     03/24/2022    CO2 18 03/24/2022    BUN 34 03/24/2022     Lab Results   Component Value Date    WBC 6.5 03/24/2022    HGB 10.1 03/24/2022    HCT 30.3 03/24/2022    MCV 99 03/24/2022     03/24/2022     Lab Results   Component Value Date    CHOL 139 08/21/2018    CHOL 135 08/04/2017    CHOL 141 12/06/2016     Lab Results   Component Value Date    HDL 79 08/21/2018    HDL 65 08/04/2017    HDL 75 12/06/2016     No components found for: LDLCALC  Lab Results   Component Value Date    TRIG 70 08/21/2018    TRIG 62 08/04/2017    TRIG 61 12/06/2016     No results found for: CHOLHDL  Lab Results   Component Value Date    TROPONINI 0.02 02/21/2022     Lab Results   Component Value Date     02/22/2022     Lab Results   Component Value Date    TSH 3.14 03/24/2022

## 2022-11-09 NOTE — LETTER
11/9/2022 April MD Meena  [facility] [address]  [address]  [facility] [address]    RE: Rox Zavala       Dear Colleague,     I had the pleasure of seeing Rox Zavala in the [facility].            Assessment/Plan:   1.  Chronic diastolic congestive heart failure: The patient complains of shortness of breath on exertion.  She had echocardiogram in June 2022 which was a reported normal LV systolic function, normal functioning of bioprosthetic aortic valve, elevated pulmonary artery systolic pressure.  She complains of worsening shortness of breath on exertion.  However, she has no leg edema, no obvious crackles in lungs.  Her weight has been stable.    We discussed further evaluation and management.    Her shortness of breath on exertion could be related to congestive heart failure, CAD, but also could be caused by lung disease.  She has 40 years of her smoking history.  She could have a COPD.  I have referred the patient to pulmonary clinic for further evaluation and management.    Increase Lasix from 40 mg daily to 40 mg twice a day for 3 to 5 days and then back to 40 mg daily.    Routine labs CBC and BMP, BNP, ALT and lipid profile today.    2.  Coronary artery disease, status post the LIMA to D1, s/p CHEN to mid LCx and RCA 7/2016: The patient complains of dyspnea on exertion.  Her recent echocardiogram was reported normal left ventricular systolic function, LVEF was 60 to 65%, akinetic in basal inferior lateral wall, no significant valvular disease.  Continue aspirin, Imdur, Lipitor and metoprolol XL.     3.  Paroxysmal atrial fibrillation.  Her heart rate is well controlled.  Continue metoprolol succinate 50 mg daily and diltiazem  mg daily.  Continue anticoagulation Eliquis 2.5 mg twice a day.     4.  Severe aortic valve stenosis, status post the bioprosthetic aortic valve replacement: Echocardiogram was reported normal functioning of bioprosthetic aortic  valve.     5.  Essential hypertension: Her blood pressure is at low side.  Lisinopril 2.5 mg daily is discontinued today..  Continue metoprolol XL 50 mg daily.      6.  Dyslipidemia: Her pravastatin is discontinued.  Lipid profile today.    Total 41 minutes were spent in this visit for face to face visit, physical exam, review of current labs/imaging studies, plan for ongoing treatment with >50% spent on counseling and coordination of care as documented in the above mentioned note.      Thank you for the opportunity to be involved in the care of Rox Zavala. If you have any questions, please feel free to contact me.  I will see the patient again in 3 months and as needed.    Much or all of the text in this note was generated through the use of Dragon Dictate voice-to-text software. Errors in spelling or words which seem out of context are unintentional. Sound alike errors, in particular, may have escaped editing.       History of Present Illness:   It is my pleasure to see Rox Zavala at the [facility] clinic for routine cardiology follow up.  Rox Zavala is a 87 year old female with a medical history of severe aortic valve stenosis status post bioprosthetic aortic valve replacement on Dec15, 2014, one-vessel bypass surgery LIMA to D1, 3 vessel coronary artery disease s/p CHEN to mid LCx and mid RCA on 7-5-2016, essential hypertension, hyperlipidemia, hypothyroidism, frequent PVCs.    The patient states that she has not doing well because of shortness of breath on exertion.  She did not have shortness of breath if she is at rest.  She has shortness of breath with minimal activities.  Her weight has been stable.  She has no obvious leg edema.  She has palpitations when she exerted herself.  She has no dizziness, orthopnea.  Her blood pressure and heart rate are well controlled.    Past Medical History:     Patient Active Problem List   Diagnosis     Hammer Toe     Hemorrhoids      Psoriasis     Generalized Osteoarthritis Of The Hand     Osteoarthritis Of The Knee     Vitamin D Deficiency     Hypothyroidism     Neck Pain     Upper Back Pain (Between Shoulder Blades)     Osteopenia     Coronary artery disease     S/P AVR     Constipation     Acute diastolic heart failure (H)     Essential hypertension     Hyperlipidemia     Coronary artery disease involving coronary bypass graft of native heart with other forms of angina pectoris (H)     Psoriatic arthritis (H)     Sacral insufficiency fracture, initial encounter     Back pain     Pressure injury of buttock, stage 1, unspecified laterality     MCI (mild cognitive impairment)     Frequent PVCs     Senile osteoporosis     JOSIAH (acute kidney injury) (H)     Dieulafoy lesion of stomach     Gastric ulcer due to Helicobacter pylori, acute     Stage 3 chronic kidney disease, unspecified whether stage 3a or 3b CKD (H)     Mild vascular neurocognitive disorder     Thrombocytopenia (H)     Rheumatoid arthritis -- on Methotrexate     Acute gastric ulcer with hemorrhage     Atrial flutter with rapid ventricular response (H)     Hyponatremia     Chronic heart failure with preserved ejection fraction (H)     Paroxysmal atrial fibrillation (H)       Past Surgical History:     Past Surgical History:   Procedure Laterality Date     ANGIOPLASTY / STENTING FEMORAL       AORTIC VALVE REPLACEMENT       CARDIAC SURGERY      CABG     ESOPHAGOSCOPY, GASTROSCOPY, DUODENOSCOPY (EGD), COMBINED N/A 11/16/2021    Procedure: ESOPHAGOGASTRODUODENOSCOPY;  Surgeon: Enrico Galan MD;  Location: [facility] Main OR     EYE SURGERY Bilateral     cataracts     HC REMOVAL GALLBLADDER      Description: Cholecystectomy;  Recorded: 03/20/2008;     DC ESOPHAGOGASTRODUODENOSCOPY TRANSORAL DIAGNOSTIC N/A 6/1/2021    Procedure: ESOPHAGOGASTRODUODENOSCOPY (EGD) with biopsies;  Surgeon: Julio Lopez MD;  Location: [facility];  Service: Gastroenterology     DC  ESOPHAGOGASTRODUODENOSCOPY TRANSORAL DIAGNOSTIC N/A 6/6/2021    Procedure: ESOPHAGOGASTRODUODENOSCOPY (EGD) with bicap cauterization;  Surgeon: Julio Lopez MD;  Location: [facility];  Service: Gastroenterology     DC ESOPHAGOGASTRODUODENOSCOPY TRANSORAL DIAGNOSTIC N/A 6/7/2021    Procedure: ESOPHAGOGASTRODUODENOSCOPY (EGD) with hemoclip;  Surgeon: Sam Brock MD;  Location: [facility];  Service: Gastroenterology     [facility] LIGATE FALLOPIAN TUBE      Description: Tubal Ligation;  Recorded: 03/20/2008;     [facility] TOTAL HIP ARTHROPLASTY Right     Description: Total Hip Replacement;  Recorded: 03/20/2008; right       Family History:     Family History   Problem Relation Age of Onset     Asthma Mother      Coronary Artery Disease No family hx of      Breast Cancer No family hx of         Social History:    reports that she quit smoking about 27 years ago. Her smoking use included cigarettes. She has a 30.00 pack-year smoking history. She has never used smokeless tobacco. She reports current alcohol use of about 1.0 standard drink per week. She reports that she does not use drugs.    Review of Systems:   12 systems are reviewed negative except for in HPI.    Meds:     Current Outpatient Medications:      apixaban ANTICOAGULANT (ELIQUIS) 2.5 MG tablet, Take 1 tablet (2.5 mg) by mouth 2 times daily, Disp: 60 tablet, Rfl: 1     atorvastatin (LIPITOR) 40 MG tablet, Take 1 tablet (40 mg) by mouth daily For lipids, Disp: 90 tablet, Rfl: 3     calcium carbonate-vitamin D (OS-CARLA WITH D) 500-200 MG-UNIT tablet, Calcium 500 + D 500 mg-5 mcg (200 unit) tablet, Disp: , Rfl:      cholecalciferol 50 MCG (2000 UT) tablet, Take 1 tablet (50 mcg) by mouth daily, Disp: 90 tablet, Rfl: 0     diltiazem ER COATED BEADS (CARDIZEM CD/CARTIA XT) 180 MG 24 hr capsule, Take 1 capsule (180 mg) by mouth daily, Disp: 30 capsule, Rfl: 3     donepezil (ARICEPT) 10 MG tablet, Take 1 tablet (10 mg) by mouth At Bedtime dementia, Disp: 90 tablet,  Rfl: 3     furosemide (LASIX) 40 MG tablet, TAKE 1 TAB BY MOUTH ONCE DAILY, Disp: , Rfl:      isosorbide mononitrate (IMDUR) 30 MG 24 hr tablet, Take 1 tablet (30 mg) by mouth daily, Disp: 90 tablet, Rfl: 1     lisinopril (ZESTRIL) 20 MG tablet, lisinopril 20 mg tablet, Disp: , Rfl:      loperamide (IMODIUM A-D) 2 MG tablet, Take 1 tablet (2 mg) by mouth 4 times daily as needed for diarrhea, Disp: 60 tablet, Rfl: 0     methotrexate sodium 2.5 MG TABS, Take 3 tablets (7.5 mg) by mouth once a week Fridays, Disp: 36 tablet, Rfl: 0     methotrexate sodium, PF, 50 MG/2 ML oral solution (inj used orally), methotrexate oral, Disp: , Rfl:      metoprolol succinate ER (TOPROL XL) 50 MG 24 hr tablet, Take 50 mg by mouth daily, Disp: , Rfl:      Multiple Vitamins-Minerals (PRESERVISION AREDS 2 PO), Take 1 tablet by mouth 2 times daily, Disp: , Rfl:      nitroGLYcerin (NITROSTAT) 0.4 MG sublingual tablet, Place 0.4 mg under the tongue every 5 minutes as needed for chest pain For chest pain place 1 tablet under the tongue every 5 minutes for 3 doses. If symptoms persist 5 minutes after 1st dose call 911., Disp: , Rfl:      omeprazole (PRILOSEC) 40 MG DR capsule, Take 1 capsule (40 mg) by mouth daily, Disp: 60 capsule, Rfl: 3     pantoprazole (PROTONIX) 40 MG EC tablet, pantoprazole 40 mg tablet,delayed release, Disp: , Rfl:      polyethylene glycol (MIRALAX) 17 GM/Dose powder, Take 17 g by mouth daily To prevent constiaption, Disp: , Rfl:      SENEXON-S 8.6-50 MG tablet, TAKE 1 TAB BY MOUTH TWICE A DAY FOR CONSTIPATION, Disp: , Rfl:      SYNTHROID 100 MCG tablet, TAKE 1 TABLET EVERY DAY AT 6:00 A.M., Disp: 90 tablet, Rfl: 3     traMADol (ULTRAM) 50 MG tablet, Take 0.5 tablets (25 mg) by mouth 2 times daily as needed for severe pain, Disp: 10 tablet, Rfl: 0     vitamin C (ASCORBIC ACID) 500 MG tablet, Take 1 tablet (500 mg) by mouth daily, Disp: 90 tablet, Rfl: 0    Allergies:   Patient has no known allergies.      Objective:     "  Physical Exam  52.2 kg (115 lb)  1.626 m (5' 4\")  Body mass index is 19.74 kg/m .  /60 (BP Location: Right arm, Patient Position: Sitting, Cuff Size: Adult Regular)   Pulse 51   Resp 16   Ht 1.626 m (5' 4\")   Wt 52.2 kg (115 lb)   BMI 19.74 kg/m      General Appearance:   Awake, Alert, No acute distress.   HEENT:  Pupil equal and reactive to light. No scleral icterus; the mucous membranes were moist.   Neck: No cervical bruits. No JVD. No thyromegaly.     Chest: The spine was straight. The chest was symmetric.   Lungs:   No crackles. No wheezing.   Cardiovascular:   Regular rhythm and rate, normal first and second heart sounds with II/VI systolic murmurs at apex. No rubs or gallops.    Abdomen:  Soft. No tenderness. Non-distended. Bowels sounds are present   Extremities: Equal tibial pulses. Trace leg edema.   Skin: No rashes or ulcers. Warm, Dry.   Musculoskeletal: No tenderness. No deformity.   Neurologic: Mood and affect are appropriate. No focal deficits.         EKG: Personally reviewed  Sinus tachycardia with occasional Premature ventricular complexes and Fusion complexes   Left axis deviation   Right bundle branch block   Abnormal ECG   When compared with ECG of 22-FEB-2022 09:54,   Fusion complexes are now Present   Vent. rate has increased BY  53 BPM     Cardiac Imaging Studies  ECHO on 6-2-2022:  1. Normal left ventricular size and systolic performance with a visually  estimated ejection fraction of 55%.  2. There is mildly abnormal septal motion c/w right ventricular overload;  there is diastolic flattening of the septum with a more normal configuration  at end systole.  3. There is a bio-prosthetic aortic valve (documented 21 mm Lalitha-Blackwell  Magna bovine pericardial tissue valve).  Â  Normal aortic valve prosthesis metrics with a mean systolic gradient of 6  mmHg and a peak anterograde velocity of 1.7 m/sec.  Â  No aortic insufficiency is detected.  4. There is mild, to perhaps " mild-moderate, tricuspid insufficiency.  5. There is moderate right ventricular enlargement with moderately reduced  right ventricular systolic performance.  6. There is severe left atrial enlargement. There is mild to moderate right  atrial enlargement.  7. Right ventricular systolic pressure relative to right atrial pressure is  mildly increased. The pulmonary artery pressure is estimated to be 40-45 mmHg  plus right atrial pressure (the IVC is moderately dilated).    Lab Review   Lab Results   Component Value Date     03/24/2022    CO2 18 03/24/2022    BUN 34 03/24/2022     Lab Results   Component Value Date    WBC 6.5 03/24/2022    HGB 10.1 03/24/2022    HCT 30.3 03/24/2022    MCV 99 03/24/2022     03/24/2022     Lab Results   Component Value Date    CHOL 139 08/21/2018    CHOL 135 08/04/2017    CHOL 141 12/06/2016     Lab Results   Component Value Date    HDL 79 08/21/2018    HDL 65 08/04/2017    HDL 75 12/06/2016     No components found for: LDLCALC  Lab Results   Component Value Date    TRIG 70 08/21/2018    TRIG 62 08/04/2017    TRIG 61 12/06/2016     No results found for: CHOLHDL  Lab Results   Component Value Date    TROPONINI 0.02 02/21/2022     Lab Results   Component Value Date     02/22/2022     Lab Results   Component Value Date    TSH 3.14 03/24/2022                 Thank you for allowing me to participate in the care of your patient.      Sincerely,     Shipla Wilburn MD     [facility]  cc:   Shilpa Wilburn MD  [id number] [address]

## 2022-11-10 NOTE — TELEPHONE ENCOUNTER
Results and recommendations discussed with son Billy.  Billy verbalized understanding and had no questions.    M at [facility] for PCP.  Results and recommendations routed.  Called the [facility] - faxed order to hold Eliquis to them as meds are set up for pt.  Referral placed to [facility] - pt has seen Dr Galan in the past.

## 2022-11-10 NOTE — TELEPHONE ENCOUNTER
----- Message from Shilpa Wilburn MD sent at 11/10/2022  2:58 PM CST -----  Please let the patient know that I have reviewed her laboratory test reports.  Her LDL was mildly elevated after her statins was discontinued.  She has mild kidney dysfunction.  Her hemoglobin level is dropped from 11.5-8.8 which could be the reason why she is so shortness of breath on exertion.  Eliquis please let the patient to hold Eliquis at this time and f refer the patient to GI clinic for further evaluation as quickly as possible.  Thanks  Martin

## 2022-12-19 NOTE — TELEPHONE ENCOUNTER
Spoke with daughter, confirmed per office visit 11/09, follow up is to be in 3 months. Transferred to scheduling.-RICH

## 2022-12-19 NOTE — TELEPHONE ENCOUNTER
[facility] Call Center    Phone Message    May a detailed message be left on voicemail: yes     Reason for Call: Other: Pt daughter is wondering if Feb 17th is too soon for a follow up appointment? The appt request date is for march 09. Please call pt daughter back to discuss. Thank you     Action Taken: Message routed to:  Other: Cardiology    Travel Screening: Not Applicable       Thank you!  [facility]

## 2022-12-30 NOTE — PROGRESS NOTES
Heart Failure Care Map  GOALS TO BE MET BEFORE DISCHARGE:    1. Decrease congestion and/or edema with diuretic therapy to achieve near optimal volume status.     Dyspnea improved: Yes, satisfactory for discharge.   Edema improved: Yes, satisfactory for discharge.        Net I/O and Weights since admission:   05/14 0700 - 06/13 0659  In: 8654 [P.O.:8154]  Out: 39889 [Urine:27881]  Net: -7446     Vitals:    06/01/22 1700 06/01/22 2205 06/02/22 0422 06/04/22 0322   Weight: 54.9 kg (121 lb) 57.1 kg (125 lb 14.4 oz) 56.8 kg (125 lb 3.2 oz) 57.2 kg (126 lb 1.6 oz)    06/05/22 0404 06/06/22 0355 06/07/22 0630 06/08/22 0435   Weight: 54.9 kg (121 lb) 55.6 kg (122 lb 9.6 oz) 53.7 kg (118 lb 4.8 oz) 52.9 kg (116 lb 11.2 oz)    06/09/22 0700 06/10/22 0644 06/11/22 0203 06/12/22 0311   Weight: 55.7 kg (122 lb 14.4 oz) 55 kg (121 lb 3 oz) 54.9 kg (121 lb 1.6 oz) 55.6 kg (122 lb 8 oz)       2.  O2 sats > 90% on room air, or at prior home O2 therapy level.      Able to wean O2 this shift to keep sats above 90%?: No, further care required to meet this goal. Please explain patient is unable to wean off 02 do to needs increased with activity and during sleep.   Does patient use Home O2? No          Current oxygenation status:   SpO2: 92 %     O2 Device: Nasal cannula, Oxygen Delivery: 2 LPM    3.  Tolerates ambulation and mobility near baseline.     Ambulation: Yes, satisfactory for discharge.   Times patient ambulated with staff this shift: 0    Please review the Heart Failure Care Map for additional HF goal outcomes.  Patient continues to need oxygen with activity and during sleep. Rhythm=a-fib HR= 75, lungs with faint crackles/diminished, 02 sat's 92% on 2 liter's nasal cannula. purwik in place with ok urine output. Bed alarm on for safety and Melatonin 1 mg given at 0046 for sleep. Will cont to monitor.  Denice Apple RN  6/13/2022    Admission

## 2023-01-02 NOTE — PROGRESS NOTES
..Initial SW/CM Assessment/Plan of Care Note     Baseline Assessment   67 year old admitted 12/30/2022 as Inpatient.  Prior to admission patient was living with Spouse and residing at House,  .  Decision maker is Patient, N/A name is .  Document is not activated.     Patient’s Primary Care Provider is Maria Fernanda Olivares MD.     Progress Note   Met with patient and her spouse, patient is IPTA works many hours per day admits to sleeping only \"four hours a night\". We did discuss diabetes education and she agreed to going to outpatient educations.  Patient was prescribed Jardiance this author called the [employer] in [place] and was informed that patient required a prior authorization from the insurer. This author called the pharmacy line at [facility] and was informed that the offices were closed on Monday January 2nd.  This author conveyed this information to Dr Alberts and she s/w Dr Maynard who will follow up with patient regarding the prior auth, and it was agreed to discharge patient with other antihyperglycemic medications until  but patient would go home with metformin.   This author informed patient that she should follow up with Dr Maynard and his office to set up an appt., and to see if they can get the prior authorization for the Jardiance. This author printed off a coupon from the Jardiance site which can be used after her insurance provides authorization.    Functional Status Prior to Admission   Functional Status  Ambulation: Independent/Self  Dressing: Independent/Self  Toileting: Independent/Self  Shopping: Independent/Self  Medication Preparation: Independent/Self  Medication Administration: Independent/Self  Transportation: Independent/Self         Agency/Support   Type of Services Prior to Hospitalization: None   Support Systems: Spouse/Significant other   Home Devices/Equipment: None   Mobility Assist Devices: None   Sensory Support Devices: None     Therapy Recommendations          Insurance   Primary: [facility]   Secondary:  Care Management Follow Up    Length of Stay (days): 2    Expected Discharge Date: 06/06/2022    Concerns to be Addressed:   Monitoring labs (creatinine trending up, holding lasix), A fib with RVR (treatment seems to be dropping BP). Cardiology following.   Patient plan of care discussed at interdisciplinary rounds: Yes  Follow up from rounds/notes:   As per above. Will have therapy re-evaluate (patient seems a little fragile to return home alone).     Anticipated Discharge Disposition:  Home.      Anticipated Discharge Services:  Plan home with [facility].   Anticipated Discharge DME:  Per therapy (if indicated).    Patient/family educated on Medicare website which has current facility and service quality ratings:  NA  Education Provided on the Discharge Plan:  Per team  Patient/Family in Agreement with the Plan:  Yes    Referrals Placed by CM/SW:  None  Private pay costs discussed: Not applicable (Kinnye reviewed in ED)    Additional Information:  Patient lives at [facility] in their [facility] apartments alone. The only services that are provided are housekeeping help every 2 weeks and 3 meals a day. She is independent with activities of daily living and son helps with medication set up and transportation. Family is very involved. Patient is open to [facility] for home PT and OT can accept upon return. If nursing is added, they will make a referral to their sister agency. Their direct number is [phone], fax number [phone]. Therapy agrees with a home discharge. An update was provided to [facility] and son Kwesi on 6/3/2022. Kwesi stated that his brother, Billy, will provide transportation at discharge.   Her  with [facility] is [place] ([phone]). Patient's primary care provider is Nora Robledo.     Mona Frazier RN       N/A     Barriers to Discharge   Identified Barriers to Discharge/Transition Planning:       Plan   Discharge/Care Planning  Patient's Anticipated Discharge Needs: Home with no anticipated needs  Planned Discharge/Disposition: Home or Self Care   SW/CM - Recommendations for Discharge:     List Provided (if applicable):    Referral to (if applicable):    Disclosure of [facility] Affiliation (if applicable):        Anticipate patient can return to the environment from which patient entered the hospital.     Anticipate patient can provide self-care at discharge.     Refer to SW/CM Assessment flowsheet for objective data.

## 2023-12-13 NOTE — PROGRESS NOTES
Assessment/Recommendations   Assessment:    1.  Heart failure with preserved ejection fraction, right-sided heart failure, NYHA class III: Compensated.  She continues to have fatigue, dyspnea on exertion and trace pedal edema.  Her weight has decreased a few pounds since her last clinic visit.  Dr. Wilburn increased her Lasix to 40 mg daily but she has only been taking this for a few days.  She lives in assisted living where her meals are provided.  2.  Paroxysmal atrial fibrillation: Rate controlled.  She continues Eliquis for anticoagulation and Toprol and diltiazem.  3.  Chronic kidney disease stage III: she will have a BMP next week.    Plan:  1.  Continue current medications     2.  BMP next week  3.  Continue monitoring weights and low-salt diet  4.  Continue wearing compression socks    Rox Zavala will follow up with Dr. Wilburn November 9 and in the heart failure clinic in February or sooner if needed.     History of Present Illness/Subjective    Ms. Rox Zavala is a 87 year old female seen at [facility] heart failure clinic today for continued follow-up.  Her daughter accompanies her today.  He follows up for heart failure with preserved ejection fraction, right-sided heart failure.  She had an echocardiogram in June 2022 which showed ejection fraction of 55% with RV moderately enlarged. She has a past medical history significant for atrial flutter, CAD, hypertension, hyperlipidemia, status post bioprosthetic aortic valve, chronic kidney disease stage III, hypothyroidism.    During the last clinic visit, Dr. Wilburn increased her Lasix to 40 mg daily.  She states she has only increased it for few days.  Her clinic weight is down 2 pounds.  She has not noticed any improvement with her dyspnea on exertion or lower extremity edema.  She denies lightheadedness, shortness of breath, orthopnea, PND, palpitations, chest pain and abdominal fullness/bloating.       Physical Examination Review of Systems  "  BP 92/52 (BP Location: Right arm, Patient Position: Sitting, Cuff Size: Adult Small)   Pulse 100   Resp 16   Ht 1.626 m (5' 4\")   Wt 52.2 kg (115 lb)   BMI 19.74 kg/m    Body mass index is 19.74 kg/m .  Wt Readings from Last 3 Encounters:   09/02/22 52.2 kg (115 lb)   08/16/22 53.1 kg (117 lb)   06/22/22 52.6 kg (116 lb)       General Appearance:   no acute distress   ENT/Mouth: Wearing mask   EYES:  no scleral icterus, normal conjunctivae   Neck: no thyromegaly   Chest/Lungs:   lungs are clear to auscultation, no rales or wheezing, equal chest wall expansion    Cardiovascular:    Irregularly irregular. Normal first and second heart sounds with no murmurs, rubs, or gallops; Jugular venous pressure normal, trace pedal edema bilaterally, wearing compression socks   Abdomen:  no organomegaly, masses, bruits, or tenderness; bowel sounds are present   Extremities: no cyanosis or clubbing   Skin: no xanthelasma, warm.    Neurologic: normal  bilateral, no tremors     Psychiatric: alert and oriented x3    Enc Vitals  BP: 92/52  Pulse: 100  Resp: 16  Weight: 52.2 kg (115 lb)  Height: 162.6 cm (5' 4\")                                         Medical History  Surgical History Family History Social History   Past Medical History:   Diagnosis Date     Acute diastolic CHF (congestive heart failure) (H) 12/23/2014     Acute renal failure (H)      Anemia      Aortic valve stenosis, severe      Arrhythmia      Arthritis      Atrial flutter (H)      Coronary artery disease 11/17/2014     Dieulafoy lesion of stomach      Disease of thyroid gland      Essential hypertension 12/08/2015     Gastroesophageal reflux disease      GI bleed 06/01/2021     Hammer toe      Heart murmur      History of blood transfusion      Hyperlipidemia      Hypertension      Hypothyroidism     Created by Conversion [employer] Utility updated for latest IMO load      Irregular heart beat      Macular disorder     \"wrinkle\"     MCI (mild cognitive " impairment) 06/16/2019     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 Main OR     EYE SURGERY Bilateral     cataracts     HC REMOVAL GALLBLADDER      Description: Cholecystectomy;  Recorded: 03/20/2008;     GA ESOPHAGOGASTRODUODENOSCOPY TRANSORAL DIAGNOSTIC N/A 6/1/2021    Procedure: ESOPHAGOGASTRODUODENOSCOPY (EGD) with biopsies;  Surgeon: Julio Lopez MD;  Location: [facility];  Service: Gastroenterology     GA ESOPHAGOGASTRODUODENOSCOPY TRANSORAL DIAGNOSTIC N/A 6/6/2021    Procedure: ESOPHAGOGASTRODUODENOSCOPY (EGD) with bicap cauterization;  Surgeon: Julio Lopez MD;  Location: [facility];  Service: Gastroenterology     GA ESOPHAGOGASTRODUODENOSCOPY TRANSORAL DIAGNOSTIC N/A 6/7/2021    Procedure: ESOPHAGOGASTRODUODENOSCOPY (EGD) with hemoclip;  Surgeon: Sam Brock MD;  Location: [facility];  Service: Gastroenterology     [facility] LIGATE FALLOPIAN TUBE      Description: Tubal Ligation;  Recorded: 03/20/2008;     ZZC TOTAL HIP ARTHROPLASTY Right     Description: Total Hip Replacement;  Recorded: 03/20/2008; right    Family History   Problem Relation Age of Onset     Asthma Mother      Coronary Artery Disease No family hx of      Breast Cancer No family hx of     Social History     Socioeconomic History     Marital status:      Spouse name: Not on file     Number of children: Not on file     Years of education: Not on file     Highest  "education level: Not on file   Occupational History     Not on file   Tobacco Use     Smoking status: Former Smoker     Packs/day: 1.00     Years: 30.00     Pack years: 30.00     Types: Cigarettes     Quit date: 1995     Years since quittin.6     Smokeless tobacco: Never Used   Substance and Sexual Activity     Alcohol use: Yes     Alcohol/week: 1.0 standard drink     Drug use: No     Sexual activity: Not on file   Other Topics Concern     Not on file   Social History Narrative    , patient notes she and her  were  for 60 years and he passed away 12 years ago following an illness and complications. Patient notes they had a happy marriage.  Children: Patient notes she had 4 children, one daughter passed away fo llowing a MVA when daughter was 57 years old. Leisa states she is close to her two boys, daughter, grandchildren, and great-grandchildren and finds her family supportive.     Lives in a town home independently.. Now in senior living.    Lives at \" [facility]\", independent living.    Misbah Barone MD  2020           Social Determinants of Health     Financial Resource Strain: Not on file   Food Insecurity: Not on file   Transportation Needs: Not on file   Physical Activity: Not on file   Stress: Not on file   Social Connections: Not on file   Intimate Partner Violence: Not on file   Housing Stability: Not on file          Medications  Allergies   Current Outpatient Medications   Medication Sig Dispense Refill     apixaban ANTICOAGULANT (ELIQUIS) 2.5 MG tablet Take 1 tablet (2.5 mg) by mouth 2 times daily 60 tablet 1     atorvastatin (LIPITOR) 40 MG tablet Take 1 tablet (40 mg) by mouth daily For lipids 90 tablet 3     calcium carbonate-vitamin D (OS-CARLA WITH D) 500-200 MG-UNIT tablet Calcium 500 + D 500 mg-5 mcg (200 unit) tablet       cholecalciferol 50 MCG (2000 UT) tablet Take 1 tablet (50 mcg) by mouth daily 90 tablet 0     diltiazem ER COATED BEADS (CARDIZEM CD/CARTIA " Detail Level: Detailed X Size Of Lesion In Cm (Optional): 0 XT) 180 MG 24 hr capsule Take 1 capsule (180 mg) by mouth daily 30 capsule 3     donepezil (ARICEPT) 10 MG tablet Take 1 tablet (10 mg) by mouth At Bedtime dementia 90 tablet 3     furosemide (LASIX) 40 MG tablet TAKE 1 TAB BY MOUTH ONCE DAILY       isosorbide mononitrate (IMDUR) 30 MG 24 hr tablet Take 1 tablet (30 mg) by mouth daily 90 tablet 1     lisinopril (ZESTRIL) 20 MG tablet lisinopril 20 mg tablet       loperamide (IMODIUM A-D) 2 MG tablet Take 1 tablet (2 mg) by mouth 4 times daily as needed for diarrhea 60 tablet 0     methotrexate sodium 2.5 MG TABS Take 3 tablets (7.5 mg) by mouth once a week Fridays 36 tablet 0     methotrexate sodium, PF, 50 MG/2 ML oral solution (inj used orally) methotrexate oral       metoprolol succinate ER (TOPROL XL) 50 MG 24 hr tablet Take 50 mg by mouth daily       Multiple Vitamins-Minerals (PRESERVISION AREDS 2 PO) Take 1 tablet by mouth 2 times daily       nitroGLYcerin (NITROSTAT) 0.4 MG sublingual tablet Place 0.4 mg under the tongue every 5 minutes as needed for chest pain For chest pain place 1 tablet under the tongue every 5 minutes for 3 doses. If symptoms persist 5 minutes after 1st dose call 911.       omeprazole (PRILOSEC) 40 MG DR capsule Take 1 capsule (40 mg) by mouth daily 60 capsule 3     pantoprazole (PROTONIX) 40 MG EC tablet pantoprazole 40 mg tablet,delayed release       polyethylene glycol (MIRALAX) 17 GM/Dose powder Miralax 17 gram oral powder packet       polyethylene glycol (MIRALAX) 17 GM/Dose powder Take 17 g by mouth daily To prevent constiaption       SENEXON-S 8.6-50 MG tablet TAKE 1 TAB BY MOUTH TWICE A DAY FOR CONSTIPATION       SYNTHROID 100 MCG tablet TAKE 1 TABLET EVERY DAY AT 6:00 A.M. 90 tablet 3     traMADol (ULTRAM) 50 MG tablet Take 0.5 tablets (25 mg) by mouth 2 times daily as needed for severe pain 10 tablet 0     vitamin C (ASCORBIC ACID) 500 MG tablet Take 1 tablet (500 mg) by mouth daily 90 tablet 0    No Known Allergies      Lab  Incorporate Mauc In Note: Yes Results    Chemistry/lipid CBC Cardiac Enzymes/BNP/TSH/INR   Lab Results   Component Value Date    CHOL 139 08/21/2018    HDL 79 08/21/2018    TRIG 70 08/21/2018    BUN 20 08/16/2022     08/16/2022    CO2 34 (H) 08/16/2022    Lab Results   Component Value Date    WBC 6.5 08/16/2022    HGB 11.5 (L) 08/16/2022    HCT 34.9 (L) 08/16/2022     (H) 08/16/2022     08/16/2022    Lab Results   Component Value Date    TROPONINI 0.03 06/01/2022     (H) 06/01/2022    TSH 4.26 04/15/2022    INR 1.25 (H) 02/16/2022             This note has been dictated using voice recognition software. Any grammatical, typographical, or context distortions are unintentional and inherent to the software    30 minutes spent on the date of encounter doing chart review, review of outside records, review of test results, interpretation with above tests, patient visit, documentation and discussion with family.

## 2024-08-14 NOTE — PLAN OF CARE
Already spoke with Selena, Pt is scheduled Goal Outcome Evaluation:        Heart Failure Care Map  GOALS TO BE MET BEFORE DISCHARGE:    1. Decrease congestion and/or edema with diuretic therapy to achieve near optimal volume status.     Dyspnea improved: Yes, satisfactory for discharge.   Edema improved: No, further care required to meet this goal. Please explain has plus 1 pitting edema in legs.        Net I/O and Weights since admission:   05/05 0700 - 06/04 0659  In: 2300 [P.O.:1800]  Out: 3675 [Urine:3675]  Net: -1375     Vitals:    06/01/22 1700 06/01/22 2205 06/02/22 0422 06/04/22 0322   Weight: 54.9 kg (121 lb) 57.1 kg (125 lb 14.4 oz) 56.8 kg (125 lb 3.2 oz) 57.2 kg (126 lb 1.6 oz)       2.  O2 sats > 90% on room air, or at prior home O2 therapy level.      Able to wean O2 this shift to keep sats above 90%?: No, further care required to meet this goal. Please explain remained on nasal cannula 2 L/min   Does patient use Home O2? No          Current oxygenation status:   SpO2: 93 %     O2 Device: Nasal cannula, Oxygen Delivery: 2 LPM    3.  Tolerates ambulation and mobility near baseline.     Ambulation: No, further care required to meet this goal. Please explain patient did not walk with staff over night.  Walked to restroom once, not in hallway.   Times patient ambulated with staff this shift: 0    Please review the Heart Failure Care Map for additional HF goal outcomes.    Belia Brown RN  6/4/2022       Patient is alert.  Slept well during night.  Output measurement difficult to obtain due to incontinence, and bypass external catheter.  Staff attempted to walk to restroom, but patient missed hat.  Patient denied pain.  Remained on nasal cannula.  Pleasant and cooperative with staff.  Will continue to monitor.

## 2024-08-24 NOTE — DISCHARGE INSTRUCTIONS
Oxygen Provider:  Arranged through [facility] [employer] [facility] [employer], contact number [phone].  If you have any questions or concerns please call the oxygen company directly.   
normal sinus rhythm

## 2025-06-11 NOTE — TELEPHONE ENCOUNTER
----- Message from Misbah Barone MD sent at 4/24/2019  9:05 AM CDT -----  Please inform patient that her labs show that she is in congestive heart failure based on her be done a trip appetite.  The x-ray did not show much of fluid congestion however she does have chronic COPD changes that can mask it.    If she is continuing to have shortness of breath, she should go to emergency room.    If she feels stable as before, noting that she has had fluid gain of greater than 1 pound, she should take 1 pill of Lasix 40 mg as previously suggested by her cardiologist.  She does have cardiology appointment tomorrow.    An EKG is read by a cardiologist shows that it is same as before.    Misbah Barone MD  4/24/2019    P.S: I am also sending a my chart message.  However I would like this message to be relayed in person to   

## (undated) DEVICE — SOL WATER IRRIG 1000ML BOTTLE 2F7114

## (undated) DEVICE — SUCTION MANIFOLD NEPTUNE 2 SYS 1 PORT 702-025-000

## (undated) DEVICE — TUBING SUCTION MEDI-VAC 1/4"X20' N620A - HE